# Patient Record
Sex: FEMALE | Race: OTHER | HISPANIC OR LATINO | URBAN - METROPOLITAN AREA
[De-identification: names, ages, dates, MRNs, and addresses within clinical notes are randomized per-mention and may not be internally consistent; named-entity substitution may affect disease eponyms.]

---

## 2021-09-07 ENCOUNTER — EMERGENCY (EMERGENCY)
Facility: HOSPITAL | Age: 26
LOS: 1 days | Discharge: HOME | End: 2021-09-07
Attending: EMERGENCY MEDICINE | Admitting: EMERGENCY MEDICINE
Payer: COMMERCIAL

## 2021-09-07 VITALS
HEART RATE: 99 BPM | OXYGEN SATURATION: 96 % | SYSTOLIC BLOOD PRESSURE: 119 MMHG | RESPIRATION RATE: 16 BRPM | TEMPERATURE: 98 F | DIASTOLIC BLOOD PRESSURE: 81 MMHG

## 2021-09-07 VITALS
TEMPERATURE: 98 F | WEIGHT: 194.01 LBS | RESPIRATION RATE: 18 BRPM | OXYGEN SATURATION: 98 % | SYSTOLIC BLOOD PRESSURE: 125 MMHG | DIASTOLIC BLOOD PRESSURE: 80 MMHG | HEART RATE: 110 BPM | HEIGHT: 69.29 IN

## 2021-09-07 DIAGNOSIS — Z20.822 CONTACT WITH AND (SUSPECTED) EXPOSURE TO COVID-19: ICD-10-CM
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
## 2021-09-07 DIAGNOSIS — Z87.81 PERSONAL HISTORY OF (HEALED) TRAUMATIC FRACTURE: ICD-10-CM

## 2021-09-07 DIAGNOSIS — M79.605 PAIN IN LEFT LEG: ICD-10-CM

## 2021-09-07 DIAGNOSIS — S82.202D UNSPECIFIED FRACTURE OF SHAFT OF LEFT TIBIA, SUBSEQUENT ENCOUNTER FOR CLOSED FRACTURE WITH ROUTINE HEALING: ICD-10-CM

## 2021-09-07 DIAGNOSIS — Z98.890 OTHER SPECIFIED POSTPROCEDURAL STATES: Chronic | ICD-10-CM

## 2021-09-07 DIAGNOSIS — M96.840 POSTPROCEDURAL HEMATOMA OF A MUSCULOSKELETAL STRUCTURE FOLLOWING A MUSCULOSKELETAL SYSTEM PROCEDURE: ICD-10-CM

## 2021-09-07 DIAGNOSIS — M25.572 PAIN IN LEFT ANKLE AND JOINTS OF LEFT FOOT: ICD-10-CM

## 2021-09-07 LAB
ALBUMIN SERPL ELPH-MCNC: 3.9 G/DL — SIGNIFICANT CHANGE UP (ref 3.3–5)
ALP SERPL-CCNC: 68 U/L — SIGNIFICANT CHANGE UP (ref 40–120)
ALT FLD-CCNC: 14 U/L — SIGNIFICANT CHANGE UP (ref 10–45)
ANION GAP SERPL CALC-SCNC: 14 MMOL/L — SIGNIFICANT CHANGE UP (ref 5–17)
AST SERPL-CCNC: 13 U/L — SIGNIFICANT CHANGE UP (ref 10–40)
BASOPHILS # BLD AUTO: 0.03 K/UL — SIGNIFICANT CHANGE UP (ref 0–0.2)
BASOPHILS NFR BLD AUTO: 0.2 % — SIGNIFICANT CHANGE UP (ref 0–2)
BILIRUB SERPL-MCNC: 0.4 MG/DL — SIGNIFICANT CHANGE UP (ref 0.2–1.2)
BUN SERPL-MCNC: 12 MG/DL — SIGNIFICANT CHANGE UP (ref 7–23)
CALCIUM SERPL-MCNC: 9.7 MG/DL — SIGNIFICANT CHANGE UP (ref 8.4–10.5)
CHLORIDE SERPL-SCNC: 104 MMOL/L — SIGNIFICANT CHANGE UP (ref 96–108)
CO2 SERPL-SCNC: 21 MMOL/L — LOW (ref 22–31)
CREAT SERPL-MCNC: 0.7 MG/DL — SIGNIFICANT CHANGE UP (ref 0.5–1.3)
CRP SERPL-MCNC: 213.7 MG/L — HIGH (ref 0–4)
EOSINOPHIL # BLD AUTO: 0.1 K/UL — SIGNIFICANT CHANGE UP (ref 0–0.5)
EOSINOPHIL NFR BLD AUTO: 0.8 % — SIGNIFICANT CHANGE UP (ref 0–6)
ERYTHROCYTE [SEDIMENTATION RATE] IN BLOOD: 109 MM/HR — HIGH
GLUCOSE SERPL-MCNC: 83 MG/DL — SIGNIFICANT CHANGE UP (ref 70–99)
HCT VFR BLD CALC: 37.3 % — SIGNIFICANT CHANGE UP (ref 34.5–45)
HGB BLD-MCNC: 12.4 G/DL — SIGNIFICANT CHANGE UP (ref 11.5–15.5)
IMM GRANULOCYTES NFR BLD AUTO: 0.3 % — SIGNIFICANT CHANGE UP (ref 0–1.5)
LYMPHOCYTES # BLD AUTO: 1.41 K/UL — SIGNIFICANT CHANGE UP (ref 1–3.3)
LYMPHOCYTES # BLD AUTO: 11.1 % — LOW (ref 13–44)
MCHC RBC-ENTMCNC: 29.3 PG — SIGNIFICANT CHANGE UP (ref 27–34)
MCHC RBC-ENTMCNC: 33.2 GM/DL — SIGNIFICANT CHANGE UP (ref 32–36)
MCV RBC AUTO: 88.2 FL — SIGNIFICANT CHANGE UP (ref 80–100)
MONOCYTES # BLD AUTO: 0.67 K/UL — SIGNIFICANT CHANGE UP (ref 0–0.9)
MONOCYTES NFR BLD AUTO: 5.3 % — SIGNIFICANT CHANGE UP (ref 2–14)
NEUTROPHILS # BLD AUTO: 10.48 K/UL — HIGH (ref 1.8–7.4)
NEUTROPHILS NFR BLD AUTO: 82.3 % — HIGH (ref 43–77)
NRBC # BLD: 0 /100 WBCS — SIGNIFICANT CHANGE UP (ref 0–0)
PLATELET # BLD AUTO: 524 K/UL — HIGH (ref 150–400)
POTASSIUM SERPL-MCNC: 3.9 MMOL/L — SIGNIFICANT CHANGE UP (ref 3.5–5.3)
POTASSIUM SERPL-SCNC: 3.9 MMOL/L — SIGNIFICANT CHANGE UP (ref 3.5–5.3)
PROT SERPL-MCNC: 9 G/DL — HIGH (ref 6–8.3)
RBC # BLD: 4.23 M/UL — SIGNIFICANT CHANGE UP (ref 3.8–5.2)
RBC # FLD: 11.9 % — SIGNIFICANT CHANGE UP (ref 10.3–14.5)
SARS-COV-2 RNA SPEC QL NAA+PROBE: SIGNIFICANT CHANGE UP
SODIUM SERPL-SCNC: 139 MMOL/L — SIGNIFICANT CHANGE UP (ref 135–145)
WBC # BLD: 12.73 K/UL — HIGH (ref 3.8–10.5)
WBC # FLD AUTO: 12.73 K/UL — HIGH (ref 3.8–10.5)

## 2021-09-07 PROCEDURE — 73590 X-RAY EXAM OF LOWER LEG: CPT | Mod: 26

## 2021-09-07 PROCEDURE — 80053 COMPREHEN METABOLIC PANEL: CPT

## 2021-09-07 PROCEDURE — G1004: CPT

## 2021-09-07 PROCEDURE — 36415 COLL VENOUS BLD VENIPUNCTURE: CPT

## 2021-09-07 PROCEDURE — 73701 CT LOWER EXTREMITY W/DYE: CPT | Mod: MG

## 2021-09-07 PROCEDURE — 73610 X-RAY EXAM OF ANKLE: CPT | Mod: 26

## 2021-09-07 PROCEDURE — 73610 X-RAY EXAM OF ANKLE: CPT | Mod: 26,LT

## 2021-09-07 PROCEDURE — 85652 RBC SED RATE AUTOMATED: CPT

## 2021-09-07 PROCEDURE — 73590 X-RAY EXAM OF LOWER LEG: CPT

## 2021-09-07 PROCEDURE — 87040 BLOOD CULTURE FOR BACTERIA: CPT

## 2021-09-07 PROCEDURE — 73590 X-RAY EXAM OF LOWER LEG: CPT | Mod: 26,LT

## 2021-09-07 PROCEDURE — 73701 CT LOWER EXTREMITY W/DYE: CPT | Mod: 26,LT,MG

## 2021-09-07 PROCEDURE — 73610 X-RAY EXAM OF ANKLE: CPT

## 2021-09-07 PROCEDURE — 85025 COMPLETE CBC W/AUTO DIFF WBC: CPT

## 2021-09-07 PROCEDURE — 99244 OFF/OP CNSLTJ NEW/EST MOD 40: CPT

## 2021-09-07 PROCEDURE — 86140 C-REACTIVE PROTEIN: CPT

## 2021-09-07 PROCEDURE — 99284 EMERGENCY DEPT VISIT MOD MDM: CPT | Mod: 25

## 2021-09-07 PROCEDURE — U0005: CPT

## 2021-09-07 PROCEDURE — U0003: CPT

## 2021-09-07 PROCEDURE — 99285 EMERGENCY DEPT VISIT HI MDM: CPT | Mod: 25

## 2021-09-07 PROCEDURE — 96374 THER/PROPH/DIAG INJ IV PUSH: CPT | Mod: XU

## 2021-09-07 RX ORDER — KETOROLAC TROMETHAMINE 30 MG/ML
15 SYRINGE (ML) INJECTION ONCE
Refills: 0 | Status: DISCONTINUED | OUTPATIENT
Start: 2021-09-07 | End: 2021-09-07

## 2021-09-07 RX ORDER — OXYCODONE AND ACETAMINOPHEN 5; 325 MG/1; MG/1
1 TABLET ORAL ONCE
Refills: 0 | Status: DISCONTINUED | OUTPATIENT
Start: 2021-09-07 | End: 2021-09-07

## 2021-09-07 RX ADMIN — OXYCODONE AND ACETAMINOPHEN 1 TABLET(S): 5; 325 TABLET ORAL at 17:14

## 2021-09-07 RX ADMIN — OXYCODONE AND ACETAMINOPHEN 1 TABLET(S): 5; 325 TABLET ORAL at 18:06

## 2021-09-07 RX ADMIN — Medication 15 MILLIGRAM(S): at 18:24

## 2021-09-07 NOTE — ED PROVIDER NOTE - NSFOLLOWUPINSTRUCTIONS_ED_ALL_ED_FT
If you have any worsening of your symptoms, return to the ED IMMEDIATELY for reassessment and likely admission at that time for OR.    Call tomorrow or thursday am (after the holiday) to get an appointment immediatley to see him about your ankle.

## 2021-09-07 NOTE — H&P ADULT - ATTENDING COMMENTS
Patient with chronic draining left ankle medial wound s/p prior ORIF, now with draining lateral wound, exam consistent with possible posterior tibial artery ands nerve injury at index injury/surgery as well as suspected superficial peroneal nerve.  Xrays consistent with infected nonunion.  No emergent indication for acute admission at this time as aseptic and chronic as stated. Will require complex, likely staged surgical intervention with a foot and ankle specialist.  Options for admission with pain control, and inpatient surgery versus outpatient consultation were discussed.  Pt opts for outpatient initiation of care with specialist.  NWB LLE and crutches until followup.

## 2021-09-07 NOTE — ED PROVIDER NOTE - ATTENDING CONTRIBUTION TO CARE
here w/ concern for new ankle pain and bleeding ulcers  in setting of ankle fracture s/p ORIF several months ago in [facility]. tachycardic in the ed, but non toxic appearing, ankle with diffuse edema, bilateral malleolar ulcerations with blood oozing from wounds, no purulence seen. plan for labs, xr, ortho consult for poss infection

## 2021-09-07 NOTE — H&P ADULT - NSHPPHYSICALEXAM_GEN_ALL_CORE
Physical Exam:   1.5cmx1.5cm open wound at left lateral malleolus draining serosanguinous foul smelling fluid. [id number] wound at medial maleolus draining serosanguinous fluid. Mild TTP, swollen.  Absent sensation over dorsum, 1st webspace, plantar surface of L foot. Decreased sensation over lateral surface of 5th metatarsal (same presentation as compared to sensation immediately post op)  2+ pedal pulses warm and well perfused, capillary refill <3 seconds  0/5 strength with L dorsiflexion and L EHL, 3/5 strength with L FHL/GC.

## 2021-09-07 NOTE — ED ADULT NURSE NOTE - CHIEF COMPLAINT QUOTE
Patient states had surgery on March 28 in [facility], on left ankle 5 months ago for multiple fractures, states was recovering well since, noted last week pain, swelling and wound re-opened w/  bleeding from site.  Arrives w/ bulky dressing in place and ambulates w/ crutches.

## 2021-09-07 NOTE — ED PROVIDER NOTE - OBJECTIVE STATEMENT
26 healthy F psh ORIF L tib/fib fracture 3/28 in [facility] p/w pain/swelling and open wounds to LLE x one week.   pt reports she had surgery for tib/fib fracture after injury in March, has been compliant with PT and has since been able to ambulate with cane and minimal pain.  Over past week she reports diffuse pain and increased swelling and surgical wounds have opened w/ bloody drainage.  She has not been able to bear weight for past week.  Went to see podiatrist today who told her she needs to see ortho today or go to ED.  Denies f/c, 26 healthy F psh ORIF L tib/fib fracture 3/28 in [facility] p/w pain/swelling and open wounds to LLE x one week.   pt reports she had surgery for tib/fib fracture after injury in March, has been compliant with PT and has since been able to ambulate with cane and minimal pain.  Over past week she reports diffuse pain and increased swelling and surgical wounds have opened w/ bloody drainage.  She has not been able to bear weight for past week.  Went to see podiatrist today who told her she needs to see ortho today or go to ED.  Pt has had decreased sensation L 5th toe since surgery, no change.  Denies f/c, headache, dizziness, chest pain, sob, abd pain, nv, numbness/weakness, calf pain, recurrent injury/trauma

## 2021-09-07 NOTE — ED PROVIDER NOTE - PATIENT PORTAL LINK FT
You can access the FollowMyHealth Patient Portal offered by [facility] by registering at the following website: [email or web address]. By joining [employer]’s FollowMyHealth portal, you will also be able to view your health information using other applications (apps) compatible with our system.

## 2021-09-07 NOTE — ED ADULT NURSE NOTE - NSIMPLEMENTINTERV_GEN_ALL_ED
Implemented All Fall Risk Interventions:  Gobles to call system. Call bell, personal items and telephone within reach. Instruct patient to call for assistance. Room bathroom lighting operational. Non-slip footwear when patient is off stretcher. Physically safe environment: no spills, clutter or unnecessary equipment. Stretcher in lowest position, wheels locked, appropriate side rails in place. Provide visual cue, wrist band, yellow gown, etc. Monitor gait and stability. Monitor for mental status changes and reorient to person, place, and time. Review medications for side effects contributing to fall risk. Reinforce activity limits and safety measures with patient and family.

## 2021-09-07 NOTE — H&P ADULT - NSHPLABSRESULTS_GEN_ALL_CORE
Labs:                        12.4   12.73 )-----------( 524      ( 07 Sep 2021 17:24 )             37.3     09-07    139  |  104  |  12  ----------------------------<  83  3.9   |  21<L>  |  0.70    Ca    9.7      07 Sep 2021 17:24    TPro  9.0<H>  /  Alb  3.9  /  TBili  0.4  /  DBili  x   /  AST  13  /  ALT  14  /  AlkPhos  68  09-07        .7

## 2021-09-07 NOTE — H&P ADULT - HISTORY OF PRESENT ILLNESS
Orthopaedic Surgery Consult Note    Attending Physician: Dr. Torre  Consult requested by: Dr. Urbano    CC: L ankle pain/swelling    HPI:  26yFemale s/p L ankle ORIF in [facility] in 3/2021 who presents with increasing pain, swelling in left ankle since last week. Patient states she completed PT in [facility] and progressed to full weight-bearing as per her orthopaedic surgeon's ordered. Denies recent trauma or falls to the area. Patient reports increasing drainage of foul smelling fluid from L lateral/medial malleolus wounds since yesterday. Patient denies recent sickness, and fevers.    Allergies    No Known Allergies    Intolerances      PAST MEDICAL & SURGICAL HISTORY:      Meds:  Pentoxyfylline 400mg BID    Family History:  Denies family history of bleeding disorders    Social History:   Pt is a nonsmoker  Social EtOH use     Review of Systems:

## 2021-09-07 NOTE — ED ADULT TRIAGE NOTE - CHIEF COMPLAINT QUOTE
Patient states had surgery on March 28 in [facility], on left ankle 5 months ago for multiple fractures, states was recovering well since, noted last week pain, swelling and wound re-opened w/  bleeding from site.  Arrives w/ bulky dressing in place and ambulates w/ crutches.

## 2021-09-07 NOTE — ED PROVIDER NOTE - PROGRESS NOTE DETAILS
ortho requesting CT imaging LLE, hold abx at this time seen by ortho attending and admission offered, pt prefers to go home and arrange for OR for washout as outpatient vs returning immediately to the ED for any worsening of symptoms

## 2021-09-07 NOTE — H&P ADULT - NSHPRISKHIVSCREEN_GEN_ALL_CORE
Call your healthcare provider right away if you get any of the following signs or symptoms of bleeding/clotting problems:   * Pain, redness, swelling, or temperature changes to any part of your body   * Sudden weakness, numbness or tingling    * Chest pain or shortness in breath   * Sudden onset of slurred speech/inability to speak and/or facial droop or visual changes   * Headaches, dizziness, faint-feeling, or weakness   * Unusual bruising (unexplained or growing in size)   * Nosebleeds or bleeding gums   * Bleeding from cuts that take a long time to stop   * Menstrual bleeding or vaginal bleeding that is heavier than normal   * Pink or brown urine, red or black stools   * Coughing up blood   * Vomiting blood or material that looks like coffee grounds    Update Anticoagulation Clinic (Coumadin Clinic) with any of the following:   * Medication changes (new, stopped or dose changes, this includes over-the-counter medications and supplements)   * Planning on having any surgery, medical or dental procedures   * Diet changes   * Falls  (you should go to Emergency Department immediately for evaluation, then notify Anticoagulation Clinic)    Please, do not wait until your next appointment to update us on changes.      
Offered and patient declined

## 2021-09-07 NOTE — ED PROVIDER NOTE - CLINICAL SUMMARY MEDICAL DECISION MAKING FREE TEXT BOX
26 healthy F psh ORIF L tib/fib fracture 3/28 in [facility] p/w pain/swelling and open wounds to LLE x one week.  on exam pt afebrile, appears uncomfortable but nad, LLE: open wound to b/l malleolus w/ hematoma and serosanguinous malodorous drainage from lateral wound, no purulence, + erythema/warmth and edema from proximal ankle extending into midfoot, FROM toes/knee, limited ROM ankle 2/2 pain (and likely hardware), diminished sensation 5th toe but cap refill intact all digits and warm to touch, DP/PT pulse 2+, SILT, compartments soft.  concern for infected hardware, no obvious abscess.  will obtain labs, xray and c/s ortho for recs

## 2021-09-07 NOTE — ED ADULT NURSE NOTE - OBJECTIVE STATEMENT
26y female presents to ED c/o left ankle pain. Pt states she had mechanical fall in March breaking bones (unsure which ones) in left ankle and requiring surgery with pin placement in [facility]. Since then pt has been attending pt and healing. Starting one week ago, pt started noticing pain, swelling, redness, and bleeding from incision sites on right and left lateral aspects of ankle, pain requiring use of crutches to ambulate. Pt able to move toes but has numbness in toes that has been present since surgery. Denies other relevant medical history. A&Ox4.

## 2021-09-07 NOTE — ED PROVIDER NOTE - CARE PROVIDER_API CALL
Chapin Wallis)  Orthopaedic Surgery  [address], [address] [place] [address]  Phone: [phone]  Fax: [phone]  Follow Up Time:

## 2021-09-08 ENCOUNTER — TRANSCRIPTION ENCOUNTER (OUTPATIENT)
Age: 26
End: 2021-09-08

## 2021-09-09 ENCOUNTER — INPATIENT (INPATIENT)
Facility: HOSPITAL | Age: 26
LOS: 56 days | Discharge: HOME HEALTH | DRG: 464 | End: 2021-11-05
Attending: STUDENT IN AN ORGANIZED HEALTH CARE EDUCATION/TRAINING PROGRAM | Admitting: ORTHOPAEDIC SURGERY
Payer: COMMERCIAL

## 2021-09-09 ENCOUNTER — APPOINTMENT (OUTPATIENT)
Dept: ORTHOPEDIC SURGERY | Facility: HOSPITAL | Age: 26
End: 2021-09-09

## 2021-09-09 ENCOUNTER — APPOINTMENT (OUTPATIENT)
Dept: ORTHOPEDIC SURGERY | Facility: CLINIC | Age: 26
End: 2021-09-09
Payer: COMMERCIAL

## 2021-09-09 ENCOUNTER — RESULT REVIEW (OUTPATIENT)
Age: 26
End: 2021-09-09

## 2021-09-09 ENCOUNTER — NON-APPOINTMENT (OUTPATIENT)
Age: 26
End: 2021-09-09

## 2021-09-09 VITALS
HEART RATE: 96 BPM | OXYGEN SATURATION: 97 % | SYSTOLIC BLOOD PRESSURE: 122 MMHG | DIASTOLIC BLOOD PRESSURE: 84 MMHG | WEIGHT: 190.04 LBS | TEMPERATURE: 98 F | RESPIRATION RATE: 18 BRPM | HEIGHT: 69.29 IN

## 2021-09-09 DIAGNOSIS — M00.9 PYOGENIC ARTHRITIS, UNSPECIFIED: ICD-10-CM

## 2021-09-09 DIAGNOSIS — T81.42XA INFECTION FOLLOWING A PROCEDURE, DEEP INCISIONAL SURGICAL SITE, INITIAL ENCOUNTER: ICD-10-CM

## 2021-09-09 DIAGNOSIS — M86.672 OTHER CHRONIC OSTEOMYELITIS, LEFT ANKLE AND FOOT: ICD-10-CM

## 2021-09-09 DIAGNOSIS — G57.92 UNSPECIFIED MONONEUROPATHY OF LEFT LOWER LIMB: ICD-10-CM

## 2021-09-09 DIAGNOSIS — M67.02 SHORT ACHILLES TENDON (ACQUIRED), LEFT ANKLE: ICD-10-CM

## 2021-09-09 DIAGNOSIS — B95.61 METHICILLIN SUSCEPTIBLE STAPHYLOCOCCUS AUREUS INFECTION AS THE CAUSE OF DISEASES CLASSIFIED ELSEWHERE: ICD-10-CM

## 2021-09-09 DIAGNOSIS — E87.6 HYPOKALEMIA: ICD-10-CM

## 2021-09-09 DIAGNOSIS — I96 GANGRENE, NOT ELSEWHERE CLASSIFIED: ICD-10-CM
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
## 2021-09-09 DIAGNOSIS — R74.01 ELEVATION OF LEVELS OF LIVER TRANSAMINASE LEVELS: ICD-10-CM

## 2021-09-09 DIAGNOSIS — I73.9 PERIPHERAL VASCULAR DISEASE, UNSPECIFIED: ICD-10-CM

## 2021-09-09 DIAGNOSIS — Z72.0 TOBACCO USE: ICD-10-CM

## 2021-09-09 DIAGNOSIS — T84.69XA INFECTION AND INFLAMMATORY REACTION DUE TO INTERNAL FIXATION DEVICE OF OTHER SITE, INITIAL ENCOUNTER: ICD-10-CM
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
## 2021-09-09 DIAGNOSIS — T37.8X5A ADVERSE EFFECT OF OTHER SPECIFIED SYSTEMIC ANTI-INFECTIVES AND ANTIPARASITICS, INITIAL ENCOUNTER: ICD-10-CM

## 2021-09-09 DIAGNOSIS — S82.832A OTHER FRACTURE OF UPPER AND LOWER END OF LEFT FIBULA, INITIAL ENCOUNTER FOR CLOSED FRACTURE: ICD-10-CM

## 2021-09-09 DIAGNOSIS — T86.828 OTHER COMPLICATIONS OF SKIN GRAFT (ALLOGRAFT) (AUTOGRAFT): ICD-10-CM

## 2021-09-09 DIAGNOSIS — Z98.890 OTHER SPECIFIED POSTPROCEDURAL STATES: Chronic | ICD-10-CM

## 2021-09-09 DIAGNOSIS — B95.4 OTHER STREPTOCOCCUS AS THE CAUSE OF DISEASES CLASSIFIED ELSEWHERE: ICD-10-CM

## 2021-09-09 DIAGNOSIS — G57.42: ICD-10-CM

## 2021-09-09 DIAGNOSIS — E83.42 HYPOMAGNESEMIA: ICD-10-CM

## 2021-09-09 DIAGNOSIS — S96.812A STRAIN OF OTHER SPECIFIED MUSCLES AND TENDONS AT ANKLE AND FOOT LEVEL, LEFT FOOT, INITIAL ENCOUNTER: ICD-10-CM

## 2021-09-09 DIAGNOSIS — R33.9 RETENTION OF URINE, UNSPECIFIED: ICD-10-CM

## 2021-09-09 DIAGNOSIS — T84.625A INFECTION AND INFLAMMATORY REACTION DUE TO INTERNAL FIXATION DEVICE OF LEFT FIBULA, INITIAL ENCOUNTER: ICD-10-CM

## 2021-09-09 DIAGNOSIS — L89.601 PRESSURE ULCER OF UNSPECIFIED HEEL, STAGE 1: ICD-10-CM

## 2021-09-09 DIAGNOSIS — D62 ACUTE POSTHEMORRHAGIC ANEMIA: ICD-10-CM

## 2021-09-09 DIAGNOSIS — T81.40XA INFECTION FOLLOWING A PROCEDURE, UNSPECIFIED, INITIAL ENCOUNTER: ICD-10-CM

## 2021-09-09 DIAGNOSIS — M79.A22 NONTRAUMATIC COMPARTMENT SYNDROME OF LEFT LOWER EXTREMITY: ICD-10-CM

## 2021-09-09 DIAGNOSIS — E44.0 MODERATE PROTEIN-CALORIE MALNUTRITION: ICD-10-CM

## 2021-09-09 DIAGNOSIS — G57.82 OTHER SPECIFIED MONONEUROPATHIES OF LEFT LOWER LIMB: ICD-10-CM

## 2021-09-09 LAB
ALBUMIN SERPL ELPH-MCNC: 4 G/DL — SIGNIFICANT CHANGE UP (ref 3.3–5)
ALP SERPL-CCNC: 68 U/L — SIGNIFICANT CHANGE UP (ref 40–120)
ALT FLD-CCNC: 14 U/L — SIGNIFICANT CHANGE UP (ref 10–45)
ANION GAP SERPL CALC-SCNC: 11 MMOL/L — SIGNIFICANT CHANGE UP (ref 5–17)
APPEARANCE UR: CLEAR — SIGNIFICANT CHANGE UP
APTT BLD: 31.1 SEC — SIGNIFICANT CHANGE UP (ref 27.5–35.5)
AST SERPL-CCNC: 15 U/L — SIGNIFICANT CHANGE UP (ref 10–40)
BASOPHILS # BLD AUTO: 0.02 K/UL — SIGNIFICANT CHANGE UP (ref 0–0.2)
BASOPHILS NFR BLD AUTO: 0.2 % — SIGNIFICANT CHANGE UP (ref 0–2)
BILIRUB SERPL-MCNC: 0.5 MG/DL — SIGNIFICANT CHANGE UP (ref 0.2–1.2)
BILIRUB UR-MCNC: NEGATIVE — SIGNIFICANT CHANGE UP
BLD GP AB SCN SERPL QL: NEGATIVE — SIGNIFICANT CHANGE UP
BUN SERPL-MCNC: 11 MG/DL — SIGNIFICANT CHANGE UP (ref 7–23)
CALCIUM SERPL-MCNC: 9.8 MG/DL — SIGNIFICANT CHANGE UP (ref 8.4–10.5)
CHLORIDE SERPL-SCNC: 100 MMOL/L — SIGNIFICANT CHANGE UP (ref 96–108)
CO2 SERPL-SCNC: 24 MMOL/L — SIGNIFICANT CHANGE UP (ref 22–31)
COLOR SPEC: YELLOW — SIGNIFICANT CHANGE UP
CREAT SERPL-MCNC: 0.72 MG/DL — SIGNIFICANT CHANGE UP (ref 0.5–1.3)
CRP SERPL-MCNC: 129 MG/L — HIGH (ref 0–4)
DIFF PNL FLD: NEGATIVE — SIGNIFICANT CHANGE UP
EOSINOPHIL # BLD AUTO: 0.18 K/UL — SIGNIFICANT CHANGE UP (ref 0–0.5)
EOSINOPHIL NFR BLD AUTO: 2.1 % — SIGNIFICANT CHANGE UP (ref 0–6)
ERYTHROCYTE [SEDIMENTATION RATE] IN BLOOD: >130 MM/HR — HIGH
GLUCOSE SERPL-MCNC: 83 MG/DL — SIGNIFICANT CHANGE UP (ref 70–99)
GLUCOSE UR QL: NEGATIVE — SIGNIFICANT CHANGE UP
HCG UR QL: NEGATIVE — SIGNIFICANT CHANGE UP
HCT VFR BLD CALC: 35.5 % — SIGNIFICANT CHANGE UP (ref 34.5–45)
HGB BLD-MCNC: 11.9 G/DL — SIGNIFICANT CHANGE UP (ref 11.5–15.5)
IMM GRANULOCYTES NFR BLD AUTO: 0.6 % — SIGNIFICANT CHANGE UP (ref 0–1.5)
INR BLD: 1.26 — HIGH (ref 0.88–1.16)
KETONES UR-MCNC: 40 MG/DL
LEUKOCYTE ESTERASE UR-ACNC: NEGATIVE — SIGNIFICANT CHANGE UP
LYMPHOCYTES # BLD AUTO: 1.48 K/UL — SIGNIFICANT CHANGE UP (ref 1–3.3)
LYMPHOCYTES # BLD AUTO: 16.9 % — SIGNIFICANT CHANGE UP (ref 13–44)
MCHC RBC-ENTMCNC: 29.3 PG — SIGNIFICANT CHANGE UP (ref 27–34)
MCHC RBC-ENTMCNC: 33.5 GM/DL — SIGNIFICANT CHANGE UP (ref 32–36)
MCV RBC AUTO: 87.4 FL — SIGNIFICANT CHANGE UP (ref 80–100)
MONOCYTES # BLD AUTO: 0.6 K/UL — SIGNIFICANT CHANGE UP (ref 0–0.9)
MONOCYTES NFR BLD AUTO: 6.8 % — SIGNIFICANT CHANGE UP (ref 2–14)
NEUTROPHILS # BLD AUTO: 6.44 K/UL — SIGNIFICANT CHANGE UP (ref 1.8–7.4)
NEUTROPHILS NFR BLD AUTO: 73.4 % — SIGNIFICANT CHANGE UP (ref 43–77)
NITRITE UR-MCNC: NEGATIVE — SIGNIFICANT CHANGE UP
NRBC # BLD: 0 /100 WBCS — SIGNIFICANT CHANGE UP (ref 0–0)
PH UR: 6 — SIGNIFICANT CHANGE UP (ref 5–8)
PLATELET # BLD AUTO: 498 K/UL — HIGH (ref 150–400)
POTASSIUM SERPL-MCNC: 3.6 MMOL/L — SIGNIFICANT CHANGE UP (ref 3.5–5.3)
POTASSIUM SERPL-SCNC: 3.6 MMOL/L — SIGNIFICANT CHANGE UP (ref 3.5–5.3)
PROT SERPL-MCNC: 8.9 G/DL — HIGH (ref 6–8.3)
PROT UR-MCNC: NEGATIVE MG/DL — SIGNIFICANT CHANGE UP
PROTHROM AB SERPL-ACNC: 15 SEC — HIGH (ref 10.6–13.6)
RBC # BLD: 4.06 M/UL — SIGNIFICANT CHANGE UP (ref 3.8–5.2)
RBC # FLD: 12 % — SIGNIFICANT CHANGE UP (ref 10.3–14.5)
RH IG SCN BLD-IMP: NEGATIVE — SIGNIFICANT CHANGE UP
SARS-COV-2 RNA SPEC QL NAA+PROBE: NEGATIVE — SIGNIFICANT CHANGE UP
SODIUM SERPL-SCNC: 135 MMOL/L — SIGNIFICANT CHANGE UP (ref 135–145)
SP GR SPEC: 1.02 — SIGNIFICANT CHANGE UP (ref 1–1.03)
UROBILINOGEN FLD QL: 0.2 E.U./DL — SIGNIFICANT CHANGE UP
WBC # BLD: 8.77 K/UL — SIGNIFICANT CHANGE UP (ref 3.8–10.5)
WBC # FLD AUTO: 8.77 K/UL — SIGNIFICANT CHANGE UP (ref 3.8–10.5)

## 2021-09-09 PROCEDURE — 73600 X-RAY EXAM OF ANKLE: CPT | Mod: 26,LT

## 2021-09-09 PROCEDURE — 99222 1ST HOSP IP/OBS MODERATE 55: CPT | Mod: 57

## 2021-09-09 PROCEDURE — 99285 EMERGENCY DEPT VISIT HI MDM: CPT | Mod: 25

## 2021-09-09 PROCEDURE — 88311 DECALCIFY TISSUE: CPT | Mod: 26

## 2021-09-09 PROCEDURE — 88304 TISSUE EXAM BY PATHOLOGIST: CPT | Mod: 26

## 2021-09-09 PROCEDURE — 77071 MNL APPL STRS JT RADIOGRAPHY: CPT

## 2021-09-09 PROCEDURE — 93010 ELECTROCARDIOGRAM REPORT: CPT

## 2021-09-09 PROCEDURE — 27695 REPAIR OF ANKLE LIGAMENT: CPT | Mod: 59,LT

## 2021-09-09 PROCEDURE — 97607 NEG PRS WND THR NDME<=50SQCM: CPT

## 2021-09-09 PROCEDURE — 64774 REMOVE SKIN NERVE LESION: CPT | Mod: LT

## 2021-09-09 PROCEDURE — 88300 SURGICAL PATH GROSS: CPT | Mod: 26,59

## 2021-09-09 PROCEDURE — 20205 DEEP MUSCLE BIOPSY: CPT | Mod: LT

## 2021-09-09 PROCEDURE — 99254 IP/OBS CNSLTJ NEW/EST MOD 60: CPT

## 2021-09-09 PROCEDURE — 27620 EXPLORE/TREAT ANKLE JOINT: CPT | Mod: 59,LT

## 2021-09-09 PROCEDURE — 99253 IP/OBS CNSLTJ NEW/EST LOW 45: CPT

## 2021-09-09 PROCEDURE — 20680 REMOVAL OF IMPLANT DEEP: CPT | Mod: LT

## 2021-09-09 PROCEDURE — 88305 TISSUE EXAM BY PATHOLOGIST: CPT | Mod: 26

## 2021-09-09 PROCEDURE — 99205 OFFICE O/P NEW HI 60 MIN: CPT | Mod: 25

## 2021-09-09 RX ORDER — CEFEPIME 1 G/1
2000 INJECTION, POWDER, FOR SOLUTION INTRAMUSCULAR; INTRAVENOUS EVERY 8 HOURS
Refills: 0 | Status: DISCONTINUED | OUTPATIENT
Start: 2021-09-09 | End: 2021-09-11

## 2021-09-09 RX ORDER — OXYCODONE HYDROCHLORIDE 5 MG/1
5 TABLET ORAL EVERY 4 HOURS
Refills: 0 | Status: DISCONTINUED | OUTPATIENT
Start: 2021-09-09 | End: 2021-09-16

## 2021-09-09 RX ORDER — SODIUM CHLORIDE 9 MG/ML
1000 INJECTION, SOLUTION INTRAVENOUS
Refills: 0 | Status: DISCONTINUED | OUTPATIENT
Start: 2021-09-09 | End: 2021-09-15

## 2021-09-09 RX ORDER — OXYCODONE HYDROCHLORIDE 5 MG/1
10 TABLET ORAL EVERY 4 HOURS
Refills: 0 | Status: DISCONTINUED | OUTPATIENT
Start: 2021-09-09 | End: 2021-09-16

## 2021-09-09 RX ORDER — VANCOMYCIN HCL 1 G
1250 VIAL (EA) INTRAVENOUS EVERY 8 HOURS
Refills: 0 | Status: DISCONTINUED | OUTPATIENT
Start: 2021-09-09 | End: 2021-09-10

## 2021-09-09 RX ORDER — OXYCODONE AND ACETAMINOPHEN 5; 325 MG/1; MG/1
1 TABLET ORAL EVERY 6 HOURS
Refills: 0 | Status: DISCONTINUED | OUTPATIENT
Start: 2021-09-09 | End: 2021-09-09

## 2021-09-09 RX ORDER — MORPHINE SULFATE 50 MG/1
4 CAPSULE, EXTENDED RELEASE ORAL EVERY 4 HOURS
Refills: 0 | Status: DISCONTINUED | OUTPATIENT
Start: 2021-09-09 | End: 2021-09-09

## 2021-09-09 RX ORDER — ACETAMINOPHEN 500 MG
650 TABLET ORAL EVERY 6 HOURS
Refills: 0 | Status: DISCONTINUED | OUTPATIENT
Start: 2021-09-09 | End: 2021-09-20

## 2021-09-09 RX ORDER — SODIUM CHLORIDE 9 MG/ML
1000 INJECTION INTRAMUSCULAR; INTRAVENOUS; SUBCUTANEOUS
Refills: 0 | Status: DISCONTINUED | OUTPATIENT
Start: 2021-09-09 | End: 2021-09-09

## 2021-09-09 RX ORDER — SODIUM CHLORIDE 9 MG/ML
2000 INJECTION, SOLUTION INTRAVENOUS ONCE
Refills: 0 | Status: COMPLETED | OUTPATIENT
Start: 2021-09-09 | End: 2021-09-09

## 2021-09-09 RX ORDER — METOCLOPRAMIDE HCL 10 MG
10 TABLET ORAL EVERY 6 HOURS
Refills: 0 | Status: DISCONTINUED | OUTPATIENT
Start: 2021-09-09 | End: 2021-10-25

## 2021-09-09 RX ORDER — CEFEPIME 1 G/1
2000 INJECTION, POWDER, FOR SOLUTION INTRAMUSCULAR; INTRAVENOUS ONCE
Refills: 0 | Status: COMPLETED | OUTPATIENT
Start: 2021-09-09 | End: 2021-09-09

## 2021-09-09 RX ORDER — HYDROMORPHONE HYDROCHLORIDE 2 MG/ML
0.5 INJECTION INTRAMUSCULAR; INTRAVENOUS; SUBCUTANEOUS
Refills: 0 | Status: DISCONTINUED | OUTPATIENT
Start: 2021-09-09 | End: 2021-09-16

## 2021-09-09 RX ORDER — HYDROMORPHONE HYDROCHLORIDE 2 MG/ML
0.5 INJECTION INTRAMUSCULAR; INTRAVENOUS; SUBCUTANEOUS EVERY 4 HOURS
Refills: 0 | Status: DISCONTINUED | OUTPATIENT
Start: 2021-09-09 | End: 2021-09-09

## 2021-09-09 RX ORDER — SODIUM CHLORIDE 9 MG/ML
1000 INJECTION, SOLUTION INTRAVENOUS
Refills: 0 | Status: DISCONTINUED | OUTPATIENT
Start: 2021-09-09 | End: 2021-09-09

## 2021-09-09 RX ORDER — HYDROMORPHONE HYDROCHLORIDE 2 MG/ML
0.5 INJECTION INTRAMUSCULAR; INTRAVENOUS; SUBCUTANEOUS EVERY 4 HOURS
Refills: 0 | Status: DISCONTINUED | OUTPATIENT
Start: 2021-09-09 | End: 2021-09-16

## 2021-09-09 RX ORDER — HEPARIN SODIUM 5000 [USP'U]/ML
5000 INJECTION INTRAVENOUS; SUBCUTANEOUS EVERY 12 HOURS
Refills: 0 | Status: DISCONTINUED | OUTPATIENT
Start: 2021-09-09 | End: 2021-09-10

## 2021-09-09 RX ADMIN — SODIUM CHLORIDE 100 MILLILITER(S): 9 INJECTION INTRAMUSCULAR; INTRAVENOUS; SUBCUTANEOUS at 14:27

## 2021-09-09 RX ADMIN — OXYCODONE HYDROCHLORIDE 10 MILLIGRAM(S): 5 TABLET ORAL at 14:00

## 2021-09-09 RX ADMIN — Medication 166.67 MILLIGRAM(S): at 22:47

## 2021-09-09 RX ADMIN — OXYCODONE HYDROCHLORIDE 10 MILLIGRAM(S): 5 TABLET ORAL at 14:24

## 2021-09-09 RX ADMIN — SODIUM CHLORIDE 2000 MILLILITER(S): 9 INJECTION, SOLUTION INTRAVENOUS at 09:43

## 2021-09-09 RX ADMIN — CEFEPIME 100 MILLIGRAM(S): 1 INJECTION, POWDER, FOR SOLUTION INTRAMUSCULAR; INTRAVENOUS at 21:21

## 2021-09-09 NOTE — H&P ADULT - NSHPPHYSICALEXAM_GEN_ALL_CORE
Constitutional: vitals reviewed per nursing documentation, NAD, lying comfortably in bed/stretcher  Eyes: PERRLA, no obvious deformity, ecchymoses, swelling of eyelids  Neck: trachea midline  Lungs: not using accessory muscles of respiration, normal respiratory effort  Neuro/Psych: A&Ox3, normal affect and mood  MSK:   LLE - 1 x 2 cm open draining wound over the lateral malleolus, Anterior medial 0.5 x 0.5 cm open wound, No exposed hardware, Positive Achilles contracture with inability to dorsiflex past 10 degrees plantar flexed with the knee flexed and extended, Patient guards to range of motion, Clawing of the lesser and hallux, Moderate to severe soft tissue swelling about the forefoot, Altered sensorium diffusely about the left lower extremity extending up to the knee, Unable to recruit EHL or tib ant, Minimal recruitment of FHL and/or FDL, Brisk capillary refill to all toes, Significant atrophy of the left calf and anterior and lateral compartment, Positive syndesmotic squeeze test, Positive external rotation lateral translation testing  RLE - sensation intact to light touch, 5/5 EHL/FHL/TA/GS, 2+ DP pulse, no open wounds/erythema/ecchymoses, no tenderness throughout lower extremity, FROM without pain, no instability or laxity of joints, no pitting edema

## 2021-09-09 NOTE — CONSULT NOTE ADULT - SUBJECTIVE AND OBJECTIVE BOX
HPI:  25 yo F with L ankle fracture with likely septic arthritis/infected hardware.  ID consult for assistance with antibiotics.  She had surgery on 3/28 in [facility] for multiple fractures after falling off a skateboard with placement of 9 "nails."  She was treated postoperatively with clindamycin and levofloxacin IV X 2 weeks.  Per MsAdolfo Saez, she then was treated with clindamycin and levofloxacin po from May through July b/o problem with wound healing.  She moved to [place] at the end of August for school.  On ~, she began noting increasing ankle pain and swelling.  On , she began draining blood, no obvious pus.  She saw Podiatrist – was started on cephalexin q12h and referred to the ED.  She was seen in the ED on  – She was afebrile with .  She was noted to have 1.5 X 1.5 cm open wound at L lateral malleolus draining serosanguinous foul-smelling fluid and 1 X 1 cm wound at medial malleolus draining serosanguinous fluid with mild TTP, absent sensation over dorsum, 1st webspace, plantar surface of L foot with decreased sensation over lateral surface of 5th metatarsal.  Labs were notable for WBC 12.7, , .7.  CT ankle with IVC showed fixation hardware traversing a comminuted fracture fibula and medial malleolus and posterior malleolus.  No evidence of rivas-hardware lucency was seen.  ST swelling and edema were predominantly laterally without associated collection.  Per MsAdolfo  CLIFF, she has L ankle pain rated 7/10 in severity, no fever, chills.      PAST MEDICAL & SURGICAL HISTORY:  Left tibial neuropathy    PAD (peripheral artery disease)    Status post ORIF of fracture of ankle            MEDICATIONS  (STANDING):  multivitamin 1 Tablet(s) Oral daily  sodium chloride 0.9%. 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  Single, lives alone.  No animal contact.  Occ tobacco, occ alcohol use, no recreational drug use.    FAMILY HISTORY:  No pertinent family history in first degree relatives        Vital Signs Last 24 Hrs  T(C): 37 (09 Sep 2021 15:52), Max: 37.1 (09 Sep 2021 12:05)  T(F): 98.6 (09 Sep 2021 15:52), Max: 98.8 (09 Sep 2021 12:05)  HR: 85 (09 Sep 2021 15:52) (73 - 96)  BP: 105/70 (09 Sep 2021 15:52) (105/70 - 122/84)  BP(mean): --  RR: 18 (09 Sep 2021 15:52) (18 - 18)  SpO2: 98% (09 Sep 2021 15:52) (97% - 99%)    PE:  WDWN in no distress  HEENT:  NC, PERRL, sclerae anicteric, conjunctivae clear, EOMI.  Sinuses nontender, no nasal exudate.  No buccal or pharyngeal lesions, erythema or exudate  Neck:  Supple, no adenopathy  Lungs:  Clear to auscultation  Cor:  RRR, S1, S2, no murmur appreciated  Abd:  Symmetric, normoactive BS.  Soft, nontender, no masses, guarding or rebound.  Liver and spleen not enlarged  Extrem:  No cyanosis or edema.  LLE in splint. Can move toes.  Skin:  No rashes.    LABS:                        11.9   8.77  )-----------( 498      ( 09 Sep 2021 09:42 )             35.5         135  |  100  |  11  ----------------------------<  83  3.6   |  24  |  0.72    Ca    9.8      09 Sep 2021 09:42    TPro  8.9<H>  /  Alb  4.0  /  TBili  0.5  /  DBili  x   /  AST  15  /  ALT  14  /  AlkPhos  68      Sedimentation Rate, Erythrocyte: >130 mm/Hr (. @ 09:42)  C-Reactive Protein, Serum: 129.0 mg/L (21 @ 09:42)    Urinalysis Basic - ( 09 Sep 2021 12:19 )    Color: Yellow / Appearance: Clear / S.020 / pH: x  Gluc: x / Ketone: 40 mg/dL  / Bili: Negative / Urobili: 0.2 E.U./dL   Blood: x / Protein: NEGATIVE mg/dL / Nitrite: NEGATIVE   Leuk Esterase: NEGATIVE / RBC: x / WBC x   Sq Epi: x / Non Sq Epi: x / Bacteria: x    MICROBIOLOGY:  Blood cultures:  9/7 X 2 - NGTD    RADIOLOGY & ADDITIONAL STUDIES:  < from: CT Lower Extremity w/ IV Cont, Left (21 @ 19:07) >  FINDINGS: CT of the left lower extremity was performedin the axial plane. Reconstructions were performed in the sagittal and coronal planes. Reference is made to prior radiograph dated 2021.    Evaluation of the study demonstrate fixation hardware traversing a comminuted fracture fibula and medial malleolus and posterior malleolus. No evidence of rivas-hardware lucency. Soft tissue swelling and edematous predominantly laterally without associated collection lateral aspect of foot plantar and Achilles tendon spurring.          IMPRESSION:    Fixation hardware traversing trimalleolar ankle fracture with soft tissue swelling laterally.    No drainable fluid collection and no CT evidence of osteomyelitis.    < end of copied text >

## 2021-09-09 NOTE — ED PROVIDER NOTE - NOTES
will see patient shortly, case d/w Dr. Crespo of ID by ortho team pta - rec hold off abx at this time until OR

## 2021-09-09 NOTE — ED ADULT NURSE NOTE - NS ED NURSE TRANSPORT WITH
Patient Education   Personalized Prevention Plan  You are due for the preventive services outlined below.  Your care team is available to assist you in scheduling these services.  If you have already completed any of these items, please share that information with your care team to update in your medical record.  Health Maintenance Due   Topic Date Due     Zoster (Shingles) Vaccine (1 of 2) 07/01/2003     Discuss Advance Care Planning  09/12/2017     Pneumococcal Vaccine (1 of 1 - PPSV23) 07/01/2018     Colorectal Cancer Screening  04/07/2019     Mammogram  10/09/2019     PHQ-2  01/01/2020     Flu Vaccine (1) 09/01/2020     FALL RISK ASSESSMENT  10/22/2020     Annual Wellness Visit  10/22/2020       PLEASE CALL TO SCHEDULE YOUR MAMMOGRAM  [facility] [place] [phone]  [facility] [place] [phone]  [facility]   [phone]  Central Scheduling (all locations) [phone]    If you are under/uninsured, we recommend you contact the [facility]. They offer mammograms on a sliding fee charge. You can schedule with them at [phone].    Call to schedule your imaging:    [place] or Replaced by [facility] [phone]        
IV Fluids

## 2021-09-09 NOTE — ED PROVIDER NOTE - NS ED ROS FT
no fever  no eye discharge  no cp  no shortness of breath  no vomiting  no rash  no new weakness arm/leg  no oppositional behavior  no nuchal rigidity  no laceration  +foot pain left -

## 2021-09-09 NOTE — ED ADULT NURSE NOTE - OBJECTIVE STATEMENT
Patient alert and oriented x 3 came c/o left ankle post surgery site painful , appears to be swollen with wound opening and drainage since last week , was seen in the ER last tues  and told to ff up with podiatrists . Was seen by podiatrist today and advised to come to ER for admission for OR today . Pt. been taking motrin with no relief . [place] , had surgery for left ankle fracture . Reported since the beginning of sept the site is getting infected ,with numbness and tingling sensation . Denies any fever nor chills .

## 2021-09-09 NOTE — H&P ADULT - HISTORY OF PRESENT ILLNESS
25 yo F who originally had a left ankle ORIF in [facility] where she lives in March 2021 after a skateboarding accident. She has had wound drainage and numbness for a significant amount of time since the procedure. She was seen in the [facility] ER 2 days ago due to worsening ankle pain. She was advised to follow up with Dr. Wallis in his office this morning and after seeing her in his office, patient was sent to the ER for work up and admission for infection. She has been on oral antibiotics prescribed by other providers prior to presenting to [facility] this week. These have not improved her symptoms. She has also noticed numbness and weakness in her left foot since the procedure. This has not improved. She was previously a community ambulator, but now walks with a cane. She has only been taking ibuprofen for pain. She has pain aggravated by movement and weightbearing, alleviated by rest. There is no radiation of pain.

## 2021-09-09 NOTE — CONSULT NOTE ADULT - SUBJECTIVE AND OBJECTIVE BOX
26F [place] L [place] infection and non-union here for washout, RICK, ex-fix w Dr. Kadi rogers called for pre-op evaluation            PAST MEDICAL & SURGICAL HISTORY:  Left tibial neuropathy    PAD (peripheral artery disease)    Status post ORIF of fracture of ankle      Home Meds: Home Medications:    Allergies: Allergies    No Known Allergies    Intolerances      Soc:   Advanced Directives: Presumed Full Code     CURRENT MEDICATIONS:   --------------------------------------------------------------------------------------  Neurologic Medications    Respiratory Medications    Cardiovascular Medications    Gastrointestinal Medications  sodium chloride 0.9%. 1000 milliLiter(s) IV Continuous <Continuous>    Genitourinary Medications    Hematologic/Oncologic Medications    Antimicrobial/Immunologic Medications    Endocrine/Metabolic Medications    Topical/Other Medications    --------------------------------------------------------------------------------------    VITAL SIGNS, INS/OUTS (last 24 hours):  --------------------------------------------------------------------------------------  ICU Vital Signs Last 24 Hrs  T(C): 36.9 (09 Sep 2021 08:55), Max: 36.9 (09 Sep 2021 08:55)  T(F): 98.4 (09 Sep 2021 08:55), Max: 98.4 (09 Sep 2021 08:55)  HR: 96 (09 Sep 2021 08:55) (96 - 96)  BP: 122/84 (09 Sep 2021 08:55) (122/84 - 122/84)  BP(mean): --  ABP: --  ABP(mean): --  RR: 18 (09 Sep 2021 08:55) (18 - 18)  SpO2: 97% (09 Sep 2021 08:55) (97% - 97%)    I&O's Summary    --------------------------------------------------------------------------------------    EXAM:    LABS  --------------------------------------------------------------------------------------  Labs:  CAPILLARY BLOOD GLUCOSE                              11.9   8.77  )-----------( 498      ( 09 Sep 2021 09:42 )             35.5       Auto Neutrophil %: 73.4 % (09-09-21 @ 09:42)  Auto Immature Granulocyte %: 0.6 % (09-09-21 @ 09:42)    09-09    135  |  100  |  11  ----------------------------<  83  3.6   |  24  |  0.72      Calcium, Total Serum: 9.8 mg/dL (09-09-21 @ 09:42)      LFTs:             8.9  | 0.5  | 15       ------------------[68      ( 09 Sep 2021 09:42 )  4.0  | x    | 14          Lipase:x      Amylase:x             Coags:     15.0   ----< 1.26    ( 09 Sep 2021 09:41 )     31.1                    Culture - Blood (collected 07 Sep 2021 18:48)  Source: .Blood Blood-Peripheral  Preliminary Report (08 Sep 2021 19:01):    No growth at 1 day.    Culture - Blood (collected 07 Sep 2021 18:48)  Source: .Blood Blood-Peripheral  Preliminary Report (08 Sep 2021 19:01):    No growth at 1 day.        --------------------------------------------------------------------------------------    OTHER LABS    IMAGING RESULTS  ****************  XR L Tib Fib 9/7  FINDINGS:    Frontal, lateral and ankle mortise views of the left ankle and 2 views left tibia and fibula shows no evidence for acute fracture, subluxation or dislocation. Patient is status post plate and screw fixation distal fibula and tibia. Multiple screws fixate the medial malleolus. Moderate diffuse lateral malleolus soft tissue swelling. Mild medial malleolus soft tissue swelling. Hardware appears to be in satisfactory position. No evidence of osteomyelitis. Please refer to the subsequent chest CT for further details.    IMPRESSION: Postoperative changes. Soft tissue swelling, as above. Please refer to subsequent CT for further details      XR L ankle 3 view 9/7  IMPRESSION: Postoperative changes. Soft tissue swelling, as above. Please refer to subsequent CT for further details    CT LE 9/7  IMPRESSION:    Fixation hardware traversing trimalleolar ankle fracture with soft tissue swelling laterally.    No drainable fluid collection and no CT evidence of osteomyelitis.         26F w prior L ankle [place] infection and non-union here for washout, RICK, [facility]    Patient fell while skateboarding and was found to have a trimalleolar fracture of her LEFT Ankle. She had surgery performed in [facility] on 3/28/21. She reports being on post-op antibiotics for 7 days but had poor surgical site healing thereafter. She denies having prior history of poor wound healing previously or history of DM2 or vascular issues. She states she was worked up for DM2 and SLE (both negative) as well as a doppler (denies any angiograms). She was thereafter placed on pentoxyphylline BID and Daflon to help improve her circulation. In the coming months she also received PO levofloxacin and clindamycin w some improvement. She notes that cultures were obtained (unclear source) but no organisms were identified. She has been ambulating w a cane after surgery but recently started to use crutches to to increased pain accompanied by drainage followed by bleeding from non-healing ulcers on both the lateral and medial ankle. She was seen in urgent care 3 days ago and given PO keflex which she has been taking. Was also seen in [facility] ED and instructed to follow w Dr. Wallis. Pt denies having any fever, chills, sweats, breaks elsewhere in her skin.    Labs:  9/7 now >130. WBC also improved to 8 from 12.7. Also noted to have paraprotein gap (TProtein 8.9 w Albumin 4).  today from 213 two days prior.    Pt states pain is otherwise controlled. today. She denies any fever, chills, chest pain, dyspnea. Performs activities of daily living wo limitations. Denies any nausea, abd pain. States she has some diarrhea intermittently when she takes ibuprofen (sometimes wo food). No melena, hematochezia. No dysuria.     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 Lives w roommate. Smokes intermittently. Social EtOH. Lives in 2nd story walkup          PAST MEDICAL & SURGICAL HISTORY:  Left tibial neuropathy    PAD (peripheral artery disease)    Status post ORIF of fracture of ankle      Home Meds: Home Medications:    Allergies: Allergies    No Known Allergies    Intolerances      Soc:   Advanced Directives: Presumed Full Code     CURRENT MEDICATIONS:   --------------------------------------------------------------------------------------  Neurologic Medications    Respiratory Medications    Cardiovascular Medications    Gastrointestinal Medications  sodium chloride 0.9%. 1000 milliLiter(s) IV Continuous <Continuous>    Genitourinary Medications    Hematologic/Oncologic Medications    Antimicrobial/Immunologic Medications    Endocrine/Metabolic Medications    Topical/Other Medications    --------------------------------------------------------------------------------------    VITAL SIGNS, INS/OUTS (last 24 hours):  --------------------------------------------------------------------------------------  ICU Vital Signs Last 24 Hrs  T(C): 36.9 (09 Sep 2021 08:55), Max: 36.9 (09 Sep 2021 08:55)  T(F): 98.4 (09 Sep 2021 08:55), Max: 98.4 (09 Sep 2021 08:55)  HR: 96 (09 Sep 2021 08:55) (96 - 96)  BP: 122/84 (09 Sep 2021 08:55) (122/84 - 122/84)  BP(mean): --  ABP: --  ABP(mean): --  RR: 18 (09 Sep 2021 08:55) (18 - 18)  SpO2: 97% (09 Sep 2021 08:55) (97% - 97%)    I&O's Summary    --------------------------------------------------------------------------------------    EXAM:  GEN: female in NAD on RA  HEENT: NC/AT, MMM  NECK: Supple  CV: RRR, nml S1S2, no murmurs  PULM: nml effort, CTAB  ABD: Soft, non-distended, NABS, non-tender  NEURO  A/O x3, moving all extremities, Sensation intact  EXT: L calf, ankle, foot in ACE Dressing. sensation intact in L toes. Did not take down dressing but photos of medial ankle show multiple ulcers, one w scant bleeding and surrounding erythema.   PSYCH: Appropriate    LABS  --------------------------------------------------------------------------------------  Labs:  CAPILLARY BLOOD GLUCOSE                              11.9   8.77  )-----------( 498      ( 09 Sep 2021 09:42 )             35.5       Auto Neutrophil %: 73.4 % (09-09-21 @ 09:42)  Auto Immature Granulocyte %: 0.6 % (09-09-21 @ 09:42)    09-09    135  |  100  |  11  ----------------------------<  83  3.6   |  24  |  0.72      Calcium, Total Serum: 9.8 mg/dL (09-09-21 @ 09:42)      LFTs:             8.9  | 0.5  | 15       ------------------[68      ( 09 Sep 2021 09:42 )  4.0  | x    | 14          Lipase:x      Amylase:x             Coags:     15.0   ----< 1.26    ( 09 Sep 2021 09:41 )     31.1                    Culture - Blood (collected 07 Sep 2021 18:48)  Source: .Blood Blood-Peripheral  Preliminary Report (08 Sep 2021 19:01):    No growth at 1 day.    Culture - Blood (collected 07 Sep 2021 18:48)  Source: .Blood Blood-Peripheral  Preliminary Report (08 Sep 2021 19:01):    No growth at 1 day.        --------------------------------------------------------------------------------------    OTHER LABS    IMAGING RESULTS  ****************  XR L Tib Fib 9/7  FINDINGS:    Frontal, lateral and ankle mortise views of the left ankle and 2 views left tibia and fibula shows no evidence for acute fracture, subluxation or dislocation. Patient is status post plate and screw fixation distal fibula and tibia. Multiple screws fixate the medial malleolus. Moderate diffuse lateral malleolus soft tissue swelling. Mild medial malleolus soft tissue swelling. Hardware appears to be in satisfactory position. No evidence of osteomyelitis. Please refer to the subsequent chest CT for further details.    IMPRESSION: Postoperative changes. Soft tissue swelling, as above. Please refer to subsequent CT for further details      XR L ankle 3 view 9/7  IMPRESSION: Postoperative changes. Soft tissue swelling, as above. Please refer to subsequent CT for further details    CT LE 9/7  IMPRESSION:    Fixation hardware traversing trimalleolar ankle fracture with soft tissue swelling laterally.    No drainable fluid collection and no CT evidence of osteomyelitis.

## 2021-09-09 NOTE — H&P ADULT - ASSESSMENT
27 yo F with left ankle trimalleolar fracture treated in March 2021 with subsequent nerve injury vs missed compartment syndrome and draining wounds

## 2021-09-09 NOTE — DISCUSSION/SUMMARY
[de-identified] : 26-year-old female with severe left ankle injury in March 2021 with presumed left trimalleolar fracture dislocation which underwent open reduction internal fixation outside the [place].  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  Unfortunately the patient appears to have a missed compartment syndrome with clawing of her toes as well as a screw through the posterior medial fossa concerning for injury to the tibial nerve.  The patient has developed an Achilles tendon contracture and limited range of motion.  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  The patient has open wounds as well as a screw posterior to anterior that extends to the anterior medial wound.  There is also a significant malreduction of the syndesmosis and a large malrotated anterior fibular fragment.  The patient has been on oral antibiotics as an outpatient.  I had a lengthy conversation with the patient as she will require multiple months of treatment.  Nonetheless delaying treatment for return to [facility] puts the patient has significant morbility for loss of limb.  I recommend the patient urgently go to the emergency room and be admitted to the hospital.\par The patient will require an I&D removal of hardware and possible placement of an external fixator and or wound VAC therapy.  The patient depending on the nature of her infection may require multiple washouts.  The patient will require a second stage operation after clearing the infection in 6 to 8 weeks to treat her malunions nonunions.  The patient will maintain nonweightbearing status for likely at minimum 3 months from initiation of initial I&D hopefully this evening.\par 1.  The patient is encouraged to be admitted to [facility] for urgent irrigation and debridement with removal of hardware and potential application of external fixator\par 2.  Strict nonweightbearing left lower extremity\par 3.  N.p.o.\par 4.  Patient placed in a tall cam boot for immobilization in the interim\par 5.  I spoke with Dr. Crespo from infectious disease in order to coordinate the patient's care upon admission to the hospital\par 6.  Medical clearance pending\par 7.  Neuro consult\par 8.  PT eval\par 9.  Inflammatory work-up ESR, CRP, CBC with differential upon arrival to the ER

## 2021-09-09 NOTE — H&P ADULT - ATTENDING COMMENTS
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 The patient will require multiple I&Ds and likely plastic surgery coverage. Will discuss with Dr. Dunbar depending on appearance in the OR.   1. Medicine co-management   2. Start Vanc/cefepime after OR cultures q 8 per ID recs  3. F/u OR cultures  4. NPO  5. Strict NWB LLE  6. Follow  up neuro recs  7. Pain control

## 2021-09-09 NOTE — HISTORY OF PRESENT ILLNESS
[FreeTextEntry1] : 26-year-old female who sustained a skateboarding accident about her left lower extremity at while [place] the [place].  The patient was a previous community ambulator without assistance device.  The patient denies there being an open wound but said that she could see the bone tenting the skin.  She was taken to the OR the following day after closed reduction by an outside provider.  She subsequently developed drainage and wound complications concerning for infected hardware.  She has been seen by outside providers including podiatrist and put on oral antibiotics.

## 2021-09-09 NOTE — H&P ADULT - PROBLEM SELECTOR PLAN 1
- Admit to Orthopedic Service   - NWB LLE   - NPO/IV fluids  - Medical clearance   - Dr. Carmona contacted for ID consult. Holding off on antibiotics until OR. Appreciate recs.  - Dr. Nassar contacted for Neurology consult. Appreciate recs.   - Added on for OR today   - Consented for left ankle I&D, RICK, possible ex-fix, possible wound vac with Dr. Wallis  - Discussed with Dr. Wallis - Admit to Orthopedic Service   - NWB LLE   - Short leg splint placed LLE  - NPO/IV fluids  - Medical clearance   - Dr. Carmona contacted for ID consult. Holding off on antibiotics until OR. Appreciate recs.  - Dr. Nassar contacted for Neurology consult. Appreciate recs.   - Added on for OR today   - Consented for left ankle I&D, RICK, possible ex-fix, possible wound vac with Dr. Wallis  - Discussed with Dr. Wallis

## 2021-09-09 NOTE — ED PROVIDER NOTE - PHYSICAL EXAMINATION
NAD  EOMI  airway patent  +S1S2 no mrg  CTA b/l  soft nt nd  no le edema;  normal mood and affect, pleasant  left foot in cam book, normal color toes - ortho coming down now to assess will defer removing dressing

## 2021-09-09 NOTE — ED PROVIDER NOTE - CLINICAL SUMMARY MEDICAL DECISION MAKING FREE TEXT BOX
pt with worsening post op infection here for admission, normal vitals, will get pre-op labs, ortho c/s, hold off on abx at this time per ID recs, re-assess

## 2021-09-09 NOTE — PHYSICAL EXAM
[de-identified] : Pleasant well-appearing 26-year-old female with increased BMI\par Friend at bedside\par \par Left lower extremity\par 1 x 2 cm open draining wound about the lateral malleolus\par Anterior medial 0.5 x 0.5 cm open wound\par No exposed hardware\par Positive Achilles contracture with inability to dorsiflex past 10 degrees plantar flexed with the knee flexed and extended\par Patient guards to range of motion\par Clawing of the lesser and hallux\par Moderate to severe soft tissue swelling about the forefoot\par Altered sensorium diffusely about the left lower extremity extending up to the knee\par Unable to recruit EHL or tib ant\par Minimal recruitment of FHL and/or FDL\par Brisk capillary refill to all toes\par Significant atrophy of the left calf and anterior and lateral compartment\par Positive syndesmotic squeeze test\par Positive external rotation lateral translation testing\par  [de-identified] : Nonweightbearing left tib-fib and left ankle x-rays [facility]\par Retained hardware about the left ankle\par Ankle mortise with concern for medial side intra-articular extension of the hardware\par Posterior medial posterior to anterior screw\par Articular step-off of the posterior malleolus with malreduction of the posterior malleolus\par Limited evidence of bony healing about the distal fibula\par Large soft tissue swelling\par \par \par CT scan left ankle shows limited evidence of bony healing about the distal fibula\par Long overpenetration of the lag screw about the distal fibula\par Overpenetration of the posterior and anterior screw about the posterior medial fossa concerning for proximity to the tibial nerve and FHL tendon\par Anterior tibiotalar joint narrowing consistent with posttraumatic arthrosis\par Proximal lag screw within the syndesmosis\par Over compression of the syndesmosis proximally\par Partial healing of the posterior malleolus with significant articular step-off and malalignment\par

## 2021-09-09 NOTE — REVIEW OF SYSTEMS
[Joint Pain] : joint pain [FreeTextEntry9] : Review of Systems no feelings of weakness  \par All other review of systems are negative except as noted. \par Constitutional: no chills, not feeling tired and no fever. \par Eyes: no discharge, no pain and no redness. \par ENT: no nasal discharge, no nosebleeds and no sore throat. \par Cardiovascular: no chest pain, no palpitations and the heart rate was not fast. \par Respiratory: no shortness of breath, no cough and no wheezing. \par Gastrointestinal: no abdominal pain, no diarrhea, no vomiting and no melena. \par Genitourinary: no pelvic pain, no dysuria, no abnormal urethral discharge and no frequency. \par Integumentary: no skin lesions and no change in a mole. \par Neurological: no dizziness, no fainting and no convulsions. \par Endocrine: no deepening of the voice and no hot flashes. \par Hematologic/Lymphatic: does not bleed easily, no swollen glands and no cervical lymphadenopathy. \par Musculoskeletal: [as per HPI, otherwise denies additional joint pain, effusions, stiffness.]

## 2021-09-09 NOTE — BRIEF OPERATIVE NOTE - NSICDXBRIEFPROCEDURE_GEN_ALL_CORE_FT
PROCEDURES:  Removal of deeply implanted hardware 09-Sep-2021 20:56:17  Zaid Whittington   PROCEDURES:  Removal of deeply implanted hardware 09-Sep-2021 20:56:17  Zaid Whittington  Arthrotomy, ankle, with exploration 09-Sep-2021 23:06:09  Chapin Wallis  Excision, neuroma, lower leg 09-Sep-2021 23:06:38  Chapin Wallis  Bone debridement 09-Sep-2021 23:06:59  Chapin Wallis  Irrigation and debridement, tissue, down to bone, excisional, more than 20 sq cm 09-Sep-2021 23:09:03  Chapin Wallis  Repair of ligament of left ankle 09-Sep-2021 23:09:45  Chapin Wallis  Negative pressure wound therapy, (e. g. vacuum assisted drainage collection) using a mechanically-powered device, not durable medical equipment, including provision of cartridge and dressing(s), topical application(s), wound assessment, and instructi 09-Sep-2021 23:10:17  Chapin Wallis

## 2021-09-09 NOTE — H&P ADULT - NSHPSOCIALHISTORY_GEN_ALL_CORE
She smokes occasional cigarettes  She drinks occasionally  She denies illicit drug use or marijuana use

## 2021-09-09 NOTE — ED ADULT NURSE NOTE - NSIMPLEMENTINTERV_GEN_ALL_ED
Implemented All Fall Risk Interventions:  [place] to call system. Call bell, personal items and telephone within reach. Instruct patient to call for assistance. Room bathroom lighting operational. Non-slip footwear when patient is off stretcher. Physically safe environment: no spills, clutter or unnecessary equipment. Stretcher in lowest position, wheels locked, appropriate side rails in place. Provide visual cue, wrist band, yellow gown, etc. Monitor gait and stability. Monitor for mental status changes and reorient to person, place, and time. Review medications for side effects contributing to fall risk. Reinforce activity limits and safety measures with patient and family.

## 2021-09-09 NOTE — H&P ADULT - NSHPREVIEWOFSYSTEMS_GEN_ALL_CORE
Review of Systems:  Constitutional: denies fevers, chills  CV: denies chest pain   Resp: denies shortness of breath, cough  GI: denies nausea/vomiting  MSK: + joint pain, left ankle (see HPI)   Neurologic: denies numbness/tingling, dizziness  Hematologic: denies easy bleeding/bruising    All other systems negative except as noted in HPI.

## 2021-09-09 NOTE — CONSULT NOTE ADULT - ASSESSMENT
25 yo F s/p L ankle fracture 3/2021 with draining wounds, likely septic arthritis/infected hardware.  She received prolonged course of clinda and levofloxacin.  Was started on cephalexin 2 d ago - may affect cultures.  She is afebrile, WBC currently WNL.  Suggest:  - Continue to f/u blood cultures from 9/7  - Plan for OR per Dr. Wallis - please send bacterial, fungal and AFB cultures  - Start antibiotics after cultures are obtained        - vancomycin 1.25 g IV q8h - trough prior to 4th dose       - cefepime 2 g IV q8h  Recommendations discussed with primary team and Dr. Wallis.  Will follow with you - team 2.

## 2021-09-09 NOTE — H&P ADULT - NSHPLABSRESULTS_GEN_ALL_CORE
Labs:                         11.9   8.77  )-----------( 498      ( 09 Sep 2021 09:42 )             35.5     09-09    135  |  100  |  11  ----------------------------<  83  3.6   |  24  |  0.72    Ca    9.8      09 Sep 2021 09:42    TPro  8.9<H>  /  Alb  4.0  /  TBili  0.5  /  DBili  x   /  AST  15  /  ALT  14  /  AlkPhos  68  09-09    PT/INR - ( 09 Sep 2021 09:41 )   PT: 15.0 sec;   INR: 1.26          PTT - ( 09 Sep 2021 09:41 )  PTT:31.1 sec    ESR >130      Radiographs:  Xrays and CT performed on 9/7 demonstrate hardware in place, no evidence of osteomyelitis and no visible fluid collection

## 2021-09-09 NOTE — ED ADULT NURSE NOTE - NSICDXPASTMEDICALHX_GEN_ALL_CORE_FT
PAST MEDICAL HISTORY:  No pertinent past medical history      PAST MEDICAL HISTORY:  Left tibial neuropathy

## 2021-09-09 NOTE — CONSULT NOTE ADULT - ASSESSMENT
Recommendations      DISPO 26F w [place] L [place] and non-union here for washout, RICK, [facility] Wallis [facility]    #Pre-operative evaluation  EKG: Sinus rhythm 87; QTc 454  RCRI: Class I risk  RIDER: 0.0% risk of MI or cardiac arrest intraoperatively or within 30d post-op    #L ankle soft tissue infection - Pt appears non-toxic at this time. BCx from 9/7 NGTD. Plan for washout and I&D w intraoperative cultures. ID has been consulted  #Tobacco use - intermittently - pt counseled to stop smoking    Recommendations  -Overall patient has good functional status. She is low risk for intermediate risk procedure and may proceed to surgery.  -Suspect poor wound healing likely 2/2 underlying infection. Extremities are well perfused w good pulses. Low suspicion for circulatory issue or PAD contributing to poor wound healing. Thus would hold pentoxyphylline at this time. Daflon is not FDA approved for use--thus holding.  -Add on A1C    DISPO: TBD  Pt will need to establish PMD on discharge  Pt lives in 2nd floor walk-up apartment.

## 2021-09-10 ENCOUNTER — TRANSCRIPTION ENCOUNTER (OUTPATIENT)
Age: 26
End: 2021-09-10

## 2021-09-10 PROBLEM — G57.42: Chronic | Status: ACTIVE | Noted: 2021-09-09

## 2021-09-10 PROBLEM — I73.9 PERIPHERAL VASCULAR DISEASE, UNSPECIFIED: Chronic | Status: ACTIVE | Noted: 2021-09-09

## 2021-09-10 LAB
ANION GAP SERPL CALC-SCNC: 11 MMOL/L — SIGNIFICANT CHANGE UP (ref 5–17)
BUN SERPL-MCNC: 6 MG/DL — LOW (ref 7–23)
CALCIUM SERPL-MCNC: 8.8 MG/DL — SIGNIFICANT CHANGE UP (ref 8.4–10.5)
CHLORIDE SERPL-SCNC: 104 MMOL/L — SIGNIFICANT CHANGE UP (ref 96–108)
CO2 SERPL-SCNC: 22 MMOL/L — SIGNIFICANT CHANGE UP (ref 22–31)
COVID-19 SPIKE DOMAIN AB INTERP: POSITIVE
COVID-19 SPIKE DOMAIN ANTIBODY RESULT: >250 U/ML — HIGH
CREAT SERPL-MCNC: 0.62 MG/DL — SIGNIFICANT CHANGE UP (ref 0.5–1.3)
CULTURE RESULTS: SIGNIFICANT CHANGE UP
GLUCOSE SERPL-MCNC: 90 MG/DL — SIGNIFICANT CHANGE UP (ref 70–99)
GRAM STN FLD: SIGNIFICANT CHANGE UP
HCT VFR BLD CALC: 31.8 % — LOW (ref 34.5–45)
HGB BLD-MCNC: 10.7 G/DL — LOW (ref 11.5–15.5)
MCHC RBC-ENTMCNC: 29.8 PG — SIGNIFICANT CHANGE UP (ref 27–34)
MCHC RBC-ENTMCNC: 33.6 GM/DL — SIGNIFICANT CHANGE UP (ref 32–36)
MCV RBC AUTO: 88.6 FL — SIGNIFICANT CHANGE UP (ref 80–100)
NRBC # BLD: 0 /100 WBCS — SIGNIFICANT CHANGE UP (ref 0–0)
PLATELET # BLD AUTO: 432 K/UL — HIGH (ref 150–400)
POTASSIUM SERPL-MCNC: 3.8 MMOL/L — SIGNIFICANT CHANGE UP (ref 3.5–5.3)
POTASSIUM SERPL-SCNC: 3.8 MMOL/L — SIGNIFICANT CHANGE UP (ref 3.5–5.3)
RBC # BLD: 3.59 M/UL — LOW (ref 3.8–5.2)
RBC # FLD: 11.8 % — SIGNIFICANT CHANGE UP (ref 10.3–14.5)
SARS-COV-2 IGG+IGM SERPL QL IA: >250 U/ML — HIGH
SARS-COV-2 IGG+IGM SERPL QL IA: POSITIVE
SODIUM SERPL-SCNC: 137 MMOL/L — SIGNIFICANT CHANGE UP (ref 135–145)
SPECIMEN SOURCE: SIGNIFICANT CHANGE UP
VANCOMYCIN TROUGH SERPL-MCNC: 25.2 UG/ML — CRITICAL HIGH (ref 10–20)
WBC # BLD: 8.33 K/UL — SIGNIFICANT CHANGE UP (ref 3.8–10.5)
WBC # FLD AUTO: 8.33 K/UL — SIGNIFICANT CHANGE UP (ref 3.8–10.5)

## 2021-09-10 PROCEDURE — 99232 SBSQ HOSP IP/OBS MODERATE 35: CPT

## 2021-09-10 PROCEDURE — 99233 SBSQ HOSP IP/OBS HIGH 50: CPT

## 2021-09-10 RX ORDER — HEPARIN SODIUM 5000 [USP'U]/ML
5000 INJECTION INTRAVENOUS; SUBCUTANEOUS EVERY 8 HOURS
Refills: 0 | Status: DISCONTINUED | OUTPATIENT
Start: 2021-09-10 | End: 2021-09-13

## 2021-09-10 RX ORDER — CHOLECALCIFEROL (VITAMIN D3) 125 MCG
1000 CAPSULE ORAL DAILY
Refills: 0 | Status: DISCONTINUED | OUTPATIENT
Start: 2021-09-10 | End: 2021-10-25

## 2021-09-10 RX ORDER — SENNA PLUS 8.6 MG/1
2 TABLET ORAL AT BEDTIME
Refills: 0 | Status: DISCONTINUED | OUTPATIENT
Start: 2021-09-10 | End: 2021-09-14

## 2021-09-10 RX ORDER — CALCIUM CARBONATE 500(1250)
1 TABLET ORAL DAILY
Refills: 0 | Status: DISCONTINUED | OUTPATIENT
Start: 2021-09-10 | End: 2021-10-25

## 2021-09-10 RX ORDER — MAGNESIUM HYDROXIDE 400 MG/1
30 TABLET, CHEWABLE ORAL DAILY
Refills: 0 | Status: DISCONTINUED | OUTPATIENT
Start: 2021-09-10 | End: 2021-09-14

## 2021-09-10 RX ORDER — VANCOMYCIN HCL 1 G
1000 VIAL (EA) INTRAVENOUS EVERY 8 HOURS
Refills: 0 | Status: DISCONTINUED | OUTPATIENT
Start: 2021-09-10 | End: 2021-09-11

## 2021-09-10 RX ORDER — POLYETHYLENE GLYCOL 3350 17 G/17G
17 POWDER, FOR SOLUTION ORAL DAILY
Refills: 0 | Status: DISCONTINUED | OUTPATIENT
Start: 2021-09-10 | End: 2021-09-14

## 2021-09-10 RX ADMIN — Medication 166.67 MILLIGRAM(S): at 16:01

## 2021-09-10 RX ADMIN — HEPARIN SODIUM 5000 UNIT(S): 5000 INJECTION INTRAVENOUS; SUBCUTANEOUS at 15:34

## 2021-09-10 RX ADMIN — CEFEPIME 100 MILLIGRAM(S): 1 INJECTION, POWDER, FOR SOLUTION INTRAMUSCULAR; INTRAVENOUS at 05:40

## 2021-09-10 RX ADMIN — OXYCODONE HYDROCHLORIDE 10 MILLIGRAM(S): 5 TABLET ORAL at 07:36

## 2021-09-10 RX ADMIN — HEPARIN SODIUM 5000 UNIT(S): 5000 INJECTION INTRAVENOUS; SUBCUTANEOUS at 21:21

## 2021-09-10 RX ADMIN — OXYCODONE HYDROCHLORIDE 10 MILLIGRAM(S): 5 TABLET ORAL at 16:50

## 2021-09-10 RX ADMIN — OXYCODONE HYDROCHLORIDE 10 MILLIGRAM(S): 5 TABLET ORAL at 15:47

## 2021-09-10 RX ADMIN — SENNA PLUS 2 TABLET(S): 8.6 TABLET ORAL at 21:21

## 2021-09-10 RX ADMIN — Medication 650 MILLIGRAM(S): at 16:50

## 2021-09-10 RX ADMIN — Medication 650 MILLIGRAM(S): at 15:41

## 2021-09-10 RX ADMIN — Medication 1 TABLET(S): at 12:03

## 2021-09-10 RX ADMIN — Medication 166.67 MILLIGRAM(S): at 05:40

## 2021-09-10 RX ADMIN — CEFEPIME 100 MILLIGRAM(S): 1 INJECTION, POWDER, FOR SOLUTION INTRAMUSCULAR; INTRAVENOUS at 21:21

## 2021-09-10 RX ADMIN — OXYCODONE HYDROCHLORIDE 10 MILLIGRAM(S): 5 TABLET ORAL at 08:10

## 2021-09-10 RX ADMIN — CEFEPIME 100 MILLIGRAM(S): 1 INJECTION, POWDER, FOR SOLUTION INTRAMUSCULAR; INTRAVENOUS at 15:34

## 2021-09-10 NOTE — PROGRESS NOTE ADULT - ASSESSMENT
27 yo F with L ankle fractures with septic arthritis, postoperative compartment syndrome s/p RICK, I&D, bone debridement and ligament repair on 9/9.  Afebrile, WBC nl.  Multiple OR cultures have GPC in pairs on gram stain - most c/c Strep, cultures are in progress.  Would continue broad coverage for now.  Per Ms. Saez, RTOR is planned for 9/12.  Suggest:  - Continue to f/u blood cultures from 9/7 and 9/9  - Continue to f/u OR cultures from 9/9  - Continue vancomycin 1.25 mg IV q8h - agree with trough prior to dose scheduled for 22:00 today.  Goal:  13-17  - Continue cefepime 2 g IV q8h  Will continue to follow with you - team 2.

## 2021-09-10 NOTE — DISCHARGE NOTE PROVIDER - DETAILS OF MALNUTRITION DIAGNOSIS/DIAGNOSES
This patient has been assessed with a concern for Malnutrition and was treated during this hospitalization for the following Nutrition diagnosis/diagnoses:     -  09/27/2021: Moderate protein-calorie malnutrition

## 2021-09-10 NOTE — PROGRESS NOTE ADULT - SUBJECTIVE AND OBJECTIVE BOX
INTERVAL HPI/OVERNIGHT EVENTS: CASTRO O/N    SUBJECTIVE: Patient seen and examined at bedside.   Pt feels well overall. Denies any fever. Pain is controlled in ankle. She has been eating wo nausea, abd pain. Denies flatus or BM. Voiding wo dysuria. No chest pain, palpitations, lightheadedness, dizziness, or dyspnea.  Discussed w patient plan to monitor surgical cultures for growth and sensitivities and tailoring abx.    **    OBJECTIVE:    VITAL SIGNS:  ICU Vital Signs Last 24 Hrs  T(C): 37.6 (10 Sep 2021 05:19), Max: 37.6 (10 Sep 2021 05:19)  T(F): 99.6 (10 Sep 2021 05:19), Max: 99.6 (10 Sep 2021 05:19)  HR: 99 (10 Sep 2021 06:04) (80 - 103)  BP: 91/61 (10 Sep 2021 06:04) (91/47 - 117/73)  BP(mean): 90 (09 Sep 2021 21:46) (69 - 90)  ABP: --  ABP(mean): --  RR: 18 (10 Sep 2021 05:19) (14 - 22)  SpO2: 95% (10 Sep 2021 05:19) (95% - 100%)         @ :  -  09-10 @ 07:00  --------------------------------------------------------  IN: 1620 mL / OUT: 1190 mL / NET: 430 mL    09-10 @ 07:  -  09-10 @ 12:40  --------------------------------------------------------  IN: 240 mL / OUT: 1000 mL / NET: -760 mL      CAPILLARY BLOOD GLUCOSE          PHYSICAL EXAM:  GEN: female in NAD on RA  HEENT: NC/AT, MMM  NECK: Supple  CV: RRR, nml S1S2, no murmurs  PULM: nml effort, CTAB  ABD: Soft, non-distended, NABS, non-tender  NEURO  A/O x3, moving all extremities,   EXT: LLE in splint.   PSYCH: Appropriate      MEDICATIONS:  MEDICATIONS  (STANDING):  calcium carbonate   1250 mG (OsCal) 1 Tablet(s) Oral daily  cefepime   IVPB 2000 milliGRAM(s) IV Intermittent every 8 hours  cholecalciferol 1000 Unit(s) Oral daily  heparin   Injectable 5000 Unit(s) SubCutaneous every 8 hours  lactated ringers. 1000 milliLiter(s) (120 mL/Hr) IV Continuous <Continuous>  multivitamin 1 Tablet(s) Oral daily  polyethylene glycol 3350 17 Gram(s) Oral daily  senna 2 Tablet(s) Oral at bedtime  vancomycin  IVPB 1250 milliGRAM(s) IV Intermittent every 8 hours    MEDICATIONS  (PRN):  acetaminophen   Tablet .. 650 milliGRAM(s) Oral every 6 hours PRN Temp greater or equal to 38C (100.4F), Mild Pain (1 - 3)  HYDROmorphone  Injectable 0.5 milliGRAM(s) IV Push every 4 hours PRN Breakthrough pain  HYDROmorphone  Injectable 0.5 milliGRAM(s) IV Push every 15 minutes PRN pacu  magnesium hydroxide Suspension 30 milliLiter(s) Oral daily PRN Constipation  metoclopramide Injectable 10 milliGRAM(s) IV Push every 6 hours PRN Nausea and/or Vomiting  oxyCODONE    IR 10 milliGRAM(s) Oral every 4 hours PRN Severe Pain (7 - 10)  oxyCODONE    IR 5 milliGRAM(s) Oral every 4 hours PRN Moderate Pain (4 - 6)      ALLERGIES:  Allergies    No Known Allergies    Intolerances        LABS:                        10.7   8.33  )-----------( 432      ( 10 Sep 2021 07:40 )             31.8     09-10    137  |  104  |  6<L>  ----------------------------<  90  3.8   |  22  |  0.62    Ca    8.8      10 Sep 2021 07:40    TPro  8.9<H>  /  Alb  4.0  /  TBili  0.5  /  DBili  x   /  AST  15  /  ALT  14  /  AlkPhos  68  -    PT/INR - ( 09 Sep 2021 09:41 )   PT: 15.0 sec;   INR: 1.26          PTT - ( 09 Sep 2021 09:41 )  PTT:31.1 sec  Urinalysis Basic - ( 09 Sep 2021 12:19 )    Color: Yellow / Appearance: Clear / S.020 / pH: x  Gluc: x / Ketone: 40 mg/dL  / Bili: Negative / Urobili: 0.2 E.U./dL   Blood: x / Protein: NEGATIVE mg/dL / Nitrite: NEGATIVE   Leuk Esterase: NEGATIVE / RBC: x / WBC x   Sq Epi: x / Non Sq Epi: x / Bacteria: x        RADIOLOGY & ADDITIONAL TESTS: Reviewed.

## 2021-09-10 NOTE — DISCHARGE NOTE PROVIDER - CARE PROVIDER_API CALL
Chapin Wallis)  Orthopaedic Surgery  [address] [place] [address]  Phone: [phone]  Fax: [phone]  Follow Up Time: 2 weeks   Chapin Wallis)  Orthopaedic Surgery  [address] [place] [address]  Phone: [phone]  Fax: [phone]  Follow Up Time: 2 weeks    Jac Dickens)  Plastic Surgery  [address] [place] [address]  Phone: [phone]  Fax: [phone]  Follow Up Time:     Kathleen Carmona)  Infectious Disease; Internal Medicine  [address] [place] [address]  Phone: [phone]  Fax: [phone]  Follow Up Time:    Chapin Wallis)  Orthopaedic Surgery  [address] [place] [address]  Phone: [phone]  Fax: [phone]  Follow Up Time: 2 weeks    Jac Dickens)  Plastic Surgery  [address] [place] [address]  Phone: [phone]  Fax: [phone]  Follow Up Time: 1 week    Kathleen Carmona)  Infectious Disease; Internal Medicine  [address] [place] [address]  Phone: [phone]  Fax: [phone]  Follow Up Time:

## 2021-09-10 NOTE — PROGRESS NOTE ADULT - ASSESSMENT
26F [place] p/w infection and non-union now s/p RICK, arthrotomy, neuroma excision, L ankle ligament repair, and I/D w bone debridement w Dr. Wallis 9/10 -- surgical culture growing G+ cocci in pairs    #Post-op state - pain is controlled. PPx: SQH. 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 Currently on IV Vancomycin and Cefepime. BCx 9/7 NGTD. Surgical cultures from OR 9/9 w Gram + cocci. ID is following.   #Blood loss anemia - 10.7 from 11.9 yesterday. Likely operative loss w element of dilution from IVF. Pt is asymptomatic. Continue to follow  #Tobacco use - intermittently - pt counseled to stop smoking  VTE ppx: SQH    Plan  -Follow up surgical culture and sensitivities - Growing gram+ cocci. f/u ID Recs  -plan to return to OR for debridement SUN  -Neurology and Plastic surgery have been consulted; f/u recs. Noted to have neuroma which was excised in OR  -Add on A1C    DISPO: TBD  Pt will need to establish PMD on discharge  Pt lives in 2nd floor walk-up apartment.

## 2021-09-10 NOTE — PHYSICAL THERAPY INITIAL EVALUATION ADULT - LIGHT TOUCH SENSATION, LLE, REHAB EVAL
Pt reports decreased sensation below the knee, could not provide numerical value to compare to RLE/mild impairment

## 2021-09-10 NOTE — DISCHARGE NOTE PROVIDER - NSDCCPTREATMENT_GEN_ALL_CORE_FT
PRINCIPAL PROCEDURE  Procedure: Removal of deeply implanted hardware  Findings and Treatment:       SECONDARY PROCEDURE  Procedure: Bone debridement  Findings and Treatment:     Procedure: Excision, neuroma, lower leg  Findings and Treatment:     Procedure: Irrigation and debridement, tissue, down to bone, excisional, more than 20 sq cm  Findings and Treatment:     Procedure: Negative pressure wound therapy, (e. g. vacuum assisted drainage collection) using a mechanically-powered device, not durable medical equipment, including provision of cartridge and dressing(s), topical application(s), wound assessment, and instructi  Findings and Treatment:     Procedure: Arthrotomy, ankle, with exploration  Findings and Treatment:      PRINCIPAL PROCEDURE  Procedure: Removal of deeply implanted hardware  Findings and Treatment:       SECONDARY PROCEDURE  Procedure: Arthrotomy, ankle, with exploration  Findings and Treatment:     Procedure: Bone debridement  Findings and Treatment:     Procedure: Excision, neuroma, lower leg  Findings and Treatment:     Procedure: Irrigation and debridement, tissue, down to bone, excisional, more than 20 sq cm  Findings and Treatment:     Procedure: Negative pressure wound therapy, (e. g. vacuum assisted drainage collection) using a mechanically-powered device, not durable medical equipment, including provision of cartridge and dressing(s), topical application(s), wound assessment, and instructi  Findings and Treatment:     Procedure: Gracilis free flap procedure  Findings and Treatment:     Procedure: Split thickness skin graft of left thigh  Findings and Treatment:

## 2021-09-10 NOTE — DISCHARGE NOTE PROVIDER - HOSPITAL COURSE
Admitted 9/9/21 with chronic left ankle drainage s/p left ankle ORIF in March 2020; and increased left ankle pain  Pre-op medical clearance and optimization  ID consult- Dr. Carmona recommend cefepime and vancomycin after OR while awaiting growth on OR cultures  Surgery- Left ankle I+D and removal of hardware with wound vac placement 9/9/21  Pain control  DVT prophylaxis- heparin subq  OOB/Physical Therapy  Nutrition Consult  Plastics consult- Dr. Dunbar for free flap evaluation   Dianna Kohli was admitted 9/9/21 with chronic left ankle drainage and increase left ankle pain s/p left ankle ORIF in March 2020. She underwent left ankle incision and drainage and removal of hardware with wound vacuum placement on 9/9/21 by orthopedic surgery with repeat I&D and vac change on 9/12/21. The wound infection was treated with cefepime and vancomycin followed by ceftriaxone and nafcillin after wound cultures grew out MSSA. She went for repeat I&D, arthrotomy of left ankle, left ankle reconstruction with left gracilis free flap and left thigh split thickness skin graft on 9/20/21 with plastic surgery.  A PICC line was placed on 9/21/21 for long-term administration of intravenous antibiotics at home.     Pain medication and blood clot prophylaxis were provided throughout the hospital course. Diet was advanced as tolerated and transition to oral medications occurred. Physical therapy was provided. Instructions for care at home care and outpatient follow-up were provided. She was discharged on *** to ***.   Dianna Kohli was admitted 9/9/21 with chronic left ankle drainage and increase left ankle pain s/p left ankle ORIF in March 2020. She underwent left ankle incision and drainage and removal of hardware with wound vacuum placement on 9/9/21 by orthopedic surgery with repeat I&D and vac change on 9/12/21. The wound infection was treated with cefepime and vancomycin followed by ceftriaxone and nafcillin after wound cultures grew out MSSA. She went for repeat I&D, arthrotomy of left ankle, left ankle reconstruction with left gracilis free flap closure and left thigh split thickness skin graft on 9/20/21 with orthopedics and plastic surgery in the setting of missed compartment syndrome.  A PICC line was placed on 9/21/21 for long-term administration of intravenous antibiotics. Orthopedics fitted her with a PRAFO device to offload pressure from her heal while on bedrest. The patient was educated on daily leg dangles, which were uptitrated to 30 minutes four times a day. Pain medication and blood clot prophylaxis were provided throughout the hospital course. Diet was advanced as tolerated and transition to oral medications occurred. Physical therapy was provided. She is being discharged to a rehabilitation facility with the plan for readmission in mid-October for a definitive reconstructive surgery with plastics and orthopedics. Dianna Kohli was admitted 9/9/21 with chronic left ankle drainage and increase left ankle pain s/p left ankle ORIF in March 2020. She underwent left ankle incision and drainage and removal of hardware with wound vacuum placement on 9/9/21 by orthopedic surgery with repeat I&D and vac change on 9/12/21. The wound infection was treated with cefepime and vancomycin followed by ceftriaxone and nafcillin after wound cultures grew out MSSA. She went for repeat I&D, arthrotomy of left ankle, left ankle reconstruction with left gracilis free flap closure and left thigh split thickness skin graft on 9/20/21 with orthopedics and plastic surgery in the setting of missed compartment syndrome.  A PICC line was placed on 9/21/21 for long-term administration of intravenous antibiotics until 11/1/2021. Orthopedics fitted her with a PRAFO device to offload pressure from her heal while on bedrest, which she will continue to wear. The patient was educated on daily leg dangles, which were uptitrated to 30 minutes four times a day. Pain medication and blood clot prophylaxis were provided throughout the hospital course. Diet was advanced as tolerated and transition to oral medications occurred. Physical therapy was provided. She is being discharged to a rehabilitation facility with the plan for readmission in mid-October for a definitive reconstructive surgery with plastics and orthopedics. Dianna Kohli is a 26F with h/o left ankle fracture s/p ORIF (3/2021) c/b septic arthritis who was admitted 9/9/21 for I&D with bone debridement, removal of hardware, ligament repair with wound vac placement by Orthopedic sx. She then returned to OR (9/12/21) for further tissue debridement, arthrotomy of ankle with wound vac replacement. The complex wound infection was treated with cefepime and vancomycin followed by ceftriaxone and nafcillin after wound cultures grew out MSSA. She went for repeat I&D, arthrotomy of left ankle, left ankle reconstruction with left gracilis free flap closure and left thigh split thickness skin graft on 9/20/21 with orthopedics and plastic surgery in the setting of missed compartment syndrome. A PICC line was placed on 9/21/21 for long-term administration of intravenous antibiotics until 12/1/2021. 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 OR cultures notable for E. feacalis, Candida (albicans and parapsilosis), Staph epi, and Stenotrophomonas. ID was consulted antibiotics were broadened to daptomycin and diflucan. She then returned to OR 10/25/21 for repeat free flap debridement and STSG from the left anterior thigh. Postoperatively was noted to have increased LFTs and was switched to caspofungin. Pain medication and blood clot prophylaxis were provided throughout the hospital course. Diet was advanced as tolerated and transition to oral medications occurred. Physical therapy was provided and recommended home with home PT.  She is being discharged home with plan for readmission in [facility] with orthopedic surgery. Patient is to follow up outpatient with Dr. Dickens, Mathew, and Kadi. She will receive home infusions of daptomycin and caspofungin until 12/1/21.

## 2021-09-10 NOTE — PROGRESS NOTE ADULT - SUBJECTIVE AND OBJECTIVE BOX
Ortho Note    Pt seen and examined. Comfortable without complaints, pain controlled  Denies CP, SOB, N/V, numbness/tingling. Reports has neuro deficits in left foot at baseline s/p previous ankle surgery,   and current neuro status is consistent with baseline.     Vital Signs Last 24 Hrs  T(C): 37.6 (09-10-21 @ 05:19), Max: 37.6 (09-10-21 @ 05:19)  T(F): 99.6 (09-10-21 @ 05:19), Max: 99.6 (09-10-21 @ 05:19)  HR: 99 (09-10-21 @ 06:04) (99 - 103)  BP: 91/61 (09-10-21 @ 06:04) (91/47 - 91/61)  BP(mean): --  RR: 18 (09-10-21 @ 05:19) (18 - 18)  SpO2: 95% (09-10-21 @ 05:19) (95% - 95%)  AVSS    General: Pt Alert and oriented, NAD  DSG- wound vac holding suction; ace wrap CDI; elevated on pillows  Pulses: Toes WWP  Sensation: SILT to first 2 toes; denies sensation along SPN distribution  Motor: 0-1/5 EHL/TA/FHL/GS at baseline; able to slightly wiggle first 2 toes, which reports is her baseline                          10.7   8.33  )-----------( 432      ( 10 Sep 2021 07:40 )             31.8     09-10    137  |  104  |  6<L>  ----------------------------<  90  3.8   |  22  |  0.62    Ca    8.8      10 Sep 2021 07:40    TPro  8.9<H>  /  Alb  4.0  /  TBili  0.5  /  DBili  x   /  AST  15  /  ALT  14  /  AlkPhos  68  09-09      A/P: 26yFemale POD#1 s/p left ankle I+D and removal of hardware  - afebrile, nontoxic appearing; VSS, continue to monitor labs including CBC, CMP, ESR, CRP  - f/u OR cultures and pathology specimens (neuroma, muscle belly, bone)  - Pain Control- well- controlled on current regimen, continue  - DVT ppx: HSQ  - PT, WBS: NWB LLE  - LLE elevation  - monitor wound vac output  - appreciate ID recs- continue cefepime and vancomycin; vanc troph due prio to 4th dose (9pm tonight)  - Plastics Dr. Dunbar contacted directly by Dr. Wallis for recommendations on possible free-flap, f/u recs  - nutrition consult and recommendations; vitamin D and calcium supplementation ordered  - dispo: plan for RTOR Sunday for repeat I+D and wound vac change    Ortho Pager [phone]

## 2021-09-10 NOTE — PHYSICAL THERAPY INITIAL EVALUATION ADULT - GENERAL OBSERVATIONS, REHAB EVAL
Patient received semi-yin in bed in NAD on RA, +SCD, +IV, + L ankle in ace wrap, +wound vac. Cleared by RN. Agreeable to PT.

## 2021-09-10 NOTE — DISCHARGE NOTE PROVIDER - NSDCFUADDINST_GEN_ALL_CORE_FT
Non weight-bearing left lower extremity.  No strenuous activity, heavy lifting, driving or returning to work until cleared by MD.  Keep incision clean and dry. Keep dressing dry and intact. Do not remove.  Try to have regular bowel movements, take stool softener or laxative if necessary.  May take Pepcid or Zantac for upset stomach.    Swelling may travel all the way down leg to foot, this is normal and will subside in a few weeks.  Call to schedule an appt with Dr. Wallis for follow up, if you have staples or sutures they will be removed in office.  Contact your doctor if you experience: fever greater than 101.5, chills, chest pain, difficulty breathing, redness or excessive drainage around the incision, other concerns.  Follow up with your primary care provider.   Non weight-bearing left lower extremity.  No strenuous activity, heavy lifting, driving or returning to work until cleared by MD.  Keep incision clean and dry. Keep dressing dry and intact. Do not remove.  Try to have regular bowel movements, take stool softener or laxative if necessary.  May take Pepcid or Zantac for upset stomach.    Swelling may travel all the way down leg to foot, this is normal and will subside in a few weeks.  Call to schedule an appt with Dr. Wallis for follow up, if you have staples or sutures they will be removed in office.  Contact your doctor if you experience: fever greater than 101.5, chills, chest pain, difficulty breathing, redness or excessive drainage around the incision, other concerns.  Follow up with your primary care provider.    The flap and skin used to reconstruct the ankle defect is very fragile. Monitor it for normal skin color and warmth. If you notice changes, contact Dr. Dickens's office [phone].     Patient to follow up with Dr. Carmona ([address] [place] [address], [phone]); ID office will call patient to schedule.  Non weight-bearing left lower extremity.  No strenuous activity, heavy lifting, driving or returning to work until cleared by MD.  Keep incision clean and dry. Keep dressing dry and intact. Do not remove.  Try to have regular bowel movements, take stool softener or laxative if necessary.  May take Pepcid or Zantac for upset stomach.    Swelling may travel all the way down leg to foot, this is normal and will subside in a few weeks.  Call to schedule an appt with Dr. Wallis for follow up, if you have staples or sutures they will be removed in office.  Contact your doctor if you experience: fever greater than 101.5, chills, chest pain, difficulty breathing, redness or excessive drainage around the incision, other concerns.  Follow up with your primary care provider.    The flap and skin used to reconstruct the ankle defect is very fragile. Monitor it for normal skin color and warmth. If you notice changes, contact Dr. Dickens's office [phone].     *Weekly labs: CMP, CBC, ESR, CRP faxed to ID office at [phone]  - Patient to follow up with Dr. Carmona in 2 weeks ([address] [place] [address], [phone]), ID office will call patient to schedule  Non weight-bearing left lower extremity.  No strenuous activity, heavy lifting, driving or returning to work until cleared by MD.  Keep incision clean and dry. Keep dressing dry and intact. Do not remove.  Try to have regular bowel movements, take stool softener or laxative if necessary.  May take Pepcid or Zantac for upset stomach.    Swelling may travel all the way down leg to foot, this is normal and will subside in a few weeks.  Call to schedule an appt with Dr. Wallis for follow up, if you have staples or sutures they will be removed in office.  Contact your doctor if you experience: fever greater than 101.5, chills, chest pain, difficulty breathing, redness or excessive drainage around the incision, other concerns.  Follow up with your primary care provider.    The flap and skin used to reconstruct the ankle defect is very fragile. Monitor it for normal skin color and warmth. Continue dangling your foot for 30 minutes four times a day. If you notice changes, contact Dr. Dickens's office [phone].     *Weekly labs: CMP, CBC, ESR, CRP faxed to ID office at [phone]  - Patient to follow up with Dr. Carmona in 2 weeks ([address] [place] [address], [phone]), ID office will call patient to schedule  Non weight-bearing left lower extremity.   Continue to wear PRAFO device on L foot.   No strenuous activity, heavy lifting, driving or returning to work until cleared by MD.  Keep incision clean and dry. Keep dressing dry and intact. Do not remove.  Try to have regular bowel movements, take stool softener or laxative if necessary.  May take Pepcid or Zantac for upset stomach.  Continue IV antibiotics until 11/1/2021.    Swelling may travel all the way down leg to foot, this is normal and will subside in a few weeks.  Contact your doctor if you experience: fever greater than 101.5, chills, chest pain, difficulty breathing, redness or excessive drainage around the incision, other concerns.    The flap and skin used to reconstruct the ankle defect is very fragile. Monitor it for normal skin color and warmth.   Continue dangling your foot for 30 minutes four times a day. If you notice changes, contact Dr. Dickens's office [phone].     *Weekly labs: CMP, CBC, ESR, CRP faxed to ID office at [phone]  - Patient to follow up with Dr. Carmona in 2 weeks ([address] [place] [address], [phone]), ID office will call patient to schedule.  - Patient will be scheduled for definitive reconstructive surgery with [facility] in mid-October, tentatively 10/18/2021.  Non weight-bearing left lower extremity.   Continue to wear Ex fit to LLE.  No strenuous activity, heavy lifting, driving or returning to work until cleared by MD.  Keep incision clean and dry. Keep dressing dry and intact. Change daily with xeroform gauze, abd pad, kerlix gauze to LLE. Left anterior thigh to have aquaphor daily.   Try to have regular bowel movements, take stool softener or laxative if necessary.  May take Pepcid or Zantac for upset stomach.  Continue IV antibiotics until 12/1/2021.    Swelling may travel all the way down leg to foot, this is normal and will subside in a few weeks.  Contact your doctor if you experience: fever greater than 101.5, chills, chest pain, difficulty breathing, redness or excessive drainage around the incision, other concerns.    The flap and skin used to reconstruct the ankle defect is very fragile. Monitor it for normal skin color and warmth.   Continue dangling your foot for 30 minutes four times a day. If you notice changes, contact Dr. Dickens's office [phone].     *Weekly labs: CMP, CBC, ESR, CRP faxed to ID office at [phone]  - Patient to follow up with Dr. Carmona in 2 weeks ([address] [place] [address], [phone]), ID office will call patient to schedule.  - Patient will be scheduled for definitive reconstructive surgery with [facility] in mid-December tentative.

## 2021-09-10 NOTE — DISCHARGE NOTE PROVIDER - PROVIDER TOKENS
PROVIDER:[TOKEN:[[id number]],FOLLOWUP:[2 weeks]] PROVIDER:[TOKEN:[[id number]],FOLLOWUP:[2 weeks]],PROVIDER:[TOKEN:[[id number]]],PROVIDER:[TOKEN:[[id number]]] PROVIDER:[TOKEN:[[id number]],FOLLOWUP:[2 weeks]],PROVIDER:[TOKEN:[[id number]],FOLLOWUP:[1 week]],PROVIDER:[TOKEN:[[id number]]]

## 2021-09-10 NOTE — DISCHARGE NOTE PROVIDER - NSDCMRMEDTOKEN_GEN_ALL_CORE_FT
acetaminophen 325 mg oral tablet: 3 tab(s) orally every 6 hours  bacitracin 500 units/g topical ointment: 1 application topically once a day  bisacodyl 10 mg rectal suppository: 1 suppository(ies) rectal once a day, As needed, Constipation  calcium carbonate 1250 mg (500 mg elemental calcium) oral tablet: 1 tab(s) orally once a day  cefTRIAXone: 2000 milligram(s) intravenous every 24 hours  cholecalciferol oral tablet: 1000 unit(s) orally once a day  diphenhydrAMINE 25 mg oral capsule: 1 cap(s) orally every 4 hours, As needed, Rash and/or Itching  enoxaparin: 40 milligram(s) subcutaneous once a day  gabapentin 600 mg oral tablet: 1 tab(s) orally every 8 hours  Multiple Vitamins oral tablet: 1 tab(s) orally once a day  nafcillin: 2 gram(s) intravenous every 4 hours  oxyCODONE 10 mg oral tablet: 1 tab(s) orally every 4 hours, As needed, Severe Pain (7 - 10)  oxyCODONE 5 mg oral tablet: 1 tab(s) orally every 4 hours, As needed, Moderate Pain (4 - 6)  polyethylene glycol 3350 oral powder for reconstitution: 17 gram(s) orally 2 times a day  senna oral tablet: 2 tab(s) orally once a day (at bedtime)  sodium chloride 0.9% injectable solution: 10 milliliter(s) injectable every 4 hours to flush picc line before/after medication is administered   acetaminophen 325 mg oral tablet: 3 tab(s) orally every 6 hours  bacitracin 500 units/g topical ointment: 1 application topically once a day  bisacodyl 10 mg rectal suppository: 1 suppository(ies) rectal once a day, As needed, Constipation  calcium carbonate 1250 mg (500 mg elemental calcium) oral tablet: 1 tab(s) orally once a day  cefTRIAXone: 2000 milligram(s) intravenous every 24 hours  cholecalciferol oral tablet: 1000 unit(s) orally once a day  diphenhydrAMINE 25 mg oral capsule: 1 cap(s) orally every 4 hours, As needed, Rash and/or Itching  enoxaparin: 40 milligram(s) subcutaneous once a day  gabapentin 600 mg oral tablet: 1 tab(s) orally every 8 hours  Multiple Vitamins oral tablet: 1 tab(s) orally once a day  nafcillin: 2 gram(s) intravenous every 4 hours  oxyCODONE 10 mg oral tablet: 1 tab(s) orally every 4 hours, As needed, Severe Pain (7 - 10)  oxyCODONE 5 mg oral tablet: 1 tab(s) orally every 4 hours, As needed, Moderate Pain (4 - 6)  petrolatum topical ointment: 1 application topically 2 times a day  polyethylene glycol 3350 oral powder for reconstitution: 17 gram(s) orally 2 times a day  senna oral tablet: 2 tab(s) orally once a day (at bedtime)  sodium chloride 0.9% injectable solution: 10 milliliter(s) injectable every 4 hours to flush picc line before/after medication is administered   acetaminophen 325 mg oral tablet: 3 tab(s) orally every 6 hours, As Needed -for mild pain   Allergy Relief (Diphenhydramine HCl) 25 mg oral capsule: 1 cap(s) orally once a day (at bedtime), As Needed -Rash and/or Itching   Baciguent 500 units/g topical ointment: 1 application topically once a day  bacitracin 500 units/g topical ointment: 1 application topically once a day  bisacodyl 10 mg rectal suppository: 1 suppository(ies) rectal once a day, As needed, Constipation  calcium carbonate 1250 mg (500 mg elemental calcium) oral tablet: 1 tab(s) orally once a day  Calcium Oyster Shell 1250 mg (500 mg elemental calcium) oral tablet: 1 tab(s) orally once a day  caspofungin:   caspofungin 50 mg intravenous injection: 50 milligram(s) intravenously every 24 hours   cholecalciferol oral tablet: 1000 unit(s) orally once a day  cholecalciferol oral tablet: 1000 unit(s) orally once a day  DAPTOmycin 500 mg intravenous injection: 500 milligram(s) intravenous every 24 hours  diphenhydrAMINE 25 mg oral capsule: 1 cap(s) orally every 4 hours, As needed, Rash and/or Itching  heparin flush 10 units/mL intravenous solution: 3 milliliter(s) intravenously 2 times a day   Melatonin 3 mg oral tablet: 1 tab(s) orally once a day (at bedtime)  Multiple Vitamins oral tablet: 1 tab(s) orally once a day  Multiple Vitamins oral tablet: 1 tab(s) orally once a day  Neurontin 600 mg oral tablet: 1 tab(s) orally every 6 hours MDD:4  Normal Saline Flush 0.9% injectable solution: 10 milliliter(s) injectable 2 times a day   Oxaydo 5 mg oral tablet: 1 tab(s) orally every 4 hours, As Needed -Moderate Pain (4 - 6) - for moderate pain MDD:6  oxyCODONE 10 mg oral tablet: 1 tab(s) orally every 4 hours, As Needed -Severe Pain (7 - 10) - for severe pain MDD:2  petrolatum topical ointment: 1 application topically 2 times a day  Please obtain the following labs once every week: CBC, CMP, ESR, CRP and CPK:   polyethylene glycol 3350 oral powder for reconstitution: 17 gram(s) orally 2 times a day  polyethylene glycol 3350 oral powder for reconstitution: 17 gram(s) orally 2 times a day  senna oral tablet: 2 tab(s) orally once a day (at bedtime)  senna oral tablet: 2 tab(s) orally once a day (at bedtime)  sodium chloride 0.9% injectable solution: 10 milliliter(s) injectable every 4 hours to flush picc line before/after medication is administered

## 2021-09-10 NOTE — DISCHARGE NOTE PROVIDER - NSDCACTIVITY_GEN_ALL_CORE
Do not drive or operate machinery/Walking - Indoors allowed/No heavy lifting/straining Do not drive or operate machinery/Showering allowed/Walking - Indoors allowed/No heavy lifting/straining/Follow Instructions Provided by your Surgical Team

## 2021-09-10 NOTE — DISCHARGE NOTE PROVIDER - NSDCCPCAREPLAN_GEN_ALL_CORE_FT
PRINCIPAL DISCHARGE DIAGNOSIS  Diagnosis: Wound infection complicating hardware  Assessment and Plan of Treatment:

## 2021-09-10 NOTE — PHYSICAL THERAPY INITIAL EVALUATION ADULT - ADDITIONAL COMMENTS
Pt lives alone in an apartment [address] + 2 [address] inside.  Pt previously Independent, pt owns AC but states she frank snot feel confident on them.  Pt reports using a RW with the initial injury.  Pt reports she had done stair bumping before.

## 2021-09-10 NOTE — PHYSICAL THERAPY INITIAL EVALUATION ADULT - PERTINENT HX OF CURRENT PROBLEM, REHAB EVAL
25 yo F who originally had a left ankle ORIF in [facility] where she lives in March 2021 after a skateboarding accident. She has had wound drainage and numbness for a significant amount of time since the procedure. She was seen in the [facility] ER 2 days ago due to worsening ankle pain. She was advised to follow up with Dr. Wallis in his office this morning and after seeing her in his office, patient was sent to the ER for work up and admission for infection.

## 2021-09-10 NOTE — PROGRESS NOTE ADULT - ATTENDING COMMENTS
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   She will require repeat I&D second look on sunday with eval with plastics at that time  - afebrile, nontoxic appearing; VSS, continue to monitor labs including CBC, CMP, ESR, CRP  - f/u OR cultures and pathology specimens (neuroma, muscle belly, bone)  - Pain Control- well- controlled on current regimen, continue  - DVT ppx: HSQ  - PT, WBS: NWB LLE  - LLE elevation  - monitor wound vac output  - appreciate ID recs- continue cefepime and vancomycin; vanc troph due prio to 4th dose (9pm tonight)  - Plastics Dr. Dunbar contacted directly by Dr. Wallis for recommendations on possible free-flap, f/u recs  - nutrition consult and recommendations; vitamin D and calcium supplementation ordered  - dispo: plan for RTOR Sunday for repeat I+D and wound vac change

## 2021-09-10 NOTE — PROGRESS NOTE ADULT - SUBJECTIVE AND OBJECTIVE BOX
Ortho Post Op Check    Procedure: L ankle RICK, I&D and wound vac placement   Surgeon: Kadi     Pt seen and examined at bedside in the PACU. Reports no sensation below the knee, pain well-controlled. Wound vac intact and functional. Denies CP, SOB, N/V and tingling      Vital Signs Last 24 Hrs  T(C): 37.6 (10 Sep 2021 05:19), Max: 37.6 (10 Sep 2021 05:19)  T(F): 99.6 (10 Sep 2021 05:19), Max: 99.6 (10 Sep 2021 05:19)  HR: 99 (10 Sep 2021 06:04) (73 - 103)  BP: 91/61 (10 Sep 2021 06:04) (91/47 - 122/84)  BP(mean): 90 (09 Sep 2021 21:46) (69 - 90)  RR: 18 (10 Sep 2021 05:19) (14 - 22)  SpO2: 95% (10 Sep 2021 05:19) (95% - 100%)    General: Pt Alert and oriented, NAD  LLE splint intact  DSG C/D/I  Wound vac in place  Sensation intact around knee but none at and below splint (likely from block)  Motor: EHL/FHL/TA/GS 0/5 (likely from block)  Toes WWP    A/P: 26yFemale POD#1 s/p above procedure   - Stable  - Pain Control  - Continue vanc and cefepime  - f/u cultures  - DVT ppx: SQH  - PT, WBS: NWB LLE  - Dispo plan for RTOR likely saturday for wound vac change and repeat I&D    Ortho Pager [phone]

## 2021-09-10 NOTE — DISCHARGE NOTE PROVIDER - NSDCHHNEEDSERVICE_GEN_ALL_CORE
Medication teaching and assessment/Observation and assessment/Teaching and training/Wound care and assessment Medication teaching and assessment/Observation and assessment/Teaching and training/Wound care and assessment/Other, specify... Central venous access care/Medication teaching and assessment/Observation and assessment/Teaching and training/Wound care and assessment/Other, specify...

## 2021-09-10 NOTE — DISCHARGE NOTE PROVIDER - NSDCFUSCHEDAPPT_GEN_ALL_CORE_FT
NAPOLEON RODRIGUEZ ; 09/12/2021 ; [facility] [address] NAPOLEON RODRIGUEZ ; 10/18/2021 ; [facility] [address]

## 2021-09-10 NOTE — DISCHARGE NOTE PROVIDER - CARE PROVIDERS DIRECT ADDRESSES
[email or web address]

## 2021-09-10 NOTE — PROGRESS NOTE ADULT - SUBJECTIVE AND OBJECTIVE BOX
INTERVAL HPI/OVERNIGHT EVENTS:  Tm 99.6.  s/p OR last night.  L ankle pain currently 4-5/10 in severity    CONSTITUTIONAL:  No fever, chills, night sweats  EYES:  No photophobia or visual changes  CARDIOVASCULAR:  No chest pain  RESPIRATORY:  No cough, wheezing, or SOB   GASTROINTESTINAL:  No nausea, vomiting, diarrhea, constipation, or abdominal pain  GENITOURINARY:  No frequency, urgency, dysuria or hematuria  NEUROLOGIC:  No headache, lightheadedness      ANTIBIOTICS/RELEVANT:    Vancomycin 1.25 g IV q8h (-present)  Cefepime 2 g IV q8h (-present)      Vital Signs Last 24 Hrs  T(C): 36.9 (10 Sep 2021 16:55), Max: 37.6 (10 Sep 2021 05:19)  T(F): 98.5 (10 Sep 2021 16:55), Max: 99.6 (10 Sep 2021 05:19)  HR: 90 (10 Sep 2021 16:55) (80 - 103)  BP: 106/67 (10 Sep 2021 16:55) (91/47 - 117/73)  BP(mean): 90 (09 Sep 2021 21:46) (69 - 90)  RR: 17 (10 Sep 2021 16:55) (14 - 22)  SpO2: 95% (10 Sep 2021 16:55) (95% - 100%)    PHYSICAL EXAM:  Constitutional:  Well-developed, well nourished  Eyes:  Sclerae anicteric, conjunctivae clear, PERRL  Ear/Nose/Throat:  No nasal exudate or sinus tenderness;  No buccal mucosal lesions, no pharyngeal erythema or exudate	  Neck:  Supple, no adenopathy  Respiratory:  Clear bilaterally  Cardiovascular:  RRR, S1S2, no murmur appreciated  Gastrointestinal:  Symmetric, normoactive BS, soft, NT, no masses, guarding or rebound.  No HSM  Extremities:  No edema.  LLE in splint below knee.  Can move toes      LABS:                        10.7   8.33  )-----------( 432      ( 10 Sep 2021 07:40 )             31.8         09-10    137  |  104  |  6<L>  ----------------------------<  90  3.8   |  22  |  0.62    Ca    8.8      10 Sep 2021 07:40    TPro  8.9<H>  /  Alb  4.0  /  TBili  0.5  /  DBili  x   /  AST  15  /  ALT  14  /  AlkPhos  68        Urinalysis Basic - ( 09 Sep 2021 12:19 )    Color: Yellow / Appearance: Clear / S.020 / pH: x  Gluc: x / Ketone: 40 mg/dL  / Bili: Negative / Urobili: 0.2 E.U./dL   Blood: x / Protein: NEGATIVE mg/dL / Nitrite: NEGATIVE   Leuk Esterase: NEGATIVE / RBC: x / WBC x   Sq Epi: x / Non Sq Epi: x / Bacteria: x        MICROBIOLOGY:  Blood cultures;  9/7 X 2 - NGTD;  9/9 X 2 - NGTD    OR cultures 9/9 X 6 (medial and lateral) - some or all with GPC in prs on grams stain, culture in progress in         RADIOLOGY & ADDITIONAL STUDIES:

## 2021-09-11 ENCOUNTER — TRANSCRIPTION ENCOUNTER (OUTPATIENT)
Age: 26
End: 2021-09-11

## 2021-09-11 LAB
ANION GAP SERPL CALC-SCNC: 11 MMOL/L — SIGNIFICANT CHANGE UP (ref 5–17)
BLD GP AB SCN SERPL QL: NEGATIVE — SIGNIFICANT CHANGE UP
BUN SERPL-MCNC: 7 MG/DL — SIGNIFICANT CHANGE UP (ref 7–23)
CALCIUM SERPL-MCNC: 9.2 MG/DL — SIGNIFICANT CHANGE UP (ref 8.4–10.5)
CHLORIDE SERPL-SCNC: 101 MMOL/L — SIGNIFICANT CHANGE UP (ref 96–108)
CO2 SERPL-SCNC: 24 MMOL/L — SIGNIFICANT CHANGE UP (ref 22–31)
CREAT SERPL-MCNC: 0.62 MG/DL — SIGNIFICANT CHANGE UP (ref 0.5–1.3)
CRP SERPL-MCNC: 104.5 MG/L — HIGH (ref 0–4)
ERYTHROCYTE [SEDIMENTATION RATE] IN BLOOD: 143 MM/HR — HIGH
GLUCOSE SERPL-MCNC: 102 MG/DL — HIGH (ref 70–99)
HCT VFR BLD CALC: 32.3 % — LOW (ref 34.5–45)
HGB BLD-MCNC: 10.6 G/DL — LOW (ref 11.5–15.5)
MCHC RBC-ENTMCNC: 29.3 PG — SIGNIFICANT CHANGE UP (ref 27–34)
MCHC RBC-ENTMCNC: 32.8 GM/DL — SIGNIFICANT CHANGE UP (ref 32–36)
MCV RBC AUTO: 89.2 FL — SIGNIFICANT CHANGE UP (ref 80–100)
NRBC # BLD: 0 /100 WBCS — SIGNIFICANT CHANGE UP (ref 0–0)
PLATELET # BLD AUTO: 419 K/UL — HIGH (ref 150–400)
POTASSIUM SERPL-MCNC: 3.7 MMOL/L — SIGNIFICANT CHANGE UP (ref 3.5–5.3)
POTASSIUM SERPL-SCNC: 3.7 MMOL/L — SIGNIFICANT CHANGE UP (ref 3.5–5.3)
RBC # BLD: 3.62 M/UL — LOW (ref 3.8–5.2)
RBC # FLD: 11.6 % — SIGNIFICANT CHANGE UP (ref 10.3–14.5)
RH IG SCN BLD-IMP: NEGATIVE — SIGNIFICANT CHANGE UP
SARS-COV-2 RNA SPEC QL NAA+PROBE: SIGNIFICANT CHANGE UP
SODIUM SERPL-SCNC: 136 MMOL/L — SIGNIFICANT CHANGE UP (ref 135–145)
WBC # BLD: 6.96 K/UL — SIGNIFICANT CHANGE UP (ref 3.8–10.5)
WBC # FLD AUTO: 6.96 K/UL — SIGNIFICANT CHANGE UP (ref 3.8–10.5)

## 2021-09-11 PROCEDURE — 99232 SBSQ HOSP IP/OBS MODERATE 35: CPT

## 2021-09-11 RX ORDER — NAFCILLIN 10 G/100ML
2 INJECTION, POWDER, FOR SOLUTION INTRAVENOUS EVERY 4 HOURS
Refills: 0 | Status: DISCONTINUED | OUTPATIENT
Start: 2021-09-11 | End: 2021-10-21

## 2021-09-11 RX ADMIN — CEFEPIME 100 MILLIGRAM(S): 1 INJECTION, POWDER, FOR SOLUTION INTRAMUSCULAR; INTRAVENOUS at 05:22

## 2021-09-11 RX ADMIN — HEPARIN SODIUM 5000 UNIT(S): 5000 INJECTION INTRAVENOUS; SUBCUTANEOUS at 15:07

## 2021-09-11 RX ADMIN — OXYCODONE HYDROCHLORIDE 10 MILLIGRAM(S): 5 TABLET ORAL at 11:23

## 2021-09-11 RX ADMIN — Medication 1000 UNIT(S): at 11:23

## 2021-09-11 RX ADMIN — Medication 1 TABLET(S): at 11:23

## 2021-09-11 RX ADMIN — Medication 250 MILLIGRAM(S): at 00:37

## 2021-09-11 RX ADMIN — NAFCILLIN 200 GRAM(S): 10 INJECTION, POWDER, FOR SOLUTION INTRAVENOUS at 23:28

## 2021-09-11 RX ADMIN — OXYCODONE HYDROCHLORIDE 10 MILLIGRAM(S): 5 TABLET ORAL at 05:21

## 2021-09-11 RX ADMIN — SENNA PLUS 2 TABLET(S): 8.6 TABLET ORAL at 22:17

## 2021-09-11 RX ADMIN — OXYCODONE HYDROCHLORIDE 10 MILLIGRAM(S): 5 TABLET ORAL at 06:20

## 2021-09-11 RX ADMIN — HEPARIN SODIUM 5000 UNIT(S): 5000 INJECTION INTRAVENOUS; SUBCUTANEOUS at 05:22

## 2021-09-11 RX ADMIN — CEFEPIME 100 MILLIGRAM(S): 1 INJECTION, POWDER, FOR SOLUTION INTRAMUSCULAR; INTRAVENOUS at 13:00

## 2021-09-11 RX ADMIN — OXYCODONE HYDROCHLORIDE 10 MILLIGRAM(S): 5 TABLET ORAL at 18:29

## 2021-09-11 RX ADMIN — OXYCODONE HYDROCHLORIDE 10 MILLIGRAM(S): 5 TABLET ORAL at 19:29

## 2021-09-11 RX ADMIN — HEPARIN SODIUM 5000 UNIT(S): 5000 INJECTION INTRAVENOUS; SUBCUTANEOUS at 22:17

## 2021-09-11 RX ADMIN — OXYCODONE HYDROCHLORIDE 10 MILLIGRAM(S): 5 TABLET ORAL at 23:47

## 2021-09-11 RX ADMIN — OXYCODONE HYDROCHLORIDE 10 MILLIGRAM(S): 5 TABLET ORAL at 12:20

## 2021-09-11 NOTE — PROGRESS NOTE ADULT - ASSESSMENT
25 yo F with L ankle fractures with septic arthritis, postoperative compartment syndrome s/p RICK, I&D, bone debridement and ligament repair on 9/9.  Afebrile, WBC nl.  All OR cultures are growing Staph aureus - presumptive MSSA.  Vancomycin trough was supratherapeutic but no longer need vanc.  Discussed with MsAdolfo COORNADO options of cefazolin vs. nafcillin - advantages and disadvantages.  Will use nafcillin.  RTOR is planned for 9/12.    Suggest:  - Continue to f/u blood cultures from 9/7 and 9/9  - Continue to f/u OR cultures from 9/9 for additional organisms and formal susceptibility testing  - Start nafcillin 2 g IV q4h  - D/C vancomycin and cefepime  Recommendations discussed with primary team.  Will continue to follow with you - team 2.

## 2021-09-11 NOTE — PROGRESS NOTE ADULT - SUBJECTIVE AND OBJECTIVE BOX
INTERVAL HPI/OVERNIGHT EVENTS:  Patient seen and examined at bedside. Patient states her pain is well controlled. Has not had BM since being in the hospital. No new complaints. Otherwise denies fever, chills, lightheadedness, CP, palpitations, SOB, cough, N/V/D, abdominal pain, dysuria.     VITAL SIGNS:  T(F): 98.2 (09-11-21 @ 20:15)  HR: 96 (09-11-21 @ 20:15)  BP: 104/69 (09-11-21 @ 20:15)  RR: 18 (09-11-21 @ 20:15)  SpO2: 96% (09-11-21 @ 20:15)  Wt(kg): --    PHYSICAL EXAM:  Constitutional: NAD, comfortable in bed.  HEENT: NC/AT, MMM  Neck: Supple,  Respiratory: Normal rate, rhythm, depth, effort. CTAB. No w/r/r.   Cardiovascular: RRR, normal S1 and S2, no m/r/g.   Gastrointestinal: +BS, soft NTND, no guarding   Extremities: wwp; no cyanosis, clubbing or edema.   Vascular: Pulses equal and strong throughout.   Neurological: AAOx3, moving all extremities  Skin: No gross skin abnormalities or rashes    MEDICATIONS  (STANDING):  calcium carbonate   1250 mG (OsCal) 1 Tablet(s) Oral daily  cholecalciferol 1000 Unit(s) Oral daily  heparin   Injectable 5000 Unit(s) SubCutaneous every 8 hours  lactated ringers. 1000 milliLiter(s) (120 mL/Hr) IV Continuous <Continuous>  multivitamin 1 Tablet(s) Oral daily  nafcillin  IVPB 2 Gram(s) IV Intermittent every 4 hours  polyethylene glycol 3350 17 Gram(s) Oral daily  senna 2 Tablet(s) Oral at bedtime    MEDICATIONS  (PRN):  acetaminophen   Tablet .. 650 milliGRAM(s) Oral every 6 hours PRN Temp greater or equal to 38C (100.4F), Mild Pain (1 - 3)  bisacodyl Suppository 10 milliGRAM(s) Rectal daily PRN If no bowel movement by POD#2  HYDROmorphone  Injectable 0.5 milliGRAM(s) IV Push every 4 hours PRN Breakthrough pain  HYDROmorphone  Injectable 0.5 milliGRAM(s) IV Push every 15 minutes PRN pacu  magnesium hydroxide Suspension 30 milliLiter(s) Oral daily PRN Constipation  metoclopramide Injectable 10 milliGRAM(s) IV Push every 6 hours PRN Nausea and/or Vomiting  oxyCODONE    IR 10 milliGRAM(s) Oral every 4 hours PRN Severe Pain (7 - 10)  oxyCODONE    IR 5 milliGRAM(s) Oral every 4 hours PRN Moderate Pain (4 - 6)      Allergies    No Known Allergies    Intolerances        LABS:                        10.6   6.96  )-----------( 419      ( 11 Sep 2021 05:52 )             32.3     09-11    136  |  101  |  7   ----------------------------<  102<H>  3.7   |  24  |  0.62    Ca    9.2      11 Sep 2021 05:52            RADIOLOGY & ADDITIONAL TESTS: Reviewed

## 2021-09-11 NOTE — PROGRESS NOTE ADULT - SUBJECTIVE AND OBJECTIVE BOX
Ortho Note    Pt comfortable without complaints, pain controlled  Denies CP, SOB, N/V, numbness/tingling     Vital Signs Last 24 Hrs  T(C): 36.9 (09-11-21 @ 05:47), Max: 36.9 (09-11-21 @ 05:47)  T(F): 98.5 (09-11-21 @ 05:47), Max: 98.5 (09-11-21 @ 05:47)  HR: 94 (09-11-21 @ 05:47) (94 - 94)  BP: 102/66 (09-11-21 @ 05:47) (102/66 - 102/66)  BP(mean): 78 (09-11-21 @ 05:47) (78 - 78)  RR: 18 (09-11-21 @ 05:47) (18 - 18)  SpO2: 96% (09-11-21 @ 05:47) (96% - 96%)  I&O's Summary    10 Sep 2021 07:01  -  11 Sep 2021 07:00  --------------------------------------------------------  IN: 1990 mL / OUT: 1910 mL / NET: 80 mL        Physical Exam:  General: Pt Alert and oriented, NAD  DSG posterior slab splint C/D/I vacuum dressing  Pulses: 2+ pedal pulses  Sensation: Sensation absent in superficial peroneal distribution  Motor: 0/5 EHL 3/5 FHL                          10.6   6.96  )-----------( 419      ( 11 Sep 2021 05:52 )             32.3     09-11    136  |  101  |  7   ----------------------------<  102<H>  3.7   |  24  |  0.62    Ca    9.2      11 Sep 2021 05:52    TPro  8.9<H>  /  Alb  4.0  /  TBili  0.5  /  DBili  x   /  AST  15  /  ALT  14  /  AlkPhos  68  09-09      A/P: 26yFemale POD#2 s/p L ankle ORIF/I&D   - Stable  - Pain Control  - Continue vanc   - waiting ID recs regarding cefepime  - f/u cultures  - DVT ppx: SQH  - PT, WBS: NWB LLE  - Dispo plan for RTOR sunday for wound vac change and repeat I&D    Sergio Clements, PGY-1  Ortho Pager [phone] Ortho Note    Pt comfortable without complaints, pain controlled  Denies CP, SOB, N/V, numbness/tingling     Vital Signs Last 24 Hrs  T(C): 36.9 (09-11-21 @ 05:47), Max: 36.9 (09-11-21 @ 05:47)  T(F): 98.5 (09-11-21 @ 05:47), Max: 98.5 (09-11-21 @ 05:47)  HR: 94 (09-11-21 @ 05:47) (94 - 94)  BP: 102/66 (09-11-21 @ 05:47) (102/66 - 102/66)  BP(mean): 78 (09-11-21 @ 05:47) (78 - 78)  RR: 18 (09-11-21 @ 05:47) (18 - 18)  SpO2: 96% (09-11-21 @ 05:47) (96% - 96%)  I&O's Summary    10 Sep 2021 07:01  -  11 Sep 2021 07:00  --------------------------------------------------------  IN: 1990 mL / OUT: 1910 mL / NET: 80 mL        Physical Exam:  General: Pt Alert and oriented, NAD  DSG posterior slab splint C/D/I vacuum dressing  Pulses: 2+ pedal pulses  Sensation: Sensation absent in superficial peroneal distribution  Motor: 0/5 EHL 3/5 FHL                          10.6   6.96  )-----------( 419      ( 11 Sep 2021 05:52 )             32.3     09-11    136  |  101  |  7   ----------------------------<  102<H>  3.7   |  24  |  0.62    Ca    9.2      11 Sep 2021 05:52    TPro  8.9<H>  /  Alb  4.0  /  TBili  0.5  /  DBili  x   /  AST  15  /  ALT  14  /  AlkPhos  68  09-09      A/P: 26yFemale POD#2 s/p L ankle ORIF/I&D   - Stable  - Pain Control  - trend ESR/CRP for x3 days  - Continue vanc   - waiting ID recs regarding cefepime  - f/u cultures  - DVT ppx: SQH  - PT, WBS: NWGERRY LLE  - Dispo plan for RTOR sunday for wound vac change and repeat I&D    Sergio Clements, PGY-1  Ortho Pager [phone]

## 2021-09-11 NOTE — PROGRESS NOTE ADULT - ATTENDING COMMENTS
Patient seen and examined at 1030am on 9.11.21.  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   Plan for repeat I&D second look on tomorrow  I have discussed and reviewed images with Dr. Dunbar regarding her need for flap. Potential for ALT vs gracilis flap.   - f/u OR cultures and pathology specimens (neuroma, muscle belly, bone)  - Pain Control- well- controlled on current regimen, continue  - DVT ppx: HSQ  - PT, WBS: NWB LLE  - LLE elevation  - monitor wound vac output  - appreciate ID recs- continue cefepime and vancomycin  - dispo: plan for RTOR Sunday for repeat I+D and wound vac change .

## 2021-09-11 NOTE — PROGRESS NOTE ADULT - SUBJECTIVE AND OBJECTIVE BOX
INTERVAL HPI/OVERNIGHT EVENTS:  Tm 98.5.  Pain well-controlled at present - recently took pain meds    CONSTITUTIONAL:  No fever, chills, night sweats  EYES:  No photophobia or visual changes  CARDIOVASCULAR:  No chest pain  RESPIRATORY:  No cough, wheezing, or SOB   GASTROINTESTINAL:  No nausea, vomiting, diarrhea, constipation, or abdominal pain  GENITOURINARY:  No frequency, urgency, dysuria or hematuria  NEUROLOGIC:  No headache, lightheadedness      ANTIBIOTICS/RELEVANT:    Vancomycin 1.25 g IV q8h (9/9-present)  Cefepime 2 g IV q8h (9/9-present)      Vital Signs Last 24 Hrs  T(C): 36.9 (11 Sep 2021 16:50), Max: 36.9 (11 Sep 2021 05:47)  T(F): 98.4 (11 Sep 2021 16:50), Max: 98.5 (11 Sep 2021 05:47)  HR: 94 (11 Sep 2021 16:50) (84 - 115)  BP: 101/70 (11 Sep 2021 16:50) (98/63 - 123/77)  BP(mean): 78 (11 Sep 2021 05:47) (78 - 78)  RR: 17 (11 Sep 2021 16:50) (16 - 18)  SpO2: 96% (11 Sep 2021 16:50) (95% - 97%)    PHYSICAL EXAM:  Constitutional:  Well-developed, well nourished  Eyes:  Sclerae anicterica, conjunctivae clear, PERRL  Ear/Nose/Throat:  No nasal exudate or sinus tenderness;  No buccal mucosal lesions, no pharyngeal erythema or exudate	  Neck:  Supple, no adenopathy  Respiratory:  Clear bilaterally  Cardiovascular:  RRR, S1S2, no murmur appreciated  Gastrointestinal:  Symmetric, normoactive BS, soft, NT, no masses, guarding or rebound.  No HSM  Extremities:  No edema      LABS:                        10.6   6.96  )-----------( 419      ( 11 Sep 2021 05:52 )             32.3         09-11    136  |  101  |  7   ----------------------------<  102<H>  3.7   |  24  |  0.62    Ca    9.2      11 Sep 2021 05:52            MICROBIOLOGY:    Blood cultures;  9/7 X 2 - NGTD;  9/9 X 2 - NGTD    OR cultures 9/9 X 6 (medial and lateral) - all growing Staph aureus, presumed MSSA      RADIOLOGY & ADDITIONAL STUDIES:

## 2021-09-11 NOTE — PROGRESS NOTE ADULT - ASSESSMENT
26F [place] p/w infection and non-union now s/p RICK, arthrotomy, neuroma excision, L ankle ligament repair, and I/D w bone debridement w Dr. Wallis 9/10 -- surgical culture growing G+ cocci in pairs    #Post-op state - pain is controlled. PPx: SQH. 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 Currently on nafcillin per ID. BCx 9/7 NGTD. Surgical cultures from OR 9/9 w Gram + cocci, presumed MSSA. ID is following.   #Blood loss anemia - Likely operative loss w element of dilution from IVF. Pt is asymptomatic and hgb is stable. Continue to follow  #Tobacco use - intermittently - pt counseled to stop smoking  VTE ppx: SQH    Plan  -Follow up surgical culture and sensitivities - f/u ID Recs  -plan to return to OR for debridement SUN  -Neurology and Plastic surgery have been consulted; f/u recs. Noted to have neuroma which was excised in OR  -Add on A1C  - increase bowel regimen    DISPO: TBD  Pt will need to establish PMD on discharge  Pt lives in 2nd floor walk-up apartment.

## 2021-09-12 ENCOUNTER — APPOINTMENT (OUTPATIENT)
Dept: ORTHOPEDIC SURGERY | Facility: HOSPITAL | Age: 26
End: 2021-09-12

## 2021-09-12 LAB
-  CEFAZOLIN: SIGNIFICANT CHANGE UP
-  CLINDAMYCIN: SIGNIFICANT CHANGE UP
-  ERYTHROMYCIN: SIGNIFICANT CHANGE UP
-  LINEZOLID: SIGNIFICANT CHANGE UP
-  OXACILLIN: SIGNIFICANT CHANGE UP
-  RIFAMPIN: SIGNIFICANT CHANGE UP
-  TRIMETHOPRIM/SULFAMETHOXAZOLE: SIGNIFICANT CHANGE UP
-  VANCOMYCIN: SIGNIFICANT CHANGE UP
CULTURE RESULTS: SIGNIFICANT CHANGE UP
GRAM STN FLD: SIGNIFICANT CHANGE UP
METHOD TYPE: SIGNIFICANT CHANGE UP
ORGANISM # SPEC MICROSCOPIC CNT: SIGNIFICANT CHANGE UP
SPECIMEN SOURCE: SIGNIFICANT CHANGE UP

## 2021-09-12 PROCEDURE — 11047 DBRDMT BONE EACH ADDL: CPT | Mod: 58,LT

## 2021-09-12 PROCEDURE — 73600 X-RAY EXAM OF ANKLE: CPT | Mod: 26,LT

## 2021-09-12 PROCEDURE — 11044 DBRDMT BONE 1ST 20 SQ CM/<: CPT | Mod: 58,LT

## 2021-09-12 PROCEDURE — 73706 CT ANGIO LWR EXTR W/O&W/DYE: CPT | Mod: 26,LT

## 2021-09-12 PROCEDURE — 27620 EXPLORE/TREAT ANKLE JOINT: CPT | Mod: 58,LT

## 2021-09-12 PROCEDURE — 99232 SBSQ HOSP IP/OBS MODERATE 35: CPT

## 2021-09-12 RX ORDER — CEFTRIAXONE 500 MG/1
2000 INJECTION, POWDER, FOR SOLUTION INTRAMUSCULAR; INTRAVENOUS EVERY 24 HOURS
Refills: 0 | Status: DISCONTINUED | OUTPATIENT
Start: 2021-09-12 | End: 2021-09-17

## 2021-09-12 RX ADMIN — HYDROMORPHONE HYDROCHLORIDE 0.5 MILLIGRAM(S): 2 INJECTION INTRAMUSCULAR; INTRAVENOUS; SUBCUTANEOUS at 21:55

## 2021-09-12 RX ADMIN — POLYETHYLENE GLYCOL 3350 17 GRAM(S): 17 POWDER, FOR SOLUTION ORAL at 12:37

## 2021-09-12 RX ADMIN — SENNA PLUS 2 TABLET(S): 8.6 TABLET ORAL at 21:46

## 2021-09-12 RX ADMIN — OXYCODONE HYDROCHLORIDE 10 MILLIGRAM(S): 5 TABLET ORAL at 13:39

## 2021-09-12 RX ADMIN — NAFCILLIN 200 GRAM(S): 10 INJECTION, POWDER, FOR SOLUTION INTRAVENOUS at 17:30

## 2021-09-12 RX ADMIN — HYDROMORPHONE HYDROCHLORIDE 0.5 MILLIGRAM(S): 2 INJECTION INTRAMUSCULAR; INTRAVENOUS; SUBCUTANEOUS at 10:41

## 2021-09-12 RX ADMIN — NAFCILLIN 200 GRAM(S): 10 INJECTION, POWDER, FOR SOLUTION INTRAVENOUS at 03:01

## 2021-09-12 RX ADMIN — CEFTRIAXONE 100 MILLIGRAM(S): 500 INJECTION, POWDER, FOR SOLUTION INTRAMUSCULAR; INTRAVENOUS at 23:18

## 2021-09-12 RX ADMIN — Medication 1 TABLET(S): at 12:36

## 2021-09-12 RX ADMIN — HYDROMORPHONE HYDROCHLORIDE 0.5 MILLIGRAM(S): 2 INJECTION INTRAMUSCULAR; INTRAVENOUS; SUBCUTANEOUS at 21:46

## 2021-09-12 RX ADMIN — Medication 650 MILLIGRAM(S): at 21:00

## 2021-09-12 RX ADMIN — OXYCODONE HYDROCHLORIDE 10 MILLIGRAM(S): 5 TABLET ORAL at 19:02

## 2021-09-12 RX ADMIN — HYDROMORPHONE HYDROCHLORIDE 0.5 MILLIGRAM(S): 2 INJECTION INTRAMUSCULAR; INTRAVENOUS; SUBCUTANEOUS at 10:12

## 2021-09-12 RX ADMIN — Medication 1 TABLET(S): at 18:53

## 2021-09-12 RX ADMIN — OXYCODONE HYDROCHLORIDE 10 MILLIGRAM(S): 5 TABLET ORAL at 12:39

## 2021-09-12 RX ADMIN — Medication 1000 UNIT(S): at 12:36

## 2021-09-12 RX ADMIN — HEPARIN SODIUM 5000 UNIT(S): 5000 INJECTION INTRAVENOUS; SUBCUTANEOUS at 14:56

## 2021-09-12 RX ADMIN — OXYCODONE HYDROCHLORIDE 10 MILLIGRAM(S): 5 TABLET ORAL at 20:00

## 2021-09-12 RX ADMIN — NAFCILLIN 200 GRAM(S): 10 INJECTION, POWDER, FOR SOLUTION INTRAVENOUS at 07:05

## 2021-09-12 RX ADMIN — NAFCILLIN 200 GRAM(S): 10 INJECTION, POWDER, FOR SOLUTION INTRAVENOUS at 12:37

## 2021-09-12 RX ADMIN — HEPARIN SODIUM 5000 UNIT(S): 5000 INJECTION INTRAVENOUS; SUBCUTANEOUS at 07:05

## 2021-09-12 RX ADMIN — NAFCILLIN 200 GRAM(S): 10 INJECTION, POWDER, FOR SOLUTION INTRAVENOUS at 21:39

## 2021-09-12 RX ADMIN — HEPARIN SODIUM 5000 UNIT(S): 5000 INJECTION INTRAVENOUS; SUBCUTANEOUS at 21:46

## 2021-09-12 RX ADMIN — Medication 650 MILLIGRAM(S): at 20:30

## 2021-09-12 NOTE — DIETITIAN INITIAL EVALUATION ADULT. - LAB (SPECIFY)
[phone] [place], glucose

## 2021-09-12 NOTE — PROGRESS NOTE ADULT - SUBJECTIVE AND OBJECTIVE BOX
Ortho Post Op Check    Procedure: L ankle I&D, wound vac change  Surgeon: Dr. Wallis    Pt comfortable without complaints, pain controlled  Denies CP, SOB, N/V, numbness/tingling     Vital Signs Last 24 Hrs  T(C): 37.1 (09-12-21 @ 16:06), Max: 37.1 (09-12-21 @ 16:06)  T(F): 98.7 (09-12-21 @ 16:06), Max: 98.7 (09-12-21 @ 16:06)  HR: 98 (09-12-21 @ 16:06) (86 - 98)  BP: 104/70 (09-12-21 @ 16:06) (103/69 - 104/70)  BP(mean): 81 (09-12-21 @ 16:06) (81 - 81)  RR: 18 (09-12-21 @ 16:06) (16 - 18)  SpO2: 97% (09-12-21 @ 16:06) (97% - 97%)  I&O's Summary    11 Sep 2021 07:01  -  12 Sep 2021 07:00  --------------------------------------------------------  IN: 1930 mL / OUT: 1300 mL / NET: 630 mL    12 Sep 2021 07:01  -  12 Sep 2021 18:45  --------------------------------------------------------  IN: 1520 mL / OUT: 830 mL / NET: 690 mL        Physical Exam:  General: Pt Alert and oriented, NAD  DSG posterior slab splint C/D/I vacuum dressing  Pulses: 2+ pedal pulses  Sensation: Sensation absent in superficial peroneal distribution  Motor: 0/5 EHL 3/5 FHL                          10.6   6.96  )-----------( 419      ( 11 Sep 2021 05:52 )             32.3     09-11    136  |  101  |  7   ----------------------------<  102<H>  3.7   |  24  |  0.62    Ca    9.2      11 Sep 2021 05:52      A/P: 26yFemale POD#0 s/p above procedure  - Stable  - Pain Control  - trend ESR/CRP for x3 days  - appreciate ID recs, continue naficillin 2gIV q4  - f/u cultures  - DVT ppx: SQH  - PT, WBS: NWB LLE  - Dispo: pending RTOR Tuesday for L ankle I&D, vac change    Sergio Clements, PGY-1  Ortho Pager [phone] Ortho Post Op Check    Procedure: L ankle I&D, wound vac change  Surgeon: Dr. Wallis    Pt comfortable without complaints, pain controlled  Denies CP, SOB, N/V, numbness/tingling     Vital Signs Last 24 Hrs  T(C): 37.1 (09-12-21 @ 16:06), Max: 37.1 (09-12-21 @ 16:06)  T(F): 98.7 (09-12-21 @ 16:06), Max: 98.7 (09-12-21 @ 16:06)  HR: 98 (09-12-21 @ 16:06) (86 - 98)  BP: 104/70 (09-12-21 @ 16:06) (103/69 - 104/70)  BP(mean): 81 (09-12-21 @ 16:06) (81 - 81)  RR: 18 (09-12-21 @ 16:06) (16 - 18)  SpO2: 97% (09-12-21 @ 16:06) (97% - 97%)  I&O's Summary    11 Sep 2021 07:01  -  12 Sep 2021 07:00  --------------------------------------------------------  IN: 1930 mL / OUT: 1300 mL / NET: 630 mL    12 Sep 2021 07:01  -  12 Sep 2021 18:45  --------------------------------------------------------  IN: 1520 mL / OUT: 830 mL / NET: 690 mL        Physical Exam:  General: Pt Alert and oriented, NAD  DSG posterior slab splint C/D/I vacuum dressing  Pulses: 2+ pedal pulses  Sensation: Sensation improving over 3rd, 4th, 5th toes, regained minor sensation over the 2nd toe  Motor: 0/5 EHL 3/5 FHL                          10.6   6.96  )-----------( 419      ( 11 Sep 2021 05:52 )             32.3     09-11    136  |  101  |  7   ----------------------------<  102<H>  3.7   |  24  |  0.62    Ca    9.2      11 Sep 2021 05:52      A/P: 26yFemale POD#0 s/p above procedure  - Stable  - Pain Control  - trend ESR/CRP for x3 days  - appreciate ID recs, continue naficillin 2gIV q4, start ceftriaxone IV 2g q24  - cultures growing Strep anginosus  - DVT ppx: SQH  - PT, WBS: NWB LLE  - Dispo: pending RTOR Tuesday for L ankle I&D, vac change    Sergio Clements, PGY-1  Ortho Pager [phone]

## 2021-09-12 NOTE — PROGRESS NOTE ADULT - SUBJECTIVE AND OBJECTIVE BOX
INTERVAL HPI/OVERNIGHT EVENTS:  She is s/p RTOR this morning - feels well.  No ankle pain at present    CONSTITUTIONAL:  No fever, chills, night sweats  EYES:  No photophobia or visual changes  CARDIOVASCULAR:  No chest pain  RESPIRATORY:  No cough, wheezing, or SOB   GASTROINTESTINAL:  No nausea, vomiting, diarrhea, constipation, or abdominal pain  GENITOURINARY:  No frequency, urgency, dysuria or hematuria  NEUROLOGIC:  No headache, lightheadedness      ANTIBIOTICS/RELEVANT:    Nafcillin 2 g IV q4h (9/11-present)    Vancomycin 9/9-9/11  Cefepime 9/9-9/11      Vital Signs Last 24 Hrs  T(C): 37.1 (12 Sep 2021 16:06), Max: 37.2 (12 Sep 2021 05:43)  T(F): 98.7 (12 Sep 2021 16:06), Max: 98.9 (12 Sep 2021 05:43)  HR: 98 (12 Sep 2021 16:06) (70 - 98)  BP: 104/70 (12 Sep 2021 16:06) (87/54 - 108/73)  BP(mean): 81 (12 Sep 2021 16:06) (65 - 87)  RR: 18 (12 Sep 2021 16:06) (11 - 19)  SpO2: 97% (12 Sep 2021 16:06) (94% - 99%)    PHYSICAL EXAM:  Constitutional:  Well-developed, well nourished  Eyes:  Sclerae anicteric, conjunctivae clear, PERRL  Ear/Nose/Throat:  No nasal exudate or sinus tenderness;  No buccal mucosal lesions, no pharyngeal erythema or exudate	  Neck:  Supple, no adenopathy  Respiratory:  Clear bilaterally  Cardiovascular:  RRR, S1S2, no murmur appreciated  Gastrointestinal:  Symmetric, normoactive BS, soft, NT, no masses, guarding or rebound.  No HSM  Extremities:  No edema.  LLE in splint, can wiggle toes      LABS:                        10.6   6.96  )-----------( 419      ( 11 Sep 2021 05:52 )             32.3         09-11    136  |  101  |  7   ----------------------------<  102<H>  3.7   |  24  |  0.62    Ca    9.2      11 Sep 2021 05:52            MICROBIOLOGY:    Blood cultures;  9/7 X 2 - NG;  9/9 X 2 - NG    OR cultures 9/9 X 6 (medial and lateral) - all growing Staph aureus, some also with Strep anginosus    RADIOLOGY & ADDITIONAL STUDIES:

## 2021-09-12 NOTE — PROGRESS NOTE ADULT - SUBJECTIVE AND OBJECTIVE BOX
Ortho Note    Pt comfortable without complaints, pain controlled  Denies CP, SOB, N/V, numbness/tingling     Vital Signs Last 24 Hrs  T(C): 35.8 (12 Sep 2021 09:09), Max: 37.2 (12 Sep 2021 05:43)  T(F): 96.5 (12 Sep 2021 09:09), Max: 98.9 (12 Sep 2021 05:43)  HR: 82 (12 Sep 2021 09:24) (81 - 115)  BP: 97/57 (12 Sep 2021 09:24) (94/52 - 123/77)  BP(mean): 72 (12 Sep 2021 09:24) (68 - 74)  RR: 17 (12 Sep 2021 09:24) (14 - 19)  SpO2: 95% (12 Sep 2021 09:24) (95% - 99%)      Physical Exam:  General: Pt Alert and oriented, NAD  DSG posterior slab splint C/D/I vacuum dressing  Pulses: 2+ pedal pulses  Sensation: Sensation absent in superficial peroneal distribution  Motor: 0/5 EHL 3/5 FHL                          10.6   6.96  )-----------( 419      ( 11 Sep 2021 05:52 )             32.3         A/P: 26yFemale POD#3 s/p L ankle ORIF/I&D   - Stable  - Pain Control  - trend ESR/CRP for x3 days  - ID recommends naficillin  - f/u cultures  - DVT ppx: SQH  - PT, WBS: NWB LLE  - Dispo plan for RTOR today for wound vac change and repeat I&D

## 2021-09-12 NOTE — PROGRESS NOTE ADULT - ASSESSMENT
25 yo F with L ankle fractures with septic arthritis, postoperative compartment syndrome s/p RICK, I&D, bone debridement and ligament repair on 9/9, I&D with tissue debridement to bone 9/12.  Afebrile, WBC nl.  All OR cultures are growing MSSA, some now also growing Strep anginosus - would target.  Two also with Staph epi - not clear that this needs to be treated.  Suggest:  - Continue to f/u blood cultures from 9/9  - Continue to f/u OR cultures from 9/9 for additional growth and susceptibility of Strep anginosus  - Continue nafcillin 2 g IV q4h  - Start ceftriaxone 2 g IV q24h  Recommendations discussed with primary team.  Will follow with you - team 2.

## 2021-09-12 NOTE — BRIEF OPERATIVE NOTE - NSICDXBRIEFPROCEDURE_GEN_ALL_CORE_FT
PROCEDURES:  Irrigation and debridement, tissue, down to bone, excisional, more than 20 sq cm 09-Sep-2021 23:09:03  Chapin Wallis  Negative pressure wound therapy, (e. g. vacuum assisted drainage collection) using a mechanically-powered device, not durable medical equipment, including provision of cartridge and dressing(s), topical application(s), wound assessment, and instructi 09-Sep-2021 23:10:17  Chapin Wallis

## 2021-09-12 NOTE — PROGRESS NOTE ADULT - ATTENDING COMMENTS
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 Repeat I&D vac change performed with no active purulence. Debridement included skin, subdermal, muscle and necrotic bone. 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   Plan for repeat I&D vac change on 9.14.21 and 9.17.21 with plan for free flap coverage on 9.20.21. Plan for either  ALT vs gracilis flap.   - f/u repeat OR cultures and pathology specimens (neuroma, muscle belly, bone)  - Pain Control- well- controlled on current regimen, continue  - DVT ppx: HSQ  - PT, WBS: NWB LLE  - LLE elevation  - monitor wound vac output  - appreciate ID recs- nafcillin  - dispo: plan for RTOR Sunday for repeat I+D and wound vac change . 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 Repeat I&D vac change performed with no active purulence. Debridement included skin, subdermal, muscle and necrotic bone. 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   Plan for repeat I&D vac change on 9.14.21 and 9.17.21 with plan for free flap coverage on 9.20.21. Plan for either  ALT vs gracilis flap.   - f/u repeat OR cultures and pathology specimens (neuroma, muscle belly, bone)  - CTA left lower extremtiy for preop planning for free flap   - Pain Control- well- controlled on current regimen, continue  - DVT ppx: HSQ  - PT, WBS: NWB LLE  - LLE elevation  - monitor wound vac output  - appreciate ID recs- nafcillin  - dispo: plan for RTOR Sunday for repeat I+D and wound vac change .

## 2021-09-12 NOTE — DIETITIAN INITIAL EVALUATION ADULT. - OTHER INFO
Pt originally had left ankle ORIF in [facility] in 3/2021 after skateboarding accident. Has wound drainage and numbness for significant amount of time since procedure. Presents to [facility] for worsening ankle pain. 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 Will require multiple I&Ds + IV abx. S/P Lt ankle RICK and I&D with wound vac placement on 9/09. Plan to OR again 9/12.   On regular diet, tolerating without N/V.  GI: No BM since admit on 9/09 (x2-3 days), bowel med noted. abd-soft  Skin: surgical incision to lt ankle with wound vac     *Emphasized importance of consuming nutrient dense foods/ protein for healing/ recovery.

## 2021-09-12 NOTE — DIETITIAN INITIAL EVALUATION ADULT. - PROBLEM SELECTOR PLAN 1
- Admit to Orthopedic Service   - NWB LLE   - Short leg splint placed LLE  - NPO/IV fluids  - Medical clearance   - Dr. Carmona contacted for ID consult. Holding off on antibiotics until OR. Appreciate recs.  - Dr. Nassar contacted for Neurology consult. Appreciate recs.   - Added on for OR today   - Consented for left ankle I&D, RICK, possible ex-fix, possible wound vac with Dr. Wallis  - Discussed with Dr. Wallis

## 2021-09-12 NOTE — DIETITIAN INITIAL EVALUATION ADULT. - ORAL INTAKE PTA/DIET HISTORY
Spoke with pt in room on 9/11. States appetite is fair, typically eats 2-3 meals per day (breakfast and lunch). Does not always eat dinner, but may have snack. Eats/ drinks a variety of foods and beverages. Denies nutrition concerns or issues.   No cultural, ethnic, Moravian food preferences noted   Overall, nutritionally stable prior to admit.

## 2021-09-13 ENCOUNTER — TRANSCRIPTION ENCOUNTER (OUTPATIENT)
Age: 26
End: 2021-09-13

## 2021-09-13 LAB
-  AMPICILLIN: SIGNIFICANT CHANGE UP
-  CEFTRIAXONE: SIGNIFICANT CHANGE UP
-  CLINDAMYCIN: SIGNIFICANT CHANGE UP
-  ERYTHROMYCIN: SIGNIFICANT CHANGE UP
-  LEVOFLOXACIN: SIGNIFICANT CHANGE UP
-  PENICILLIN: SIGNIFICANT CHANGE UP
-  VANCOMYCIN: SIGNIFICANT CHANGE UP
-  VANCOMYCIN: SIGNIFICANT CHANGE UP
ANION GAP SERPL CALC-SCNC: 13 MMOL/L — SIGNIFICANT CHANGE UP (ref 5–17)
BUN SERPL-MCNC: 11 MG/DL — SIGNIFICANT CHANGE UP (ref 7–23)
CALCIUM SERPL-MCNC: 9.2 MG/DL — SIGNIFICANT CHANGE UP (ref 8.4–10.5)
CHLORIDE SERPL-SCNC: 98 MMOL/L — SIGNIFICANT CHANGE UP (ref 96–108)
CO2 SERPL-SCNC: 27 MMOL/L — SIGNIFICANT CHANGE UP (ref 22–31)
CREAT SERPL-MCNC: 0.86 MG/DL — SIGNIFICANT CHANGE UP (ref 0.5–1.3)
CRP SERPL-MCNC: 47.9 MG/L — HIGH (ref 0–4)
CULTURE RESULTS: SIGNIFICANT CHANGE UP
ERYTHROCYTE [SEDIMENTATION RATE] IN BLOOD: >130 MM/HR — HIGH
GLUCOSE SERPL-MCNC: 101 MG/DL — HIGH (ref 70–99)
HCT VFR BLD CALC: 32.5 % — LOW (ref 34.5–45)
HGB BLD-MCNC: 10.6 G/DL — LOW (ref 11.5–15.5)
MCHC RBC-ENTMCNC: 29 PG — SIGNIFICANT CHANGE UP (ref 27–34)
MCHC RBC-ENTMCNC: 32.6 GM/DL — SIGNIFICANT CHANGE UP (ref 32–36)
MCV RBC AUTO: 89 FL — SIGNIFICANT CHANGE UP (ref 80–100)
METHOD TYPE: SIGNIFICANT CHANGE UP
NRBC # BLD: 0 /100 WBCS — SIGNIFICANT CHANGE UP (ref 0–0)
ORGANISM # SPEC MICROSCOPIC CNT: SIGNIFICANT CHANGE UP
PLATELET # BLD AUTO: 543 K/UL — HIGH (ref 150–400)
POTASSIUM SERPL-MCNC: 3.6 MMOL/L — SIGNIFICANT CHANGE UP (ref 3.5–5.3)
POTASSIUM SERPL-SCNC: 3.6 MMOL/L — SIGNIFICANT CHANGE UP (ref 3.5–5.3)
RBC # BLD: 3.65 M/UL — LOW (ref 3.8–5.2)
RBC # FLD: 11.9 % — SIGNIFICANT CHANGE UP (ref 10.3–14.5)
SARS-COV-2 RNA SPEC QL NAA+PROBE: SIGNIFICANT CHANGE UP
SODIUM SERPL-SCNC: 138 MMOL/L — SIGNIFICANT CHANGE UP (ref 135–145)
SPECIMEN SOURCE: SIGNIFICANT CHANGE UP
WBC # BLD: 12.04 K/UL — HIGH (ref 3.8–10.5)
WBC # FLD AUTO: 12.04 K/UL — HIGH (ref 3.8–10.5)

## 2021-09-13 PROCEDURE — 99232 SBSQ HOSP IP/OBS MODERATE 35: CPT

## 2021-09-13 PROCEDURE — 99233 SBSQ HOSP IP/OBS HIGH 50: CPT

## 2021-09-13 RX ORDER — HEPARIN SODIUM 5000 [USP'U]/ML
5000 INJECTION INTRAVENOUS; SUBCUTANEOUS EVERY 8 HOURS
Refills: 0 | Status: DISCONTINUED | OUTPATIENT
Start: 2021-09-13 | End: 2021-09-13

## 2021-09-13 RX ORDER — HEPARIN SODIUM 5000 [USP'U]/ML
5000 INJECTION INTRAVENOUS; SUBCUTANEOUS EVERY 8 HOURS
Refills: 0 | Status: DISCONTINUED | OUTPATIENT
Start: 2021-09-13 | End: 2021-09-17

## 2021-09-13 RX ORDER — SODIUM CHLORIDE 9 MG/ML
1000 INJECTION, SOLUTION INTRAVENOUS
Refills: 0 | Status: DISCONTINUED | OUTPATIENT
Start: 2021-09-13 | End: 2021-09-15

## 2021-09-13 RX ORDER — INFLUENZA VIRUS VACCINE 15; 15; 15; 15 UG/.5ML; UG/.5ML; UG/.5ML; UG/.5ML
0.5 SUSPENSION INTRAMUSCULAR ONCE
Refills: 0 | Status: DISCONTINUED | OUTPATIENT
Start: 2021-09-13 | End: 2021-10-03

## 2021-09-13 RX ADMIN — NAFCILLIN 200 GRAM(S): 10 INJECTION, POWDER, FOR SOLUTION INTRAVENOUS at 09:30

## 2021-09-13 RX ADMIN — HEPARIN SODIUM 5000 UNIT(S): 5000 INJECTION INTRAVENOUS; SUBCUTANEOUS at 06:49

## 2021-09-13 RX ADMIN — HEPARIN SODIUM 5000 UNIT(S): 5000 INJECTION INTRAVENOUS; SUBCUTANEOUS at 21:39

## 2021-09-13 RX ADMIN — NAFCILLIN 200 GRAM(S): 10 INJECTION, POWDER, FOR SOLUTION INTRAVENOUS at 13:02

## 2021-09-13 RX ADMIN — OXYCODONE HYDROCHLORIDE 10 MILLIGRAM(S): 5 TABLET ORAL at 22:27

## 2021-09-13 RX ADMIN — NAFCILLIN 200 GRAM(S): 10 INJECTION, POWDER, FOR SOLUTION INTRAVENOUS at 01:10

## 2021-09-13 RX ADMIN — Medication 1 TABLET(S): at 11:45

## 2021-09-13 RX ADMIN — OXYCODONE HYDROCHLORIDE 10 MILLIGRAM(S): 5 TABLET ORAL at 21:27

## 2021-09-13 RX ADMIN — CEFTRIAXONE 100 MILLIGRAM(S): 500 INJECTION, POWDER, FOR SOLUTION INTRAMUSCULAR; INTRAVENOUS at 23:02

## 2021-09-13 RX ADMIN — HEPARIN SODIUM 5000 UNIT(S): 5000 INJECTION INTRAVENOUS; SUBCUTANEOUS at 13:02

## 2021-09-13 RX ADMIN — OXYCODONE HYDROCHLORIDE 10 MILLIGRAM(S): 5 TABLET ORAL at 15:00

## 2021-09-13 RX ADMIN — POLYETHYLENE GLYCOL 3350 17 GRAM(S): 17 POWDER, FOR SOLUTION ORAL at 13:10

## 2021-09-13 RX ADMIN — Medication 1000 UNIT(S): at 11:45

## 2021-09-13 RX ADMIN — OXYCODONE HYDROCHLORIDE 10 MILLIGRAM(S): 5 TABLET ORAL at 14:00

## 2021-09-13 RX ADMIN — NAFCILLIN 200 GRAM(S): 10 INJECTION, POWDER, FOR SOLUTION INTRAVENOUS at 21:05

## 2021-09-13 RX ADMIN — NAFCILLIN 200 GRAM(S): 10 INJECTION, POWDER, FOR SOLUTION INTRAVENOUS at 05:09

## 2021-09-13 RX ADMIN — NAFCILLIN 200 GRAM(S): 10 INJECTION, POWDER, FOR SOLUTION INTRAVENOUS at 17:09

## 2021-09-13 RX ADMIN — SENNA PLUS 2 TABLET(S): 8.6 TABLET ORAL at 21:40

## 2021-09-13 NOTE — PROGRESS NOTE ADULT - SUBJECTIVE AND OBJECTIVE BOX
Ortho Progress Note    Procedure: L ankle I&D, wound vac change  Surgeon: Dr. Wallis    Pt comfortable without complaints, pain controlled  Denies CP, SOB, N/V, numbness/tingling     Vital Signs Last 24 Hrs  T(C): 37.7 (13 Sep 2021 05:10), Max: 37.7 (13 Sep 2021 05:10)  T(F): 99.9 (13 Sep 2021 05:10), Max: 99.9 (13 Sep 2021 05:10)  HR: 85 (13 Sep 2021 05:10) (70 - 98)  BP: 106/67 (13 Sep 2021 05:10) (95/62 - 109/71)  BP(mean): 81 (12 Sep 2021 16:06) (69 - 82)  RR: 18 (13 Sep 2021 05:10) (11 - 18)  SpO2: 96% (13 Sep 2021 05:10) (95% - 97%)    I&O's Summary    12 Sep 2021 07:01  -  13 Sep 2021 07:00  --------------------------------------------------------  IN: 2000 mL / OUT: 2480 mL / NET: -480 mL          Physical Exam:  General: Pt Alert and oriented, NAD  DSG posterior slab splint C/D/I vacuum dressing  Pulses: 2+ pedal pulses  Sensation: Sensation improving over 3rd, 4th, 5th toes, regained minor sensation over the 2nd toe  Motor: 0/5 EHL 3/5 FHL                                     10.6   12.04 )-----------( 543      ( 13 Sep 2021 09:05 )             32.5         A/P: 26yFemale POD#1 s/p above procedure  - Stable  - Pain Control  - trend ESR/CRP for x3 days  - appreciate ID recs, continue naficillin 2gIV q4, ceftriaxone IV 2g q24  - cultures growing Strep anginosus  - DVT ppx: SQH  - PT, WBS: NWB LLE  - Dispo: pending RTOR Tuesday for L ankle I&D, vac change

## 2021-09-13 NOTE — PROGRESS NOTE ADULT - SUBJECTIVE AND OBJECTIVE BOX
INTERVAL HPI/OVERNIGHT EVENTS:  Tm 99.9.  L ankle pain 5/10 in severity    CONSTITUTIONAL:  No fever, chills, night sweats  EYES:  No photophobia or visual changes  CARDIOVASCULAR:  No chest pain  RESPIRATORY:  No cough, wheezing, or SOB   GASTROINTESTINAL:  No nausea, vomiting, diarrhea, constipation, or abdominal pain  GENITOURINARY:  No frequency, urgency, dysuria or hematuria  NEUROLOGIC:  No headache, lightheadedness      ANTIBIOTICS/RELEVANT:    Nafcillin 2 g IV q4h (9/11-present)  Ceftriaxone 2 g IV q24h (9/12-present)    Vancomycin 9/9-9/11  Cefepime 9/9-9/11        Vital Signs Last 24 Hrs  T(C): 37.2 (13 Sep 2021 13:32), Max: 37.7 (13 Sep 2021 05:10)  T(F): 98.9 (13 Sep 2021 13:32), Max: 99.9 (13 Sep 2021 05:10)  HR: 90 (13 Sep 2021 13:32) (85 - 98)  BP: 99/68 (13 Sep 2021 13:32) (95/62 - 109/71)  BP(mean): 81 (12 Sep 2021 16:06) (81 - 81)  RR: 18 (13 Sep 2021 13:32) (16 - 18)  SpO2: 99% (13 Sep 2021 13:32) (95% - 99%)    PHYSICAL EXAM:  Constitutional:  Well-developed, well nourished  Eyes:  Sclerae anicteric, conjunctivae clear, PERRL  Ear/Nose/Throat:  No nasal exudate or sinus tenderness;  No buccal mucosal lesions, no pharyngeal erythema or exudate	  Neck:  Supple, no adenopathy  Respiratory:  Clear bilaterally  Cardiovascular:  RRR, S1S2, no murmur appreciated  Gastrointestinal:  Symmetric, normoactive BS, soft, NT, no masses, guarding or rebound.  No HSM  Extremities:  No edema.  LLE in splint      LABS:                        10.6   12.04 )-----------( 543      ( 13 Sep 2021 09:05 )             32.5         09-13    138  |  98  |  11  ----------------------------<  101<H>  3.6   |  27  |  0.86    Ca    9.2      13 Sep 2021 09:05    ESR >130  CRP 47.9        MICROBIOLOGY:    Blood cultures;  9/7 X 2 - NG;  9/9 X 2 - NG    OR cultures 9/9 X 6 (medial and lateral) - all growing Staph aureus, some also with Strep anginosus    OR cultures 9/12 X3 - NGTD        RADIOLOGY & ADDITIONAL STUDIES:

## 2021-09-13 NOTE — PROGRESS NOTE ADULT - SUBJECTIVE AND OBJECTIVE BOX
Ortho Note    Surgery: s/p OR for RICK, I&D, wound vac left ankle on 9/9, repeat on 9/11  Repeat planned 9/14  Patient seen and examined at bedside   Pt endorses left ankle pain   Denies CP, SOB, N/V, numbness/tingling   states no BM since admission     Vital Signs Last 24 Hrs  T(C): 37.2 (09-13-21 @ 13:32), Max: 37.2 (09-13-21 @ 13:32)  T(F): 98.9 (09-13-21 @ 13:32), Max: 98.9 (09-13-21 @ 13:32)  HR: 90 (09-13-21 @ 13:32) (90 - 90)  BP: 99/68 (09-13-21 @ 13:32) (99/68 - 99/68)  BP(mean): --  RR: 18 (09-13-21 @ 13:32) (18 - 18)  SpO2: 99% (09-13-21 @ 13:32) (99% - 99%)  AVSS    General: Pt Alert and oriented, NAD  Dressing C/D/I left ankle   Sensation: intact to light touch   Motor wiggles digits         A/P: 26yFemale OR for RICK, I&D, wound vac left ankle on 9/9, repeat on 9/11, repeat planned for 9/14, 9/17    - Medically Stable  - c/w Pain Control  - DVT ppx: c/w SQH - do not need to hold for I&D procedures  - PT, WBS: NWB LLE   - c/w ceftriaxone, nafcillin IV - OR cultures growing strep   - repeat I&D tomorrow with wound vac change   - NPO after midnight, continue SQH  - Bowel regimen       Ortho Pager [phone]

## 2021-09-13 NOTE — PROGRESS NOTE ADULT - ATTENDING COMMENTS
Patients CTA reviewed and concern for posterior ankle and subtalar abscess. Will plan for posterior ankle and subtalar arthroscopy to avoid further soft tissue compromise for planned reconstruction. Plan for initiation of Whitley technique 9.14.21 of the distal fibula.  I discussed this plan at length with the patient who agrees to the purposed plan  I discussed the patients care with Dr Dunbar who will be taking the patient for free flap on 9.20

## 2021-09-13 NOTE — PROGRESS NOTE ADULT - SUBJECTIVE AND OBJECTIVE BOX
INTERVAL HPI/OVERNIGHT EVENTS:  CASTRO O/N    SUBJECTIVE: Patient seen and examined at bedside.   Pt feels well overall. Had some trouble sleeping last night due to noise. Denies any pain in L ankle. No fever, chills, numbness, chest pain, dyspnea. Eating wo nausea, abd pain. Voiding wo dysuria. Working w PT (N[facility] LLE) wo issues)      OBJECTIVE:    VITAL SIGNS:  ICU Vital Signs Last 24 Hrs  T(C): 37.2 (13 Sep 2021 13:32), Max: 37.7 (13 Sep 2021 05:10)  T(F): 98.9 (13 Sep 2021 13:32), Max: 99.9 (13 Sep 2021 05:10)  HR: 90 (13 Sep 2021 13:32) (85 - 98)  BP: 99/68 (13 Sep 2021 13:32) (95/62 - 109/71)  BP(mean): 81 (12 Sep 2021 16:06) (81 - 81)  ABP: --  ABP(mean): --  RR: 18 (13 Sep 2021 13:32) (16 - 18)  SpO2: 99% (13 Sep 2021 13:32) (95% - 99%)        09-12 @ 07:01 - 09-13 @ 07:00  --------------------------------------------------------  IN: 2000 mL / OUT: 2480 mL / NET: -480 mL    09-13 @ 07:01 - 09-13 @ 14:04  --------------------------------------------------------  IN: 360 mL / OUT: 0 mL / NET: 360 mL      CAPILLARY BLOOD GLUCOSE          PHYSICAL EXAM:  GEN: female in NAD on RA  HEENT: NC/AT, MMM  NECK: Supple  CV: RRR, nml S1S2, no murmurs  PULM: nml effort, CTAB  ABD: Soft, non-distended, NABS, non-tender  NEURO  A/O x3, moving all extremities,   EXT: LLE in splint.   PSYCH: Appropriate      MEDICATIONS:  MEDICATIONS  (STANDING):  calcium carbonate   1250 mG (OsCal) 1 Tablet(s) Oral daily  cefTRIAXone   IVPB 2000 milliGRAM(s) IV Intermittent every 24 hours  cholecalciferol 1000 Unit(s) Oral daily  heparin   Injectable 5000 Unit(s) SubCutaneous every 8 hours  influenza   Vaccine 0.5 milliLiter(s) IntraMuscular once  lactated ringers. 1000 milliLiter(s) (120 mL/Hr) IV Continuous <Continuous>  lactated ringers. 1000 milliLiter(s) (120 mL/Hr) IV Continuous <Continuous>  multivitamin 1 Tablet(s) Oral daily  nafcillin  IVPB 2 Gram(s) IV Intermittent every 4 hours  polyethylene glycol 3350 17 Gram(s) Oral daily  senna 2 Tablet(s) Oral at bedtime    MEDICATIONS  (PRN):  acetaminophen   Tablet .. 650 milliGRAM(s) Oral every 6 hours PRN Temp greater or equal to 38C (100.4F), Mild Pain (1 - 3)  bisacodyl Suppository 10 milliGRAM(s) Rectal daily PRN If no bowel movement by POD#2  HYDROmorphone  Injectable 0.5 milliGRAM(s) IV Push every 4 hours PRN Breakthrough pain  HYDROmorphone  Injectable 0.5 milliGRAM(s) IV Push every 15 minutes PRN pacu  magnesium hydroxide Suspension 30 milliLiter(s) Oral daily PRN Constipation  metoclopramide Injectable 10 milliGRAM(s) IV Push every 6 hours PRN Nausea and/or Vomiting  oxyCODONE    IR 10 milliGRAM(s) Oral every 4 hours PRN Severe Pain (7 - 10)  oxyCODONE    IR 5 milliGRAM(s) Oral every 4 hours PRN Moderate Pain (4 - 6)      ALLERGIES:  Allergies    No Known Allergies    Intolerances        LABS:                        10.6   12.04 )-----------( 543      ( 13 Sep 2021 09:05 )             32.5     09-13    138  |  98  |  11  ----------------------------<  101<H>  3.6   |  27  |  0.86    Ca    9.2      13 Sep 2021 09:05            RADIOLOGY & ADDITIONAL TESTS: Reviewed.

## 2021-09-13 NOTE — PROGRESS NOTE ADULT - ASSESSMENT
25 yo F with L ankle fractures with septic arthritis, postoperative compartment syndrome s/p RICK, I&D, bone debridement and ligament repair on 9/9, I&D with tissue debridement to bone 9/12.  Afebrile, WBC nl.  OR cultures from 9/9 are growing MSSA and Strep anginosus- would target and provide some gram negative coverage as she had been on cephalexin PTA.  Two also with Staph epi - not clear that this needs to be treated.  OR cultures from 9/12 - NGTD.  Suggest:  - Continue to f/u blood cultures from 9/9  - F/U OR cultures from 9/12  - Continue nafcillin 2 g IV q4h  - Continue ceftriaxone 2 g IV q24h  - Plan for RTOR on 9/14 and 9/17, per Dr. Wallis.  Will f/u cultures from those dates.  Recommendations discussed with primary team and Dr. Wallis.  Will follow with you - team 2.

## 2021-09-14 ENCOUNTER — APPOINTMENT (OUTPATIENT)
Dept: ORTHOPEDIC SURGERY | Facility: HOSPITAL | Age: 26
End: 2021-09-14

## 2021-09-14 LAB
ANION GAP SERPL CALC-SCNC: 11 MMOL/L — SIGNIFICANT CHANGE UP (ref 5–17)
BLD GP AB SCN SERPL QL: NEGATIVE — SIGNIFICANT CHANGE UP
BUN SERPL-MCNC: 11 MG/DL — SIGNIFICANT CHANGE UP (ref 7–23)
CALCIUM SERPL-MCNC: 9.4 MG/DL — SIGNIFICANT CHANGE UP (ref 8.4–10.5)
CHLORIDE SERPL-SCNC: 99 MMOL/L — SIGNIFICANT CHANGE UP (ref 96–108)
CO2 SERPL-SCNC: 27 MMOL/L — SIGNIFICANT CHANGE UP (ref 22–31)
CREAT SERPL-MCNC: 0.88 MG/DL — SIGNIFICANT CHANGE UP (ref 0.5–1.3)
CULTURE RESULTS: SIGNIFICANT CHANGE UP
GLUCOSE SERPL-MCNC: 81 MG/DL — SIGNIFICANT CHANGE UP (ref 70–99)
GRAM STN FLD: SIGNIFICANT CHANGE UP
HCG SERPL-ACNC: <0 MIU/ML — SIGNIFICANT CHANGE UP
HCT VFR BLD CALC: 31.8 % — LOW (ref 34.5–45)
HGB BLD-MCNC: 10.4 G/DL — LOW (ref 11.5–15.5)
MCHC RBC-ENTMCNC: 30 PG — SIGNIFICANT CHANGE UP (ref 27–34)
MCHC RBC-ENTMCNC: 32.7 GM/DL — SIGNIFICANT CHANGE UP (ref 32–36)
MCV RBC AUTO: 91.6 FL — SIGNIFICANT CHANGE UP (ref 80–100)
NRBC # BLD: 0 /100 WBCS — SIGNIFICANT CHANGE UP (ref 0–0)
PLATELET # BLD AUTO: 487 K/UL — HIGH (ref 150–400)
POTASSIUM SERPL-MCNC: 3.6 MMOL/L — SIGNIFICANT CHANGE UP (ref 3.5–5.3)
POTASSIUM SERPL-SCNC: 3.6 MMOL/L — SIGNIFICANT CHANGE UP (ref 3.5–5.3)
RBC # BLD: 3.47 M/UL — LOW (ref 3.8–5.2)
RBC # FLD: 12.1 % — SIGNIFICANT CHANGE UP (ref 10.3–14.5)
RH IG SCN BLD-IMP: NEGATIVE — SIGNIFICANT CHANGE UP
SODIUM SERPL-SCNC: 137 MMOL/L — SIGNIFICANT CHANGE UP (ref 135–145)
SPECIMEN SOURCE: SIGNIFICANT CHANGE UP
WBC # BLD: 7.06 K/UL — SIGNIFICANT CHANGE UP (ref 3.8–10.5)
WBC # FLD AUTO: 7.06 K/UL — SIGNIFICANT CHANGE UP (ref 3.8–10.5)

## 2021-09-14 PROCEDURE — 97607 NEG PRS WND THR NDME<=50SQCM: CPT

## 2021-09-14 PROCEDURE — 29897 ANKLE ARTHROSCOPY/SURGERY: CPT | Mod: LT

## 2021-09-14 PROCEDURE — 99232 SBSQ HOSP IP/OBS MODERATE 35: CPT

## 2021-09-14 PROCEDURE — 27620 EXPLORE/TREAT ANKLE JOINT: CPT | Mod: LT

## 2021-09-14 PROCEDURE — 27612 EXPLORATION OF ANKLE JOINT: CPT | Mod: 59,LT

## 2021-09-14 PROCEDURE — 29906 SUBTALAR ARTHRO W/DEB: CPT | Mod: LT

## 2021-09-14 RX ORDER — LACTULOSE 10 G/15ML
10 SOLUTION ORAL ONCE
Refills: 0 | Status: DISCONTINUED | OUTPATIENT
Start: 2021-09-14 | End: 2021-09-14

## 2021-09-14 RX ORDER — HYDROMORPHONE HYDROCHLORIDE 2 MG/ML
0.5 INJECTION INTRAMUSCULAR; INTRAVENOUS; SUBCUTANEOUS
Refills: 0 | Status: DISCONTINUED | OUTPATIENT
Start: 2021-09-14 | End: 2021-09-17

## 2021-09-14 RX ORDER — SENNA PLUS 8.6 MG/1
2 TABLET ORAL AT BEDTIME
Refills: 0 | Status: DISCONTINUED | OUTPATIENT
Start: 2021-09-14 | End: 2021-10-25

## 2021-09-14 RX ORDER — POLYETHYLENE GLYCOL 3350 17 G/17G
17 POWDER, FOR SOLUTION ORAL
Refills: 0 | Status: DISCONTINUED | OUTPATIENT
Start: 2021-09-14 | End: 2021-10-25

## 2021-09-14 RX ORDER — MULTIVIT WITH MIN/MFOLATE/K2 340-15/3 G
1 POWDER (GRAM) ORAL ONCE
Refills: 0 | Status: COMPLETED | OUTPATIENT
Start: 2021-09-14 | End: 2021-09-14

## 2021-09-14 RX ADMIN — HEPARIN SODIUM 5000 UNIT(S): 5000 INJECTION INTRAVENOUS; SUBCUTANEOUS at 22:11

## 2021-09-14 RX ADMIN — POLYETHYLENE GLYCOL 3350 17 GRAM(S): 17 POWDER, FOR SOLUTION ORAL at 19:38

## 2021-09-14 RX ADMIN — HEPARIN SODIUM 5000 UNIT(S): 5000 INJECTION INTRAVENOUS; SUBCUTANEOUS at 05:18

## 2021-09-14 RX ADMIN — POLYETHYLENE GLYCOL 3350 17 GRAM(S): 17 POWDER, FOR SOLUTION ORAL at 12:05

## 2021-09-14 RX ADMIN — HYDROMORPHONE HYDROCHLORIDE 0.5 MILLIGRAM(S): 2 INJECTION INTRAMUSCULAR; INTRAVENOUS; SUBCUTANEOUS at 05:47

## 2021-09-14 RX ADMIN — HYDROMORPHONE HYDROCHLORIDE 0.5 MILLIGRAM(S): 2 INJECTION INTRAMUSCULAR; INTRAVENOUS; SUBCUTANEOUS at 05:17

## 2021-09-14 RX ADMIN — NAFCILLIN 200 GRAM(S): 10 INJECTION, POWDER, FOR SOLUTION INTRAVENOUS at 05:05

## 2021-09-14 RX ADMIN — NAFCILLIN 200 GRAM(S): 10 INJECTION, POWDER, FOR SOLUTION INTRAVENOUS at 01:00

## 2021-09-14 RX ADMIN — HEPARIN SODIUM 5000 UNIT(S): 5000 INJECTION INTRAVENOUS; SUBCUTANEOUS at 12:05

## 2021-09-14 RX ADMIN — NAFCILLIN 200 GRAM(S): 10 INJECTION, POWDER, FOR SOLUTION INTRAVENOUS at 19:39

## 2021-09-14 RX ADMIN — NAFCILLIN 200 GRAM(S): 10 INJECTION, POWDER, FOR SOLUTION INTRAVENOUS at 12:05

## 2021-09-14 RX ADMIN — Medication 1 BOTTLE: at 22:11

## 2021-09-14 RX ADMIN — SODIUM CHLORIDE 120 MILLILITER(S): 9 INJECTION, SOLUTION INTRAVENOUS at 17:38

## 2021-09-14 RX ADMIN — HYDROMORPHONE HYDROCHLORIDE 0.5 MILLIGRAM(S): 2 INJECTION INTRAMUSCULAR; INTRAVENOUS; SUBCUTANEOUS at 17:37

## 2021-09-14 RX ADMIN — HYDROMORPHONE HYDROCHLORIDE 0.5 MILLIGRAM(S): 2 INJECTION INTRAMUSCULAR; INTRAVENOUS; SUBCUTANEOUS at 22:41

## 2021-09-14 RX ADMIN — SENNA PLUS 2 TABLET(S): 8.6 TABLET ORAL at 22:12

## 2021-09-14 RX ADMIN — HYDROMORPHONE HYDROCHLORIDE 0.5 MILLIGRAM(S): 2 INJECTION INTRAMUSCULAR; INTRAVENOUS; SUBCUTANEOUS at 22:11

## 2021-09-14 NOTE — PROGRESS NOTE ADULT - SUBJECTIVE AND OBJECTIVE BOX
INTERVAL HPI/OVERNIGHT EVENTS:  Seen this morning prior to OR.  Remains afebrile.  Pain in L ankle is 7/10 in severity    CONSTITUTIONAL:  No fever, chills, night sweats  EYES:  No photophobia or visual changes  CARDIOVASCULAR:  No chest pain  RESPIRATORY:  No cough, wheezing, or SOB   GASTROINTESTINAL:  No nausea, vomiting, diarrhea, constipation, or abdominal pain  GENITOURINARY:  No frequency, urgency, dysuria or hematuria  NEUROLOGIC:  No headache, lightheadedness      ANTIBIOTICS/RELEVANT:    Nafcillin 2 g IV q4h (9/11-present)  Ceftriaxone 2 g IV q24h (9/12-present)    Vancomycin 9/9-9/11  Cefepime 9/9-9/11      Vital Signs Last 24 Hrs  T(C): 36.8 (14 Sep 2021 13:44), Max: 37 (14 Sep 2021 04:33)  T(F): 98.2 (14 Sep 2021 13:44), Max: 98.6 (14 Sep 2021 04:33)  HR: 90 (14 Sep 2021 13:44) (84 - 92)  BP: 110/70 (14 Sep 2021 13:44) (99/67 - 110/70)  BP(mean): 80 (14 Sep 2021 10:01) (80 - 80)  RR: 18 (14 Sep 2021 13:44) (17 - 20)  SpO2: 99% (14 Sep 2021 13:44) (96% - 100%)    PHYSICAL EXAM:  Constitutional:  Well-developed, well nourished  Eyes:  Sclerae anicteric, conjunctivae clear, PERRL  Ear/Nose/Throat:  No nasal exudate or sinus tenderness;  No buccal mucosal lesions, no pharyngeal erythema or exudate	  Neck:  Supple, no adenopathy  Respiratory:  Clear bilaterally  Cardiovascular:  RRR, S1S2, no murmur appreciated  Gastrointestinal:  Symmetric, normoactive BS, soft, NT, no masses, guarding or rebound.  No HSM  Extremities:  No edema.  LLE in splint      LABS:                        10.4   7.06  )-----------( 487      ( 14 Sep 2021 08:20 )             31.8         09-14    137  |  99  |  11  ----------------------------<  81  3.6   |  27  |  0.88    Ca    9.4      14 Sep 2021 08:20            MICROBIOLOGY:    Blood cultures;  9/7 X 2 - NG;  9/9 X 2 - NG    OR cultures 9/9 X 6 (medial and lateral) - all growing Staph aureus, some also with Strep anginosus    OR cultures 9/12 X3 - NGTD      RADIOLOGY & ADDITIONAL STUDIES:

## 2021-09-14 NOTE — PROGRESS NOTE ADULT - SUBJECTIVE AND OBJECTIVE BOX
Ortho Post Op Check    Procedure: L Ankle I&D/Vac Change  Surgeon: Kadi    Pt comfortable without complaints, pain controlled  Denies CP, SOB, N/V, numbness/tingling     Vital Signs Last 24 Hrs  T(C): 36.6 (09-14-21 @ 19:18), Max: 36.6 (09-14-21 @ 19:18)  T(F): 97.9 (09-14-21 @ 19:18), Max: 97.9 (09-14-21 @ 19:18)  HR: 84 (09-14-21 @ 19:18) (66 - 92)  BP: 97/66 (09-14-21 @ 19:18) (97/66 - 108/67)  BP(mean): 79 (09-14-21 @ 18:00) (77 - 82)  RR: 18 (09-14-21 @ 19:18) (12 - 18)  SpO2: 95% (09-14-21 @ 19:18) (93% - 97%)    General: Pt Alert and oriented, NAD  AO Splint DSG C/D/I  Pulses: Toes WWP  Sensation: SILT grossly SPN/DPN in Toes  Motor: wiggling toes in splint      A/P: 26yFemale s/p L ankle Arthroscopy, I&D/Vac Exchange by Dr. NEAL Wallis on 09-14  - Stable  - Pain Control  - DVT ppx: HSQ  - Post op abx: Ceftriaxone + Nafcillin  - WBS: NWB LLE in AO Splint  - RTOR Friday 9/17 for repeat I&D, 9/20 for closure with PRS    Ortho Pager [phone]

## 2021-09-14 NOTE — PROGRESS NOTE ADULT - SUBJECTIVE AND OBJECTIVE BOX
Ortho Progress Note    Procedure: L ankle I&D, wound vac change  Surgeon: Dr. Wallis    Pt comfortable without complaints, pain controlled. For OR today  Denies CP, SOB, N/V, numbness/tingling     Vital Signs Last 24 Hrs  T(C): 37 (14 Sep 2021 04:33), Max: 37.2 (13 Sep 2021 13:32)  T(F): 98.6 (14 Sep 2021 04:33), Max: 98.9 (13 Sep 2021 13:32)  HR: 84 (14 Sep 2021 04:33) (84 - 90)  BP: 102/69 (14 Sep 2021 04:33) (99/67 - 105/75)  BP(mean): --  RR: 17 (14 Sep 2021 04:33) (17 - 18)  SpO2: 96% (14 Sep 2021 04:33) (96% - 100%)      Physical Exam:  General: Pt Alert and oriented, NAD  DSG posterior slab splint C/D/I vacuum dressing  Pulses: 2+ pedal pulses  Sensation: Sensation improving over 3rd, 4th, 5th toes, regained minor sensation over the 2nd toe  Motor: 0/5 EHL 3/5 FHL                                                10.6   12.04 )-----------( 543      ( 13 Sep 2021 09:05 )             32.5       < from: CT Angio Lower Extremity w/ IV Cont, Left (09.12.21 @ 14:34) >  IMPRESSION:    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    Most distal 5 cm segments of left ASHLEY and peroneal artery are not opacified at the surgical site due to occlusion or severe narrowing. This was present on preoperative study as well. Reconstitution of dorsalis pedis artery.    Postoperative changes in the left ankle with removal of previously seen metallic hardware.    Persistent 2.6 cm rim enhancing pockets of fluid behind the talocalcaneal joint which could be infected.    < end of copied text >      A/P: 26yFemale POD#2 s/p above procedure  - Stable  - Pain Control  - trend ESR/CRP for x3 days  - appreciate ID recs, continue naficillin 2gIV q4, ceftriaxone IV 2g q24  - cultures growing Strep anginosus  - DVT ppx: SQH  - PT, WBS: NWB LLE  - Dispo: pending RTOR today for L ankle I&D, vac change

## 2021-09-14 NOTE — PROGRESS NOTE ADULT - ASSESSMENT
27 yo F with L ankle fractures with septic arthritis, postoperative compartment syndrome s/p RICK, I&D, bone debridement and ligament repair on 9/9, I&D with tissue debridement to bone 9/12.  Afebrile, WBC nl.  OR cultures from 9/9 are growing MSSA and Strep anginosus- would target and provide some gram negative coverage as she had been on cephalexin PTA.  Two also with Staph epi - not clear that this needs to be treated.  OR cultures from 9/12 - NGTD.  Suggest:  - Continue to f/u blood cultures from 9/9  - F/U OR cultures from 9/12 and today  - Continue nafcillin 2 g IV q4h  - Continue ceftriaxone 2 g IV q24h  - Plan for RTOR on 9/17, per Dr. Wallis.  Will f/u cultures from that day.  Recommendations discussed with primary team and Dr. Wallsi.  Will follow with you - team 2.

## 2021-09-14 NOTE — PROGRESS NOTE ADULT - SUBJECTIVE AND OBJECTIVE BOX
Ortho Note    Surgery: s/p OR for RICK, I&D, wound vac left ankle on 9/9, repeat on 9/11  Repeat planned today 9/14  Patient seen and examined at bedside   Pt notes pain controlled at rest this AM   Denies CP, SOB, N/V, numbness/tingling   still no BM     Vital Signs Last 24 Hrs  T(C): 36.9 (09-14-21 @ 10:01), Max: 36.9 (09-14-21 @ 10:01)  T(F): 98.5 (09-14-21 @ 10:01), Max: 98.5 (09-14-21 @ 10:01)  HR: 92 (09-14-21 @ 10:01) (92 - 92)  BP: 102/69 (09-14-21 @ 10:01) (102/69 - 102/69)  BP(mean): 80 (09-14-21 @ 10:01) (80 - 80)  RR: 20 (09-14-21 @ 10:01) (20 - 20)  SpO2: 97% (09-14-21 @ 10:01) (97% - 97%)  AVSS    General: Pt Alert and oriented, NAD  Dressing C/D/I, wound vac left ankle   extremity warm, well perfused   motor firing LLE       A/P: 26yFemale OR for RICK, I&D, wound vac left ankle on 9/9, repeat on 9/11, repeat planned for  today 9/14, 9/17    - Medically Stable  - c/w Pain Control  - DVT ppx: c/w SQH - do not need to hold for I&D procedures  - Per ID Dr Carmona: Continue nafcillin 2 g IV q4h,  ceftriaxone 2 g IV q24h, trend OR cultures  - repeat I&D today with wound vac change   - NPO since midnight   - c/w Bowel regimen   - c/w NWB LLE     Ortho Pager [phone]

## 2021-09-14 NOTE — PROGRESS NOTE ADULT - ATTENDING COMMENTS
Patient CTA for soft tissue mapping for free flap for the plastic surgery team showed a ring-enhancing collection about the posterior aspect of the ankle and subtalar joint concerning for abscess.  The patient was discussed with at length the risk benefits and alternatives to operative intervention.  The posterior lateral approach is our planned interval for reconstruction of her ankle.  In order to assess this abscess, the patient will undergo a posterior ankle and subtalar arthroscopy with her I&D and VAC change.  After shared decision-making, the patient agrees to the proposed intervention.  The patient has been scheduled for Friday for repeat washout and VAC change as well as planned free flap on Monday the 20th.Patient CTA for soft tissue mapping for free flap for the plastic surgery team showed a ring-enhancing collection about the posterior aspect of the ankle and subtalar joint concerning for abscess.  The patient was discussed with at length the risk benefits and alternatives to operative intervention.  The posterior lateral approach is our planned interval for reconstruction of her ankle.  In order to assess this abscess, the patient will undergo a posterior ankle and subtalar arthroscopy with her I&D and VAC change.  After shared decision-making, the patient agrees to the proposed intervention.  The patient has been scheduled for Friday for repeat washout and VAC change as well as planned free flap on Monday the 20th.

## 2021-09-14 NOTE — BRIEF OPERATIVE NOTE - NSICDXBRIEFPROCEDURE_GEN_ALL_CORE_FT
PROCEDURES:  Irrigation and debridement, tissue, down to bone, excisional, more than 20 sq cm 09-Sep-2021 23:09:03  Chapin Wallis  Negative pressure wound therapy, (e. g. vacuum assisted drainage collection) using a mechanically-powered device, not durable medical equipment, including provision of cartridge and dressing(s), topical application(s), wound assessment, and instructi 09-Sep-2021 23:10:17  Chapin Wallis  Arthroscopy, ankle, with arthrotomy 14-Sep-2021 16:58:42  Unruly Chopra

## 2021-09-15 DIAGNOSIS — S91.002A UNSPECIFIED OPEN WOUND, LEFT ANKLE, INITIAL ENCOUNTER: ICD-10-CM

## 2021-09-15 LAB
ANION GAP SERPL CALC-SCNC: 9 MMOL/L — SIGNIFICANT CHANGE UP (ref 5–17)
BUN SERPL-MCNC: 11 MG/DL — SIGNIFICANT CHANGE UP (ref 7–23)
CALCIUM SERPL-MCNC: 9.3 MG/DL — SIGNIFICANT CHANGE UP (ref 8.4–10.5)
CHLORIDE SERPL-SCNC: 101 MMOL/L — SIGNIFICANT CHANGE UP (ref 96–108)
CO2 SERPL-SCNC: 28 MMOL/L — SIGNIFICANT CHANGE UP (ref 22–31)
CREAT SERPL-MCNC: 0.83 MG/DL — SIGNIFICANT CHANGE UP (ref 0.5–1.3)
CULTURE RESULTS: SIGNIFICANT CHANGE UP
GLUCOSE SERPL-MCNC: 91 MG/DL — SIGNIFICANT CHANGE UP (ref 70–99)
HCT VFR BLD CALC: 30.3 % — LOW (ref 34.5–45)
HGB BLD-MCNC: 10 G/DL — LOW (ref 11.5–15.5)
MCHC RBC-ENTMCNC: 29.7 PG — SIGNIFICANT CHANGE UP (ref 27–34)
MCHC RBC-ENTMCNC: 33 GM/DL — SIGNIFICANT CHANGE UP (ref 32–36)
MCV RBC AUTO: 89.9 FL — SIGNIFICANT CHANGE UP (ref 80–100)
NRBC # BLD: 0 /100 WBCS — SIGNIFICANT CHANGE UP (ref 0–0)
PLATELET # BLD AUTO: 527 K/UL — HIGH (ref 150–400)
POTASSIUM SERPL-MCNC: 3.5 MMOL/L — SIGNIFICANT CHANGE UP (ref 3.5–5.3)
POTASSIUM SERPL-SCNC: 3.5 MMOL/L — SIGNIFICANT CHANGE UP (ref 3.5–5.3)
RBC # BLD: 3.37 M/UL — LOW (ref 3.8–5.2)
RBC # FLD: 12 % — SIGNIFICANT CHANGE UP (ref 10.3–14.5)
SODIUM SERPL-SCNC: 138 MMOL/L — SIGNIFICANT CHANGE UP (ref 135–145)
SPECIMEN SOURCE: SIGNIFICANT CHANGE UP
SURGICAL PATHOLOGY STUDY: SIGNIFICANT CHANGE UP
WBC # BLD: 9.7 K/UL — SIGNIFICANT CHANGE UP (ref 3.8–10.5)
WBC # FLD AUTO: 9.7 K/UL — SIGNIFICANT CHANGE UP (ref 3.8–10.5)

## 2021-09-15 PROCEDURE — 99232 SBSQ HOSP IP/OBS MODERATE 35: CPT

## 2021-09-15 RX ORDER — SODIUM CHLORIDE 9 MG/ML
1000 INJECTION, SOLUTION INTRAVENOUS
Refills: 0 | Status: DISCONTINUED | OUTPATIENT
Start: 2021-09-16 | End: 2021-09-17

## 2021-09-15 RX ORDER — SOD SULF/SODIUM/NAHCO3/KCL/PEG
4000 SOLUTION, RECONSTITUTED, ORAL ORAL ONCE
Refills: 0 | Status: COMPLETED | OUTPATIENT
Start: 2021-09-15 | End: 2021-09-15

## 2021-09-15 RX ORDER — SODIUM CHLORIDE 9 MG/ML
1000 INJECTION, SOLUTION INTRAVENOUS
Refills: 0 | Status: DISCONTINUED | OUTPATIENT
Start: 2021-09-15 | End: 2021-09-15

## 2021-09-15 RX ADMIN — Medication 1 TABLET(S): at 12:48

## 2021-09-15 RX ADMIN — NAFCILLIN 200 GRAM(S): 10 INJECTION, POWDER, FOR SOLUTION INTRAVENOUS at 00:43

## 2021-09-15 RX ADMIN — NAFCILLIN 200 GRAM(S): 10 INJECTION, POWDER, FOR SOLUTION INTRAVENOUS at 15:59

## 2021-09-15 RX ADMIN — OXYCODONE HYDROCHLORIDE 10 MILLIGRAM(S): 5 TABLET ORAL at 21:19

## 2021-09-15 RX ADMIN — NAFCILLIN 200 GRAM(S): 10 INJECTION, POWDER, FOR SOLUTION INTRAVENOUS at 12:47

## 2021-09-15 RX ADMIN — NAFCILLIN 200 GRAM(S): 10 INJECTION, POWDER, FOR SOLUTION INTRAVENOUS at 07:39

## 2021-09-15 RX ADMIN — Medication 1000 UNIT(S): at 12:48

## 2021-09-15 RX ADMIN — SENNA PLUS 2 TABLET(S): 8.6 TABLET ORAL at 21:19

## 2021-09-15 RX ADMIN — OXYCODONE HYDROCHLORIDE 10 MILLIGRAM(S): 5 TABLET ORAL at 13:12

## 2021-09-15 RX ADMIN — OXYCODONE HYDROCHLORIDE 10 MILLIGRAM(S): 5 TABLET ORAL at 14:10

## 2021-09-15 RX ADMIN — HEPARIN SODIUM 5000 UNIT(S): 5000 INJECTION INTRAVENOUS; SUBCUTANEOUS at 13:12

## 2021-09-15 RX ADMIN — OXYCODONE HYDROCHLORIDE 10 MILLIGRAM(S): 5 TABLET ORAL at 23:00

## 2021-09-15 RX ADMIN — HEPARIN SODIUM 5000 UNIT(S): 5000 INJECTION INTRAVENOUS; SUBCUTANEOUS at 05:15

## 2021-09-15 RX ADMIN — OXYCODONE HYDROCHLORIDE 10 MILLIGRAM(S): 5 TABLET ORAL at 04:47

## 2021-09-15 RX ADMIN — Medication 4000 MILLILITER(S): at 09:14

## 2021-09-15 RX ADMIN — CEFTRIAXONE 100 MILLIGRAM(S): 500 INJECTION, POWDER, FOR SOLUTION INTRAMUSCULAR; INTRAVENOUS at 23:09

## 2021-09-15 RX ADMIN — NAFCILLIN 200 GRAM(S): 10 INJECTION, POWDER, FOR SOLUTION INTRAVENOUS at 04:07

## 2021-09-15 RX ADMIN — OXYCODONE HYDROCHLORIDE 10 MILLIGRAM(S): 5 TABLET ORAL at 03:47

## 2021-09-15 RX ADMIN — NAFCILLIN 200 GRAM(S): 10 INJECTION, POWDER, FOR SOLUTION INTRAVENOUS at 19:16

## 2021-09-15 RX ADMIN — HEPARIN SODIUM 5000 UNIT(S): 5000 INJECTION INTRAVENOUS; SUBCUTANEOUS at 21:19

## 2021-09-15 RX ADMIN — CEFTRIAXONE 100 MILLIGRAM(S): 500 INJECTION, POWDER, FOR SOLUTION INTRAMUSCULAR; INTRAVENOUS at 00:42

## 2021-09-15 NOTE — CONSULT NOTE ADULT - SUBJECTIVE AND OBJECTIVE BOX
Dianna is a 25 yo F who underwent a left ankle ORIF in [facility] in March 2021 following a skateboarding accident. Since the procedure she experienced wound pus drainage and foot numbness. She is currently under the care of Dr Wallis who performed multiple I&D, debridements and washouts to the wound area, and is treated with IV Abx with positive bacterial growth from wound cultures. She was previously a community ambulator, but now walks with a cane. She has only been taking ibuprofen for pain. She has pain aggravated by movement and weightbearing, alleviated by rest.  Dr Wallis is planning extensive debridement of remnant abscesses and soft tissue in the ankle area and bone reconstruction using hardware. Therfore he requested my recommendation regarding soft tissue coverage to the hardware.   The patient denies any other procedures in the past especially in the lower extremity.     Upon physical exam: left ankle wound is covered with wound vac. all pulses stations were palpated +3. no scars were visible over the thighs skin.

## 2021-09-15 NOTE — PROGRESS NOTE ADULT - SUBJECTIVE AND OBJECTIVE BOX
Ortho Progress Note    Procedure: L Ankle I&D/Vac Change  Surgeon: Kadi    Pt comfortable without complaints, pain controlled. Cultures NGTD  Denies CP, SOB, N/V, numbness/tingling     Vital Signs Last 24 Hrs  T(C): 37.1 (15 Sep 2021 03:39), Max: 37.1 (15 Sep 2021 03:39)  T(F): 98.8 (15 Sep 2021 03:39), Max: 98.8 (15 Sep 2021 03:39)  HR: 88 (15 Sep 2021 03:39) (66 - 92)  BP: 105/70 (15 Sep 2021 03:39) (97/66 - 110/70)  BP(mean): 79 (14 Sep 2021 18:00) (77 - 82)  RR: 17 (15 Sep 2021 03:39) (12 - 20)  SpO2: 96% (15 Sep 2021 03:39) (93% - 99%)    General: Pt Alert and oriented, NAD  AO Splint DSG C/D/I  Pulses: Toes WWP  Sensation: SILT grossly SPN/DPN in Toes  Motor: wiggling toes in splint                          10.0   9.70  )-----------( 527      ( 15 Sep 2021 07:58 )             30.3         A/P: 26yFemale s/p L ankle Arthroscopy, I&D/Vac Exchange by Dr. NEAL Wallis on 09-14  - Stable  - Pain Control  - DVT ppx: HSQ  - Post op abx: Ceftriaxone + Nafcillin per ID recs  - WBS: NWB LLE in AO Splint  - RTOR Friday 9/17 for repeat I&D, 9/20 for closure with PRS    Ortho Pager [phone]

## 2021-09-15 NOTE — PROGRESS NOTE ADULT - SUBJECTIVE AND OBJECTIVE BOX
INTERVAL HPI/OVERNIGHT EVENTS:  Remains afebrile.  L ankle pain 8/10 -has not taken pain meds since last night    CONSTITUTIONAL:  No fever, chills, night sweats  EYES:  No photophobia or visual changes  CARDIOVASCULAR:  No chest pain  RESPIRATORY:  No cough, wheezing, or SOB   GASTROINTESTINAL:  No nausea, vomiting, diarrhea, or abdominal pain.  Is taking Go-Lytely for constipation  GENITOURINARY:  No frequency, urgency, dysuria or hematuria  NEUROLOGIC:  No headache, lightheadedness      ANTIBIOTICS/RELEVANT:  Nafcillin 2 g IV q4h (9/11-present)  Ceftriaxone 2 g IV q24h (9/12-present)    Vancomycin 9/9-9/11  Cefepime 9/9-9/11          Vital Signs Last 24 Hrs  T(C): 37 (15 Sep 2021 17:42), Max: 37.1 (15 Sep 2021 03:39)  T(F): 98.6 (15 Sep 2021 17:42), Max: 98.8 (15 Sep 2021 03:39)  HR: 99 (15 Sep 2021 17:42) (66 - 99)  BP: 101/65 (15 Sep 2021 17:42) (97/66 - 108/67)  BP(mean): 79 (14 Sep 2021 18:00) (77 - 82)  RR: 18 (15 Sep 2021 17:42) (12 - 19)  SpO2: 95% (15 Sep 2021 17:42) (93% - 97%)    PHYSICAL EXAM:  Constitutional:  Well-developed, well nourished  Eyes:  Sclerae anicteric, conjunctivae clear, PERRL  Ear/Nose/Throat:  No nasal exudate or sinus tenderness;  No buccal mucosal lesions, no pharyngeal erythema or exudate	  Neck:  Supple, no adenopathy  Respiratory:  Clear bilaterally  Cardiovascular:  RRR, S1S2, no murmur appreciated  Gastrointestinal:  Symmetric, normoactive BS, soft, NT, no masses, guarding or rebound.  No HSM  Extremities:  No edema.  LLE in splint      LABS:                        10.0   9.70  )-----------( 527      ( 15 Sep 2021 07:58 )             30.3         09-15    138  |  101  |  11  ----------------------------<  91  3.5   |  28  |  0.83    Ca    9.3      15 Sep 2021 07:58            MICROBIOLOGY:    Blood cultures;  9/7 X 2 - NG;  9/9 X 2 - NG    OR cultures 9/9 X 6 (medial and lateral) - all growing Staph aureus, some also with Strep anginosus    OR cultures 9/12 X3 - NGTD    OR cultures 9/14 X3 - NGTD    RADIOLOGY & ADDITIONAL STUDIES:

## 2021-09-15 NOTE — PROGRESS NOTE ADULT - NSPROGADDITIONALINFOA_GEN_ALL_CORE
Patient's cultures reviewed  No growth to date  Posterior ankle arthroscopy and subtalar arthroscopy showed no evidence of abscess at the level of the ring-enhancing soft tissue collection about the posterior aspect of the subtalar and ankle joint.  There is significant evidence of a historic compartment syndrome about the operative extremity as the FHL tendon muscle belly was severely atrophic.  There was significant capsular contracture of the posterior capsule which was taken down to increase the patient's ankle range of motion.  I discussed at length with Dr. Dunbar regarding his plan for plastic surgery coverage and we reviewed images as well as clinical and x-ray as well as her CT.  Based on the negative cultures and no evidence of abscess about the posterior ankle and subtalar joint, we will continue with repeat I&D on Friday plan for musculocutaneous flap coverage of the lateral ankle with a mass Gunnison technique for the distal fibula with planned bone grafting and fixation of the syndesmosis and fibula with possible deltoid ligament reconstruction.

## 2021-09-15 NOTE — PROGRESS NOTE ADULT - ASSESSMENT
25 yo F with L ankle fractures with septic arthritis, postoperative compartment syndrome s/p RICK, I&D, bone debridement and ligament repair on 9/9, I&D with tissue debridement to bone 9/12.  Afebrile, WBC nl.  OR cultures from 9/9 are growing MSSA and Strep anginosus- would target and provide some gram negative coverage as she had been on cephalexin PTA.  Two also with Staph epi - not clear that this needs to be treated.  OR cultures from 9/12 & 9/14 - NGTD.  Suggest:  - F/U OR cultures from 9/12 and today  - Continue nafcillin 2 g IV q4h  - Continue ceftriaxone 2 g IV q24h  - Plan for RTOR on 9/17, per Dr. Wallis.  Will f/u cultures from that day.  Recommendations discussed with primary team.  Will follow with you - team 2.  Dr. Campbell will assume care tomorrow.

## 2021-09-15 NOTE — PROGRESS NOTE ADULT - ASSESSMENT
26F [place] p/w infection and non-union now s/p RICK, arthrotomy, neuroma excision, L ankle ligament repair, and I/D w bone debridement w Dr. Wallis 9/10, Repeat I&D and vac change 9/12, Repeat I&D w vac change, athrotomy 9/14 -- surgical culture growing MSSA, strep anginosis - currently on IV CTX and Nafcillin    #Post-op state - pain is controlled. PPx: SQH. 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 Currently on IV CTX 2g q24h and Nafcillin 1g q4h. BCx 9/7 NGTD. Surgical cultures from OR 9/9 w MSSA and strep anginosus. Cx 9/12 NGTD.  ID is following.   #Blood loss anemia - 10 from 10.4. Likely operative loss w element of dilution from IVF. Pt is asymptomatic. Continue to follow  #Leukocytosis - resolved. Likely reactive. Pt afebrile and non-toxica ppearing.   #Tobacco use - intermittently - pt counseled to stop smoking  #Constipation - resolved 9/15 - on golytely and bowel regimen  VTE ppx: SQH    Plan  -F/U ID recs  -plan to return to OR Debridement and grafting SUN 9/19 w plastic surgery    DISPO: TBD  Pt will need to establish PMD on discharge  Pt lives in 2nd floor walk-up apartment.

## 2021-09-15 NOTE — PROGRESS NOTE ADULT - SUBJECTIVE AND OBJECTIVE BOX
POST OPERATIVE DAY #:   STATUS POST: Left    Right  TKA/THR                        SUBJECTIVE: Patient seen and examined.   Denies any sob/cp/n/v/numbness or tingling in b/l   Patient denies having a bowel movement today    OBJECTIVE:     Vital Signs Last 24 Hrs  T(C): 36.8 (15 Sep 2021 09:20), Max: 37.1 (15 Sep 2021 03:39)  T(F): 98.3 (15 Sep 2021 09:20), Max: 98.8 (15 Sep 2021 03:39)  HR: 83 (15 Sep 2021 09:20) (66 - 92)  BP: 100/67 (15 Sep 2021 09:20) (97/66 - 110/70)  BP(mean): 79 (14 Sep 2021 18:00) (77 - 82)  RR: 17 (15 Sep 2021 09:20) (12 - 18)  SpO2: 95% (15 Sep 2021 09:20) (93% - 99%)    Affected extremity:          Dressing: clean/dry/intact          Sensation: intact to light touch to patient's baseline         Motor exam: EHL/TA/GS   Pulses             I&O's Detail    14 Sep 2021 07:01  -  15 Sep 2021 07:00  --------------------------------------------------------  IN:    IV PiggyBack: 100 mL    IV PiggyBack: 50 mL    Lactated Ringers: 960 mL    Oral Fluid: 550 mL  Total IN: 1660 mL    OUT:    VAC (Vacuum Assisted Closure) System (mL): 10 mL    Voided (mL): 2600 mL  Total OUT: 2610 mL    Total NET: -950 mL      15 Sep 2021 07:01  -  15 Sep 2021 12:44  --------------------------------------------------------  IN:    Oral Fluid: 300 mL  Total IN: 300 mL    OUT:    Voided (mL): 500 mL  Total OUT: 500 mL    Total NET: -200 mL          LABS:                        10.0   9.70  )-----------( 527      ( 15 Sep 2021 07:58 )             30.3     09-15    138  |  101  |  11  ----------------------------<  91  3.5   |  28  |  0.83    Ca    9.3      15 Sep 2021 07:58            MEDICATIONS:  cefTRIAXone   IVPB 2000 milliGRAM(s) IV Intermittent every 24 hours  nafcillin  IVPB 2 Gram(s) IV Intermittent every 4 hours    acetaminophen   Tablet .. 650 milliGRAM(s) Oral every 6 hours PRN  HYDROmorphone  Injectable 0.5 milliGRAM(s) IV Push every 15 minutes  HYDROmorphone  Injectable 0.5 milliGRAM(s) IV Push every 4 hours PRN  HYDROmorphone  Injectable 0.5 milliGRAM(s) IV Push every 15 minutes PRN  metoclopramide Injectable 10 milliGRAM(s) IV Push every 6 hours PRN  oxyCODONE    IR 10 milliGRAM(s) Oral every 4 hours PRN  oxyCODONE    IR 5 milliGRAM(s) Oral every 4 hours PRN    heparin   Injectable 5000 Unit(s) SubCutaneous every 8 hours        ASSESSMENT AND PLAN: 25yo Female s/p     1. Analgesic pain control  2. DVT prophylaxis: HSQ    3. Weight Bearing Status: NWB    -Continue drain VAC POST OPERATIVE DAY #:   STATUS POST: Left ankle arthroscopy and arthrotomy on 9/14          SUBJECTIVE: Patient seen and examined.   Denies any sob/cp/n/v/numbness or tingling in b/l   Patient denies having a bowel movement today    OBJECTIVE:     Vital Signs Last 24 Hrs  T(C): 36.8 (15 Sep 2021 09:20), Max: 37.1 (15 Sep 2021 03:39)  T(F): 98.3 (15 Sep 2021 09:20), Max: 98.8 (15 Sep 2021 03:39)  HR: 83 (15 Sep 2021 09:20) (66 - 92)  BP: 100/67 (15 Sep 2021 09:20) (97/66 - 110/70)  BP(mean): 79 (14 Sep 2021 18:00) (77 - 82)  RR: 17 (15 Sep 2021 09:20) (12 - 18)  SpO2: 95% (15 Sep 2021 09:20) (93% - 99%)    Affected extremity:          Dressing: clean/dry/intact          Sensation: intact to light touch to patient's baseline         Motor exam: EHL/TA/GS  Pulses present             I&O's Detail    14 Sep 2021 07:01  -  15 Sep 2021 07:00  --------------------------------------------------------  IN:    IV PiggyBack: 100 mL    IV PiggyBack: 50 mL    Lactated Ringers: 960 mL    Oral Fluid: 550 mL  Total IN: 1660 mL    OUT:    VAC (Vacuum Assisted Closure) System (mL): 10 mL    Voided (mL): 2600 mL  Total OUT: 2610 mL    Total NET: -950 mL      15 Sep 2021 07:01  -  15 Sep 2021 12:44  --------------------------------------------------------  IN:    Oral Fluid: 300 mL  Total IN: 300 mL    OUT:    Voided (mL): 500 mL  Total OUT: 500 mL    Total NET: -200 mL          LABS:                        10.0   9.70  )-----------( 527      ( 15 Sep 2021 07:58 )             30.3     09-15    138  |  101  |  11  ----------------------------<  91  3.5   |  28  |  0.83    Ca    9.3      15 Sep 2021 07:58            MEDICATIONS:  cefTRIAXone   IVPB 2000 milliGRAM(s) IV Intermittent every 24 hours  nafcillin  IVPB 2 Gram(s) IV Intermittent every 4 hours    acetaminophen   Tablet .. 650 milliGRAM(s) Oral every 6 hours PRN  HYDROmorphone  Injectable 0.5 milliGRAM(s) IV Push every 15 minutes  HYDROmorphone  Injectable 0.5 milliGRAM(s) IV Push every 4 hours PRN  HYDROmorphone  Injectable 0.5 milliGRAM(s) IV Push every 15 minutes PRN  metoclopramide Injectable 10 milliGRAM(s) IV Push every 6 hours PRN  oxyCODONE    IR 10 milliGRAM(s) Oral every 4 hours PRN  oxyCODONE    IR 5 milliGRAM(s) Oral every 4 hours PRN    heparin   Injectable 5000 Unit(s) SubCutaneous every 8 hours        ASSESSMENT AND PLAN: 25yo Female s/p     Analgesic pain control: continue hydromorphone, acetaminophen, oxycodone  - DVT prophylaxis: HSQ    - Non-weight bearing lower left extremity   -Continue drain VAC  -Continue vancomycin and nafcillin    POST OPERATIVE DAY #: 1  STATUS POST: Left ankle arthroscopy, arthrotomy, and washout on 9/14          SUBJECTIVE: Patient seen and examined. Denies any sob/cp/n/v. Patient denies having a bowel movement for a week and has been started drinking Golytely today (9/15), patient also denies being able to pass gas.     OBJECTIVE:     Vital Signs Last 24 Hrs  T(C): 36.8 (15 Sep 2021 09:20), Max: 37.1 (15 Sep 2021 03:39)  T(F): 98.3 (15 Sep 2021 09:20), Max: 98.8 (15 Sep 2021 03:39)  HR: 83 (15 Sep 2021 09:20) (66 - 92)  BP: 100/67 (15 Sep 2021 09:20) (97/66 - 110/70)  BP(mean): 79 (14 Sep 2021 18:00) (77 - 82)  RR: 17 (15 Sep 2021 09:20) (12 - 18)  SpO2: 95% (15 Sep 2021 09:20) (93% - 99%)    General: No obvious signs of distress. Upon entering the room the patient was sleeping.     Affected extremity: Left Lower Extremity         Dressing: clean/dry/intact, wound vac present and operational, is in a posterior splint.          Sensation: Decreased sensation in the toes of the left lower extremity.          Motor exam: firing toes minimally, can wiggle toes, cap refill <2 seconds.               I&O's Detail    14 Sep 2021 07:01  -  15 Sep 2021 07:00  --------------------------------------------------------  IN:    IV PiggyBack: 100 mL    IV PiggyBack: 50 mL    Lactated Ringers: 960 mL    Oral Fluid: 550 mL  Total IN: 1660 mL    OUT:    VAC (Vacuum Assisted Closure) System (mL): 10 mL    Voided (mL): 2600 mL  Total OUT: 2610 mL    Total NET: -950 mL      15 Sep 2021 07:01  -  15 Sep 2021 12:44  --------------------------------------------------------  IN:    Oral Fluid: 300 mL  Total IN: 300 mL    OUT:    Voided (mL): 500 mL  Total OUT: 500 mL    Total NET: -200 mL          LABS:                        10.0   9.70  )-----------( 527      ( 15 Sep 2021 07:58 )             30.3     09-15    138  |  101  |  11  ----------------------------<  91  3.5   |  28  |  0.83    Ca    9.3      15 Sep 2021 07:58            MEDICATIONS:  cefTRIAXone   IVPB 2000 milliGRAM(s) IV Intermittent every 24 hours  nafcillin  IVPB 2 Gram(s) IV Intermittent every 4 hours    acetaminophen   Tablet .. 650 milliGRAM(s) Oral every 6 hours PRN  HYDROmorphone  Injectable 0.5 milliGRAM(s) IV Push every 15 minutes  HYDROmorphone  Injectable 0.5 milliGRAM(s) IV Push every 4 hours PRN  HYDROmorphone  Injectable 0.5 milliGRAM(s) IV Push every 15 minutes PRN  metoclopramide Injectable 10 milliGRAM(s) IV Push every 6 hours PRN  oxyCODONE    IR 10 milliGRAM(s) Oral every 4 hours PRN  oxyCODONE    IR 5 milliGRAM(s) Oral every 4 hours PRN    heparin   Injectable 5000 Unit(s) SubCutaneous every 8 hours        ASSESSMENT AND PLAN: 25yo female s/p Left ankle arthroscopy, arthrotomy, and washout on 9/14       - Analgesic pain control: continue hydromorphone, acetaminophen, oxycodone  - DVT prophylaxis: HSQ    - Non-weight bearing lower left extremity   - Continue wound VAC  - Appreciate ID recommendations  - Continue vancomycin and nafcillin   - No bowel movement given golytely today  - Ensure and liquid protein to diet per nutrition recommendation   - F/U if patient has bowel movement  - Plan for OR on Friday and on Monday for wound vac closure POST OPERATIVE DAY #: 1  STATUS POST: left  Posterior ankle arthroscopy and subtalar arthroscopy on 9/14          SUBJECTIVE: Patient seen and examined. Denies any sob/cp/n/v. Patient denies having a bowel movement for a week and has been started drinking Golytely today (9/15), patient also denies being able to pass gas.     OBJECTIVE:     Vital Signs Last 24 Hrs  T(C): 36.8 (15 Sep 2021 09:20), Max: 37.1 (15 Sep 2021 03:39)  T(F): 98.3 (15 Sep 2021 09:20), Max: 98.8 (15 Sep 2021 03:39)  HR: 83 (15 Sep 2021 09:20) (66 - 92)  BP: 100/67 (15 Sep 2021 09:20) (97/66 - 110/70)  BP(mean): 79 (14 Sep 2021 18:00) (77 - 82)  RR: 17 (15 Sep 2021 09:20) (12 - 18)  SpO2: 95% (15 Sep 2021 09:20) (93% - 99%)    General: No obvious signs of distress. Upon entering the room the patient was sleeping.   Affected extremity: Left Lower Extremity elevated on pillows,          Dressing: clean/dry/intact, wound vac present and operational, is in a posterior splint.          Sensation: Decreased sensation in the toes of the left lower extremity.          Motor exam: firing toes minimally, can wiggle toes, cap refill <2 seconds.               I&O's Detail    14 Sep 2021 07:01  -  15 Sep 2021 07:00  --------------------------------------------------------  IN:    IV PiggyBack: 100 mL    IV PiggyBack: 50 mL    Lactated Ringers: 960 mL    Oral Fluid: 550 mL  Total IN: 1660 mL    OUT:    VAC (Vacuum Assisted Closure) System (mL): 10 mL    Voided (mL): 2600 mL  Total OUT: 2610 mL    Total NET: -950 mL      15 Sep 2021 07:01  -  15 Sep 2021 12:44  --------------------------------------------------------  IN:    Oral Fluid: 300 mL  Total IN: 300 mL    OUT:    Voided (mL): 500 mL  Total OUT: 500 mL    Total NET: -200 mL          LABS:                        10.0   9.70  )-----------( 527      ( 15 Sep 2021 07:58 )             30.3     09-15    138  |  101  |  11  ----------------------------<  91  3.5   |  28  |  0.83    Ca    9.3      15 Sep 2021 07:58            MEDICATIONS:  cefTRIAXone   IVPB 2000 milliGRAM(s) IV Intermittent every 24 hours  nafcillin  IVPB 2 Gram(s) IV Intermittent every 4 hours    acetaminophen   Tablet .. 650 milliGRAM(s) Oral every 6 hours PRN  HYDROmorphone  Injectable 0.5 milliGRAM(s) IV Push every 15 minutes  HYDROmorphone  Injectable 0.5 milliGRAM(s) IV Push every 4 hours PRN  HYDROmorphone  Injectable 0.5 milliGRAM(s) IV Push every 15 minutes PRN  metoclopramide Injectable 10 milliGRAM(s) IV Push every 6 hours PRN  oxyCODONE    IR 10 milliGRAM(s) Oral every 4 hours PRN  oxyCODONE    IR 5 milliGRAM(s) Oral every 4 hours PRN    heparin   Injectable 5000 Unit(s) SubCutaneous every 8 hours        ASSESSMENT AND PLAN: 27yo female s/p left Posterior ankle arthroscopy and subtalar arthroscopy , arthrotomy, on 9/14       - Analgesic pain control: continue hydromorphone, acetaminophen, oxycodone  - DVT prophylaxis: HSQ    - Non-weight bearing lower left extremity   - Continue wound VAC  - Appreciate ID recommendations- Continue vancomycin and nafcillin, cx- ngtd  - No bowel movement given golytely today- had BM today  - Ensure and liquid protein to diet per nutrition recommendation   - F/U if patient has bowel movement    Agree with and edited by Chanel Erickson MS, PA-C  - Plan for OR on Friday and on Monday for wound vac closure

## 2021-09-15 NOTE — CHART NOTE - NSCHARTNOTEFT_GEN_A_CORE
Admitting Diagnosis:   Patient is a 26y old  Female who presents with a chief complaint of L ankle pain (15 Sep 2021 12:41)    PAST MEDICAL & SURGICAL HISTORY:  Left tibial neuropathy  PAD (peripheral artery disease)  Status post ORIF of fracture of ankle      Current Nutrition Order:  Diet, Regular:   Liquid Protein Supplement     Qty per Day:  2  Supplement Feeding Modality:  Oral  Ensure Max Cans or Servings Per Day:  1       Frequency:  Daily (09-15-21 @ 12:49)  Diet, NPO:   NPO for Procedure/Test     NPO Start Date: 16-Sep-2021,   NPO Start Time: 23:59  Except Medications (09-15-21 @ 08:42)    PO Intake: Good (%) [   ]  Fair (50-75%) [ x  ] Poor (<25%) [   ]  -Attempted to speak with pt in room this morning, but speaking soundly at visit. On previous visit (9/12), pt was eating 2 meals per day (B and L) which is normal for pt at home. To increase protein intake, spoke with MD via phone and recommend add Ensure Max once daily and LPS BID.     GI Issues: Last BM 9/10 -noted dulcolax, ordered + senna and miralax. + flatus, abd-soft   Per RN, pt denies N/V.   Pain: controlled  Skin Integrity: Left ankle with wound vac, no pressure breakdown     Labs:   09-15    138  |  101  |  11  ----------------------------<  91  3.5   |  28  |  0.83    Ca    9.3      15 Sep 2021 07:58    Medications:  MEDICATIONS  (STANDING):  calcium carbonate   1250 mG (OsCal) 1 Tablet(s) Oral daily  cefTRIAXone   IVPB 2000 milliGRAM(s) IV Intermittent every 24 hours  cholecalciferol 1000 Unit(s) Oral daily  heparin   Injectable 5000 Unit(s) SubCutaneous every 8 hours  HYDROmorphone  Injectable 0.5 milliGRAM(s) IV Push every 15 minutes  influenza   Vaccine 0.5 milliLiter(s) IntraMuscular once  multivitamin 1 Tablet(s) Oral daily  nafcillin  IVPB 2 Gram(s) IV Intermittent every 4 hours  polyethylene glycol 3350 17 Gram(s) Oral two times a day  senna 2 Tablet(s) Oral at bedtime    MEDICATIONS  (PRN):  acetaminophen   Tablet .. 650 milliGRAM(s) Oral every 6 hours PRN Temp greater or equal to 38C (100.4F), Mild Pain (1 - 3)  bisacodyl Suppository 10 milliGRAM(s) Rectal daily PRN Constipation  HYDROmorphone  Injectable 0.5 milliGRAM(s) IV Push every 4 hours PRN Breakthrough pain  HYDROmorphone  Injectable 0.5 milliGRAM(s) IV Push every 15 minutes PRN pacu  metoclopramide Injectable 10 milliGRAM(s) IV Push every 6 hours PRN Nausea and/or Vomiting  oxyCODONE    IR 10 milliGRAM(s) Oral every 4 hours PRN Severe Pain (7 - 10)  oxyCODONE    IR 5 milliGRAM(s) Oral every 4 hours PRN Moderate Pain (4 - 6)    Anthropometrics:  Ht: 176cm (69")  Wt: 82.2kg (9/14)        88kg (9/07)    IBW:145# (65.9kg)  % IBW: 125%    Weight Change: Per EMR, noted 6kg (7%) wt decline x 1 week which is severe per standards. Suspect some weight loss d/t hypermetabolic, however, question accuracy of weight. Continue to monitor wt during admission.     Nutrition Focused Physical Exam: Completed [   ]  Not Pertinent [ x ]    Estimated energy needs: 65.9kg   *IBW for estimated needs d/t % IBW >120% (125%). Increased needs d/t increased protein catabolism   Calories: 1650-1950kcals (25-30kcals/kg)  Protein: 95-130g (1.5-2.0g/kg)   Fluid:+1950ml/day (30ml/kcals)    Subjective:   Pt originally had left ankle ORIF in [facility] in 3/2021 after skateboarding accident. Has wound drainage and numbness for significant amount of time since procedure. Presents to [facility] for worsening ankle pain. 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 Will require multiple I&Ds + IV abx. S/P Lt ankle RICK and I&D with wound vac placement on 9/09. RTOR 9/17 for repeat I&D and 9/20 for closure of PRS.     Previous Nutrition Diagnosis: Increased nutrient needs (protein) R/T hypermetabolic, increased protein catabolism AEB 1.5-2.0g/kg protein,    Active [ x  ]  Resolved [   ]    Goal: Pt to meet >75% of nutrient needs.     Recommendations:  1. Continue regular diet   -Add Ensure Max once daily and LPS BID (additional protein)  -Encourage protein at every meal/snack  2. Continue MVI, vitamin D3  3. Aggressive bowel regimen (last BM 9/10)    Education: Encourage nutrient dense foods     Risk Level: High [ x  ] Moderate [   ] Low [   ]  Will continue to monitor per protocol.   Lisa Dolan RD,,CNSC

## 2021-09-15 NOTE — CONSULT NOTE ADULT - ASSESSMENT
Patient is a candidate for bone and soft tissue reconstruction of the left ankle. I have discussed with the patient about possible reconstruction options, their benefits, risks and alternatives. I have explored with her the possibility of using a free flap from the outer or inner thigh. We reviewed the procedure itself, possible complications, the recovery process and the scar locations. All patient questions answered. patient understood and agreed to proceed with the proposed plan.     I have discussed my plans with Dr Mcclelland and we agreed to proceed as planned.

## 2021-09-15 NOTE — PROGRESS NOTE ADULT - SUBJECTIVE AND OBJECTIVE BOX
INTERVAL HPI/OVERNIGHT EVENTS: CASTRO O/N    SUBJECTIVE: Patient seen and examined at bedside.   Pt had BM earlier today. Reports minimal pain in L ankle and foot. Feels sensation in 4th and 5th toes are improving. Denies any fever, chest pain, dyspnea. Walking w PT and to bathroom wo issues. No nausea, abd pain. No dysuria    OBJECTIVE:    VITAL SIGNS:  ICU Vital Signs Last 24 Hrs  T(C): 36.7 (15 Sep 2021 13:59), Max: 37.1 (15 Sep 2021 03:39)  T(F): 98 (15 Sep 2021 13:59), Max: 98.8 (15 Sep 2021 03:39)  HR: 88 (15 Sep 2021 13:59) (66 - 92)  BP: 101/67 (15 Sep 2021 13:59) (97/66 - 108/67)  BP(mean): 79 (14 Sep 2021 18:00) (77 - 82)  ABP: --  ABP(mean): --  RR: 19 (15 Sep 2021 13:59) (12 - 19)  SpO2: 97% (15 Sep 2021 13:59) (93% - 97%)        09-14 @ 07:01  -  09-15 @ 07:00  --------------------------------------------------------  IN: 1660 mL / OUT: 2610 mL / NET: -950 mL    09-15 @ 07:01  -  09-15 @ 16:31  --------------------------------------------------------  IN: 700 mL / OUT: 525 mL / NET: 175 mL      CAPILLARY BLOOD GLUCOSE          PHYSICAL EXAM:  GEN: female in NAD on RA  HEENT: NC/AT, MMM  NECK: Supple  CV: RRR, nml S1S2, no murmurs  PULM: nml effort, CTAB  ABD: Soft, non-distended, NABS, non-tender  NEURO  A/O x3, moving all extremities,   EXT: LLE in splint. Decreased sensation in 1st 3 toes on LEFT  PSYCH: Appropriate    MEDICATIONS:  MEDICATIONS  (STANDING):  calcium carbonate   1250 mG (OsCal) 1 Tablet(s) Oral daily  cefTRIAXone   IVPB 2000 milliGRAM(s) IV Intermittent every 24 hours  cholecalciferol 1000 Unit(s) Oral daily  heparin   Injectable 5000 Unit(s) SubCutaneous every 8 hours  HYDROmorphone  Injectable 0.5 milliGRAM(s) IV Push every 15 minutes  influenza   Vaccine 0.5 milliLiter(s) IntraMuscular once  multivitamin 1 Tablet(s) Oral daily  nafcillin  IVPB 2 Gram(s) IV Intermittent every 4 hours  polyethylene glycol 3350 17 Gram(s) Oral two times a day  senna 2 Tablet(s) Oral at bedtime    MEDICATIONS  (PRN):  acetaminophen   Tablet .. 650 milliGRAM(s) Oral every 6 hours PRN Temp greater or equal to 38C (100.4F), Mild Pain (1 - 3)  bisacodyl Suppository 10 milliGRAM(s) Rectal daily PRN Constipation  HYDROmorphone  Injectable 0.5 milliGRAM(s) IV Push every 4 hours PRN Breakthrough pain  HYDROmorphone  Injectable 0.5 milliGRAM(s) IV Push every 15 minutes PRN pacu  metoclopramide Injectable 10 milliGRAM(s) IV Push every 6 hours PRN Nausea and/or Vomiting  oxyCODONE    IR 10 milliGRAM(s) Oral every 4 hours PRN Severe Pain (7 - 10)  oxyCODONE    IR 5 milliGRAM(s) Oral every 4 hours PRN Moderate Pain (4 - 6)      ALLERGIES:  Allergies    No Known Allergies    Intolerances        LABS:                        10.0   9.70  )-----------( 527      ( 15 Sep 2021 07:58 )             30.3     09-15    138  |  101  |  11  ----------------------------<  91  3.5   |  28  |  0.83    Ca    9.3      15 Sep 2021 07:58            RADIOLOGY & ADDITIONAL TESTS: Reviewed.

## 2021-09-16 ENCOUNTER — TRANSCRIPTION ENCOUNTER (OUTPATIENT)
Age: 26
End: 2021-09-16

## 2021-09-16 LAB
ANION GAP SERPL CALC-SCNC: 10 MMOL/L — SIGNIFICANT CHANGE UP (ref 5–17)
BUN SERPL-MCNC: 8 MG/DL — SIGNIFICANT CHANGE UP (ref 7–23)
CALCIUM SERPL-MCNC: 9.3 MG/DL — SIGNIFICANT CHANGE UP (ref 8.4–10.5)
CHLORIDE SERPL-SCNC: 101 MMOL/L — SIGNIFICANT CHANGE UP (ref 96–108)
CO2 SERPL-SCNC: 27 MMOL/L — SIGNIFICANT CHANGE UP (ref 22–31)
CREAT SERPL-MCNC: 0.79 MG/DL — SIGNIFICANT CHANGE UP (ref 0.5–1.3)
GLUCOSE SERPL-MCNC: 101 MG/DL — HIGH (ref 70–99)
HCT VFR BLD CALC: 31.6 % — LOW (ref 34.5–45)
HGB BLD-MCNC: 9.9 G/DL — LOW (ref 11.5–15.5)
MCHC RBC-ENTMCNC: 28.7 PG — SIGNIFICANT CHANGE UP (ref 27–34)
MCHC RBC-ENTMCNC: 31.3 GM/DL — LOW (ref 32–36)
MCV RBC AUTO: 91.6 FL — SIGNIFICANT CHANGE UP (ref 80–100)
NRBC # BLD: 0 /100 WBCS — SIGNIFICANT CHANGE UP (ref 0–0)
PLATELET # BLD AUTO: 443 K/UL — HIGH (ref 150–400)
POTASSIUM SERPL-MCNC: 3.7 MMOL/L — SIGNIFICANT CHANGE UP (ref 3.5–5.3)
POTASSIUM SERPL-SCNC: 3.7 MMOL/L — SIGNIFICANT CHANGE UP (ref 3.5–5.3)
RBC # BLD: 3.45 M/UL — LOW (ref 3.8–5.2)
RBC # FLD: 12.2 % — SIGNIFICANT CHANGE UP (ref 10.3–14.5)
SARS-COV-2 RNA SPEC QL NAA+PROBE: SIGNIFICANT CHANGE UP
SODIUM SERPL-SCNC: 138 MMOL/L — SIGNIFICANT CHANGE UP (ref 135–145)
WBC # BLD: 6.74 K/UL — SIGNIFICANT CHANGE UP (ref 3.8–10.5)
WBC # FLD AUTO: 6.74 K/UL — SIGNIFICANT CHANGE UP (ref 3.8–10.5)

## 2021-09-16 PROCEDURE — 99232 SBSQ HOSP IP/OBS MODERATE 35: CPT

## 2021-09-16 RX ORDER — OXYCODONE HYDROCHLORIDE 5 MG/1
10 TABLET ORAL EVERY 4 HOURS
Refills: 0 | Status: DISCONTINUED | OUTPATIENT
Start: 2021-09-16 | End: 2021-09-22

## 2021-09-16 RX ORDER — OXYCODONE HYDROCHLORIDE 5 MG/1
5 TABLET ORAL EVERY 4 HOURS
Refills: 0 | Status: DISCONTINUED | OUTPATIENT
Start: 2021-09-16 | End: 2021-09-22

## 2021-09-16 RX ORDER — HYDROMORPHONE HYDROCHLORIDE 2 MG/ML
0.5 INJECTION INTRAMUSCULAR; INTRAVENOUS; SUBCUTANEOUS EVERY 4 HOURS
Refills: 0 | Status: DISCONTINUED | OUTPATIENT
Start: 2021-09-16 | End: 2021-09-22

## 2021-09-16 RX ADMIN — Medication 1 TABLET(S): at 12:46

## 2021-09-16 RX ADMIN — NAFCILLIN 200 GRAM(S): 10 INJECTION, POWDER, FOR SOLUTION INTRAVENOUS at 00:34

## 2021-09-16 RX ADMIN — HYDROMORPHONE HYDROCHLORIDE 0.5 MILLIGRAM(S): 2 INJECTION INTRAMUSCULAR; INTRAVENOUS; SUBCUTANEOUS at 01:00

## 2021-09-16 RX ADMIN — POLYETHYLENE GLYCOL 3350 17 GRAM(S): 17 POWDER, FOR SOLUTION ORAL at 05:35

## 2021-09-16 RX ADMIN — HEPARIN SODIUM 5000 UNIT(S): 5000 INJECTION INTRAVENOUS; SUBCUTANEOUS at 20:50

## 2021-09-16 RX ADMIN — NAFCILLIN 200 GRAM(S): 10 INJECTION, POWDER, FOR SOLUTION INTRAVENOUS at 19:13

## 2021-09-16 RX ADMIN — CEFTRIAXONE 100 MILLIGRAM(S): 500 INJECTION, POWDER, FOR SOLUTION INTRAMUSCULAR; INTRAVENOUS at 23:16

## 2021-09-16 RX ADMIN — OXYCODONE HYDROCHLORIDE 5 MILLIGRAM(S): 5 TABLET ORAL at 20:50

## 2021-09-16 RX ADMIN — NAFCILLIN 200 GRAM(S): 10 INJECTION, POWDER, FOR SOLUTION INTRAVENOUS at 08:13

## 2021-09-16 RX ADMIN — NAFCILLIN 200 GRAM(S): 10 INJECTION, POWDER, FOR SOLUTION INTRAVENOUS at 12:47

## 2021-09-16 RX ADMIN — HEPARIN SODIUM 5000 UNIT(S): 5000 INJECTION INTRAVENOUS; SUBCUTANEOUS at 13:37

## 2021-09-16 RX ADMIN — Medication 1000 UNIT(S): at 12:46

## 2021-09-16 RX ADMIN — HYDROMORPHONE HYDROCHLORIDE 0.5 MILLIGRAM(S): 2 INJECTION INTRAMUSCULAR; INTRAVENOUS; SUBCUTANEOUS at 06:32

## 2021-09-16 RX ADMIN — SODIUM CHLORIDE 130 MILLILITER(S): 9 INJECTION, SOLUTION INTRAVENOUS at 00:01

## 2021-09-16 RX ADMIN — HYDROMORPHONE HYDROCHLORIDE 0.5 MILLIGRAM(S): 2 INJECTION INTRAMUSCULAR; INTRAVENOUS; SUBCUTANEOUS at 05:40

## 2021-09-16 RX ADMIN — HEPARIN SODIUM 5000 UNIT(S): 5000 INJECTION INTRAVENOUS; SUBCUTANEOUS at 05:35

## 2021-09-16 RX ADMIN — NAFCILLIN 200 GRAM(S): 10 INJECTION, POWDER, FOR SOLUTION INTRAVENOUS at 16:20

## 2021-09-16 RX ADMIN — NAFCILLIN 200 GRAM(S): 10 INJECTION, POWDER, FOR SOLUTION INTRAVENOUS at 04:00

## 2021-09-16 RX ADMIN — HYDROMORPHONE HYDROCHLORIDE 0.5 MILLIGRAM(S): 2 INJECTION INTRAMUSCULAR; INTRAVENOUS; SUBCUTANEOUS at 00:33

## 2021-09-16 NOTE — PROGRESS NOTE ADULT - SUBJECTIVE AND OBJECTIVE BOX
Ortho Progress Note    Procedure: L Ankle I&D/Vac Change  Surgeon: Kadi    Pt comfortable without complaints, pain controlled. Cultures NGTD  Denies CP, SOB, N/V, numbness/tingling     Vital Signs Last 24 Hrs  T(C): 36.8 (16 Sep 2021 05:25), Max: 37 (15 Sep 2021 17:42)  T(F): 98.3 (16 Sep 2021 05:25), Max: 98.6 (15 Sep 2021 17:42)  HR: 80 (16 Sep 2021 05:25) (80 - 99)  BP: 97/63 (16 Sep 2021 05:25) (90/62 - 105/67)  BP(mean): --  RR: 19 (16 Sep 2021 05:25) (17 - 19)  SpO2: 96% (16 Sep 2021 05:25) (95% - 97%)    General: Pt Alert and oriented, NAD  AO Splint DSG C/D/I  Pulses: Toes WWP  Sensation: SILT grossly SPN/DPN in Toes  Motor: wiggling toes in splint                                       10.0   9.70  )-----------( 527      ( 15 Sep 2021 07:58 )             30.3       A/P: 26yFemale s/p L ankle Arthroscopy, I&D/Vac Exchange by Dr. NEAL Wallis on 09-14  - Stable  - Pain Control  - DVT ppx: HSQ  - Post op abx: Ceftriaxone + Nafcillin per ID recs  - WBS: NWB LLE in AO Splint  - RTOR Friday 9/17 for repeat I&D, 9/20 for closure with PRS    Ortho Pager [phone]

## 2021-09-16 NOTE — PROGRESS NOTE ADULT - ASSESSMENT
26F h/o L ankle fracture with septic arthritis, postoperative compartment syndrome s/p RICK, I&D, bone debridement and ligament repair on 9/9, I&D with tissue debridement to bone on 9/12, OR culture 9/9 grew MSSA and S. anginosus.  Plan for repeat OR on 9/17 and 9/20.      - cont nafcillin 2g IV q4h  - cont CTX 2g IV q24h  - f/u OR cultures   - please send OR culture when she goes to OR    Team 2 will follow you.  Case d/w primary team.    Patito Campbell MD, MS  Infectious Disease attending  work cell [phone]

## 2021-09-16 NOTE — PROGRESS NOTE ADULT - ASSESSMENT
26F [place] p/w infection and non-union now s/p RICK, arthrotomy, neuroma excision, L ankle ligament repair, and I/D w bone debridement w Dr. Wallis 9/10, Repeat I&D and vac change 9/12, Repeat I&D w vac change, athrotomy 9/14 -- surgical culture growing MSSA, strep anginosis - currently on IV CTX and Nafcillin    #Post-op state - pain is controlled. PPx: SQH. 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 Currently on IV CTX 2g q24h and Nafcillin 1g q4h. BCx 9/7 NGTD. Surgical cultures from OR 9/9 w MSSA and strep anginosus. Cx 9/12 NGTD.  ID is following.   #Blood loss anemia - stable in 10 range. Likely operative loss w element of dilution from IVF. Pt is asymptomatic. Continue to follow  #Leukocytosis - resolved. Likely reactive. Pt afebrile and non-toxica ppearing.   #Tobacco use - intermittently - pt counseled to stop smoking  #Constipation - resolved 9/15 - on golytely and bowel regimen  VTE ppx: SQH    Plan  -F/U ID recs  -plan to return to OR Debridement and grafting SUN 9/19 w plastic surgery    DISPO: TBD  Pt will need to establish PMD on discharge  Pt lives in 2nd floor walk-up apartment.

## 2021-09-16 NOTE — PROGRESS NOTE ADULT - SUBJECTIVE AND OBJECTIVE BOX
INFECTIOUS DISEASES CONSULT FOLLOW-UP NOTE    INTERVAL HPI/OVERNIGHT EVENTS:  no event overnight  patient denied fever/chills, n/v/d   L ankle pain controlled     ROS:   Constitutional, eyes, ENT, cardiovascular, respiratory, gastrointestinal, genitourinary, integumentary, neurological, psychiatric and heme/lymph are otherwise negative other than noted above       ANTIBIOTICS/RELEVANT:    MEDICATIONS  (STANDING):  calcium carbonate   1250 mG (OsCal) 1 Tablet(s) Oral daily  cefTRIAXone   IVPB 2000 milliGRAM(s) IV Intermittent every 24 hours  cholecalciferol 1000 Unit(s) Oral daily  heparin   Injectable 5000 Unit(s) SubCutaneous every 8 hours  HYDROmorphone  Injectable 0.5 milliGRAM(s) IV Push every 15 minutes  influenza   Vaccine 0.5 milliLiter(s) IntraMuscular once  lactated ringers. 1000 milliLiter(s) (130 mL/Hr) IV Continuous <Continuous>  multivitamin 1 Tablet(s) Oral daily  nafcillin  IVPB 2 Gram(s) IV Intermittent every 4 hours  polyethylene glycol 3350 17 Gram(s) Oral two times a day  senna 2 Tablet(s) Oral at bedtime    MEDICATIONS  (PRN):  acetaminophen   Tablet .. 650 milliGRAM(s) Oral every 6 hours PRN Temp greater or equal to 38C (100.4F), Mild Pain (1 - 3)  bisacodyl Suppository 10 milliGRAM(s) Rectal daily PRN Constipation  HYDROmorphone  Injectable 0.5 milliGRAM(s) IV Push every 4 hours PRN Breakthrough pain  metoclopramide Injectable 10 milliGRAM(s) IV Push every 6 hours PRN Nausea and/or Vomiting  oxyCODONE    IR 5 milliGRAM(s) Oral every 4 hours PRN Moderate Pain (4 - 6)  oxyCODONE    IR 10 milliGRAM(s) Oral every 4 hours PRN Severe Pain (7 - 10)        Vital Signs Last 24 Hrs  T(C): 36.7 (16 Sep 2021 14:30), Max: 37 (15 Sep 2021 17:42)  T(F): 98 (16 Sep 2021 14:30), Max: 98.6 (15 Sep 2021 17:42)  HR: 87 (16 Sep 2021 14:30) (80 - 99)  BP: 105/69 (16 Sep 2021 14:30) (90/62 - 105/69)  BP(mean): --  RR: 17 (16 Sep 2021 14:30) (17 - 19)  SpO2: 97% (16 Sep 2021 14:30) (95% - 97%)    09-15-21 @ 07:01  -  09-16-21 @ 07:00  --------------------------------------------------------  IN: 3180 mL / OUT: 1325 mL / NET: 1855 mL    09-16-21 @ 07:01  -  09-16-21 @ 15:29  --------------------------------------------------------  IN: 1710 mL / OUT: 0 mL / NET: 1710 mL      PHYSICAL EXAM:  Constitutional: alert, NAD  Eyes: the sclera and conjunctiva were normal.   ENT: the ears and nose were normal in appearance.   Neck: the appearance of the neck was normal and the neck was supple.   Pulmonary: no respiratory distress and lungs were clear to auscultation bilaterally.   Heart: heart rate was normal and rhythm regular, normal S1 and S2  Ext: L ankle covered with dressing   Abdomen: normal bowel sounds, soft, non-tender  Neurological: no focal deficits.   Psychiatric: the affect was normal        LABS:                        9.9    6.74  )-----------( 443      ( 16 Sep 2021 09:57 )             31.6     09-16    138  |  101  |  8   ----------------------------<  101<H>  3.7   |  27  |  0.79    Ca    9.3      16 Sep 2021 09:57            MICROBIOLOGY:  9/14 OR culture ngtd x 3  9/12 OR culture ngtd x3  9/9 L lateral ankle WCx: MSSA (S to ox, cef), S. anginosus (S to PenG)      RADIOLOGY & ADDITIONAL STUDIES:  Reviewed

## 2021-09-16 NOTE — PROGRESS NOTE ADULT - NSPROGADDITIONALINFOA_GEN_ALL_CORE
A/P: 26yFemale w/ complex pathology of the left ankle with septic arthritis s/p washout in the setting of a trimal infected malunion/nonunion, unstable sydnesmotic injury, deltoid sleeve avulsion with missed compartment syndrome 6 months ago, with SPN transection and neuroma formation. No evidence of posterior ankle and subtalar abscess on posterior ankle scope on 9.14.21/. Cultures continue to be negative from repeat washouts.  - Pathology reviewed consistent with above diagnosis  - For I&D vac change tomorrow with plan for free flap on monday   - trend ESR CRPs  - Stable  - Pain Control  - DVT ppx: HSQ  - Post op abx: Ceftriaxone + Nafcillin per ID recs  - WBS: NWB LLE in AO Splint .

## 2021-09-17 ENCOUNTER — APPOINTMENT (OUTPATIENT)
Dept: ORTHOPEDIC SURGERY | Facility: HOSPITAL | Age: 26
End: 2021-09-17

## 2021-09-17 LAB
ANION GAP SERPL CALC-SCNC: 11 MMOL/L — SIGNIFICANT CHANGE UP (ref 5–17)
BUN SERPL-MCNC: 8 MG/DL — SIGNIFICANT CHANGE UP (ref 7–23)
CALCIUM SERPL-MCNC: 9.2 MG/DL — SIGNIFICANT CHANGE UP (ref 8.4–10.5)
CHLORIDE SERPL-SCNC: 103 MMOL/L — SIGNIFICANT CHANGE UP (ref 96–108)
CO2 SERPL-SCNC: 24 MMOL/L — SIGNIFICANT CHANGE UP (ref 22–31)
CREAT SERPL-MCNC: 0.8 MG/DL — SIGNIFICANT CHANGE UP (ref 0.5–1.3)
GLUCOSE SERPL-MCNC: 94 MG/DL — SIGNIFICANT CHANGE UP (ref 70–99)
HCG SERPL-ACNC: <0 MIU/ML — SIGNIFICANT CHANGE UP
HCT VFR BLD CALC: 30.8 % — LOW (ref 34.5–45)
HGB BLD-MCNC: 10 G/DL — LOW (ref 11.5–15.5)
MCHC RBC-ENTMCNC: 29.5 PG — SIGNIFICANT CHANGE UP (ref 27–34)
MCHC RBC-ENTMCNC: 32.5 GM/DL — SIGNIFICANT CHANGE UP (ref 32–36)
MCV RBC AUTO: 90.9 FL — SIGNIFICANT CHANGE UP (ref 80–100)
NRBC # BLD: 0 /100 WBCS — SIGNIFICANT CHANGE UP (ref 0–0)
PLATELET # BLD AUTO: 478 K/UL — HIGH (ref 150–400)
POTASSIUM SERPL-MCNC: 3.5 MMOL/L — SIGNIFICANT CHANGE UP (ref 3.5–5.3)
POTASSIUM SERPL-SCNC: 3.5 MMOL/L — SIGNIFICANT CHANGE UP (ref 3.5–5.3)
RBC # BLD: 3.39 M/UL — LOW (ref 3.8–5.2)
RBC # FLD: 12.3 % — SIGNIFICANT CHANGE UP (ref 10.3–14.5)
SODIUM SERPL-SCNC: 138 MMOL/L — SIGNIFICANT CHANGE UP (ref 135–145)
WBC # BLD: 8.14 K/UL — SIGNIFICANT CHANGE UP (ref 3.8–10.5)
WBC # FLD AUTO: 8.14 K/UL — SIGNIFICANT CHANGE UP (ref 3.8–10.5)

## 2021-09-17 PROCEDURE — 27610 EXPLORE/TREAT ANKLE JOINT: CPT | Mod: 58,LT

## 2021-09-17 PROCEDURE — 99232 SBSQ HOSP IP/OBS MODERATE 35: CPT

## 2021-09-17 PROCEDURE — 97607 NEG PRS WND THR NDME<=50SQCM: CPT | Mod: LT

## 2021-09-17 PROCEDURE — 20700 MNL PREP&INSJ DP RX DLVR DEV: CPT | Mod: 58,LT

## 2021-09-17 PROCEDURE — 99231 SBSQ HOSP IP/OBS SF/LOW 25: CPT

## 2021-09-17 PROCEDURE — 73610 X-RAY EXAM OF ANKLE: CPT | Mod: 26,LT

## 2021-09-17 RX ORDER — HEPARIN SODIUM 5000 [USP'U]/ML
5000 INJECTION INTRAVENOUS; SUBCUTANEOUS EVERY 8 HOURS
Refills: 0 | Status: DISCONTINUED | OUTPATIENT
Start: 2021-09-17 | End: 2021-09-19

## 2021-09-17 RX ORDER — CEFTRIAXONE 500 MG/1
2000 INJECTION, POWDER, FOR SOLUTION INTRAMUSCULAR; INTRAVENOUS EVERY 24 HOURS
Refills: 0 | Status: DISCONTINUED | OUTPATIENT
Start: 2021-09-17 | End: 2021-10-21

## 2021-09-17 RX ORDER — SODIUM CHLORIDE 9 MG/ML
1000 INJECTION, SOLUTION INTRAVENOUS
Refills: 0 | Status: DISCONTINUED | OUTPATIENT
Start: 2021-09-17 | End: 2021-09-19

## 2021-09-17 RX ADMIN — CEFTRIAXONE 100 MILLIGRAM(S): 500 INJECTION, POWDER, FOR SOLUTION INTRAMUSCULAR; INTRAVENOUS at 23:10

## 2021-09-17 RX ADMIN — OXYCODONE HYDROCHLORIDE 10 MILLIGRAM(S): 5 TABLET ORAL at 08:49

## 2021-09-17 RX ADMIN — NAFCILLIN 200 GRAM(S): 10 INJECTION, POWDER, FOR SOLUTION INTRAVENOUS at 08:37

## 2021-09-17 RX ADMIN — HEPARIN SODIUM 5000 UNIT(S): 5000 INJECTION INTRAVENOUS; SUBCUTANEOUS at 21:23

## 2021-09-17 RX ADMIN — HYDROMORPHONE HYDROCHLORIDE 0.5 MILLIGRAM(S): 2 INJECTION INTRAMUSCULAR; INTRAVENOUS; SUBCUTANEOUS at 21:38

## 2021-09-17 RX ADMIN — NAFCILLIN 200 GRAM(S): 10 INJECTION, POWDER, FOR SOLUTION INTRAVENOUS at 21:23

## 2021-09-17 RX ADMIN — SENNA PLUS 2 TABLET(S): 8.6 TABLET ORAL at 21:23

## 2021-09-17 RX ADMIN — NAFCILLIN 200 GRAM(S): 10 INJECTION, POWDER, FOR SOLUTION INTRAVENOUS at 05:34

## 2021-09-17 RX ADMIN — OXYCODONE HYDROCHLORIDE 10 MILLIGRAM(S): 5 TABLET ORAL at 09:19

## 2021-09-17 RX ADMIN — HYDROMORPHONE HYDROCHLORIDE 0.5 MILLIGRAM(S): 2 INJECTION INTRAMUSCULAR; INTRAVENOUS; SUBCUTANEOUS at 16:35

## 2021-09-17 RX ADMIN — NAFCILLIN 200 GRAM(S): 10 INJECTION, POWDER, FOR SOLUTION INTRAVENOUS at 00:47

## 2021-09-17 RX ADMIN — NAFCILLIN 200 GRAM(S): 10 INJECTION, POWDER, FOR SOLUTION INTRAVENOUS at 17:27

## 2021-09-17 RX ADMIN — NAFCILLIN 200 GRAM(S): 10 INJECTION, POWDER, FOR SOLUTION INTRAVENOUS at 13:05

## 2021-09-17 RX ADMIN — HYDROMORPHONE HYDROCHLORIDE 0.5 MILLIGRAM(S): 2 INJECTION INTRAMUSCULAR; INTRAVENOUS; SUBCUTANEOUS at 22:08

## 2021-09-17 RX ADMIN — Medication 1 TABLET(S): at 11:17

## 2021-09-17 RX ADMIN — HEPARIN SODIUM 5000 UNIT(S): 5000 INJECTION INTRAVENOUS; SUBCUTANEOUS at 13:05

## 2021-09-17 RX ADMIN — HEPARIN SODIUM 5000 UNIT(S): 5000 INJECTION INTRAVENOUS; SUBCUTANEOUS at 05:41

## 2021-09-17 RX ADMIN — OXYCODONE HYDROCHLORIDE 10 MILLIGRAM(S): 5 TABLET ORAL at 00:47

## 2021-09-17 RX ADMIN — Medication 1000 UNIT(S): at 11:17

## 2021-09-17 NOTE — PROGRESS NOTE ADULT - ATTENDING COMMENTS
A/P: 26yFemale w/ complex pathology of the left ankle with septic arthritis s/p washout in the setting of a trimal infected malunion/nonunion, unstable sydnesmotic injury, deltoid sleeve avulsion with missed compartment syndrome 6 months ago, with SPN transection and neuroma formation  - Pathology reviewed consistent with above diagnosis  - For I&D vac change today with plan for free flap on monday   - trend ESR CRPs  - Stable  - Pain Control  - DVT ppx: HSQ  - Post op abx: Ceftriaxone + Nafcillin per ID recs  - WBS: NWB LLE in AO Splint

## 2021-09-17 NOTE — PROGRESS NOTE ADULT - SUBJECTIVE AND OBJECTIVE BOX
Ortho Post Op Check    Procedure: L Ankle I&D, Vac Change  Surgeon: Kadi    Pt comfortable without complaints, pain controlled  Denies CP, SOB, N/V, numbness/tingling     Vital Signs Last 24 Hrs  T(C): 36.8 (09-17-21 @ 17:45), Max: 37 (09-17-21 @ 14:09)  T(F): 98.3 (09-17-21 @ 17:45), Max: 98.6 (09-17-21 @ 14:09)  HR: 73 (09-17-21 @ 17:45) (68 - 85)  BP: 104/68 (09-17-21 @ 17:45) (99/59 - 110/78)  BP(mean): 80 (09-17-21 @ 17:10) (74 - 83)  RR: 18 (09-17-21 @ 17:45) (12 - 21)  SpO2: 94% (09-17-21 @ 17:45) (94% - 97%)    General: Pt Alert and oriented, NAD  AO Splint, wound vac; DSG C/D/I  Pulses: Toes WWP  Sensation: SILT SPN/DPN/Tib in Toes  Motor: wiggling toes    A/P: 26yFemale s/p Repeat L Ankle I&D, Vac Change by Dr. NEAL Wallis on 09-17  - Stable  - Pain Control  - DVT ppx: HSQ  - Post op abx: Ceftriaxone + Nafcillin  - WBS: NWB in AO Splint  - RTOR on Monday 9/20 with plastics for flap closure    Ortho Pager [phone]

## 2021-09-17 NOTE — PROGRESS NOTE ADULT - ASSESSMENT
26F [place] p/w infection and non-union now s/p RICK, arthrotomy, neuroma excision, L ankle ligament repair, and I/D w bone debridement w Dr. Wallis 9/10, Repeat I&D and vac change 9/12, Repeat I&D w vac change, athrotomy 9/14 -- surgical culture growing MSSA, strep anginosis - currently on IV CTX and Nafcillin    #Post-op state - pain is controlled. PPx: SQH. 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 Currently on IV CTX 2g q24h and Nafcillin 2g q4h. BCx 9/7 NGTD. Surgical cultures from OR 9/9 w MSSA and strep anginosus. Cx 9/12 NGTD.  ID is following.   #Blood loss anemia - stable in 10 range. Likely operative loss w element of dilution from IVF. Pt is asymptomatic. Continue to follow  #Leukocytosis - resolved. Likely reactive. Pt afebrile and non-toxica ppearing.   #Tobacco use - intermittently - pt counseled to stop smoking  #Constipation - resolved 9/15 - on golytely and bowel regimen  VTE ppx: SQH    Plan  -F/U ID recs  -plan to return to OR I&D w vac change TODAY. will have grafting w plastics Monday.    DISPO: TBD  Pt will need to establish PMD on discharge  Pt lives in 2nd floor walk-up apartment.

## 2021-09-17 NOTE — PROGRESS NOTE ADULT - SUBJECTIVE AND OBJECTIVE BOX
Ortho Note    Pt seen and examined on morning rounds. Pt comfortable without complaints, pain controlled.  Denies CP, SOB, N/V, numbness/tingling     Vital Signs Last 24 Hrs  T(C): 36.7 (09-17-21 @ 00:34), Max: 36.7 (09-17-21 @ 00:34)  T(F): 98.1 (09-17-21 @ 00:34), Max: 98.1 (09-17-21 @ 00:34)  HR: 87 (09-17-21 @ 00:34) (87 - 87)  BP: 106/71 (09-17-21 @ 00:34) (106/71 - 106/71)  BP(mean): --  RR: 18 (09-17-21 @ 00:34) (18 - 18)  SpO2: 97% (09-17-21 @ 00:34) (97% - 97%)  I&O's Summary    15 Sep 2021 07:01  -  16 Sep 2021 07:00  --------------------------------------------------------  IN: 3180 mL / OUT: 1325 mL / NET: 1855 mL    16 Sep 2021 07:01  -  17 Sep 2021 06:31  --------------------------------------------------------  IN: 3860 mL / OUT: 2270 mL / NET: 1590 mL        Physical Exam:  General: Pt Alert and oriented, NAD  DSG AO splint C/D/I  Pulses: 2+ pedal pulses, toes wwp  Sensation: Absent sensation over the 1st and 2nd toes, decreased sensation over the 3rd-5th toes  Motor: 0/0 L  EHL, 3/5 FHL/GS                           9.9    6.74  )-----------( 443      ( 16 Sep 2021 09:57 )             31.6     09-16    138  |  101  |  8   ----------------------------<  101<H>  3.7   |  27  |  0.79    Ca    9.3      16 Sep 2021 09:57        A/P: 26yFemale s/p L ankle Arthroscopy, I&D/Vac Exchange by Dr. NEAL Wallis on 09-14  - Stable  - Pain Control  - DVT ppx: HSQ  - Post op abx: Ceftriaxone + Nafcillin per ID recs  - WBS: NWB LLE in AO Splint  - RTOR today for repeat I&D, 9/20 for closure with plastics      Sergio Clements, PGY-1  Ortho Pager [phone]

## 2021-09-17 NOTE — PROGRESS NOTE ADULT - ATTENDING COMMENTS
Plan for free flap on monday and care transfer to plastics team on monday after free flap  Plan for f/u mri w/wo contrast to ensure no residual abscess in distal tibia or adjacent  f/u inflammatory markers  Continue abx per ID nafcillin and ceftriaxone

## 2021-09-17 NOTE — BRIEF OPERATIVE NOTE - NSICDXBRIEFPROCEDURE_GEN_ALL_CORE_FT
PROCEDURES:  Bone debridement 09-Sep-2021 23:06:59  Chapin Wallis  Irrigation and debridement, tissue, down to bone, excisional, more than 20 sq cm 09-Sep-2021 23:09:03  Chapin Wallis  Negative pressure wound therapy, (e. g. vacuum assisted drainage collection) using a mechanically-powered device, not durable medical equipment, including provision of cartridge and dressing(s), topical application(s), wound assessment, and instructi 09-Sep-2021 23:10:17  Chapin Wallis

## 2021-09-17 NOTE — PROGRESS NOTE ADULT - ASSESSMENT
26F h/o L ankle fracture with septic arthritis, postoperative compartment syndrome s/p RICK, I&D, bone debridement and ligament repair on 9/9, I&D with tissue debridement to bone on 9/12, OR culture 9/9 grew MSSA and S. anginosus.  Plan for repeat OR on 9/17 and 9/20.      - cont nafcillin 2g IV q4h  - cont CTX 2g IV q24h  - f/u OR cultures   - please send OR culture when she goes to OR    Team 2 will follow you.  Case d/w primary team.    Patito Campbell MD, MS  Infectious Disease attending  work cell [phone]

## 2021-09-17 NOTE — PROGRESS NOTE ADULT - SUBJECTIVE AND OBJECTIVE BOX
INTERVAL HPI/OVERNIGHT EVENTS: CASTRO O/N    SUBJECTIVE: Patient seen and examined at bedside.   Pt doing well. Awaiting OR today for I&D and vac change. Reports she has been ambulating to bathroom using a walker wo issues. Having regular bowel movements despite declining bowel regimen. Voiding wo dysuria. Numbness in L toes at baseline. No fever, chest pain, dyspnea.       OBJECTIVE:    VITAL SIGNS:  ICU Vital Signs Last 24 Hrs  T(C): 36.8 (17 Sep 2021 17:45), Max: 37.2 (16 Sep 2021 21:03)  T(F): 98.3 (17 Sep 2021 17:45), Max: 99 (16 Sep 2021 21:03)  HR: 73 (17 Sep 2021 17:45) (68 - 89)  BP: 104/68 (17 Sep 2021 17:45) (94/64 - 110/78)  BP(mean): 80 (17 Sep 2021 17:10) (74 - 83)  ABP: --  ABP(mean): --  RR: 18 (17 Sep 2021 17:45) (12 - 21)  SpO2: 94% (17 Sep 2021 17:45) (94% - 97%)        09-16 @ 07:01 - 09-17 @ 07:00  --------------------------------------------------------  IN: 3990 mL / OUT: 2270 mL / NET: 1720 mL    09-17 @ 07:01 - 09-17 @ 20:10  --------------------------------------------------------  IN: 1380 mL / OUT: 800 mL / NET: 580 mL      CAPILLARY BLOOD GLUCOSE          PHYSICAL EXAM:  GEN: female in NAD on RA  HEENT: NC/AT, MMM  NECK: Supple  CV: RRR, nml S1S2, no murmurs  PULM: nml effort, CTAB  ABD: Soft, non-distended, NABS, non-tender  NEURO  A/O x3, moving all extremities,   EXT: LLE in splint. Decreased sensation in 1st 3 toes on LEFT  PSYCH: Appropriate    MEDICATIONS:  MEDICATIONS  (STANDING):  calcium carbonate   1250 mG (OsCal) 1 Tablet(s) Oral daily  cefTRIAXone   IVPB 2000 milliGRAM(s) IV Intermittent every 24 hours  cholecalciferol 1000 Unit(s) Oral daily  heparin   Injectable 5000 Unit(s) SubCutaneous every 8 hours  influenza   Vaccine 0.5 milliLiter(s) IntraMuscular once  lactated ringers. 1000 milliLiter(s) (130 mL/Hr) IV Continuous <Continuous>  multivitamin 1 Tablet(s) Oral daily  nafcillin  IVPB 2 Gram(s) IV Intermittent every 4 hours  polyethylene glycol 3350 17 Gram(s) Oral two times a day  senna 2 Tablet(s) Oral at bedtime    MEDICATIONS  (PRN):  acetaminophen   Tablet .. 650 milliGRAM(s) Oral every 6 hours PRN Temp greater or equal to 38C (100.4F), Mild Pain (1 - 3)  bisacodyl Suppository 10 milliGRAM(s) Rectal daily PRN Constipation  HYDROmorphone  Injectable 0.5 milliGRAM(s) IV Push every 4 hours PRN Breakthrough pain  metoclopramide Injectable 10 milliGRAM(s) IV Push every 6 hours PRN Nausea and/or Vomiting  oxyCODONE    IR 10 milliGRAM(s) Oral every 4 hours PRN Severe Pain (7 - 10)  oxyCODONE    IR 5 milliGRAM(s) Oral every 4 hours PRN Moderate Pain (4 - 6)      ALLERGIES:  Allergies    No Known Allergies    Intolerances        LABS:                        10.0   8.14  )-----------( 478      ( 17 Sep 2021 07:16 )             30.8     09-17    138  |  103  |  8   ----------------------------<  94  3.5   |  24  |  0.80    Ca    9.2      17 Sep 2021 07:16            RADIOLOGY & ADDITIONAL TESTS: Reviewed.

## 2021-09-17 NOTE — CHART NOTE - NSCHARTNOTEFT_GEN_A_CORE
Admitting Diagnosis:   Patient is a 26y old  Female who presents with a chief complaint of L ankle pain (17 Sep 2021 13:35)      PAST MEDICAL & SURGICAL HISTORY:  Left tibial neuropathy    PAD (peripheral artery disease)    Status post ORIF of fracture of ankle        Current Nutrition Order:   NPO    PO Intake: Good (%) [   ]  Fair (50-75%) [   ] Poor (<25%) [   ] -- N/A    GI Issues: Denies n/v/c. Reported diarrhea, last BM 9/15. Currently on BM regimen (dulcolax, miralax, senna)    Pain: pain well managed per pt report    Skin Integrity: Steve 19, surgical incision noted    Labs:   09-17    138  |  103  |  8   ----------------------------<  94  3.5   |  24  |  0.80    Ca    9.2      17 Sep 2021 07:16      CAPILLARY BLOOD GLUCOSE          Medications:  MEDICATIONS  (STANDING):  calcium carbonate   1250 mG (OsCal) 1 Tablet(s) Oral daily  cefTRIAXone   IVPB 2000 milliGRAM(s) IV Intermittent every 24 hours  cholecalciferol 1000 Unit(s) Oral daily  heparin   Injectable 5000 Unit(s) SubCutaneous every 8 hours  HYDROmorphone  Injectable 0.5 milliGRAM(s) IV Push every 15 minutes  influenza   Vaccine 0.5 milliLiter(s) IntraMuscular once  lactated ringers. 1000 milliLiter(s) (130 mL/Hr) IV Continuous <Continuous>  multivitamin 1 Tablet(s) Oral daily  nafcillin  IVPB 2 Gram(s) IV Intermittent every 4 hours  polyethylene glycol 3350 17 Gram(s) Oral two times a day  senna 2 Tablet(s) Oral at bedtime    MEDICATIONS  (PRN):  acetaminophen   Tablet .. 650 milliGRAM(s) Oral every 6 hours PRN Temp greater or equal to 38C (100.4F), Mild Pain (1 - 3)  bisacodyl Suppository 10 milliGRAM(s) Rectal daily PRN Constipation  HYDROmorphone  Injectable 0.5 milliGRAM(s) IV Push every 4 hours PRN Breakthrough pain  metoclopramide Injectable 10 milliGRAM(s) IV Push every 6 hours PRN Nausea and/or Vomiting  oxyCODONE    IR 10 milliGRAM(s) Oral every 4 hours PRN Severe Pain (7 - 10)  oxyCODONE    IR 5 milliGRAM(s) Oral every 4 hours PRN Moderate Pain (4 - 6)      Anthropometrics:  Ht: 176cm (69")  Wt: 82.2kg (9/14)        88kg (9/07)  IBW:145# (65.9kg)  % IBW: 125%    Weight Change: No new wts in EMR. Please obtain wts weekly to assess for wt changes.    Estimated energy needs:  65.9kg   *IBW for estimated needs d/t % IBW >120% (125%). Increased needs d/t increased protein catabolism   Calories: 1650-1950kcals (25-30kcals/kg)  Protein: 95-130g (1.5-2.0g/kg)   Fluid:+1950ml/day (30ml/kcals)    Subjective:   @7 y/o with h/o Pt originally had left ankle ORIF in [facility] in 3/2021 after skateboarding accident. Has wound drainage and numbness for significant amount of time since procedure. Presents to [facility] for worsening ankle pain. 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 Will require multiple I&Ds + IV abx. S/P Lt ankle RICK and I&D with wound vac placement on 9/09. RTOR 9/17 for repeat I&D and 9/20 for closure of PRS.     Previous Nutrition Diagnosis:    Active [   ]  Resolved [   ]    If resolved, new PES:     Goal:    Recommendations:    Education:     Risk Level: High [   ] Moderate [   ] Low [   ] Admitting Diagnosis:   Patient is a 26y old  Female who presents with a chief complaint of L ankle pain (17 Sep 2021 13:35)      PAST MEDICAL & SURGICAL HISTORY:  Left tibial neuropathy    PAD (peripheral artery disease)    Status post ORIF of fracture of ankle        Current Nutrition Order:   NPO    PO Intake: Good (%) [   ]  Fair (50-75%) [   ] Poor (<25%) [   ] -- N/A    GI Issues: Denies n/v/c. Reported diarrhea, last BM 9/15. Currently on BM regimen (dulcolax, miralax, senna)    Pain: pain well managed per pt report    Skin Integrity: Steve 19, surgical incision noted    Labs:   09-17    138  |  103  |  8   ----------------------------<  94  3.5   |  24  |  0.80    Ca    9.2      17 Sep 2021 07:16      CAPILLARY BLOOD GLUCOSE          Medications:  MEDICATIONS  (STANDING):  calcium carbonate   1250 mG (OsCal) 1 Tablet(s) Oral daily  cefTRIAXone   IVPB 2000 milliGRAM(s) IV Intermittent every 24 hours  cholecalciferol 1000 Unit(s) Oral daily  heparin   Injectable 5000 Unit(s) SubCutaneous every 8 hours  HYDROmorphone  Injectable 0.5 milliGRAM(s) IV Push every 15 minutes  influenza   Vaccine 0.5 milliLiter(s) IntraMuscular once  lactated ringers. 1000 milliLiter(s) (130 mL/Hr) IV Continuous <Continuous>  multivitamin 1 Tablet(s) Oral daily  nafcillin  IVPB 2 Gram(s) IV Intermittent every 4 hours  polyethylene glycol 3350 17 Gram(s) Oral two times a day  senna 2 Tablet(s) Oral at bedtime    MEDICATIONS  (PRN):  acetaminophen   Tablet .. 650 milliGRAM(s) Oral every 6 hours PRN Temp greater or equal to 38C (100.4F), Mild Pain (1 - 3)  bisacodyl Suppository 10 milliGRAM(s) Rectal daily PRN Constipation  HYDROmorphone  Injectable 0.5 milliGRAM(s) IV Push every 4 hours PRN Breakthrough pain  metoclopramide Injectable 10 milliGRAM(s) IV Push every 6 hours PRN Nausea and/or Vomiting  oxyCODONE    IR 10 milliGRAM(s) Oral every 4 hours PRN Severe Pain (7 - 10)  oxyCODONE    IR 5 milliGRAM(s) Oral every 4 hours PRN Moderate Pain (4 - 6)      Anthropometrics:  Ht: 176cm (69")  Wt: 82.2kg (9/14)        88kg (9/07)  IBW:145# (65.9kg)  % IBW: 125%    Weight Change: No new wts in EMR. Please obtain wts weekly to assess for wt changes.    Estimated energy needs:  65.9kg   *IBW for estimated needs d/t % IBW >120% (125%). Increased needs d/t increased protein catabolism   Calories: 1650-1950kcals (25-30kcals/kg)  Protein: 95-130g (1.5-2.0g/kg)   Fluid:+1950ml/day (30ml/kcals)    Subjective:   25 y/o with h/o Pt originally had left ankle ORIF in [facility] in 3/2021 after skateboarding accident. Has wound drainage and numbness for significant amount of time since procedure. Presents to [facility] for worsening ankle pain. 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 Will require multiple I&Ds + IV abx. S/P Lt ankle RICK and I&D with wound vac placement on 9/09, s/p repeat on 9/11, 9/14 and planned repeat on 9/17 and 9/20.     On assessment, pt seen resting in bed. Pt reported fluctuating appetite. Pt NPO today for pending repeat washout. Previously on regular diet, reported approximately 50% PO intake and able to tolerate ONS. Encourage pt to have adequate PO intake and ONS to meet protein needs to aid in healing. Pt verbalized understanding. Reports well managed pain, no n/v. Skin: Steve 19, surgical incision noted. RD to follow per protocol.    Previous Nutrition Diagnosis:  Increased nutrient needs (protein) R/T hypermetabolic, increased protein catabolism AEB 1.5-2.0g/kg protein    Active [  x ]  Resolved [   ]    If resolved, new PES:     Goal: Pt to meet >75% of nutrient needs.     Recommendations:  1. Adv diet when medically feasible, rec continue regular diet, Ensure Max 1day + LPS BID  2. Monitor %PO intake, continue to encourage PO at mealtimes  3. Continue MVI  4. Monitor BM, if persistent diarrhea, decrease BM regimen, my need additional fiber supplement  5. Monitor BMP, lytes, replete prn    Education: Continue encourage PO    Risk Level: High [  x ] Moderate [   ] Low [   ]

## 2021-09-18 LAB
ANION GAP SERPL CALC-SCNC: 11 MMOL/L — SIGNIFICANT CHANGE UP (ref 5–17)
BUN SERPL-MCNC: 6 MG/DL — LOW (ref 7–23)
CALCIUM SERPL-MCNC: 9.7 MG/DL — SIGNIFICANT CHANGE UP (ref 8.4–10.5)
CHLORIDE SERPL-SCNC: 104 MMOL/L — SIGNIFICANT CHANGE UP (ref 96–108)
CO2 SERPL-SCNC: 24 MMOL/L — SIGNIFICANT CHANGE UP (ref 22–31)
CREAT SERPL-MCNC: 0.81 MG/DL — SIGNIFICANT CHANGE UP (ref 0.5–1.3)
CRP SERPL-MCNC: 48.6 MG/L — HIGH (ref 0–4)
ERYTHROCYTE [SEDIMENTATION RATE] IN BLOOD: 125 MM/HR — HIGH
GLUCOSE SERPL-MCNC: 97 MG/DL — SIGNIFICANT CHANGE UP (ref 70–99)
HCT VFR BLD CALC: 34.2 % — LOW (ref 34.5–45)
HGB BLD-MCNC: 11.1 G/DL — LOW (ref 11.5–15.5)
MCHC RBC-ENTMCNC: 29.7 PG — SIGNIFICANT CHANGE UP (ref 27–34)
MCHC RBC-ENTMCNC: 32.5 GM/DL — SIGNIFICANT CHANGE UP (ref 32–36)
MCV RBC AUTO: 91.4 FL — SIGNIFICANT CHANGE UP (ref 80–100)
NRBC # BLD: 0 /100 WBCS — SIGNIFICANT CHANGE UP (ref 0–0)
PLATELET # BLD AUTO: 632 K/UL — HIGH (ref 150–400)
POTASSIUM SERPL-MCNC: 3.9 MMOL/L — SIGNIFICANT CHANGE UP (ref 3.5–5.3)
POTASSIUM SERPL-SCNC: 3.9 MMOL/L — SIGNIFICANT CHANGE UP (ref 3.5–5.3)
RBC # BLD: 3.74 M/UL — LOW (ref 3.8–5.2)
RBC # FLD: 12.3 % — SIGNIFICANT CHANGE UP (ref 10.3–14.5)
SODIUM SERPL-SCNC: 139 MMOL/L — SIGNIFICANT CHANGE UP (ref 135–145)
WBC # BLD: 11.47 K/UL — HIGH (ref 3.8–10.5)
WBC # FLD AUTO: 11.47 K/UL — HIGH (ref 3.8–10.5)

## 2021-09-18 PROCEDURE — 99231 SBSQ HOSP IP/OBS SF/LOW 25: CPT

## 2021-09-18 PROCEDURE — 73723 MRI JOINT LWR EXTR W/O&W/DYE: CPT | Mod: 26,LT

## 2021-09-18 PROCEDURE — 99232 SBSQ HOSP IP/OBS MODERATE 35: CPT

## 2021-09-18 RX ADMIN — CEFTRIAXONE 100 MILLIGRAM(S): 500 INJECTION, POWDER, FOR SOLUTION INTRAMUSCULAR; INTRAVENOUS at 22:41

## 2021-09-18 RX ADMIN — OXYCODONE HYDROCHLORIDE 10 MILLIGRAM(S): 5 TABLET ORAL at 21:29

## 2021-09-18 RX ADMIN — NAFCILLIN 200 GRAM(S): 10 INJECTION, POWDER, FOR SOLUTION INTRAVENOUS at 08:42

## 2021-09-18 RX ADMIN — NAFCILLIN 200 GRAM(S): 10 INJECTION, POWDER, FOR SOLUTION INTRAVENOUS at 05:45

## 2021-09-18 RX ADMIN — NAFCILLIN 200 GRAM(S): 10 INJECTION, POWDER, FOR SOLUTION INTRAVENOUS at 23:44

## 2021-09-18 RX ADMIN — NAFCILLIN 200 GRAM(S): 10 INJECTION, POWDER, FOR SOLUTION INTRAVENOUS at 12:05

## 2021-09-18 RX ADMIN — HEPARIN SODIUM 5000 UNIT(S): 5000 INJECTION INTRAVENOUS; SUBCUTANEOUS at 22:41

## 2021-09-18 RX ADMIN — NAFCILLIN 200 GRAM(S): 10 INJECTION, POWDER, FOR SOLUTION INTRAVENOUS at 00:39

## 2021-09-18 RX ADMIN — Medication 1 TABLET(S): at 13:12

## 2021-09-18 RX ADMIN — HEPARIN SODIUM 5000 UNIT(S): 5000 INJECTION INTRAVENOUS; SUBCUTANEOUS at 05:45

## 2021-09-18 RX ADMIN — POLYETHYLENE GLYCOL 3350 17 GRAM(S): 17 POWDER, FOR SOLUTION ORAL at 05:46

## 2021-09-18 RX ADMIN — NAFCILLIN 200 GRAM(S): 10 INJECTION, POWDER, FOR SOLUTION INTRAVENOUS at 19:01

## 2021-09-18 RX ADMIN — OXYCODONE HYDROCHLORIDE 10 MILLIGRAM(S): 5 TABLET ORAL at 08:12

## 2021-09-18 RX ADMIN — OXYCODONE HYDROCHLORIDE 10 MILLIGRAM(S): 5 TABLET ORAL at 13:51

## 2021-09-18 RX ADMIN — Medication 1000 UNIT(S): at 12:05

## 2021-09-18 RX ADMIN — HEPARIN SODIUM 5000 UNIT(S): 5000 INJECTION INTRAVENOUS; SUBCUTANEOUS at 13:11

## 2021-09-18 RX ADMIN — Medication 1 TABLET(S): at 12:05

## 2021-09-18 RX ADMIN — OXYCODONE HYDROCHLORIDE 10 MILLIGRAM(S): 5 TABLET ORAL at 08:42

## 2021-09-18 RX ADMIN — OXYCODONE HYDROCHLORIDE 10 MILLIGRAM(S): 5 TABLET ORAL at 13:11

## 2021-09-18 NOTE — PROGRESS NOTE ADULT - SUBJECTIVE AND OBJECTIVE BOX
INTERVAL HPI/OVERNIGHT EVENTS: CASTRO o/n. Pain controlled. OR I/D and vac change were uncomplicated. working wiht physical therapy. no new numbness in legs.     VITAL SIGNS:  T(F): 98.1 (09-18-21 @ 08:45)  HR: 78 (09-18-21 @ 08:45)  BP: 103/65 (09-18-21 @ 08:45)  RR: 18 (09-18-21 @ 08:45)  SpO2: 97% (09-18-21 @ 08:45)  Wt(kg): --    PHYSICAL EXAM:      Constitutional: NAD, well-groomed, well-developed  HEENT: PERRLA, EOMI, Normal Hearing, MMM  Respiratory: CTAB  Cardiovascular: S1 and S2, RRR, no M/G/R  Gastrointestinal: BS+, soft, NT/ND  Extremities: No peripheral edema  Vascular: 2+ peripheral pulses  Neurological: A/O x 3, no focal deficits  Psychiatric: Normal mood, normal affect  Musculoskeletal: LLE w/ splint and wound vac   Skin: No rashes    MEDICATIONS  (STANDING):  calcium carbonate   1250 mG (OsCal) 1 Tablet(s) Oral daily  cefTRIAXone   IVPB 2000 milliGRAM(s) IV Intermittent every 24 hours  cholecalciferol 1000 Unit(s) Oral daily  heparin   Injectable 5000 Unit(s) SubCutaneous every 8 hours  influenza   Vaccine 0.5 milliLiter(s) IntraMuscular once  lactated ringers. 1000 milliLiter(s) (130 mL/Hr) IV Continuous <Continuous>  multivitamin 1 Tablet(s) Oral daily  nafcillin  IVPB 2 Gram(s) IV Intermittent every 4 hours  polyethylene glycol 3350 17 Gram(s) Oral two times a day  senna 2 Tablet(s) Oral at bedtime    MEDICATIONS  (PRN):  acetaminophen   Tablet .. 650 milliGRAM(s) Oral every 6 hours PRN Temp greater or equal to 38C (100.4F), Mild Pain (1 - 3)  bisacodyl Suppository 10 milliGRAM(s) Rectal daily PRN Constipation  HYDROmorphone  Injectable 0.5 milliGRAM(s) IV Push every 4 hours PRN Breakthrough pain  metoclopramide Injectable 10 milliGRAM(s) IV Push every 6 hours PRN Nausea and/or Vomiting  oxyCODONE    IR 10 milliGRAM(s) Oral every 4 hours PRN Severe Pain (7 - 10)  oxyCODONE    IR 5 milliGRAM(s) Oral every 4 hours PRN Moderate Pain (4 - 6)      Allergies    No Known Allergies    Intolerances        LABS:                        11.1   11.47 )-----------( 632      ( 18 Sep 2021 07:33 )             34.2     09-18    139  |  104  |  6<L>  ----------------------------<  97  3.9   |  24  |  0.81    Ca    9.7      18 Sep 2021 07:33            RADIOLOGY & ADDITIONAL TESTS:

## 2021-09-18 NOTE — PROGRESS NOTE ADULT - SUBJECTIVE AND OBJECTIVE BOX
Ortho Note    Pt seen and examined on morning rounds. Pt comfortable without complaints, pain controlled.  Denies CP, SOB, N/V, numbness/tingling     Vital Signs Last 24 Hrs  T(C): 36.8 (09-18-21 @ 04:23), Max: 36.8 (09-18-21 @ 04:23)  T(F): 98.2 (09-18-21 @ 04:23), Max: 98.2 (09-18-21 @ 04:23)  HR: 76 (09-18-21 @ 04:23) (76 - 76)  BP: 99/65 (09-18-21 @ 04:23) (99/65 - 99/65)  BP(mean): --  RR: 18 (09-18-21 @ 04:23) (18 - 18)  SpO2: 97% (09-18-21 @ 04:23) (97% - 97%)  I&O's Summary    17 Sep 2021 07:01  -  18 Sep 2021 07:00  --------------------------------------------------------  IN: 2930 mL / OUT: 800 mL / NET: 2130 mL    18 Sep 2021 07:01  -  18 Sep 2021 08:42  --------------------------------------------------------  IN: 0 mL / OUT: 800 mL / NET: -800 mL        Physical Exam:  General: Pt Alert and oriented, NAD  DSG AO splint C/D/I  Pulses: 2+ pedal pulses, toes wwp  Sensation: Absent sensation over the 1st and 2nd toes, decreased sensation over the 3rd-5th toes  Motor: 0/0 L  EHL, 3/5 FHL/GS                           11.1   11.47 )-----------( 632      ( 18 Sep 2021 07:33 )             34.2     09-18    139  |  104  |  6<L>  ----------------------------<  97  3.9   |  24  |  0.81    Ca    9.7      18 Sep 2021 07:33        A/P: 26yFemale s/p Repeat L Ankle I&D, Vac Change by Dr. NEAL Wallis on 09-17  - Stable  - Pain Control  - DVT ppx: HSQ  - Post op abx: Ceftriaxone + Nafcillin  - WBS: NWB LLE in AO Splint  - RTOR on Monday 9/20 with plastics for flap closure    Sergio Clements, PGY-1  Ortho Pager [phone]

## 2021-09-18 NOTE — PROGRESS NOTE ADULT - SUBJECTIVE AND OBJECTIVE BOX
3rd attempt to call with results, busy signal.    INTERVAL HPI/OVERNIGHT EVENTS:    Coverage for Dr. Campbell     Patient was seen and examined at bedside.  No complaints.  No events    CONSTITUTIONAL:  Negative fever or chills, feels well, good appetite  EYES:  Negative  blurry vision or double vision  CARDIOVASCULAR:  Negative for chest pain or palpitations  RESPIRATORY:  Negative for cough, wheezing, or SOB   GASTROINTESTINAL:  Negative for nausea, vomiting, diarrhea, constipation, or abdominal pain  GENITOURINARY:  Negative frequency, urgency or dysuria  NEUROLOGIC:  No headache, confusion, dizziness, lightheadedness      ANTIBIOTICS/RELEVANT:    MEDICATIONS  (STANDING):  calcium carbonate   1250 mG (OsCal) 1 Tablet(s) Oral daily  cefTRIAXone   IVPB 2000 milliGRAM(s) IV Intermittent every 24 hours  cholecalciferol 1000 Unit(s) Oral daily  heparin   Injectable 5000 Unit(s) SubCutaneous every 8 hours  influenza   Vaccine 0.5 milliLiter(s) IntraMuscular once  lactated ringers. 1000 milliLiter(s) (130 mL/Hr) IV Continuous <Continuous>  multivitamin 1 Tablet(s) Oral daily  nafcillin  IVPB 2 Gram(s) IV Intermittent every 4 hours  polyethylene glycol 3350 17 Gram(s) Oral two times a day  senna 2 Tablet(s) Oral at bedtime    MEDICATIONS  (PRN):  acetaminophen   Tablet .. 650 milliGRAM(s) Oral every 6 hours PRN Temp greater or equal to 38C (100.4F), Mild Pain (1 - 3)  bisacodyl Suppository 10 milliGRAM(s) Rectal daily PRN Constipation  HYDROmorphone  Injectable 0.5 milliGRAM(s) IV Push every 4 hours PRN Breakthrough pain  metoclopramide Injectable 10 milliGRAM(s) IV Push every 6 hours PRN Nausea and/or Vomiting  oxyCODONE    IR 10 milliGRAM(s) Oral every 4 hours PRN Severe Pain (7 - 10)  oxyCODONE    IR 5 milliGRAM(s) Oral every 4 hours PRN Moderate Pain (4 - 6)        Vital Signs Last 24 Hrs  T(C): 36.6 (18 Sep 2021 14:25), Max: 36.8 (17 Sep 2021 17:45)  T(F): 97.9 (18 Sep 2021 14:25), Max: 98.3 (17 Sep 2021 17:45)  HR: 86 (18 Sep 2021 14:25) (66 - 86)  BP: 114/72 (18 Sep 2021 14:25) (97/59 - 114/72)  BP(mean): 80 (17 Sep 2021 17:10) (74 - 83)  RR: 18 (18 Sep 2021 14:25) (12 - 21)  SpO2: 94% (18 Sep 2021 14:25) (94% - 98%)    PHYSICAL EXAM:  Constitutional:Well-developed, well nourished  Eyes:GLORY, EOMI  Ear/Nose/Throat: no oral lesion, no sinus tenderness on percussion	  Neck:   supple  Respiratory: CTA rhett  Cardiovascular: S1S2 RRR, no murmurs  Gastrointestinal:soft, (+) BS, no HSM  Extremities:dressing in place, clean   Vascular: DP Pulse:	right normal; left normal      LABS:                        11.1   11.47 )-----------( 632      ( 18 Sep 2021 07:33 )             34.2     09-18    139  |  104  |  6<L>  ----------------------------<  97  3.9   |  24  |  0.81    Ca    9.7      18 Sep 2021 07:33            MICROBIOLOGY:    Culture - Surgical Swab (09.14.21 @ 21:59)    Gram Stain:   No organisms seen  Rare WBC's    Specimen Source: .Surgical Swab left posterior subtalar joint#3 or spe    Culture Results:   No growth to date          RADIOLOGY & ADDITIONAL STUDIES:

## 2021-09-18 NOTE — PROGRESS NOTE ADULT - ASSESSMENT
26F [place] p/w infection and non-union now s/p RICK, arthrotomy, neuroma excision, L ankle ligament repair, and I/D w bone debridement w Dr. Wallis 9/10, Repeat I&D and vac change 9/12, Repeat I&D w vac change, athrotomy 9/14 -- surgical culture growing MSSA, strep anginosis - currently on IV CTX and Nafcillin    #Post-op state - pain is controlled. PPx: SQH. 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 Currently on IV CTX 2g q24h and Nafcillin 2g q4h. BCx 9/7 NGTD. Surgical cultures from OR 9/9 w MSSA and strep anginosus. Cx 9/12 NGTD.  ID is following.   #Blood loss anemia - stable in 10 range. Likely operative loss w element of dilution from IVF. hgb today is 11 reflecting likely hemoconcentration  #Leukocytosis - mild, suspect due to hemoconcentration given rise in all cell lines as opposed to worsening infection control. continue to follow   #Tobacco use - intermittently - pt counseled to stop smoking  #Constipation - resolved 9/15 - on golytely and bowel regimen  VTE ppx: SQH    Plan  -F/U ID recs  - return to OR Monday for flap revision    DISPO: TBD  Pt will need to establish PMD on dischargef  Pt lives in 2nd floor walk-up apartment.

## 2021-09-18 NOTE — PROGRESS NOTE ADULT - ATTENDING COMMENTS
MRI w/wo contrast pending results and eval for skip abscess or broadies abscess   Posterior fluid collection evaluated with posterior ankle and subtalar scope without evidence of purulence in posterior aspect of ankle  Will discuss MRI results with [facility] attg radiologist  f/u inflammatory markers being trended  plan for coverage on monday with plastics free flap  Continuing limb salvage with planned delayed reconstruction after infection clearance and possible bone transport  continue vac therapy  continue abx ceftriaxone and nafcillin

## 2021-09-18 NOTE — PROGRESS NOTE ADULT - ASSESSMENT
26F h/o L ankle fracture with septic arthritis, postoperative compartment syndrome s/p RICK, I&D, bone debridement and ligament repair on 9/9, I&D with tissue debridement to bone on 9/12, OR culture 9/9 grew MSSA and S. anginosus.  Plan for repeat OR on 9/17 and 9/20.      Recommend:  1.  Continue nafcillin 2g IV q4h  2.  Continue CTX 2g IV q24h  3.  f/u OR cultures   4.  please send OR culture when she goes to OR    Team 2 will follow.  I will cover the service this weekend.  Dr. Campbell returns on 9/20

## 2021-09-19 ENCOUNTER — TRANSCRIPTION ENCOUNTER (OUTPATIENT)
Age: 26
End: 2021-09-19

## 2021-09-19 LAB
ANION GAP SERPL CALC-SCNC: 12 MMOL/L — SIGNIFICANT CHANGE UP (ref 5–17)
BASOPHILS # BLD AUTO: 0.04 K/UL — SIGNIFICANT CHANGE UP (ref 0–0.2)
BASOPHILS NFR BLD AUTO: 0.5 % — SIGNIFICANT CHANGE UP (ref 0–2)
BLD GP AB SCN SERPL QL: NEGATIVE — SIGNIFICANT CHANGE UP
BUN SERPL-MCNC: 8 MG/DL — SIGNIFICANT CHANGE UP (ref 7–23)
CALCIUM SERPL-MCNC: 9.8 MG/DL — SIGNIFICANT CHANGE UP (ref 8.4–10.5)
CHLORIDE SERPL-SCNC: 102 MMOL/L — SIGNIFICANT CHANGE UP (ref 96–108)
CO2 SERPL-SCNC: 22 MMOL/L — SIGNIFICANT CHANGE UP (ref 22–31)
CREAT SERPL-MCNC: 0.77 MG/DL — SIGNIFICANT CHANGE UP (ref 0.5–1.3)
EOSINOPHIL # BLD AUTO: 0.21 K/UL — SIGNIFICANT CHANGE UP (ref 0–0.5)
EOSINOPHIL NFR BLD AUTO: 2.5 % — SIGNIFICANT CHANGE UP (ref 0–6)
GLUCOSE SERPL-MCNC: 120 MG/DL — HIGH (ref 70–99)
HCT VFR BLD CALC: 34.4 % — LOW (ref 34.5–45)
HGB BLD-MCNC: 11.2 G/DL — LOW (ref 11.5–15.5)
IMM GRANULOCYTES NFR BLD AUTO: 0.6 % — SIGNIFICANT CHANGE UP (ref 0–1.5)
LYMPHOCYTES # BLD AUTO: 1.76 K/UL — SIGNIFICANT CHANGE UP (ref 1–3.3)
LYMPHOCYTES # BLD AUTO: 20.9 % — SIGNIFICANT CHANGE UP (ref 13–44)
MCHC RBC-ENTMCNC: 29.2 PG — SIGNIFICANT CHANGE UP (ref 27–34)
MCHC RBC-ENTMCNC: 32.6 GM/DL — SIGNIFICANT CHANGE UP (ref 32–36)
MCV RBC AUTO: 89.8 FL — SIGNIFICANT CHANGE UP (ref 80–100)
MONOCYTES # BLD AUTO: 0.58 K/UL — SIGNIFICANT CHANGE UP (ref 0–0.9)
MONOCYTES NFR BLD AUTO: 6.9 % — SIGNIFICANT CHANGE UP (ref 2–14)
NEUTROPHILS # BLD AUTO: 5.79 K/UL — SIGNIFICANT CHANGE UP (ref 1.8–7.4)
NEUTROPHILS NFR BLD AUTO: 68.6 % — SIGNIFICANT CHANGE UP (ref 43–77)
NRBC # BLD: 0 /100 WBCS — SIGNIFICANT CHANGE UP (ref 0–0)
PLATELET # BLD AUTO: 582 K/UL — HIGH (ref 150–400)
POTASSIUM SERPL-MCNC: 3.6 MMOL/L — SIGNIFICANT CHANGE UP (ref 3.5–5.3)
POTASSIUM SERPL-SCNC: 3.6 MMOL/L — SIGNIFICANT CHANGE UP (ref 3.5–5.3)
RBC # BLD: 3.83 M/UL — SIGNIFICANT CHANGE UP (ref 3.8–5.2)
RBC # FLD: 12.8 % — SIGNIFICANT CHANGE UP (ref 10.3–14.5)
RH IG SCN BLD-IMP: NEGATIVE — SIGNIFICANT CHANGE UP
SARS-COV-2 RNA SPEC QL NAA+PROBE: SIGNIFICANT CHANGE UP
SODIUM SERPL-SCNC: 136 MMOL/L — SIGNIFICANT CHANGE UP (ref 135–145)
WBC # BLD: 8.43 K/UL — SIGNIFICANT CHANGE UP (ref 3.8–10.5)
WBC # FLD AUTO: 8.43 K/UL — SIGNIFICANT CHANGE UP (ref 3.8–10.5)

## 2021-09-19 RX ORDER — SODIUM CHLORIDE 9 MG/ML
1000 INJECTION, SOLUTION INTRAVENOUS
Refills: 0 | Status: DISCONTINUED | OUTPATIENT
Start: 2021-09-19 | End: 2021-09-20

## 2021-09-19 RX ADMIN — NAFCILLIN 200 GRAM(S): 10 INJECTION, POWDER, FOR SOLUTION INTRAVENOUS at 18:15

## 2021-09-19 RX ADMIN — Medication 1 TABLET(S): at 11:31

## 2021-09-19 RX ADMIN — NAFCILLIN 200 GRAM(S): 10 INJECTION, POWDER, FOR SOLUTION INTRAVENOUS at 22:03

## 2021-09-19 RX ADMIN — NAFCILLIN 200 GRAM(S): 10 INJECTION, POWDER, FOR SOLUTION INTRAVENOUS at 14:08

## 2021-09-19 RX ADMIN — NAFCILLIN 200 GRAM(S): 10 INJECTION, POWDER, FOR SOLUTION INTRAVENOUS at 10:34

## 2021-09-19 RX ADMIN — Medication 1000 UNIT(S): at 11:31

## 2021-09-19 RX ADMIN — HYDROMORPHONE HYDROCHLORIDE 0.5 MILLIGRAM(S): 2 INJECTION INTRAMUSCULAR; INTRAVENOUS; SUBCUTANEOUS at 20:41

## 2021-09-19 RX ADMIN — HYDROMORPHONE HYDROCHLORIDE 0.5 MILLIGRAM(S): 2 INJECTION INTRAMUSCULAR; INTRAVENOUS; SUBCUTANEOUS at 11:47

## 2021-09-19 RX ADMIN — HEPARIN SODIUM 5000 UNIT(S): 5000 INJECTION INTRAVENOUS; SUBCUTANEOUS at 14:10

## 2021-09-19 RX ADMIN — HEPARIN SODIUM 5000 UNIT(S): 5000 INJECTION INTRAVENOUS; SUBCUTANEOUS at 06:08

## 2021-09-19 RX ADMIN — NAFCILLIN 200 GRAM(S): 10 INJECTION, POWDER, FOR SOLUTION INTRAVENOUS at 03:10

## 2021-09-19 RX ADMIN — HYDROMORPHONE HYDROCHLORIDE 0.5 MILLIGRAM(S): 2 INJECTION INTRAMUSCULAR; INTRAVENOUS; SUBCUTANEOUS at 21:41

## 2021-09-19 RX ADMIN — NAFCILLIN 200 GRAM(S): 10 INJECTION, POWDER, FOR SOLUTION INTRAVENOUS at 06:08

## 2021-09-19 RX ADMIN — HYDROMORPHONE HYDROCHLORIDE 0.5 MILLIGRAM(S): 2 INJECTION INTRAMUSCULAR; INTRAVENOUS; SUBCUTANEOUS at 11:32

## 2021-09-19 NOTE — OCCUPATIONAL THERAPY INITIAL EVALUATION ADULT - PERTINENT HX OF CURRENT PROBLEM, REHAB EVAL
s/p Repeat L Ankle I&D, Vac Change by Dr. NEAL Wallis on 09-17, for RTOR 9/20 with plastic surgery for flap

## 2021-09-19 NOTE — OCCUPATIONAL THERAPY INITIAL EVALUATION ADULT - MD ORDER
25 yo F who originally had a left ankle ORIF in [facility] where she lives in March 2021 after a skateboarding accident. She has had wound drainage and numbness for a significant amount of time since the procedure. She was seen in the [facility] ER 2 days ago due to worsening ankle pain. She was advised to follow up with Dr. Wallis in his office this morning and after seeing her in his office, patient was sent to the ER for work up and admission for infection.

## 2021-09-19 NOTE — OCCUPATIONAL THERAPY INITIAL EVALUATION ADULT - ANTICIPATED DISCHARGE DISPOSITION, OT EVAL
Home no OT needs. Pt reports her friend can assist her in [place] once discharge and then will fly to [place] to recovery with assistance from family./no needs

## 2021-09-19 NOTE — OCCUPATIONAL THERAPY INITIAL EVALUATION ADULT - LIVES WITH, PROFILE
Pt currently lives in [place] alone in [facility] with 2 flights of stairs to enter. Pt reports since ORIF in march, she has been using crutches or w/c. Prior to sx, pt is ind for ADLs and functional mobility./alone

## 2021-09-19 NOTE — PROGRESS NOTE ADULT - SUBJECTIVE AND OBJECTIVE BOX
Ortho Note    Pt seen and examined on morning rounds. Pt comfortable without complaints, pain controlled.  Denies CP, SOB, N/V, numbness/tingling     Vital Signs Last 24 Hrs  T(C): 36.7 (19 Sep 2021 05:52), Max: 36.7 (18 Sep 2021 08:45)  T(F): 98.1 (19 Sep 2021 05:52), Max: 98.1 (18 Sep 2021 08:45)  HR: 75 (19 Sep 2021 05:52) (75 - 89)  BP: 103/67 (19 Sep 2021 05:52) (99/65 - 114/72)  BP(mean): --  RR: 18 (19 Sep 2021 05:52) (16 - 18)  SpO2: 97% (19 Sep 2021 05:52) (94% - 97%)      Physical Exam:  General: Pt Alert and oriented, NAD  DSG AO splint C/D/I  Pulses: 2+ pedal pulses, toes wwp  Sensation: Absent sensation over the 1st and 2nd toes, decreased sensation over the 3rd-5th toes  Motor: 0/0 L  EHL, 3/5 FHL/GS                A/P: 26yFemale s/p Repeat L Ankle I&D, Vac Change by Dr. NEAL Wallis on 09-17, for RTOR 9/20 with plastic surgery for flap  - Stable  - Pain Control  - DVT ppx: HSQ, will hold tonight for OR  - NPO after MN  - Consent by PRS team  - Pre-op labs/covid  - Post op abx: Ceftriaxone + Nafcillin  - WBS: NWB LLE in AO Splint  - RTOR on Monday 9/20 with plastics for flap closure

## 2021-09-19 NOTE — OCCUPATIONAL THERAPY INITIAL EVALUATION ADULT - ADDITIONAL COMMENTS
Pt reports she is planning to go home for a day or two to pack up her things and then fly to [place] to recovery with her family.

## 2021-09-19 NOTE — PROGRESS NOTE ADULT - ATTENDING COMMENTS
repeat labs show no progressive thrombocytosis  I spoke with Dr. Dunbar who will be performing her free flap tomorow  Clinical imaging reviewed and I will be present to resect deficient tissue prior to free flap and plan for flap elevation and recon in 4+ weeks  plan for transfer to plastics team post op for flap management  Will discuss with scholarship regarding patient dispo once stabilized and healing.. likely 1-2 weeks from now

## 2021-09-19 NOTE — OCCUPATIONAL THERAPY INITIAL EVALUATION ADULT - RANGE OF MOTION EXAMINATION, LOWER EXTREMITY
except L ankle: not accessed in ace bandage/cast/bilateral LE Active ROM was WFL  (within functional limits)

## 2021-09-20 ENCOUNTER — APPOINTMENT (OUTPATIENT)
Dept: ORTHOPEDIC SURGERY | Facility: HOSPITAL | Age: 26
End: 2021-09-20

## 2021-09-20 ENCOUNTER — RESULT REVIEW (OUTPATIENT)
Age: 26
End: 2021-09-20

## 2021-09-20 LAB
ANION GAP SERPL CALC-SCNC: 12 MMOL/L — SIGNIFICANT CHANGE UP (ref 5–17)
BUN SERPL-MCNC: 10 MG/DL — SIGNIFICANT CHANGE UP (ref 7–23)
CALCIUM SERPL-MCNC: 9.2 MG/DL — SIGNIFICANT CHANGE UP (ref 8.4–10.5)
CHLORIDE SERPL-SCNC: 101 MMOL/L — SIGNIFICANT CHANGE UP (ref 96–108)
CO2 SERPL-SCNC: 18 MMOL/L — LOW (ref 22–31)
CREAT SERPL-MCNC: 0.72 MG/DL — SIGNIFICANT CHANGE UP (ref 0.5–1.3)
GLUCOSE SERPL-MCNC: 92 MG/DL — SIGNIFICANT CHANGE UP (ref 70–99)
GRAM STN FLD: SIGNIFICANT CHANGE UP
HCG UR QL: NEGATIVE — SIGNIFICANT CHANGE UP
HCT VFR BLD CALC: 32.1 % — LOW (ref 34.5–45)
HGB BLD-MCNC: 10.7 G/DL — LOW (ref 11.5–15.5)
MCHC RBC-ENTMCNC: 29.9 PG — SIGNIFICANT CHANGE UP (ref 27–34)
MCHC RBC-ENTMCNC: 33.3 GM/DL — SIGNIFICANT CHANGE UP (ref 32–36)
MCV RBC AUTO: 89.7 FL — SIGNIFICANT CHANGE UP (ref 80–100)
NRBC # BLD: 0 /100 WBCS — SIGNIFICANT CHANGE UP (ref 0–0)
PLATELET # BLD AUTO: 523 K/UL — HIGH (ref 150–400)
POTASSIUM SERPL-MCNC: SIGNIFICANT CHANGE UP (ref 3.5–5.3)
POTASSIUM SERPL-SCNC: SIGNIFICANT CHANGE UP (ref 3.5–5.3)
RBC # BLD: 3.58 M/UL — LOW (ref 3.8–5.2)
RBC # FLD: 12.8 % — SIGNIFICANT CHANGE UP (ref 10.3–14.5)
SODIUM SERPL-SCNC: 131 MMOL/L — LOW (ref 135–145)
SPECIMEN SOURCE: SIGNIFICANT CHANGE UP
WBC # BLD: 7.78 K/UL — SIGNIFICANT CHANGE UP (ref 3.8–10.5)
WBC # FLD AUTO: 7.78 K/UL — SIGNIFICANT CHANGE UP (ref 3.8–10.5)

## 2021-09-20 PROCEDURE — 11044 DBRDMT BONE 1ST 20 SQ CM/<: CPT | Mod: 58,LT

## 2021-09-20 PROCEDURE — 11047 DBRDMT BONE EACH ADDL: CPT | Mod: 58,LT

## 2021-09-20 PROCEDURE — 73630 X-RAY EXAM OF FOOT: CPT | Mod: 26,LT

## 2021-09-20 PROCEDURE — 73610 X-RAY EXAM OF ANKLE: CPT | Mod: 26,LT

## 2021-09-20 PROCEDURE — 27691 REVISE LOWER LEG TENDON: CPT | Mod: 58,LT

## 2021-09-20 PROCEDURE — 88304 TISSUE EXAM BY PATHOLOGIST: CPT | Mod: 26

## 2021-09-20 PROCEDURE — 88311 DECALCIFY TISSUE: CPT | Mod: 26

## 2021-09-20 RX ORDER — ENOXAPARIN SODIUM 100 MG/ML
40 INJECTION SUBCUTANEOUS ONCE
Refills: 0 | Status: DISCONTINUED | OUTPATIENT
Start: 2021-09-20 | End: 2021-09-20

## 2021-09-20 RX ORDER — NAFCILLIN 10 G/100ML
2 INJECTION, POWDER, FOR SOLUTION INTRAVENOUS
Qty: 0 | Refills: 0 | DISCHARGE
Start: 2021-09-20 | End: 2021-11-01

## 2021-09-20 RX ORDER — SODIUM CHLORIDE 9 MG/ML
1000 INJECTION, SOLUTION INTRAVENOUS
Refills: 0 | Status: DISCONTINUED | OUTPATIENT
Start: 2021-09-20 | End: 2021-09-22

## 2021-09-20 RX ORDER — ACETAMINOPHEN 500 MG
975 TABLET ORAL EVERY 6 HOURS
Refills: 0 | Status: DISCONTINUED | OUTPATIENT
Start: 2021-09-20 | End: 2021-10-18

## 2021-09-20 RX ORDER — ONDANSETRON 8 MG/1
4 TABLET, FILM COATED ORAL EVERY 6 HOURS
Refills: 0 | Status: DISCONTINUED | OUTPATIENT
Start: 2021-09-20 | End: 2021-10-25

## 2021-09-20 RX ORDER — ENOXAPARIN SODIUM 100 MG/ML
40 INJECTION SUBCUTANEOUS DAILY
Refills: 0 | Status: DISCONTINUED | OUTPATIENT
Start: 2021-09-21 | End: 2021-10-12

## 2021-09-20 RX ORDER — ACETAMINOPHEN 500 MG
975 TABLET ORAL EVERY 8 HOURS
Refills: 0 | Status: DISCONTINUED | OUTPATIENT
Start: 2021-09-20 | End: 2021-09-20

## 2021-09-20 RX ORDER — ALBUMIN HUMAN 25 %
250 VIAL (ML) INTRAVENOUS ONCE
Refills: 0 | Status: DISCONTINUED | OUTPATIENT
Start: 2021-09-20 | End: 2021-09-20

## 2021-09-20 RX ORDER — CEFTRIAXONE 500 MG/1
2000 INJECTION, POWDER, FOR SOLUTION INTRAMUSCULAR; INTRAVENOUS
Qty: 0 | Refills: 0 | DISCHARGE
Start: 2021-09-20 | End: 2021-11-01

## 2021-09-20 RX ORDER — HYDROMORPHONE HYDROCHLORIDE 2 MG/ML
0.5 INJECTION INTRAMUSCULAR; INTRAVENOUS; SUBCUTANEOUS
Refills: 0 | Status: DISCONTINUED | OUTPATIENT
Start: 2021-09-20 | End: 2021-09-21

## 2021-09-20 RX ADMIN — CEFTRIAXONE 100 MILLIGRAM(S): 500 INJECTION, POWDER, FOR SOLUTION INTRAMUSCULAR; INTRAVENOUS at 23:02

## 2021-09-20 RX ADMIN — SENNA PLUS 2 TABLET(S): 8.6 TABLET ORAL at 22:00

## 2021-09-20 RX ADMIN — CEFTRIAXONE 100 MILLIGRAM(S): 500 INJECTION, POWDER, FOR SOLUTION INTRAMUSCULAR; INTRAVENOUS at 00:22

## 2021-09-20 RX ADMIN — HYDROMORPHONE HYDROCHLORIDE 0.5 MILLIGRAM(S): 2 INJECTION INTRAMUSCULAR; INTRAVENOUS; SUBCUTANEOUS at 18:35

## 2021-09-20 RX ADMIN — HYDROMORPHONE HYDROCHLORIDE 0.5 MILLIGRAM(S): 2 INJECTION INTRAMUSCULAR; INTRAVENOUS; SUBCUTANEOUS at 20:13

## 2021-09-20 RX ADMIN — NAFCILLIN 200 GRAM(S): 10 INJECTION, POWDER, FOR SOLUTION INTRAVENOUS at 02:35

## 2021-09-20 RX ADMIN — OXYCODONE HYDROCHLORIDE 10 MILLIGRAM(S): 5 TABLET ORAL at 20:08

## 2021-09-20 RX ADMIN — HYDROMORPHONE HYDROCHLORIDE 0.5 MILLIGRAM(S): 2 INJECTION INTRAMUSCULAR; INTRAVENOUS; SUBCUTANEOUS at 00:28

## 2021-09-20 RX ADMIN — NAFCILLIN 200 GRAM(S): 10 INJECTION, POWDER, FOR SOLUTION INTRAVENOUS at 22:00

## 2021-09-20 RX ADMIN — Medication 10 MILLIGRAM(S): at 21:53

## 2021-09-20 RX ADMIN — HYDROMORPHONE HYDROCHLORIDE 0.5 MILLIGRAM(S): 2 INJECTION INTRAMUSCULAR; INTRAVENOUS; SUBCUTANEOUS at 18:12

## 2021-09-20 RX ADMIN — HYDROMORPHONE HYDROCHLORIDE 0.5 MILLIGRAM(S): 2 INJECTION INTRAMUSCULAR; INTRAVENOUS; SUBCUTANEOUS at 01:00

## 2021-09-20 RX ADMIN — Medication 975 MILLIGRAM(S): at 22:00

## 2021-09-20 RX ADMIN — Medication 975 MILLIGRAM(S): at 22:30

## 2021-09-20 RX ADMIN — HYDROMORPHONE HYDROCHLORIDE 0.5 MILLIGRAM(S): 2 INJECTION INTRAMUSCULAR; INTRAVENOUS; SUBCUTANEOUS at 18:30

## 2021-09-20 RX ADMIN — SODIUM CHLORIDE 125 MILLILITER(S): 9 INJECTION, SOLUTION INTRAVENOUS at 21:54

## 2021-09-20 RX ADMIN — SODIUM CHLORIDE 100 MILLILITER(S): 9 INJECTION, SOLUTION INTRAVENOUS at 00:10

## 2021-09-20 RX ADMIN — NAFCILLIN 200 GRAM(S): 10 INJECTION, POWDER, FOR SOLUTION INTRAVENOUS at 05:37

## 2021-09-20 NOTE — PROGRESS NOTE ADULT - ASSESSMENT
26 F with Left ankle trimalleolar fx and missed ACS s/p ORIF 3/2021 in [facility] now s/p multiple debridements.  - OR today for flap closure

## 2021-09-20 NOTE — BRIEF OPERATIVE NOTE - OPERATION/FINDINGS
no purulence. 7x13cm lateral area of injury  5X8cm anterior medial zone of injury  necrotic avascular peroneus brevis muscle and tendon

## 2021-09-20 NOTE — BRIEF OPERATIVE NOTE - NSICDXBRIEFPROCEDURE_GEN_ALL_CORE_FT
PROCEDURES:  Arthrotomy, ankle, with exploration 09-Sep-2021 23:06:09  Chapin Wallis  Bone debridement 09-Sep-2021 23:06:59  Chapin Wallis  Irrigation and debridement, tissue, down to bone, excisional, more than 20 sq cm 09-Sep-2021 23:09:03  Chapin Wallis

## 2021-09-20 NOTE — PROGRESS NOTE ADULT - SUBJECTIVE AND OBJECTIVE BOX
SUBJECTIVE: pt examined at bedside. Wound vac not on since last night. No other issues.      MEDICATIONS  (STANDING):  calcium carbonate   1250 mG (OsCal) 1 Tablet(s) Oral daily  cefTRIAXone   IVPB 2000 milliGRAM(s) IV Intermittent every 24 hours  cholecalciferol 1000 Unit(s) Oral daily  influenza   Vaccine 0.5 milliLiter(s) IntraMuscular once  lactated ringers. 1000 milliLiter(s) (100 mL/Hr) IV Continuous <Continuous>  multivitamin 1 Tablet(s) Oral daily  nafcillin  IVPB 2 Gram(s) IV Intermittent every 4 hours  polyethylene glycol 3350 17 Gram(s) Oral two times a day  senna 2 Tablet(s) Oral at bedtime    MEDICATIONS  (PRN):  acetaminophen   Tablet .. 650 milliGRAM(s) Oral every 6 hours PRN Temp greater or equal to 38C (100.4F), Mild Pain (1 - 3)  bisacodyl Suppository 10 milliGRAM(s) Rectal daily PRN Constipation  HYDROmorphone  Injectable 0.5 milliGRAM(s) IV Push every 4 hours PRN Breakthrough pain  metoclopramide Injectable 10 milliGRAM(s) IV Push every 6 hours PRN Nausea and/or Vomiting  oxyCODONE    IR 10 milliGRAM(s) Oral every 4 hours PRN Severe Pain (7 - 10)  oxyCODONE    IR 5 milliGRAM(s) Oral every 4 hours PRN Moderate Pain (4 - 6)      Vital Signs Last 24 Hrs  T(C): 36.7 (20 Sep 2021 04:35), Max: 36.9 (19 Sep 2021 14:05)  T(F): 98 (20 Sep 2021 04:35), Max: 98.4 (19 Sep 2021 14:05)  HR: 84 (20 Sep 2021 04:35) (78 - 95)  BP: 90/58 (20 Sep 2021 04:35) (90/58 - 121/86)  BP(mean): --  RR: 16 (20 Sep 2021 04:35) (16 - 18)  SpO2: 96% (20 Sep 2021 04:35) (94% - 98%)    Physical Exam:  General: NAD, resting comfortably in bed  Extremities: L LE elevated and wrapped, wound vac off overnight    I&O's Summary    19 Sep 2021 07:01  -  20 Sep 2021 07:00  --------------------------------------------------------  IN: 1450 mL / OUT: 1550 mL / NET: -100 mL        LABS:                        11.2   8.43  )-----------( 582      ( 19 Sep 2021 10:30 )             34.4     09-19    136  |  102  |  8   ----------------------------<  120<H>  3.6   |  22  |  0.77    Ca    9.8      19 Sep 2021 10:30

## 2021-09-20 NOTE — PROGRESS NOTE ADULT - ATTENDING COMMENTS
P: 26yFemale s/p Repeat L Ankle I&D, Vac Change by Dr. NEAL Wallis on 09-17, for RTOR 9/20 with plastic surgery for flap, excisional debridement by ortho  - NPO for OR today   - Pain Control  - DVT ppx: held for OR   - Pre-op labs/covid  - Post op abx: Ceftriaxone + Nafcillin  - WBS: NWB LLE in AO posterior slab Splint  - RTOR on today with plastics for flap closure and transfer to plastics service  - Plan for flap elevation and reconstruction in 4+ weeks after at minimum 6 weeks IV abx  - f/u repeat OR cultures

## 2021-09-20 NOTE — PROGRESS NOTE ADULT - SUBJECTIVE AND OBJECTIVE BOX
Ortho Note    Pt seen and examined on morning rounds. Pt comfortable without complaints, pain controlled.  Denies CP, SOB, N/V, numbness/tingling. OR today with plastics     Vital Signs Last 24 Hrs  T(C): 36.7 (20 Sep 2021 04:35), Max: 36.9 (19 Sep 2021 14:05)  T(F): 98 (20 Sep 2021 04:35), Max: 98.4 (19 Sep 2021 14:05)  HR: 84 (20 Sep 2021 04:35) (78 - 95)  BP: 90/58 (20 Sep 2021 04:35) (90/58 - 121/86)  BP(mean): --  RR: 16 (20 Sep 2021 04:35) (16 - 18)  SpO2: 96% (20 Sep 2021 04:35) (94% - 98%)      Physical Exam:  General: Pt Alert and oriented, NAD  DSG AO splint C/D/I  Pulses: 2+ pedal pulses, toes wwp  Sensation: Absent sensation over the 1st and 2nd toes, decreased sensation over the 3rd-5th toes  Motor: 0/0 L  EHL, 3/5 FHL/GS                A/P: 26yFemale s/p Repeat L Ankle I&D, Vac Change by Dr. NEAL Wallis on 09-17, for RTOR 9/20 with plastic surgery for flap  - NPO for OR today   - Pain Control  - DVT ppx: held for OR   - Pre-op labs/covid  - Post op abx: Ceftriaxone + Nafcillin  - WBS: NWB LLE in AO Splint  - RTOR on today with plastics for flap closure and transfer to plastics service

## 2021-09-21 LAB
ANION GAP SERPL CALC-SCNC: 8 MMOL/L — SIGNIFICANT CHANGE UP (ref 5–17)
BUN SERPL-MCNC: 8 MG/DL — SIGNIFICANT CHANGE UP (ref 7–23)
CALCIUM SERPL-MCNC: 8.9 MG/DL — SIGNIFICANT CHANGE UP (ref 8.4–10.5)
CHLORIDE SERPL-SCNC: 104 MMOL/L — SIGNIFICANT CHANGE UP (ref 96–108)
CO2 SERPL-SCNC: 26 MMOL/L — SIGNIFICANT CHANGE UP (ref 22–31)
CREAT SERPL-MCNC: 0.82 MG/DL — SIGNIFICANT CHANGE UP (ref 0.5–1.3)
GLUCOSE SERPL-MCNC: 102 MG/DL — HIGH (ref 70–99)
HCT VFR BLD CALC: 21.4 % — LOW (ref 34.5–45)
HGB BLD-MCNC: 7.1 G/DL — LOW (ref 11.5–15.5)
MCHC RBC-ENTMCNC: 30.2 PG — SIGNIFICANT CHANGE UP (ref 27–34)
MCHC RBC-ENTMCNC: 33.2 GM/DL — SIGNIFICANT CHANGE UP (ref 32–36)
MCV RBC AUTO: 91.1 FL — SIGNIFICANT CHANGE UP (ref 80–100)
NRBC # BLD: 0 /100 WBCS — SIGNIFICANT CHANGE UP (ref 0–0)
PLATELET # BLD AUTO: 400 K/UL — SIGNIFICANT CHANGE UP (ref 150–400)
POTASSIUM SERPL-MCNC: 3.5 MMOL/L — SIGNIFICANT CHANGE UP (ref 3.5–5.3)
POTASSIUM SERPL-SCNC: 3.5 MMOL/L — SIGNIFICANT CHANGE UP (ref 3.5–5.3)
RBC # BLD: 2.35 M/UL — LOW (ref 3.8–5.2)
RBC # FLD: 13.4 % — SIGNIFICANT CHANGE UP (ref 10.3–14.5)
SODIUM SERPL-SCNC: 138 MMOL/L — SIGNIFICANT CHANGE UP (ref 135–145)
WBC # BLD: 8.45 K/UL — SIGNIFICANT CHANGE UP (ref 3.8–10.5)
WBC # FLD AUTO: 8.45 K/UL — SIGNIFICANT CHANGE UP (ref 3.8–10.5)

## 2021-09-21 PROCEDURE — 99232 SBSQ HOSP IP/OBS MODERATE 35: CPT

## 2021-09-21 PROCEDURE — 36573 INSJ PICC RS&I 5 YR+: CPT

## 2021-09-21 PROCEDURE — 76937 US GUIDE VASCULAR ACCESS: CPT | Mod: 26,59

## 2021-09-21 RX ORDER — SODIUM CHLORIDE 9 MG/ML
10 INJECTION INTRAMUSCULAR; INTRAVENOUS; SUBCUTANEOUS
Refills: 0 | Status: DISCONTINUED | OUTPATIENT
Start: 2021-09-21 | End: 2021-10-25

## 2021-09-21 RX ORDER — CHLORHEXIDINE GLUCONATE 213 G/1000ML
1 SOLUTION TOPICAL
Refills: 0 | Status: DISCONTINUED | OUTPATIENT
Start: 2021-09-21 | End: 2021-10-25

## 2021-09-21 RX ADMIN — POLYETHYLENE GLYCOL 3350 17 GRAM(S): 17 POWDER, FOR SOLUTION ORAL at 05:48

## 2021-09-21 RX ADMIN — Medication 975 MILLIGRAM(S): at 16:54

## 2021-09-21 RX ADMIN — Medication 975 MILLIGRAM(S): at 05:30

## 2021-09-21 RX ADMIN — NAFCILLIN 200 GRAM(S): 10 INJECTION, POWDER, FOR SOLUTION INTRAVENOUS at 10:38

## 2021-09-21 RX ADMIN — OXYCODONE HYDROCHLORIDE 5 MILLIGRAM(S): 5 TABLET ORAL at 14:32

## 2021-09-21 RX ADMIN — Medication 1000 UNIT(S): at 12:01

## 2021-09-21 RX ADMIN — Medication 1 TABLET(S): at 14:29

## 2021-09-21 RX ADMIN — Medication 975 MILLIGRAM(S): at 10:47

## 2021-09-21 RX ADMIN — HYDROMORPHONE HYDROCHLORIDE 0.5 MILLIGRAM(S): 2 INJECTION INTRAMUSCULAR; INTRAVENOUS; SUBCUTANEOUS at 11:33

## 2021-09-21 RX ADMIN — NAFCILLIN 200 GRAM(S): 10 INJECTION, POWDER, FOR SOLUTION INTRAVENOUS at 02:16

## 2021-09-21 RX ADMIN — Medication 1 TABLET(S): at 12:01

## 2021-09-21 RX ADMIN — NAFCILLIN 200 GRAM(S): 10 INJECTION, POWDER, FOR SOLUTION INTRAVENOUS at 14:29

## 2021-09-21 RX ADMIN — OXYCODONE HYDROCHLORIDE 10 MILLIGRAM(S): 5 TABLET ORAL at 21:37

## 2021-09-21 RX ADMIN — POLYETHYLENE GLYCOL 3350 17 GRAM(S): 17 POWDER, FOR SOLUTION ORAL at 17:04

## 2021-09-21 RX ADMIN — SENNA PLUS 2 TABLET(S): 8.6 TABLET ORAL at 21:37

## 2021-09-21 RX ADMIN — OXYCODONE HYDROCHLORIDE 10 MILLIGRAM(S): 5 TABLET ORAL at 22:30

## 2021-09-21 RX ADMIN — CEFTRIAXONE 100 MILLIGRAM(S): 500 INJECTION, POWDER, FOR SOLUTION INTRAMUSCULAR; INTRAVENOUS at 23:23

## 2021-09-21 RX ADMIN — Medication 975 MILLIGRAM(S): at 04:48

## 2021-09-21 RX ADMIN — OXYCODONE HYDROCHLORIDE 10 MILLIGRAM(S): 5 TABLET ORAL at 04:48

## 2021-09-21 RX ADMIN — Medication 975 MILLIGRAM(S): at 22:30

## 2021-09-21 RX ADMIN — CHLORHEXIDINE GLUCONATE 1 APPLICATION(S): 213 SOLUTION TOPICAL at 16:56

## 2021-09-21 RX ADMIN — OXYCODONE HYDROCHLORIDE 10 MILLIGRAM(S): 5 TABLET ORAL at 05:30

## 2021-09-21 RX ADMIN — Medication 975 MILLIGRAM(S): at 21:37

## 2021-09-21 RX ADMIN — Medication 975 MILLIGRAM(S): at 10:39

## 2021-09-21 RX ADMIN — SODIUM CHLORIDE 125 MILLILITER(S): 9 INJECTION, SOLUTION INTRAVENOUS at 21:38

## 2021-09-21 RX ADMIN — NAFCILLIN 200 GRAM(S): 10 INJECTION, POWDER, FOR SOLUTION INTRAVENOUS at 05:48

## 2021-09-21 RX ADMIN — ENOXAPARIN SODIUM 40 MILLIGRAM(S): 100 INJECTION SUBCUTANEOUS at 12:01

## 2021-09-21 RX ADMIN — NAFCILLIN 200 GRAM(S): 10 INJECTION, POWDER, FOR SOLUTION INTRAVENOUS at 18:14

## 2021-09-21 RX ADMIN — NAFCILLIN 200 GRAM(S): 10 INJECTION, POWDER, FOR SOLUTION INTRAVENOUS at 21:38

## 2021-09-21 RX ADMIN — HYDROMORPHONE HYDROCHLORIDE 0.5 MILLIGRAM(S): 2 INJECTION INTRAMUSCULAR; INTRAVENOUS; SUBCUTANEOUS at 11:18

## 2021-09-21 RX ADMIN — OXYCODONE HYDROCHLORIDE 5 MILLIGRAM(S): 5 TABLET ORAL at 17:04

## 2021-09-21 RX ADMIN — ONDANSETRON 4 MILLIGRAM(S): 8 TABLET, FILM COATED ORAL at 16:55

## 2021-09-21 RX ADMIN — Medication 975 MILLIGRAM(S): at 17:03

## 2021-09-21 NOTE — BRIEF OPERATIVE NOTE - OPERATION/FINDINGS
Left ankle reconstruction with left gracilis free flap and left thigh STSG. Microvascular anastomosis with recipient AT. Implantable cook at venous anastomosis.  in left thigh.

## 2021-09-21 NOTE — PROGRESS NOTE ADULT - SUBJECTIVE AND OBJECTIVE BOX
Ortho    Patient doing well post-op. Expected amount of pain/tenderness at donor site. Foot XR performed. OR cx NGTD.   Denies CP, SOB, N/V, numbness/tingling     Vital Signs Last 24 Hrs  T(C): 37 (21 Sep 2021 04:33), Max: 37.2 (20 Sep 2021 20:50)  T(F): 98.6 (21 Sep 2021 04:33), Max: 98.9 (20 Sep 2021 20:50)  HR: 98 (21 Sep 2021 04:05) (92 - 102)  BP: 106/64 (21 Sep 2021 04:05) (100/55 - 120/65)  BP(mean): 80 (21 Sep 2021 04:05) (71 - 80)  RR: 18 (21 Sep 2021 04:05) (12 - 18)  SpO2: 97% (21 Sep 2021 04:05) (95% - 100%)    General: Pt Alert and oriented, NAD  L thigh and L foot DSG C/D/I,  intact  Pulses: toes wwp  Sensation: Absent sensation over the 1st and 2nd toes, decreased sensation over the 3rd-5th toes  Motor: 0/0 L  EHL, 3/5 FHL/GS    A/P: 26yFemale s/p Repeat L Ankle I&D, Vac Change by Dr. NEAL Wallis on 09-17, with plastic surgery for flap harvest and closure with plastic surgery (9/2)    - Dressing evaluation and drain management per plastics team   - F/u OR cx - NGTD   - Pain Control  - Post op abx: Ceftriaxone + Nafcillin  - DVT ppx: Lovenox  - WBS: NWB LLE  - Plastics primary, ortho to peripherally follow

## 2021-09-21 NOTE — PROGRESS NOTE ADULT - SUBJECTIVE AND OBJECTIVE BOX
SUBJECTIVE: s/p L ankle recon with L gracilis free flap and STSG (9/20). No events overnight. Pt reporting post operative pain and nausea. Otherwise feeling ok. No other issues      MEDICATIONS  (STANDING):  acetaminophen   Tablet .. 975 milliGRAM(s) Oral every 6 hours  calcium carbonate   1250 mG (OsCal) 1 Tablet(s) Oral daily  cefTRIAXone   IVPB 2000 milliGRAM(s) IV Intermittent every 24 hours  cholecalciferol 1000 Unit(s) Oral daily  enoxaparin Injectable 40 milliGRAM(s) SubCutaneous daily  influenza   Vaccine 0.5 milliLiter(s) IntraMuscular once  lactated ringers. 1000 milliLiter(s) (125 mL/Hr) IV Continuous <Continuous>  multivitamin 1 Tablet(s) Oral daily  nafcillin  IVPB 2 Gram(s) IV Intermittent every 4 hours  polyethylene glycol 3350 17 Gram(s) Oral two times a day  senna 2 Tablet(s) Oral at bedtime    MEDICATIONS  (PRN):  bisacodyl Suppository 10 milliGRAM(s) Rectal daily PRN Constipation  HYDROmorphone  Injectable 0.5 milliGRAM(s) IV Push every 4 hours PRN Breakthrough pain  HYDROmorphone  Injectable 0.5 milliGRAM(s) IV Push every 10 minutes PRN Severe Pain (7 - 10)  metoclopramide Injectable 10 milliGRAM(s) IV Push every 6 hours PRN Nausea and/or Vomiting  ondansetron Injectable 4 milliGRAM(s) IV Push every 6 hours PRN Nausea and/or Vomiting  oxyCODONE    IR 10 milliGRAM(s) Oral every 4 hours PRN Severe Pain (7 - 10)  oxyCODONE    IR 5 milliGRAM(s) Oral every 4 hours PRN Moderate Pain (4 - 6)      Vital Signs Last 24 Hrs  T(C): 37 (21 Sep 2021 04:33), Max: 37.2 (20 Sep 2021 20:50)  T(F): 98.6 (21 Sep 2021 04:33), Max: 98.9 (20 Sep 2021 20:50)  HR: 98 (21 Sep 2021 04:05) (92 - 102)  BP: 106/64 (21 Sep 2021 04:05) (100/55 - 120/65)  BP(mean): 80 (21 Sep 2021 04:05) (71 - 80)  RR: 18 (21 Sep 2021 04:05) (12 - 18)  SpO2: 97% (21 Sep 2021 04:05) (95% - 100%)    Physical Exam:  General: NAD  Extremities: LLE ace wrap in place, flap soft, warm, color appropriate, stable cook signal and arterial doppler, drain serosanguinous 15cc    I&O's Summary    20 Sep 2021 07:01  -  21 Sep 2021 07:00  --------------------------------------------------------  IN: 1725 mL / OUT: 765 mL / NET: 960 mL        LABS:                        10.7   7.78  )-----------( 523      ( 20 Sep 2021 07:24 )             32.1     09-20    131<L>  |  101  |  10  ----------------------------<  92  See Note   |  18<L>  |  0.72    Ca    9.2      20 Sep 2021 07:24          CAPILLARY BLOOD GLUCOSE            RADIOLOGY & ADDITIONAL STUDIES:

## 2021-09-21 NOTE — PROGRESS NOTE ADULT - SUBJECTIVE AND OBJECTIVE BOX
Ortho Post Op Check    Procedure: Flap closure  Surgeon: Dr. Dickens    Pt comfortable without complaints, pain controlled  Denies CP, SOB, N/V, numbness/tingling     Vital Signs Last 24 Hrs  T(C): 37.2 (09-20-21 @ 22:19), Max: 37.2 (09-20-21 @ 20:50)  T(F): 98.9 (09-20-21 @ 22:19), Max: 98.9 (09-20-21 @ 20:50)  HR: 102 (09-20-21 @ 23:40) (92 - 102)  BP: 108/58 (09-20-21 @ 23:40) (105/59 - 120/65)  BP(mean): 78 (09-20-21 @ 23:40) (77 - 79)  RR: 17 (09-20-21 @ 23:40) (12 - 17)  SpO2: 97% (09-20-21 @ 23:40) (95% - 100%)  I&O's Summary    19 Sep 2021 07:01  -  20 Sep 2021 07:00  --------------------------------------------------------  IN: 1450 mL / OUT: 1550 mL / NET: -100 mL    20 Sep 2021 07:01  -  21 Sep 2021 00:35  --------------------------------------------------------  IN: 775 mL / OUT: 250 mL / NET: 525 mL        General: Pt Alert and oriented, NAD  DSG C/D/I  Pulses: 2+ dp, pt, toes wwp  Sensation: SILT sural/saph/sp/dp/ tibial distributions  Motor: 5/5 EHL/FHL/TA/GS firing                          10.7   7.78  )-----------( 523      ( 20 Sep 2021 07:24 )             32.1     09-20    131<L>  |  101  |  10  ----------------------------<  92  See Note   |  18<L>  |  0.72    Ca    9.2      20 Sep 2021 07:24        Post-op X-Ray: hardware in place    A/P: 26yFemale s/p Repeat L Ankle I&D, Vac Change by Dr. NEAL Wallis on 09-17, for RTOR 9/20 with plastic surgery for flap  - Stable  - Pain Control  - transferred to plastic surgery service  - Post op abx: Ceftriaxone + Nafcillin  - DVT ppx: HSQ  - WBS: NWB LLE in AO Splint  - RTOR on Monday 9/20 with plastics for flap closure    Sergio Clements, PGY-1  Ortho Pager [phone] Ortho Post Op Check    Procedure: Flap closure  Surgeon: Dr. Dickens    Pt comfortable without complaints, pain controlled  Denies CP, SOB, N/V, numbness/tingling     Vital Signs Last 24 Hrs  T(C): 37.2 (09-20-21 @ 22:19), Max: 37.2 (09-20-21 @ 20:50)  T(F): 98.9 (09-20-21 @ 22:19), Max: 98.9 (09-20-21 @ 20:50)  HR: 102 (09-20-21 @ 23:40) (92 - 102)  BP: 108/58 (09-20-21 @ 23:40) (105/59 - 120/65)  BP(mean): 78 (09-20-21 @ 23:40) (77 - 79)  RR: 17 (09-20-21 @ 23:40) (12 - 17)  SpO2: 97% (09-20-21 @ 23:40) (95% - 100%)  I&O's Summary    19 Sep 2021 07:01  -  20 Sep 2021 07:00  --------------------------------------------------------  IN: 1450 mL / OUT: 1550 mL / NET: -100 mL    20 Sep 2021 07:01  -  21 Sep 2021 00:35  --------------------------------------------------------  IN: 775 mL / OUT: 250 mL / NET: 525 mL        General: Pt Alert and oriented, NAD  DSG C/D/I  Pulses: 2+ dp, pt, toes wwp  Sensation: Absent sensation over the 1st and 2nd toes, decreased sensation over 3rd>>4th> 5th toes  Motor: 0/0 L EHL, 3/5 FHL/TA/GS firing                          10.7   7.78  )-----------( 523      ( 20 Sep 2021 07:24 )             32.1     09-20    131<L>  |  101  |  10  ----------------------------<  92  See Note   |  18<L>  |  0.72    Ca    9.2      20 Sep 2021 07:24        Post-op X-Ray: hardware in place    A/P: 26yFemale s/p Repeat L Ankle I&D, Vac Change by Dr. NEAL Wallis on 09-17, for flap harvest and closure with plastic surgery 9-20.  - f/u OR cultures  - Pain Control  - transferred to plastic surgery service (primary)  - Post op abx: Ceftriaxone + Nafcillin  - DVT ppx: LVX  - WBS: NWB LLE in AO Splint  - RTOR on Monday 9/20 with plastics for flap closure  - Ortho will continue to follow    Serigo Clements, PGY-1  Ortho Pager [phone]

## 2021-09-21 NOTE — PROGRESS NOTE ADULT - ASSESSMENT
26F h/o L ankle fracture with septic arthritis, postoperative compartment syndrome s/p RICK, I&D, bone debridement and ligament repair on 9/9, I&D with tissue debridement to bone on 9/12, repeat I&D, arthrotomy of ankle, L ankle reconstruction with L gracilis free flap and L thigh STSG, microvascular ansastomosis with recipient AT on 9/20.  OR culture 9/9 grew MSSA and S. anginosus.      - cont nafcillin 2g IV q4h  - cont CTX 2g IV q24h  - f/u 9/20 OR cultures   - Place PICC line   - Duration of IV antibiotics is 6 weeks (9/20 - 11/1)  - Weekly labs: CMP, CBC, ESR, CRP faxed to ID office at [phone]  - Patient to follow up with Dr. Carmona in 2 weeks ([address] [place] [address], [phone]), ID office will call patient to schedule       Team 2 will follow you.  Case d/w primary team.    Patito Campbell MD, MS  Infectious Disease attending  work cell [phone]

## 2021-09-21 NOTE — PROGRESS NOTE ADULT - ATTENDING COMMENTS
Patient seen at 1800.   Doing well  splint intact  flap with good turgor  post op images reviewed  Discussed with Dr. Campbell for IV abx for 6 weeks prior to flap elevation and reconstruction  Tentative plan for definitive ORIF on October 18th.   F/u cultures  Care per plastics for flap managment

## 2021-09-21 NOTE — PROGRESS NOTE ADULT - ASSESSMENT
26F s/p L ankle recon with L gracilis free flap and STSG (9/20).  - NPO  - pain/nausea  - keep Left leg elevated on 2 pillows  - flap check q1 hour  - monitor cook doppler and sera doppler  -  drain care  - bedrest  - ceftriaxone, nafcillin  - Macario  - IVF  - Lovenox/SCD on right leg  - IS

## 2021-09-21 NOTE — CHART NOTE - NSCHARTNOTEFT_GEN_A_CORE
Admitting Diagnosis:   Patient is a 26y old  Female who presents with a chief complaint of L ankle pain (21 Sep 2021 11:56)      PAST MEDICAL & SURGICAL HISTORY:  Left tibial neuropathy    PAD (peripheral artery disease)    Status post ORIF of fracture of ankle          Current Nutrition Order:  Regular    PO Intake: Good (%) [   ]  Fair (50-75%) [  x ] Poor (<25%) [   ]   Endorsing fair appetite, planning to have steak for lunch as protein. Discussed other optimal protein options for wound healing.     GI Issues:  Denies GI distress,   No reports of N/V, has yet to have a BM post-op  Ordered for zofran, dulcolax, reglan    Pain:   Pain being medically managed.   Ordered for tylenol, dilaudid, oxycodone    Skin Integrity:   Surgical incision  L gracilis free flap and STSG   drain      Labs:   09-21    138  |  104  |  8   ----------------------------<  102<H>  3.5   |  26  |  0.82    Ca    8.9      21 Sep 2021 07:56      CAPILLARY BLOOD GLUCOSE          Medications:  MEDICATIONS  (STANDING):  acetaminophen   Tablet .. 975 milliGRAM(s) Oral every 6 hours  calcium carbonate   1250 mG (OsCal) 1 Tablet(s) Oral daily  cefTRIAXone   IVPB 2000 milliGRAM(s) IV Intermittent every 24 hours  cholecalciferol 1000 Unit(s) Oral daily  enoxaparin Injectable 40 milliGRAM(s) SubCutaneous daily  influenza   Vaccine 0.5 milliLiter(s) IntraMuscular once  lactated ringers. 1000 milliLiter(s) (125 mL/Hr) IV Continuous <Continuous>  multivitamin 1 Tablet(s) Oral daily  nafcillin  IVPB 2 Gram(s) IV Intermittent every 4 hours  polyethylene glycol 3350 17 Gram(s) Oral two times a day  senna 2 Tablet(s) Oral at bedtime    MEDICATIONS  (PRN):  bisacodyl Suppository 10 milliGRAM(s) Rectal daily PRN Constipation  HYDROmorphone  Injectable 0.5 milliGRAM(s) IV Push every 4 hours PRN Breakthrough pain  HYDROmorphone  Injectable 0.5 milliGRAM(s) IV Push every 10 minutes PRN Severe Pain (7 - 10)  metoclopramide Injectable 10 milliGRAM(s) IV Push every 6 hours PRN Nausea and/or Vomiting  ondansetron Injectable 4 milliGRAM(s) IV Push every 6 hours PRN Nausea and/or Vomiting  oxyCODONE    IR 10 milliGRAM(s) Oral every 4 hours PRN Severe Pain (7 - 10)  oxyCODONE    IR 5 milliGRAM(s) Oral every 4 hours PRN Moderate Pain (4 - 6)    Admitted Anthropometrics:  Height: 5'9" Weight: 181lbs, IBW 160lbs+/-10%, %%, BMI 26.8     Weight Change:  Pt suspects weight loss during hospital stay but not prior to admission  Reports UBW is 88kg, admitted weight is 82kg. Will f/u with patient to reassess for weight loss PTA- may be scale discrepencies.     Estimated energy needs:  IBW used for calculations as pt >120% of IBW  Nutrient needs based on [facility] standards of care for maintenance in adults.   Needs adjusted for wound healing  Calories: 1650-1950kcals (25-30kcals/kg)  Protein: 95-130g (1.5-2.0g/kg)   Fluid:+1950ml/day (30ml/kcals)    Subjective:   26yFemale s/p Repeat L Ankle I&D, Vac Change by Dr. NEAL Wallis on 09-17, with plastic surgery for flap harvest and closure with plastic surgery (9/2)    Pt seen in room, resting in bed. Currently on a regular diet and tolerating PO. Endorsing a fair appetite and intentionally eating protein. Reinforced diet education on importance of protein w/ wound healing. Continue w/ MVI, recommend adding on Zinc and Vitamin C for wound healing. Provided education on optimal protein options. Pt receptive and expressed understanding. RD to follow.     Previous Nutrition Diagnosis:  Increased nutrient needs (protein) R/T hypermetabolic, increased protein catabolism AEB 1.5-2.0g/kg protein  Active [  x ]  Resolved [   ]    If resolved, new PES:     Goal:   Pt to consistently meet % of estimated needs PO     Recommendations:  1. Adv diet when medically feasible, rec continue regular diet, Ensure Max 1day + LPS BID  2. Monitor %PO intake, continue to encourage PO at mealtimes  3. Continue MVI. Add on vitamin C and Zinc to promote wound healing.   4. Bowel regimen per team 2/2 current pain regimen  5. Monitor BMP, lytes, replete prn    Education: Continue encourage PO/protein    Risk Level: High [   ] Moderate [ x ] Low [   ].

## 2021-09-21 NOTE — CONSULT NOTE ADULT - SUBJECTIVE AND OBJECTIVE BOX
Vascular Access Service Consult Note    26yFemaleHEALTH ISSUES - PROBLEM Dx:      Diagnosis:  ankle infection    Indications for Vascular Access (Check all that apply)  [ x ]  Antibiotic Therapy       Antibiotic Prescribed:    ceftriaxone and nafcillin x 6 weeks                                                                                [  ]  IV Hydration  [  ]  Total Parenteral Nutrition  [  ]  Chemotherapy  [  ]  Difficult Venous Access  [  ]  CVP monitoring  [  ]  Medications with high potential for tissue necrosis on extravasation  [  ]  Other    Screening (Check all that apply)  Previous Radiation to chest  [  ] Yes      [x  ]  No  Breast Cancer                          [  ] Left     [  ]  Right    [ x ]  No  Lymph Node Dissection         [  ] Left     [  ]  Right    [x  ]  No  Pacemaker or ICD                   [  ] Left     [  ]  Right    [x]  No  Upper Extremity DVT             [  ] Left     [  ]  Right    [x  ]  No  Chronic Kidney Disease         [  ]  Yes     [ x]  No  Hemodialysis                           [  ]  Yes     [ x ]  No  AV Fistula/ Graft                     [  ]  Left    [  ]  Right    [ x ]  No  Temp>101F in past 24 H       [  ]  Yes     [ x ]  No  H/O PICC/Midline                   [  ]  Yes     [ x]  No    Lab data:                        7.1    8.45  )-----------( 400      ( 21 Sep 2021 07:56 )             21.4     09-21    138  |  104  |  8   ----------------------------<  102<H>  3.5   |  26  |  0.82    Ca    8.9      21 Sep 2021 07:56        Culture Results:   No growth to date (09-20 @ 10:03)  Culture Results:   No growth to date (09-20 @ 10:02)  Culture Results:   No growth to date (09-20 @ 10:02)          I have reviewed the chart, interviewed and examined the patient and determined that this patient:  [x  ] Is a candidate for a PICC line  [  ] Is a candidate for a Midline  [  ] Is not a candidate for vascular access device (reason)    Lumens:    [  ] Single  [ x ] Double

## 2021-09-21 NOTE — PROGRESS NOTE ADULT - SUBJECTIVE AND OBJECTIVE BOX
INFECTIOUS DISEASES CONSULT FOLLOW-UP NOTE    INTERVAL HPI/OVERNIGHT EVENTS:  no event overnight  patient underwent surgery yesterda  reports feeling very tired and pain at L foot but otherwise feeling ok    ROS:   Constitutional, eyes, ENT, cardiovascular, respiratory, gastrointestinal, genitourinary, integumentary, neurological, psychiatric and heme/lymph are otherwise negative other than noted above       ANTIBIOTICS/RELEVANT:    MEDICATIONS  (STANDING):  acetaminophen   Tablet .. 975 milliGRAM(s) Oral every 6 hours  calcium carbonate   1250 mG (OsCal) 1 Tablet(s) Oral daily  cefTRIAXone   IVPB 2000 milliGRAM(s) IV Intermittent every 24 hours  cholecalciferol 1000 Unit(s) Oral daily  enoxaparin Injectable 40 milliGRAM(s) SubCutaneous daily  influenza   Vaccine 0.5 milliLiter(s) IntraMuscular once  lactated ringers. 1000 milliLiter(s) (125 mL/Hr) IV Continuous <Continuous>  multivitamin 1 Tablet(s) Oral daily  nafcillin  IVPB 2 Gram(s) IV Intermittent every 4 hours  polyethylene glycol 3350 17 Gram(s) Oral two times a day  senna 2 Tablet(s) Oral at bedtime    MEDICATIONS  (PRN):  bisacodyl Suppository 10 milliGRAM(s) Rectal daily PRN Constipation  HYDROmorphone  Injectable 0.5 milliGRAM(s) IV Push every 4 hours PRN Breakthrough pain  HYDROmorphone  Injectable 0.5 milliGRAM(s) IV Push every 10 minutes PRN Severe Pain (7 - 10)  metoclopramide Injectable 10 milliGRAM(s) IV Push every 6 hours PRN Nausea and/or Vomiting  ondansetron Injectable 4 milliGRAM(s) IV Push every 6 hours PRN Nausea and/or Vomiting  oxyCODONE    IR 10 milliGRAM(s) Oral every 4 hours PRN Severe Pain (7 - 10)  oxyCODONE    IR 5 milliGRAM(s) Oral every 4 hours PRN Moderate Pain (4 - 6)        Vital Signs Last 24 Hrs  T(C): 37.2 (21 Sep 2021 09:35), Max: 37.2 (20 Sep 2021 20:50)  T(F): 98.9 (21 Sep 2021 09:35), Max: 98.9 (20 Sep 2021 20:50)  HR: 96 (21 Sep 2021 08:32) (92 - 102)  BP: 109/57 (21 Sep 2021 08:32) (100/55 - 120/65)  BP(mean): 78 (21 Sep 2021 08:32) (71 - 80)  RR: 18 (21 Sep 2021 08:32) (12 - 18)  SpO2: 96% (21 Sep 2021 08:32) (95% - 100%)    09-20-21 @ 07:01  -  09-21-21 @ 07:00  --------------------------------------------------------  IN: 1725 mL / OUT: 765 mL / NET: 960 mL    09-21-21 @ 07:01  -  09-21-21 @ 11:56  --------------------------------------------------------  IN: 625 mL / OUT: 0 mL / NET: 625 mL      PHYSICAL EXAM:  Constitutional: alert, NAD  Eyes: the sclera and conjunctiva were normal.   ENT: the ears and nose were normal in appearance.   Neck: the appearance of the neck was normal and the neck was supple.   Pulmonary: no respiratory distress and lungs were clear to auscultation bilaterally.   Heart: heart rate was normal and rhythm regular, normal S1 and S2  Ext: L leg covered with dressing and cast   Abdomen: normal bowel sounds, soft, non-tender  Neurological: no focal deficits.   Psychiatric: the affect was normal        LABS:                        7.1    8.45  )-----------( 400      ( 21 Sep 2021 07:56 )             21.4     09-21    138  |  104  |  8   ----------------------------<  102<H>  3.5   |  26  |  0.82    Ca    8.9      21 Sep 2021 07:56            MICROBIOLOGY:  9/20 OR #2 L ankle WCx: ngtd  9/20 OR #1 L ankle WCx: ngtd  9/20 OR #3 L ankle WCx: ngtd  9/14 OR culture ngtd x 3  9/12 OR culture ngtd x3  9/9 L lateral ankle WCx: MSSA (S to ox, cef), S. anginosus (S to PenG)        RADIOLOGY & ADDITIONAL STUDIES:  Reviewed

## 2021-09-22 LAB
-  CEFAZOLIN: SIGNIFICANT CHANGE UP
-  CLINDAMYCIN: SIGNIFICANT CHANGE UP
-  ERYTHROMYCIN: SIGNIFICANT CHANGE UP
-  LINEZOLID: SIGNIFICANT CHANGE UP
-  OXACILLIN: SIGNIFICANT CHANGE UP
-  RIFAMPIN: SIGNIFICANT CHANGE UP
-  TRIMETHOPRIM/SULFAMETHOXAZOLE: SIGNIFICANT CHANGE UP
-  VANCOMYCIN: SIGNIFICANT CHANGE UP
CULTURE RESULTS: SIGNIFICANT CHANGE UP
METHOD TYPE: SIGNIFICANT CHANGE UP
ORGANISM # SPEC MICROSCOPIC CNT: SIGNIFICANT CHANGE UP
ORGANISM # SPEC MICROSCOPIC CNT: SIGNIFICANT CHANGE UP
SPECIMEN SOURCE: SIGNIFICANT CHANGE UP
SURGICAL PATHOLOGY STUDY: SIGNIFICANT CHANGE UP

## 2021-09-22 PROCEDURE — 99232 SBSQ HOSP IP/OBS MODERATE 35: CPT

## 2021-09-22 RX ORDER — HYDROMORPHONE HYDROCHLORIDE 2 MG/ML
0.5 INJECTION INTRAMUSCULAR; INTRAVENOUS; SUBCUTANEOUS EVERY 4 HOURS
Refills: 0 | Status: DISCONTINUED | OUTPATIENT
Start: 2021-09-22 | End: 2021-09-28

## 2021-09-22 RX ORDER — OXYCODONE HYDROCHLORIDE 5 MG/1
10 TABLET ORAL EVERY 4 HOURS
Refills: 0 | Status: DISCONTINUED | OUTPATIENT
Start: 2021-09-22 | End: 2021-09-28

## 2021-09-22 RX ORDER — OXYCODONE HYDROCHLORIDE 5 MG/1
5 TABLET ORAL EVERY 4 HOURS
Refills: 0 | Status: DISCONTINUED | OUTPATIENT
Start: 2021-09-22 | End: 2021-09-28

## 2021-09-22 RX ADMIN — Medication 975 MILLIGRAM(S): at 05:00

## 2021-09-22 RX ADMIN — HYDROMORPHONE HYDROCHLORIDE 0.5 MILLIGRAM(S): 2 INJECTION INTRAMUSCULAR; INTRAVENOUS; SUBCUTANEOUS at 06:11

## 2021-09-22 RX ADMIN — ONDANSETRON 4 MILLIGRAM(S): 8 TABLET, FILM COATED ORAL at 09:16

## 2021-09-22 RX ADMIN — OXYCODONE HYDROCHLORIDE 10 MILLIGRAM(S): 5 TABLET ORAL at 05:00

## 2021-09-22 RX ADMIN — Medication 1 TABLET(S): at 13:50

## 2021-09-22 RX ADMIN — ENOXAPARIN SODIUM 40 MILLIGRAM(S): 100 INJECTION SUBCUTANEOUS at 12:10

## 2021-09-22 RX ADMIN — Medication 975 MILLIGRAM(S): at 09:16

## 2021-09-22 RX ADMIN — OXYCODONE HYDROCHLORIDE 10 MILLIGRAM(S): 5 TABLET ORAL at 04:33

## 2021-09-22 RX ADMIN — SENNA PLUS 2 TABLET(S): 8.6 TABLET ORAL at 22:48

## 2021-09-22 RX ADMIN — NAFCILLIN 200 GRAM(S): 10 INJECTION, POWDER, FOR SOLUTION INTRAVENOUS at 05:30

## 2021-09-22 RX ADMIN — NAFCILLIN 200 GRAM(S): 10 INJECTION, POWDER, FOR SOLUTION INTRAVENOUS at 17:09

## 2021-09-22 RX ADMIN — Medication 975 MILLIGRAM(S): at 18:02

## 2021-09-22 RX ADMIN — NAFCILLIN 200 GRAM(S): 10 INJECTION, POWDER, FOR SOLUTION INTRAVENOUS at 10:41

## 2021-09-22 RX ADMIN — ONDANSETRON 4 MILLIGRAM(S): 8 TABLET, FILM COATED ORAL at 22:48

## 2021-09-22 RX ADMIN — Medication 1 TABLET(S): at 14:09

## 2021-09-22 RX ADMIN — Medication 975 MILLIGRAM(S): at 17:05

## 2021-09-22 RX ADMIN — NAFCILLIN 200 GRAM(S): 10 INJECTION, POWDER, FOR SOLUTION INTRAVENOUS at 02:04

## 2021-09-22 RX ADMIN — OXYCODONE HYDROCHLORIDE 10 MILLIGRAM(S): 5 TABLET ORAL at 17:06

## 2021-09-22 RX ADMIN — Medication 975 MILLIGRAM(S): at 10:16

## 2021-09-22 RX ADMIN — NAFCILLIN 200 GRAM(S): 10 INJECTION, POWDER, FOR SOLUTION INTRAVENOUS at 14:10

## 2021-09-22 RX ADMIN — Medication 975 MILLIGRAM(S): at 22:48

## 2021-09-22 RX ADMIN — OXYCODONE HYDROCHLORIDE 10 MILLIGRAM(S): 5 TABLET ORAL at 22:48

## 2021-09-22 RX ADMIN — HYDROMORPHONE HYDROCHLORIDE 0.5 MILLIGRAM(S): 2 INJECTION INTRAMUSCULAR; INTRAVENOUS; SUBCUTANEOUS at 06:45

## 2021-09-22 RX ADMIN — Medication 1000 UNIT(S): at 12:10

## 2021-09-22 RX ADMIN — Medication 975 MILLIGRAM(S): at 04:30

## 2021-09-22 RX ADMIN — OXYCODONE HYDROCHLORIDE 10 MILLIGRAM(S): 5 TABLET ORAL at 18:02

## 2021-09-22 RX ADMIN — CHLORHEXIDINE GLUCONATE 1 APPLICATION(S): 213 SOLUTION TOPICAL at 05:00

## 2021-09-22 NOTE — PROGRESS NOTE ADULT - SUBJECTIVE AND OBJECTIVE BOX
Ortho    Patient doing well post-op. Pain moderately improved. PICC placed yesterday. OR cx NGTD.   Denies CP, SOB, N/V, numbness/tingling     Vital Signs Last 24 Hrs  T(C): 37.7 (22 Sep 2021 04:45), Max: 37.7 (22 Sep 2021 04:45)  T(F): 99.8 (22 Sep 2021 04:45), Max: 99.8 (22 Sep 2021 04:45)  HR: 100 (22 Sep 2021 03:45) (94 - 105)  BP: 108/65 (22 Sep 2021 03:45) (106/59 - 127/68)  BP(mean): 81 (22 Sep 2021 03:45) (78 - 90)  RR: 17 (22 Sep 2021 03:45) (17 - 18)  SpO2: 98% (22 Sep 2021 03:45) (96% - 98%)    General: Pt Alert and oriented, NAD  L thigh and L foot DSG C/D/I,  intact  Pulses: toes wwp  Sensation: Absent sensation over the 1st and 2nd toes, decreased sensation over the 3rd-5th toes  Motor: 0/0 L  EHL, 3/5 FHL/GS    A/P: 26yFemale s/p Repeat L Ankle I&D, Vac Change by Dr. NEAL Wallis on 09-17, with plastic surgery for flap harvest and closure with plastic surgery (9/2)    - PICC placed, placed for IV abx x 6 weeks  - Dressing evaluation and drain management per plastics team   - F/u OR cx - NGTD   - Pain Control  - Post op abx: Ceftriaxone + Nafcillin  - DVT ppx: Lovenox  - WBS: NWB LLE  - Plastics primary, ortho to peripherally follow

## 2021-09-22 NOTE — PROGRESS NOTE ADULT - ATTENDING COMMENTS
26yFemale s/p Repeat L Ankle I&D, Vac Change by Dr. NEAL Wallis on 09-17, with plastic surgery for flap harvest and closure with plastic surgery (9/2)    - Care per plastics until flap matured. Plan for return to operating room as early as October 18 for open treatment of fibular nonunion malunion, open treatment of of tibial malunion nonunion, deltoid ligament repair versus reconstruction, open reduction internal fixation of syndesmosis, possible tendo Achilles tendon lengthening.  The patient is doing well and was seen with the plastic surgery team at bedside for flap evaluation.  The flap is doing quite well and Doppler intact flow inflow and outflow to the flap.  The skin paddle looks excellent.  Continue IV abx thru PICC with plan for 6 weeks IV abx post Reconstruction. discussed and agreed with Dr. Campbell  - Dressing evaluation and drain management per plastics team   - F/u OR cx - NGTD   - Pain Control  - Post op abx: Ceftriaxone + Nafcillin  - DVT ppx: Lovenox  - WBS: DIANN DELACRUZE

## 2021-09-22 NOTE — PROGRESS NOTE ADULT - ASSESSMENT
26F s/p L ankle recon with L gracilis free flap and STSG (9/20).  - Reg diet  - pain/nausea  - PICC 9/21, ceftriaxone, nafcillin - ID following  - keep Left leg neutral  - flap check q2 hour  - monitor channing doppler and sera doppler  -  drain care  - bedrest  - Remove st  - DC IVF  - Lovenox/SCD on right leg  - IS

## 2021-09-22 NOTE — PROGRESS NOTE ADULT - ASSESSMENT
26F h/o L ankle fracture with septic arthritis, postoperative compartment syndrome s/p RICK, I&D, bone debridement and ligament repair on 9/9, I&D with tissue debridement to bone on 9/12, repeat I&D, arthrotomy of ankle, L ankle reconstruction with L gracilis free flap and L thigh STSG, microvascular ansastomosis with recipient AT on 9/20.  OR culture 9/9 grew MSSA and S. anginosus.  9/20 OR culture ngtd so far.     - cont nafcillin 2g IV q4h  - cont CTX 2g IV q24h  - f/u 9/20 OR cultures   - Place PICC line   - Duration of IV antibiotics is at least 6 weeks (9/20 - 11/1), then hardware placement, then likely another long course of abx  - Weekly labs: CMP, CBC, ESR, CRP faxed to ID office at [phone]  - Patient to follow up with Dr. Carmona in 2 weeks ([address] [place] [address], [phone]), ID office will call patient to schedule       Team 2 will follow you.  Case d/w primary team.    Patito Campbell MD, MS  Infectious Disease attending  work cell [phone]

## 2021-09-22 NOTE — PROGRESS NOTE ADULT - SUBJECTIVE AND OBJECTIVE BOX
INFECTIOUS DISEASES CONSULT FOLLOW-UP NOTE    INTERVAL HPI/OVERNIGHT EVENTS:  no event overnight  patient reports nausea and vomiting this morning    ROS:   Constitutional, eyes, ENT, cardiovascular, respiratory, gastrointestinal, genitourinary, integumentary, neurological, psychiatric and heme/lymph are otherwise negative other than noted above       ANTIBIOTICS/RELEVANT:    MEDICATIONS  (STANDING):  acetaminophen   Tablet .. 975 milliGRAM(s) Oral every 6 hours  calcium carbonate   1250 mG (OsCal) 1 Tablet(s) Oral daily  cefTRIAXone   IVPB 2000 milliGRAM(s) IV Intermittent every 24 hours  chlorhexidine 2% Cloths 1 Application(s) Topical <User Schedule>  cholecalciferol 1000 Unit(s) Oral daily  enoxaparin Injectable 40 milliGRAM(s) SubCutaneous daily  influenza   Vaccine 0.5 milliLiter(s) IntraMuscular once  multivitamin 1 Tablet(s) Oral daily  nafcillin  IVPB 2 Gram(s) IV Intermittent every 4 hours  polyethylene glycol 3350 17 Gram(s) Oral two times a day  senna 2 Tablet(s) Oral at bedtime    MEDICATIONS  (PRN):  bisacodyl Suppository 10 milliGRAM(s) Rectal daily PRN Constipation  HYDROmorphone  Injectable 0.5 milliGRAM(s) IV Push every 4 hours PRN Breakthrough pain  metoclopramide Injectable 10 milliGRAM(s) IV Push every 6 hours PRN Nausea and/or Vomiting  ondansetron Injectable 4 milliGRAM(s) IV Push every 6 hours PRN Nausea and/or Vomiting  oxyCODONE    IR 10 milliGRAM(s) Oral every 4 hours PRN Severe Pain (7 - 10)  oxyCODONE    IR 5 milliGRAM(s) Oral every 4 hours PRN Moderate Pain (4 - 6)  sodium chloride 0.9% lock flush 10 milliLiter(s) IV Push every 1 hour PRN Pre/post blood products, medications, blood draw, and to maintain line patency        Vital Signs Last 24 Hrs  T(C): 36.9 (22 Sep 2021 09:06), Max: 37.7 (22 Sep 2021 04:45)  T(F): 98.4 (22 Sep 2021 09:06), Max: 99.8 (22 Sep 2021 04:45)  HR: 90 (22 Sep 2021 09:19) (90 - 100)  BP: 116/72 (22 Sep 2021 09:19) (106/59 - 127/68)  BP(mean): 88 (22 Sep 2021 09:19) (78 - 90)  RR: 18 (22 Sep 2021 09:19) (17 - 18)  SpO2: 99% (22 Sep 2021 09:19) (96% - 99%)    09-21-21 @ 07:01  -  09-22-21 @ 07:00  --------------------------------------------------------  IN: 4050 mL / OUT: 3715 mL / NET: 335 mL    09-22-21 @ 07:01  -  09-22-21 @ 13:34  --------------------------------------------------------  IN: 100 mL / OUT: 0 mL / NET: 100 mL      PHYSICAL EXAM:  Constitutional: alert, NAD  Eyes: the sclera and conjunctiva were normal.   ENT: the ears and nose were normal in appearance.   Neck: the appearance of the neck was normal and the neck was supple.   Pulmonary: no respiratory distress and lungs were clear to auscultation bilaterally.   Heart: heart rate was normal and rhythm regular, normal S1 and S2  Ext: L leg covered with cast   Abdomen: normal bowel sounds, soft, non-tender  Neurological: no focal deficits.           LABS:                        7.1    8.45  )-----------( 400      ( 21 Sep 2021 07:56 )             21.4     09-21    138  |  104  |  8   ----------------------------<  102<H>  3.5   |  26  |  0.82    Ca    8.9      21 Sep 2021 07:56            MICROBIOLOGY:  9/20 OR #2 L ankle WCx: ngtd  9/20 OR #1 L ankle WCx: ngtd  9/20 OR #3 L ankle WCx: ngtd  9/14 OR culture ngtd x 3  9/12 OR culture ngtd x3  9/9 L lateral ankle WCx: MSSA (S to ox, cef), S. anginosus (S to PenG)        RADIOLOGY & ADDITIONAL STUDIES:  Reviewed

## 2021-09-22 NOTE — PROGRESS NOTE ADULT - SUBJECTIVE AND OBJECTIVE BOX
SUBJECTIVE: examined at bedside this morning. no events overnight. Pt is feeling well. Pain well controlled. Pt has not yet had BM. No other issues.    MEDICATIONS  (STANDING):  acetaminophen   Tablet .. 975 milliGRAM(s) Oral every 6 hours  calcium carbonate   1250 mG (OsCal) 1 Tablet(s) Oral daily  cefTRIAXone   IVPB 2000 milliGRAM(s) IV Intermittent every 24 hours  chlorhexidine 2% Cloths 1 Application(s) Topical <User Schedule>  cholecalciferol 1000 Unit(s) Oral daily  enoxaparin Injectable 40 milliGRAM(s) SubCutaneous daily  influenza   Vaccine 0.5 milliLiter(s) IntraMuscular once  multivitamin 1 Tablet(s) Oral daily  nafcillin  IVPB 2 Gram(s) IV Intermittent every 4 hours  polyethylene glycol 3350 17 Gram(s) Oral two times a day  senna 2 Tablet(s) Oral at bedtime    MEDICATIONS  (PRN):  bisacodyl Suppository 10 milliGRAM(s) Rectal daily PRN Constipation  HYDROmorphone  Injectable 0.5 milliGRAM(s) IV Push every 4 hours PRN Breakthrough pain  metoclopramide Injectable 10 milliGRAM(s) IV Push every 6 hours PRN Nausea and/or Vomiting  ondansetron Injectable 4 milliGRAM(s) IV Push every 6 hours PRN Nausea and/or Vomiting  oxyCODONE    IR 10 milliGRAM(s) Oral every 4 hours PRN Severe Pain (7 - 10)  oxyCODONE    IR 5 milliGRAM(s) Oral every 4 hours PRN Moderate Pain (4 - 6)  sodium chloride 0.9% lock flush 10 milliLiter(s) IV Push every 1 hour PRN Pre/post blood products, medications, blood draw, and to maintain line patency      Vital Signs Last 24 Hrs  T(C): 37.7 (22 Sep 2021 04:45), Max: 37.7 (22 Sep 2021 04:45)  T(F): 99.8 (22 Sep 2021 04:45), Max: 99.8 (22 Sep 2021 04:45)  HR: 100 (22 Sep 2021 03:45) (94 - 105)  BP: 108/65 (22 Sep 2021 03:45) (106/59 - 127/68)  BP(mean): 81 (22 Sep 2021 03:45) (78 - 90)  RR: 17 (22 Sep 2021 03:45) (17 - 18)  SpO2: 98% (22 Sep 2021 03:45) (96% - 98%)    Physical Exam:  General: NAD  Extremities: LLE ace wrap in place, flap soft, warm, color appropriate, stable venous and arterial doppler, thigh soft, drain serosanguinous 15cc    I&O's Summary    21 Sep 2021 07:01  -  22 Sep 2021 07:00  --------------------------------------------------------  IN: 4050 mL / OUT: 3715 mL / NET: 335 mL        LABS:                        7.1    8.45  )-----------( 400      ( 21 Sep 2021 07:56 )             21.4     09-21    138  |  104  |  8   ----------------------------<  102<H>  3.5   |  26  |  0.82    Ca    8.9      21 Sep 2021 07:56

## 2021-09-23 LAB
-  CEFAZOLIN: SIGNIFICANT CHANGE UP
-  CLINDAMYCIN: SIGNIFICANT CHANGE UP
-  ERYTHROMYCIN: SIGNIFICANT CHANGE UP
-  LINEZOLID: SIGNIFICANT CHANGE UP
-  OXACILLIN: SIGNIFICANT CHANGE UP
-  RIFAMPIN: SIGNIFICANT CHANGE UP
-  TRIMETHOPRIM/SULFAMETHOXAZOLE: SIGNIFICANT CHANGE UP
-  VANCOMYCIN: SIGNIFICANT CHANGE UP
ANION GAP SERPL CALC-SCNC: 10 MMOL/L — SIGNIFICANT CHANGE UP (ref 5–17)
BASOPHILS # BLD AUTO: 0.03 K/UL — SIGNIFICANT CHANGE UP (ref 0–0.2)
BASOPHILS NFR BLD AUTO: 0.4 % — SIGNIFICANT CHANGE UP (ref 0–2)
BUN SERPL-MCNC: 9 MG/DL — SIGNIFICANT CHANGE UP (ref 7–23)
CALCIUM SERPL-MCNC: 9.4 MG/DL — SIGNIFICANT CHANGE UP (ref 8.4–10.5)
CHLORIDE SERPL-SCNC: 105 MMOL/L — SIGNIFICANT CHANGE UP (ref 96–108)
CO2 SERPL-SCNC: 25 MMOL/L — SIGNIFICANT CHANGE UP (ref 22–31)
CREAT SERPL-MCNC: 0.71 MG/DL — SIGNIFICANT CHANGE UP (ref 0.5–1.3)
CRP SERPL-MCNC: 134.3 MG/L — HIGH (ref 0–4)
CRP SERPL-MCNC: 75.7 MG/L — HIGH (ref 0–4)
CULTURE RESULTS: SIGNIFICANT CHANGE UP
EOSINOPHIL # BLD AUTO: 0.12 K/UL — SIGNIFICANT CHANGE UP (ref 0–0.5)
EOSINOPHIL NFR BLD AUTO: 1.7 % — SIGNIFICANT CHANGE UP (ref 0–6)
ERYTHROCYTE [SEDIMENTATION RATE] IN BLOOD: 123 MM/HR — HIGH
ERYTHROCYTE [SEDIMENTATION RATE] IN BLOOD: 43 MM/HR — HIGH
GLUCOSE SERPL-MCNC: 83 MG/DL — SIGNIFICANT CHANGE UP (ref 70–99)
HCT VFR BLD CALC: 24.9 % — LOW (ref 34.5–45)
HGB BLD-MCNC: 8.5 G/DL — LOW (ref 11.5–15.5)
IMM GRANULOCYTES NFR BLD AUTO: 0.3 % — SIGNIFICANT CHANGE UP (ref 0–1.5)
LYMPHOCYTES # BLD AUTO: 0.98 K/UL — LOW (ref 1–3.3)
LYMPHOCYTES # BLD AUTO: 13.7 % — SIGNIFICANT CHANGE UP (ref 13–44)
MCHC RBC-ENTMCNC: 30.9 PG — SIGNIFICANT CHANGE UP (ref 27–34)
MCHC RBC-ENTMCNC: 34.1 GM/DL — SIGNIFICANT CHANGE UP (ref 32–36)
MCV RBC AUTO: 90.5 FL — SIGNIFICANT CHANGE UP (ref 80–100)
METHOD TYPE: SIGNIFICANT CHANGE UP
MONOCYTES # BLD AUTO: 0.58 K/UL — SIGNIFICANT CHANGE UP (ref 0–0.9)
MONOCYTES NFR BLD AUTO: 8.1 % — SIGNIFICANT CHANGE UP (ref 2–14)
NEUTROPHILS # BLD AUTO: 5.43 K/UL — SIGNIFICANT CHANGE UP (ref 1.8–7.4)
NEUTROPHILS NFR BLD AUTO: 75.8 % — SIGNIFICANT CHANGE UP (ref 43–77)
NRBC # BLD: 0 /100 WBCS — SIGNIFICANT CHANGE UP (ref 0–0)
ORGANISM # SPEC MICROSCOPIC CNT: SIGNIFICANT CHANGE UP
ORGANISM # SPEC MICROSCOPIC CNT: SIGNIFICANT CHANGE UP
PLATELET # BLD AUTO: 413 K/UL — HIGH (ref 150–400)
POTASSIUM SERPL-MCNC: 3.7 MMOL/L — SIGNIFICANT CHANGE UP (ref 3.5–5.3)
POTASSIUM SERPL-SCNC: 3.7 MMOL/L — SIGNIFICANT CHANGE UP (ref 3.5–5.3)
RBC # BLD: 2.75 M/UL — LOW (ref 3.8–5.2)
RBC # FLD: 14.2 % — SIGNIFICANT CHANGE UP (ref 10.3–14.5)
SODIUM SERPL-SCNC: 140 MMOL/L — SIGNIFICANT CHANGE UP (ref 135–145)
SPECIMEN SOURCE: SIGNIFICANT CHANGE UP
WBC # BLD: 7.16 K/UL — SIGNIFICANT CHANGE UP (ref 3.8–10.5)
WBC # FLD AUTO: 7.16 K/UL — SIGNIFICANT CHANGE UP (ref 3.8–10.5)

## 2021-09-23 PROCEDURE — 99232 SBSQ HOSP IP/OBS MODERATE 35: CPT

## 2021-09-23 RX ADMIN — NAFCILLIN 200 GRAM(S): 10 INJECTION, POWDER, FOR SOLUTION INTRAVENOUS at 15:21

## 2021-09-23 RX ADMIN — NAFCILLIN 200 GRAM(S): 10 INJECTION, POWDER, FOR SOLUTION INTRAVENOUS at 00:26

## 2021-09-23 RX ADMIN — Medication 975 MILLIGRAM(S): at 01:30

## 2021-09-23 RX ADMIN — ONDANSETRON 4 MILLIGRAM(S): 8 TABLET, FILM COATED ORAL at 10:41

## 2021-09-23 RX ADMIN — NAFCILLIN 200 GRAM(S): 10 INJECTION, POWDER, FOR SOLUTION INTRAVENOUS at 03:11

## 2021-09-23 RX ADMIN — OXYCODONE HYDROCHLORIDE 10 MILLIGRAM(S): 5 TABLET ORAL at 19:25

## 2021-09-23 RX ADMIN — CEFTRIAXONE 100 MILLIGRAM(S): 500 INJECTION, POWDER, FOR SOLUTION INTRAMUSCULAR; INTRAVENOUS at 23:19

## 2021-09-23 RX ADMIN — CEFTRIAXONE 100 MILLIGRAM(S): 500 INJECTION, POWDER, FOR SOLUTION INTRAMUSCULAR; INTRAVENOUS at 00:26

## 2021-09-23 RX ADMIN — NAFCILLIN 200 GRAM(S): 10 INJECTION, POWDER, FOR SOLUTION INTRAVENOUS at 05:58

## 2021-09-23 RX ADMIN — Medication 975 MILLIGRAM(S): at 05:46

## 2021-09-23 RX ADMIN — Medication 975 MILLIGRAM(S): at 10:41

## 2021-09-23 RX ADMIN — Medication 975 MILLIGRAM(S): at 22:00

## 2021-09-23 RX ADMIN — POLYETHYLENE GLYCOL 3350 17 GRAM(S): 17 POWDER, FOR SOLUTION ORAL at 18:09

## 2021-09-23 RX ADMIN — Medication 1 TABLET(S): at 12:56

## 2021-09-23 RX ADMIN — SENNA PLUS 2 TABLET(S): 8.6 TABLET ORAL at 21:38

## 2021-09-23 RX ADMIN — OXYCODONE HYDROCHLORIDE 10 MILLIGRAM(S): 5 TABLET ORAL at 06:47

## 2021-09-23 RX ADMIN — Medication 975 MILLIGRAM(S): at 17:54

## 2021-09-23 RX ADMIN — Medication 975 MILLIGRAM(S): at 16:54

## 2021-09-23 RX ADMIN — POLYETHYLENE GLYCOL 3350 17 GRAM(S): 17 POWDER, FOR SOLUTION ORAL at 05:58

## 2021-09-23 RX ADMIN — NAFCILLIN 200 GRAM(S): 10 INJECTION, POWDER, FOR SOLUTION INTRAVENOUS at 21:39

## 2021-09-23 RX ADMIN — Medication 975 MILLIGRAM(S): at 11:41

## 2021-09-23 RX ADMIN — Medication 975 MILLIGRAM(S): at 21:38

## 2021-09-23 RX ADMIN — NAFCILLIN 200 GRAM(S): 10 INJECTION, POWDER, FOR SOLUTION INTRAVENOUS at 18:09

## 2021-09-23 RX ADMIN — ENOXAPARIN SODIUM 40 MILLIGRAM(S): 100 INJECTION SUBCUTANEOUS at 12:55

## 2021-09-23 RX ADMIN — NAFCILLIN 200 GRAM(S): 10 INJECTION, POWDER, FOR SOLUTION INTRAVENOUS at 10:41

## 2021-09-23 RX ADMIN — Medication 1000 UNIT(S): at 12:56

## 2021-09-23 RX ADMIN — OXYCODONE HYDROCHLORIDE 10 MILLIGRAM(S): 5 TABLET ORAL at 19:55

## 2021-09-23 RX ADMIN — Medication 975 MILLIGRAM(S): at 03:10

## 2021-09-23 RX ADMIN — CHLORHEXIDINE GLUCONATE 1 APPLICATION(S): 213 SOLUTION TOPICAL at 05:46

## 2021-09-23 NOTE — PROGRESS NOTE ADULT - SUBJECTIVE AND OBJECTIVE BOX
SUBJECTIVE: pt examined at bedside. Straight cathed at 2am put out 600cc. Pt has not had BM since surgery 3 days ago. Cook doppler was cut. Pain well controlled and nausea improving. Denies CP, SOB, dizziness.      MEDICATIONS  (STANDING):  acetaminophen   Tablet .. 975 milliGRAM(s) Oral every 6 hours  calcium carbonate   1250 mG (OsCal) 1 Tablet(s) Oral daily  cefTRIAXone   IVPB 2000 milliGRAM(s) IV Intermittent every 24 hours  chlorhexidine 2% Cloths 1 Application(s) Topical <User Schedule>  cholecalciferol 1000 Unit(s) Oral daily  enoxaparin Injectable 40 milliGRAM(s) SubCutaneous daily  influenza   Vaccine 0.5 milliLiter(s) IntraMuscular once  multivitamin 1 Tablet(s) Oral daily  nafcillin  IVPB 2 Gram(s) IV Intermittent every 4 hours  polyethylene glycol 3350 17 Gram(s) Oral two times a day  senna 2 Tablet(s) Oral at bedtime    MEDICATIONS  (PRN):  bisacodyl Suppository 10 milliGRAM(s) Rectal daily PRN Constipation  HYDROmorphone  Injectable 0.5 milliGRAM(s) IV Push every 4 hours PRN Breakthrough pain  metoclopramide Injectable 10 milliGRAM(s) IV Push every 6 hours PRN Nausea and/or Vomiting  ondansetron Injectable 4 milliGRAM(s) IV Push every 6 hours PRN Nausea and/or Vomiting  oxyCODONE    IR 5 milliGRAM(s) Oral every 4 hours PRN Moderate Pain (4 - 6)  oxyCODONE    IR 10 milliGRAM(s) Oral every 4 hours PRN Severe Pain (7 - 10)  sodium chloride 0.9% lock flush 10 milliLiter(s) IV Push every 1 hour PRN Pre/post blood products, medications, blood draw, and to maintain line patency      Vital Signs Last 24 Hrs  T(C): 36.8 (23 Sep 2021 04:38), Max: 37.1 (22 Sep 2021 13:50)  T(F): 98.2 (23 Sep 2021 04:38), Max: 98.8 (22 Sep 2021 21:40)  HR: 92 (23 Sep 2021 05:42) (90 - 992)  BP: 107/67 (23 Sep 2021 05:42) (107/67 - 121/74)  BP(mean): 81 (23 Sep 2021 05:42) (81 - 90)  RR: 20 (23 Sep 2021 05:42) (18 - 20)  SpO2: 97% (23 Sep 2021 05:42) (96% - 99%)    Physical Exam:  General: NAD  Extremities: LLE ace wrap in place, flap soft, warm, color appropriate, stable venous and arterial doppler, thigh soft, drain serosanguinous 5cc    I&O's Summary    22 Sep 2021 07:01  -  23 Sep 2021 07:00  --------------------------------------------------------  IN: 700 mL / OUT: 2105 mL / NET: -1405 mL        LABS:                        7.1    8.45  )-----------( 400      ( 21 Sep 2021 07:56 )             21.4     09-21    138  |  104  |  8   ----------------------------<  102<H>  3.5   |  26  |  0.82    Ca    8.9      21 Sep 2021 07:56          CAPILLARY BLOOD GLUCOSE            RADIOLOGY & ADDITIONAL STUDIES:

## 2021-09-23 NOTE — PROGRESS NOTE ADULT - SUBJECTIVE AND OBJECTIVE BOX
INFECTIOUS DISEASES CONSULT FOLLOW-UP NOTE    INTERVAL HPI/OVERNIGHT EVENTS:  no event overnight  patient reports feeling better, still has n/v but improving  pain well controlled       ROS:   Constitutional, eyes, ENT, cardiovascular, respiratory, gastrointestinal, genitourinary, integumentary, neurological, psychiatric and heme/lymph are otherwise negative other than noted above       ANTIBIOTICS/RELEVANT:    MEDICATIONS  (STANDING):  acetaminophen   Tablet .. 975 milliGRAM(s) Oral every 6 hours  calcium carbonate   1250 mG (OsCal) 1 Tablet(s) Oral daily  cefTRIAXone   IVPB 2000 milliGRAM(s) IV Intermittent every 24 hours  chlorhexidine 2% Cloths 1 Application(s) Topical <User Schedule>  cholecalciferol 1000 Unit(s) Oral daily  enoxaparin Injectable 40 milliGRAM(s) SubCutaneous daily  influenza   Vaccine 0.5 milliLiter(s) IntraMuscular once  multivitamin 1 Tablet(s) Oral daily  nafcillin  IVPB 2 Gram(s) IV Intermittent every 4 hours  polyethylene glycol 3350 17 Gram(s) Oral two times a day  senna 2 Tablet(s) Oral at bedtime    MEDICATIONS  (PRN):  bisacodyl Suppository 10 milliGRAM(s) Rectal daily PRN Constipation  HYDROmorphone  Injectable 0.5 milliGRAM(s) IV Push every 4 hours PRN Breakthrough pain  metoclopramide Injectable 10 milliGRAM(s) IV Push every 6 hours PRN Nausea and/or Vomiting  ondansetron Injectable 4 milliGRAM(s) IV Push every 6 hours PRN Nausea and/or Vomiting  oxyCODONE    IR 5 milliGRAM(s) Oral every 4 hours PRN Moderate Pain (4 - 6)  oxyCODONE    IR 10 milliGRAM(s) Oral every 4 hours PRN Severe Pain (7 - 10)  sodium chloride 0.9% lock flush 10 milliLiter(s) IV Push every 1 hour PRN Pre/post blood products, medications, blood draw, and to maintain line patency        Vital Signs Last 24 Hrs  T(C): 36.9 (23 Sep 2021 09:31), Max: 37.1 (22 Sep 2021 21:40)  T(F): 98.4 (23 Sep 2021 09:31), Max: 98.8 (22 Sep 2021 21:40)  HR: 98 (23 Sep 2021 13:02) (88 - 992)  BP: 107/62 (23 Sep 2021 13:02) (107/62 - 121/74)  BP(mean): 80 (23 Sep 2021 13:02) (80 - 90)  RR: 20 (23 Sep 2021 13:02) (18 - 20)  SpO2: 96% (23 Sep 2021 13:02) (96% - 98%)    09-22-21 @ 07:01  -  09-23-21 @ 07:00  --------------------------------------------------------  IN: 700 mL / OUT: 2105 mL / NET: -1405 mL    09-23-21 @ 07:01  -  09-23-21 @ 14:32  --------------------------------------------------------  IN: 100 mL / OUT: 800 mL / NET: -700 mL      PHYSICAL EXAM:  Constitutional: alert, NAD  Eyes: the sclera and conjunctiva were normal.   ENT: the ears and nose were normal in appearance.   Neck: the appearance of the neck was normal and the neck was supple.   Pulmonary: no respiratory distress and lungs were clear to auscultation bilaterally.   Heart: heart rate was normal and rhythm regular, normal S1 and S2  Abdomen: normal bowel sounds, soft, non-tender  Ext: L leg covered with cast   Neurological: no focal deficits.   Psychiatric: the affect was normal        LABS:                MICROBIOLOGY:  9/20 OR #2 L ankle WCx: ngtd  9/20 OR #1 L ankle WCx: MSSA (R erythro)  9/20 OR #3 L ankle WCx: ngtd  9/14 OR culture ngtd x 3  9/12 OR culture ngtd x3  9/9 L lateral ankle WCx: MSSA (S to ox, cef), S. anginosus (S to PenG)        RADIOLOGY & ADDITIONAL STUDIES:  Reviewed

## 2021-09-23 NOTE — PROGRESS NOTE ADULT - SUBJECTIVE AND OBJECTIVE BOX
Ortho    Patient doing well post-op. Pain moderately improved. Seen with plastics team today.   Denies CP, SOB, N/V, numbness/tingling     Vital Signs Last 24 Hrs  T(C): 36.8 (23 Sep 2021 04:38), Max: 37.1 (22 Sep 2021 13:50)  T(F): 98.2 (23 Sep 2021 04:38), Max: 98.8 (22 Sep 2021 21:40)  HR: 92 (23 Sep 2021 05:42) (90 - 992)  BP: 107/67 (23 Sep 2021 05:42) (107/67 - 121/74)  BP(mean): 81 (23 Sep 2021 05:42) (81 - 90)  RR: 20 (23 Sep 2021 05:42) (18 - 20)  SpO2: 97% (23 Sep 2021 05:42) (96% - 99%)    General: Pt Alert and oriented, NAD  L thigh and L foot DSG C/D/I,  intact. Cook signal removed. Flap intact, good skin paddle  Pulses: toes wwp  Sensation: Absent sensation over the 1st and 2nd toes, decreased sensation over the 3rd-5th toes  Motor: 0/0 L  EHL, 3/5 FHL/GS    A/P: 26yFemale s/p Repeat L Ankle I&D, Vac Change by Dr. NEAL Wallis on 09-17, with plastic surgery for flap harvest and closure with plastic surgery (9/2)    - PICC placed, placed for IV abx x 6 weeks  - Dressing evaluation and drain management per plastics team   - F/u OR cx - staph aureus in 1 culture   - Pain Control  - Post op abx: Ceftriaxone + Nafcillin  - DVT ppx: Lovenox  - WBS: NWB LLE  - Plastics primary, ortho to peripherally follow

## 2021-09-23 NOTE — PROGRESS NOTE ADULT - ASSESSMENT
26F h/o L ankle fracture with septic arthritis, postoperative compartment syndrome s/p RICK, I&D, bone debridement and ligament repair on 9/9, I&D with tissue debridement to bone on 9/12, repeat I&D, arthrotomy of ankle, L ankle reconstruction with L gracilis free flap and L thigh STSG, microvascular ansastomosis with recipient AT on 9/20.  OR culture 9/9 grew MSSA and S. anginosus.  9/20 OR culture grew MSSA.  No change in abx regimen.     - cont nafcillin 2g IV q4h  - cont CTX 2g IV q24h  - f/u 9/20 OR cultures   - Place PICC line   - Duration of IV antibiotics is at least 6 weeks (9/20 - 11/1), then hardware placement, then likely another long course of abx  - Weekly labs: CMP, CBC, ESR, CRP faxed to ID office at [phone]  - Patient to follow up with Dr. Carmona in 2 weeks ([address] [place] [address], [phone]), ID office will call patient to schedule       Team 2 will follow you.  Case d/w primary team.    Patito Campbell MD, MS  Infectious Disease attending  work cell [phone]

## 2021-09-23 NOTE — PROGRESS NOTE ADULT - ASSESSMENT
26F s/p L ankle recon with L gracilis free flap and STSG (9/20).  - Reg diet  - pain/nausea  - PICC 9/21, ceftriaxone, nafcillin - ID following  - keep Left leg neutral  - flap check q4 hour  - monitor flap with pencil doppler, cook doppler removed  -  drain care  - bedrest  - Bladder scan if no void by 10AM  - Lovenox/SCD on right leg  - IS

## 2021-09-24 LAB
ANION GAP SERPL CALC-SCNC: 11 MMOL/L — SIGNIFICANT CHANGE UP (ref 5–17)
APPEARANCE UR: CLEAR — SIGNIFICANT CHANGE UP
BILIRUB UR-MCNC: NEGATIVE — SIGNIFICANT CHANGE UP
BUN SERPL-MCNC: 8 MG/DL — SIGNIFICANT CHANGE UP (ref 7–23)
CALCIUM SERPL-MCNC: 9.1 MG/DL — SIGNIFICANT CHANGE UP (ref 8.4–10.5)
CHLORIDE SERPL-SCNC: 103 MMOL/L — SIGNIFICANT CHANGE UP (ref 96–108)
CO2 SERPL-SCNC: 23 MMOL/L — SIGNIFICANT CHANGE UP (ref 22–31)
COLOR SPEC: YELLOW — SIGNIFICANT CHANGE UP
CREAT SERPL-MCNC: 0.7 MG/DL — SIGNIFICANT CHANGE UP (ref 0.5–1.3)
CRP SERPL-MCNC: 109.8 MG/L — HIGH (ref 0–4)
DIFF PNL FLD: NEGATIVE — SIGNIFICANT CHANGE UP
ERYTHROCYTE [SEDIMENTATION RATE] IN BLOOD: 115 MM/HR — HIGH
GLUCOSE SERPL-MCNC: 89 MG/DL — SIGNIFICANT CHANGE UP (ref 70–99)
GLUCOSE UR QL: NEGATIVE — SIGNIFICANT CHANGE UP
HCT VFR BLD CALC: 28 % — LOW (ref 34.5–45)
HGB BLD-MCNC: 8.9 G/DL — LOW (ref 11.5–15.5)
KETONES UR-MCNC: 40 MG/DL
LEUKOCYTE ESTERASE UR-ACNC: NEGATIVE — SIGNIFICANT CHANGE UP
MCHC RBC-ENTMCNC: 29.2 PG — SIGNIFICANT CHANGE UP (ref 27–34)
MCHC RBC-ENTMCNC: 31.8 GM/DL — LOW (ref 32–36)
MCV RBC AUTO: 91.8 FL — SIGNIFICANT CHANGE UP (ref 80–100)
NITRITE UR-MCNC: NEGATIVE — SIGNIFICANT CHANGE UP
NRBC # BLD: 0 /100 WBCS — SIGNIFICANT CHANGE UP (ref 0–0)
PH UR: 7.5 — SIGNIFICANT CHANGE UP (ref 5–8)
PLATELET # BLD AUTO: 412 K/UL — HIGH (ref 150–400)
POTASSIUM SERPL-MCNC: 3.6 MMOL/L — SIGNIFICANT CHANGE UP (ref 3.5–5.3)
POTASSIUM SERPL-SCNC: 3.6 MMOL/L — SIGNIFICANT CHANGE UP (ref 3.5–5.3)
PROT UR-MCNC: NEGATIVE MG/DL — SIGNIFICANT CHANGE UP
RBC # BLD: 3.05 M/UL — LOW (ref 3.8–5.2)
RBC # FLD: 14.5 % — SIGNIFICANT CHANGE UP (ref 10.3–14.5)
SODIUM SERPL-SCNC: 137 MMOL/L — SIGNIFICANT CHANGE UP (ref 135–145)
SP GR SPEC: 1.01 — SIGNIFICANT CHANGE UP (ref 1–1.03)
UROBILINOGEN FLD QL: 0.2 E.U./DL — SIGNIFICANT CHANGE UP
WBC # BLD: 6.2 K/UL — SIGNIFICANT CHANGE UP (ref 3.8–10.5)
WBC # FLD AUTO: 6.2 K/UL — SIGNIFICANT CHANGE UP (ref 3.8–10.5)

## 2021-09-24 PROCEDURE — 99232 SBSQ HOSP IP/OBS MODERATE 35: CPT

## 2021-09-24 RX ORDER — BACITRACIN ZINC 500 UNIT/G
1 OINTMENT IN PACKET (EA) TOPICAL DAILY
Refills: 0 | Status: DISCONTINUED | OUTPATIENT
Start: 2021-09-24 | End: 2021-09-24

## 2021-09-24 RX ORDER — BACITRACIN ZINC 500 UNIT/G
1 OINTMENT IN PACKET (EA) TOPICAL DAILY
Refills: 0 | Status: DISCONTINUED | OUTPATIENT
Start: 2021-09-24 | End: 2021-10-06

## 2021-09-24 RX ORDER — NITROGLYCERIN 6.5 MG
5 CAPSULE, EXTENDED RELEASE ORAL
Refills: 0 | Status: DISCONTINUED | OUTPATIENT
Start: 2021-09-24 | End: 2021-09-24

## 2021-09-24 RX ORDER — GABAPENTIN 400 MG/1
300 CAPSULE ORAL EVERY 8 HOURS
Refills: 0 | Status: DISCONTINUED | OUTPATIENT
Start: 2021-09-24 | End: 2021-10-07

## 2021-09-24 RX ORDER — NITROGLYCERIN 6.5 MG
1 CAPSULE, EXTENDED RELEASE ORAL
Refills: 0 | Status: COMPLETED | OUTPATIENT
Start: 2021-09-24 | End: 2021-09-26

## 2021-09-24 RX ADMIN — HYDROMORPHONE HYDROCHLORIDE 0.5 MILLIGRAM(S): 2 INJECTION INTRAMUSCULAR; INTRAVENOUS; SUBCUTANEOUS at 08:56

## 2021-09-24 RX ADMIN — Medication 1 APPLICATION(S): at 08:56

## 2021-09-24 RX ADMIN — Medication 1 TABLET(S): at 12:40

## 2021-09-24 RX ADMIN — Medication 1 INCH(S): at 17:50

## 2021-09-24 RX ADMIN — Medication 975 MILLIGRAM(S): at 11:12

## 2021-09-24 RX ADMIN — OXYCODONE HYDROCHLORIDE 10 MILLIGRAM(S): 5 TABLET ORAL at 21:58

## 2021-09-24 RX ADMIN — Medication 975 MILLIGRAM(S): at 21:57

## 2021-09-24 RX ADMIN — OXYCODONE HYDROCHLORIDE 10 MILLIGRAM(S): 5 TABLET ORAL at 22:15

## 2021-09-24 RX ADMIN — HYDROMORPHONE HYDROCHLORIDE 0.5 MILLIGRAM(S): 2 INJECTION INTRAMUSCULAR; INTRAVENOUS; SUBCUTANEOUS at 12:39

## 2021-09-24 RX ADMIN — OXYCODONE HYDROCHLORIDE 10 MILLIGRAM(S): 5 TABLET ORAL at 10:12

## 2021-09-24 RX ADMIN — ENOXAPARIN SODIUM 40 MILLIGRAM(S): 100 INJECTION SUBCUTANEOUS at 12:38

## 2021-09-24 RX ADMIN — NAFCILLIN 200 GRAM(S): 10 INJECTION, POWDER, FOR SOLUTION INTRAVENOUS at 02:50

## 2021-09-24 RX ADMIN — OXYCODONE HYDROCHLORIDE 10 MILLIGRAM(S): 5 TABLET ORAL at 02:13

## 2021-09-24 RX ADMIN — Medication 1 TABLET(S): at 14:05

## 2021-09-24 RX ADMIN — Medication 1 INCH(S): at 14:05

## 2021-09-24 RX ADMIN — Medication 975 MILLIGRAM(S): at 16:36

## 2021-09-24 RX ADMIN — POLYETHYLENE GLYCOL 3350 17 GRAM(S): 17 POWDER, FOR SOLUTION ORAL at 06:15

## 2021-09-24 RX ADMIN — OXYCODONE HYDROCHLORIDE 10 MILLIGRAM(S): 5 TABLET ORAL at 03:00

## 2021-09-24 RX ADMIN — NAFCILLIN 200 GRAM(S): 10 INJECTION, POWDER, FOR SOLUTION INTRAVENOUS at 06:15

## 2021-09-24 RX ADMIN — ONDANSETRON 4 MILLIGRAM(S): 8 TABLET, FILM COATED ORAL at 15:28

## 2021-09-24 RX ADMIN — Medication 975 MILLIGRAM(S): at 22:30

## 2021-09-24 RX ADMIN — Medication 975 MILLIGRAM(S): at 04:24

## 2021-09-24 RX ADMIN — NAFCILLIN 200 GRAM(S): 10 INJECTION, POWDER, FOR SOLUTION INTRAVENOUS at 10:44

## 2021-09-24 RX ADMIN — CHLORHEXIDINE GLUCONATE 1 APPLICATION(S): 213 SOLUTION TOPICAL at 06:17

## 2021-09-24 RX ADMIN — NAFCILLIN 200 GRAM(S): 10 INJECTION, POWDER, FOR SOLUTION INTRAVENOUS at 17:49

## 2021-09-24 RX ADMIN — Medication 1000 UNIT(S): at 12:39

## 2021-09-24 RX ADMIN — GABAPENTIN 300 MILLIGRAM(S): 400 CAPSULE ORAL at 21:58

## 2021-09-24 RX ADMIN — Medication 975 MILLIGRAM(S): at 17:07

## 2021-09-24 RX ADMIN — OXYCODONE HYDROCHLORIDE 10 MILLIGRAM(S): 5 TABLET ORAL at 14:13

## 2021-09-24 RX ADMIN — Medication 1 INCH(S): at 22:00

## 2021-09-24 RX ADMIN — HYDROMORPHONE HYDROCHLORIDE 0.5 MILLIGRAM(S): 2 INJECTION INTRAMUSCULAR; INTRAVENOUS; SUBCUTANEOUS at 13:27

## 2021-09-24 RX ADMIN — Medication 975 MILLIGRAM(S): at 10:44

## 2021-09-24 RX ADMIN — OXYCODONE HYDROCHLORIDE 10 MILLIGRAM(S): 5 TABLET ORAL at 15:19

## 2021-09-24 RX ADMIN — Medication 5 INCH(S): at 10:45

## 2021-09-24 RX ADMIN — NAFCILLIN 200 GRAM(S): 10 INJECTION, POWDER, FOR SOLUTION INTRAVENOUS at 14:04

## 2021-09-24 RX ADMIN — SENNA PLUS 2 TABLET(S): 8.6 TABLET ORAL at 21:58

## 2021-09-24 RX ADMIN — Medication 975 MILLIGRAM(S): at 05:00

## 2021-09-24 RX ADMIN — NAFCILLIN 200 GRAM(S): 10 INJECTION, POWDER, FOR SOLUTION INTRAVENOUS at 21:58

## 2021-09-24 RX ADMIN — HYDROMORPHONE HYDROCHLORIDE 0.5 MILLIGRAM(S): 2 INJECTION INTRAMUSCULAR; INTRAVENOUS; SUBCUTANEOUS at 06:32

## 2021-09-24 RX ADMIN — OXYCODONE HYDROCHLORIDE 10 MILLIGRAM(S): 5 TABLET ORAL at 09:32

## 2021-09-24 RX ADMIN — CEFTRIAXONE 100 MILLIGRAM(S): 500 INJECTION, POWDER, FOR SOLUTION INTRAMUSCULAR; INTRAVENOUS at 23:17

## 2021-09-24 NOTE — PROGRESS NOTE ADULT - ASSESSMENT
26F h/o L ankle fracture with septic arthritis, postoperative compartment syndrome s/p RICK, I&D, bone debridement and ligament repair on 9/9, I&D with tissue debridement to bone on 9/12, repeat I&D, arthrotomy of ankle, L ankle reconstruction with L gracilis free flap and L thigh STSG, microvascular ansastomosis with recipient AT on 9/20.  OR culture 9/9 grew MSSA and S. anginosus.  9/20 OR culture grew MSSA.  No change in abx regimen.     - cont nafcillin 2g IV q4h (upon discharge, then convert to 12g/day continuous infusion q24h)  - cont CTX 2g IV q24h  - f/u 9/20 OR cultures   - Place PICC line   - Duration of IV antibiotics is at least 6 weeks (9/20 - 11/1), then hardware placement, then likely another long course of abx  - Weekly labs: CMP, CBC, ESR, CRP faxed to ID office at [phone]  - Patient to follow up with Dr. Carmona in 2 weeks ([address] [place] [address], [phone]), ID office will call patient to schedule       Thank you for your consult.  Please re-consult us or call us with questions.  Case d/w primary team.    Patito Campbell MD, MS  Infectious Disease attending  work cell [phone]  For any questions during evening/weekend/holiday, please page ID on call

## 2021-09-24 NOTE — PROGRESS NOTE ADULT - ASSESSMENT
26F s/p L ankle recon with L gracilis free flap and STSG (9/20).  - keep Left leg elevated  - flap check q4 hour  - monitor flap with pencil doppler  - Nitro-BID to flap area Q4 hours  - Bacitracin over xeroform on flap daily  - Reg diet  - pain/nausea  - PICC 9/21, ceftriaxone, nafcillin - ID following  -  drain care  - bedrest  - st  - Lovenox/SCD on right leg  - IS

## 2021-09-24 NOTE — PROGRESS NOTE ADULT - SUBJECTIVE AND OBJECTIVE BOX
INFECTIOUS DISEASES CONSULT FOLLOW-UP NOTE    INTERVAL HPI/OVERNIGHT EVENTS:  no event overnight  patient reports pain at L foot  denied n/v/d abdominal pain     ROS:   Constitutional, eyes, ENT, cardiovascular, respiratory, gastrointestinal, genitourinary, integumentary, neurological, psychiatric and heme/lymph are otherwise negative other than noted above       ANTIBIOTICS/RELEVANT:    MEDICATIONS  (STANDING):  acetaminophen   Tablet .. 975 milliGRAM(s) Oral every 6 hours  BACItracin   Ointment 1 Application(s) Topical daily  calcium carbonate   1250 mG (OsCal) 1 Tablet(s) Oral daily  cefTRIAXone   IVPB 2000 milliGRAM(s) IV Intermittent every 24 hours  chlorhexidine 2% Cloths 1 Application(s) Topical <User Schedule>  cholecalciferol 1000 Unit(s) Oral daily  enoxaparin Injectable 40 milliGRAM(s) SubCutaneous daily  gabapentin 300 milliGRAM(s) Oral every 8 hours  influenza   Vaccine 0.5 milliLiter(s) IntraMuscular once  multivitamin 1 Tablet(s) Oral daily  nafcillin  IVPB 2 Gram(s) IV Intermittent every 4 hours  nitroglycerin    2% Ointment 1 Inch(s) Transdermal <User Schedule>  polyethylene glycol 3350 17 Gram(s) Oral two times a day  senna 2 Tablet(s) Oral at bedtime    MEDICATIONS  (PRN):  bisacodyl Suppository 10 milliGRAM(s) Rectal daily PRN Constipation  HYDROmorphone  Injectable 0.5 milliGRAM(s) IV Push every 4 hours PRN Breakthrough pain  metoclopramide Injectable 10 milliGRAM(s) IV Push every 6 hours PRN Nausea and/or Vomiting  ondansetron Injectable 4 milliGRAM(s) IV Push every 6 hours PRN Nausea and/or Vomiting  oxyCODONE    IR 5 milliGRAM(s) Oral every 4 hours PRN Moderate Pain (4 - 6)  oxyCODONE    IR 10 milliGRAM(s) Oral every 4 hours PRN Severe Pain (7 - 10)  sodium chloride 0.9% lock flush 10 milliLiter(s) IV Push every 1 hour PRN Pre/post blood products, medications, blood draw, and to maintain line patency        Vital Signs Last 24 Hrs  T(C): 36.9 (24 Sep 2021 13:33), Max: 37.2 (24 Sep 2021 09:05)  T(F): 98.4 (24 Sep 2021 13:33), Max: 98.9 (24 Sep 2021 09:05)  HR: 96 (24 Sep 2021 15:26) (86 - 96)  BP: 124/76 (24 Sep 2021 15:26) (101/57 - 125/70)  BP(mean): 95 (24 Sep 2021 15:26) (73 - 95)  RR: 18 (24 Sep 2021 15:26) (18 - 18)  SpO2: 98% (24 Sep 2021 15:26) (96% - 98%)    21 @ 07:01  -  21 @ 07:00  --------------------------------------------------------  IN: 940 mL / OUT: 2637.5 mL / NET: -1697.5 mL    21 @ 07:01  -  21 @ 17:18  --------------------------------------------------------  IN: 200 mL / OUT: 507 mL / NET: -307 mL      PHYSICAL EXAM:  Constitutional: alert, NAD  Eyes: the sclera and conjunctiva were normal.   ENT: the ears and nose were normal in appearance.   Neck: the appearance of the neck was normal and the neck was supple.   Pulmonary: no respiratory distress and lungs were clear to auscultation bilaterally.   Heart: heart rate was normal and rhythm regular, normal S1 and S2  Abdomen: normal bowel sounds, soft, non-tender  Ext: L foot with flap, L thigh with surgical wound c/d/i   Neurological: no focal deficits.   Psychiatric: the affect was normal        LABS:                        8.9    6.20  )-----------( 412      ( 24 Sep 2021 10:36 )             28.0         137  |  103  |  8   ----------------------------<  89  3.6   |  23  |  0.70    Ca    9.1      24 Sep 2021 10:36        Urinalysis Basic - ( 24 Sep 2021 12:46 )    Color: Yellow / Appearance: Clear / S.015 / pH: x  Gluc: x / Ketone: 40 mg/dL  / Bili: Negative / Urobili: 0.2 E.U./dL   Blood: x / Protein: NEGATIVE mg/dL / Nitrite: NEGATIVE   Leuk Esterase: NEGATIVE / RBC: x / WBC x   Sq Epi: x / Non Sq Epi: x / Bacteria: x        MICROBIOLOGY:   OR #2 L ankle WCx: ngtd   OR #1 L ankle WCx: MSSA (R erythro)   OR #3 L ankle WCx: ngtd   OR culture ngtd x 3   OR culture ngtd x3   L lateral ankle WCx: MSSA (S to ox, cef), S. anginosus (S to PenG)        RADIOLOGY & ADDITIONAL STUDIES:  Reviewed

## 2021-09-24 NOTE — PROGRESS NOTE ADULT - SUBJECTIVE AND OBJECTIVE BOX
Ortho    Patient doing well post-op. Pain moderately improved. Seen with plastics team today.   Denies CP, SOB, N/V, numbness/tingling     Vital Signs Last 24 Hrs  T(C): 36.4 (24 Sep 2021 04:44), Max: 37 (23 Sep 2021 22:06)  T(F): 97.6 (24 Sep 2021 04:44), Max: 98.6 (23 Sep 2021 22:06)  HR: 96 (24 Sep 2021 04:20) (86 - 98)  BP: 108/62 (24 Sep 2021 04:20) (101/57 - 110/67)  BP(mean): 79 (24 Sep 2021 04:20) (73 - 85)  RR: 18 (24 Sep 2021 04:20) (18 - 20)  SpO2: 96% (24 Sep 2021 04:20) (96% - 97%)    General: Pt Alert and oriented, NAD  L thigh and L foot DSG C/D/I,  intact. Flap intact, good skin paddle, slight venous congestion but bleeding on pin prick.   Pulses: toes wwp  Sensation: Absent sensation over the 1st and 2nd toes, decreased sensation over the 3rd-5th toes  Motor: 0/0 L  EHL, 3/5 FHL/GS    A/P: 26yFemale s/p Repeat L Ankle I&D, Vac Change by Dr. NEAL Wallis on 09-17, with plastic surgery for flap harvest and closure with plastic surgery (9/2)    - PICC placed, placed for IV abx x 6 weeks   - Continue elevation, defer dangle protocol per plastics   - Dressing evaluation and drain management per plastics team   - F/u OR cx - staph aureus in 1 culture - continue abx as planned per ID.   - Pain Control  - Post op abx: Ceftriaxone + Nafcillin  - DVT ppx: Lovenox  - WBS: NWB LLE  - Plastics primary, ortho to peripherally follow

## 2021-09-24 NOTE — PROGRESS NOTE ADULT - SUBJECTIVE AND OBJECTIVE BOX
SUBJECTIVE: examined at bedside this morning. left leg dressing taken down to remove penrose drain. Gracillis flap appeared slightly dark but improved with leg elevation and nitro paste. New doppler location found and marked. Reviewed with nurse. left thigh donor skin graft site healing appropriately.      MEDICATIONS  (STANDING):  acetaminophen   Tablet .. 975 milliGRAM(s) Oral every 6 hours  BACItracin   Ointment 1 Application(s) Topical daily  calcium carbonate   1250 mG (OsCal) 1 Tablet(s) Oral daily  cefTRIAXone   IVPB 2000 milliGRAM(s) IV Intermittent every 24 hours  chlorhexidine 2% Cloths 1 Application(s) Topical <User Schedule>  cholecalciferol 1000 Unit(s) Oral daily  enoxaparin Injectable 40 milliGRAM(s) SubCutaneous daily  influenza   Vaccine 0.5 milliLiter(s) IntraMuscular once  multivitamin 1 Tablet(s) Oral daily  nafcillin  IVPB 2 Gram(s) IV Intermittent every 4 hours  nitroglycerin    2% Ointment 5 Inch(s) Transdermal <User Schedule>  polyethylene glycol 3350 17 Gram(s) Oral two times a day  senna 2 Tablet(s) Oral at bedtime    MEDICATIONS  (PRN):  bisacodyl Suppository 10 milliGRAM(s) Rectal daily PRN Constipation  HYDROmorphone  Injectable 0.5 milliGRAM(s) IV Push every 4 hours PRN Breakthrough pain  metoclopramide Injectable 10 milliGRAM(s) IV Push every 6 hours PRN Nausea and/or Vomiting  ondansetron Injectable 4 milliGRAM(s) IV Push every 6 hours PRN Nausea and/or Vomiting  oxyCODONE    IR 5 milliGRAM(s) Oral every 4 hours PRN Moderate Pain (4 - 6)  oxyCODONE    IR 10 milliGRAM(s) Oral every 4 hours PRN Severe Pain (7 - 10)  sodium chloride 0.9% lock flush 10 milliLiter(s) IV Push every 1 hour PRN Pre/post blood products, medications, blood draw, and to maintain line patency      Vital Signs Last 24 Hrs  T(C): 37.2 (24 Sep 2021 09:05), Max: 37.2 (24 Sep 2021 09:05)  T(F): 98.9 (24 Sep 2021 09:05), Max: 98.9 (24 Sep 2021 09:05)  HR: 96 (24 Sep 2021 09:05) (86 - 98)  BP: 118/69 (24 Sep 2021 09:05) (101/57 - 118/69)  BP(mean): 85 (24 Sep 2021 09:05) (73 - 85)  RR: 18 (24 Sep 2021 09:05) (18 - 20)  SpO2: 98% (24 Sep 2021 09:05) (96% - 98%)    Physical Exam:  General: NAD  Extremities: flap soft, warm, blistering on distal aspect of flap, appears darker but improved with leg elevation and nitro paste, cap refill 2-3s, appropriate bleeding to needle scratch, new arterial doppler site found and marked, thigh soft, donor site healing appropriately, drain serosanguinous 12.5cc  I&O's Summary    23 Sep 2021 07:01  -  24 Sep 2021 07:00  --------------------------------------------------------  IN: 940 mL / OUT: 2637.5 mL / NET: -1697.5 mL        LABS:                        8.5    7.16  )-----------( 413      ( 23 Sep 2021 15:20 )             24.9     09-23    140  |  105  |  9   ----------------------------<  83  3.7   |  25  |  0.71    Ca    9.4      23 Sep 2021 15:20          CAPILLARY BLOOD GLUCOSE            RADIOLOGY & ADDITIONAL STUDIES:

## 2021-09-25 LAB
ANION GAP SERPL CALC-SCNC: 9 MMOL/L — SIGNIFICANT CHANGE UP (ref 5–17)
BUN SERPL-MCNC: 8 MG/DL — SIGNIFICANT CHANGE UP (ref 7–23)
CALCIUM SERPL-MCNC: 9.3 MG/DL — SIGNIFICANT CHANGE UP (ref 8.4–10.5)
CHLORIDE SERPL-SCNC: 101 MMOL/L — SIGNIFICANT CHANGE UP (ref 96–108)
CO2 SERPL-SCNC: 27 MMOL/L — SIGNIFICANT CHANGE UP (ref 22–31)
CREAT SERPL-MCNC: 0.68 MG/DL — SIGNIFICANT CHANGE UP (ref 0.5–1.3)
CRP SERPL-MCNC: 107.6 MG/L — HIGH (ref 0–4)
CULTURE RESULTS: NO GROWTH — SIGNIFICANT CHANGE UP
ERYTHROCYTE [SEDIMENTATION RATE] IN BLOOD: 106 MM/HR — HIGH
GLUCOSE SERPL-MCNC: 85 MG/DL — SIGNIFICANT CHANGE UP (ref 70–99)
HCT VFR BLD CALC: 26.4 % — LOW (ref 34.5–45)
HGB BLD-MCNC: 8.6 G/DL — LOW (ref 11.5–15.5)
MCHC RBC-ENTMCNC: 29.8 PG — SIGNIFICANT CHANGE UP (ref 27–34)
MCHC RBC-ENTMCNC: 32.6 GM/DL — SIGNIFICANT CHANGE UP (ref 32–36)
MCV RBC AUTO: 91.3 FL — SIGNIFICANT CHANGE UP (ref 80–100)
NRBC # BLD: 0 /100 WBCS — SIGNIFICANT CHANGE UP (ref 0–0)
PLATELET # BLD AUTO: 410 K/UL — HIGH (ref 150–400)
POTASSIUM SERPL-MCNC: 3.6 MMOL/L — SIGNIFICANT CHANGE UP (ref 3.5–5.3)
POTASSIUM SERPL-SCNC: 3.6 MMOL/L — SIGNIFICANT CHANGE UP (ref 3.5–5.3)
RBC # BLD: 2.89 M/UL — LOW (ref 3.8–5.2)
RBC # FLD: 14.6 % — HIGH (ref 10.3–14.5)
SODIUM SERPL-SCNC: 137 MMOL/L — SIGNIFICANT CHANGE UP (ref 135–145)
SPECIMEN SOURCE: SIGNIFICANT CHANGE UP
WBC # BLD: 6.6 K/UL — SIGNIFICANT CHANGE UP (ref 3.8–10.5)
WBC # FLD AUTO: 6.6 K/UL — SIGNIFICANT CHANGE UP (ref 3.8–10.5)

## 2021-09-25 RX ADMIN — NAFCILLIN 200 GRAM(S): 10 INJECTION, POWDER, FOR SOLUTION INTRAVENOUS at 18:01

## 2021-09-25 RX ADMIN — Medication 1 TABLET(S): at 11:04

## 2021-09-25 RX ADMIN — Medication 1 INCH(S): at 18:01

## 2021-09-25 RX ADMIN — CHLORHEXIDINE GLUCONATE 1 APPLICATION(S): 213 SOLUTION TOPICAL at 05:45

## 2021-09-25 RX ADMIN — Medication 975 MILLIGRAM(S): at 18:40

## 2021-09-25 RX ADMIN — Medication 975 MILLIGRAM(S): at 11:44

## 2021-09-25 RX ADMIN — GABAPENTIN 300 MILLIGRAM(S): 400 CAPSULE ORAL at 21:45

## 2021-09-25 RX ADMIN — Medication 1 INCH(S): at 01:30

## 2021-09-25 RX ADMIN — Medication 1 INCH(S): at 12:03

## 2021-09-25 RX ADMIN — NAFCILLIN 200 GRAM(S): 10 INJECTION, POWDER, FOR SOLUTION INTRAVENOUS at 10:49

## 2021-09-25 RX ADMIN — OXYCODONE HYDROCHLORIDE 10 MILLIGRAM(S): 5 TABLET ORAL at 18:01

## 2021-09-25 RX ADMIN — Medication 1 INCH(S): at 14:04

## 2021-09-25 RX ADMIN — OXYCODONE HYDROCHLORIDE 10 MILLIGRAM(S): 5 TABLET ORAL at 18:35

## 2021-09-25 RX ADMIN — GABAPENTIN 300 MILLIGRAM(S): 400 CAPSULE ORAL at 06:41

## 2021-09-25 RX ADMIN — HYDROMORPHONE HYDROCHLORIDE 0.5 MILLIGRAM(S): 2 INJECTION INTRAMUSCULAR; INTRAVENOUS; SUBCUTANEOUS at 00:55

## 2021-09-25 RX ADMIN — HYDROMORPHONE HYDROCHLORIDE 0.5 MILLIGRAM(S): 2 INJECTION INTRAMUSCULAR; INTRAVENOUS; SUBCUTANEOUS at 01:25

## 2021-09-25 RX ADMIN — NAFCILLIN 200 GRAM(S): 10 INJECTION, POWDER, FOR SOLUTION INTRAVENOUS at 06:42

## 2021-09-25 RX ADMIN — SENNA PLUS 2 TABLET(S): 8.6 TABLET ORAL at 21:45

## 2021-09-25 RX ADMIN — Medication 1 INCH(S): at 11:00

## 2021-09-25 RX ADMIN — Medication 1 INCH(S): at 15:59

## 2021-09-25 RX ADMIN — ENOXAPARIN SODIUM 40 MILLIGRAM(S): 100 INJECTION SUBCUTANEOUS at 11:03

## 2021-09-25 RX ADMIN — Medication 1 INCH(S): at 02:41

## 2021-09-25 RX ADMIN — Medication 1 APPLICATION(S): at 11:03

## 2021-09-25 RX ADMIN — Medication 975 MILLIGRAM(S): at 11:04

## 2021-09-25 RX ADMIN — Medication 1 INCH(S): at 21:46

## 2021-09-25 RX ADMIN — OXYCODONE HYDROCHLORIDE 10 MILLIGRAM(S): 5 TABLET ORAL at 21:46

## 2021-09-25 RX ADMIN — Medication 1 INCH(S): at 05:30

## 2021-09-25 RX ADMIN — OXYCODONE HYDROCHLORIDE 10 MILLIGRAM(S): 5 TABLET ORAL at 07:15

## 2021-09-25 RX ADMIN — NAFCILLIN 200 GRAM(S): 10 INJECTION, POWDER, FOR SOLUTION INTRAVENOUS at 22:43

## 2021-09-25 RX ADMIN — HYDROMORPHONE HYDROCHLORIDE 0.5 MILLIGRAM(S): 2 INJECTION INTRAMUSCULAR; INTRAVENOUS; SUBCUTANEOUS at 23:44

## 2021-09-25 RX ADMIN — Medication 1000 UNIT(S): at 11:04

## 2021-09-25 RX ADMIN — Medication 975 MILLIGRAM(S): at 18:01

## 2021-09-25 RX ADMIN — CEFTRIAXONE 100 MILLIGRAM(S): 500 INJECTION, POWDER, FOR SOLUTION INTRAMUSCULAR; INTRAVENOUS at 21:45

## 2021-09-25 RX ADMIN — Medication 1 INCH(S): at 20:30

## 2021-09-25 RX ADMIN — Medication 1 INCH(S): at 06:42

## 2021-09-25 RX ADMIN — OXYCODONE HYDROCHLORIDE 10 MILLIGRAM(S): 5 TABLET ORAL at 06:42

## 2021-09-25 RX ADMIN — NAFCILLIN 200 GRAM(S): 10 INJECTION, POWDER, FOR SOLUTION INTRAVENOUS at 02:40

## 2021-09-25 RX ADMIN — GABAPENTIN 300 MILLIGRAM(S): 400 CAPSULE ORAL at 14:03

## 2021-09-25 RX ADMIN — NAFCILLIN 200 GRAM(S): 10 INJECTION, POWDER, FOR SOLUTION INTRAVENOUS at 14:03

## 2021-09-25 RX ADMIN — OXYCODONE HYDROCHLORIDE 10 MILLIGRAM(S): 5 TABLET ORAL at 22:15

## 2021-09-25 NOTE — PROGRESS NOTE ADULT - SUBJECTIVE AND OBJECTIVE BOX
Ortho    Patient doing well post-op. Pain has been stable. UA negative. CRP downtrending.  Denies CP, SOB, N/V, numbness/tingling     Vital Signs Last 24 Hrs  T(C): 37.2 (25 Sep 2021 04:51), Max: 37.2 (24 Sep 2021 09:05)  T(F): 98.9 (25 Sep 2021 04:51), Max: 98.9 (24 Sep 2021 09:05)  HR: 96 (25 Sep 2021 04:05) (90 - 96)  BP: 112/77 (25 Sep 2021 04:05) (106/70 - 125/70)  BP(mean): 90 (25 Sep 2021 04:05) (83 - 95)  RR: 18 (25 Sep 2021 04:05) (18 - 20)  SpO2: 96% (25 Sep 2021 04:05) (96% - 98%)    General: Pt Alert and oriented, NAD  L thigh and L foot DSG C/D/I,  intact.  Pulses: toes wwp  Sensation: Absent sensation over the 1st and 2nd toes, decreased sensation over the 3rd-5th toes  Motor: 0/0 L  EHL, 3/5 FHL/GS    A/P: 26yFemale s/p Repeat L Ankle I&D, Vac Change by Dr. NEAL Wallis on 09-17, with plastic surgery for flap harvest and closure with plastic surgery (9/2)    - PICC placed, placed for IV abx x 6 weeks   - Continue elevation, defer dangle protocol per plastics   - Dressing evaluation and drain management per plastics team   - OR cx - staph aureus in 1 culture - continue abx as planned per ID.   - Pain Control  - Post op abx: Ceftriaxone + Nafcillin  - DVT ppx: Lovenox  - WBS: NWB LLE  - Plastics primary, ortho to peripherally follow

## 2021-09-25 NOTE — PROGRESS NOTE ADULT - SUBJECTIVE AND OBJECTIVE BOX
SUBJECTIVE: Seen and examined at bedside.  Improved pain at lateral malleolus compared to during the day yesteday    ICU Vital Signs Last 24 Hrs  T(C): 37.2 (25 Sep 2021 04:51), Max: 37.2 (25 Sep 2021 04:51)  T(F): 98.9 (25 Sep 2021 04:51), Max: 98.9 (25 Sep 2021 04:51)  HR: 112 (25 Sep 2021 08:48) (90 - 112)  BP: 115/69 (25 Sep 2021 08:48) (106/70 - 125/70)  BP(mean): 86 (25 Sep 2021 08:48) (83 - 95)  ABP: --  ABP(mean): --  RR: 18 (25 Sep 2021 08:48) (18 - 20)  SpO2: 95% (25 Sep 2021 08:48) (95% - 98%)    Physical Exam:  General: NAD  Extremities: flap soft, warm, some distal tip necrosis with blistering.  Posterior aspect of flap along suture line is ecchymotic.  Distal aspect of flap with more erythematous appearance likely due to injury from prior congestion.  Arterial signal intact.  STSG with 100% take.  Flap with more swelling compared to yesterday morning.    STSG donor site dry. Flap donor site soft, appropriate TTP. Drain serous

## 2021-09-25 NOTE — PROGRESS NOTE ADULT - ASSESSMENT
26F s/p L ankle recon with L gracilis free flap and STSG (9/20).  - keep Left leg elevated  - flap check q4 hour  - monitor flap with pencil doppler  - Nitro-BID to flap area Q4 hours  - Bacitracin over xeroform on flap daily  - Reg diet  - PICC 9/21, ceftriaxone, nafcillin - ID following  -  drain care  - bedrest  - st  - Lovenox/SCD on right leg  - IS  Monet Cote PGY6  [facility] [phone]  [facility] [phone]  [facility] [address]

## 2021-09-26 LAB — CULTURE RESULTS: SIGNIFICANT CHANGE UP

## 2021-09-26 RX ADMIN — Medication 1 INCH(S): at 02:07

## 2021-09-26 RX ADMIN — Medication 1 APPLICATION(S): at 11:35

## 2021-09-26 RX ADMIN — ENOXAPARIN SODIUM 40 MILLIGRAM(S): 100 INJECTION SUBCUTANEOUS at 11:35

## 2021-09-26 RX ADMIN — HYDROMORPHONE HYDROCHLORIDE 0.5 MILLIGRAM(S): 2 INJECTION INTRAMUSCULAR; INTRAVENOUS; SUBCUTANEOUS at 05:27

## 2021-09-26 RX ADMIN — HYDROMORPHONE HYDROCHLORIDE 0.5 MILLIGRAM(S): 2 INJECTION INTRAMUSCULAR; INTRAVENOUS; SUBCUTANEOUS at 00:05

## 2021-09-26 RX ADMIN — Medication 975 MILLIGRAM(S): at 12:00

## 2021-09-26 RX ADMIN — OXYCODONE HYDROCHLORIDE 10 MILLIGRAM(S): 5 TABLET ORAL at 13:15

## 2021-09-26 RX ADMIN — Medication 1 INCH(S): at 06:00

## 2021-09-26 RX ADMIN — Medication 975 MILLIGRAM(S): at 00:24

## 2021-09-26 RX ADMIN — Medication 975 MILLIGRAM(S): at 23:32

## 2021-09-26 RX ADMIN — OXYCODONE HYDROCHLORIDE 10 MILLIGRAM(S): 5 TABLET ORAL at 18:45

## 2021-09-26 RX ADMIN — GABAPENTIN 300 MILLIGRAM(S): 400 CAPSULE ORAL at 05:26

## 2021-09-26 RX ADMIN — NAFCILLIN 200 GRAM(S): 10 INJECTION, POWDER, FOR SOLUTION INTRAVENOUS at 05:10

## 2021-09-26 RX ADMIN — Medication 1 INCH(S): at 00:15

## 2021-09-26 RX ADMIN — Medication 1000 UNIT(S): at 11:34

## 2021-09-26 RX ADMIN — Medication 1 INCH(S): at 09:30

## 2021-09-26 RX ADMIN — Medication 975 MILLIGRAM(S): at 18:14

## 2021-09-26 RX ADMIN — Medication 1 INCH(S): at 14:06

## 2021-09-26 RX ADMIN — Medication 1 INCH(S): at 17:59

## 2021-09-26 RX ADMIN — NAFCILLIN 200 GRAM(S): 10 INJECTION, POWDER, FOR SOLUTION INTRAVENOUS at 01:59

## 2021-09-26 RX ADMIN — Medication 1 INCH(S): at 10:50

## 2021-09-26 RX ADMIN — Medication 1 INCH(S): at 05:10

## 2021-09-26 RX ADMIN — Medication 975 MILLIGRAM(S): at 05:27

## 2021-09-26 RX ADMIN — OXYCODONE HYDROCHLORIDE 10 MILLIGRAM(S): 5 TABLET ORAL at 01:59

## 2021-09-26 RX ADMIN — HYDROMORPHONE HYDROCHLORIDE 0.5 MILLIGRAM(S): 2 INJECTION INTRAMUSCULAR; INTRAVENOUS; SUBCUTANEOUS at 22:32

## 2021-09-26 RX ADMIN — OXYCODONE HYDROCHLORIDE 10 MILLIGRAM(S): 5 TABLET ORAL at 12:45

## 2021-09-26 RX ADMIN — Medication 975 MILLIGRAM(S): at 11:34

## 2021-09-26 RX ADMIN — NAFCILLIN 200 GRAM(S): 10 INJECTION, POWDER, FOR SOLUTION INTRAVENOUS at 18:15

## 2021-09-26 RX ADMIN — CEFTRIAXONE 100 MILLIGRAM(S): 500 INJECTION, POWDER, FOR SOLUTION INTRAMUSCULAR; INTRAVENOUS at 21:26

## 2021-09-26 RX ADMIN — Medication 1 TABLET(S): at 11:35

## 2021-09-26 RX ADMIN — NAFCILLIN 200 GRAM(S): 10 INJECTION, POWDER, FOR SOLUTION INTRAVENOUS at 14:00

## 2021-09-26 RX ADMIN — Medication 975 MILLIGRAM(S): at 01:02

## 2021-09-26 RX ADMIN — NAFCILLIN 200 GRAM(S): 10 INJECTION, POWDER, FOR SOLUTION INTRAVENOUS at 10:49

## 2021-09-26 RX ADMIN — Medication 975 MILLIGRAM(S): at 18:45

## 2021-09-26 RX ADMIN — Medication 1 INCH(S): at 21:26

## 2021-09-26 RX ADMIN — SENNA PLUS 2 TABLET(S): 8.6 TABLET ORAL at 21:26

## 2021-09-26 RX ADMIN — HYDROMORPHONE HYDROCHLORIDE 0.5 MILLIGRAM(S): 2 INJECTION INTRAMUSCULAR; INTRAVENOUS; SUBCUTANEOUS at 16:15

## 2021-09-26 RX ADMIN — HYDROMORPHONE HYDROCHLORIDE 0.5 MILLIGRAM(S): 2 INJECTION INTRAMUSCULAR; INTRAVENOUS; SUBCUTANEOUS at 15:46

## 2021-09-26 RX ADMIN — HYDROMORPHONE HYDROCHLORIDE 0.5 MILLIGRAM(S): 2 INJECTION INTRAMUSCULAR; INTRAVENOUS; SUBCUTANEOUS at 21:27

## 2021-09-26 RX ADMIN — HYDROMORPHONE HYDROCHLORIDE 0.5 MILLIGRAM(S): 2 INJECTION INTRAMUSCULAR; INTRAVENOUS; SUBCUTANEOUS at 06:00

## 2021-09-26 RX ADMIN — OXYCODONE HYDROCHLORIDE 10 MILLIGRAM(S): 5 TABLET ORAL at 18:15

## 2021-09-26 RX ADMIN — Medication 975 MILLIGRAM(S): at 06:00

## 2021-09-26 RX ADMIN — CHLORHEXIDINE GLUCONATE 1 APPLICATION(S): 213 SOLUTION TOPICAL at 06:00

## 2021-09-26 RX ADMIN — NAFCILLIN 200 GRAM(S): 10 INJECTION, POWDER, FOR SOLUTION INTRAVENOUS at 21:26

## 2021-09-26 RX ADMIN — GABAPENTIN 300 MILLIGRAM(S): 400 CAPSULE ORAL at 21:26

## 2021-09-26 RX ADMIN — GABAPENTIN 300 MILLIGRAM(S): 400 CAPSULE ORAL at 14:06

## 2021-09-26 RX ADMIN — OXYCODONE HYDROCHLORIDE 10 MILLIGRAM(S): 5 TABLET ORAL at 02:35

## 2021-09-26 NOTE — PROGRESS NOTE ADULT - ASSESSMENT
26F s/p L ankle recon with L gracilis free flap and STSG (9/20).  - keep Left leg elevated  - flap check q4 hour  - monitor flap with pencil doppler  - Nitro-BID to flap area Q4 hours  - Bacitracin over xeroform on flap daily  - Reg diet  - PICC 9/21, ceftriaxone, nafcillin - ID following  -  drain care  - bedrest  - st  - Lovenox/SCD on right leg  - IS  Monet Cote PGY6  [facility] [phone]  [facility] [phone]  [facility] [address]

## 2021-09-26 NOTE — PROGRESS NOTE ADULT - SUBJECTIVE AND OBJECTIVE BOX
Ortho    Patient doing well post-op. Pain has been stable.  Denies CP, SOB, N/V, numbness/tingling     Vital Signs Last 24 Hrs  T(C): 36.9 (26 Sep 2021 04:38), Max: 37.3 (25 Sep 2021 20:42)  T(F): 98.5 (26 Sep 2021 04:38), Max: 99.2 (25 Sep 2021 20:42)  HR: 98 (26 Sep 2021 03:50) (98 - 112)  BP: 124/72 (26 Sep 2021 03:50) (109/67 - 124/72)  BP(mean): 89 (26 Sep 2021 03:50) (84 - 91)  RR: 18 (26 Sep 2021 03:50) (18 - 18)  SpO2: 96% (26 Sep 2021 03:50) (95% - 98%)    General: Pt Alert and oriented, NAD  L thigh and L foot DSG C/D/I,  intact.   Heel erythema improving, repadded   Pulses: toes wwp  Sensation: Absent sensation over the 1st and 2nd toes, decreased sensation over the 3rd-5th toes  Motor: 0/0 L  EHL, 3/5 FHL/GS    A/P: 26yFemale s/p Repeat L Ankle I&D, Vac Change by Dr. NEAL Wallis on 09-17, with plastic surgery for flap harvest and closure with plastic surgery (9/2)    - PICC placed, placed for IV abx x 6 weeks   - Continue elevation, defer dangle protocol per plastics   - Dressing evaluation and drain management per plastics team   - OR cx - staph aureus in 1 culture - continue abx as planned per ID.   - Pain Control  - Post op abx: Ceftriaxone + Nafcillin  - DVT ppx: Lovenox  - WBS: NWB LLE  - Plastics primary, ortho to peripherally follow

## 2021-09-27 RX ADMIN — POLYETHYLENE GLYCOL 3350 17 GRAM(S): 17 POWDER, FOR SOLUTION ORAL at 18:14

## 2021-09-27 RX ADMIN — SENNA PLUS 2 TABLET(S): 8.6 TABLET ORAL at 21:37

## 2021-09-27 RX ADMIN — Medication 975 MILLIGRAM(S): at 18:15

## 2021-09-27 RX ADMIN — HYDROMORPHONE HYDROCHLORIDE 0.5 MILLIGRAM(S): 2 INJECTION INTRAMUSCULAR; INTRAVENOUS; SUBCUTANEOUS at 05:58

## 2021-09-27 RX ADMIN — NAFCILLIN 200 GRAM(S): 10 INJECTION, POWDER, FOR SOLUTION INTRAVENOUS at 10:18

## 2021-09-27 RX ADMIN — NAFCILLIN 200 GRAM(S): 10 INJECTION, POWDER, FOR SOLUTION INTRAVENOUS at 05:27

## 2021-09-27 RX ADMIN — Medication 975 MILLIGRAM(S): at 19:00

## 2021-09-27 RX ADMIN — OXYCODONE HYDROCHLORIDE 10 MILLIGRAM(S): 5 TABLET ORAL at 21:00

## 2021-09-27 RX ADMIN — Medication 975 MILLIGRAM(S): at 05:26

## 2021-09-27 RX ADMIN — Medication 975 MILLIGRAM(S): at 07:26

## 2021-09-27 RX ADMIN — NAFCILLIN 200 GRAM(S): 10 INJECTION, POWDER, FOR SOLUTION INTRAVENOUS at 01:59

## 2021-09-27 RX ADMIN — Medication 1000 UNIT(S): at 12:01

## 2021-09-27 RX ADMIN — Medication 975 MILLIGRAM(S): at 13:00

## 2021-09-27 RX ADMIN — HYDROMORPHONE HYDROCHLORIDE 0.5 MILLIGRAM(S): 2 INJECTION INTRAMUSCULAR; INTRAVENOUS; SUBCUTANEOUS at 16:25

## 2021-09-27 RX ADMIN — Medication 1 APPLICATION(S): at 11:55

## 2021-09-27 RX ADMIN — HYDROMORPHONE HYDROCHLORIDE 0.5 MILLIGRAM(S): 2 INJECTION INTRAMUSCULAR; INTRAVENOUS; SUBCUTANEOUS at 15:58

## 2021-09-27 RX ADMIN — OXYCODONE HYDROCHLORIDE 10 MILLIGRAM(S): 5 TABLET ORAL at 21:30

## 2021-09-27 RX ADMIN — HYDROMORPHONE HYDROCHLORIDE 0.5 MILLIGRAM(S): 2 INJECTION INTRAMUSCULAR; INTRAVENOUS; SUBCUTANEOUS at 23:21

## 2021-09-27 RX ADMIN — Medication 1 TABLET(S): at 12:01

## 2021-09-27 RX ADMIN — CHLORHEXIDINE GLUCONATE 1 APPLICATION(S): 213 SOLUTION TOPICAL at 05:26

## 2021-09-27 RX ADMIN — GABAPENTIN 300 MILLIGRAM(S): 400 CAPSULE ORAL at 05:26

## 2021-09-27 RX ADMIN — GABAPENTIN 300 MILLIGRAM(S): 400 CAPSULE ORAL at 21:37

## 2021-09-27 RX ADMIN — ENOXAPARIN SODIUM 40 MILLIGRAM(S): 100 INJECTION SUBCUTANEOUS at 12:00

## 2021-09-27 RX ADMIN — POLYETHYLENE GLYCOL 3350 17 GRAM(S): 17 POWDER, FOR SOLUTION ORAL at 05:27

## 2021-09-27 RX ADMIN — HYDROMORPHONE HYDROCHLORIDE 0.5 MILLIGRAM(S): 2 INJECTION INTRAMUSCULAR; INTRAVENOUS; SUBCUTANEOUS at 22:51

## 2021-09-27 RX ADMIN — HYDROMORPHONE HYDROCHLORIDE 0.5 MILLIGRAM(S): 2 INJECTION INTRAMUSCULAR; INTRAVENOUS; SUBCUTANEOUS at 02:35

## 2021-09-27 RX ADMIN — Medication 975 MILLIGRAM(S): at 02:28

## 2021-09-27 RX ADMIN — OXYCODONE HYDROCHLORIDE 10 MILLIGRAM(S): 5 TABLET ORAL at 07:35

## 2021-09-27 RX ADMIN — GABAPENTIN 300 MILLIGRAM(S): 400 CAPSULE ORAL at 13:25

## 2021-09-27 RX ADMIN — OXYCODONE HYDROCHLORIDE 10 MILLIGRAM(S): 5 TABLET ORAL at 14:00

## 2021-09-27 RX ADMIN — Medication 975 MILLIGRAM(S): at 12:01

## 2021-09-27 RX ADMIN — Medication 1 TABLET(S): at 12:00

## 2021-09-27 RX ADMIN — NAFCILLIN 200 GRAM(S): 10 INJECTION, POWDER, FOR SOLUTION INTRAVENOUS at 18:14

## 2021-09-27 RX ADMIN — OXYCODONE HYDROCHLORIDE 10 MILLIGRAM(S): 5 TABLET ORAL at 13:25

## 2021-09-27 RX ADMIN — OXYCODONE HYDROCHLORIDE 10 MILLIGRAM(S): 5 TABLET ORAL at 08:26

## 2021-09-27 RX ADMIN — NAFCILLIN 200 GRAM(S): 10 INJECTION, POWDER, FOR SOLUTION INTRAVENOUS at 21:37

## 2021-09-27 RX ADMIN — HYDROMORPHONE HYDROCHLORIDE 0.5 MILLIGRAM(S): 2 INJECTION INTRAMUSCULAR; INTRAVENOUS; SUBCUTANEOUS at 10:27

## 2021-09-27 RX ADMIN — NAFCILLIN 200 GRAM(S): 10 INJECTION, POWDER, FOR SOLUTION INTRAVENOUS at 13:53

## 2021-09-27 RX ADMIN — CEFTRIAXONE 100 MILLIGRAM(S): 500 INJECTION, POWDER, FOR SOLUTION INTRAMUSCULAR; INTRAVENOUS at 22:51

## 2021-09-27 RX ADMIN — HYDROMORPHONE HYDROCHLORIDE 0.5 MILLIGRAM(S): 2 INJECTION INTRAMUSCULAR; INTRAVENOUS; SUBCUTANEOUS at 10:09

## 2021-09-27 NOTE — PROGRESS NOTE ADULT - ATTENDING COMMENTS
26yFemale s/p Repeat L Ankle I&D, Vac Change by Dr. NEAL Wallis on 09-17, with plastic surgery for flap harvest and closure with plastic surgery (9/2)  Patients heel with stage 1 pressure injury  - dressing applied- please ensure extremely padded in the setting of multiple re-examinations.   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

## 2021-09-27 NOTE — DIETITIAN NUTRITION RISK NOTIFICATION - TREATMENT: THE FOLLOWING DIET HAS BEEN RECOMMENDED
Diet, Regular:   Liquid Protein Supplement     Qty per Day:  BID  Supplement Feeding Modality:  Oral  Ensure Enlive Cans or Servings Per Day:  1       Frequency:  Daily  Ensure Max Cans or Servings Per Day:  1       Frequency:  Daily (09-27-21 @ 14:35) [Pending Verification By Attending]  Diet, Regular (09-21-21 @ 09:05) [Active]

## 2021-09-27 NOTE — CHART NOTE - NSCHARTNOTEFT_GEN_A_CORE
Admitting Diagnosis:   Patient is a 26y old  Female who presents with a chief complaint of L ankle pain (27 Sep 2021 08:00)    PAST MEDICAL & SURGICAL HISTORY:  Left tibial neuropathy  PAD (peripheral artery disease)  Status post ORIF of fracture of ankle      Current Nutrition Order:  Diet, Regular (09-21-21 @ 09:05)    PO Intake: Good (%) [   ]  Fair (50%) [ x  ] Poor (<25%) [   ]  -Pt states appetite continues to be fair, not hungry most of the time, but understands need to eat. Had not eaten breakfast yet this morning d/t sleeping. Previously added LPS to diet order, but removed now. Pt states tried LPs and would drink if needed. Attempted to page MD x2, rec adding LPS BID, Ensure Max and Ensure Enlive once daily. Pended order to MD today (9/27)  *Emphasized importance of eating nutrient dense foods/ protein at every meal and snack. Encouraged pt to drink at minimum one LPS daily and one ONS daily for additional nutrition.     GI Issues: Pt states last BM 9/20. Bowel meds ordered, but pt has been refusing per RN. Noted miralax and senna bowel meds, suspect will need stronger meds d/t opoid use. RN states will bring up to MD.   Pain: Daily pain medication   Skin Integrity: Left ankle wound (L gracilis free flap and STSG on 9/20)  -Plastics following    Labs: 9/25- .6 H    Medications:  MEDICATIONS  (STANDING):  acetaminophen   Tablet .. 975 milliGRAM(s) Oral every 6 hours  BACItracin   Ointment 1 Application(s) Topical daily  calcium carbonate   1250 mG (OsCal) 1 Tablet(s) Oral daily  cefTRIAXone   IVPB 2000 milliGRAM(s) IV Intermittent every 24 hours  chlorhexidine 2% Cloths 1 Application(s) Topical <User Schedule>  cholecalciferol 1000 Unit(s) Oral daily  enoxaparin Injectable 40 milliGRAM(s) SubCutaneous daily  gabapentin 300 milliGRAM(s) Oral every 8 hours  influenza   Vaccine 0.5 milliLiter(s) IntraMuscular once  multivitamin 1 Tablet(s) Oral daily  nafcillin  IVPB 2 Gram(s) IV Intermittent every 4 hours  polyethylene glycol 3350 17 Gram(s) Oral two times a day  senna 2 Tablet(s) Oral at bedtime    MEDICATIONS  (PRN):  bisacodyl Suppository 10 milliGRAM(s) Rectal daily PRN Constipation  HYDROmorphone  Injectable 0.5 milliGRAM(s) IV Push every 4 hours PRN Breakthrough pain  metoclopramide Injectable 10 milliGRAM(s) IV Push every 6 hours PRN Nausea and/or Vomiting  ondansetron Injectable 4 milliGRAM(s) IV Push every 6 hours PRN Nausea and/or Vomiting  oxyCODONE    IR 5 milliGRAM(s) Oral every 4 hours PRN Moderate Pain (4 - 6)  oxyCODONE    IR 10 milliGRAM(s) Oral every 4 hours PRN Severe Pain (7 - 10)  sodium chloride 0.9% lock flush 10 milliLiter(s) IV Push every 1 hour PRN Pre/post blood products, medications, blood draw, and to maintain line patency    Anthropometrics:  Ht: 175.3cm (69")  Wt: 82.2kg (9/20) BMI: 26.8        86.2kg (9/09)    IBW: 145# (65.9kg)  % IBW: 125%    Weight Change: Per EMR, 4kg (5%) wt loss x 2 weeks (since admit) which is severe per ASPEN standards    Nutrition Focused Physical Exam: Completed [   ]  Not Pertinent [ x  ]  *No overt signs of muscle wasting    Malnutrition: Moderate malnutrition in the context of acute illness AEB <75% of estimated energy requirement for >7 days, 5% wt loss x 2 weeks     Estimated energy needs:  IBW used for calculations as pt >120% of IBW  Needs adjusted for wound healing  Calories: 1650-1950kcals (25-30kcals/kg)  Protein: 95-130g (1.5-2.0g/kg)   Fluid:+1950ml/day (30ml/kcals)    Subjective:   26yFemale s/p Repeat L Ankle I&D, Vac Change by Dr. NEAL Wallis on 09-17, with plastic surgery for flap harvest and closure with plastic surgery (9/2). PICC line with IV abx.     Spoke with pt in room this morning. Continues to have poor to fair po intake. Emphasized importance of consuming nutrient dense foods for wound healing/ recovery. Noted LPS and Ensure not in diet order anymore. Requested additional of supplements and encouraged pt to drink at minimum one of each per day.     Previous Nutrition Diagnosis:  Increased nutrient needs (protein) R/T hypermetabolic, increased protein catabolism AEB 1.5-2.0g/kg protein  Active [  x ]  Resolved [   ]    Goal:   Pt to consistently meet % of estimated needs PO     Recommendations:  1. Continue regular diet  -Add LPS BID, Ensure max and Ensure Enlive once daily   2. Monitor %PO intake, continue to encourage PO at mealtimes  3. Continue MVI. Add on vitamin C and Zinc to promote wound healing.   4. Aggressive bowel regimen (no BM x7 days)  5. Monitor BMP, lytes, replete prn    Education: Continue encourage PO/protein    Risk Level: High [   ] Moderate [ x ] Low [   ].  Will continue to monitor per protocol.   Lisa Dolan RD,,CNSC

## 2021-09-27 NOTE — PROGRESS NOTE ADULT - SUBJECTIVE AND OBJECTIVE BOX
Ortho    Patient doing well post-op. Pain has been stable.  Denies CP, SOB, N/V, numbness/tingling     Vital Signs Last 24 Hrs  T(C): 36.8 (27 Sep 2021 04:46), Max: 37.3 (26 Sep 2021 13:20)  T(F): 98.2 (27 Sep 2021 04:46), Max: 99.1 (26 Sep 2021 13:20)  HR: 88 (27 Sep 2021 04:05) (86 - 106)  BP: 103/67 (27 Sep 2021 04:05) (103/67 - 120/66)  BP(mean): 80 (27 Sep 2021 04:05) (80 - 87)  RR: 17 (27 Sep 2021 04:05) (17 - 18)  SpO2: 97% (27 Sep 2021 04:05) (96% - 98%)    General: Pt Alert and oriented, NAD  L thigh and L foot DSG C/D/I,  intact.   Heel erythema resolving, well padded with allevyn dressing   Pulses: toes wwp  Sensation: Absent sensation over the 1st and 2nd toes, decreased sensation over the 3rd-5th toes  Motor: 0/0 L  EHL, 3/5 FHL/GS    A/P: 26yFemale s/p Repeat L Ankle I&D, Vac Change by Dr. NEAL Wallis on 09-17, with plastic surgery for flap harvest and closure with plastic surgery (9/2)    - PICC placed, placed for IV abx x 6 weeks   - Continue elevation, defer dangle protocol per plastics   - Dressing evaluation and drain management per plastics team   - OR cx - staph aureus in 1 culture - continue abx as planned per ID.   - Pain Control  - Post op abx: Ceftriaxone + Nafcillin  - DVT ppx: Lovenox  - WBS: NWB LLE  - Plastics primary, ortho to peripherally follow

## 2021-09-27 NOTE — PROGRESS NOTE ADULT - SUBJECTIVE AND OBJECTIVE BOX
SUBJECTIVE:  Seen and examined at bedside. Doing well. Flap warm. Doppler faint but audible.   No overnight events.     OBJECTIVE:     VITAL SIGNS / I&O's    Vital Signs Last 24 Hrs  T(C): 36.8 (27 Sep 2021 04:46), Max: 37.3 (26 Sep 2021 13:20)  T(F): 98.2 (27 Sep 2021 04:46), Max: 99.1 (26 Sep 2021 13:20)  HR: 88 (27 Sep 2021 04:05) (86 - 106)  BP: 103/67 (27 Sep 2021 04:05) (103/67 - 120/66)  BP(mean): 80 (27 Sep 2021 04:05) (80 - 87)  RR: 17 (27 Sep 2021 04:05) (17 - 18)  SpO2: 97% (27 Sep 2021 04:05) (96% - 98%)      26 Sep 2021 07:01  -  27 Sep 2021 07:00  --------------------------------------------------------  IN:    IV PiggyBack: 600 mL    Oral Fluid: 240 mL  Total IN: 840 mL    OUT:    Drain (mL): 35 mL    Indwelling Catheter - Urethral (mL): 1200 mL  Total OUT: 1235 mL    Total NET: -395 mL    PHYSICAL EXAM    General: NAD  Extremities: flap soft, warm, some distal tip necrosis with blistering (blisters now ruptured).  Posterior aspect of flap along suture line is ecchymotic.  Evolving ecchymosis of distal and posterior aspect of flap.  Arterial signal intact.  STSG with 100% take, some area with stsg desiccation along suture line due to xeroform being displaced.     STSG donor site dry. Flap donor site soft, appropriate TTP. Drain serous.    LABS    Urinalysis ( @ 12:46):     Color: Yellow / Appearance: Clear / S.015 / pH: 7.5 / Gluc: NEGATIVE / Ketones: 40<!> / Bili: Negative / Urobili: 0.2 / Protein :NEGATIVE / Nitrites: NEGATIVE / Leuk.Est: NEGATIVE / RBC:  / WBC:  / Sq Epi:  / Non Sq Epi:  / Bacteria        -> .Surgical Swab OR-#2-LEFT ANKLE CULTURE Culture ( @ 10:03)       No organisms seen  Rare WBC's    NG    No growth to date    -> .Surgical Swab OR-#1-LEFT ANKLE CULTURE Culture ( @ 10:02)       No organisms seen  Rare WBC's    Staphylococcus aureus    Assessment and Plan:   · Assessment	  26F s/p L ankle recon with L gracilis free flap and STSG ().  - keep Left leg elevated  - flap check q4 hour  - monitor flap with pencil doppler  - Nitro-BID to flap area Q4 hours  - Bacitracin over xeroform on flap daily  - Reg diet  - PICC , ceftriaxone, nafcillin - ID following  -  drain care  - bedrest  - st  - Lovenox/SCD on right leg  - IS  - Dangle left leg 5min BID    Randy Bearden, PGY1 (Otolaryngology)  [facility]

## 2021-09-28 LAB
GLUCOSE BLDC GLUCOMTR-MCNC: 406 MG/DL — HIGH (ref 70–99)
GLUCOSE BLDC GLUCOMTR-MCNC: 93 MG/DL — SIGNIFICANT CHANGE UP (ref 70–99)
SARS-COV-2 RNA SPEC QL NAA+PROBE: SIGNIFICANT CHANGE UP

## 2021-09-28 PROCEDURE — 73610 X-RAY EXAM OF ANKLE: CPT | Mod: 26,LT

## 2021-09-28 RX ORDER — OXYCODONE HYDROCHLORIDE 5 MG/1
10 TABLET ORAL EVERY 4 HOURS
Refills: 0 | Status: DISCONTINUED | OUTPATIENT
Start: 2021-09-28 | End: 2021-10-05

## 2021-09-28 RX ORDER — OXYCODONE HYDROCHLORIDE 5 MG/1
5 TABLET ORAL EVERY 4 HOURS
Refills: 0 | Status: DISCONTINUED | OUTPATIENT
Start: 2021-09-28 | End: 2021-10-03

## 2021-09-28 RX ORDER — HYDROMORPHONE HYDROCHLORIDE 2 MG/ML
0.5 INJECTION INTRAMUSCULAR; INTRAVENOUS; SUBCUTANEOUS ONCE
Refills: 0 | Status: DISCONTINUED | OUTPATIENT
Start: 2021-09-28 | End: 2021-09-28

## 2021-09-28 RX ORDER — HYDROMORPHONE HYDROCHLORIDE 2 MG/ML
0.5 INJECTION INTRAMUSCULAR; INTRAVENOUS; SUBCUTANEOUS EVERY 4 HOURS
Refills: 0 | Status: DISCONTINUED | OUTPATIENT
Start: 2021-09-28 | End: 2021-10-05

## 2021-09-28 RX ADMIN — Medication 975 MILLIGRAM(S): at 06:00

## 2021-09-28 RX ADMIN — CHLORHEXIDINE GLUCONATE 1 APPLICATION(S): 213 SOLUTION TOPICAL at 06:38

## 2021-09-28 RX ADMIN — NAFCILLIN 200 GRAM(S): 10 INJECTION, POWDER, FOR SOLUTION INTRAVENOUS at 21:23

## 2021-09-28 RX ADMIN — Medication 1 TABLET(S): at 12:00

## 2021-09-28 RX ADMIN — Medication 1 TABLET(S): at 13:59

## 2021-09-28 RX ADMIN — Medication 975 MILLIGRAM(S): at 19:44

## 2021-09-28 RX ADMIN — HYDROMORPHONE HYDROCHLORIDE 0.5 MILLIGRAM(S): 2 INJECTION INTRAMUSCULAR; INTRAVENOUS; SUBCUTANEOUS at 08:54

## 2021-09-28 RX ADMIN — Medication 975 MILLIGRAM(S): at 11:59

## 2021-09-28 RX ADMIN — HYDROMORPHONE HYDROCHLORIDE 0.5 MILLIGRAM(S): 2 INJECTION INTRAMUSCULAR; INTRAVENOUS; SUBCUTANEOUS at 21:52

## 2021-09-28 RX ADMIN — OXYCODONE HYDROCHLORIDE 10 MILLIGRAM(S): 5 TABLET ORAL at 22:53

## 2021-09-28 RX ADMIN — Medication 1 APPLICATION(S): at 12:00

## 2021-09-28 RX ADMIN — Medication 975 MILLIGRAM(S): at 18:46

## 2021-09-28 RX ADMIN — HYDROMORPHONE HYDROCHLORIDE 0.5 MILLIGRAM(S): 2 INJECTION INTRAMUSCULAR; INTRAVENOUS; SUBCUTANEOUS at 21:22

## 2021-09-28 RX ADMIN — GABAPENTIN 300 MILLIGRAM(S): 400 CAPSULE ORAL at 14:00

## 2021-09-28 RX ADMIN — NAFCILLIN 200 GRAM(S): 10 INJECTION, POWDER, FOR SOLUTION INTRAVENOUS at 10:17

## 2021-09-28 RX ADMIN — NAFCILLIN 200 GRAM(S): 10 INJECTION, POWDER, FOR SOLUTION INTRAVENOUS at 17:22

## 2021-09-28 RX ADMIN — OXYCODONE HYDROCHLORIDE 10 MILLIGRAM(S): 5 TABLET ORAL at 08:36

## 2021-09-28 RX ADMIN — GABAPENTIN 300 MILLIGRAM(S): 400 CAPSULE ORAL at 21:23

## 2021-09-28 RX ADMIN — OXYCODONE HYDROCHLORIDE 10 MILLIGRAM(S): 5 TABLET ORAL at 22:23

## 2021-09-28 RX ADMIN — SENNA PLUS 2 TABLET(S): 8.6 TABLET ORAL at 21:23

## 2021-09-28 RX ADMIN — Medication 975 MILLIGRAM(S): at 23:39

## 2021-09-28 RX ADMIN — NAFCILLIN 200 GRAM(S): 10 INJECTION, POWDER, FOR SOLUTION INTRAVENOUS at 14:00

## 2021-09-28 RX ADMIN — OXYCODONE HYDROCHLORIDE 10 MILLIGRAM(S): 5 TABLET ORAL at 07:29

## 2021-09-28 RX ADMIN — NAFCILLIN 200 GRAM(S): 10 INJECTION, POWDER, FOR SOLUTION INTRAVENOUS at 03:00

## 2021-09-28 RX ADMIN — Medication 975 MILLIGRAM(S): at 05:23

## 2021-09-28 RX ADMIN — Medication 975 MILLIGRAM(S): at 13:40

## 2021-09-28 RX ADMIN — HYDROMORPHONE HYDROCHLORIDE 0.5 MILLIGRAM(S): 2 INJECTION INTRAMUSCULAR; INTRAVENOUS; SUBCUTANEOUS at 08:36

## 2021-09-28 RX ADMIN — Medication 1000 UNIT(S): at 12:00

## 2021-09-28 RX ADMIN — Medication 975 MILLIGRAM(S): at 00:02

## 2021-09-28 RX ADMIN — CEFTRIAXONE 100 MILLIGRAM(S): 500 INJECTION, POWDER, FOR SOLUTION INTRAMUSCULAR; INTRAVENOUS at 22:52

## 2021-09-28 RX ADMIN — HYDROMORPHONE HYDROCHLORIDE 0.5 MILLIGRAM(S): 2 INJECTION INTRAMUSCULAR; INTRAVENOUS; SUBCUTANEOUS at 06:37

## 2021-09-28 RX ADMIN — ONDANSETRON 4 MILLIGRAM(S): 8 TABLET, FILM COATED ORAL at 16:56

## 2021-09-28 RX ADMIN — POLYETHYLENE GLYCOL 3350 17 GRAM(S): 17 POWDER, FOR SOLUTION ORAL at 17:15

## 2021-09-28 RX ADMIN — POLYETHYLENE GLYCOL 3350 17 GRAM(S): 17 POWDER, FOR SOLUTION ORAL at 05:23

## 2021-09-28 RX ADMIN — HYDROMORPHONE HYDROCHLORIDE 0.5 MILLIGRAM(S): 2 INJECTION INTRAMUSCULAR; INTRAVENOUS; SUBCUTANEOUS at 19:44

## 2021-09-28 RX ADMIN — GABAPENTIN 300 MILLIGRAM(S): 400 CAPSULE ORAL at 05:23

## 2021-09-28 RX ADMIN — Medication 975 MILLIGRAM(S): at 00:32

## 2021-09-28 RX ADMIN — NAFCILLIN 200 GRAM(S): 10 INJECTION, POWDER, FOR SOLUTION INTRAVENOUS at 06:38

## 2021-09-28 RX ADMIN — HYDROMORPHONE HYDROCHLORIDE 0.5 MILLIGRAM(S): 2 INJECTION INTRAMUSCULAR; INTRAVENOUS; SUBCUTANEOUS at 09:47

## 2021-09-28 RX ADMIN — ENOXAPARIN SODIUM 40 MILLIGRAM(S): 100 INJECTION SUBCUTANEOUS at 12:00

## 2021-09-28 RX ADMIN — HYDROMORPHONE HYDROCHLORIDE 0.5 MILLIGRAM(S): 2 INJECTION INTRAMUSCULAR; INTRAVENOUS; SUBCUTANEOUS at 17:14

## 2021-09-28 NOTE — PROGRESS NOTE ADULT - SUBJECTIVE AND OBJECTIVE BOX
SUBJECTIVE:  No overnight events. Doing well this morning. Tolerated dangling protocol yesterday.     OBJECTIVE:     Vital Signs Last 24 Hrs  T(C): 37.2 (28 Sep 2021 04:34), Max: 37.2 (27 Sep 2021 13:31)  T(F): 99 (28 Sep 2021 04:34), Max: 99 (28 Sep 2021 04:34)  HR: 100 (28 Sep 2021 04:14) (96 - 110)  BP: 114/69 (28 Sep 2021 04:14) (108/67 - 123/81)  BP(mean): 85 (28 Sep 2021 04:14) (82 - 97)  RR: 20 (28 Sep 2021 04:14) (15 - 20)  SpO2: 95% (28 Sep 2021 04:14) (95% - 98%)      27 Sep 2021 07:01  -  28 Sep 2021 07:00  --------------------------------------------------------  IN:    IV PiggyBack: 450 mL    Oral Fluid: 390 mL  Total IN: 840 mL    OUT:    Drain (mL): 17.5 mL    Indwelling Catheter - Urethral (mL): 1675 mL  Total OUT: 1692.5 mL    Total NET: -852.5 mL    Urinalysis ( @ 12:46):     Color: Yellow / Appearance: Clear / S.015 / pH: 7.5 / Gluc: NEGATIVE / Ketones: 40<!> / Bili: Negative / Urobili: 0.2 / Protein :NEGATIVE / Nitrites: NEGATIVE / Leuk.Est: NEGATIVE / RBC:  / WBC:  / Sq Epi:  / Non Sq Epi:  / Bacteria        -> .Surgical Swab OR-#2-LEFT ANKLE CULTURE Culture ( @ 10:03)       No organisms seen  Rare WBC's    NG    No growth to date    -> .Surgical Swab OR-#1-LEFT ANKLE CULTURE Culture ( @ 10:02)       No organisms seen  Rare WBC's    Staphylococcus aureus    Rare Staphylococcus aureus    PHYSICAL EXAM    General: NAD  Extremities: flap soft, warm, some distal tip necrosis with blistering (blisters now ruptured).  Posterior aspect of flap along suture line is ecchymotic.  Evolving ecchymosis of distal and posterior aspect of flap.  Arterial signal intact.  STSG with 100% take.     STSG donor site dry. Flap donor site soft, appropriate TTP. Drain serous.      LABS    POCT Blood Glucose.: 406 mg/dL (28 Sep 2021 04:34)

## 2021-09-28 NOTE — PROGRESS NOTE ADULT - ASSESSMENT
ASSESSMENT & PLAN    26F s/p L ankle recon with L gracilis free flap and STSG (9/20).  - keep Left leg elevated  - flap check q4 hour  - monitor flap with pencil doppler  - Nitro-BID to flap area Q4 hours  - Bacitracin over xeroform on flap daily  - Reg diet  - PICC 9/21, ceftriaxone, nafcillin - ID following  -  drain care  - bedrest  - st, TOSAHIL again today  - Lovenox/SCD on right leg  - IS  - Increase dangle protocol to 15min BID  - PT again today ASSESSMENT & PLAN    26F s/p L ankle recon with L gracilis free flap and STSG (9/20).  - keep Left leg elevated  - flap check q4 hour  - monitor flap with pencil doppler  - Nitro-BID to flap area Q4 hours  - Bacitracin over xeroform on flap daily  - Reg diet  - PICC 9/21, ceftriaxone, nafcillin - ID following  -  drain care  - bedrest  - st, TOSAHIL again today  - Lovenox/SCD on right leg  - IS  - Increase dangle protocol to 10 min BID  - PT again today

## 2021-09-28 NOTE — PROGRESS NOTE ADULT - ATTENDING COMMENTS
Patient seen and assessed at bedside  Discussed with physicians assistant  I also spoke with Dr. Dunbar from the plastic surgery team regarding the patient's flap.  There is no plan at this time and to return to the OR for debridement of the tip necrosis.  We are awaiting demarcation of the flap.  The patient's posterior heel stage I decubitus ulcer is stable without significant pain  No evidence of progression to stage II or higher  I had a lengthy conversation with the patient about planned further interventions.  If her heel wound begins to progress with significant padding and unloading, we may need to entertain the idea of placement of a ringed external fixator and an Achilles tendon lengthening.  The patient has been in equinus since the initiation of her treatment.  She understands that she will need an Achilles tendon lengthening with or without a gastrocnemius recession at the time of definitive repair of her ankle.  At this time we will continue to monitor and plan for ongoing definitive conversion on October 18.

## 2021-09-28 NOTE — PROGRESS NOTE ADULT - SUBJECTIVE AND OBJECTIVE BOX
Ortho    Patient doing well post-op. Pain has been stable.  Denies CP, SOB, N/V, numbness/tingling     Vital Signs Last 24 Hrs  T(C): 37.2 (28 Sep 2021 04:34), Max: 37.2 (27 Sep 2021 13:31)  T(F): 99 (28 Sep 2021 04:34), Max: 99 (28 Sep 2021 04:34)  HR: 100 (28 Sep 2021 04:14) (96 - 110)  BP: 114/69 (28 Sep 2021 04:14) (108/67 - 123/81)  BP(mean): 85 (28 Sep 2021 04:14) (82 - 97)  RR: 20 (28 Sep 2021 04:14) (15 - 20)  SpO2: 95% (28 Sep 2021 04:14) (95% - 98%)    General: Pt Alert and oriented, NAD  L thigh and L foot DSG C/D/I,  intact.   Heel pressure ulcer stable, well padded with allevyn dressing   Pulses: toes wwp  Sensation: Absent sensation over the 1st and 2nd toes, decreased sensation over the 3rd-5th toes  Motor: 0/0 L  EHL, 3/5 FHL/GS    A/P: 26yFemale s/p Repeat L Ankle I&D, Vac Change by Dr. NEAL Wallis on 09-17, with plastic surgery for flap harvest and closure with plastic surgery (9/2)    - PICC placed, placed for IV abx x 6 weeks   - Continue elevation, defer dangle protocol per plastics   - Dressing evaluation and drain management per plastics team   - OR cx - staph aureus in 1 culture - continue abx as planned per ID.   - Pain Control  - Post op abx: Ceftriaxone + Nafcillin  - DVT ppx: Lovenox  - WBS: NWB LLE  - Plastics primary, ortho to peripherally follow

## 2021-09-29 LAB
APTT BLD: 37 SEC — HIGH (ref 27.5–35.5)
BLD GP AB SCN SERPL QL: NEGATIVE — SIGNIFICANT CHANGE UP
CRP SERPL-MCNC: 127.3 MG/L — HIGH (ref 0–4)
ERYTHROCYTE [SEDIMENTATION RATE] IN BLOOD: >150 MM/HR — HIGH
INR BLD: 1.4 — HIGH (ref 0.88–1.16)
PROTHROM AB SERPL-ACNC: 16.5 SEC — HIGH (ref 10.6–13.6)
RH IG SCN BLD-IMP: NEGATIVE — SIGNIFICANT CHANGE UP

## 2021-09-29 RX ADMIN — OXYCODONE HYDROCHLORIDE 10 MILLIGRAM(S): 5 TABLET ORAL at 20:15

## 2021-09-29 RX ADMIN — Medication 975 MILLIGRAM(S): at 05:38

## 2021-09-29 RX ADMIN — GABAPENTIN 300 MILLIGRAM(S): 400 CAPSULE ORAL at 22:19

## 2021-09-29 RX ADMIN — OXYCODONE HYDROCHLORIDE 5 MILLIGRAM(S): 5 TABLET ORAL at 12:37

## 2021-09-29 RX ADMIN — Medication 975 MILLIGRAM(S): at 17:21

## 2021-09-29 RX ADMIN — Medication 975 MILLIGRAM(S): at 00:09

## 2021-09-29 RX ADMIN — SENNA PLUS 2 TABLET(S): 8.6 TABLET ORAL at 22:19

## 2021-09-29 RX ADMIN — ENOXAPARIN SODIUM 40 MILLIGRAM(S): 100 INJECTION SUBCUTANEOUS at 12:12

## 2021-09-29 RX ADMIN — CEFTRIAXONE 100 MILLIGRAM(S): 500 INJECTION, POWDER, FOR SOLUTION INTRAMUSCULAR; INTRAVENOUS at 23:20

## 2021-09-29 RX ADMIN — HYDROMORPHONE HYDROCHLORIDE 0.5 MILLIGRAM(S): 2 INJECTION INTRAMUSCULAR; INTRAVENOUS; SUBCUTANEOUS at 16:55

## 2021-09-29 RX ADMIN — HYDROMORPHONE HYDROCHLORIDE 0.5 MILLIGRAM(S): 2 INJECTION INTRAMUSCULAR; INTRAVENOUS; SUBCUTANEOUS at 10:15

## 2021-09-29 RX ADMIN — GABAPENTIN 300 MILLIGRAM(S): 400 CAPSULE ORAL at 05:39

## 2021-09-29 RX ADMIN — NAFCILLIN 200 GRAM(S): 10 INJECTION, POWDER, FOR SOLUTION INTRAVENOUS at 01:50

## 2021-09-29 RX ADMIN — NAFCILLIN 200 GRAM(S): 10 INJECTION, POWDER, FOR SOLUTION INTRAVENOUS at 16:26

## 2021-09-29 RX ADMIN — HYDROMORPHONE HYDROCHLORIDE 0.5 MILLIGRAM(S): 2 INJECTION INTRAMUSCULAR; INTRAVENOUS; SUBCUTANEOUS at 10:29

## 2021-09-29 RX ADMIN — OXYCODONE HYDROCHLORIDE 10 MILLIGRAM(S): 5 TABLET ORAL at 19:41

## 2021-09-29 RX ADMIN — Medication 1 TABLET(S): at 12:13

## 2021-09-29 RX ADMIN — Medication 975 MILLIGRAM(S): at 12:37

## 2021-09-29 RX ADMIN — NAFCILLIN 200 GRAM(S): 10 INJECTION, POWDER, FOR SOLUTION INTRAVENOUS at 22:19

## 2021-09-29 RX ADMIN — POLYETHYLENE GLYCOL 3350 17 GRAM(S): 17 POWDER, FOR SOLUTION ORAL at 05:38

## 2021-09-29 RX ADMIN — NAFCILLIN 200 GRAM(S): 10 INJECTION, POWDER, FOR SOLUTION INTRAVENOUS at 05:38

## 2021-09-29 RX ADMIN — HYDROMORPHONE HYDROCHLORIDE 0.5 MILLIGRAM(S): 2 INJECTION INTRAMUSCULAR; INTRAVENOUS; SUBCUTANEOUS at 16:32

## 2021-09-29 RX ADMIN — HYDROMORPHONE HYDROCHLORIDE 0.5 MILLIGRAM(S): 2 INJECTION INTRAMUSCULAR; INTRAVENOUS; SUBCUTANEOUS at 21:24

## 2021-09-29 RX ADMIN — Medication 1 APPLICATION(S): at 12:12

## 2021-09-29 RX ADMIN — HYDROMORPHONE HYDROCHLORIDE 0.5 MILLIGRAM(S): 2 INJECTION INTRAMUSCULAR; INTRAVENOUS; SUBCUTANEOUS at 06:35

## 2021-09-29 RX ADMIN — Medication 975 MILLIGRAM(S): at 12:12

## 2021-09-29 RX ADMIN — CHLORHEXIDINE GLUCONATE 1 APPLICATION(S): 213 SOLUTION TOPICAL at 10:16

## 2021-09-29 RX ADMIN — Medication 1 TABLET(S): at 16:27

## 2021-09-29 RX ADMIN — HYDROMORPHONE HYDROCHLORIDE 0.5 MILLIGRAM(S): 2 INJECTION INTRAMUSCULAR; INTRAVENOUS; SUBCUTANEOUS at 20:54

## 2021-09-29 RX ADMIN — OXYCODONE HYDROCHLORIDE 5 MILLIGRAM(S): 5 TABLET ORAL at 12:13

## 2021-09-29 RX ADMIN — GABAPENTIN 300 MILLIGRAM(S): 400 CAPSULE ORAL at 16:26

## 2021-09-29 RX ADMIN — NAFCILLIN 200 GRAM(S): 10 INJECTION, POWDER, FOR SOLUTION INTRAVENOUS at 10:16

## 2021-09-29 RX ADMIN — Medication 1000 UNIT(S): at 12:13

## 2021-09-29 NOTE — PROGRESS NOTE ADULT - SUBJECTIVE AND OBJECTIVE BOX
Ortho    Patient doing well post-op. Pain has been stable. Denies heel pain or tenderness throughout the day and overnight.   Denies CP, SOB, N/V, numbness/tingling     Vital Signs Last 24 Hrs  T(C): 37.1 (29 Sep 2021 04:20), Max: 37.3 (28 Sep 2021 20:45)  T(F): 98.7 (29 Sep 2021 04:20), Max: 99.1 (28 Sep 2021 20:45)  HR: 98 (29 Sep 2021 04:27) (96 - 110)  BP: 107/72 (29 Sep 2021 04:27) (107/72 - 123/79)  BP(mean): 85 (29 Sep 2021 04:27) (80 - 94)  RR: 18 (29 Sep 2021 04:27) (18 - 20)  SpO2: 97% (29 Sep 2021 04:27) (95% - 97%)    General: Pt Alert and oriented, NAD  L thigh and L foot DSG C/D/I  Heel pressure ulcer stable, well padded with allevyn dressing   Pulses: toes wwp  Sensation: Absent sensation over the 1st and 2nd toes, decreased sensation over the 3rd-5th toes  Motor: 0/0 L  EHL, 3/5 FHL/GS    A/P: 26yFemale s/p Repeat L Ankle I&D, Vac Change by Dr. NEAL Wallis on 09-17, with plastic surgery for flap harvest and closure with plastic surgery (9/2)    - PICC placed, placed for IV abx x 6 weeks   - Continue elevation, defer dangle protocol per plastics   - Dressing evaluation and drain management per plastics team   - OR cx - staph aureus in 1 culture - continue abx as planned per ID.   - Pain Control  - Post op abx: Ceftriaxone + Nafcillin  - DVT ppx: Lovenox  - WBS: NWB LLE  - Plastics primary, ortho to peripherally follow

## 2021-09-29 NOTE — CONSULT NOTE ADULT - CONSULT REASON
Left ankle wound, candidate for extensive debridement and ORIF revision. In need for soft tissue reconstruction evaluation.
vascular access
Assistance with antibiotics
Pre-op evaluation; Co-management
Urinary retention

## 2021-09-29 NOTE — PROGRESS NOTE ADULT - ASSESSMENT
ASSESSMENT & PLAN  26F s/p L ankle recon with L gracilis free flap and STSG (9/20).  - flap check q4 hour, monitor clinically and with with pencil doppler  - Bacitracin over xeroform on flap daily and to distal edge of flap  - left thigh skin graft donor site dressing will remain in place until it falls off on its own  - keep Left leg elevated  - Increase dangle protocol to 15 min BID  - 9/20 OR Cx: MSSA. PICC 9/21, ceftriaxone, nafcillin until 11/1 per ID recs  - if fails TOV again today will place Macario, urology consult placed  - Lovenox/SCD on right leg  - IS  - Reg diet  - pain control    Plastic Surgery  Pager: [phone]

## 2021-09-29 NOTE — PROGRESS NOTE ADULT - SUBJECTIVE AND OBJECTIVE BOX
Plastic Surgery Progress Note (pg [facility]: [id number], [facility]: [phone])    SUBJECTIVE  The patient was seen and examined this morning during rounds. No acute events overnight. Dangled for 10 minutes BID yesterday. Had some nausea with second dangle. Failed trial of void x2 yesterday and overnight. Pain controlled. Tolerating diet.    OBJECTIVE  ___________________________________________________  VITAL SIGNS / I&O's   Vital Signs Last 24 Hrs  T(C): 37.1 (29 Sep 2021 04:20), Max: 37.3 (28 Sep 2021 20:45)  T(F): 98.7 (29 Sep 2021 04:20), Max: 99.1 (28 Sep 2021 20:45)  HR: 98 (29 Sep 2021 04:27) (90 - 110)  BP: 107/72 (29 Sep 2021 04:27) (107/72 - 123/79)  BP(mean): 85 (29 Sep 2021 04:27) (80 - 94)  RR: 18 (29 Sep 2021 04:27) (18 - 20)  SpO2: 97% (29 Sep 2021 04:27) (95% - 97%)      28 Sep 2021 07:01  -  29 Sep 2021 07:00  --------------------------------------------------------  IN:    IV PiggyBack: 50 mL    IV PiggyBack: 600 mL    Oral Fluid: 920 mL  Total IN: 1570 mL    OUT:    Drain (mL): 18 mL    Indwelling Catheter - Urethral (mL): 100 mL    Intermittent Catheterization - Urethral (mL): 1550 mL  Total OUT: 1668 mL    Total NET: -98 mL        ___________________________________________________  PHYSICAL EXAM    -- CONSTITUTIONAL: NAD  -- NEURO: Awake, alert  -- PULM: Non-labored respirations  -- EXTREMITIES:   LEFT THIGH: gracilis flap donor site incision c/d/i, dermabond prineo in place, area soft, no collection appreciated.  drain serous with scant output. Left thigh skin graft donor site dressing in place: xeroform and telfa  LEFT LOWER LEG: splint in place held loosely with ACE wrap. Gracilis flap skin paddle soft, warm, distal half with demarcated area of dyspigmentation, evidence of ischemia at distal edge. Applied bacitracin. Proximal half with 2-3 sec capillary refill, appropriate color. Arterial pencil doppler signal present. Skin graft taking well on muscle, remains moist. Applied xeroform and bacitracin.  LEFT FOOT: digits warm, well perfused  LEFT HEEL: small area of non-blanching erythema appears stable, continuing Allevyn dressing    ___________________________________________________  LABS          PT/INR - ( 29 Sep 2021 05:38 )   PT: 16.5 sec;   INR: 1.40          PTT - ( 29 Sep 2021 05:38 )  PTT:37.0 sec  CAPILLARY BLOOD GLUCOSE      POCT Blood Glucose.: 93 mg/dL (28 Sep 2021 09:36)          ___________________________________________________  MEDICATIONS  (STANDING):  acetaminophen   Tablet .. 975 milliGRAM(s) Oral every 6 hours  BACItracin   Ointment 1 Application(s) Topical daily  calcium carbonate   1250 mG (OsCal) 1 Tablet(s) Oral daily  cefTRIAXone   IVPB 2000 milliGRAM(s) IV Intermittent every 24 hours  chlorhexidine 2% Cloths 1 Application(s) Topical <User Schedule>  cholecalciferol 1000 Unit(s) Oral daily  enoxaparin Injectable 40 milliGRAM(s) SubCutaneous daily  gabapentin 300 milliGRAM(s) Oral every 8 hours  influenza   Vaccine 0.5 milliLiter(s) IntraMuscular once  multivitamin 1 Tablet(s) Oral daily  nafcillin  IVPB 2 Gram(s) IV Intermittent every 4 hours  polyethylene glycol 3350 17 Gram(s) Oral two times a day  senna 2 Tablet(s) Oral at bedtime    MEDICATIONS  (PRN):  bisacodyl Suppository 10 milliGRAM(s) Rectal daily PRN Constipation  HYDROmorphone  Injectable 0.5 milliGRAM(s) IV Push every 4 hours PRN Breakthrough pain  metoclopramide Injectable 10 milliGRAM(s) IV Push every 6 hours PRN Nausea and/or Vomiting  ondansetron Injectable 4 milliGRAM(s) IV Push every 6 hours PRN Nausea and/or Vomiting  oxyCODONE    IR 5 milliGRAM(s) Oral every 4 hours PRN Moderate Pain (4 - 6)  oxyCODONE    IR 10 milliGRAM(s) Oral every 4 hours PRN Severe Pain (7 - 10)  sodium chloride 0.9% lock flush 10 milliLiter(s) IV Push every 1 hour PRN Pre/post blood products, medications, blood draw, and to maintain line patency

## 2021-09-29 NOTE — CONSULT NOTE ADULT - SUBJECTIVE AND OBJECTIVE BOX
Patient is a 26y old  Female who presents with a chief complaint of L ankle pain (29 Sep 2021 08:37)      HPI:  25 yo F who originally had a left ankle ORIF in [facility] where she lives in March 2021 after a skateboarding accident. She has had wound drainage and numbness for a significant amount of time since the procedure. She was seen in the [facility] ER 2 days ago due to worsening ankle pain. She was advised to follow up with Dr. Wallis in his office this morning and after seeing her in his office, patient was sent to the ER for work up and admission for infection. She has been on oral antibiotics prescribed by other providers prior to presenting to [facility] this week. These have not improved her symptoms. She has also noticed numbness and weakness in her left foot since the procedure. This has not improved. She was previously a community ambulator, but now walks with a cane. She has only been taking ibuprofen for pain. She has pain aggravated by movement and weightbearing, alleviated by rest. There is no radiation of pain.  (09 Sep 2021 10:49)       <<<<<<<<<<  NOTE >>>>>>>>>>    Patient is as steted above,  team consulted for Urinary retention s/p failed TOV x 2.  Patient states that prior to admission, she was urinating well and did not have any issues w/ retention.  She states that even after her prior procedures she did not have any issues w/ emptying her bladder.  She states she is ambulatory, and hydrates well.  She denies n/v/f/c, dysuria, hematuria, LUT's, flank pain, Chest pain, SOB.        Vital Signs Last 24 Hrs  T(C): 37.1 (29 Sep 2021 04:20), Max: 37.3 (28 Sep 2021 20:45)  T(F): 98.7 (29 Sep 2021 04:20), Max: 99.1 (28 Sep 2021 20:45)  HR: 94 (29 Sep 2021 08:36) (94 - 110)  BP: 102/66 (29 Sep 2021 08:36) (102/66 - 123/79)  BP(mean): 78 (29 Sep 2021 08:36) (78 - 94)  RR: 18 (29 Sep 2021 08:36) (18 - 20)  SpO2: 95% (29 Sep 2021 08:36) (95% - 97%)  I&O's Summary    28 Sep 2021 07:01  -  29 Sep 2021 07:00  --------------------------------------------------------  IN: 1570 mL / OUT: 1668 mL / NET: -98 mL        PE:  Gen: Bedbound, NAD, alert and oriented x 3   Abd: soft nt/nd. Negative CVAT B/L  : Negative SP tenderness.   JAZMYNE: Deferred.     LABS:          PT/INR - ( 29 Sep 2021 05:38 )   PT: 16.5 sec;   INR: 1.40          PTT - ( 29 Sep 2021 05:38 )  PTT:37.0 sec  Cultures      A/P

## 2021-09-29 NOTE — CONSULT NOTE ADULT - ASSESSMENT
Patient is 26F w/ Urinary retention, w/ multiple failed TOV's, 2/2 to deconditioning and bedbound status.  Patient requires no acute surgical intervention at moment.  Continue st catheter until ambulatory, bowel regiment, and can follow up outpatient Urology clinic.     Recs:  -Continue St  -Pending final recs  Patient is 26F w/ Urinary retention, w/ multiple failed TOV's, 2/2 to deconditioning and bedbound status.  Patient requires no acute surgical intervention at moment.  Continue st catheter until ambulatory, bowel regiment, and can follow up outpatient Urology clinic.     Recs:  -Can TOV in AM 9/30  -If Fails TOV (PVR >150cc) can replace st catheter and discharge w/ st.     Patient is 26F w/ Urinary retention, w/ multiple failed TOV's, 2/2 to deconditioning and bedbound status.  Patient requires no acute surgical intervention at moment.  Continue st catheter until ambulatory, bowel regiment, and can follow up outpatient Urology clinic.     Recs:  -Can TOV in AM 9/30  -If Fails TOV (PVR >150cc) can replace st catheter and discharge with outpt TOV at the  clinic on Friday 10/1   26F w/ no previous urologic hx. Urology consulted for post operative urinary retention. Pt with failed TOV x2 with st placement  by primary team.     Recs:  -Can TOV in AM 9/30  -If Fails TOV (PVR >150cc) can replace st catheter and discharge with outpt TOV at the  clinic on Friday 10/1  - would minimize narcotics as much as tolerable  - bowel regimen     26F w/ no previous urologic hx. Urology consulted for post operative urinary retention. Pt with failed TOV x2 with st placement  by primary team.     Recs:  -Place St catheter  -Discharge with st catheter and outpt TOV at the  clinic on Friday 10/1  - would minimize narcotics as much as tolerable  - bowel regimen  - Plan discussed w/  team.    26F w/ no previous urologic hx. Urology consulted for post operative urinary retention. Pt with failed TOV x2 with st placement  by primary team.     Recs:  -Place St catheter  - would minimize narcotics as much as tolerable  - bowel regimen  - TOV as inpatient if more mobile, decreased narcotic pain medication needs, and having regular bowel movements  - Plan discussed w/  team.

## 2021-09-30 LAB
CRP SERPL-MCNC: 116.7 MG/L — HIGH (ref 0–4)
ERYTHROCYTE [SEDIMENTATION RATE] IN BLOOD: 145 MM/HR — HIGH

## 2021-09-30 RX ADMIN — NAFCILLIN 200 GRAM(S): 10 INJECTION, POWDER, FOR SOLUTION INTRAVENOUS at 18:52

## 2021-09-30 RX ADMIN — Medication 975 MILLIGRAM(S): at 17:50

## 2021-09-30 RX ADMIN — OXYCODONE HYDROCHLORIDE 10 MILLIGRAM(S): 5 TABLET ORAL at 22:42

## 2021-09-30 RX ADMIN — HYDROMORPHONE HYDROCHLORIDE 0.5 MILLIGRAM(S): 2 INJECTION INTRAMUSCULAR; INTRAVENOUS; SUBCUTANEOUS at 19:45

## 2021-09-30 RX ADMIN — GABAPENTIN 300 MILLIGRAM(S): 400 CAPSULE ORAL at 22:18

## 2021-09-30 RX ADMIN — OXYCODONE HYDROCHLORIDE 5 MILLIGRAM(S): 5 TABLET ORAL at 22:42

## 2021-09-30 RX ADMIN — Medication 975 MILLIGRAM(S): at 23:05

## 2021-09-30 RX ADMIN — Medication 1 TABLET(S): at 11:27

## 2021-09-30 RX ADMIN — NAFCILLIN 200 GRAM(S): 10 INJECTION, POWDER, FOR SOLUTION INTRAVENOUS at 11:27

## 2021-09-30 RX ADMIN — Medication 975 MILLIGRAM(S): at 11:27

## 2021-09-30 RX ADMIN — Medication 1000 UNIT(S): at 11:27

## 2021-09-30 RX ADMIN — Medication 975 MILLIGRAM(S): at 11:26

## 2021-09-30 RX ADMIN — Medication 975 MILLIGRAM(S): at 06:29

## 2021-09-30 RX ADMIN — OXYCODONE HYDROCHLORIDE 10 MILLIGRAM(S): 5 TABLET ORAL at 03:06

## 2021-09-30 RX ADMIN — HYDROMORPHONE HYDROCHLORIDE 0.5 MILLIGRAM(S): 2 INJECTION INTRAMUSCULAR; INTRAVENOUS; SUBCUTANEOUS at 05:11

## 2021-09-30 RX ADMIN — GABAPENTIN 300 MILLIGRAM(S): 400 CAPSULE ORAL at 05:59

## 2021-09-30 RX ADMIN — Medication 975 MILLIGRAM(S): at 00:50

## 2021-09-30 RX ADMIN — OXYCODONE HYDROCHLORIDE 5 MILLIGRAM(S): 5 TABLET ORAL at 22:26

## 2021-09-30 RX ADMIN — Medication 975 MILLIGRAM(S): at 23:32

## 2021-09-30 RX ADMIN — NAFCILLIN 200 GRAM(S): 10 INJECTION, POWDER, FOR SOLUTION INTRAVENOUS at 02:55

## 2021-09-30 RX ADMIN — Medication 975 MILLIGRAM(S): at 05:59

## 2021-09-30 RX ADMIN — OXYCODONE HYDROCHLORIDE 10 MILLIGRAM(S): 5 TABLET ORAL at 21:24

## 2021-09-30 RX ADMIN — NAFCILLIN 200 GRAM(S): 10 INJECTION, POWDER, FOR SOLUTION INTRAVENOUS at 06:25

## 2021-09-30 RX ADMIN — Medication 1 APPLICATION(S): at 11:27

## 2021-09-30 RX ADMIN — Medication 975 MILLIGRAM(S): at 09:13

## 2021-09-30 RX ADMIN — CEFTRIAXONE 100 MILLIGRAM(S): 500 INJECTION, POWDER, FOR SOLUTION INTRAMUSCULAR; INTRAVENOUS at 23:04

## 2021-09-30 RX ADMIN — NAFCILLIN 200 GRAM(S): 10 INJECTION, POWDER, FOR SOLUTION INTRAVENOUS at 22:18

## 2021-09-30 RX ADMIN — CHLORHEXIDINE GLUCONATE 1 APPLICATION(S): 213 SOLUTION TOPICAL at 06:37

## 2021-09-30 RX ADMIN — NAFCILLIN 200 GRAM(S): 10 INJECTION, POWDER, FOR SOLUTION INTRAVENOUS at 15:20

## 2021-09-30 RX ADMIN — OXYCODONE HYDROCHLORIDE 10 MILLIGRAM(S): 5 TABLET ORAL at 02:36

## 2021-09-30 RX ADMIN — Medication 975 MILLIGRAM(S): at 17:51

## 2021-09-30 RX ADMIN — GABAPENTIN 300 MILLIGRAM(S): 400 CAPSULE ORAL at 15:20

## 2021-09-30 RX ADMIN — Medication 975 MILLIGRAM(S): at 00:20

## 2021-09-30 RX ADMIN — HYDROMORPHONE HYDROCHLORIDE 0.5 MILLIGRAM(S): 2 INJECTION INTRAMUSCULAR; INTRAVENOUS; SUBCUTANEOUS at 04:41

## 2021-09-30 RX ADMIN — ENOXAPARIN SODIUM 40 MILLIGRAM(S): 100 INJECTION SUBCUTANEOUS at 11:27

## 2021-09-30 NOTE — PROGRESS NOTE ADULT - ASSESSMENT
ASSESSMENT & PLAN    26F s/p L ankle recon with L gracilis free flap and STSG (9/20).  - flap check q4 hour, monitor clinically and with pencil doppler  - Bacitracin over xeroform on flap daily and to distal edge of flap  - left thigh skin graft donor site dressing will remain in place until it falls off on its own  - keep Left leg elevated  - Dangle protocol 15 min BID, repeat today  - 9/20 OR Cx: MSSA. PICC 9/21, ceftriaxone, nafcillin until 11/1 per ID recs  - Lovenox/SCD on right leg  - IS  - Reg diet  - pain control  - ECG, if normal remove tele

## 2021-09-30 NOTE — PROGRESS NOTE ADULT - SUBJECTIVE AND OBJECTIVE BOX
SUBJECTIVE:  Had brief run of PVC yesterday that were palpable. She says this has never happened before. Otherwise no other overnight events. She ended up having one 30 minute dangling period yesterday which she tolerated. She is now voiding.     OBJECTIVE:     Vital Signs Last 24 Hrs  T(C): 36.9 (30 Sep 2021 04:47), Max: 37.1 (29 Sep 2021 17:32)  T(F): 98.4 (30 Sep 2021 04:47), Max: 98.8 (29 Sep 2021 17:32)  HR: 90 (30 Sep 2021 04:42) (90 - 100)  BP: 109/66 (30 Sep 2021 04:42) (102/66 - 117/62)  BP(mean): 82 (30 Sep 2021 04:42) (78 - 85)  RR: 20 (30 Sep 2021 04:42) (18 - 20)  SpO2: 95% (30 Sep 2021 04:42) (95% - 99%)      29 Sep 2021 07:01  -  30 Sep 2021 07:00  --------------------------------------------------------  IN:    IV PiggyBack: 50 mL    IV PiggyBack: 500 mL    Oral Fluid: 200 mL  Total IN: 750 mL    OUT:    Drain (mL): 0 mL    Voided (mL): 600 mL  Total OUT: 600 mL    Total NET: 150 mL    PHYSICAL EXAM    CONSTITUTIONAL: NAD  NEURO: Awake, alert  PULM: Non-labored respirations  EXTREMITIES:   LEFT THIGH: gracilis flap donor site incision c/d/i, dermabond prineo in place, area soft, no collection appreciated.  drain serous with scant output. Left thigh skin graft donor site dressing in place: xeroform and telfa  LEFT LOWER LEG: splint in place held loosely with ACE wrap. Gracilis flap skin paddle soft, warm, distal half with demarcated area of dyspigmentation appearing more dusky then yesterday, evidence of ischemia at distal edge. Slow red blood flow to pin prick. Applied bacitracin. Proximal half with 2-3 sec capillary refill, appropriate color. Arterial pencil doppler signal present. Skin graft taking well on muscle, remains moist. Applied xeroform and bacitracin.  LEFT FOOT: digits warm, well perfused  LEFT HEEL: small area of non-blanching erythema appears stable, continuing Allevyn dressing    LABS    PT/INR - ( 29 Sep 2021 05:38 )   PT: 16.5 sec;   INR: 1.40        PTT - ( 29 Sep 2021 05:38 )  PTT:37.0 sec    IMAGING  None    MEDICATIONS  (STANDING):  acetaminophen   Tablet .. 975 milliGRAM(s) Oral every 6 hours  BACItracin   Ointment 1 Application(s) Topical daily  calcium carbonate   1250 mG (OsCal) 1 Tablet(s) Oral daily  cefTRIAXone   IVPB 2000 milliGRAM(s) IV Intermittent every 24 hours  chlorhexidine 2% Cloths 1 Application(s) Topical <User Schedule>  cholecalciferol 1000 Unit(s) Oral daily  enoxaparin Injectable 40 milliGRAM(s) SubCutaneous daily  gabapentin 300 milliGRAM(s) Oral every 8 hours  influenza   Vaccine 0.5 milliLiter(s) IntraMuscular once  multivitamin 1 Tablet(s) Oral daily  nafcillin  IVPB 2 Gram(s) IV Intermittent every 4 hours  polyethylene glycol 3350 17 Gram(s) Oral two times a day  senna 2 Tablet(s) Oral at bedtime    MEDICATIONS  (PRN):  bisacodyl Suppository 10 milliGRAM(s) Rectal daily PRN Constipation  HYDROmorphone  Injectable 0.5 milliGRAM(s) IV Push every 4 hours PRN Breakthrough pain  metoclopramide Injectable 10 milliGRAM(s) IV Push every 6 hours PRN Nausea and/or Vomiting  ondansetron Injectable 4 milliGRAM(s) IV Push every 6 hours PRN Nausea and/or Vomiting  oxyCODONE    IR 5 milliGRAM(s) Oral every 4 hours PRN Moderate Pain (4 - 6)  oxyCODONE    IR 10 milliGRAM(s) Oral every 4 hours PRN Severe Pain (7 - 10)  sodium chloride 0.9% lock flush 10 milliLiter(s) IV Push every 1 hour PRN Pre/post blood products, medications, blood draw, and to maintain line patency

## 2021-09-30 NOTE — PROGRESS NOTE ADULT - SUBJECTIVE AND OBJECTIVE BOX
Ortho    Patient doing well post-op. Pain has been stable. Denies heel pain or tenderness throughout the day and overnight.   Denies CP, SOB, N/V, numbness/tingling     Vital Signs Last 24 Hrs  T(C): 36.9 (30 Sep 2021 04:47), Max: 37.1 (29 Sep 2021 17:32)  T(F): 98.4 (30 Sep 2021 04:47), Max: 98.8 (29 Sep 2021 17:32)  HR: 90 (30 Sep 2021 04:42) (90 - 100)  BP: 109/66 (30 Sep 2021 04:42) (102/66 - 117/62)  BP(mean): 82 (30 Sep 2021 04:42) (78 - 85)  RR: 20 (30 Sep 2021 04:42) (18 - 20)  SpO2: 95% (30 Sep 2021 04:42) (95% - 99%)    General: Pt Alert and oriented, NAD  L thigh and L foot DSG C/D/I  Heel pressure ulcer stable, well padded with allevyn dressing   Pulses: toes wwp  Sensation: Absent sensation over the 1st and 2nd toes, decreased sensation over the 3rd-5th toes  Motor: 0/0 L  EHL, 3/5 FHL/GS    A/P: 26yFemale s/p Repeat L Ankle I&D, Vac Change by Dr. NEAL Wallis on 09-17, with plastic surgery for flap harvest and closure with plastic surgery (9/2)    - PICC placed, placed for IV abx x 6 weeks   - Continue elevation, defer dangle protocol per plastics   - Dressing evaluation and drain management per plastics team   - OR cx - staph aureus in 1 culture - continue abx as planned per ID.   - Pain Control  - Post op abx: Ceftriaxone + Nafcillin  - DVT ppx: Lovenox  - WBS: NWB LLE  - Plastics primary, ortho to peripherally follow

## 2021-10-01 LAB
BASOPHILS # BLD AUTO: 0.02 K/UL — SIGNIFICANT CHANGE UP (ref 0–0.2)
BASOPHILS NFR BLD AUTO: 0.5 % — SIGNIFICANT CHANGE UP (ref 0–2)
CULTURE RESULTS: NO GROWTH — SIGNIFICANT CHANGE UP
EOSINOPHIL # BLD AUTO: 0.15 K/UL — SIGNIFICANT CHANGE UP (ref 0–0.5)
EOSINOPHIL NFR BLD AUTO: 3.6 % — SIGNIFICANT CHANGE UP (ref 0–6)
HCT VFR BLD CALC: 27.3 % — LOW (ref 34.5–45)
HGB BLD-MCNC: 8.7 G/DL — LOW (ref 11.5–15.5)
IMM GRANULOCYTES NFR BLD AUTO: 0.5 % — SIGNIFICANT CHANGE UP (ref 0–1.5)
LYMPHOCYTES # BLD AUTO: 0.94 K/UL — LOW (ref 1–3.3)
LYMPHOCYTES # BLD AUTO: 22.6 % — SIGNIFICANT CHANGE UP (ref 13–44)
MCHC RBC-ENTMCNC: 29.5 PG — SIGNIFICANT CHANGE UP (ref 27–34)
MCHC RBC-ENTMCNC: 31.9 GM/DL — LOW (ref 32–36)
MCV RBC AUTO: 92.5 FL — SIGNIFICANT CHANGE UP (ref 80–100)
MONOCYTES # BLD AUTO: 0.41 K/UL — SIGNIFICANT CHANGE UP (ref 0–0.9)
MONOCYTES NFR BLD AUTO: 9.9 % — SIGNIFICANT CHANGE UP (ref 2–14)
NEUTROPHILS # BLD AUTO: 2.62 K/UL — SIGNIFICANT CHANGE UP (ref 1.8–7.4)
NEUTROPHILS NFR BLD AUTO: 62.9 % — SIGNIFICANT CHANGE UP (ref 43–77)
NRBC # BLD: 0 /100 WBCS — SIGNIFICANT CHANGE UP (ref 0–0)
PLATELET # BLD AUTO: 415 K/UL — HIGH (ref 150–400)
RBC # BLD: 2.95 M/UL — LOW (ref 3.8–5.2)
RBC # FLD: 15.3 % — HIGH (ref 10.3–14.5)
SPECIMEN SOURCE: SIGNIFICANT CHANGE UP
WBC # BLD: 4.16 K/UL — SIGNIFICANT CHANGE UP (ref 3.8–10.5)
WBC # FLD AUTO: 4.16 K/UL — SIGNIFICANT CHANGE UP (ref 3.8–10.5)

## 2021-10-01 RX ORDER — DIPHENHYDRAMINE HCL 50 MG
25 CAPSULE ORAL EVERY 4 HOURS
Refills: 0 | Status: DISCONTINUED | OUTPATIENT
Start: 2021-10-01 | End: 2021-10-25

## 2021-10-01 RX ADMIN — CHLORHEXIDINE GLUCONATE 1 APPLICATION(S): 213 SOLUTION TOPICAL at 05:45

## 2021-10-01 RX ADMIN — GABAPENTIN 300 MILLIGRAM(S): 400 CAPSULE ORAL at 05:42

## 2021-10-01 RX ADMIN — Medication 975 MILLIGRAM(S): at 07:34

## 2021-10-01 RX ADMIN — OXYCODONE HYDROCHLORIDE 5 MILLIGRAM(S): 5 TABLET ORAL at 09:37

## 2021-10-01 RX ADMIN — Medication 1 TABLET(S): at 11:10

## 2021-10-01 RX ADMIN — HYDROMORPHONE HYDROCHLORIDE 0.5 MILLIGRAM(S): 2 INJECTION INTRAMUSCULAR; INTRAVENOUS; SUBCUTANEOUS at 02:44

## 2021-10-01 RX ADMIN — HYDROMORPHONE HYDROCHLORIDE 0.5 MILLIGRAM(S): 2 INJECTION INTRAMUSCULAR; INTRAVENOUS; SUBCUTANEOUS at 07:37

## 2021-10-01 RX ADMIN — Medication 975 MILLIGRAM(S): at 17:45

## 2021-10-01 RX ADMIN — GABAPENTIN 300 MILLIGRAM(S): 400 CAPSULE ORAL at 14:02

## 2021-10-01 RX ADMIN — HYDROMORPHONE HYDROCHLORIDE 0.5 MILLIGRAM(S): 2 INJECTION INTRAMUSCULAR; INTRAVENOUS; SUBCUTANEOUS at 00:25

## 2021-10-01 RX ADMIN — HYDROMORPHONE HYDROCHLORIDE 0.5 MILLIGRAM(S): 2 INJECTION INTRAMUSCULAR; INTRAVENOUS; SUBCUTANEOUS at 05:40

## 2021-10-01 RX ADMIN — NAFCILLIN 100 GRAM(S): 10 INJECTION, POWDER, FOR SOLUTION INTRAVENOUS at 23:08

## 2021-10-01 RX ADMIN — ENOXAPARIN SODIUM 40 MILLIGRAM(S): 100 INJECTION SUBCUTANEOUS at 11:09

## 2021-10-01 RX ADMIN — Medication 975 MILLIGRAM(S): at 05:42

## 2021-10-01 RX ADMIN — NAFCILLIN 200 GRAM(S): 10 INJECTION, POWDER, FOR SOLUTION INTRAVENOUS at 05:41

## 2021-10-01 RX ADMIN — Medication 975 MILLIGRAM(S): at 23:09

## 2021-10-01 RX ADMIN — Medication 975 MILLIGRAM(S): at 11:10

## 2021-10-01 RX ADMIN — OXYCODONE HYDROCHLORIDE 5 MILLIGRAM(S): 5 TABLET ORAL at 18:18

## 2021-10-01 RX ADMIN — NAFCILLIN 100 GRAM(S): 10 INJECTION, POWDER, FOR SOLUTION INTRAVENOUS at 15:10

## 2021-10-01 RX ADMIN — Medication 975 MILLIGRAM(S): at 11:52

## 2021-10-01 RX ADMIN — CEFTRIAXONE 100 MILLIGRAM(S): 500 INJECTION, POWDER, FOR SOLUTION INTRAMUSCULAR; INTRAVENOUS at 22:43

## 2021-10-01 RX ADMIN — Medication 975 MILLIGRAM(S): at 17:18

## 2021-10-01 RX ADMIN — OXYCODONE HYDROCHLORIDE 5 MILLIGRAM(S): 5 TABLET ORAL at 23:09

## 2021-10-01 RX ADMIN — Medication 1000 UNIT(S): at 11:09

## 2021-10-01 RX ADMIN — GABAPENTIN 300 MILLIGRAM(S): 400 CAPSULE ORAL at 21:12

## 2021-10-01 RX ADMIN — Medication 1 TABLET(S): at 11:09

## 2021-10-01 RX ADMIN — NAFCILLIN 200 GRAM(S): 10 INJECTION, POWDER, FOR SOLUTION INTRAVENOUS at 02:54

## 2021-10-01 RX ADMIN — NAFCILLIN 200 GRAM(S): 10 INJECTION, POWDER, FOR SOLUTION INTRAVENOUS at 12:21

## 2021-10-01 RX ADMIN — Medication 1 APPLICATION(S): at 11:10

## 2021-10-01 RX ADMIN — OXYCODONE HYDROCHLORIDE 5 MILLIGRAM(S): 5 TABLET ORAL at 09:32

## 2021-10-01 RX ADMIN — OXYCODONE HYDROCHLORIDE 5 MILLIGRAM(S): 5 TABLET ORAL at 18:37

## 2021-10-01 RX ADMIN — NAFCILLIN 100 GRAM(S): 10 INJECTION, POWDER, FOR SOLUTION INTRAVENOUS at 19:20

## 2021-10-01 NOTE — PROGRESS NOTE ADULT - SUBJECTIVE AND OBJECTIVE BOX
SUBJECTIVE:  Patient was seen and examined this morning by the Plastic Surgery team. She is doing well. No acute overnight events. Voiding, tolerating PO. Tolerated yesterdays dangling.     OBJECTIVE:     VITAL SIGNS / I&O's    Vital Signs Last 24 Hrs  T(C): 37 (01 Oct 2021 04:27), Max: 37.6 (30 Sep 2021 17:23)  T(F): 98.6 (01 Oct 2021 04:27), Max: 99.6 (30 Sep 2021 17:23)  HR: 86 (01 Oct 2021 04:26) (84 - 110)  BP: 106/67 (01 Oct 2021 04:26) (104/60 - 116/71)  BP(mean): 78 (01 Oct 2021 04:26) (77 - 87)  RR: 17 (01 Oct 2021 04:26) (17 - 18)  SpO2: 97% (01 Oct 2021 04:26) (95% - 97%)      30 Sep 2021 07:01  -  01 Oct 2021 07:00  --------------------------------------------------------  IN:    IV PiggyBack: 100 mL    IV PiggyBack: 300 mL  Total IN: 400 mL    OUT:    Voided (mL): 600 mL  Total OUT: 600 mL    Total NET: -200 mL    PHYSICAL EXAM    General: NAD  Extremities: flap soft, warm, some distal tip necrosis with blistering (blisters now ruptured).  Posterior aspect of flap along suture line is ecchymotic.  Evolving ecchymosis of distal and posterior aspect of flap.  Arterial signal intact.  STSG with 100% take. Good blood flow on scratch test.     STSG donor site dry. Flap donor site soft, appropriate TTP. Drain serous.    LABS                        8.7    4.16  )-----------( 415      ( 01 Oct 2021 06:53 )             27.3     MEDICATIONS  (STANDING):  acetaminophen   Tablet .. 975 milliGRAM(s) Oral every 6 hours  BACItracin   Ointment 1 Application(s) Topical daily  calcium carbonate   1250 mG (OsCal) 1 Tablet(s) Oral daily  cefTRIAXone   IVPB 2000 milliGRAM(s) IV Intermittent every 24 hours  chlorhexidine 2% Cloths 1 Application(s) Topical <User Schedule>  cholecalciferol 1000 Unit(s) Oral daily  enoxaparin Injectable 40 milliGRAM(s) SubCutaneous daily  gabapentin 300 milliGRAM(s) Oral every 8 hours  influenza   Vaccine 0.5 milliLiter(s) IntraMuscular once  multivitamin 1 Tablet(s) Oral daily  nafcillin  IVPB 2 Gram(s) IV Intermittent every 4 hours  polyethylene glycol 3350 17 Gram(s) Oral two times a day  senna 2 Tablet(s) Oral at bedtime    MEDICATIONS  (PRN):  bisacodyl Suppository 10 milliGRAM(s) Rectal daily PRN Constipation  HYDROmorphone  Injectable 0.5 milliGRAM(s) IV Push every 4 hours PRN Breakthrough pain  metoclopramide Injectable 10 milliGRAM(s) IV Push every 6 hours PRN Nausea and/or Vomiting  ondansetron Injectable 4 milliGRAM(s) IV Push every 6 hours PRN Nausea and/or Vomiting  oxyCODONE    IR 5 milliGRAM(s) Oral every 4 hours PRN Moderate Pain (4 - 6)  oxyCODONE    IR 10 milliGRAM(s) Oral every 4 hours PRN Severe Pain (7 - 10)  sodium chloride 0.9% lock flush 10 milliLiter(s) IV Push every 1 hour PRN Pre/post blood products, medications, blood draw, and to maintain line patency

## 2021-10-01 NOTE — PROGRESS NOTE ADULT - ASSESSMENT
ASSESSMENT & PLAN    26F s/p L ankle recon with L gracilis free flap and STSG (9/20).  - keep Left leg elevated  - flap check q4 hour  - monitor flap with pencil doppler  - Nitro-BID to flap area Q4 hours  - Bacitracin over xeroform on flap daily  - Reg diet  - PICC 9/21, ceftriaxone, nafcillin - ID following  -  drain care  - bedrest  - MARI st again today  - Lovenox/SCD on right leg  - IS  - Increase dangle protocol to 15 min TID

## 2021-10-01 NOTE — PROGRESS NOTE ADULT - SUBJECTIVE AND OBJECTIVE BOX
Ortho    Patient doing well post-op. Patient dangled 30 min yesterday and tolerated well. Denies heel pain or tenderness throughout the day and overnight.   Denies CP, SOB, N/V, numbness/tingling     Vital Signs Last 24 Hrs  T(C): 37 (01 Oct 2021 04:27), Max: 37.6 (30 Sep 2021 17:23)  T(F): 98.6 (01 Oct 2021 04:27), Max: 99.6 (30 Sep 2021 17:23)  HR: 100 (01 Oct 2021 00:29) (84 - 110)  BP: 104/60 (01 Oct 2021 00:29) (104/60 - 116/71)  BP(mean): 77 (01 Oct 2021 00:29) (77 - 87)  RR: 17 (01 Oct 2021 00:29) (17 - 18)  SpO2: 97% (01 Oct 2021 00:29) (95% - 97%)    General: Pt Alert and oriented, NAD  L thigh and L foot DSG C/D/I  Heel pressure ulcer stable, well padded with allevyn dressing   Pulses: toes wwp  Sensation: Absent sensation over the 1st and 2nd toes, decreased sensation over the 3rd-5th toes  Motor: 0/0 L  EHL, 3/5 FHL/GS    A/P: 26yFemale s/p Repeat L Ankle I&D, Vac Change by Dr. NEAL Wallis on 09-17, with plastic surgery for flap harvest and closure with plastic surgery (9/2)    - PICC placed, placed for IV abx x 6 weeks   - Continue elevation, defer dangle protocol per plastics   - Dressing evaluation and drain management per plastics team   - OR cx - staph aureus in 1 culture - continue abx as planned per ID.   - Pain Control  - Post op abx: Ceftriaxone + Nafcillin  - DVT ppx: Lovenox  - WBS: NWB LLE  - Plastics primary, ortho to peripherally follow

## 2021-10-01 NOTE — PROGRESS NOTE ADULT - ATTENDING COMMENTS
A/P: 26yFemale s/p Repeat L Ankle I&D, Vac Change by Dr. NEAL Wallis on 09-17, with plastic surgery for flap harvest and closure with plastic surgery (9/2)  I discussed the patient's care with Dr. Dunbar, The patients flap is maturing with demarcation ongoing  Her inflammatory markers continue to be elevated but does not correlate with signs of infection.  The patient's compartment syndrome status continues to cause muscle imbalance about the left lower extremity leading to increased equinus sedation and flexion contractures of the long flexors of the toes.  The patient has minimal to no dorsiflexion about her EHL and EDL.  The patient will require operative stabilization of her ankle fracture at 6 weeks post IV antibiotics.    Plan continues to be for flap elevation, revision open reduction internal fixation of trimalleolar ankle fracture malunion nonunion, syndesmosis, deltoid ligament repair versus reconstruction, Achilles tendon lengthening, , possible ringed ex fix to correct equinus and will be done in conjunction with Dr. Dunbar from plastic surgery for flap elevation  PATIENT continues to require custom PRAFO PRIOR TO DISCHARGE TO REHAB that does not compress free flap   PLAN FOR READMISSION ON OCTOBER 16TH MORNING FOR RE-EVAL, ANKLE AND FOOT XRAYS, FOR OR ON OCTOBER 17TH  - PICC placed, placed for IV abx x 6 weeks   - Continue elevation, defer dangle protocol per plastics   - Dressing evaluation and drain management per plastics team   - OR cx - staph aureus in 1 culture - continue abx as planned per ID.   - Pain Control  - Post op abx: Ceftriaxone + Nafcillin  - DVT ppx: Lovenox  - WBS: DIANN RAMOS

## 2021-10-02 RX ADMIN — GABAPENTIN 300 MILLIGRAM(S): 400 CAPSULE ORAL at 13:52

## 2021-10-02 RX ADMIN — NAFCILLIN 100 GRAM(S): 10 INJECTION, POWDER, FOR SOLUTION INTRAVENOUS at 17:09

## 2021-10-02 RX ADMIN — GABAPENTIN 300 MILLIGRAM(S): 400 CAPSULE ORAL at 05:31

## 2021-10-02 RX ADMIN — HYDROMORPHONE HYDROCHLORIDE 0.5 MILLIGRAM(S): 2 INJECTION INTRAMUSCULAR; INTRAVENOUS; SUBCUTANEOUS at 00:28

## 2021-10-02 RX ADMIN — NAFCILLIN 100 GRAM(S): 10 INJECTION, POWDER, FOR SOLUTION INTRAVENOUS at 21:09

## 2021-10-02 RX ADMIN — HYDROMORPHONE HYDROCHLORIDE 0.5 MILLIGRAM(S): 2 INJECTION INTRAMUSCULAR; INTRAVENOUS; SUBCUTANEOUS at 12:16

## 2021-10-02 RX ADMIN — OXYCODONE HYDROCHLORIDE 5 MILLIGRAM(S): 5 TABLET ORAL at 21:46

## 2021-10-02 RX ADMIN — HYDROMORPHONE HYDROCHLORIDE 0.5 MILLIGRAM(S): 2 INJECTION INTRAMUSCULAR; INTRAVENOUS; SUBCUTANEOUS at 12:43

## 2021-10-02 RX ADMIN — CEFTRIAXONE 100 MILLIGRAM(S): 500 INJECTION, POWDER, FOR SOLUTION INTRAMUSCULAR; INTRAVENOUS at 22:53

## 2021-10-02 RX ADMIN — Medication 975 MILLIGRAM(S): at 05:30

## 2021-10-02 RX ADMIN — GABAPENTIN 300 MILLIGRAM(S): 400 CAPSULE ORAL at 21:06

## 2021-10-02 RX ADMIN — Medication 1000 UNIT(S): at 11:15

## 2021-10-02 RX ADMIN — Medication 1 TABLET(S): at 11:16

## 2021-10-02 RX ADMIN — HYDROMORPHONE HYDROCHLORIDE 0.5 MILLIGRAM(S): 2 INJECTION INTRAMUSCULAR; INTRAVENOUS; SUBCUTANEOUS at 19:00

## 2021-10-02 RX ADMIN — CHLORHEXIDINE GLUCONATE 1 APPLICATION(S): 213 SOLUTION TOPICAL at 05:31

## 2021-10-02 RX ADMIN — Medication 1 APPLICATION(S): at 11:15

## 2021-10-02 RX ADMIN — NAFCILLIN 100 GRAM(S): 10 INJECTION, POWDER, FOR SOLUTION INTRAVENOUS at 05:30

## 2021-10-02 RX ADMIN — Medication 975 MILLIGRAM(S): at 18:22

## 2021-10-02 RX ADMIN — OXYCODONE HYDROCHLORIDE 10 MILLIGRAM(S): 5 TABLET ORAL at 04:02

## 2021-10-02 RX ADMIN — HYDROMORPHONE HYDROCHLORIDE 0.5 MILLIGRAM(S): 2 INJECTION INTRAMUSCULAR; INTRAVENOUS; SUBCUTANEOUS at 18:33

## 2021-10-02 RX ADMIN — OXYCODONE HYDROCHLORIDE 5 MILLIGRAM(S): 5 TABLET ORAL at 21:16

## 2021-10-02 RX ADMIN — ENOXAPARIN SODIUM 40 MILLIGRAM(S): 100 INJECTION SUBCUTANEOUS at 11:15

## 2021-10-02 RX ADMIN — NAFCILLIN 100 GRAM(S): 10 INJECTION, POWDER, FOR SOLUTION INTRAVENOUS at 09:08

## 2021-10-02 RX ADMIN — Medication 1 TABLET(S): at 11:15

## 2021-10-02 RX ADMIN — Medication 975 MILLIGRAM(S): at 17:19

## 2021-10-02 RX ADMIN — Medication 975 MILLIGRAM(S): at 12:43

## 2021-10-02 RX ADMIN — ONDANSETRON 4 MILLIGRAM(S): 8 TABLET, FILM COATED ORAL at 12:10

## 2021-10-02 RX ADMIN — NAFCILLIN 100 GRAM(S): 10 INJECTION, POWDER, FOR SOLUTION INTRAVENOUS at 13:44

## 2021-10-02 RX ADMIN — Medication 975 MILLIGRAM(S): at 11:16

## 2021-10-02 NOTE — PROGRESS NOTE ADULT - ASSESSMENT
ASSESSMENT & PLAN    26F s/p L ankle recon with L gracilis free flap and STSG (9/20).    - keep Left leg elevated  - flap check q4 hour  - monitor flap with pencil doppler  - Bacitracin over xeroform on flap daily  - Reg diet  - PICC 9/21, ceftriaxone, nafcillin - ID following  - bedrest  - Lovenox/SCD on right leg  - IS  - Increase dangle protocol to 30 min TID - please place on commode during these times to allow voiding.  trying to avoid st placement    BSmith

## 2021-10-02 NOTE — PROGRESS NOTE ADULT - SUBJECTIVE AND OBJECTIVE BOX
Ortho Note    Pt comfortable without complaints, pain controlled  Denies any new onset symptoms this morning    Vital Signs Last 24 Hrs  T(C): 36.9 (10-02-21 @ 04:47), Max: 36.9 (10-02-21 @ 04:47)  T(F): 98.5 (10-02-21 @ 04:47), Max: 98.5 (10-02-21 @ 04:47)  HR: 86 (10-02-21 @ 08:20) (86 - 86)  BP: 102/64 (10-02-21 @ 08:20) (102/64 - 103/65)  BP(mean): 76 (10-02-21 @ 08:20) (76 - 78)  RR: 18 (10-02-21 @ 08:20) (18 - 18)  SpO2: 97% (10-02-21 @ 08:20) (97% - 98%)    VSS  General: A&Ox3, NAD  LLE: GRAFO brace in place; Anterior soft tissue flap with mildly congested underneath overlying Xeroform; Heel checked with Allevyn in place, removed and no underlying skin ulcer, Allevyn replaced  Pulses: Foot WWP; Cap refill < 2 sec  Sensation: Patient with abnormal/absent sensation over most of the dorsum & plantar aspects of the foot consistent with baseline  Motor: Minimal FHL/FDL, 0/5 EHL/EDL activity consistent with baseline        A/P: 26yFemale s/p repeat ankle I&D by Dr. Wallis on 09/17 w L gracilis free flap and STSG on 09/20  - Stable  - Pain/Nausea Control  - Home meds  - DVT ppx: per plastics team  - WBS: NWB LLE in custom GRAFO  - Flap care per plastics team  - Abx care per plastics team  - Continue plan for staged orthopedic/plastics combined surgical intervention later this month      Ortho Pager [phone]

## 2021-10-02 NOTE — PROGRESS NOTE ADULT - SUBJECTIVE AND OBJECTIVE BOX
Plastic Surgery Progress Note    S: Patient seen and examined.  2 episodes of retention yesterday resulting in straight cath.  abdominal rash / pruritis -> benadryl    Vital Signs Last 24 Hrs  T(C): 36.9 (02 Oct 2021 04:47), Max: 37.3 (01 Oct 2021 22:53)  T(F): 98.5 (02 Oct 2021 04:47), Max: 99.1 (01 Oct 2021 22:53)  HR: 86 (02 Oct 2021 08:20) (86 - 99)  BP: 102/64 (02 Oct 2021 08:20) (102/64 - 128/78)  BP(mean): 76 (02 Oct 2021 08:20) (76 - 94)  RR: 18 (02 Oct 2021 08:20) (18 - 18)  SpO2: 97% (02 Oct 2021 08:20) (97% - 98%)  I&O's Detail    01 Oct 2021 07:01  -  02 Oct 2021 07:00  --------------------------------------------------------  IN:    Oral Fluid: 240 mL  Total IN: 240 mL    OUT:    Intermittent Catheterization - Urethral (mL): 2015 mL  Total OUT: 2015 mL    Total NET: -1775 mL          Physical Exam:  General: Awake and alert, NAD  R thigh: soft, incision intact  RLE: flap stable, soft, STSG with good take, skin paddle demarcating but stable.  audible doppler signal.  PRAFO splint in place.

## 2021-10-03 LAB
CULTURE RESULTS: NO GROWTH — SIGNIFICANT CHANGE UP
SPECIMEN SOURCE: SIGNIFICANT CHANGE UP

## 2021-10-03 RX ORDER — KETOROLAC TROMETHAMINE 30 MG/ML
15 SYRINGE (ML) INJECTION EVERY 6 HOURS
Refills: 0 | Status: DISCONTINUED | OUTPATIENT
Start: 2021-10-03 | End: 2021-10-06

## 2021-10-03 RX ADMIN — Medication 15 MILLIGRAM(S): at 17:51

## 2021-10-03 RX ADMIN — Medication 1 TABLET(S): at 11:57

## 2021-10-03 RX ADMIN — HYDROMORPHONE HYDROCHLORIDE 0.5 MILLIGRAM(S): 2 INJECTION INTRAMUSCULAR; INTRAVENOUS; SUBCUTANEOUS at 23:55

## 2021-10-03 RX ADMIN — GABAPENTIN 300 MILLIGRAM(S): 400 CAPSULE ORAL at 12:00

## 2021-10-03 RX ADMIN — Medication 15 MILLIGRAM(S): at 17:18

## 2021-10-03 RX ADMIN — GABAPENTIN 300 MILLIGRAM(S): 400 CAPSULE ORAL at 06:50

## 2021-10-03 RX ADMIN — HYDROMORPHONE HYDROCHLORIDE 0.5 MILLIGRAM(S): 2 INJECTION INTRAMUSCULAR; INTRAVENOUS; SUBCUTANEOUS at 00:50

## 2021-10-03 RX ADMIN — OXYCODONE HYDROCHLORIDE 10 MILLIGRAM(S): 5 TABLET ORAL at 14:32

## 2021-10-03 RX ADMIN — HYDROMORPHONE HYDROCHLORIDE 0.5 MILLIGRAM(S): 2 INJECTION INTRAMUSCULAR; INTRAVENOUS; SUBCUTANEOUS at 00:20

## 2021-10-03 RX ADMIN — Medication 975 MILLIGRAM(S): at 17:51

## 2021-10-03 RX ADMIN — NAFCILLIN 100 GRAM(S): 10 INJECTION, POWDER, FOR SOLUTION INTRAVENOUS at 10:05

## 2021-10-03 RX ADMIN — Medication 975 MILLIGRAM(S): at 07:21

## 2021-10-03 RX ADMIN — Medication 975 MILLIGRAM(S): at 17:18

## 2021-10-03 RX ADMIN — OXYCODONE HYDROCHLORIDE 5 MILLIGRAM(S): 5 TABLET ORAL at 21:28

## 2021-10-03 RX ADMIN — OXYCODONE HYDROCHLORIDE 5 MILLIGRAM(S): 5 TABLET ORAL at 07:19

## 2021-10-03 RX ADMIN — ENOXAPARIN SODIUM 40 MILLIGRAM(S): 100 INJECTION SUBCUTANEOUS at 11:58

## 2021-10-03 RX ADMIN — HYDROMORPHONE HYDROCHLORIDE 0.5 MILLIGRAM(S): 2 INJECTION INTRAMUSCULAR; INTRAVENOUS; SUBCUTANEOUS at 23:25

## 2021-10-03 RX ADMIN — NAFCILLIN 100 GRAM(S): 10 INJECTION, POWDER, FOR SOLUTION INTRAVENOUS at 17:18

## 2021-10-03 RX ADMIN — HYDROMORPHONE HYDROCHLORIDE 0.5 MILLIGRAM(S): 2 INJECTION INTRAMUSCULAR; INTRAVENOUS; SUBCUTANEOUS at 15:32

## 2021-10-03 RX ADMIN — NAFCILLIN 100 GRAM(S): 10 INJECTION, POWDER, FOR SOLUTION INTRAVENOUS at 13:53

## 2021-10-03 RX ADMIN — NAFCILLIN 100 GRAM(S): 10 INJECTION, POWDER, FOR SOLUTION INTRAVENOUS at 20:58

## 2021-10-03 RX ADMIN — Medication 975 MILLIGRAM(S): at 00:34

## 2021-10-03 RX ADMIN — NAFCILLIN 100 GRAM(S): 10 INJECTION, POWDER, FOR SOLUTION INTRAVENOUS at 05:29

## 2021-10-03 RX ADMIN — GABAPENTIN 300 MILLIGRAM(S): 400 CAPSULE ORAL at 21:03

## 2021-10-03 RX ADMIN — CEFTRIAXONE 100 MILLIGRAM(S): 500 INJECTION, POWDER, FOR SOLUTION INTRAMUSCULAR; INTRAVENOUS at 23:17

## 2021-10-03 RX ADMIN — NAFCILLIN 100 GRAM(S): 10 INJECTION, POWDER, FOR SOLUTION INTRAVENOUS at 00:44

## 2021-10-03 RX ADMIN — CHLORHEXIDINE GLUCONATE 1 APPLICATION(S): 213 SOLUTION TOPICAL at 06:53

## 2021-10-03 RX ADMIN — OXYCODONE HYDROCHLORIDE 5 MILLIGRAM(S): 5 TABLET ORAL at 20:58

## 2021-10-03 RX ADMIN — Medication 975 MILLIGRAM(S): at 06:51

## 2021-10-03 RX ADMIN — Medication 975 MILLIGRAM(S): at 11:56

## 2021-10-03 RX ADMIN — OXYCODONE HYDROCHLORIDE 5 MILLIGRAM(S): 5 TABLET ORAL at 07:49

## 2021-10-03 RX ADMIN — Medication 975 MILLIGRAM(S): at 00:04

## 2021-10-03 RX ADMIN — Medication 25 MILLIGRAM(S): at 15:47

## 2021-10-03 RX ADMIN — Medication 1 TABLET(S): at 12:00

## 2021-10-03 RX ADMIN — HYDROMORPHONE HYDROCHLORIDE 0.5 MILLIGRAM(S): 2 INJECTION INTRAMUSCULAR; INTRAVENOUS; SUBCUTANEOUS at 15:05

## 2021-10-03 RX ADMIN — Medication 975 MILLIGRAM(S): at 12:00

## 2021-10-03 RX ADMIN — Medication 1 APPLICATION(S): at 12:00

## 2021-10-03 RX ADMIN — Medication 1000 UNIT(S): at 11:56

## 2021-10-03 RX ADMIN — OXYCODONE HYDROCHLORIDE 10 MILLIGRAM(S): 5 TABLET ORAL at 13:53

## 2021-10-03 NOTE — PROGRESS NOTE ADULT - SUBJECTIVE AND OBJECTIVE BOX
Plastic Surgery Progress Note    S: Patient seen and examined.  dangled 30 minutes x 3 yesterday, had BM and voided during these times.  felt cold overnight.     Vital Signs Last 24 Hrs  T(C): 36.8 (03 Oct 2021 04:53), Max: 37.2 (02 Oct 2021 20:39)  T(F): 98.2 (03 Oct 2021 04:53), Max: 99 (02 Oct 2021 20:39)  HR: 80 (03 Oct 2021 03:41) (80 - 104)  BP: 104/58 (03 Oct 2021 03:41) (104/58 - 121/77)  BP(mean): 76 (03 Oct 2021 03:41) (76 - 91)  RR: 18 (03 Oct 2021 03:41) (18 - 20)  SpO2: 97% (03 Oct 2021 03:41) (97% - 100%)  I&O's Detail    02 Oct 2021 07:01  -  03 Oct 2021 07:00  --------------------------------------------------------  IN:    IV PiggyBack: 50 mL    IV PiggyBack: 450 mL    Oral Fluid: 80 mL  Total IN: 580 mL    OUT:    Voided (mL): 400 mL  Total OUT: 400 mL    Total NET: 180 mL          Physical Exam:  General: Awake and alert, NAD  RLE: flap stable, soft, STSG with good take, skin paddle demarcating but stable.  audible doppler signal.  PRAFO splint in place.

## 2021-10-03 NOTE — PROGRESS NOTE ADULT - SUBJECTIVE AND OBJECTIVE BOX
Ortho Note    Pt comfortable without complaints, pain controlled  Denies any new onset symptoms this morning    Vital Signs Last 24 Hrs  T(C): 37.2 (03 Oct 2021 20:36), Max: 37.2 (03 Oct 2021 09:18)  T(F): 99 (03 Oct 2021 20:36), Max: 99 (03 Oct 2021 20:36)  HR: 98 (03 Oct 2021 20:20) (80 - 104)  BP: 108/64 (03 Oct 2021 20:20) (104/58 - 121/77)  BP(mean): 78 (03 Oct 2021 20:20) (76 - 91)  RR: 19 (03 Oct 2021 20:20) (18 - 19)  SpO2: 94% (03 Oct 2021 20:20) (94% - 98%)    VSS  General: A&Ox3, NAD  LLE: GRAFO brace in place; Anterior soft tissue flap with persistently congested appearance underneath overlying Xeroform; Heel checked with Allevyn in place, removed and no underlying skin ulcer, Allevyn replaced  Pulses: Foot WWP; Cap refill < 2 sec  Sensation: Patient with abnormal/absent sensation over most of the dorsum & plantar aspects of the foot consistent with baseline  Motor: Minimal FHL/FDL, 0/5 EHL/EDL activity consistent with baseline    Labs:                          A/P: 26yFemale s/p repeat ankle I&D by Dr. Wallis on 09/17 w L gracilis free flap and STSG on 09/20  - Stable  - Pain/Nausea Control  - Home meds  - DVT ppx: per plastics team  - WBS: NWB LLE in custom GRAFO  - Flap care per plastics team  - Abx care per plastics team  - Continue plan for staged orthopedic/plastics combined surgical intervention later this month      Ortho Pager [phone]

## 2021-10-03 NOTE — PROGRESS NOTE ADULT - ATTENDING COMMENTS
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 The patient is currently in a PRAFO for unloading of her left heel.  I had a lengthy discussion with the patient regarding her progressive equinus contracture need for correction of this at a future date.  We discussed potential application of a ringed exfix at the time of definitive conversion on October 18.  The patient agrees to the proposed intervention.  I continue to be concerned about the patient's elevated inflammatory markers however this is likely associated with her chronic osteo and flap maturation.  The patients flap is maturing with demarcation ongoing  Plan continues to be for flap elevation, revision open reduction internal fixation of trimalleolar ankle fracture malunion nonunion, syndesmosis, deltoid ligament repair versus reconstruction, Achilles tendon lengthening, , possible ringed ex fix to correct equinus and will be done in conjunction with Dr. Dunbar from plastic surgery for flap elevation  PLAN FOR READMISSION ON OCTOBER 16TH MORNING FOR RE-EVAL, ANKLE AND FOOT XRAYS, FOR OR ON OCTOBER 18TH  - PICC placed, placed for IV abx x 6 weeks   - Continue elevation, defer dangle protocol per plastics   - Dressing evaluation and drain management per plastics team   - OR cx - staph aureus in 1 culture - continue abx as planned per ID.   - Pain Control  - Post op abx: Ceftriaxone + Nafcillin  - DVT ppx: Lovenox  - WBS: NWB LLE .

## 2021-10-04 LAB
ANION GAP SERPL CALC-SCNC: 10 MMOL/L — SIGNIFICANT CHANGE UP (ref 5–17)
BUN SERPL-MCNC: 9 MG/DL — SIGNIFICANT CHANGE UP (ref 7–23)
CALCIUM SERPL-MCNC: 9.2 MG/DL — SIGNIFICANT CHANGE UP (ref 8.4–10.5)
CHLORIDE SERPL-SCNC: 104 MMOL/L — SIGNIFICANT CHANGE UP (ref 96–108)
CO2 SERPL-SCNC: 25 MMOL/L — SIGNIFICANT CHANGE UP (ref 22–31)
CREAT SERPL-MCNC: 0.79 MG/DL — SIGNIFICANT CHANGE UP (ref 0.5–1.3)
CULTURE RESULTS: NO GROWTH — SIGNIFICANT CHANGE UP
GLUCOSE SERPL-MCNC: 90 MG/DL — SIGNIFICANT CHANGE UP (ref 70–99)
HCT VFR BLD CALC: 27.1 % — LOW (ref 34.5–45)
HGB BLD-MCNC: 8.8 G/DL — LOW (ref 11.5–15.5)
MCHC RBC-ENTMCNC: 29.6 PG — SIGNIFICANT CHANGE UP (ref 27–34)
MCHC RBC-ENTMCNC: 32.5 GM/DL — SIGNIFICANT CHANGE UP (ref 32–36)
MCV RBC AUTO: 91.2 FL — SIGNIFICANT CHANGE UP (ref 80–100)
NRBC # BLD: 0 /100 WBCS — SIGNIFICANT CHANGE UP (ref 0–0)
PLATELET # BLD AUTO: 458 K/UL — HIGH (ref 150–400)
POTASSIUM SERPL-MCNC: 3.3 MMOL/L — LOW (ref 3.5–5.3)
POTASSIUM SERPL-SCNC: 3.3 MMOL/L — LOW (ref 3.5–5.3)
RBC # BLD: 2.97 M/UL — LOW (ref 3.8–5.2)
RBC # FLD: 15.3 % — HIGH (ref 10.3–14.5)
SODIUM SERPL-SCNC: 139 MMOL/L — SIGNIFICANT CHANGE UP (ref 135–145)
SPECIMEN SOURCE: SIGNIFICANT CHANGE UP
WBC # BLD: 3.26 K/UL — LOW (ref 3.8–10.5)
WBC # FLD AUTO: 3.26 K/UL — LOW (ref 3.8–10.5)

## 2021-10-04 PROCEDURE — 73600 X-RAY EXAM OF ANKLE: CPT | Mod: 26,LT

## 2021-10-04 RX ADMIN — NAFCILLIN 100 GRAM(S): 10 INJECTION, POWDER, FOR SOLUTION INTRAVENOUS at 13:21

## 2021-10-04 RX ADMIN — Medication 25 MILLIGRAM(S): at 19:53

## 2021-10-04 RX ADMIN — Medication 15 MILLIGRAM(S): at 18:08

## 2021-10-04 RX ADMIN — Medication 975 MILLIGRAM(S): at 00:08

## 2021-10-04 RX ADMIN — Medication 975 MILLIGRAM(S): at 18:08

## 2021-10-04 RX ADMIN — NAFCILLIN 100 GRAM(S): 10 INJECTION, POWDER, FOR SOLUTION INTRAVENOUS at 17:09

## 2021-10-04 RX ADMIN — Medication 15 MILLIGRAM(S): at 12:30

## 2021-10-04 RX ADMIN — GABAPENTIN 300 MILLIGRAM(S): 400 CAPSULE ORAL at 21:36

## 2021-10-04 RX ADMIN — Medication 1 APPLICATION(S): at 11:22

## 2021-10-04 RX ADMIN — Medication 25 MILLIGRAM(S): at 00:57

## 2021-10-04 RX ADMIN — Medication 975 MILLIGRAM(S): at 06:32

## 2021-10-04 RX ADMIN — Medication 15 MILLIGRAM(S): at 00:38

## 2021-10-04 RX ADMIN — Medication 975 MILLIGRAM(S): at 12:30

## 2021-10-04 RX ADMIN — ENOXAPARIN SODIUM 40 MILLIGRAM(S): 100 INJECTION SUBCUTANEOUS at 11:22

## 2021-10-04 RX ADMIN — NAFCILLIN 100 GRAM(S): 10 INJECTION, POWDER, FOR SOLUTION INTRAVENOUS at 09:25

## 2021-10-04 RX ADMIN — Medication 15 MILLIGRAM(S): at 23:25

## 2021-10-04 RX ADMIN — Medication 975 MILLIGRAM(S): at 00:38

## 2021-10-04 RX ADMIN — Medication 1000 UNIT(S): at 11:21

## 2021-10-04 RX ADMIN — NAFCILLIN 100 GRAM(S): 10 INJECTION, POWDER, FOR SOLUTION INTRAVENOUS at 00:31

## 2021-10-04 RX ADMIN — Medication 975 MILLIGRAM(S): at 11:21

## 2021-10-04 RX ADMIN — OXYCODONE HYDROCHLORIDE 10 MILLIGRAM(S): 5 TABLET ORAL at 13:27

## 2021-10-04 RX ADMIN — CHLORHEXIDINE GLUCONATE 1 APPLICATION(S): 213 SOLUTION TOPICAL at 06:03

## 2021-10-04 RX ADMIN — Medication 975 MILLIGRAM(S): at 17:08

## 2021-10-04 RX ADMIN — Medication 1 TABLET(S): at 11:20

## 2021-10-04 RX ADMIN — HYDROMORPHONE HYDROCHLORIDE 0.5 MILLIGRAM(S): 2 INJECTION INTRAMUSCULAR; INTRAVENOUS; SUBCUTANEOUS at 19:04

## 2021-10-04 RX ADMIN — Medication 15 MILLIGRAM(S): at 06:32

## 2021-10-04 RX ADMIN — Medication 15 MILLIGRAM(S): at 00:08

## 2021-10-04 RX ADMIN — HYDROMORPHONE HYDROCHLORIDE 0.5 MILLIGRAM(S): 2 INJECTION INTRAMUSCULAR; INTRAVENOUS; SUBCUTANEOUS at 19:25

## 2021-10-04 RX ADMIN — CEFTRIAXONE 100 MILLIGRAM(S): 500 INJECTION, POWDER, FOR SOLUTION INTRAMUSCULAR; INTRAVENOUS at 23:23

## 2021-10-04 RX ADMIN — GABAPENTIN 300 MILLIGRAM(S): 400 CAPSULE ORAL at 06:02

## 2021-10-04 RX ADMIN — Medication 15 MILLIGRAM(S): at 11:23

## 2021-10-04 RX ADMIN — OXYCODONE HYDROCHLORIDE 10 MILLIGRAM(S): 5 TABLET ORAL at 14:30

## 2021-10-04 RX ADMIN — Medication 15 MILLIGRAM(S): at 06:02

## 2021-10-04 RX ADMIN — Medication 975 MILLIGRAM(S): at 06:02

## 2021-10-04 RX ADMIN — NAFCILLIN 100 GRAM(S): 10 INJECTION, POWDER, FOR SOLUTION INTRAVENOUS at 21:38

## 2021-10-04 RX ADMIN — NAFCILLIN 100 GRAM(S): 10 INJECTION, POWDER, FOR SOLUTION INTRAVENOUS at 04:59

## 2021-10-04 RX ADMIN — Medication 15 MILLIGRAM(S): at 17:08

## 2021-10-04 RX ADMIN — GABAPENTIN 300 MILLIGRAM(S): 400 CAPSULE ORAL at 14:51

## 2021-10-04 RX ADMIN — Medication 975 MILLIGRAM(S): at 23:24

## 2021-10-04 RX ADMIN — SENNA PLUS 2 TABLET(S): 8.6 TABLET ORAL at 21:36

## 2021-10-04 NOTE — PROGRESS NOTE ADULT - ATTENDING COMMENTS
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 The patient is currently in a PRAFO for unloading of her left heel.  I had a lengthy discussion with the patient regarding her progressive equinus contracture need for correction of this at a future date.  We discussed potential application of a ringed exfix at the time of definitive conversion on October 18.  The patient agrees to the proposed intervention.  I continue to be concerned about the patient's elevated inflammatory markers however this is likely associated with her chronic osteo and flap maturation.  The patients flap is maturing with demarcation ongoing  Plan continues to be for flap elevation, revision open reduction internal fixation of trimalleolar ankle fracture malunion nonunion, syndesmosis, deltoid ligament repair versus reconstruction, Achilles tendon lengthening, , possible ringed ex fix to correct equinus and will be done in conjunction with Dr. Dunbar from plastic surgery for flap elevation  APPRECIATE ID COMMENTARY ON ONGOING ELEVATION OF INFLAMMATORY MARKERS.  PLAN FOR READMISSION ON OCTOBER 16TH MORNING FOR RE-EVAL, ANKLE AND FOOT XRAYS, FOR OR ON OCTOBER 18TH  - PICC placed, placed for IV abx x 6 weeks   - Continue elevation, defer dangle protocol per plastics   - Dressing evaluation and drain management per plastics team   - OR cx - staph aureus in 1 culture - continue abx as planned per ID.   - Pain Control  - Post op abx: Ceftriaxone + Nafcillin  - DVT ppx: Lovenox  - WBS: NWB LLE .

## 2021-10-04 NOTE — CHART NOTE - NSCHARTNOTEFT_GEN_A_CORE
Admitting Diagnosis:   Patient is a 26y old  Female who presents with a chief complaint of L ankle pain (04 Oct 2021 08:05)    PAST MEDICAL & SURGICAL HISTORY:  Left tibial neuropathy  PAD (peripheral artery disease)  Status post ORIF of fracture of ankle      Current Nutrition Order:  Diet, Regular (09-21-21 @ 09:05)    PO Intake: Good (%) [   ]  Fair (50-75%) [ x  ] Poor (<25%) [   ]  -Spoke with pt in room today. States appetite is "off and on", but states tries to eat at least 2 meals per day even if not hungry. Tried LPS and states is "very sweet", but willing to try to drink daily if remembers. Continue to emphasize importance of nutrition and focus on protein food at every meal/snack.     GI Issues: +BM 10/2, abd-soft, + normal bowel sounds  Pain: Denies   Skin Integrity: Left ankle wound (L gracilis free flap and STSG on 9/20)  -Plastics following    Labs:   10-04    139  |  104  |  9   ----------------------------<  90  3.3<L>   |  25  |  0.79    Ca    9.2      04 Oct 2021 07:17    Medications:  MEDICATIONS  (STANDING):  acetaminophen   Tablet .. 975 milliGRAM(s) Oral every 6 hours  BACItracin   Ointment 1 Application(s) Topical daily  calcium carbonate   1250 mG (OsCal) 1 Tablet(s) Oral daily  cefTRIAXone   IVPB 2000 milliGRAM(s) IV Intermittent every 24 hours  chlorhexidine 2% Cloths 1 Application(s) Topical <User Schedule>  cholecalciferol 1000 Unit(s) Oral daily  enoxaparin Injectable 40 milliGRAM(s) SubCutaneous daily  gabapentin 300 milliGRAM(s) Oral every 8 hours  ketorolac   Injectable 15 milliGRAM(s) IV Push every 6 hours  multivitamin 1 Tablet(s) Oral daily  nafcillin  IVPB 2 Gram(s) IV Intermittent every 4 hours  polyethylene glycol 3350 17 Gram(s) Oral two times a day  senna 2 Tablet(s) Oral at bedtime    MEDICATIONS  (PRN):  bisacodyl Suppository 10 milliGRAM(s) Rectal daily PRN Constipation  diphenhydrAMINE 25 milliGRAM(s) Oral every 4 hours PRN Rash and/or Itching  HYDROmorphone  Injectable 0.5 milliGRAM(s) IV Push every 4 hours PRN Breakthrough pain  metoclopramide Injectable 10 milliGRAM(s) IV Push every 6 hours PRN Nausea and/or Vomiting  ondansetron Injectable 4 milliGRAM(s) IV Push every 6 hours PRN Nausea and/or Vomiting  oxyCODONE    IR 5 milliGRAM(s) Oral every 4 hours PRN Moderate Pain (4 - 6)  oxyCODONE    IR 10 milliGRAM(s) Oral every 4 hours PRN Severe Pain (7 - 10)  sodium chloride 0.9% lock flush 10 milliLiter(s) IV Push every 1 hour PRN Pre/post blood products, medications, blood draw, and to maintain line patency    Anthropometrics:  Ht: 175.3cm (69")  Wt: 82.2kg (9/20) BMI: 26.8        86.2kg (9/09)    IBW: 145# (65.9kg)  % IBW: 125%    Weight Change: Per EMR, 4kg (5%) wt loss x 2 weeks (since admit) which is severe per ASPEN standards.   *No new weight to assess since 9/20.     Nutrition Focused Physical Exam: Completed [   ]  Not Pertinent [ x  ]  *No overt signs of muscle wasting. Suspect muscle quality decline r/t limited physical activity     Malnutrition: Moderate malnutrition in the context of acute illness AEB <75% of estimated energy requirement for >7 days, 5% wt loss x 2 weeks (diagnosed 9/27)    Estimated energy needs:  IBW used for calculations as pt >120% of IBW  Needs adjusted for wound healing  Calories: 1650-1950kcals (25-30kcals/kg)  Protein: 95-130g (1.5-2.0g/kg)   Fluid:+1950ml/day (30ml/kcals)    Subjective:   26yFemale s/p Repeat L Ankle I&D, Vac Change by Dr. NEAL Wallis on 09-17, with plastic surgery for flap harvest and closure with plastic surgery (9/2). PICC line with IV abx.     F/u 10/4: Plastics following, reports still having pruritis. Tolerating 30 minutes TID dangling over the weekend. Plan for OR 10/17. Rehab once medically ready. Working with therapies while inpatient.     Previous Nutrition Diagnosis:  Increased nutrient needs (protein) R/T hypermetabolic, increased protein catabolism AEB 1.5-2.0g/kg protein  Active [  x ]  Resolved [   ]    Goal:   Pt to consistently meet % of estimated needs PO     Recommendations:  1. Continue regular diet  -Add LPS BID, Ensure max and Ensure Enlive once daily. Pended order   2. Monitor %PO intake, continue to encourage PO at mealtimes  3. Continue MVI, Add vitamin C and Zinc  4. Bowel regimen PRN  5. Monitor BMP, lytes, replete prn    Education: Continue encourage PO/protein    Risk Level: High [   ] Moderate [ x ] Low [   ].  Will continue to monitor per protocol.   Lisa Dolan RD,CDN,,CNSC

## 2021-10-04 NOTE — PROGRESS NOTE ADULT - SUBJECTIVE AND OBJECTIVE BOX
Ortho Note    Pt comfortable without complaints, pain controlled  Denies any new onset symptoms this morning. Comfortable in grAFO     Vital Signs Last 24 Hrs  T(C): 36.7 (04 Oct 2021 05:40), Max: 37.2 (03 Oct 2021 09:18)  T(F): 98.1 (04 Oct 2021 05:40), Max: 99 (03 Oct 2021 20:36)  HR: 72 (04 Oct 2021 04:16) (72 - 104)  BP: 96/53 (04 Oct 2021 04:16) (96/53 - 119/68)  BP(mean): 69 (04 Oct 2021 04:16) (69 - 86)  RR: 19 (04 Oct 2021 04:16) (18 - 20)  SpO2: 97% (04 Oct 2021 04:16) (94% - 98%)    VSS  General: A&Ox3, NAD  LLE: GRAFO brace in place; Anterior soft tissue flap with overlying Xeroform; Heel checked with Allevyn in place, removed and no underlying skin ulcer, Allevyn replaced  Pulses: Foot WWP; Cap refill < 2 sec  Sensation: Patient with abnormal/absent sensation over most of the dorsum & plantar aspects of the foot consistent with baseline  Motor: Minimal FHL/FDL, 0/5 EHL/EDL activity consistent with baseline      A/P: 26yFemale s/p repeat ankle I&D by Dr. Wallis on 09/17 w L gracilis free flap and STSG on 09/20  - Stable  - Pain/Nausea Control  - Home meds  - DVT ppx: per plastics team  - WBS: NWB LLE in custom GRAFO  - Flap care per plastics team  - Abx care per plastics team  - Continue plan for staged orthopedic/plastics combined surgical intervention later this month      Ortho Pager [phone]   Ortho Note    Pt comfortable without complaints, pain controlled  Denies any new onset symptoms this morning. Comfortable in prAFO     Vital Signs Last 24 Hrs  T(C): 36.7 (04 Oct 2021 05:40), Max: 37.2 (03 Oct 2021 09:18)  T(F): 98.1 (04 Oct 2021 05:40), Max: 99 (03 Oct 2021 20:36)  HR: 72 (04 Oct 2021 04:16) (72 - 104)  BP: 96/53 (04 Oct 2021 04:16) (96/53 - 119/68)  BP(mean): 69 (04 Oct 2021 04:16) (69 - 86)  RR: 19 (04 Oct 2021 04:16) (18 - 20)  SpO2: 97% (04 Oct 2021 04:16) (94% - 98%)    VSS  General: A&Ox3, NAD  LLE: prAFO brace in place; Anterior soft tissue flap with overlying Xeroform; Heel checked with Allevyn in place, removed and no underlying skin ulcer, Allevyn replaced  Pulses: Foot WWP; Cap refill < 2 sec  Sensation: Patient with abnormal/absent sensation over most of the dorsum & plantar aspects of the foot consistent with baseline  Motor: Minimal FHL/FDL, 0/5 EHL/EDL activity consistent with baseline      A/P: 26yFemale s/p repeat ankle I&D by Dr. Wallis on 09/17 w L gracilis free flap and STSG on 09/20  - Stable  - Pain/Nausea Control  - Home meds  - DVT ppx: per plastics team  - WBS: NWB LLE in custom prAFO  - Flap care per plastics team  - Abx care per plastics team  - Continue plan for staged orthopedic/plastics combined surgical intervention later this month      Ortho Pager [phone]

## 2021-10-04 NOTE — PROGRESS NOTE ADULT - ASSESSMENT
ASSESSMENT & PLAN    26F s/p L ankle recon with L gracilis free flap and STSG (9/20).    - keep Left leg elevated  - flap check q4 hour  - monitor flap with pencil doppler  - Bacitracin over xeroform on flap daily  - Reg diet  - PICC 9/21, ceftriaxone, nafcillin - ID following  - bedrest  - Lovenox/SCD on right leg  - IS  - dangle 30 min FOUR times a day - please place on commode during these times to allow voiding.  trying to avoid st placement  - Opioid-sparing analgesia - standing tylenol and toradol   - benadryl prn  - appreciate Ortho recs  - coordinate dc to rehab, readmit for OR 10/17

## 2021-10-04 NOTE — PROGRESS NOTE ADULT - SUBJECTIVE AND OBJECTIVE BOX
SUBJECTIVE:  Patient was seen and examined on rounds this morning by the Plastic Surgery team. No overnight events. Still having pruritis. Tolerated 30min TID dangling over the weekend and has had adequate voiding.     OBJECTIVE:     VITAL SIGNS / I&O's    Vital Signs Last 24 Hrs  T(C): 36.7 (04 Oct 2021 05:40), Max: 37.2 (03 Oct 2021 09:18)  T(F): 98.1 (04 Oct 2021 05:40), Max: 99 (03 Oct 2021 20:36)  HR: 72 (04 Oct 2021 04:16) (72 - 104)  BP: 96/53 (04 Oct 2021 04:16) (96/53 - 119/68)  BP(mean): 69 (04 Oct 2021 04:16) (69 - 86)  RR: 19 (04 Oct 2021 04:16) (18 - 20)  SpO2: 97% (04 Oct 2021 04:16) (94% - 98%)    03 Oct 2021 07:01  -  04 Oct 2021 07:00  --------------------------------------------------------  IN:    IV PiggyBack: 50 mL    IV PiggyBack: 100 mL    Oral Fluid: 80 mL  Total IN: 230 mL    OUT:    Voided (mL): 650 mL  Total OUT: 650 mL    Total NET: -420 mL    PHYSICAL EXAM     General: Awake and alert, NAD  RLE: flap stable, soft, STSG with good take, skin paddle demarcating but stable.  audible doppler signal.  PRAFO splint in place.      LABS               8.8    3.26  )-----------( 458      ( 04 Oct 2021 07:17 )             27.1     04 Oct 2021 07:17    139    |  104    |  9      ----------------------------<  90     3.3     |  25     |  0.79     Ca    9.2        04 Oct 2021 07:17    MEDICATIONS  (STANDING):  acetaminophen   Tablet .. 975 milliGRAM(s) Oral every 6 hours  BACItracin   Ointment 1 Application(s) Topical daily  calcium carbonate   1250 mG (OsCal) 1 Tablet(s) Oral daily  cefTRIAXone   IVPB 2000 milliGRAM(s) IV Intermittent every 24 hours  chlorhexidine 2% Cloths 1 Application(s) Topical <User Schedule>  cholecalciferol 1000 Unit(s) Oral daily  enoxaparin Injectable 40 milliGRAM(s) SubCutaneous daily  gabapentin 300 milliGRAM(s) Oral every 8 hours  ketorolac   Injectable 15 milliGRAM(s) IV Push every 6 hours  multivitamin 1 Tablet(s) Oral daily  nafcillin  IVPB 2 Gram(s) IV Intermittent every 4 hours  polyethylene glycol 3350 17 Gram(s) Oral two times a day  senna 2 Tablet(s) Oral at bedtime    MEDICATIONS  (PRN):  bisacodyl Suppository 10 milliGRAM(s) Rectal daily PRN Constipation  diphenhydrAMINE 25 milliGRAM(s) Oral every 4 hours PRN Rash and/or Itching  HYDROmorphone  Injectable 0.5 milliGRAM(s) IV Push every 4 hours PRN Breakthrough pain  metoclopramide Injectable 10 milliGRAM(s) IV Push every 6 hours PRN Nausea and/or Vomiting  ondansetron Injectable 4 milliGRAM(s) IV Push every 6 hours PRN Nausea and/or Vomiting  oxyCODONE    IR 5 milliGRAM(s) Oral every 4 hours PRN Moderate Pain (4 - 6)  oxyCODONE    IR 10 milliGRAM(s) Oral every 4 hours PRN Severe Pain (7 - 10)  sodium chloride 0.9% lock flush 10 milliLiter(s) IV Push every 1 hour PRN Pre/post blood products, medications, blood draw, and to maintain line patency   SUBJECTIVE:  Patient was seen and examined on rounds this morning by the Plastic Surgery team. No overnight events. Still having pruritis. Tolerated 30min TID dangling over the weekend and has had adequate voiding.     OBJECTIVE:     VITAL SIGNS / I&O's    Vital Signs Last 24 Hrs  T(C): 36.7 (04 Oct 2021 05:40), Max: 37.2 (03 Oct 2021 09:18)  T(F): 98.1 (04 Oct 2021 05:40), Max: 99 (03 Oct 2021 20:36)  HR: 72 (04 Oct 2021 04:16) (72 - 104)  BP: 96/53 (04 Oct 2021 04:16) (96/53 - 119/68)  BP(mean): 69 (04 Oct 2021 04:16) (69 - 86)  RR: 19 (04 Oct 2021 04:16) (18 - 20)  SpO2: 97% (04 Oct 2021 04:16) (94% - 98%)    03 Oct 2021 07:01  -  04 Oct 2021 07:00  --------------------------------------------------------  IN:    IV PiggyBack: 50 mL    IV PiggyBack: 100 mL    Oral Fluid: 80 mL  Total IN: 230 mL    OUT:    Voided (mL): 650 mL  Total OUT: 650 mL    Total NET: -420 mL    PHYSICAL EXAM     General: Awake and alert, NAD  RLE: flap stable, soft, STSG with good take, skin paddle demarcating but stable. Xeroform changed, baci applied. Audible doppler signal.  PRAFO splint in place.      LABS               8.8    3.26  )-----------( 458      ( 04 Oct 2021 07:17 )             27.1     04 Oct 2021 07:17    139    |  104    |  9      ----------------------------<  90     3.3     |  25     |  0.79     Ca    9.2        04 Oct 2021 07:17    MEDICATIONS  (STANDING):  acetaminophen   Tablet .. 975 milliGRAM(s) Oral every 6 hours  BACItracin   Ointment 1 Application(s) Topical daily  calcium carbonate   1250 mG (OsCal) 1 Tablet(s) Oral daily  cefTRIAXone   IVPB 2000 milliGRAM(s) IV Intermittent every 24 hours  chlorhexidine 2% Cloths 1 Application(s) Topical <User Schedule>  cholecalciferol 1000 Unit(s) Oral daily  enoxaparin Injectable 40 milliGRAM(s) SubCutaneous daily  gabapentin 300 milliGRAM(s) Oral every 8 hours  ketorolac   Injectable 15 milliGRAM(s) IV Push every 6 hours  multivitamin 1 Tablet(s) Oral daily  nafcillin  IVPB 2 Gram(s) IV Intermittent every 4 hours  polyethylene glycol 3350 17 Gram(s) Oral two times a day  senna 2 Tablet(s) Oral at bedtime    MEDICATIONS  (PRN):  bisacodyl Suppository 10 milliGRAM(s) Rectal daily PRN Constipation  diphenhydrAMINE 25 milliGRAM(s) Oral every 4 hours PRN Rash and/or Itching  HYDROmorphone  Injectable 0.5 milliGRAM(s) IV Push every 4 hours PRN Breakthrough pain  metoclopramide Injectable 10 milliGRAM(s) IV Push every 6 hours PRN Nausea and/or Vomiting  ondansetron Injectable 4 milliGRAM(s) IV Push every 6 hours PRN Nausea and/or Vomiting  oxyCODONE    IR 5 milliGRAM(s) Oral every 4 hours PRN Moderate Pain (4 - 6)  oxyCODONE    IR 10 milliGRAM(s) Oral every 4 hours PRN Severe Pain (7 - 10)  sodium chloride 0.9% lock flush 10 milliLiter(s) IV Push every 1 hour PRN Pre/post blood products, medications, blood draw, and to maintain line patency

## 2021-10-05 LAB
CULTURE RESULTS: NO GROWTH — SIGNIFICANT CHANGE UP
CULTURE RESULTS: NO GROWTH — SIGNIFICANT CHANGE UP
SARS-COV-2 RNA SPEC QL NAA+PROBE: SIGNIFICANT CHANGE UP
SPECIMEN SOURCE: SIGNIFICANT CHANGE UP
SPECIMEN SOURCE: SIGNIFICANT CHANGE UP

## 2021-10-05 RX ORDER — ALTEPLASE 100 MG
2 KIT INTRAVENOUS ONCE
Refills: 0 | Status: DISCONTINUED | OUTPATIENT
Start: 2021-10-05 | End: 2021-10-05

## 2021-10-05 RX ORDER — BACITRACIN ZINC 500 UNIT/G
1 OINTMENT IN PACKET (EA) TOPICAL DAILY
Refills: 0 | Status: DISCONTINUED | OUTPATIENT
Start: 2021-10-05 | End: 2021-10-19

## 2021-10-05 RX ADMIN — GABAPENTIN 300 MILLIGRAM(S): 400 CAPSULE ORAL at 05:34

## 2021-10-05 RX ADMIN — OXYCODONE HYDROCHLORIDE 10 MILLIGRAM(S): 5 TABLET ORAL at 02:14

## 2021-10-05 RX ADMIN — Medication 975 MILLIGRAM(S): at 17:24

## 2021-10-05 RX ADMIN — GABAPENTIN 300 MILLIGRAM(S): 400 CAPSULE ORAL at 22:20

## 2021-10-05 RX ADMIN — OXYCODONE HYDROCHLORIDE 10 MILLIGRAM(S): 5 TABLET ORAL at 22:53

## 2021-10-05 RX ADMIN — NAFCILLIN 100 GRAM(S): 10 INJECTION, POWDER, FOR SOLUTION INTRAVENOUS at 09:42

## 2021-10-05 RX ADMIN — Medication 975 MILLIGRAM(S): at 11:47

## 2021-10-05 RX ADMIN — Medication 15 MILLIGRAM(S): at 06:15

## 2021-10-05 RX ADMIN — GABAPENTIN 300 MILLIGRAM(S): 400 CAPSULE ORAL at 13:42

## 2021-10-05 RX ADMIN — Medication 15 MILLIGRAM(S): at 17:18

## 2021-10-05 RX ADMIN — CHLORHEXIDINE GLUCONATE 1 APPLICATION(S): 213 SOLUTION TOPICAL at 05:36

## 2021-10-05 RX ADMIN — Medication 15 MILLIGRAM(S): at 11:51

## 2021-10-05 RX ADMIN — NAFCILLIN 100 GRAM(S): 10 INJECTION, POWDER, FOR SOLUTION INTRAVENOUS at 00:55

## 2021-10-05 RX ADMIN — OXYCODONE HYDROCHLORIDE 10 MILLIGRAM(S): 5 TABLET ORAL at 16:46

## 2021-10-05 RX ADMIN — ENOXAPARIN SODIUM 40 MILLIGRAM(S): 100 INJECTION SUBCUTANEOUS at 11:49

## 2021-10-05 RX ADMIN — NAFCILLIN 100 GRAM(S): 10 INJECTION, POWDER, FOR SOLUTION INTRAVENOUS at 05:36

## 2021-10-05 RX ADMIN — NAFCILLIN 100 GRAM(S): 10 INJECTION, POWDER, FOR SOLUTION INTRAVENOUS at 13:41

## 2021-10-05 RX ADMIN — Medication 1 TABLET(S): at 11:47

## 2021-10-05 RX ADMIN — OXYCODONE HYDROCHLORIDE 10 MILLIGRAM(S): 5 TABLET ORAL at 23:23

## 2021-10-05 RX ADMIN — Medication 975 MILLIGRAM(S): at 05:35

## 2021-10-05 RX ADMIN — Medication 975 MILLIGRAM(S): at 12:36

## 2021-10-05 RX ADMIN — HYDROMORPHONE HYDROCHLORIDE 0.5 MILLIGRAM(S): 2 INJECTION INTRAMUSCULAR; INTRAVENOUS; SUBCUTANEOUS at 00:56

## 2021-10-05 RX ADMIN — Medication 1 TABLET(S): at 11:48

## 2021-10-05 RX ADMIN — Medication 1000 UNIT(S): at 11:47

## 2021-10-05 RX ADMIN — Medication 15 MILLIGRAM(S): at 17:24

## 2021-10-05 RX ADMIN — Medication 15 MILLIGRAM(S): at 12:30

## 2021-10-05 RX ADMIN — OXYCODONE HYDROCHLORIDE 10 MILLIGRAM(S): 5 TABLET ORAL at 16:44

## 2021-10-05 RX ADMIN — NAFCILLIN 100 GRAM(S): 10 INJECTION, POWDER, FOR SOLUTION INTRAVENOUS at 21:57

## 2021-10-05 RX ADMIN — HYDROMORPHONE HYDROCHLORIDE 0.5 MILLIGRAM(S): 2 INJECTION INTRAMUSCULAR; INTRAVENOUS; SUBCUTANEOUS at 01:15

## 2021-10-05 RX ADMIN — Medication 1 APPLICATION(S): at 11:56

## 2021-10-05 RX ADMIN — Medication 15 MILLIGRAM(S): at 05:35

## 2021-10-05 RX ADMIN — Medication 15 MILLIGRAM(S): at 00:20

## 2021-10-05 RX ADMIN — OXYCODONE HYDROCHLORIDE 10 MILLIGRAM(S): 5 TABLET ORAL at 10:49

## 2021-10-05 RX ADMIN — CEFTRIAXONE 100 MILLIGRAM(S): 500 INJECTION, POWDER, FOR SOLUTION INTRAMUSCULAR; INTRAVENOUS at 23:48

## 2021-10-05 RX ADMIN — Medication 975 MILLIGRAM(S): at 00:20

## 2021-10-05 RX ADMIN — NAFCILLIN 100 GRAM(S): 10 INJECTION, POWDER, FOR SOLUTION INTRAVENOUS at 17:02

## 2021-10-05 RX ADMIN — Medication 975 MILLIGRAM(S): at 06:15

## 2021-10-05 RX ADMIN — Medication 975 MILLIGRAM(S): at 17:19

## 2021-10-05 RX ADMIN — SENNA PLUS 2 TABLET(S): 8.6 TABLET ORAL at 22:20

## 2021-10-05 RX ADMIN — OXYCODONE HYDROCHLORIDE 10 MILLIGRAM(S): 5 TABLET ORAL at 09:49

## 2021-10-05 NOTE — PROVIDER CONTACT NOTE (OTHER) - ASSESSMENT
Patient's flap felt cooler than surrounding extremity temperature since last assessment.  Arterial pulse present.
Red side of the double lumen PICC dose not flush. Attempted to flush with NS flush. No pain advised from patient.
Pt denies any dizziness, SOB, chest pain.

## 2021-10-05 NOTE — PROGRESS NOTE ADULT - ASSESSMENT
26F s/p L ankle recon with L gracilis free flap and STSG (9/20).    - keep Left leg elevated  - flap check q4 hour  - monitor flap with pencil doppler  - Bacitracin over xeroform on flap daily  - Reg diet  - PICC 9/21, ceftriaxone, nafcillin - ID following  - PICC team to evaluate PICC line  - bedrest  - Lovenox/SCD on right leg  - IS  - dangle 30 min FOUR times a day - please place on commode during these times to allow voiding.  trying to avoid st placement  - Opioid-sparing analgesia - standing tylenol and toradol   - benadryl prn  - appreciate Ortho recs  - coordinate dc to rehab, readmit for OR 10/17

## 2021-10-05 NOTE — PROGRESS NOTE ADULT - ATTENDING COMMENTS
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 The patient is currently in a PRAFO for unloading of her left heel.     Plan continues to be for flap elevation, revision open reduction internal fixation of trimalleolar ankle fracture malunion nonunion, syndesmosis, deltoid ligament repair versus reconstruction, Achilles tendon lengthening, , possible ringed ex fix to correct equinus and will be done in conjunction with Dr. Dunbar from plastic surgery for flap elevation    APPRECIATE ID COMMENTARY ON ONGOING ELEVATION OF INFLAMMATORY MARKERS.  PLAN FOR READMISSION ON OCTOBER 16TH MORNING FOR RE-EVAL, ANKLE AND FOOT XRAYS, FOR OR ON OCTOBER 18TH  WILL PLAN TO DISCUSS WITH PATIENTS MOM THE EXPECTATIONS OVER THE NEXT COUPLE MONTHS THIS WEEK PENDING COORDINATION OF   - PICC placed, placed for IV abx x 6 weeks   - Continue elevation, defer dangle protocol per plastics   - Dressing evaluation and drain management per plastics team   - OR cx - staph aureus in 1 culture - continue abx as planned per ID.   - Pain Control  - Post op abx: Ceftriaxone + Nafcillin  - DVT ppx: Lovenox  - WBS: NWGERRY RAMOS .

## 2021-10-05 NOTE — PROGRESS NOTE ADULT - SUBJECTIVE AND OBJECTIVE BOX
Ortho Note    Pt comfortable without complaints, pain controlled at foot/ankle. Reports some tenderness over donor site overnight   Denies any new onset symptoms this morning. Comfortable in prAFO     Vital Signs Last 24 Hrs  T(C): 36.4 (05 Oct 2021 04:58), Max: 37 (04 Oct 2021 13:10)  T(F): 97.6 (05 Oct 2021 04:58), Max: 98.6 (04 Oct 2021 13:10)  HR: 82 (05 Oct 2021 04:11) (80 - 96)  BP: 101/65 (05 Oct 2021 04:11) (101/65 - 115/72)  BP(mean): 77 (05 Oct 2021 04:11) (77 - 88)  RR: 18 (05 Oct 2021 04:11) (18 - 18)  SpO2: 99% (05 Oct 2021 04:11) (93% - 99%)      VSS  General: A&Ox3, NAD  LLE: prAFO brace in place; Anterior soft tissue flap with overlying Xeroform; Heel checked with Allevyn in place, removed and no underlying skin ulcer, Allevyn replaced  Pulses: Foot WWP; Cap refill < 2 sec  Sensation: Patient with abnormal/absent sensation over most of the dorsum & plantar aspects of the foot consistent with baseline  Motor: Minimal FHL/FDL, 0/5 EHL/EDL activity consistent with baseline      A/P: 26yFemale s/p repeat ankle I&D by Dr. Wallis on 09/17 w L gracilis free flap and STSG on 09/20  - Stable  - Pain/Nausea Control  - Home meds  - DVT ppx: per plastics team  - WBS: NWB LLE in custom prAFO  - Flap care per plastics team  - Abx care per plastics team  - Continue plan for staged orthopedic/plastics combined surgical intervention later this month  - Dispo: Rehab planning, plan for re-admission for next staged surgery later this month.       Ortho Pager [phone]

## 2021-10-05 NOTE — PROGRESS NOTE ADULT - SUBJECTIVE AND OBJECTIVE BOX
SUBJECTIVE:  No overnight events. PICC line not working properly. Patient doing well. Tolerated dangling yesterday.     OBJECTIVE:     VITAL SIGNS / I&O's    Vital Signs Last 24 Hrs  T(C): 36.4 (05 Oct 2021 04:58), Max: 37 (04 Oct 2021 13:10)  T(F): 97.6 (05 Oct 2021 04:58), Max: 98.6 (04 Oct 2021 13:10)  HR: 82 (05 Oct 2021 04:11) (80 - 96)  BP: 101/65 (05 Oct 2021 04:11) (101/65 - 115/72)  BP(mean): 77 (05 Oct 2021 04:11) (77 - 88)  RR: 18 (05 Oct 2021 04:11) (18 - 18)  SpO2: 99% (05 Oct 2021 04:11) (93% - 99%)      04 Oct 2021 07:01  -  05 Oct 2021 07:00  --------------------------------------------------------  IN:    IV PiggyBack: 150 mL  Total IN: 150 mL    OUT:    Voided (mL): 0 mL  Total OUT: 0 mL    Total NET: 150 mL    PHYSICAL EXAM     General: Awake and alert, NAD  RLE: flap stable, soft, STSG with good take, skin paddle demarcating but stable. Xeroform changed, baci applied. Audible doppler signal.  PRAFO splint in place.      LABS                        8.8    3.26  )-----------( 458      ( 04 Oct 2021 07:17 )             27.1     04 Oct 2021 07:17    139    |  104    |  9      ----------------------------<  90     3.3     |  25     |  0.79     Ca    9.2        04 Oct 2021 07:17    MEDICATIONS  (STANDING):  acetaminophen   Tablet .. 975 milliGRAM(s) Oral every 6 hours  BACItracin   Ointment 1 Application(s) Topical daily  BACItracin   Ointment 1 Application(s) Topical daily  calcium carbonate   1250 mG (OsCal) 1 Tablet(s) Oral daily  cefTRIAXone   IVPB 2000 milliGRAM(s) IV Intermittent every 24 hours  chlorhexidine 2% Cloths 1 Application(s) Topical <User Schedule>  cholecalciferol 1000 Unit(s) Oral daily  enoxaparin Injectable 40 milliGRAM(s) SubCutaneous daily  gabapentin 300 milliGRAM(s) Oral every 8 hours  ketorolac   Injectable 15 milliGRAM(s) IV Push every 6 hours  multivitamin 1 Tablet(s) Oral daily  nafcillin  IVPB 2 Gram(s) IV Intermittent every 4 hours  polyethylene glycol 3350 17 Gram(s) Oral two times a day  senna 2 Tablet(s) Oral at bedtime    MEDICATIONS  (PRN):  bisacodyl Suppository 10 milliGRAM(s) Rectal daily PRN Constipation  diphenhydrAMINE 25 milliGRAM(s) Oral every 4 hours PRN Rash and/or Itching  HYDROmorphone  Injectable 0.5 milliGRAM(s) IV Push every 4 hours PRN Breakthrough pain  metoclopramide Injectable 10 milliGRAM(s) IV Push every 6 hours PRN Nausea and/or Vomiting  ondansetron Injectable 4 milliGRAM(s) IV Push every 6 hours PRN Nausea and/or Vomiting  oxyCODONE    IR 5 milliGRAM(s) Oral every 4 hours PRN Moderate Pain (4 - 6)  oxyCODONE    IR 10 milliGRAM(s) Oral every 4 hours PRN Severe Pain (7 - 10)  sodium chloride 0.9% lock flush 10 milliLiter(s) IV Push every 1 hour PRN Pre/post blood products, medications, blood draw, and to maintain line patency

## 2021-10-05 NOTE — PROVIDER CONTACT NOTE (OTHER) - SITUATION
Flap, palpable assessment on q4h assessement "felt cooler", but not cold.  Arterial doppler check positive and patient with capillary refill.  Patient without complaints.
Pt hemoglobin 7.1 this AM
Red capped PICC line is not flushing. Was able to flush earlier in the shift.

## 2021-10-05 NOTE — PROVIDER CONTACT NOTE (OTHER) - ACTION/TREATMENT ORDERED:
Continue to monitor closely.
No CBC needed as per MD Gaston
MD Bearden and team advised a consult will be placed with the PICC/Central line team.

## 2021-10-06 RX ORDER — HYDROMORPHONE HYDROCHLORIDE 2 MG/ML
0.5 INJECTION INTRAMUSCULAR; INTRAVENOUS; SUBCUTANEOUS EVERY 4 HOURS
Refills: 0 | Status: DISCONTINUED | OUTPATIENT
Start: 2021-10-06 | End: 2021-10-13

## 2021-10-06 RX ADMIN — Medication 15 MILLIGRAM(S): at 06:06

## 2021-10-06 RX ADMIN — Medication 975 MILLIGRAM(S): at 06:21

## 2021-10-06 RX ADMIN — HYDROMORPHONE HYDROCHLORIDE 0.5 MILLIGRAM(S): 2 INJECTION INTRAMUSCULAR; INTRAVENOUS; SUBCUTANEOUS at 02:41

## 2021-10-06 RX ADMIN — NAFCILLIN 100 GRAM(S): 10 INJECTION, POWDER, FOR SOLUTION INTRAVENOUS at 01:56

## 2021-10-06 RX ADMIN — NAFCILLIN 100 GRAM(S): 10 INJECTION, POWDER, FOR SOLUTION INTRAVENOUS at 18:39

## 2021-10-06 RX ADMIN — Medication 1 TABLET(S): at 12:18

## 2021-10-06 RX ADMIN — Medication 15 MILLIGRAM(S): at 12:18

## 2021-10-06 RX ADMIN — NAFCILLIN 100 GRAM(S): 10 INJECTION, POWDER, FOR SOLUTION INTRAVENOUS at 05:50

## 2021-10-06 RX ADMIN — Medication 975 MILLIGRAM(S): at 05:51

## 2021-10-06 RX ADMIN — Medication 975 MILLIGRAM(S): at 01:19

## 2021-10-06 RX ADMIN — Medication 975 MILLIGRAM(S): at 13:10

## 2021-10-06 RX ADMIN — NAFCILLIN 100 GRAM(S): 10 INJECTION, POWDER, FOR SOLUTION INTRAVENOUS at 09:52

## 2021-10-06 RX ADMIN — CHLORHEXIDINE GLUCONATE 1 APPLICATION(S): 213 SOLUTION TOPICAL at 05:51

## 2021-10-06 RX ADMIN — Medication 1000 UNIT(S): at 12:18

## 2021-10-06 RX ADMIN — GABAPENTIN 300 MILLIGRAM(S): 400 CAPSULE ORAL at 22:48

## 2021-10-06 RX ADMIN — Medication 15 MILLIGRAM(S): at 05:51

## 2021-10-06 RX ADMIN — Medication 15 MILLIGRAM(S): at 00:49

## 2021-10-06 RX ADMIN — Medication 15 MILLIGRAM(S): at 01:04

## 2021-10-06 RX ADMIN — CEFTRIAXONE 100 MILLIGRAM(S): 500 INJECTION, POWDER, FOR SOLUTION INTRAMUSCULAR; INTRAVENOUS at 23:41

## 2021-10-06 RX ADMIN — ENOXAPARIN SODIUM 40 MILLIGRAM(S): 100 INJECTION SUBCUTANEOUS at 12:18

## 2021-10-06 RX ADMIN — Medication 1 APPLICATION(S): at 12:17

## 2021-10-06 RX ADMIN — GABAPENTIN 300 MILLIGRAM(S): 400 CAPSULE ORAL at 05:51

## 2021-10-06 RX ADMIN — HYDROMORPHONE HYDROCHLORIDE 0.5 MILLIGRAM(S): 2 INJECTION INTRAMUSCULAR; INTRAVENOUS; SUBCUTANEOUS at 02:26

## 2021-10-06 RX ADMIN — GABAPENTIN 300 MILLIGRAM(S): 400 CAPSULE ORAL at 13:16

## 2021-10-06 RX ADMIN — NAFCILLIN 100 GRAM(S): 10 INJECTION, POWDER, FOR SOLUTION INTRAVENOUS at 22:49

## 2021-10-06 RX ADMIN — Medication 975 MILLIGRAM(S): at 18:38

## 2021-10-06 RX ADMIN — NAFCILLIN 100 GRAM(S): 10 INJECTION, POWDER, FOR SOLUTION INTRAVENOUS at 13:16

## 2021-10-06 RX ADMIN — Medication 975 MILLIGRAM(S): at 12:17

## 2021-10-06 RX ADMIN — Medication 1 TABLET(S): at 12:22

## 2021-10-06 RX ADMIN — Medication 15 MILLIGRAM(S): at 12:50

## 2021-10-06 RX ADMIN — Medication 975 MILLIGRAM(S): at 19:30

## 2021-10-06 RX ADMIN — Medication 975 MILLIGRAM(S): at 00:49

## 2021-10-06 NOTE — PROGRESS NOTE ADULT - ASSESSMENT
26F s/p L ankle recon with L gracilis free flap and STSG (9/20).    - keep Left leg elevated  - dangle 30 min FOUR times a day with pre/post flap checks - please place on commode during these times to allow voiding  - flap check q4 hour  - monitor flap with pencil doppler  - Bacitracin over xeroform on flap daily  - pain control: opioid-sparing analgesia - standing tylenol and toradol, prn oxycodone for moderate and dilaudid for severe   - benadryl prn for pruritis  - Reg diet  - PICC 9/21, ceftriaxone, nafcillin - ID following  - bedrest  - Lovenox/SCD on right leg  - IS  - appreciate Ortho recs  - precautionary isolation for covid exposure, covid pcr 10/5 negative  - pending authorization to rehab  - ortho planning for definitive conversion mid October    Plastics Pager: [phone]

## 2021-10-06 NOTE — PROGRESS NOTE ADULT - SUBJECTIVE AND OBJECTIVE BOX
SUBJECTIVE:  Transferred to [facility] yesterday. Only did 1 dangle x 30min due to room change. On precautionary isolation due to COVID exposure. COVID swab yesterday negative. Had increased ankle pain overnight. Dilaudid re-ordered which helped though still having pain this morning. No issues with PICC reported. Otherwise, doing well from plastics standpoint.    OBJECTIVE:     VITAL SIGNS / I&O's    Vital Signs Last 24 Hrs  T(C): 36.9 (06 Oct 2021 05:20), Max: 37.1 (05 Oct 2021 16:17)  T(F): 98.4 (06 Oct 2021 05:20), Max: 98.7 (05 Oct 2021 16:17)  HR: 82 (06 Oct 2021 05:20) (73 - 86)  BP: 100/65 (06 Oct 2021 05:20) (100/65 - 117/82)  BP(mean): 94 (05 Oct 2021 21:00) (75 - 94)  RR: 18 (06 Oct 2021 05:20) (17 - 18)  SpO2: 96% (06 Oct 2021 05:20) (96% - 98%)      05 Oct 2021 07:01  -  06 Oct 2021 07:00  --------------------------------------------------------  IN:    IV PiggyBack: 50 mL    IV PiggyBack: 50 mL    IV PiggyBack: 200 mL    Oral Fluid: 480 mL  Total IN: 780 mL    OUT:    Voided (mL): 1600 mL  Total OUT: 1600 mL    Total NET: -820 mL    PHYSICAL EXAM     General: Awake and alert, NAD  RLE: flap stable, soft, STSG with good take, skin paddle demarcating but stable. Xeroform changed. STSG donor site crusted as expected. Audible doppler signal. PRAFO splint in place.      LABS               8.8    3.26  )-----------( 458      ( 04 Oct 2021 07:17 )             27.1     04 Oct 2021 07:17    139    |  104    |  9      ----------------------------<  90     3.3     |  25     |  0.79     Ca    9.2        04 Oct 2021 07:17    MEDICATIONS  (STANDING):  acetaminophen   Tablet .. 975 milliGRAM(s) Oral every 6 hours  BACItracin   Ointment 1 Application(s) Topical daily  calcium carbonate   1250 mG (OsCal) 1 Tablet(s) Oral daily  cefTRIAXone   IVPB 2000 milliGRAM(s) IV Intermittent every 24 hours  chlorhexidine 2% Cloths 1 Application(s) Topical <User Schedule>  cholecalciferol 1000 Unit(s) Oral daily  enoxaparin Injectable 40 milliGRAM(s) SubCutaneous daily  gabapentin 300 milliGRAM(s) Oral every 8 hours  ketorolac   Injectable 15 milliGRAM(s) IV Push every 6 hours  multivitamin 1 Tablet(s) Oral daily  nafcillin  IVPB 2 Gram(s) IV Intermittent every 4 hours  polyethylene glycol 3350 17 Gram(s) Oral two times a day  senna 2 Tablet(s) Oral at bedtime    MEDICATIONS  (PRN):  bisacodyl Suppository 10 milliGRAM(s) Rectal daily PRN Constipation  diphenhydrAMINE 25 milliGRAM(s) Oral every 4 hours PRN Rash and/or Itching  HYDROmorphone  Injectable 0.5 milliGRAM(s) IV Push every 4 hours PRN Severe Pain (7 - 10)  metoclopramide Injectable 10 milliGRAM(s) IV Push every 6 hours PRN Nausea and/or Vomiting  ondansetron Injectable 4 milliGRAM(s) IV Push every 6 hours PRN Nausea and/or Vomiting  sodium chloride 0.9% lock flush 10 milliLiter(s) IV Push every 1 hour PRN Pre/post blood products, medications, blood draw, and to maintain line patency

## 2021-10-06 NOTE — PROGRESS NOTE ADULT - SUBJECTIVE AND OBJECTIVE BOX
Ortho Note    Pt comfortable without complaints, pain controlled at foot/ankle.    Denies any new onset symptoms this morning. Comfortable in prAFO     Vital Signs Last 24 Hrs  T(C): 36.9 (06 Oct 2021 05:20), Max: 37.1 (05 Oct 2021 16:17)  T(F): 98.4 (06 Oct 2021 05:20), Max: 98.7 (05 Oct 2021 16:17)  HR: 82 (06 Oct 2021 05:20) (73 - 86)  BP: 100/65 (06 Oct 2021 05:20) (100/65 - 117/82)  BP(mean): 94 (05 Oct 2021 21:00) (75 - 94)  RR: 18 (06 Oct 2021 05:20) (17 - 18)  SpO2: 96% (06 Oct 2021 05:20) (96% - 98%)      VSS  General: A&Ox3, NAD  LLE: prAFO brace in place; Anterior soft tissue flap and skin graft recipient site with overlying Xeroform; Heel checked with Allevyn in place, removed and no underlying skin ulcer, Allevyn replaced  Pulses: Foot WWP; Cap refill < 2 sec  Sensation: Patient with abnormal/absent sensation over most of the dorsum & plantar aspects of the foot consistent with baseline  Motor: Minimal FHL/FDL, 0/5 EHL/EDL activity consistent with baseline      A/P: 26yFemale s/p repeat ankle I&D by Dr. Wallis on 09/17 w L gracilis free flap and STSG on 09/20  - Stable  - Pain/Nausea Control  - Home meds  - DVT ppx: per plastics team  - WBS: NWB LLE in custom prAFO  - Flap care per plastics team  - Abx care per plastics team  - Continue plan for staged orthopedic/plastics combined surgical intervention later this month  - Dispo: Rehab planning, plan for re-admission for next staged surgery later this month.       Ortho Pager [phone]   Ortho Note    Pt comfortable without complaints, pain controlled at foot/ankle.    Denies any new onset symptoms this morning. Comfortable in prAFO     Vital Signs Last 24 Hrs  T(C): 36.9 (06 Oct 2021 05:20), Max: 37.1 (05 Oct 2021 16:17)  T(F): 98.4 (06 Oct 2021 05:20), Max: 98.7 (05 Oct 2021 16:17)  HR: 82 (06 Oct 2021 05:20) (73 - 86)  BP: 100/65 (06 Oct 2021 05:20) (100/65 - 117/82)  BP(mean): 94 (05 Oct 2021 21:00) (75 - 94)  RR: 18 (06 Oct 2021 05:20) (17 - 18)  SpO2: 96% (06 Oct 2021 05:20) (96% - 98%)      VSS  General: A&Ox3, NAD  LLE: prAFO brace in place; Anterior soft tissue flap and skin graft recipient site with overlying Xeroform; Heel checked with Allevyn in place, removed and no underlying skin ulcer, Allevyn replaced  Pulses: Foot WWP; Cap refill < 2 sec  Sensation: Patient with abnormal/absent sensation over most of the dorsum & plantar aspects of the foot consistent with baseline  Motor: Minimal FHL/FDL, 0/5 EHL/EDL activity consistent with baseline      A/P:26yFemale With 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 The patient is currently in a PRAFO for unloading of her left heel.     Plan continues to be for flap elevation, revision open reduction internal fixation of trimalleolar ankle fracture malunion nonunion, syndesmosis, deltoid ligament repair versus reconstruction, Achilles tendon lengthening, , possible ringed ex fix to correct equinus and will be done in conjunction with Dr. Dunbar from plastic surgery for flap elevation    APPRECIATE ID COMMENTARY ON ONGOING ELEVATION OF INFLAMMATORY MARKERS.  PLAN FOR READMISSION ON OCTOBER 16TH MORNING FOR RE-EVAL, ANKLE AND FOOT XRAYS, FOR OR ON OCTOBER 18TH  WILL PLAN TO DISCUSS WITH PATIENTS MOM THE EXPECTATIONS OVER THE NEXT COUPLE MONTHS THIS WEEK PENDING COORDINATION OF   - PICC placed, placed for IV abx x 6 weeks   - Continue elevation, defer dangle protocol per plastics   - Dressing evaluation and drain management per plastics team   - OR cx - staph aureus in 1 culture - continue abx as planned per ID.   - Pain Control  - Post op abx: Ceftriaxone + Nafcillin  - DVT ppx: Lovenox  - WBS: DIANN RAMOS .

## 2021-10-07 RX ORDER — CALCIUM CARBONATE 500(1250)
1 TABLET ORAL
Qty: 0 | Refills: 0 | DISCHARGE
Start: 2021-10-07

## 2021-10-07 RX ORDER — ACETAMINOPHEN 500 MG
3 TABLET ORAL
Qty: 0 | Refills: 0 | DISCHARGE
Start: 2021-10-07

## 2021-10-07 RX ORDER — CHOLECALCIFEROL (VITAMIN D3) 125 MCG
1000 CAPSULE ORAL
Qty: 0 | Refills: 0 | DISCHARGE
Start: 2021-10-07

## 2021-10-07 RX ORDER — OXYCODONE HYDROCHLORIDE 5 MG/1
5 TABLET ORAL EVERY 4 HOURS
Refills: 0 | Status: DISCONTINUED | OUTPATIENT
Start: 2021-10-07 | End: 2021-10-14

## 2021-10-07 RX ORDER — OXYCODONE HYDROCHLORIDE 5 MG/1
1 TABLET ORAL
Qty: 0 | Refills: 0 | DISCHARGE
Start: 2021-10-07

## 2021-10-07 RX ORDER — SODIUM CHLORIDE 9 MG/ML
10 INJECTION INTRAMUSCULAR; INTRAVENOUS; SUBCUTANEOUS
Qty: 0 | Refills: 0 | DISCHARGE
Start: 2021-10-07

## 2021-10-07 RX ORDER — DIPHENHYDRAMINE HCL 50 MG
1 CAPSULE ORAL
Qty: 0 | Refills: 0 | DISCHARGE
Start: 2021-10-07

## 2021-10-07 RX ORDER — POLYETHYLENE GLYCOL 3350 17 G/17G
17 POWDER, FOR SOLUTION ORAL
Qty: 0 | Refills: 0 | DISCHARGE
Start: 2021-10-07

## 2021-10-07 RX ORDER — OXYCODONE HYDROCHLORIDE 5 MG/1
10 TABLET ORAL EVERY 4 HOURS
Refills: 0 | Status: DISCONTINUED | OUTPATIENT
Start: 2021-10-07 | End: 2021-10-14

## 2021-10-07 RX ORDER — GABAPENTIN 400 MG/1
1 CAPSULE ORAL
Qty: 0 | Refills: 0 | DISCHARGE
Start: 2021-10-07

## 2021-10-07 RX ORDER — BACITRACIN ZINC 500 UNIT/G
1 OINTMENT IN PACKET (EA) TOPICAL
Qty: 0 | Refills: 0 | DISCHARGE
Start: 2021-10-07

## 2021-10-07 RX ORDER — SENNA PLUS 8.6 MG/1
2 TABLET ORAL
Qty: 0 | Refills: 0 | DISCHARGE
Start: 2021-10-07

## 2021-10-07 RX ORDER — ENOXAPARIN SODIUM 100 MG/ML
40 INJECTION SUBCUTANEOUS
Qty: 0 | Refills: 0 | DISCHARGE
Start: 2021-10-07

## 2021-10-07 RX ORDER — GABAPENTIN 400 MG/1
600 CAPSULE ORAL EVERY 8 HOURS
Refills: 0 | Status: DISCONTINUED | OUTPATIENT
Start: 2021-10-07 | End: 2021-10-25

## 2021-10-07 RX ADMIN — NAFCILLIN 100 GRAM(S): 10 INJECTION, POWDER, FOR SOLUTION INTRAVENOUS at 06:56

## 2021-10-07 RX ADMIN — CHLORHEXIDINE GLUCONATE 1 APPLICATION(S): 213 SOLUTION TOPICAL at 08:15

## 2021-10-07 RX ADMIN — OXYCODONE HYDROCHLORIDE 10 MILLIGRAM(S): 5 TABLET ORAL at 14:30

## 2021-10-07 RX ADMIN — Medication 975 MILLIGRAM(S): at 06:56

## 2021-10-07 RX ADMIN — Medication 975 MILLIGRAM(S): at 12:19

## 2021-10-07 RX ADMIN — NAFCILLIN 100 GRAM(S): 10 INJECTION, POWDER, FOR SOLUTION INTRAVENOUS at 02:58

## 2021-10-07 RX ADMIN — OXYCODONE HYDROCHLORIDE 10 MILLIGRAM(S): 5 TABLET ORAL at 13:33

## 2021-10-07 RX ADMIN — CEFTRIAXONE 100 MILLIGRAM(S): 500 INJECTION, POWDER, FOR SOLUTION INTRAMUSCULAR; INTRAVENOUS at 22:14

## 2021-10-07 RX ADMIN — OXYCODONE HYDROCHLORIDE 10 MILLIGRAM(S): 5 TABLET ORAL at 23:01

## 2021-10-07 RX ADMIN — GABAPENTIN 600 MILLIGRAM(S): 400 CAPSULE ORAL at 21:48

## 2021-10-07 RX ADMIN — NAFCILLIN 100 GRAM(S): 10 INJECTION, POWDER, FOR SOLUTION INTRAVENOUS at 09:45

## 2021-10-07 RX ADMIN — NAFCILLIN 100 GRAM(S): 10 INJECTION, POWDER, FOR SOLUTION INTRAVENOUS at 22:14

## 2021-10-07 RX ADMIN — HYDROMORPHONE HYDROCHLORIDE 0.5 MILLIGRAM(S): 2 INJECTION INTRAMUSCULAR; INTRAVENOUS; SUBCUTANEOUS at 00:43

## 2021-10-07 RX ADMIN — GABAPENTIN 300 MILLIGRAM(S): 400 CAPSULE ORAL at 06:58

## 2021-10-07 RX ADMIN — Medication 975 MILLIGRAM(S): at 23:00

## 2021-10-07 RX ADMIN — NAFCILLIN 100 GRAM(S): 10 INJECTION, POWDER, FOR SOLUTION INTRAVENOUS at 14:55

## 2021-10-07 RX ADMIN — Medication 1 TABLET(S): at 12:18

## 2021-10-07 RX ADMIN — NAFCILLIN 100 GRAM(S): 10 INJECTION, POWDER, FOR SOLUTION INTRAVENOUS at 18:45

## 2021-10-07 RX ADMIN — GABAPENTIN 600 MILLIGRAM(S): 400 CAPSULE ORAL at 13:33

## 2021-10-07 RX ADMIN — HYDROMORPHONE HYDROCHLORIDE 0.5 MILLIGRAM(S): 2 INJECTION INTRAMUSCULAR; INTRAVENOUS; SUBCUTANEOUS at 04:31

## 2021-10-07 RX ADMIN — Medication 975 MILLIGRAM(S): at 19:30

## 2021-10-07 RX ADMIN — Medication 975 MILLIGRAM(S): at 13:10

## 2021-10-07 RX ADMIN — Medication 975 MILLIGRAM(S): at 23:48

## 2021-10-07 RX ADMIN — Medication 1 APPLICATION(S): at 12:18

## 2021-10-07 RX ADMIN — OXYCODONE HYDROCHLORIDE 10 MILLIGRAM(S): 5 TABLET ORAL at 23:33

## 2021-10-07 RX ADMIN — Medication 975 MILLIGRAM(S): at 18:45

## 2021-10-07 RX ADMIN — Medication 1000 UNIT(S): at 12:18

## 2021-10-07 RX ADMIN — Medication 975 MILLIGRAM(S): at 07:26

## 2021-10-07 RX ADMIN — HYDROMORPHONE HYDROCHLORIDE 0.5 MILLIGRAM(S): 2 INJECTION INTRAMUSCULAR; INTRAVENOUS; SUBCUTANEOUS at 04:16

## 2021-10-07 RX ADMIN — ENOXAPARIN SODIUM 40 MILLIGRAM(S): 100 INJECTION SUBCUTANEOUS at 12:19

## 2021-10-07 RX ADMIN — HYDROMORPHONE HYDROCHLORIDE 0.5 MILLIGRAM(S): 2 INJECTION INTRAMUSCULAR; INTRAVENOUS; SUBCUTANEOUS at 00:28

## 2021-10-07 NOTE — PROGRESS NOTE ADULT - ASSESSMENT
ASSESSMENT & PLAN    26F s/p L ankle recon with L gracilis free flap and STSG (9/20).    - keep Left leg elevated  - dangle 30 min FOUR times a day with pre/post flap checks - please place on commode during these times to allow voiding  - flap check q4 hour  - monitor flap with pencil doppler  - Bacitracin over xeroform on flap daily  - pain control: opioid-sparing analgesia - standing tylenol and toradol and increased gapapentin today, prn oxycodone for moderate and dilaudid for severe   - benadryl prn for pruritis  - Barrier ointment to dry skin of L buttock.  - Encourage change of bed position regularly.   - Reg diet  - PICC 9/21, ceftriaxone, nafcillin - ID following  - bedrest  - Lovenox/SCD on right leg  - IS  - appreciate Ortho recs  - precautionary isolation for covid exposure, covid pcr 10/5 negative  - pending authorization to rehab  - ortho planning for definitive conversion mid October    Plastics Pager: [phone] ASSESSMENT & PLAN    26F s/p L ankle recon with L gracilis free flap and STSG (9/20).    - keep Left leg elevated  - dangle 30 min FOUR times a day with pre/post flap checks - please place on commode during these times to allow voiding  - flap check q4 hour  - monitor flap with pencil doppler  - Bacitracin over xeroform on flap daily  - pain control: opioid-sparing analgesia - continue standing tylenol and increased standing gapapentin today, prn oxycodone/dilaudid for breakthrough  - benadryl prn for pruritis  - Barrier ointment to dry skin of L buttock.  - Encourage change of bed position regularly.   - Reg diet  - PICC 9/21, ceftriaxone, nafcillin - ID following  - bedrest  - Lovenox/SCD on right leg  - IS  - appreciate Ortho recs  - precautionary isolation for covid exposure, covid pcr 10/5 negative  - pending authorization to rehab  - ortho planning for definitive conversion mid October    Plastics Pager: [phone]

## 2021-10-07 NOTE — PROGRESS NOTE ADULT - SUBJECTIVE AND OBJECTIVE BOX
SUBJECTIVE:  No overnight events. Doing well. Reported some increased tingling in her distal extremities/toes, also a patch of dry skin on L buttocks.    OBJECTIVE:     VITAL SIGNS / I&O's    Vital Signs Last 24 Hrs  T(C): 36.7 (07 Oct 2021 04:09), Max: 36.9 (06 Oct 2021 17:37)  T(F): 98 (07 Oct 2021 04:09), Max: 98.4 (06 Oct 2021 17:37)  HR: 88 (07 Oct 2021 04:09) (69 - 88)  BP: 99/66 (07 Oct 2021 04:09) (97/67 - 110/75)  BP(mean): 87 (06 Oct 2021 20:30) (87 - 87)  RR: 16 (07 Oct 2021 04:09) (16 - 18)  SpO2: 98% (07 Oct 2021 04:09) (97% - 98%)    06 Oct 2021 07:01  -  07 Oct 2021 07:00  --------------------------------------------------------  IN:    IV PiggyBack: 100 mL    IV PiggyBack: 50 mL    Oral Fluid: 710 mL  Total IN: 860 mL    OUT:    Stool (mL): 1 mL    Voided (mL): 1700 mL  Total OUT: 1701 mL    Total NET: -841 mL    PHYSICAL EXAM     General: Awake and alert, NAD  RLE: flap stable, soft, STSG with good take, skin paddle demarcating but stable. Audible doppler signal. Xeroform changed. STSG donor site with crusted bandage, healing as expected underneath. PRAFO splint in place. Patch of pink, pruritic, scaly dry skin over L buttock.      LABS  None    MEDICATIONS  (STANDING):  acetaminophen   Tablet .. 975 milliGRAM(s) Oral every 6 hours  BACItracin   Ointment 1 Application(s) Topical daily  calcium carbonate   1250 mG (OsCal) 1 Tablet(s) Oral daily  cefTRIAXone   IVPB 2000 milliGRAM(s) IV Intermittent every 24 hours  chlorhexidine 2% Cloths 1 Application(s) Topical <User Schedule>  cholecalciferol 1000 Unit(s) Oral daily  enoxaparin Injectable 40 milliGRAM(s) SubCutaneous daily  gabapentin 600 milliGRAM(s) Oral every 8 hours  multivitamin 1 Tablet(s) Oral daily  nafcillin  IVPB 2 Gram(s) IV Intermittent every 4 hours  polyethylene glycol 3350 17 Gram(s) Oral two times a day  senna 2 Tablet(s) Oral at bedtime    MEDICATIONS  (PRN):  bisacodyl Suppository 10 milliGRAM(s) Rectal daily PRN Constipation  diphenhydrAMINE 25 milliGRAM(s) Oral every 4 hours PRN Rash and/or Itching  HYDROmorphone  Injectable 0.5 milliGRAM(s) IV Push every 4 hours PRN Severe Pain (7 - 10)  metoclopramide Injectable 10 milliGRAM(s) IV Push every 6 hours PRN Nausea and/or Vomiting  ondansetron Injectable 4 milliGRAM(s) IV Push every 6 hours PRN Nausea and/or Vomiting  sodium chloride 0.9% lock flush 10 milliLiter(s) IV Push every 1 hour PRN Pre/post blood products, medications, blood draw, and to maintain line patency

## 2021-10-07 NOTE — PROGRESS NOTE ADULT - ATTENDING COMMENTS
Patient seen and examined.  Patient and I had a 40-minute conversation to face time with her mom and 2 other family members regarding the patient's course.  The patient had a precautionary isolation due to potential Covid exposure but has been asymptomatic.  She has been isolated on €9.  I had a lengthy conversation with them about the expected prognosis and potential need for multiple operations after her planned October 18 intervention.  At this point we are expecting flap elevation, open reduction internal fixation treatment of left trimalleolar infected nonunion malunion, open reduction internal fixation left syndesmosis, application of ringed external fixator, potential left Achilles or gastroc lengthening, proximal tibia versus left iliac crest bone graft harvest.  The patient understands that she will require removal of the ringed exfix at a secondary point as well as adjustment of her forefoot pathology if it is symptomatic due to her clawing of her toes from her missed compartment syndrome.

## 2021-10-07 NOTE — PROGRESS NOTE ADULT - SUBJECTIVE AND OBJECTIVE BOX
Ortho Note    Pt comfortable without complaints, pain controlled at foot/ankle.    Denies any new onset symptoms this morning. Comfortable in prAFO     Vital Signs Last 24 Hrs  T(C): 36.8 (07 Oct 2021 12:24), Max: 36.9 (06 Oct 2021 17:37)  T(F): 98.2 (07 Oct 2021 12:24), Max: 98.4 (06 Oct 2021 17:37)  HR: 78 (07 Oct 2021 12:24) (69 - 88)  BP: 97/65 (07 Oct 2021 12:24) (97/65 - 110/75)  BP(mean): 87 (06 Oct 2021 20:30) (87 - 87)  RR: 17 (07 Oct 2021 12:24) (16 - 18)  SpO2: 98% (07 Oct 2021 12:24) (97% - 98%)    VSS  General: A&Ox3, NAD  LLE: prAFO brace in place; Anterior soft tissue flap and skin graft recipient site with overlying Xeroform; Heel checked with Allevyn in place, removed and no underlying skin ulcer, Allevyn replaced  Pulses: Foot WWP; Cap refill < 2 sec  Sensation: Patient with abnormal/absent sensation over most of the dorsum & plantar aspects of the foot consistent with baseline  Motor: Minimal FHL/FDL, 0/5 EHL/EDL activity consistent with baseline    A/P: 26yFemale s/p repeat ankle I&D by Dr. Wallis on 09/17 w L gracilis free flap and STSG on 09/20  - Stable  - Pain/Nausea Control  - Home meds  - DVT ppx: per plastics team  - WBS: NWB LLE in custom prAFO  - Flap care per plastics team  - Abx care per plastics team  - Continue plan for staged orthopedic/plastics combined surgical intervention later this month  - Dispo: Rehab planning, plan for re-admission for next staged surgery later this month.       Ortho Pager [phone]   Ortho Note    Pt comfortable without complaints, pain controlled at foot/ankle.    Denies any new onset symptoms this morning. Comfortable in prAFO     Vital Signs Last 24 Hrs  T(C): 36.8 (07 Oct 2021 12:24), Max: 36.9 (06 Oct 2021 17:37)  T(F): 98.2 (07 Oct 2021 12:24), Max: 98.4 (06 Oct 2021 17:37)  HR: 78 (07 Oct 2021 12:24) (69 - 88)  BP: 97/65 (07 Oct 2021 12:24) (97/65 - 110/75)  BP(mean): 87 (06 Oct 2021 20:30) (87 - 87)  RR: 17 (07 Oct 2021 12:24) (16 - 18)  SpO2: 98% (07 Oct 2021 12:24) (97% - 98%)    VSS  General: A&Ox3, NAD  LLE: prAFO brace in place; Anterior soft tissue flap and skin graft recipient site with overlying Xeroform; Heel checked with Allevyn in place, removed and no underlying skin ulcer, Allevyn replaced  Pulses: Foot WWP; Cap refill < 2 sec  Sensation: Patient with abnormal/absent sensation over most of the dorsum & plantar aspects of the foot consistent with baseline  Motor: Minimal FHL/FDL, 0/5 EHL/EDL activity consistent with baseline    A/P: 26yFemale With 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 The patient is currently in a PRAFO for unloading of her left heel.     Plan continues to be for flap elevation, revision open reduction internal fixation of trimalleolar ankle fracture malunion nonunion, syndesmosis, deltoid ligament repair versus reconstruction, Achilles tendon lengthening, , possible ringed ex fix to correct equinus and will be done in conjunction with Dr. Dunbar from plastic surgery for flap elevation    APPRECIATE ID COMMENTARY ON ONGOING ELEVATION OF INFLAMMATORY MARKERS.  PLAN FOR READMISSION ON OCTOBER 16TH MORNING FOR RE-EVAL, ANKLE AND FOOT XRAYS, FOR OR ON OCTOBER 18TH  WILL PLAN TO DISCUSS WITH PATIENTS MOM THE EXPECTATIONS OVER THE NEXT COUPLE MONTHS THIS WEEK PENDING COORDINATION OF   - PICC placed, placed for IV abx x 6 weeks   - Continue elevation, defer dangle protocol per plastics   - Dressing evaluation and drain management per plastics team   - OR cx - staph aureus in 1 culture - continue abx as planned per ID.   - Pain Control  - Post op abx: Ceftriaxone + Nafcillin  - DVT ppx: Lovenox  - WBS: DIANN RAMOS .     Ortho Pager [phone]

## 2021-10-08 RX ADMIN — Medication 1 APPLICATION(S): at 12:59

## 2021-10-08 RX ADMIN — OXYCODONE HYDROCHLORIDE 10 MILLIGRAM(S): 5 TABLET ORAL at 22:05

## 2021-10-08 RX ADMIN — CEFTRIAXONE 100 MILLIGRAM(S): 500 INJECTION, POWDER, FOR SOLUTION INTRAMUSCULAR; INTRAVENOUS at 22:05

## 2021-10-08 RX ADMIN — Medication 975 MILLIGRAM(S): at 05:15

## 2021-10-08 RX ADMIN — ENOXAPARIN SODIUM 40 MILLIGRAM(S): 100 INJECTION SUBCUTANEOUS at 12:59

## 2021-10-08 RX ADMIN — NAFCILLIN 100 GRAM(S): 10 INJECTION, POWDER, FOR SOLUTION INTRAVENOUS at 22:05

## 2021-10-08 RX ADMIN — OXYCODONE HYDROCHLORIDE 10 MILLIGRAM(S): 5 TABLET ORAL at 23:01

## 2021-10-08 RX ADMIN — HYDROMORPHONE HYDROCHLORIDE 0.5 MILLIGRAM(S): 2 INJECTION INTRAMUSCULAR; INTRAVENOUS; SUBCUTANEOUS at 00:25

## 2021-10-08 RX ADMIN — NAFCILLIN 100 GRAM(S): 10 INJECTION, POWDER, FOR SOLUTION INTRAVENOUS at 14:36

## 2021-10-08 RX ADMIN — GABAPENTIN 600 MILLIGRAM(S): 400 CAPSULE ORAL at 05:15

## 2021-10-08 RX ADMIN — GABAPENTIN 600 MILLIGRAM(S): 400 CAPSULE ORAL at 14:36

## 2021-10-08 RX ADMIN — NAFCILLIN 100 GRAM(S): 10 INJECTION, POWDER, FOR SOLUTION INTRAVENOUS at 01:54

## 2021-10-08 RX ADMIN — Medication 975 MILLIGRAM(S): at 23:20

## 2021-10-08 RX ADMIN — Medication 975 MILLIGRAM(S): at 12:56

## 2021-10-08 RX ADMIN — Medication 975 MILLIGRAM(S): at 06:20

## 2021-10-08 RX ADMIN — NAFCILLIN 100 GRAM(S): 10 INJECTION, POWDER, FOR SOLUTION INTRAVENOUS at 10:19

## 2021-10-08 RX ADMIN — Medication 975 MILLIGRAM(S): at 22:05

## 2021-10-08 RX ADMIN — Medication 1 TABLET(S): at 12:56

## 2021-10-08 RX ADMIN — CHLORHEXIDINE GLUCONATE 1 APPLICATION(S): 213 SOLUTION TOPICAL at 07:34

## 2021-10-08 RX ADMIN — NAFCILLIN 100 GRAM(S): 10 INJECTION, POWDER, FOR SOLUTION INTRAVENOUS at 18:38

## 2021-10-08 RX ADMIN — OXYCODONE HYDROCHLORIDE 5 MILLIGRAM(S): 5 TABLET ORAL at 12:57

## 2021-10-08 RX ADMIN — NAFCILLIN 100 GRAM(S): 10 INJECTION, POWDER, FOR SOLUTION INTRAVENOUS at 05:15

## 2021-10-08 RX ADMIN — Medication 1000 UNIT(S): at 12:59

## 2021-10-08 RX ADMIN — Medication 1 TABLET(S): at 12:59

## 2021-10-08 RX ADMIN — HYDROMORPHONE HYDROCHLORIDE 0.5 MILLIGRAM(S): 2 INJECTION INTRAMUSCULAR; INTRAVENOUS; SUBCUTANEOUS at 00:58

## 2021-10-08 NOTE — PROGRESS NOTE ADULT - ASSESSMENT
ASSESSMENT & PLAN    26F s/p L ankle recon with L gracilis free flap and STSG (9/20).    - keep Left leg elevated  - dangle 30 min FOUR times a day with pre/post flap checks - please place on commode during these times to allow voiding  - flap check q4 hour  - monitor flap with pencil doppler  - Bacitracin over xeroform on flap daily  - pain control: opioid-sparing analgesia - continue standing tylenol and gapapentin , prn oxycodone/dilaudid for breakthrough  - benadryl prn for pruritis  - Barrier ointment to dry skin of L buttock, please apply when patient positions for dangling  - Encourage change of bed position regularly.   - Reg diet  - PICC 9/21, ceftriaxone, nafcillin - ID following  - bedrest  - Lovenox/SCD on right leg  - IS  - appreciate Ortho recs  - precautionary isolation for covid exposure, covid pcr 10/5 negative  - pending authorization to rehab  - ortho planning for definitive conversion mid October    Plastics Pager: [phone]

## 2021-10-08 NOTE — PROGRESS NOTE ADULT - SUBJECTIVE AND OBJECTIVE BOX
SUBJECTIVE:  No overnight events. Pain better controlled this AM. Tolerating dangling. Still complaining of dry rash on L buttocks. Moisturizer wasn't applied yesterday. Overall doing well.    OBJECTIVE:     VITAL SIGNS / I&O's    Vital Signs Last 24 Hrs  T(C): 36.6 (08 Oct 2021 05:20), Max: 37.1 (07 Oct 2021 20:39)  T(F): 97.8 (08 Oct 2021 05:20), Max: 98.7 (07 Oct 2021 20:39)  HR: 73 (08 Oct 2021 05:20) (73 - 84)  BP: 106/76 (08 Oct 2021 05:20) (97/65 - 107/68)  BP(mean): --  RR: 18 (08 Oct 2021 05:20) (17 - 18)  SpO2: 98% (08 Oct 2021 05:20) (97% - 98%)      07 Oct 2021 07:01  -  08 Oct 2021 07:00  --------------------------------------------------------  IN:    Oral Fluid: 440 mL  Total IN: 440 mL    OUT:    Voided (mL): 1350 mL  Total OUT: 1350 mL    Total NET: -910 mL      PHYSICAL EXAM     General: Awake and alert, NAD  RLE: flap stable, soft, STSG with good take, skin paddle demarcating but stable. Audible doppler signal. Xeroform changed. STSG donor site with crusted bandage, healing as expected underneath. PRAFO splint in place. Patch of pink, pruritic, scaly dry skin over L buttock.        LABS  None    MEDICATIONS  (STANDING):  acetaminophen   Tablet .. 975 milliGRAM(s) Oral every 6 hours  BACItracin   Ointment 1 Application(s) Topical daily  calcium carbonate   1250 mG (OsCal) 1 Tablet(s) Oral daily  cefTRIAXone   IVPB 2000 milliGRAM(s) IV Intermittent every 24 hours  chlorhexidine 2% Cloths 1 Application(s) Topical <User Schedule>  cholecalciferol 1000 Unit(s) Oral daily  enoxaparin Injectable 40 milliGRAM(s) SubCutaneous daily  gabapentin 600 milliGRAM(s) Oral every 8 hours  multivitamin 1 Tablet(s) Oral daily  nafcillin  IVPB 2 Gram(s) IV Intermittent every 4 hours  polyethylene glycol 3350 17 Gram(s) Oral two times a day  senna 2 Tablet(s) Oral at bedtime    MEDICATIONS  (PRN):  bisacodyl Suppository 10 milliGRAM(s) Rectal daily PRN Constipation  diphenhydrAMINE 25 milliGRAM(s) Oral every 4 hours PRN Rash and/or Itching  HYDROmorphone  Injectable 0.5 milliGRAM(s) IV Push every 4 hours PRN Severe Pain (7 - 10)  metoclopramide Injectable 10 milliGRAM(s) IV Push every 6 hours PRN Nausea and/or Vomiting  ondansetron Injectable 4 milliGRAM(s) IV Push every 6 hours PRN Nausea and/or Vomiting  oxyCODONE    IR 5 milliGRAM(s) Oral every 4 hours PRN Moderate Pain (4 - 6)  oxyCODONE    IR 10 milliGRAM(s) Oral every 4 hours PRN Severe Pain (7 - 10)  sodium chloride 0.9% lock flush 10 milliLiter(s) IV Push every 1 hour PRN Pre/post blood products, medications, blood draw, and to maintain line patency

## 2021-10-08 NOTE — PROGRESS NOTE ADULT - SUBJECTIVE AND OBJECTIVE BOX
Ortho Note    Pt comfortable without complaints, pain controlled at foot/ankle.    Denies any new onset symptoms this morning. Comfortable in prAFO     Vital Signs Last 24 Hrs  T(C): 36.6 (08 Oct 2021 05:20), Max: 37.1 (07 Oct 2021 20:39)  T(F): 97.8 (08 Oct 2021 05:20), Max: 98.7 (07 Oct 2021 20:39)  HR: 73 (08 Oct 2021 05:20) (73 - 84)  BP: 106/76 (08 Oct 2021 05:20) (97/65 - 107/68)  BP(mean): --  RR: 18 (08 Oct 2021 05:20) (17 - 18)  SpO2: 98% (08 Oct 2021 05:20) (97% - 98%)    VSS  General: A&Ox3, NAD  LLE: prAFO brace in place; Anterior soft tissue flap and skin graft recipient site with overlying Xeroform; Heel checked with Allevyn in place, removed and no underlying skin ulcer, Allevyn replaced  Pulses: Foot WWP; Cap refill < 2 sec  Sensation: Patient with abnormal/absent sensation over most of the dorsum & plantar aspects of the foot consistent with baseline  Motor: Minimal FHL/FDL, 0/5 EHL/EDL activity consistent with baseline    A/P: 26yFemale With 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 The patient is currently in a PRAFO for unloading of her left heel.     Plan continues to be for flap elevation, revision open reduction internal fixation of trimalleolar ankle fracture malunion nonunion, syndesmosis, deltoid ligament repair versus reconstruction, Achilles tendon lengthening, , possible ringed ex fix to correct equinus and will be done in conjunction with Dr. Dunbar from plastic surgery for flap elevation    - PICC placed, placed for IV abx x 6 weeks   - Continue elevation, defer dangle protocol per plastics   - Dressing evaluation and drain management per plastics team   - OR cx - staph aureus in 1 culture - continue abx as planned per ID.   - Pain Control  - Post op abx: Ceftriaxone + Nafcillin  - DVT ppx: Lovenox  - WBS: DIANN RAMOS .     Ortho Pager [phone]   Ortho Note    Pt comfortable without complaints, pain controlled at foot/ankle.    Denies any new onset symptoms this morning. Comfortable in prAFO     Vital Signs Last 24 Hrs  T(C): 36.6 (08 Oct 2021 05:20), Max: 37.1 (07 Oct 2021 20:39)  T(F): 97.8 (08 Oct 2021 05:20), Max: 98.7 (07 Oct 2021 20:39)  HR: 73 (08 Oct 2021 05:20) (73 - 84)  BP: 106/76 (08 Oct 2021 05:20) (97/65 - 107/68)  BP(mean): --  RR: 18 (08 Oct 2021 05:20) (17 - 18)  SpO2: 98% (08 Oct 2021 05:20) (97% - 98%)    VSS  General: A&Ox3, NAD  LLE: prAFO brace in place; Anterior soft tissue flap and skin graft recipient site with overlying Xeroform; Heel checked with Allevyn in place, removed and no underlying skin ulcer, Allevyn replaced  Pulses: Foot WWP; Cap refill < 2 sec  Sensation: Patient with abnormal/absent sensation over most of the dorsum & plantar aspects of the foot consistent with baseline  Motor: Minimal FHL/FDL, 0/5 EHL/EDL activity consistent with baseline    A/P: 26yFemale With 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 The patient is currently in a PRAFO for unloading of her left heel.     Plan continues to be for flap elevation, revision open reduction internal fixation of trimalleolar ankle fracture malunion nonunion, syndesmosis, deltoid ligament repair versus reconstruction, Achilles tendon lengthening, , possible ringed ex fix to correct equinus and will be done in conjunction with Dr. Dubnar from plastic surgery for flap elevation    APPRECIATE ID COMMENTARY ON ONGOING ELEVATION OF INFLAMMATORY MARKERS.  PLAN FOR READMISSION ON OCTOBER 16TH MORNING FOR RE-EVAL, ANKLE AND FOOT XRAYS, FOR OR ON OCTOBER 18TH  WILL PLAN TO DISCUSS WITH PATIENTS MOM THE EXPECTATIONS OVER THE NEXT COUPLE MONTHS THIS WEEK PENDING COORDINATION OF   - PICC placed, placed for IV abx x 6 weeks   - Continue elevation, defer dangle protocol per plastics   - Dressing evaluation and drain management per plastics team   - OR cx - staph aureus in 1 culture - continue abx as planned per ID.   - Pain Control  - Post op abx: Ceftriaxone + Nafcillin  - DVT ppx: Lovenox  - WBS: DIANN RAMOS . .     Ortho Pager [phone]

## 2021-10-08 NOTE — PROGRESS NOTE ADULT - ATTENDING COMMENTS
Patient seen and examined.  Patient and I had a 40-minute conversation to face time with her mom and 2 other family members regarding the patient's course.  The patient had a precautionary isolation due to potential Covid exposure but has been asymptomatic.  She has been isolated on 9 [address].  I had a lengthy conversation with them about the expected prognosis and potential need for multiple operations after her planned October 18 intervention. Patient verbalized understanding and agrees to the purposed and all other indicated procedures in conjunction with her limb salvage of her left lower extremity.  At this point we are expecting flap elevation, open reduction internal fixation treatment of left trimalleolar infected nonunion malunion, open reduction internal fixation left syndesmosis, application of ringed external fixator, potential left Achilles or gastroc lengthening, proximal tibia versus left iliac crest bone graft harvest.  The patient understands that she will require removal of the ringed exfix at a secondary point as well as adjustment of her forefoot pathology if it is symptomatic due to her clawing of her toes from her missed compartment syndrome.

## 2021-10-09 RX ADMIN — Medication 1 APPLICATION(S): at 18:36

## 2021-10-09 RX ADMIN — Medication 975 MILLIGRAM(S): at 05:36

## 2021-10-09 RX ADMIN — HYDROMORPHONE HYDROCHLORIDE 0.5 MILLIGRAM(S): 2 INJECTION INTRAMUSCULAR; INTRAVENOUS; SUBCUTANEOUS at 22:16

## 2021-10-09 RX ADMIN — Medication 1000 UNIT(S): at 13:10

## 2021-10-09 RX ADMIN — NAFCILLIN 100 GRAM(S): 10 INJECTION, POWDER, FOR SOLUTION INTRAVENOUS at 23:36

## 2021-10-09 RX ADMIN — GABAPENTIN 600 MILLIGRAM(S): 400 CAPSULE ORAL at 23:36

## 2021-10-09 RX ADMIN — HYDROMORPHONE HYDROCHLORIDE 0.5 MILLIGRAM(S): 2 INJECTION INTRAMUSCULAR; INTRAVENOUS; SUBCUTANEOUS at 05:34

## 2021-10-09 RX ADMIN — GABAPENTIN 600 MILLIGRAM(S): 400 CAPSULE ORAL at 14:00

## 2021-10-09 RX ADMIN — NAFCILLIN 100 GRAM(S): 10 INJECTION, POWDER, FOR SOLUTION INTRAVENOUS at 10:37

## 2021-10-09 RX ADMIN — HYDROMORPHONE HYDROCHLORIDE 0.5 MILLIGRAM(S): 2 INJECTION INTRAMUSCULAR; INTRAVENOUS; SUBCUTANEOUS at 06:00

## 2021-10-09 RX ADMIN — Medication 975 MILLIGRAM(S): at 06:30

## 2021-10-09 RX ADMIN — GABAPENTIN 600 MILLIGRAM(S): 400 CAPSULE ORAL at 05:44

## 2021-10-09 RX ADMIN — NAFCILLIN 100 GRAM(S): 10 INJECTION, POWDER, FOR SOLUTION INTRAVENOUS at 19:55

## 2021-10-09 RX ADMIN — Medication 975 MILLIGRAM(S): at 23:36

## 2021-10-09 RX ADMIN — OXYCODONE HYDROCHLORIDE 10 MILLIGRAM(S): 5 TABLET ORAL at 19:55

## 2021-10-09 RX ADMIN — HYDROMORPHONE HYDROCHLORIDE 0.5 MILLIGRAM(S): 2 INJECTION INTRAMUSCULAR; INTRAVENOUS; SUBCUTANEOUS at 00:50

## 2021-10-09 RX ADMIN — Medication 1 TABLET(S): at 13:10

## 2021-10-09 RX ADMIN — HYDROMORPHONE HYDROCHLORIDE 0.5 MILLIGRAM(S): 2 INJECTION INTRAMUSCULAR; INTRAVENOUS; SUBCUTANEOUS at 00:05

## 2021-10-09 RX ADMIN — HYDROMORPHONE HYDROCHLORIDE 0.5 MILLIGRAM(S): 2 INJECTION INTRAMUSCULAR; INTRAVENOUS; SUBCUTANEOUS at 23:00

## 2021-10-09 RX ADMIN — NAFCILLIN 100 GRAM(S): 10 INJECTION, POWDER, FOR SOLUTION INTRAVENOUS at 03:07

## 2021-10-09 RX ADMIN — NAFCILLIN 100 GRAM(S): 10 INJECTION, POWDER, FOR SOLUTION INTRAVENOUS at 16:36

## 2021-10-09 RX ADMIN — ENOXAPARIN SODIUM 40 MILLIGRAM(S): 100 INJECTION SUBCUTANEOUS at 13:10

## 2021-10-09 RX ADMIN — SENNA PLUS 2 TABLET(S): 8.6 TABLET ORAL at 23:35

## 2021-10-09 RX ADMIN — NAFCILLIN 100 GRAM(S): 10 INJECTION, POWDER, FOR SOLUTION INTRAVENOUS at 16:53

## 2021-10-09 RX ADMIN — CHLORHEXIDINE GLUCONATE 1 APPLICATION(S): 213 SOLUTION TOPICAL at 05:35

## 2021-10-09 NOTE — PROGRESS NOTE ADULT - SUBJECTIVE AND OBJECTIVE BOX
SUBJECTIVE:  No overnight events. Pain better controlled this AM. Tolerating dangling 30 mins x4    OBJECTIVE:     VITAL SIGNS / I&O's  AVSS        PHYSICAL EXAM     General: Awake and alert, NAD  RLE: flap stable, soft, STSG with good take, skin paddle demarcating but stable. Audible doppler signal. Xeroform changed. STSG donor site healing well.      LABS  None    MEDICATIONS  (STANDING):  acetaminophen   Tablet .. 975 milliGRAM(s) Oral every 6 hours  BACItracin   Ointment 1 Application(s) Topical daily  calcium carbonate   1250 mG (OsCal) 1 Tablet(s) Oral daily  cefTRIAXone   IVPB 2000 milliGRAM(s) IV Intermittent every 24 hours  chlorhexidine 2% Cloths 1 Application(s) Topical <User Schedule>  cholecalciferol 1000 Unit(s) Oral daily  enoxaparin Injectable 40 milliGRAM(s) SubCutaneous daily  gabapentin 600 milliGRAM(s) Oral every 8 hours  multivitamin 1 Tablet(s) Oral daily  nafcillin  IVPB 2 Gram(s) IV Intermittent every 4 hours  polyethylene glycol 3350 17 Gram(s) Oral two times a day  senna 2 Tablet(s) Oral at bedtime    MEDICATIONS  (PRN):  bisacodyl Suppository 10 milliGRAM(s) Rectal daily PRN Constipation  diphenhydrAMINE 25 milliGRAM(s) Oral every 4 hours PRN Rash and/or Itching  HYDROmorphone  Injectable 0.5 milliGRAM(s) IV Push every 4 hours PRN Severe Pain (7 - 10)  metoclopramide Injectable 10 milliGRAM(s) IV Push every 6 hours PRN Nausea and/or Vomiting  ondansetron Injectable 4 milliGRAM(s) IV Push every 6 hours PRN Nausea and/or Vomiting  oxyCODONE    IR 5 milliGRAM(s) Oral every 4 hours PRN Moderate Pain (4 - 6)  oxyCODONE    IR 10 milliGRAM(s) Oral every 4 hours PRN Severe Pain (7 - 10)  sodium chloride 0.9% lock flush 10 milliLiter(s) IV Push every 1 hour PRN Pre/post blood products, medications, blood draw, and to maintain line patency

## 2021-10-09 NOTE — PROGRESS NOTE ADULT - ASSESSMENT
ASSESSMENT & PLAN    26F s/p L ankle recon with L gracilis free flap and STSG (9/20).    - keep Left leg elevated  - dangle 30 min FOUR times a day with pre/post flap checks - please place on commode during these times to allow voiding  - flap check q4 hour  - monitor flap with pencil doppler  - Bacitracin over xeroform on flap daily  - pain control: opioid-sparing analgesia - continue standing tylenol and gapapentin , prn oxycodone/dilaudid for breakthrough  - benadryl prn for pruritis  - Barrier ointment to dry skin of L buttock, please apply when patient positions for dangling  - Encourage change of bed position regularly.   - Reg diet  - PICC 9/21, ceftriaxone, nafcillin - ID following  - bedrest  - Lovenox/SCD on right leg  - IS  - appreciate Ortho recs  - precautionary isolation for covid exposure, covid pcr 10/5 negative  - pending authorization to rehab  - ortho planning for definitive conversion mid October

## 2021-10-10 RX ORDER — LANOLIN ALCOHOL/MO/W.PET/CERES
3 CREAM (GRAM) TOPICAL AT BEDTIME
Refills: 0 | Status: DISCONTINUED | OUTPATIENT
Start: 2021-10-10 | End: 2021-10-25

## 2021-10-10 RX ADMIN — OXYCODONE HYDROCHLORIDE 10 MILLIGRAM(S): 5 TABLET ORAL at 06:20

## 2021-10-10 RX ADMIN — OXYCODONE HYDROCHLORIDE 10 MILLIGRAM(S): 5 TABLET ORAL at 01:55

## 2021-10-10 RX ADMIN — Medication 975 MILLIGRAM(S): at 17:30

## 2021-10-10 RX ADMIN — NAFCILLIN 100 GRAM(S): 10 INJECTION, POWDER, FOR SOLUTION INTRAVENOUS at 17:30

## 2021-10-10 RX ADMIN — Medication 975 MILLIGRAM(S): at 13:26

## 2021-10-10 RX ADMIN — OXYCODONE HYDROCHLORIDE 10 MILLIGRAM(S): 5 TABLET ORAL at 17:36

## 2021-10-10 RX ADMIN — NAFCILLIN 100 GRAM(S): 10 INJECTION, POWDER, FOR SOLUTION INTRAVENOUS at 22:25

## 2021-10-10 RX ADMIN — Medication 975 MILLIGRAM(S): at 05:20

## 2021-10-10 RX ADMIN — Medication 3 MILLIGRAM(S): at 23:10

## 2021-10-10 RX ADMIN — Medication 975 MILLIGRAM(S): at 00:05

## 2021-10-10 RX ADMIN — Medication 1 TABLET(S): at 15:58

## 2021-10-10 RX ADMIN — NAFCILLIN 100 GRAM(S): 10 INJECTION, POWDER, FOR SOLUTION INTRAVENOUS at 13:15

## 2021-10-10 RX ADMIN — CHLORHEXIDINE GLUCONATE 1 APPLICATION(S): 213 SOLUTION TOPICAL at 05:20

## 2021-10-10 RX ADMIN — CEFTRIAXONE 100 MILLIGRAM(S): 500 INJECTION, POWDER, FOR SOLUTION INTRAMUSCULAR; INTRAVENOUS at 02:17

## 2021-10-10 RX ADMIN — Medication 1 APPLICATION(S): at 13:25

## 2021-10-10 RX ADMIN — Medication 975 MILLIGRAM(S): at 05:50

## 2021-10-10 RX ADMIN — GABAPENTIN 600 MILLIGRAM(S): 400 CAPSULE ORAL at 05:19

## 2021-10-10 RX ADMIN — NAFCILLIN 100 GRAM(S): 10 INJECTION, POWDER, FOR SOLUTION INTRAVENOUS at 05:19

## 2021-10-10 RX ADMIN — OXYCODONE HYDROCHLORIDE 10 MILLIGRAM(S): 5 TABLET ORAL at 18:30

## 2021-10-10 RX ADMIN — NAFCILLIN 100 GRAM(S): 10 INJECTION, POWDER, FOR SOLUTION INTRAVENOUS at 02:17

## 2021-10-10 RX ADMIN — Medication 975 MILLIGRAM(S): at 18:30

## 2021-10-10 RX ADMIN — OXYCODONE HYDROCHLORIDE 10 MILLIGRAM(S): 5 TABLET ORAL at 01:25

## 2021-10-10 RX ADMIN — Medication 25 MILLIGRAM(S): at 05:50

## 2021-10-10 RX ADMIN — Medication 975 MILLIGRAM(S): at 14:26

## 2021-10-10 RX ADMIN — Medication 1 TABLET(S): at 13:26

## 2021-10-10 RX ADMIN — OXYCODONE HYDROCHLORIDE 10 MILLIGRAM(S): 5 TABLET ORAL at 05:50

## 2021-10-10 RX ADMIN — NAFCILLIN 100 GRAM(S): 10 INJECTION, POWDER, FOR SOLUTION INTRAVENOUS at 09:40

## 2021-10-10 RX ADMIN — ENOXAPARIN SODIUM 40 MILLIGRAM(S): 100 INJECTION SUBCUTANEOUS at 13:25

## 2021-10-10 RX ADMIN — Medication 1000 UNIT(S): at 12:25

## 2021-10-10 RX ADMIN — GABAPENTIN 600 MILLIGRAM(S): 400 CAPSULE ORAL at 14:00

## 2021-10-10 RX ADMIN — SENNA PLUS 2 TABLET(S): 8.6 TABLET ORAL at 22:25

## 2021-10-10 RX ADMIN — GABAPENTIN 600 MILLIGRAM(S): 400 CAPSULE ORAL at 22:26

## 2021-10-10 NOTE — PROGRESS NOTE ADULT - SUBJECTIVE AND OBJECTIVE BOX
SUBJECTIVE:  No overnight events. insomnia overnight. Tolerating dangling 30 mins x4.     OBJECTIVE:     VITAL SIGNS / I&O's  AVSS    PHYSICAL EXAM     General: Awake and alert, NAD  RLE: flap stable, soft, STSG with good take, skin paddle demarcating but stable. Audible doppler signal. Xeroform changed. STSG donor site healing well.      LABS  None    MEDICATIONS  (STANDING):  acetaminophen   Tablet .. 975 milliGRAM(s) Oral every 6 hours  BACItracin   Ointment 1 Application(s) Topical daily  calcium carbonate   1250 mG (OsCal) 1 Tablet(s) Oral daily  cefTRIAXone   IVPB 2000 milliGRAM(s) IV Intermittent every 24 hours  chlorhexidine 2% Cloths 1 Application(s) Topical <User Schedule>  cholecalciferol 1000 Unit(s) Oral daily  enoxaparin Injectable 40 milliGRAM(s) SubCutaneous daily  gabapentin 600 milliGRAM(s) Oral every 8 hours  multivitamin 1 Tablet(s) Oral daily  nafcillin  IVPB 2 Gram(s) IV Intermittent every 4 hours  polyethylene glycol 3350 17 Gram(s) Oral two times a day  senna 2 Tablet(s) Oral at bedtime    MEDICATIONS  (PRN):  bisacodyl Suppository 10 milliGRAM(s) Rectal daily PRN Constipation  diphenhydrAMINE 25 milliGRAM(s) Oral every 4 hours PRN Rash and/or Itching  HYDROmorphone  Injectable 0.5 milliGRAM(s) IV Push every 4 hours PRN Severe Pain (7 - 10)  metoclopramide Injectable 10 milliGRAM(s) IV Push every 6 hours PRN Nausea and/or Vomiting  ondansetron Injectable 4 milliGRAM(s) IV Push every 6 hours PRN Nausea and/or Vomiting  oxyCODONE    IR 5 milliGRAM(s) Oral every 4 hours PRN Moderate Pain (4 - 6)  oxyCODONE    IR 10 milliGRAM(s) Oral every 4 hours PRN Severe Pain (7 - 10)  sodium chloride 0.9% lock flush 10 milliLiter(s) IV Push every 1 hour PRN Pre/post blood products, medications, blood draw, and to maintain line patency

## 2021-10-11 LAB
CRP SERPL-MCNC: 25.2 MG/L — HIGH (ref 0–4)
CULTURE RESULTS: NO GROWTH — SIGNIFICANT CHANGE UP
CULTURE RESULTS: NO GROWTH — SIGNIFICANT CHANGE UP
ERYTHROCYTE [SEDIMENTATION RATE] IN BLOOD: 96 MM/HR — HIGH
SPECIMEN SOURCE: SIGNIFICANT CHANGE UP

## 2021-10-11 RX ORDER — PETROLATUM,WHITE
1 JELLY (GRAM) TOPICAL
Refills: 0 | Status: DISCONTINUED | OUTPATIENT
Start: 2021-10-11 | End: 2021-10-25

## 2021-10-11 RX ORDER — PETROLATUM,WHITE
1 JELLY (GRAM) TOPICAL
Qty: 0 | Refills: 0 | DISCHARGE
Start: 2021-10-11

## 2021-10-11 RX ADMIN — CEFTRIAXONE 100 MILLIGRAM(S): 500 INJECTION, POWDER, FOR SOLUTION INTRAMUSCULAR; INTRAVENOUS at 23:05

## 2021-10-11 RX ADMIN — Medication 975 MILLIGRAM(S): at 18:31

## 2021-10-11 RX ADMIN — Medication 1 APPLICATION(S): at 13:50

## 2021-10-11 RX ADMIN — NAFCILLIN 100 GRAM(S): 10 INJECTION, POWDER, FOR SOLUTION INTRAVENOUS at 18:31

## 2021-10-11 RX ADMIN — Medication 3 MILLIGRAM(S): at 23:06

## 2021-10-11 RX ADMIN — Medication 1 APPLICATION(S): at 18:30

## 2021-10-11 RX ADMIN — Medication 975 MILLIGRAM(S): at 06:10

## 2021-10-11 RX ADMIN — NAFCILLIN 100 GRAM(S): 10 INJECTION, POWDER, FOR SOLUTION INTRAVENOUS at 10:13

## 2021-10-11 RX ADMIN — OXYCODONE HYDROCHLORIDE 10 MILLIGRAM(S): 5 TABLET ORAL at 22:19

## 2021-10-11 RX ADMIN — Medication 975 MILLIGRAM(S): at 13:51

## 2021-10-11 RX ADMIN — Medication 975 MILLIGRAM(S): at 00:54

## 2021-10-11 RX ADMIN — OXYCODONE HYDROCHLORIDE 10 MILLIGRAM(S): 5 TABLET ORAL at 00:43

## 2021-10-11 RX ADMIN — GABAPENTIN 600 MILLIGRAM(S): 400 CAPSULE ORAL at 06:09

## 2021-10-11 RX ADMIN — Medication 1000 UNIT(S): at 13:49

## 2021-10-11 RX ADMIN — CEFTRIAXONE 100 MILLIGRAM(S): 500 INJECTION, POWDER, FOR SOLUTION INTRAMUSCULAR; INTRAVENOUS at 00:45

## 2021-10-11 RX ADMIN — Medication 1 TABLET(S): at 13:49

## 2021-10-11 RX ADMIN — NAFCILLIN 100 GRAM(S): 10 INJECTION, POWDER, FOR SOLUTION INTRAVENOUS at 02:41

## 2021-10-11 RX ADMIN — GABAPENTIN 600 MILLIGRAM(S): 400 CAPSULE ORAL at 22:08

## 2021-10-11 RX ADMIN — GABAPENTIN 600 MILLIGRAM(S): 400 CAPSULE ORAL at 13:49

## 2021-10-11 RX ADMIN — Medication 975 MILLIGRAM(S): at 01:35

## 2021-10-11 RX ADMIN — NAFCILLIN 100 GRAM(S): 10 INJECTION, POWDER, FOR SOLUTION INTRAVENOUS at 06:10

## 2021-10-11 RX ADMIN — Medication 1 TABLET(S): at 13:50

## 2021-10-11 RX ADMIN — NAFCILLIN 100 GRAM(S): 10 INJECTION, POWDER, FOR SOLUTION INTRAVENOUS at 13:48

## 2021-10-11 RX ADMIN — HYDROMORPHONE HYDROCHLORIDE 0.5 MILLIGRAM(S): 2 INJECTION INTRAMUSCULAR; INTRAVENOUS; SUBCUTANEOUS at 03:29

## 2021-10-11 RX ADMIN — OXYCODONE HYDROCHLORIDE 10 MILLIGRAM(S): 5 TABLET ORAL at 01:36

## 2021-10-11 RX ADMIN — CHLORHEXIDINE GLUCONATE 1 APPLICATION(S): 213 SOLUTION TOPICAL at 06:09

## 2021-10-11 RX ADMIN — ENOXAPARIN SODIUM 40 MILLIGRAM(S): 100 INJECTION SUBCUTANEOUS at 13:52

## 2021-10-11 RX ADMIN — SENNA PLUS 2 TABLET(S): 8.6 TABLET ORAL at 22:08

## 2021-10-11 RX ADMIN — Medication 975 MILLIGRAM(S): at 17:31

## 2021-10-11 RX ADMIN — NAFCILLIN 100 GRAM(S): 10 INJECTION, POWDER, FOR SOLUTION INTRAVENOUS at 22:08

## 2021-10-11 NOTE — PROGRESS NOTE ADULT - ASSESSMENT
ASSESSMENT & PLAN    26F s/p L ankle recon with L gracilis free flap and STSG (9/20).    - keep Left leg elevated  - dangle 30 min FOUR times a day with pre/post flap checks - please place on commode during these times to allow voiding  - flap check q4 hour  - monitor flap with pencil doppler  - Bacitracin over xeroform on flap daily  - pain control: opioid-sparing analgesia - continue standing tylenol and gapapentin , prn oxycodone/dilaudid for breakthrough  - benadryl prn for pruritis  - Barrier ointment to dry skin of L buttock, please apply when patient positions for dangling  - Encourage change of bed position regularly.   - Apply aquafor to STSG donor site on L thigh twice per day or when dries out  - Reg diet  - PICC 9/21, ceftriaxone, nafcillin - ID following  - bedrest  - Lovenox/SCD on right leg  - IS  - appreciate Ortho recs  - precautionary isolation for covid exposure, covid pcr 10/5 negative  - dispo planning, likely rehab  - ortho planning for definitive conversion mid October    Plastics Pager: [phone]

## 2021-10-11 NOTE — PROGRESS NOTE ADULT - SUBJECTIVE AND OBJECTIVE BOX
SUBJECTIVE:  No overnight events. Tolerated dangling yesterday. Some difficulty sleeping overnight.     OBJECTIVE:     VITAL SIGNS / I&O's    Vital Signs Last 24 Hrs  T(C): 36.7 (11 Oct 2021 04:30), Max: 36.9 (10 Oct 2021 20:06)  T(F): 98.1 (11 Oct 2021 04:30), Max: 98.5 (10 Oct 2021 20:06)  HR: 88 (11 Oct 2021 04:30) (77 - 89)  BP: 97/62 (11 Oct 2021 04:30) (97/62 - 114/68)  BP(mean): --  RR: 18 (11 Oct 2021 04:30) (17 - 18)  SpO2: 97% (11 Oct 2021 04:30) (96% - 98%)      10 Oct 2021 07:01  -  11 Oct 2021 07:00  --------------------------------------------------------  IN:    Oral Fluid: 670 mL  Total IN: 670 mL    OUT:    Voided (mL): 900 mL  Total OUT: 900 mL    Total NET: -230 mL    PHYSICAL EXAM     General: Awake and alert, NAD  RLE: flap stable, soft, STSG with good take, skin paddle demarcating but stable. Audible doppler signal. Xeroform changed. STSG donor site healing well.      LABS  None    MEDICATIONS  (STANDING):  acetaminophen   Tablet .. 975 milliGRAM(s) Oral every 6 hours  AQUAPHOR (petrolatum Ointment) 1 Application(s) Topical two times a day  BACItracin   Ointment 1 Application(s) Topical daily  calcium carbonate   1250 mG (OsCal) 1 Tablet(s) Oral daily  cefTRIAXone   IVPB 2000 milliGRAM(s) IV Intermittent every 24 hours  chlorhexidine 2% Cloths 1 Application(s) Topical <User Schedule>  cholecalciferol 1000 Unit(s) Oral daily  enoxaparin Injectable 40 milliGRAM(s) SubCutaneous daily  gabapentin 600 milliGRAM(s) Oral every 8 hours  melatonin 3 milliGRAM(s) Oral at bedtime  multivitamin 1 Tablet(s) Oral daily  nafcillin  IVPB 2 Gram(s) IV Intermittent every 4 hours  polyethylene glycol 3350 17 Gram(s) Oral two times a day  senna 2 Tablet(s) Oral at bedtime    MEDICATIONS  (PRN):  bisacodyl Suppository 10 milliGRAM(s) Rectal daily PRN Constipation  diphenhydrAMINE 25 milliGRAM(s) Oral every 4 hours PRN Rash and/or Itching  HYDROmorphone  Injectable 0.5 milliGRAM(s) IV Push every 4 hours PRN Severe Pain (7 - 10)  metoclopramide Injectable 10 milliGRAM(s) IV Push every 6 hours PRN Nausea and/or Vomiting  ondansetron Injectable 4 milliGRAM(s) IV Push every 6 hours PRN Nausea and/or Vomiting  oxyCODONE    IR 5 milliGRAM(s) Oral every 4 hours PRN Moderate Pain (4 - 6)  oxyCODONE    IR 10 milliGRAM(s) Oral every 4 hours PRN Severe Pain (7 - 10)  sodium chloride 0.9% lock flush 10 milliLiter(s) IV Push every 1 hour PRN Pre/post blood products, medications, blood draw, and to maintain line patency

## 2021-10-11 NOTE — CHART NOTE - NSCHARTNOTEFT_GEN_A_CORE
Admitting Diagnosis:   Patient is a 26y old  Female who presents with a chief complaint of L ankle pain (11 Oct 2021 07:13)    PAST MEDICAL & SURGICAL HISTORY:  Left tibial neuropathy  PAD (peripheral artery disease)  Status post ORIF of fracture of ankle      Current Nutrition Order:  Diet, Regular (09-21-21 @ 09:05)    PO Intake: Good (%) [   ]  Fair (50-75%) [ x ] Poor (<25%) [   ]  -Pt continues to have fair-good po intake, understands importance of eating nutrient dense foods/ high protein foods. Drinking LPS occasionally, trying to drink more though.     GI Issues: +BM 10/10, abd-soft, + normal bowel sounds  Denies N/V or D/C  Pain: Denies   Skin Integrity: Left ankle -recon with L gracilis free flap and STSG (9/20)    Labs: Last labs 10/4- reviewed     Medications:  MEDICATIONS  (STANDING):  acetaminophen   Tablet .. 975 milliGRAM(s) Oral every 6 hours  AQUAPHOR (petrolatum Ointment) 1 Application(s) Topical two times a day  BACItracin   Ointment 1 Application(s) Topical daily  calcium carbonate   1250 mG (OsCal) 1 Tablet(s) Oral daily  cefTRIAXone   IVPB 2000 milliGRAM(s) IV Intermittent every 24 hours  chlorhexidine 2% Cloths 1 Application(s) Topical <User Schedule>  cholecalciferol 1000 Unit(s) Oral daily  enoxaparin Injectable 40 milliGRAM(s) SubCutaneous daily  gabapentin 600 milliGRAM(s) Oral every 8 hours  melatonin 3 milliGRAM(s) Oral at bedtime  multivitamin 1 Tablet(s) Oral daily  nafcillin  IVPB 2 Gram(s) IV Intermittent every 4 hours  polyethylene glycol 3350 17 Gram(s) Oral two times a day  senna 2 Tablet(s) Oral at bedtime    MEDICATIONS  (PRN):  bisacodyl Suppository 10 milliGRAM(s) Rectal daily PRN Constipation  diphenhydrAMINE 25 milliGRAM(s) Oral every 4 hours PRN Rash and/or Itching  HYDROmorphone  Injectable 0.5 milliGRAM(s) IV Push every 4 hours PRN Severe Pain (7 - 10)  metoclopramide Injectable 10 milliGRAM(s) IV Push every 6 hours PRN Nausea and/or Vomiting  ondansetron Injectable 4 milliGRAM(s) IV Push every 6 hours PRN Nausea and/or Vomiting  oxyCODONE    IR 5 milliGRAM(s) Oral every 4 hours PRN Moderate Pain (4 - 6)  oxyCODONE    IR 10 milliGRAM(s) Oral every 4 hours PRN Severe Pain (7 - 10)  sodium chloride 0.9% lock flush 10 milliLiter(s) IV Push every 1 hour PRN Pre/post blood products, medications, blood draw, and to maintain line patency    Anthropometrics:  Ht: 175.3cm (69")  Wt: 82.2kg (9/20) BMI: 26.8        86.2kg (9/09)    IBW: 145# (65.9kg)  % IBW: 125%    Weight Change: Per EMR, 4kg (5%) wt loss x 2 weeks (since admit) which is severe per [employer] standards.   *No new weight to assess since 9/20.     Nutrition Focused Physical Exam: Completed [   ]  Not Pertinent [ x  ]  *No overt signs of muscle wasting. Suspect muscle quality decline r/t limited physical activity     Malnutrition: Moderate malnutrition in the context of acute illness AEB <75% of estimated energy requirement for >7 days, 5% wt loss x 2 weeks (diagnosed 9/27)    Estimated energy needs: 65.9kg  IBW used for calculations as pt >120% of IBW, adjusted for acute illness  Needs adjusted for wound healing  Calories: 1650-1950kcals (25-30kcals/kg)  Protein: 95-130g (1.5-2.0g/kg)   Fluid:+1950ml/day (30ml/kcals)    Subjective:   26yFemale s/p Repeat L Ankle I&D, Vac Change by Dr. NEAL Wallis on 09-17, with plastic surgery for flap harvest and closure with plastic surgery (9/02). PICC line with IV abx.     F/u 10/11: Plastics following, dangle 30 min four times a day with pre/post flap checks. Plan for OR mid October for definitive conversion. Rehab once medically ready. Working with therapies while inpatient. SW following d/t planning.     Previous Nutrition Diagnosis:  Increased nutrient needs (protein) R/T hypermetabolic, increased protein catabolism AEB 1.5-2.0g/kg protein  Active [  x ]  Resolved [   ]    Goal:   Pt to consistently meet % of estimated needs PO     Recommendations:  1. Continue regular diet  -Add LPS BID, Ensure max and Ensure Enlive once daily. Pended order/ attempted to call   2. Monitor %PO intake, continue to encourage PO at mealtimes  3. Continue MVI  4. Bowel regimen PRN    Education: Continue encourage PO/protein    Risk Level: High [   ] Moderate [ x ] Low [   ].  Will continue to monitor per protocol.   Lisa Dolan RD,CDN,,CNSC.

## 2021-10-12 LAB
ALBUMIN SERPL ELPH-MCNC: 3.8 G/DL — SIGNIFICANT CHANGE UP (ref 3.3–5)
ALP SERPL-CCNC: 58 U/L — SIGNIFICANT CHANGE UP (ref 40–120)
ALT FLD-CCNC: 16 U/L — SIGNIFICANT CHANGE UP (ref 10–45)
ANION GAP SERPL CALC-SCNC: 12 MMOL/L — SIGNIFICANT CHANGE UP (ref 5–17)
AST SERPL-CCNC: 15 U/L — SIGNIFICANT CHANGE UP (ref 10–40)
BILIRUB SERPL-MCNC: 0.2 MG/DL — SIGNIFICANT CHANGE UP (ref 0.2–1.2)
BUN SERPL-MCNC: 8 MG/DL — SIGNIFICANT CHANGE UP (ref 7–23)
CALCIUM SERPL-MCNC: 9.5 MG/DL — SIGNIFICANT CHANGE UP (ref 8.4–10.5)
CHLORIDE SERPL-SCNC: 104 MMOL/L — SIGNIFICANT CHANGE UP (ref 96–108)
CO2 SERPL-SCNC: 24 MMOL/L — SIGNIFICANT CHANGE UP (ref 22–31)
CREAT SERPL-MCNC: 0.69 MG/DL — SIGNIFICANT CHANGE UP (ref 0.5–1.3)
GLUCOSE SERPL-MCNC: 93 MG/DL — SIGNIFICANT CHANGE UP (ref 70–99)
HCT VFR BLD CALC: 27.8 % — LOW (ref 34.5–45)
HGB BLD-MCNC: 9.1 G/DL — LOW (ref 11.5–15.5)
MCHC RBC-ENTMCNC: 30.1 PG — SIGNIFICANT CHANGE UP (ref 27–34)
MCHC RBC-ENTMCNC: 32.7 GM/DL — SIGNIFICANT CHANGE UP (ref 32–36)
MCV RBC AUTO: 92.1 FL — SIGNIFICANT CHANGE UP (ref 80–100)
NRBC # BLD: 0 /100 WBCS — SIGNIFICANT CHANGE UP (ref 0–0)
PLATELET # BLD AUTO: 674 K/UL — HIGH (ref 150–400)
POTASSIUM SERPL-MCNC: 3.9 MMOL/L — SIGNIFICANT CHANGE UP (ref 3.5–5.3)
POTASSIUM SERPL-SCNC: 3.9 MMOL/L — SIGNIFICANT CHANGE UP (ref 3.5–5.3)
PROT SERPL-MCNC: 7.2 G/DL — SIGNIFICANT CHANGE UP (ref 6–8.3)
RBC # BLD: 3.02 M/UL — LOW (ref 3.8–5.2)
RBC # FLD: 15.9 % — HIGH (ref 10.3–14.5)
SODIUM SERPL-SCNC: 140 MMOL/L — SIGNIFICANT CHANGE UP (ref 135–145)
WBC # BLD: 6.34 K/UL — SIGNIFICANT CHANGE UP (ref 3.8–10.5)
WBC # FLD AUTO: 6.34 K/UL — SIGNIFICANT CHANGE UP (ref 3.8–10.5)

## 2021-10-12 RX ORDER — SODIUM CHLORIDE 9 MG/ML
1000 INJECTION, SOLUTION INTRAVENOUS
Refills: 0 | Status: DISCONTINUED | OUTPATIENT
Start: 2021-10-17 | End: 2021-10-17

## 2021-10-12 RX ORDER — ENOXAPARIN SODIUM 100 MG/ML
40 INJECTION SUBCUTANEOUS EVERY 24 HOURS
Refills: 0 | Status: COMPLETED | OUTPATIENT
Start: 2021-10-12 | End: 2021-10-17

## 2021-10-12 RX ADMIN — Medication 975 MILLIGRAM(S): at 18:40

## 2021-10-12 RX ADMIN — Medication 975 MILLIGRAM(S): at 23:59

## 2021-10-12 RX ADMIN — NAFCILLIN 100 GRAM(S): 10 INJECTION, POWDER, FOR SOLUTION INTRAVENOUS at 01:15

## 2021-10-12 RX ADMIN — Medication 1 APPLICATION(S): at 12:54

## 2021-10-12 RX ADMIN — NAFCILLIN 100 GRAM(S): 10 INJECTION, POWDER, FOR SOLUTION INTRAVENOUS at 06:43

## 2021-10-12 RX ADMIN — Medication 975 MILLIGRAM(S): at 23:36

## 2021-10-12 RX ADMIN — NAFCILLIN 100 GRAM(S): 10 INJECTION, POWDER, FOR SOLUTION INTRAVENOUS at 12:54

## 2021-10-12 RX ADMIN — Medication 1 TABLET(S): at 12:53

## 2021-10-12 RX ADMIN — NAFCILLIN 100 GRAM(S): 10 INJECTION, POWDER, FOR SOLUTION INTRAVENOUS at 09:28

## 2021-10-12 RX ADMIN — OXYCODONE HYDROCHLORIDE 10 MILLIGRAM(S): 5 TABLET ORAL at 21:09

## 2021-10-12 RX ADMIN — Medication 1 APPLICATION(S): at 18:38

## 2021-10-12 RX ADMIN — Medication 3 MILLIGRAM(S): at 21:09

## 2021-10-12 RX ADMIN — Medication 975 MILLIGRAM(S): at 00:37

## 2021-10-12 RX ADMIN — Medication 975 MILLIGRAM(S): at 13:53

## 2021-10-12 RX ADMIN — CEFTRIAXONE 100 MILLIGRAM(S): 500 INJECTION, POWDER, FOR SOLUTION INTRAMUSCULAR; INTRAVENOUS at 23:36

## 2021-10-12 RX ADMIN — HYDROMORPHONE HYDROCHLORIDE 0.5 MILLIGRAM(S): 2 INJECTION INTRAMUSCULAR; INTRAVENOUS; SUBCUTANEOUS at 01:27

## 2021-10-12 RX ADMIN — NAFCILLIN 100 GRAM(S): 10 INJECTION, POWDER, FOR SOLUTION INTRAVENOUS at 22:11

## 2021-10-12 RX ADMIN — Medication 1 APPLICATION(S): at 06:44

## 2021-10-12 RX ADMIN — Medication 975 MILLIGRAM(S): at 14:51

## 2021-10-12 RX ADMIN — ENOXAPARIN SODIUM 40 MILLIGRAM(S): 100 INJECTION SUBCUTANEOUS at 12:54

## 2021-10-12 RX ADMIN — CHLORHEXIDINE GLUCONATE 1 APPLICATION(S): 213 SOLUTION TOPICAL at 06:44

## 2021-10-12 RX ADMIN — Medication 1000 UNIT(S): at 12:54

## 2021-10-12 RX ADMIN — HYDROMORPHONE HYDROCHLORIDE 0.5 MILLIGRAM(S): 2 INJECTION INTRAMUSCULAR; INTRAVENOUS; SUBCUTANEOUS at 22:30

## 2021-10-12 RX ADMIN — NAFCILLIN 100 GRAM(S): 10 INJECTION, POWDER, FOR SOLUTION INTRAVENOUS at 18:39

## 2021-10-12 RX ADMIN — Medication 975 MILLIGRAM(S): at 12:53

## 2021-10-12 RX ADMIN — GABAPENTIN 600 MILLIGRAM(S): 400 CAPSULE ORAL at 06:43

## 2021-10-12 RX ADMIN — GABAPENTIN 600 MILLIGRAM(S): 400 CAPSULE ORAL at 12:53

## 2021-10-12 RX ADMIN — Medication 1 TABLET(S): at 12:54

## 2021-10-12 RX ADMIN — OXYCODONE HYDROCHLORIDE 10 MILLIGRAM(S): 5 TABLET ORAL at 21:39

## 2021-10-12 RX ADMIN — GABAPENTIN 600 MILLIGRAM(S): 400 CAPSULE ORAL at 21:09

## 2021-10-12 RX ADMIN — Medication 975 MILLIGRAM(S): at 06:46

## 2021-10-12 RX ADMIN — HYDROMORPHONE HYDROCHLORIDE 0.5 MILLIGRAM(S): 2 INJECTION INTRAMUSCULAR; INTRAVENOUS; SUBCUTANEOUS at 22:22

## 2021-10-12 NOTE — PROGRESS NOTE ADULT - SUBJECTIVE AND OBJECTIVE BOX
SUBJECTIVE:  No overnight events. Tolerating dangles. Still having intermittent pruritis. Some difficulty sleeping.     OBJECTIVE:     VITAL SIGNS / I&O's    Vital Signs Last 24 Hrs  T(C): 36.8 (12 Oct 2021 05:59), Max: 37 (11 Oct 2021 21:45)  T(F): 98.2 (12 Oct 2021 05:59), Max: 98.6 (11 Oct 2021 21:45)  HR: 79 (12 Oct 2021 05:59) (79 - 88)  BP: 105/72 (12 Oct 2021 05:59) (105/72 - 110/74)  BP(mean): 84 (11 Oct 2021 12:08) (84 - 84)  RR: 16 (12 Oct 2021 05:59) (16 - 17)  SpO2: 96% (12 Oct 2021 05:59) (95% - 98%)      11 Oct 2021 07:01  -  12 Oct 2021 07:00  --------------------------------------------------------  IN:    Oral Fluid: 360 mL  Total IN: 360 mL    OUT:    Voided (mL): 1300 mL  Total OUT: 1300 mL    Total NET: -940 mL    PHYSICAL EXAM     General: Awake and alert, NAD  RLE: flap stable, soft, STSG with good take, skin paddle demarcating but stable. Audible doppler signal. Xeroform changed. STSG donor site healing well.      LABS                          9.1    6.34  )-----------( 674      ( 12 Oct 2021 06:11 )             27.8     12 Oct 2021 06:11    140    |  104    |  8      ----------------------------<  93     3.9     |  24     |  0.69     Ca    9.5        12 Oct 2021 06:11    TPro  7.2    /  Alb  3.8    /  TBili  0.2    /  DBili  x      /  AST  15     /  ALT  16     /  AlkPhos  58     12 Oct 2021 06:11      MEDICATIONS  (STANDING):  acetaminophen   Tablet .. 975 milliGRAM(s) Oral every 6 hours  AQUAPHOR (petrolatum Ointment) 1 Application(s) Topical two times a day  BACItracin   Ointment 1 Application(s) Topical daily  calcium carbonate   1250 mG (OsCal) 1 Tablet(s) Oral daily  cefTRIAXone   IVPB 2000 milliGRAM(s) IV Intermittent every 24 hours  chlorhexidine 2% Cloths 1 Application(s) Topical <User Schedule>  cholecalciferol 1000 Unit(s) Oral daily  enoxaparin Injectable 40 milliGRAM(s) SubCutaneous daily  gabapentin 600 milliGRAM(s) Oral every 8 hours  melatonin 3 milliGRAM(s) Oral at bedtime  multivitamin 1 Tablet(s) Oral daily  nafcillin  IVPB 2 Gram(s) IV Intermittent every 4 hours  polyethylene glycol 3350 17 Gram(s) Oral two times a day  senna 2 Tablet(s) Oral at bedtime    MEDICATIONS  (PRN):  bisacodyl Suppository 10 milliGRAM(s) Rectal daily PRN Constipation  diphenhydrAMINE 25 milliGRAM(s) Oral every 4 hours PRN Rash and/or Itching  HYDROmorphone  Injectable 0.5 milliGRAM(s) IV Push every 4 hours PRN Severe Pain (7 - 10)  metoclopramide Injectable 10 milliGRAM(s) IV Push every 6 hours PRN Nausea and/or Vomiting  ondansetron Injectable 4 milliGRAM(s) IV Push every 6 hours PRN Nausea and/or Vomiting  oxyCODONE    IR 5 milliGRAM(s) Oral every 4 hours PRN Moderate Pain (4 - 6)  oxyCODONE    IR 10 milliGRAM(s) Oral every 4 hours PRN Severe Pain (7 - 10)  sodium chloride 0.9% lock flush 10 milliLiter(s) IV Push every 1 hour PRN Pre/post blood products, medications, blood draw, and to maintain line patency   SUBJECTIVE:  No overnight events. Tolerating dangles. Still having intermittent pruritis. Some difficulty sleeping.     OBJECTIVE:     VITAL SIGNS / I&O's    Vital Signs Last 24 Hrs  T(C): 36.8 (12 Oct 2021 05:59), Max: 37 (11 Oct 2021 21:45)  T(F): 98.2 (12 Oct 2021 05:59), Max: 98.6 (11 Oct 2021 21:45)  HR: 79 (12 Oct 2021 05:59) (79 - 88)  BP: 105/72 (12 Oct 2021 05:59) (105/72 - 110/74)  BP(mean): 84 (11 Oct 2021 12:08) (84 - 84)  RR: 16 (12 Oct 2021 05:59) (16 - 17)  SpO2: 96% (12 Oct 2021 05:59) (95% - 98%)      11 Oct 2021 07:01  -  12 Oct 2021 07:00  --------------------------------------------------------  IN:    Oral Fluid: 360 mL  Total IN: 360 mL    OUT:    Voided (mL): 1300 mL  Total OUT: 1300 mL    Total NET: -940 mL    PHYSICAL EXAM     General: Awake and alert, NAD  RLE: flap stable, soft, STSG with good take, skin paddle demarcating but stable. Audible doppler signal. Xeroform changed. STSG donor site healing well.      LABS                          9.1    6.34  )-----------( 674      ( 12 Oct 2021 06:11 )             27.8     12 Oct 2021 06:11    140    |  104    |  8      ----------------------------<  93     3.9     |  24     |  0.69     Ca    9.5        12 Oct 2021 06:11    TPro  7.2    /  Alb  3.8    /  TBili  0.2    /  DBili  x      /  AST  15     /  ALT  16     /  AlkPhos  58     12 Oct 2021 06:11    ESR 96 / CRP 25.2      MEDICATIONS  (STANDING):  acetaminophen   Tablet .. 975 milliGRAM(s) Oral every 6 hours  AQUAPHOR (petrolatum Ointment) 1 Application(s) Topical two times a day  BACItracin   Ointment 1 Application(s) Topical daily  calcium carbonate   1250 mG (OsCal) 1 Tablet(s) Oral daily  cefTRIAXone   IVPB 2000 milliGRAM(s) IV Intermittent every 24 hours  chlorhexidine 2% Cloths 1 Application(s) Topical <User Schedule>  cholecalciferol 1000 Unit(s) Oral daily  enoxaparin Injectable 40 milliGRAM(s) SubCutaneous daily  gabapentin 600 milliGRAM(s) Oral every 8 hours  melatonin 3 milliGRAM(s) Oral at bedtime  multivitamin 1 Tablet(s) Oral daily  nafcillin  IVPB 2 Gram(s) IV Intermittent every 4 hours  polyethylene glycol 3350 17 Gram(s) Oral two times a day  senna 2 Tablet(s) Oral at bedtime    MEDICATIONS  (PRN):  bisacodyl Suppository 10 milliGRAM(s) Rectal daily PRN Constipation  diphenhydrAMINE 25 milliGRAM(s) Oral every 4 hours PRN Rash and/or Itching  HYDROmorphone  Injectable 0.5 milliGRAM(s) IV Push every 4 hours PRN Severe Pain (7 - 10)  metoclopramide Injectable 10 milliGRAM(s) IV Push every 6 hours PRN Nausea and/or Vomiting  ondansetron Injectable 4 milliGRAM(s) IV Push every 6 hours PRN Nausea and/or Vomiting  oxyCODONE    IR 5 milliGRAM(s) Oral every 4 hours PRN Moderate Pain (4 - 6)  oxyCODONE    IR 10 milliGRAM(s) Oral every 4 hours PRN Severe Pain (7 - 10)  sodium chloride 0.9% lock flush 10 milliLiter(s) IV Push every 1 hour PRN Pre/post blood products, medications, blood draw, and to maintain line patency

## 2021-10-12 NOTE — PROGRESS NOTE ADULT - SUBJECTIVE AND OBJECTIVE BOX
Ortho Note    Pt comfortable without complaints, pain controlled at foot/ankle.    Denies any new onset symptoms this morning. Comfortable in prAFO     Vital Signs Last 24 Hrs  T(C): 36.8 (12 Oct 2021 05:59), Max: 37 (11 Oct 2021 21:45)  T(F): 98.2 (12 Oct 2021 05:59), Max: 98.6 (11 Oct 2021 21:45)  HR: 79 (12 Oct 2021 05:59) (79 - 88)  BP: 105/72 (12 Oct 2021 05:59) (105/72 - 110/74)  BP(mean): 84 (11 Oct 2021 12:08) (84 - 84)  RR: 16 (12 Oct 2021 05:59) (16 - 17)  SpO2: 96% (12 Oct 2021 05:59) (95% - 98%)    VSS  General: A&Ox3, NAD  LLE: prAFO brace in place; Anterior soft tissue flap and skin graft recipient site with overlying Xeroform; Heel checked with Allevyn in place, removed and no underlying skin ulcer, Allevyn replaced  Pulses: Foot WWP; Cap refill < 2 sec  Sensation: Patient with abnormal/absent sensation over most of the dorsum & plantar aspects of the foot consistent with baseline  Motor: Minimal FHL/FDL, 0/5 EHL/EDL activity consistent with baseline    A/P: 26yFemale With 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 The patient is currently in a PRAFO for unloading of her left heel.     - Plan continues to be for flap elevation, open reduction internal fixation treatment of left trimalleolar infected nonunion malunion, open reduction internal fixation left syndesmosis, application of ringed external fixator, potential left Achilles or gastroc lengthening, proximal tibia versus left iliac crest bone graft harvest, and will be done in conjunction with Dr. Dunbar from plastic surgery for flap elevation.  The patient understands that she will require removal of the ringed exfix at a secondary point as well as adjustment of her forefoot pathology if it is symptomatic due to her clawing of her toes from her missed compartment syndrome.   - Appreciate ID input on inflammatory markers   - Preliminary plan for rehab and then readmission on 10/16 however dispo delayed due to incidental COVID exposure, patient remains asymptomatic. At this point, logistically, patient should stay in-house until next surgery (10/18) to avoid a short discharge to rehab and readmission. Plan for new L foot and ankle XR this weekend.   - PICC placed, placed for IV abx x 6 weeks   - Continue elevation, defer dangle protocol per plastics   - Dressing evaluation and drain management per plastics team   - OR cx - staph aureus - continue abx as planned per ID.   - Pain Control  - Post op abx: Ceftriaxone + Nafcillin  - DVT ppx: Lovenox  - WBS: DIANN RAMOS    Ortho Pager [phone]

## 2021-10-13 LAB — SARS-COV-2 RNA SPEC QL NAA+PROBE: SIGNIFICANT CHANGE UP

## 2021-10-13 RX ORDER — HYDROMORPHONE HYDROCHLORIDE 2 MG/ML
0.5 INJECTION INTRAMUSCULAR; INTRAVENOUS; SUBCUTANEOUS EVERY 4 HOURS
Refills: 0 | Status: DISCONTINUED | OUTPATIENT
Start: 2021-10-13 | End: 2021-10-18

## 2021-10-13 RX ADMIN — OXYCODONE HYDROCHLORIDE 10 MILLIGRAM(S): 5 TABLET ORAL at 06:22

## 2021-10-13 RX ADMIN — CHLORHEXIDINE GLUCONATE 1 APPLICATION(S): 213 SOLUTION TOPICAL at 06:12

## 2021-10-13 RX ADMIN — NAFCILLIN 100 GRAM(S): 10 INJECTION, POWDER, FOR SOLUTION INTRAVENOUS at 06:12

## 2021-10-13 RX ADMIN — NAFCILLIN 100 GRAM(S): 10 INJECTION, POWDER, FOR SOLUTION INTRAVENOUS at 10:06

## 2021-10-13 RX ADMIN — Medication 975 MILLIGRAM(S): at 13:13

## 2021-10-13 RX ADMIN — GABAPENTIN 600 MILLIGRAM(S): 400 CAPSULE ORAL at 06:15

## 2021-10-13 RX ADMIN — NAFCILLIN 100 GRAM(S): 10 INJECTION, POWDER, FOR SOLUTION INTRAVENOUS at 21:34

## 2021-10-13 RX ADMIN — Medication 1 TABLET(S): at 13:13

## 2021-10-13 RX ADMIN — ENOXAPARIN SODIUM 40 MILLIGRAM(S): 100 INJECTION SUBCUTANEOUS at 13:15

## 2021-10-13 RX ADMIN — CEFTRIAXONE 100 MILLIGRAM(S): 500 INJECTION, POWDER, FOR SOLUTION INTRAMUSCULAR; INTRAVENOUS at 22:30

## 2021-10-13 RX ADMIN — GABAPENTIN 600 MILLIGRAM(S): 400 CAPSULE ORAL at 13:13

## 2021-10-13 RX ADMIN — HYDROMORPHONE HYDROCHLORIDE 0.5 MILLIGRAM(S): 2 INJECTION INTRAMUSCULAR; INTRAVENOUS; SUBCUTANEOUS at 07:52

## 2021-10-13 RX ADMIN — Medication 1 APPLICATION(S): at 19:26

## 2021-10-13 RX ADMIN — NAFCILLIN 100 GRAM(S): 10 INJECTION, POWDER, FOR SOLUTION INTRAVENOUS at 13:12

## 2021-10-13 RX ADMIN — Medication 1 APPLICATION(S): at 13:12

## 2021-10-13 RX ADMIN — Medication 975 MILLIGRAM(S): at 06:15

## 2021-10-13 RX ADMIN — Medication 25 MILLIGRAM(S): at 11:06

## 2021-10-13 RX ADMIN — Medication 975 MILLIGRAM(S): at 07:00

## 2021-10-13 RX ADMIN — SENNA PLUS 2 TABLET(S): 8.6 TABLET ORAL at 21:33

## 2021-10-13 RX ADMIN — GABAPENTIN 600 MILLIGRAM(S): 400 CAPSULE ORAL at 21:35

## 2021-10-13 RX ADMIN — NAFCILLIN 100 GRAM(S): 10 INJECTION, POWDER, FOR SOLUTION INTRAVENOUS at 19:16

## 2021-10-13 RX ADMIN — Medication 3 MILLIGRAM(S): at 21:32

## 2021-10-13 RX ADMIN — OXYCODONE HYDROCHLORIDE 10 MILLIGRAM(S): 5 TABLET ORAL at 07:00

## 2021-10-13 RX ADMIN — Medication 1 TABLET(S): at 13:12

## 2021-10-13 RX ADMIN — OXYCODONE HYDROCHLORIDE 10 MILLIGRAM(S): 5 TABLET ORAL at 21:13

## 2021-10-13 RX ADMIN — NAFCILLIN 100 GRAM(S): 10 INJECTION, POWDER, FOR SOLUTION INTRAVENOUS at 02:00

## 2021-10-13 RX ADMIN — OXYCODONE HYDROCHLORIDE 10 MILLIGRAM(S): 5 TABLET ORAL at 22:13

## 2021-10-13 RX ADMIN — Medication 1 APPLICATION(S): at 06:15

## 2021-10-13 RX ADMIN — Medication 975 MILLIGRAM(S): at 19:15

## 2021-10-13 RX ADMIN — HYDROMORPHONE HYDROCHLORIDE 0.5 MILLIGRAM(S): 2 INJECTION INTRAMUSCULAR; INTRAVENOUS; SUBCUTANEOUS at 08:00

## 2021-10-13 RX ADMIN — Medication 1000 UNIT(S): at 13:13

## 2021-10-13 NOTE — PROGRESS NOTE ADULT - SUBJECTIVE AND OBJECTIVE BOX
Ortho Note    Pt comfortable without complaints, pain controlled at foot/ankle.    Denies any new onset symptoms this morning. Comfortable in prAFO     Vital Signs Last 24 Hrs  T(C): 36.6 (13 Oct 2021 06:13), Max: 36.9 (12 Oct 2021 13:45)  T(F): 97.9 (13 Oct 2021 06:13), Max: 98.4 (12 Oct 2021 13:45)  HR: 86 (13 Oct 2021 06:13) (81 - 104)  BP: 114/78 (13 Oct 2021 06:13) (103/76 - 122/73)  BP(mean): 90 (13 Oct 2021 06:13) (85 - 90)  RR: 18 (13 Oct 2021 06:13) (16 - 18)  SpO2: 97% (13 Oct 2021 06:13) (96% - 98%)    VSS  General: A&Ox3, NAD  LLE: prAFO brace in place; Anterior soft tissue flap and skin graft recipient site with overlying Xeroform; Heel checked with Allevyn in place, removed and no underlying skin ulcer, Allevyn replaced  Pulses: Foot WWP; Cap refill < 2 sec  Sensation: Patient with abnormal/absent sensation over most of the dorsum & plantar aspects of the foot consistent with baseline  Motor: Minimal FHL/FDL, 0/5 EHL/EDL activity consistent with baseline    A/P: 26yFemale With 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 The patient is currently in a PRAFO for unloading of her left heel.     -  Gallium scan today for next stage planning   - Plan continues to be for flap elevation, open reduction internal fixation treatment of left trimalleolar infected nonunion malunion, open reduction internal fixation left syndesmosis, application of ringed external fixator, potential left Achilles or gastroc lengthening, proximal tibia versus left iliac crest bone graft harvest, and will be done in conjunction with Dr. Dunbar from plastic surgery for flap elevation.  The patient understands that she will require removal of the ringed exfix at a secondary point as well as adjustment of her forefoot pathology if it is symptomatic due to her clawing of her toes from her missed compartment syndrome.   - Appreciate ID input on inflammatory markers   - Preliminary plan for rehab and then readmission on 10/16 however dispo delayed due to incidental COVID exposure, patient remains asymptomatic. At this point, logistically, patient should stay in-house until next surgery (10/18) to avoid a short discharge to rehab and readmission. Plan for new L foot and ankle XR this weekend.   - PICC placed, placed for IV abx x 6 weeks   - Continue elevation, defer dangle protocol per plastics   - Dressing evaluation and drain management per plastics team   - OR cx - staph aureus - continue abx as planned per ID.   - Pain Control  - Post op abx: Ceftriaxone + Nafcillin  - DVT ppx: Lovenox  - WBS: DIANN RAMOS    Ortho Pager [phone]

## 2021-10-13 NOTE — PROGRESS NOTE ADULT - SUBJECTIVE AND OBJECTIVE BOX
SUBJECTIVE:  No overnight events. Tolerating dangles, did 8l46gor yesterday. Overall, doing well.    OBJECTIVE:     VITAL SIGNS / I&O's    Vital Signs Last 24 Hrs  T(C): 36.6 (13 Oct 2021 06:13), Max: 36.9 (12 Oct 2021 13:45)  T(F): 97.9 (13 Oct 2021 06:13), Max: 98.4 (12 Oct 2021 13:45)  HR: 86 (13 Oct 2021 06:13) (81 - 104)  BP: 114/78 (13 Oct 2021 06:13) (103/76 - 122/73)  BP(mean): 90 (13 Oct 2021 06:13) (85 - 90)  RR: 18 (13 Oct 2021 06:13) (16 - 18)  SpO2: 97% (13 Oct 2021 06:13) (96% - 98%)      12 Oct 2021 07:01  -  13 Oct 2021 07:00  --------------------------------------------------------  IN:    IV PiggyBack: 500 mL    IV PiggyBack: 50 mL    IV PiggyBack: 100 mL  Total IN: 650 mL    OUT:    Blood Loss (mL): 1 mL    Voided (mL): 1500 mL  Total OUT: 1501 mL    Total NET: -851 mL      PHYSICAL EXAM     General: Awake and alert, NAD  LLE: flap stable, soft, STSG with good take, skin paddle demarcating but stable. Audible doppler signal. Xeroform changed. STSG donor site pink, healing well with Aquaphor applied. PRAFO in place.     LABS                          9.1    6.34  )-----------( 674      ( 12 Oct 2021 06:11 )             27.8     12 Oct 2021 06:11    140    |  104    |  8      ----------------------------<  93     3.9     |  24     |  0.69     Ca    9.5        12 Oct 2021 06:11    TPro  7.2    /  Alb  3.8    /  TBili  0.2    /  DBili  x      /  AST  15     /  ALT  16     /  AlkPhos  58     12 Oct 2021 06:11      MEDICATIONS  (STANDING):  acetaminophen   Tablet .. 975 milliGRAM(s) Oral every 6 hours  AQUAPHOR (petrolatum Ointment) 1 Application(s) Topical two times a day  BACItracin   Ointment 1 Application(s) Topical daily  calcium carbonate   1250 mG (OsCal) 1 Tablet(s) Oral daily  cefTRIAXone   IVPB 2000 milliGRAM(s) IV Intermittent every 24 hours  chlorhexidine 2% Cloths 1 Application(s) Topical <User Schedule>  cholecalciferol 1000 Unit(s) Oral daily  enoxaparin Injectable 40 milliGRAM(s) SubCutaneous every 24 hours  gabapentin 600 milliGRAM(s) Oral every 8 hours  melatonin 3 milliGRAM(s) Oral at bedtime  multivitamin 1 Tablet(s) Oral daily  nafcillin  IVPB 2 Gram(s) IV Intermittent every 4 hours  polyethylene glycol 3350 17 Gram(s) Oral two times a day  senna 2 Tablet(s) Oral at bedtime    MEDICATIONS  (PRN):  bisacodyl Suppository 10 milliGRAM(s) Rectal daily PRN Constipation  diphenhydrAMINE 25 milliGRAM(s) Oral every 4 hours PRN Rash and/or Itching  HYDROmorphone  Injectable 0.5 milliGRAM(s) IV Push every 4 hours PRN Severe Pain (7 - 10)  metoclopramide Injectable 10 milliGRAM(s) IV Push every 6 hours PRN Nausea and/or Vomiting  ondansetron Injectable 4 milliGRAM(s) IV Push every 6 hours PRN Nausea and/or Vomiting  oxyCODONE    IR 5 milliGRAM(s) Oral every 4 hours PRN Moderate Pain (4 - 6)  oxyCODONE    IR 10 milliGRAM(s) Oral every 4 hours PRN Severe Pain (7 - 10)  sodium chloride 0.9% lock flush 10 milliLiter(s) IV Push every 1 hour PRN Pre/post blood products, medications, blood draw, and to maintain line patency

## 2021-10-13 NOTE — PROGRESS NOTE ADULT - ASSESSMENT
ASSESSMENT & PLAN    26F s/p L ankle recon with L gracilis free flap and STSG (9/20).    - LLE elevation and dangling 30 min x 4 per day, flap checks before and after  - Xeroform with bacitracin daily over flap  - Pain control - multimodal  - Benadryl prn for pruritis  - Barrier ointment for dry skin on L buttocks, encourage position change regularly  - Reg diet  - Bedrest  - PICC 9/21, ceftriaxone, nafcillin - ID following  - Weekly labs for inflammatory markers, per ID  - Lovenox/SCD on right leg  - IS  - Surgery planning with Ortho, 10/18

## 2021-10-14 PROCEDURE — 78830 RP LOCLZJ TUM SPECT W/CT 1: CPT | Mod: 26

## 2021-10-14 RX ORDER — OXYCODONE HYDROCHLORIDE 5 MG/1
5 TABLET ORAL EVERY 4 HOURS
Refills: 0 | Status: DISCONTINUED | OUTPATIENT
Start: 2021-10-14 | End: 2021-10-21

## 2021-10-14 RX ORDER — OXYCODONE HYDROCHLORIDE 5 MG/1
10 TABLET ORAL EVERY 4 HOURS
Refills: 0 | Status: DISCONTINUED | OUTPATIENT
Start: 2021-10-14 | End: 2021-10-21

## 2021-10-14 RX ADMIN — Medication 1 TABLET(S): at 11:20

## 2021-10-14 RX ADMIN — Medication 1 APPLICATION(S): at 20:03

## 2021-10-14 RX ADMIN — GABAPENTIN 600 MILLIGRAM(S): 400 CAPSULE ORAL at 06:01

## 2021-10-14 RX ADMIN — POLYETHYLENE GLYCOL 3350 17 GRAM(S): 17 POWDER, FOR SOLUTION ORAL at 05:59

## 2021-10-14 RX ADMIN — Medication 975 MILLIGRAM(S): at 06:59

## 2021-10-14 RX ADMIN — NAFCILLIN 100 GRAM(S): 10 INJECTION, POWDER, FOR SOLUTION INTRAVENOUS at 01:16

## 2021-10-14 RX ADMIN — NAFCILLIN 100 GRAM(S): 10 INJECTION, POWDER, FOR SOLUTION INTRAVENOUS at 15:16

## 2021-10-14 RX ADMIN — OXYCODONE HYDROCHLORIDE 10 MILLIGRAM(S): 5 TABLET ORAL at 22:34

## 2021-10-14 RX ADMIN — Medication 975 MILLIGRAM(S): at 02:28

## 2021-10-14 RX ADMIN — NAFCILLIN 100 GRAM(S): 10 INJECTION, POWDER, FOR SOLUTION INTRAVENOUS at 05:56

## 2021-10-14 RX ADMIN — NAFCILLIN 100 GRAM(S): 10 INJECTION, POWDER, FOR SOLUTION INTRAVENOUS at 11:19

## 2021-10-14 RX ADMIN — Medication 25 MILLIGRAM(S): at 16:37

## 2021-10-14 RX ADMIN — Medication 25 MILLIGRAM(S): at 01:16

## 2021-10-14 RX ADMIN — Medication 975 MILLIGRAM(S): at 11:20

## 2021-10-14 RX ADMIN — Medication 975 MILLIGRAM(S): at 00:28

## 2021-10-14 RX ADMIN — GABAPENTIN 600 MILLIGRAM(S): 400 CAPSULE ORAL at 14:20

## 2021-10-14 RX ADMIN — OXYCODONE HYDROCHLORIDE 10 MILLIGRAM(S): 5 TABLET ORAL at 11:19

## 2021-10-14 RX ADMIN — ENOXAPARIN SODIUM 40 MILLIGRAM(S): 100 INJECTION SUBCUTANEOUS at 14:22

## 2021-10-14 RX ADMIN — Medication 1000 UNIT(S): at 11:20

## 2021-10-14 RX ADMIN — Medication 1 APPLICATION(S): at 11:20

## 2021-10-14 RX ADMIN — HYDROMORPHONE HYDROCHLORIDE 0.5 MILLIGRAM(S): 2 INJECTION INTRAMUSCULAR; INTRAVENOUS; SUBCUTANEOUS at 14:17

## 2021-10-14 RX ADMIN — HYDROMORPHONE HYDROCHLORIDE 0.5 MILLIGRAM(S): 2 INJECTION INTRAMUSCULAR; INTRAVENOUS; SUBCUTANEOUS at 01:16

## 2021-10-14 RX ADMIN — HYDROMORPHONE HYDROCHLORIDE 0.5 MILLIGRAM(S): 2 INJECTION INTRAMUSCULAR; INTRAVENOUS; SUBCUTANEOUS at 02:28

## 2021-10-14 RX ADMIN — Medication 975 MILLIGRAM(S): at 05:59

## 2021-10-14 RX ADMIN — NAFCILLIN 100 GRAM(S): 10 INJECTION, POWDER, FOR SOLUTION INTRAVENOUS at 19:48

## 2021-10-14 RX ADMIN — Medication 1 APPLICATION(S): at 06:59

## 2021-10-14 RX ADMIN — CHLORHEXIDINE GLUCONATE 1 APPLICATION(S): 213 SOLUTION TOPICAL at 06:59

## 2021-10-14 NOTE — PROGRESS NOTE ADULT - SUBJECTIVE AND OBJECTIVE BOX
SUBJECTIVE:  No overnight events. Pain controlled. Tolerating dangles. Voiding.     OBJECTIVE:     VITAL SIGNS / I&O's    Vital Signs Last 24 Hrs  T(C): 36.9 (14 Oct 2021 05:14), Max: 37.1 (13 Oct 2021 14:41)  T(F): 98.5 (14 Oct 2021 05:14), Max: 98.7 (13 Oct 2021 14:41)  HR: 91 (14 Oct 2021 05:14) (78 - 107)  BP: 119/71 (14 Oct 2021 05:14) (100/66 - 119/71)  BP(mean): 75 (13 Oct 2021 21:18) (75 - 75)  RR: 19 (14 Oct 2021 05:14) (17 - 19)  SpO2: 96% (14 Oct 2021 05:14) (96% - 98%)      13 Oct 2021 07:01  -  14 Oct 2021 07:00  --------------------------------------------------------  IN:    IV PiggyBack: 200 mL    IV PiggyBack: 100 mL    Oral Fluid: 950 mL  Total IN: 1250 mL    OUT:    Voided (mL): 1650 mL  Total OUT: 1650 mL    Total NET: -400 mL    PHYSICAL EXAM     General: Awake and alert, NAD  LLE: Flap stable, soft, STSG with good take, skin paddle demarcating but stable. Audible doppler signal. Xeroform changed. STSG donor site pink, healing well with Aquaphor applied. PRAFO in place.     LABS  None    MEDICATIONS  (STANDING):  acetaminophen   Tablet .. 975 milliGRAM(s) Oral every 6 hours  AQUAPHOR (petrolatum Ointment) 1 Application(s) Topical two times a day  BACItracin   Ointment 1 Application(s) Topical daily  calcium carbonate   1250 mG (OsCal) 1 Tablet(s) Oral daily  cefTRIAXone   IVPB 2000 milliGRAM(s) IV Intermittent every 24 hours  chlorhexidine 2% Cloths 1 Application(s) Topical <User Schedule>  cholecalciferol 1000 Unit(s) Oral daily  enoxaparin Injectable 40 milliGRAM(s) SubCutaneous every 24 hours  gabapentin 600 milliGRAM(s) Oral every 8 hours  melatonin 3 milliGRAM(s) Oral at bedtime  multivitamin 1 Tablet(s) Oral daily  nafcillin  IVPB 2 Gram(s) IV Intermittent every 4 hours  polyethylene glycol 3350 17 Gram(s) Oral two times a day  senna 2 Tablet(s) Oral at bedtime    MEDICATIONS  (PRN):  bisacodyl Suppository 10 milliGRAM(s) Rectal daily PRN Constipation  diphenhydrAMINE 25 milliGRAM(s) Oral every 4 hours PRN Rash and/or Itching  HYDROmorphone  Injectable 0.5 milliGRAM(s) IV Push every 4 hours PRN Severe Pain (7 - 10)  metoclopramide Injectable 10 milliGRAM(s) IV Push every 6 hours PRN Nausea and/or Vomiting  ondansetron Injectable 4 milliGRAM(s) IV Push every 6 hours PRN Nausea and/or Vomiting  oxyCODONE    IR 5 milliGRAM(s) Oral every 4 hours PRN Moderate Pain (4 - 6)  oxyCODONE    IR 10 milliGRAM(s) Oral every 4 hours PRN Severe Pain (7 - 10)  sodium chloride 0.9% lock flush 10 milliLiter(s) IV Push every 1 hour PRN Pre/post blood products, medications, blood draw, and to maintain line patency

## 2021-10-14 NOTE — PROGRESS NOTE ADULT - ASSESSMENT
ASSESSMENT & PLAN    26F s/p L ankle recon with L gracilis free flap and STSG (9/20).    - LLE elevation and dangling 30 min x 4 per day, flap checks before and after  - Xeroform with bacitracin daily over flap  - Pain control - multimodal  - Benadryl prn for pruritis  - Barrier ointment for dry skin on L buttocks, encourage position change regularly  - Reg diet  - Bedrest  - PICC 9/21, ceftriaxone, nafcillin - ID following  - Weekly labs for inflammatory markers, per ID  - Lovenox/SCD on right leg  - IS  - Surgery planning with Ortho, 10/18    Plastics Pager: [phone]

## 2021-10-15 PROCEDURE — 73600 X-RAY EXAM OF ANKLE: CPT | Mod: 26,LT

## 2021-10-15 RX ADMIN — NAFCILLIN 100 GRAM(S): 10 INJECTION, POWDER, FOR SOLUTION INTRAVENOUS at 07:55

## 2021-10-15 RX ADMIN — NAFCILLIN 100 GRAM(S): 10 INJECTION, POWDER, FOR SOLUTION INTRAVENOUS at 04:48

## 2021-10-15 RX ADMIN — HYDROMORPHONE HYDROCHLORIDE 0.5 MILLIGRAM(S): 2 INJECTION INTRAMUSCULAR; INTRAVENOUS; SUBCUTANEOUS at 11:45

## 2021-10-15 RX ADMIN — ENOXAPARIN SODIUM 40 MILLIGRAM(S): 100 INJECTION SUBCUTANEOUS at 13:38

## 2021-10-15 RX ADMIN — OXYCODONE HYDROCHLORIDE 10 MILLIGRAM(S): 5 TABLET ORAL at 16:21

## 2021-10-15 RX ADMIN — CHLORHEXIDINE GLUCONATE 1 APPLICATION(S): 213 SOLUTION TOPICAL at 07:01

## 2021-10-15 RX ADMIN — Medication 1 APPLICATION(S): at 13:48

## 2021-10-15 RX ADMIN — Medication 25 MILLIGRAM(S): at 01:36

## 2021-10-15 RX ADMIN — HYDROMORPHONE HYDROCHLORIDE 0.5 MILLIGRAM(S): 2 INJECTION INTRAMUSCULAR; INTRAVENOUS; SUBCUTANEOUS at 12:00

## 2021-10-15 RX ADMIN — Medication 975 MILLIGRAM(S): at 18:36

## 2021-10-15 RX ADMIN — GABAPENTIN 600 MILLIGRAM(S): 400 CAPSULE ORAL at 01:36

## 2021-10-15 RX ADMIN — Medication 3 MILLIGRAM(S): at 23:14

## 2021-10-15 RX ADMIN — OXYCODONE HYDROCHLORIDE 10 MILLIGRAM(S): 5 TABLET ORAL at 09:12

## 2021-10-15 RX ADMIN — GABAPENTIN 600 MILLIGRAM(S): 400 CAPSULE ORAL at 22:04

## 2021-10-15 RX ADMIN — NAFCILLIN 100 GRAM(S): 10 INJECTION, POWDER, FOR SOLUTION INTRAVENOUS at 16:20

## 2021-10-15 RX ADMIN — Medication 25 MILLIGRAM(S): at 19:46

## 2021-10-15 RX ADMIN — OXYCODONE HYDROCHLORIDE 10 MILLIGRAM(S): 5 TABLET ORAL at 16:51

## 2021-10-15 RX ADMIN — Medication 1 APPLICATION(S): at 07:55

## 2021-10-15 RX ADMIN — Medication 1 TABLET(S): at 11:49

## 2021-10-15 RX ADMIN — HYDROMORPHONE HYDROCHLORIDE 0.5 MILLIGRAM(S): 2 INJECTION INTRAMUSCULAR; INTRAVENOUS; SUBCUTANEOUS at 18:06

## 2021-10-15 RX ADMIN — HYDROMORPHONE HYDROCHLORIDE 0.5 MILLIGRAM(S): 2 INJECTION INTRAMUSCULAR; INTRAVENOUS; SUBCUTANEOUS at 00:05

## 2021-10-15 RX ADMIN — NAFCILLIN 100 GRAM(S): 10 INJECTION, POWDER, FOR SOLUTION INTRAVENOUS at 23:14

## 2021-10-15 RX ADMIN — NAFCILLIN 100 GRAM(S): 10 INJECTION, POWDER, FOR SOLUTION INTRAVENOUS at 01:43

## 2021-10-15 RX ADMIN — Medication 975 MILLIGRAM(S): at 07:01

## 2021-10-15 RX ADMIN — Medication 975 MILLIGRAM(S): at 12:29

## 2021-10-15 RX ADMIN — SENNA PLUS 2 TABLET(S): 8.6 TABLET ORAL at 22:03

## 2021-10-15 RX ADMIN — Medication 3 MILLIGRAM(S): at 01:36

## 2021-10-15 RX ADMIN — HYDROMORPHONE HYDROCHLORIDE 0.5 MILLIGRAM(S): 2 INJECTION INTRAMUSCULAR; INTRAVENOUS; SUBCUTANEOUS at 18:36

## 2021-10-15 RX ADMIN — GABAPENTIN 600 MILLIGRAM(S): 400 CAPSULE ORAL at 13:38

## 2021-10-15 RX ADMIN — CEFTRIAXONE 100 MILLIGRAM(S): 500 INJECTION, POWDER, FOR SOLUTION INTRAMUSCULAR; INTRAVENOUS at 01:29

## 2021-10-15 RX ADMIN — GABAPENTIN 600 MILLIGRAM(S): 400 CAPSULE ORAL at 07:56

## 2021-10-15 RX ADMIN — OXYCODONE HYDROCHLORIDE 10 MILLIGRAM(S): 5 TABLET ORAL at 03:22

## 2021-10-15 RX ADMIN — Medication 1 APPLICATION(S): at 17:24

## 2021-10-15 RX ADMIN — HYDROMORPHONE HYDROCHLORIDE 0.5 MILLIGRAM(S): 2 INJECTION INTRAMUSCULAR; INTRAVENOUS; SUBCUTANEOUS at 22:07

## 2021-10-15 RX ADMIN — OXYCODONE HYDROCHLORIDE 10 MILLIGRAM(S): 5 TABLET ORAL at 08:42

## 2021-10-15 RX ADMIN — NAFCILLIN 100 GRAM(S): 10 INJECTION, POWDER, FOR SOLUTION INTRAVENOUS at 11:50

## 2021-10-15 RX ADMIN — Medication 975 MILLIGRAM(S): at 11:49

## 2021-10-15 RX ADMIN — Medication 1000 UNIT(S): at 13:38

## 2021-10-15 RX ADMIN — Medication 975 MILLIGRAM(S): at 18:06

## 2021-10-15 RX ADMIN — NAFCILLIN 100 GRAM(S): 10 INJECTION, POWDER, FOR SOLUTION INTRAVENOUS at 19:40

## 2021-10-15 NOTE — PROGRESS NOTE ADULT - SUBJECTIVE AND OBJECTIVE BOX
INTERVAL HPI/OVERNIGHT EVENTS: CASTRO o/n    SUBJECTIVE: Patient seen and examined at bedside.   Re called as pt scheduled for upcoming surgery next week (ringed fixator without external hardware) - due to persistent elevation of inflammatory markers and +gallium scan and >30d since last pre-op note    9/17 - L ankle bone debridement and excision. Vac change  9/20 - L ankle arthrotomy w exploration, bone debridment, I&D  9/21 - L ankle recon w L gracilis free flap and L thigh STSG w plastic surgery.   10/14 - Gallium scan showing uptake in distal tibia/fibula and talus c/w osteomyelitis    OR Cx 10/20 grew MSSA. Continued on IV nafcillin 2g IV q4h, IV CTX 2g q24. Duration of abx at least 6wk thru 11/1    Today pt states she feels alright. States pain in ankle is controlled. Numbness in 3rd toe has been about the same. Reports intermittent body wide itching - most recently overnight that resolved w benadryl. Denies any rash. Has intermittent discomfort over thigh skin graft site. Also reports decreased appetite but admits she has been sleeping in later and is often skipping breakfast.     Denies any fever, chest pain, dyspnea, nausea. Voiding wo dysuria. +BM.       OBJECTIVE:    VITAL SIGNS:  ICU Vital Signs Last 24 Hrs  T(C): 36.7 (15 Oct 2021 21:00), Max: 37.1 (15 Oct 2021 17:38)  T(F): 98 (15 Oct 2021 21:00), Max: 98.7 (15 Oct 2021 17:38)  HR: 93 (15 Oct 2021 21:00) (85 - 99)  BP: 107/74 (15 Oct 2021 21:00) (101/71 - 116/81)  BP(mean): --  ABP: --  ABP(mean): --  RR: 17 (15 Oct 2021 21:00) (16 - 18)  SpO2: 98% (15 Oct 2021 21:00) (78% - 99%)        10-14 @ 07:01  -  10-15 @ 07:00  --------------------------------------------------------  IN: 550 mL / OUT: 701 mL / NET: -151 mL    10-15 @ 07:01  -  10-16 @ 01:57  --------------------------------------------------------  IN: 400 mL / OUT: 600 mL / NET: -200 mL      CAPILLARY BLOOD GLUCOSE          PHYSICAL EXAM:  GEN: female in NAD on RA  HEENT: NC/AT, MMM  CV: RRR, nml S1S2, no murmurs  PULM: nml effort, CTAB  ABD: Soft, non-distended, NABS, non-tender  NEURO  A/O x3,   L thigh skin graft site wo drainage, continues to heal. Gracilis incision site present. L ankle w PRAFO brace in place. Abnormal sensation at dorsum  PSYCH: Appropriate    MEDICATIONS:  MEDICATIONS  (STANDING):  acetaminophen   Tablet .. 975 milliGRAM(s) Oral every 6 hours  AQUAPHOR (petrolatum Ointment) 1 Application(s) Topical two times a day  BACItracin   Ointment 1 Application(s) Topical daily  calcium carbonate   1250 mG (OsCal) 1 Tablet(s) Oral daily  cefTRIAXone   IVPB 2000 milliGRAM(s) IV Intermittent every 24 hours  chlorhexidine 2% Cloths 1 Application(s) Topical <User Schedule>  cholecalciferol 1000 Unit(s) Oral daily  enoxaparin Injectable 40 milliGRAM(s) SubCutaneous every 24 hours  gabapentin 600 milliGRAM(s) Oral every 8 hours  melatonin 3 milliGRAM(s) Oral at bedtime  multivitamin 1 Tablet(s) Oral daily  nafcillin  IVPB 2 Gram(s) IV Intermittent every 4 hours  polyethylene glycol 3350 17 Gram(s) Oral two times a day  senna 2 Tablet(s) Oral at bedtime    MEDICATIONS  (PRN):  bisacodyl Suppository 10 milliGRAM(s) Rectal daily PRN Constipation  diphenhydrAMINE 25 milliGRAM(s) Oral every 4 hours PRN Rash and/or Itching  HYDROmorphone  Injectable 0.5 milliGRAM(s) IV Push every 4 hours PRN Severe Pain (7 - 10)  metoclopramide Injectable 10 milliGRAM(s) IV Push every 6 hours PRN Nausea and/or Vomiting  ondansetron Injectable 4 milliGRAM(s) IV Push every 6 hours PRN Nausea and/or Vomiting  oxyCODONE    IR 10 milliGRAM(s) Oral every 4 hours PRN Severe Pain (7 - 10)  oxyCODONE    IR 5 milliGRAM(s) Oral every 4 hours PRN Moderate Pain (4 - 6)  sodium chloride 0.9% lock flush 10 milliLiter(s) IV Push every 1 hour PRN Pre/post blood products, medications, blood draw, and to maintain line patency      ALLERGIES:  Allergies    No Known Allergies    Intolerances        LABS:                RADIOLOGY & ADDITIONAL TESTS: Reviewed.

## 2021-10-15 NOTE — PROGRESS NOTE ADULT - ATTENDING COMMENTS
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 The patient is currently in a PRAFO for unloading of her left heel. The patients inflammatory markers are trending in the appropriate direction but have not normalized and after review of her gallium scan, it would not be appropriate to place hardware for reconstruction in the field of injury due to potential seeding of implants.   -  Gallium scan reviewed  - likely plan for ringed fixator without internal hardware   due to ongoing evidence of osteomyelitis concerning for talus component of osteo. I discussed with Dr. Dunbar and plan will defer flap elevation and proceed with wound care for the flap until evidence of resolution of inflammatory marker elevation, negative gallium scan.  Plan continues to include potential left Achilles or gastroc lengthening  The patient understands that she will require removal of the ringed exfix at a secondary point as well as adjustment of her forefoot pathology if it is symptomatic due to her clawing of her toes from her missed compartment syndrome.   We will proceed with surgery on Monday as discussed above. The patient is a candidate for ankle fusion using the ringed external fixator with compression technique but we will defer this potential treatment plan if unable to salvage ankle joint and maintain motion.   - Appreciate ID recs on continuation of antibiosis for additional six weeks.   Will get repeat cultures and aspirate ankle joint on monday and eval high power field intraop to assess correlation with gallium scan

## 2021-10-15 NOTE — PROGRESS NOTE ADULT - ASSESSMENT
26F s/p L ankle recon with L gracilis free flap and STSG (9/20).    - LLE elevation and dangling 30 min x 4 per day, flap checks before and after  - Xeroform with bacitracin daily over flap  - Pain control - multimodal  - Benadryl prn for pruritis  - Barrier ointment for dry skin on L buttocks, encourage position change regularly  - Reg diet  - Bedrest  - PICC 9/21, ceftriaxone, nafcillin - ID following  - Weekly labs for inflammatory markers, per ID  - Lovenox/SCD on right leg  - IS  - Surgery planning with Ortho, 10/18

## 2021-10-15 NOTE — PROGRESS NOTE ADULT - ASSESSMENT
26F w prior L ankle ORIF in [facility] 03/2021 complicated by missed compartment syndrome p/w infection and non-union now s/p RICK, arthrotomy, neuroma excision, L ankle ligament repair, and I/D w bone debridement w Dr. Wallis 9/10, Repeat I&D and vac change 9/12, Repeat I&D w vac change, athrotomy 9/14 -- L ankle recon w L gracilis free flap and L thigh STSG w plastic surgery 9/21, surgical culture growing MSSA, strep anginosis - currently on IV CTX and Nafcillin - co-management called for pre-op evaluation - placement of ringed fixator wo internal hardware as gallium scan shows evidence of continued osteomyelitis     #Pre-Op evaluation  METS >4  RCRI Class I risk  RIDER 0.0% risk of MI or cardiac arrest intraoperatively or within 30d post-op    #L ankle OM, septic arthrosis, transected tibial nerve, transection of superficial peroneal nerve during index surgery in [facility] complicated by missed compartment syndrome. Currently on IV CTX 2g q24h and Nafcillin 2g q4h. BCx 9/7 NGTD. Surgical cultures from OR 9/9 w MSSA and strep anginosus. OR Cx 10/20 grew MSSA.  To require at least 6wk course of abx. Currently deferring placement of hardware d/t evidence of osteomyelitis on gallium scan  #Blood loss anemia - stable in 8-9 range. Likely operative loss w element of dilution from IVF. Pt is asymptomatic. Continue to follow  #Tobacco use - intermittently - pt counseled to stop smoking  VTE ppx: SQH    Recommendation  Pt is low risk for intermediate risk procedure. Optimized for OR    Discussed w patient and encouraged PO/Fluid intake and adequate nutrition via 3 meals/day in setting of multiple surgeries, chronic osteomyelitis  Obtain Weight  Can benefit from continued nutrition/dietician eval    DISPO: TBD - pending OR for ex-  Pt will need to establish PMD on discharge  Pt lives in 2nd floor walk-up apartment.

## 2021-10-15 NOTE — PROGRESS NOTE ADULT - SUBJECTIVE AND OBJECTIVE BOX
SUBJECTIVE:  No overnight events. Some pain overnight. Tolerating diet. Tolerated dangling. Voiding.    OBJECTIVE:     VITAL SIGNS / I&O's    Vital Signs Last 24 Hrs  T(C): 36.8 (15 Oct 2021 05:21), Max: 37.3 (14 Oct 2021 22:45)  T(F): 98.2 (15 Oct 2021 05:21), Max: 99.2 (14 Oct 2021 22:45)  HR: 98 (15 Oct 2021 05:21) (86 - 98)  BP: 101/71 (15 Oct 2021 05:21) (101/71 - 119/83)  BP(mean): --  RR: 17 (15 Oct 2021 05:21) (16 - 17)  SpO2: 78% (15 Oct 2021 05:21) (78% - 98%)      14 Oct 2021 07:01  -  15 Oct 2021 07:00  --------------------------------------------------------  IN:    Oral Fluid: 550 mL  Total IN: 550 mL    OUT:    Stool (mL): 1 mL    Voided (mL): 700 mL  Total OUT: 701 mL    Total NET: -151 mL      PHYSICAL EXAM     General: Awake and alert, NAD  LLE: Flap stable, soft, STSG with good take, skin paddle demarcating but stable. Audible doppler signal. Xeroform changed. STSG donor site pink, healing well with Aquaphor applied. PRAFO in place.     LABS  None    CAPILLARY BLOOD GLUCOSE    MEDICATIONS  (STANDING):  acetaminophen   Tablet .. 975 milliGRAM(s) Oral every 6 hours  AQUAPHOR (petrolatum Ointment) 1 Application(s) Topical two times a day  BACItracin   Ointment 1 Application(s) Topical daily  calcium carbonate   1250 mG (OsCal) 1 Tablet(s) Oral daily  cefTRIAXone   IVPB 2000 milliGRAM(s) IV Intermittent every 24 hours  chlorhexidine 2% Cloths 1 Application(s) Topical <User Schedule>  cholecalciferol 1000 Unit(s) Oral daily  enoxaparin Injectable 40 milliGRAM(s) SubCutaneous every 24 hours  gabapentin 600 milliGRAM(s) Oral every 8 hours  melatonin 3 milliGRAM(s) Oral at bedtime  multivitamin 1 Tablet(s) Oral daily  nafcillin  IVPB 2 Gram(s) IV Intermittent every 4 hours  polyethylene glycol 3350 17 Gram(s) Oral two times a day  senna 2 Tablet(s) Oral at bedtime    MEDICATIONS  (PRN):  bisacodyl Suppository 10 milliGRAM(s) Rectal daily PRN Constipation  diphenhydrAMINE 25 milliGRAM(s) Oral every 4 hours PRN Rash and/or Itching  HYDROmorphone  Injectable 0.5 milliGRAM(s) IV Push every 4 hours PRN Severe Pain (7 - 10)  metoclopramide Injectable 10 milliGRAM(s) IV Push every 6 hours PRN Nausea and/or Vomiting  ondansetron Injectable 4 milliGRAM(s) IV Push every 6 hours PRN Nausea and/or Vomiting  oxyCODONE    IR 5 milliGRAM(s) Oral every 4 hours PRN Moderate Pain (4 - 6)  oxyCODONE    IR 10 milliGRAM(s) Oral every 4 hours PRN Severe Pain (7 - 10)  sodium chloride 0.9% lock flush 10 milliLiter(s) IV Push every 1 hour PRN Pre/post blood products, medications, blood draw, and to maintain line patency

## 2021-10-15 NOTE — PROGRESS NOTE ADULT - SUBJECTIVE AND OBJECTIVE BOX
Ortho Note    Pt comfortable without complaints, pain controlled at foot/ankle.    Denies any new onset symptoms this morning. Comfortable in prAFO. Gallium scan performed .    Vital Signs Last 24 Hrs  T(C): 36.8 (15 Oct 2021 05:21), Max: 37.3 (14 Oct 2021 22:45)  T(F): 98.2 (15 Oct 2021 05:21), Max: 99.2 (14 Oct 2021 22:45)  HR: 98 (15 Oct 2021 05:21) (86 - 98)  BP: 101/71 (15 Oct 2021 05:21) (101/71 - 119/83)  BP(mean): --  RR: 17 (15 Oct 2021 05:21) (16 - 17)  SpO2: 78% (15 Oct 2021 05:21) (78% - 98%)    VSS  General: A&Ox3, NAD  LLE: prAFO brace in place; Anterior soft tissue flap and skin graft recipient site with overlying Xeroform; Heel checked with Allevyn in place, removed and no underlying skin ulcer, Allevyn replaced  Pulses: Foot WWP; Cap refill < 2 sec  Sensation: Patient with abnormal/absent sensation over most of the dorsum & plantar aspects of the foot consistent with baseline  Motor: Minimal FHL/FDL, 0/5 EHL/EDL activity consistent with baseline    A/P: 26yFemale With 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 The patient is currently in a PRAFO for unloading of her left heel.     -  Gallium scan performed - likely plan for ringed fixator without internal hardware   - Plan continues to be for flap elevation, open reduction internal fixation treatment of left trimalleolar infected nonunion malunion, open reduction internal fixation left syndesmosis, application of ringed external fixator, potential left Achilles or gastroc lengthening, proximal tibia versus left iliac crest bone graft harvest, and will be done in conjunction with Dr. Dunbar from plastic surgery for flap elevation.  The patient understands that she will require removal of the ringed exfix at a secondary point as well as adjustment of her forefoot pathology if it is symptomatic due to her clawing of her toes from her missed compartment syndrome.   - Appreciate ID input on inflammatory markers   - Preliminary plan for rehab and then readmission on 10/16 however dispo delayed due to incidental COVID exposure, patient remains asymptomatic. At this point, logistically, patient should stay in-house until next surgery (10/18) to avoid a short discharge to rehab and readmission. Plan for new L foot and ankle XR this weekend.   - PICC placed, placed for IV abx x 6 weeks   - Continue elevation, defer dangle protocol per plastics   - Dressing evaluation and drain management per plastics team   - OR cx - staph aureus - continue abx as planned per ID.   - Pain Control  - Post op abx: Ceftriaxone + Nafcillin  - DVT ppx: Lovenox  - WBS: DIANN RAMOS    Ortho Pager [phone]   Ortho Note    Pt comfortable without complaints, pain controlled at foot/ankle.    Denies any new onset symptoms this morning. Comfortable in prAFO. Gallium scan performed .    Vital Signs Last 24 Hrs  T(C): 36.8 (15 Oct 2021 05:21), Max: 37.3 (14 Oct 2021 22:45)  T(F): 98.2 (15 Oct 2021 05:21), Max: 99.2 (14 Oct 2021 22:45)  HR: 98 (15 Oct 2021 05:21) (86 - 98)  BP: 101/71 (15 Oct 2021 05:21) (101/71 - 119/83)  BP(mean): --  RR: 17 (15 Oct 2021 05:21) (16 - 17)  SpO2: 78% (15 Oct 2021 05:21) (78% - 98%)    VSS  General: A&Ox3, NAD  LLE: prAFO brace in place; Anterior soft tissue flap and skin graft recipient site with overlying Xeroform; Heel checked with Allevyn in place, removed and no underlying skin ulcer, Allevyn replaced  Pulses: Foot WWP; Cap refill < 2 sec  Sensation: Patient with abnormal/absent sensation over most of the dorsum & plantar aspects of the foot consistent with baseline  Motor: Minimal FHL/FDL, 0/5 EHL/EDL activity consistent with baseline    A/P: 26yFemale With 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 The patient is currently in a PRAFO for unloading of her left heel.     -  Gallium scan performed - likely plan for ringed fixator without internal hardware   due to ongoing evidence of osteomyelitis concerning for talus component of osteo. I discussed with Dr. Dunbar and plan will defer flap elevation and proceed with wound care for the flap until evidence of resolution of inflammatory marker elevation, negative gallium scan.  Plan continues to include potential left Achilles or gastroc lengthening  The patient understands that she will require removal of the ringed exfix at a secondary point as well as adjustment of her forefoot pathology if it is symptomatic due to her clawing of her toes from her missed compartment syndrome.   We will proceed with surgery on Monday as discussed above. The patient is a candidate for ankle fusion using the ringed external fixator with compression technique but we will defer this potential treatment plan if unable to salvage ankle joint and maintain motion.   - Appreciate ID recs on continuation of antibiosis for additional six weeks.   Will get repeat cultures and aspirate ankle joint on monday and eval high power field intraop to assess correlation with gallium scan  - Continue elevation, defer dangle protocol per plastics   - Dressing evaluation and drain management per plastics team   - OR cx - staph aureus - continue abx as planned per ID.   - Pain Control  - DVT ppx: Lovenox  - WBS: DIANN EDLACRUZE    Ortho Pager [phone]

## 2021-10-16 PROCEDURE — 99233 SBSQ HOSP IP/OBS HIGH 50: CPT

## 2021-10-16 RX ADMIN — GABAPENTIN 600 MILLIGRAM(S): 400 CAPSULE ORAL at 14:32

## 2021-10-16 RX ADMIN — Medication 1 APPLICATION(S): at 18:00

## 2021-10-16 RX ADMIN — GABAPENTIN 600 MILLIGRAM(S): 400 CAPSULE ORAL at 22:20

## 2021-10-16 RX ADMIN — HYDROMORPHONE HYDROCHLORIDE 0.5 MILLIGRAM(S): 2 INJECTION INTRAMUSCULAR; INTRAVENOUS; SUBCUTANEOUS at 07:00

## 2021-10-16 RX ADMIN — Medication 1 APPLICATION(S): at 15:36

## 2021-10-16 RX ADMIN — ENOXAPARIN SODIUM 40 MILLIGRAM(S): 100 INJECTION SUBCUTANEOUS at 14:32

## 2021-10-16 RX ADMIN — NAFCILLIN 100 GRAM(S): 10 INJECTION, POWDER, FOR SOLUTION INTRAVENOUS at 23:20

## 2021-10-16 RX ADMIN — CEFTRIAXONE 100 MILLIGRAM(S): 500 INJECTION, POWDER, FOR SOLUTION INTRAMUSCULAR; INTRAVENOUS at 01:28

## 2021-10-16 RX ADMIN — HYDROMORPHONE HYDROCHLORIDE 0.5 MILLIGRAM(S): 2 INJECTION INTRAMUSCULAR; INTRAVENOUS; SUBCUTANEOUS at 20:48

## 2021-10-16 RX ADMIN — Medication 1000 UNIT(S): at 12:47

## 2021-10-16 RX ADMIN — NAFCILLIN 100 GRAM(S): 10 INJECTION, POWDER, FOR SOLUTION INTRAVENOUS at 12:44

## 2021-10-16 RX ADMIN — GABAPENTIN 600 MILLIGRAM(S): 400 CAPSULE ORAL at 07:00

## 2021-10-16 RX ADMIN — Medication 1 APPLICATION(S): at 07:37

## 2021-10-16 RX ADMIN — NAFCILLIN 100 GRAM(S): 10 INJECTION, POWDER, FOR SOLUTION INTRAVENOUS at 03:11

## 2021-10-16 RX ADMIN — Medication 975 MILLIGRAM(S): at 18:46

## 2021-10-16 RX ADMIN — NAFCILLIN 100 GRAM(S): 10 INJECTION, POWDER, FOR SOLUTION INTRAVENOUS at 07:00

## 2021-10-16 RX ADMIN — Medication 1 TABLET(S): at 12:47

## 2021-10-16 RX ADMIN — Medication 3 MILLIGRAM(S): at 22:20

## 2021-10-16 RX ADMIN — Medication 975 MILLIGRAM(S): at 12:47

## 2021-10-16 RX ADMIN — NAFCILLIN 100 GRAM(S): 10 INJECTION, POWDER, FOR SOLUTION INTRAVENOUS at 19:05

## 2021-10-16 RX ADMIN — HYDROMORPHONE HYDROCHLORIDE 0.5 MILLIGRAM(S): 2 INJECTION INTRAMUSCULAR; INTRAVENOUS; SUBCUTANEOUS at 21:10

## 2021-10-16 RX ADMIN — Medication 975 MILLIGRAM(S): at 17:59

## 2021-10-16 RX ADMIN — Medication 975 MILLIGRAM(S): at 13:00

## 2021-10-16 RX ADMIN — Medication 975 MILLIGRAM(S): at 01:28

## 2021-10-16 RX ADMIN — SENNA PLUS 1 TABLET(S): 8.6 TABLET ORAL at 22:20

## 2021-10-16 RX ADMIN — CHLORHEXIDINE GLUCONATE 1 APPLICATION(S): 213 SOLUTION TOPICAL at 07:00

## 2021-10-16 RX ADMIN — NAFCILLIN 100 GRAM(S): 10 INJECTION, POWDER, FOR SOLUTION INTRAVENOUS at 15:37

## 2021-10-16 NOTE — PROGRESS NOTE ADULT - ASSESSMENT
26F w prior L ankle ORIF in [facility] 03/2021 complicated by missed compartment syndrome p/w infection and non-union now s/p RICK, arthrotomy, neuroma excision, L ankle ligament repair, and I/D w bone debridement w Dr. Wallis 9/10, Repeat I&D and vac change 9/12, Repeat I&D w vac change, athrotomy 9/14 -- L ankle recon w L gracilis free flap and L thigh STSG w plastic surgery 9/21, surgical culture growing MSSA, strep anginosis - currently on IV CTX and Nafcillin - co-management called for pre-op evaluation - placement of ringed fixator wo internal hardware as gallium scan shows evidence of continued osteomyelitis     #Pre-Op evaluation  METS >4  RCRI Class I risk  RIDER 0.0% risk of MI or cardiac arrest intraoperatively or within 30d post-op    #L ankle OM, septic arthrosis, transected tibial nerve, transection of superficial peroneal nerve during index surgery in [facility] complicated by missed compartment syndrome. Currently on IV CTX 2g q24h and Nafcillin 2g q4h. BCx 9/7 NGTD. Surgical cultures from OR 9/9 w MSSA and strep anginosus. OR Cx 10/20 grew MSSA.  To require at least 6wk course of abx. Currently deferring placement of hardware d/t evidence of osteomyelitis on gallium scan; ortho plan: patient is a candidate for ankle fusion using the ringed external fixator with compression technique but we will defer this potential treatment plan if unable to salvage ankle joint and maintain motion.   - Appreciate ID recs on continuation of antibiosis for additional six weeks.   Will get repeat cultures and aspirate ankle joint on monday and eval high power field intraop to assess correlation with gallium scan  - Continue elevation, defer dangle protocol per plastics   - Dressing evaluation and drain management per plastics team   - OR cx - staph aureus - continue abx as planned per ID.   - Pain Control  - DVT ppx: Lovenox  - WBS: NWB LLE    #Blood loss anemia - stable in 8-9 range. Likely operative loss w element of dilution from IVF. Pt is asymptomatic. Continue to follow  #Tobacco use - intermittently - pt counseled to stop smoking  VTE ppx: SQH    Recommendation  Pt is low risk for intermediate risk procedure. Optimized for OR    Discussed w patient and encouraged PO/Fluid intake and adequate nutrition via 3 meals/day in setting of multiple surgeries, chronic osteomyelitis  Obtain Weight  Can benefit from continued nutrition/dietician eval    DISPO: plan as above, apprec ortho and plastics collaboration in care  Pt will need to establish PMD on discharge  Pt lives in 2nd floor walk-up apartment.

## 2021-10-16 NOTE — PROGRESS NOTE ADULT - SUBJECTIVE AND OBJECTIVE BOX
Patient is a 26y old  Female who presents with a chief complaint of L ankle pain (17 Oct 2021 14:37)      INTERVAL HPI:Pt seen and examined. States she feels ok, denies any acute complaints conts to have decreased sensation in L toes 3-5. Denies any other acute complaints at this time.     OVERNIGHT EVENTS: none noted      VSS        REVIEW OF SYSTEMS: comprehensive ros except that stated in hpi    PHYSICAL EXAM:  GEN: female in NAD on RA  HEENT: NC/AT, MMM  CV: RRR, nml S1S2, no murmurs  PULM: nml effort, CTAB  ABD: Soft, non-distended, NABS, non-tender  NEURO:  A/O x3,  L thigh skin graft site wo drainage, continues to heal. Gracilis incision site present. L ankle w PRAFO brace in place. Abnormal sensation at dorsum  PSYCH: Appropriate                  MEDICATIONS  (STANDING):  acetaminophen   Tablet .. 975 milliGRAM(s) Oral every 6 hours  AQUAPHOR (petrolatum Ointment) 1 Application(s) Topical two times a day  BACItracin   Ointment 1 Application(s) Topical daily  calcium carbonate   1250 mG (OsCal) 1 Tablet(s) Oral daily  cefTRIAXone   IVPB 2000 milliGRAM(s) IV Intermittent every 24 hours  chlorhexidine 2% Cloths 1 Application(s) Topical <User Schedule>  cholecalciferol 1000 Unit(s) Oral daily  gabapentin 600 milliGRAM(s) Oral every 8 hours  lactated ringers. 1000 milliLiter(s) (120 mL/Hr) IV Continuous <Continuous>  melatonin 3 milliGRAM(s) Oral at bedtime  multivitamin 1 Tablet(s) Oral daily  nafcillin  IVPB 2 Gram(s) IV Intermittent every 4 hours  polyethylene glycol 3350 17 Gram(s) Oral two times a day  senna 2 Tablet(s) Oral at bedtime    MEDICATIONS  (PRN):  bisacodyl Suppository 10 milliGRAM(s) Rectal daily PRN Constipation  diphenhydrAMINE 25 milliGRAM(s) Oral every 4 hours PRN Rash and/or Itching  HYDROmorphone  Injectable 0.5 milliGRAM(s) IV Push every 4 hours PRN Severe Pain (7 - 10)  metoclopramide Injectable 10 milliGRAM(s) IV Push every 6 hours PRN Nausea and/or Vomiting  ondansetron Injectable 4 milliGRAM(s) IV Push every 6 hours PRN Nausea and/or Vomiting  oxyCODONE    IR 5 milliGRAM(s) Oral every 4 hours PRN Moderate Pain (4 - 6)  oxyCODONE    IR 10 milliGRAM(s) Oral every 4 hours PRN Severe Pain (7 - 10)  sodium chloride 0.9% lock flush 10 milliLiter(s) IV Push every 1 hour PRN Pre/post blood products, medications, blood draw, and to maintain line patency

## 2021-10-17 ENCOUNTER — TRANSCRIPTION ENCOUNTER (OUTPATIENT)
Age: 26
End: 2021-10-17

## 2021-10-17 LAB
ANION GAP SERPL CALC-SCNC: 11 MMOL/L — SIGNIFICANT CHANGE UP (ref 5–17)
APTT BLD: 36.6 SEC — HIGH (ref 27.5–35.5)
BLD GP AB SCN SERPL QL: NEGATIVE — SIGNIFICANT CHANGE UP
BUN SERPL-MCNC: 7 MG/DL — SIGNIFICANT CHANGE UP (ref 7–23)
CALCIUM SERPL-MCNC: 9 MG/DL — SIGNIFICANT CHANGE UP (ref 8.4–10.5)
CHLORIDE SERPL-SCNC: 106 MMOL/L — SIGNIFICANT CHANGE UP (ref 96–108)
CO2 SERPL-SCNC: 23 MMOL/L — SIGNIFICANT CHANGE UP (ref 22–31)
CREAT SERPL-MCNC: 0.67 MG/DL — SIGNIFICANT CHANGE UP (ref 0.5–1.3)
GLUCOSE SERPL-MCNC: 91 MG/DL — SIGNIFICANT CHANGE UP (ref 70–99)
HCG SERPL-ACNC: <0 MIU/ML — SIGNIFICANT CHANGE UP
HCT VFR BLD CALC: 26.5 % — LOW (ref 34.5–45)
HGB BLD-MCNC: 8.8 G/DL — LOW (ref 11.5–15.5)
INR BLD: 1.18 — HIGH (ref 0.88–1.16)
MCHC RBC-ENTMCNC: 30.7 PG — SIGNIFICANT CHANGE UP (ref 27–34)
MCHC RBC-ENTMCNC: 33.2 GM/DL — SIGNIFICANT CHANGE UP (ref 32–36)
MCV RBC AUTO: 92.3 FL — SIGNIFICANT CHANGE UP (ref 80–100)
NRBC # BLD: 0 /100 WBCS — SIGNIFICANT CHANGE UP (ref 0–0)
PLATELET # BLD AUTO: 544 K/UL — HIGH (ref 150–400)
POTASSIUM SERPL-MCNC: 3.4 MMOL/L — LOW (ref 3.5–5.3)
POTASSIUM SERPL-SCNC: 3.4 MMOL/L — LOW (ref 3.5–5.3)
PROTHROM AB SERPL-ACNC: 14.1 SEC — HIGH (ref 10.6–13.6)
RBC # BLD: 2.87 M/UL — LOW (ref 3.8–5.2)
RBC # FLD: 16.9 % — HIGH (ref 10.3–14.5)
RH IG SCN BLD-IMP: NEGATIVE — SIGNIFICANT CHANGE UP
SARS-COV-2 RNA SPEC QL NAA+PROBE: SIGNIFICANT CHANGE UP
SODIUM SERPL-SCNC: 140 MMOL/L — SIGNIFICANT CHANGE UP (ref 135–145)
WBC # BLD: 4.87 K/UL — SIGNIFICANT CHANGE UP (ref 3.8–10.5)
WBC # FLD AUTO: 4.87 K/UL — SIGNIFICANT CHANGE UP (ref 3.8–10.5)

## 2021-10-17 PROCEDURE — 99233 SBSQ HOSP IP/OBS HIGH 50: CPT

## 2021-10-17 RX ORDER — ENOXAPARIN SODIUM 100 MG/ML
40 INJECTION SUBCUTANEOUS DAILY
Refills: 0 | Status: DISCONTINUED | OUTPATIENT
Start: 2021-10-17 | End: 2021-10-17

## 2021-10-17 RX ORDER — POTASSIUM CHLORIDE 20 MEQ
20 PACKET (EA) ORAL
Refills: 0 | Status: COMPLETED | OUTPATIENT
Start: 2021-10-17 | End: 2021-10-17

## 2021-10-17 RX ORDER — ENOXAPARIN SODIUM 100 MG/ML
40 INJECTION SUBCUTANEOUS EVERY 24 HOURS
Refills: 0 | Status: DISCONTINUED | OUTPATIENT
Start: 2021-10-18 | End: 2021-10-25

## 2021-10-17 RX ORDER — SODIUM CHLORIDE 9 MG/ML
1000 INJECTION, SOLUTION INTRAVENOUS
Refills: 0 | Status: DISCONTINUED | OUTPATIENT
Start: 2021-10-17 | End: 2021-10-19

## 2021-10-17 RX ADMIN — NAFCILLIN 100 GRAM(S): 10 INJECTION, POWDER, FOR SOLUTION INTRAVENOUS at 03:04

## 2021-10-17 RX ADMIN — ONDANSETRON 4 MILLIGRAM(S): 8 TABLET, FILM COATED ORAL at 23:56

## 2021-10-17 RX ADMIN — NAFCILLIN 100 GRAM(S): 10 INJECTION, POWDER, FOR SOLUTION INTRAVENOUS at 14:15

## 2021-10-17 RX ADMIN — Medication 3 MILLIGRAM(S): at 21:29

## 2021-10-17 RX ADMIN — GABAPENTIN 600 MILLIGRAM(S): 400 CAPSULE ORAL at 14:19

## 2021-10-17 RX ADMIN — Medication 1 TABLET(S): at 11:54

## 2021-10-17 RX ADMIN — Medication 20 MILLIEQUIVALENT(S): at 14:19

## 2021-10-17 RX ADMIN — NAFCILLIN 100 GRAM(S): 10 INJECTION, POWDER, FOR SOLUTION INTRAVENOUS at 10:23

## 2021-10-17 RX ADMIN — NAFCILLIN 100 GRAM(S): 10 INJECTION, POWDER, FOR SOLUTION INTRAVENOUS at 06:49

## 2021-10-17 RX ADMIN — POLYETHYLENE GLYCOL 3350 17 GRAM(S): 17 POWDER, FOR SOLUTION ORAL at 06:50

## 2021-10-17 RX ADMIN — Medication 1 APPLICATION(S): at 18:02

## 2021-10-17 RX ADMIN — CHLORHEXIDINE GLUCONATE 1 APPLICATION(S): 213 SOLUTION TOPICAL at 05:39

## 2021-10-17 RX ADMIN — Medication 975 MILLIGRAM(S): at 06:50

## 2021-10-17 RX ADMIN — OXYCODONE HYDROCHLORIDE 10 MILLIGRAM(S): 5 TABLET ORAL at 15:30

## 2021-10-17 RX ADMIN — Medication 975 MILLIGRAM(S): at 11:53

## 2021-10-17 RX ADMIN — HYDROMORPHONE HYDROCHLORIDE 0.5 MILLIGRAM(S): 2 INJECTION INTRAMUSCULAR; INTRAVENOUS; SUBCUTANEOUS at 01:29

## 2021-10-17 RX ADMIN — GABAPENTIN 600 MILLIGRAM(S): 400 CAPSULE ORAL at 22:36

## 2021-10-17 RX ADMIN — Medication 20 MILLIEQUIVALENT(S): at 11:53

## 2021-10-17 RX ADMIN — Medication 1 APPLICATION(S): at 05:38

## 2021-10-17 RX ADMIN — Medication 975 MILLIGRAM(S): at 18:16

## 2021-10-17 RX ADMIN — Medication 1 APPLICATION(S): at 11:53

## 2021-10-17 RX ADMIN — Medication 10 MILLIGRAM(S): at 23:56

## 2021-10-17 RX ADMIN — Medication 975 MILLIGRAM(S): at 07:38

## 2021-10-17 RX ADMIN — Medication 975 MILLIGRAM(S): at 12:00

## 2021-10-17 RX ADMIN — Medication 10 MILLIGRAM(S): at 02:46

## 2021-10-17 RX ADMIN — Medication 975 MILLIGRAM(S): at 18:02

## 2021-10-17 RX ADMIN — NAFCILLIN 100 GRAM(S): 10 INJECTION, POWDER, FOR SOLUTION INTRAVENOUS at 18:02

## 2021-10-17 RX ADMIN — Medication 1000 UNIT(S): at 11:53

## 2021-10-17 RX ADMIN — HYDROMORPHONE HYDROCHLORIDE 0.5 MILLIGRAM(S): 2 INJECTION INTRAMUSCULAR; INTRAVENOUS; SUBCUTANEOUS at 06:50

## 2021-10-17 RX ADMIN — CEFTRIAXONE 100 MILLIGRAM(S): 500 INJECTION, POWDER, FOR SOLUTION INTRAMUSCULAR; INTRAVENOUS at 01:20

## 2021-10-17 RX ADMIN — SENNA PLUS 2 TABLET(S): 8.6 TABLET ORAL at 21:28

## 2021-10-17 RX ADMIN — ENOXAPARIN SODIUM 40 MILLIGRAM(S): 100 INJECTION SUBCUTANEOUS at 14:35

## 2021-10-17 RX ADMIN — NAFCILLIN 100 GRAM(S): 10 INJECTION, POWDER, FOR SOLUTION INTRAVENOUS at 21:29

## 2021-10-17 RX ADMIN — HYDROMORPHONE HYDROCHLORIDE 0.5 MILLIGRAM(S): 2 INJECTION INTRAMUSCULAR; INTRAVENOUS; SUBCUTANEOUS at 07:39

## 2021-10-17 RX ADMIN — GABAPENTIN 600 MILLIGRAM(S): 400 CAPSULE ORAL at 07:39

## 2021-10-17 RX ADMIN — HYDROMORPHONE HYDROCHLORIDE 0.5 MILLIGRAM(S): 2 INJECTION INTRAMUSCULAR; INTRAVENOUS; SUBCUTANEOUS at 02:45

## 2021-10-17 RX ADMIN — OXYCODONE HYDROCHLORIDE 10 MILLIGRAM(S): 5 TABLET ORAL at 14:35

## 2021-10-17 NOTE — PROGRESS NOTE ADULT - ASSESSMENT
Pt with persistent OM, surgery postponed 10/18 until later date  d/w Dr Dickens  cont reg diet and abx

## 2021-10-17 NOTE — PROGRESS NOTE ADULT - ASSESSMENT
26F w prior L ankle ORIF in [facility] 03/2021 complicated by missed compartment syndrome p/w infection and non-union now s/p RICK, arthrotomy, neuroma excision, L ankle ligament repair, and I/D w bone debridement w Dr. Wallis 9/10, Repeat I&D and vac change 9/12, Repeat I&D w vac change, athrotomy 9/14 -- L ankle recon w L gracilis free flap and L thigh STSG w plastic surgery 9/21, surgical culture growing MSSA, strep anginosis - currently on IV CTX and Nafcillin - co-management called for pre-op evaluation - placement of ringed fixator wo internal hardware as gallium scan shows evidence of continued osteomyelitis     #Pre-Op evaluation  METS >4  RCRI Class I risk  RIDER 0.0% risk of MI or cardiac arrest intraoperatively or within 30d post-op    #L ankle OM, septic arthrosis, transected tibial nerve, transection of superficial peroneal nerve during index surgery in [facility] complicated by missed compartment syndrome. Currently on IV CTX 2g q24h and Nafcillin 2g q4h. BCx 9/7 NGTD. Surgical cultures from OR 9/9 w MSSA and strep anginosus. OR Cx 10/20 grew MSSA.  To require at least 6wk course of abx. Currently deferring placement of hardware d/t evidence of osteomyelitis on gallium scan; ortho plan: patient is a candidate for ankle fusion using the ringed external fixator with compression technique but we will defer this potential treatment plan if unable to salvage ankle joint and maintain motion.   - Appreciate ID recs on continuation of antibiosis for additional six weeks.   Will get repeat cultures and aspirate ankle joint on monday and eval high power field intraop to assess correlation with gallium scan  - Continue elevation, defer dangle protocol per plastics   - Dressing evaluation and drain management per plastics team   - OR cx - staph aureus - continue abx as planned per ID.   - Pain Control  - DVT ppx: Lovenox  - WBS: NWB LLE    #Blood loss anemia - stable in 8-9 range. Likely operative loss w element of dilution from IVF. Pt is asymptomatic. Continue to follow  #Tobacco use - intermittently - pt counseled to stop smoking  VTE ppx: SQH    Recommendation  Pt is low risk for intermediate risk procedure. Optimized for OR    Discussed w patient and encouraged PO/Fluid intake and adequate nutrition via 3 meals/day in setting of multiple surgeries, chronic osteomyelitis  Obtain Weight  Can benefit from continued nutrition/dietician eval    DISPO: plan as above, apprec ortho and plastics collaboration in care  Pt will need to establish PMD on discharge  Pt lives in 2nd floor walk-up apartment.   26F w prior L ankle ORIF in [facility] 03/2021 complicated by missed compartment syndrome p/w infection and non-union now s/p RICK, arthrotomy, neuroma excision, L ankle ligament repair, and I/D w bone debridement w Dr. Wallis 9/10, Repeat I&D and vac change 9/12, Repeat I&D w vac change, athrotomy 9/14 -- L ankle recon w L gracilis free flap and L thigh STSG w plastic surgery 9/21, surgical culture growing MSSA, strep anginosis - currently on IV CTX and Nafcillin - co-management called for pre-op evaluation - placement of ringed fixator wo internal hardware as gallium scan shows evidence of continued osteomyelitis     #Pre-Op evaluation  METS >4  RCRI Class I risk  RIDER 0.0% risk of MI or cardiac arrest intraoperatively or within 30d post-op    #L ankle OM, septic arthrosis, transected tibial nerve, transection of superficial peroneal nerve during index surgery in [facility] complicated by missed compartment syndrome. Currently on IV CTX 2g q24h and Nafcillin 2g q4h. BCx 9/7 NGTD. Surgical cultures from OR 9/9 w MSSA and strep anginosus. OR Cx 10/20 grew MSSA.  To require at least 6wk course of abx. Currently deferring placement of hardware d/t evidence of osteomyelitis on gallium scan; ortho plan: patient is a candidate for ankle fusion using the ringed external fixator with compression technique but we will defer this potential treatment plan if unable to salvage ankle joint and maintain motion.   - Appreciate ID recs on continuation of antibiosis for additional six weeks.   Will get repeat cultures and aspirate ankle joint on monday and eval high power field intraop to assess correlation with gallium scan  - Continue elevation, defer dangle protocol per plastics   - Dressing evaluation and drain management per plastics team   - OR cx - staph aureus - continue abx as planned per ID.   - Pain Control  - DVT ppx: Lovenox  - WBS: NWB LLE    #Blood loss anemia - stable in 8-9 range. Likely operative loss w element of dilution from IVF. Pt is asymptomatic. Continue to follow  #Tobacco use - intermittently - pt counseled to stop smoking  VTE ppx: SQH  #hypokalemia  -repleted with 20meq kcl, trend and replete prn  Recommendation  Pt is low risk for intermediate risk procedure. Optimized for OR    Discussed w patient and encouraged PO/Fluid intake and adequate nutrition via 3 meals/day in setting of multiple surgeries, chronic osteomyelitis  Obtain Weight  Can benefit from continued nutrition/dietician eval    DISPO: plan as above, apprec ortho and plastics collaboration in care  Pt will need to establish PMD on discharge  Pt lives in 2nd floor walk-up apartment.

## 2021-10-17 NOTE — PROGRESS NOTE ADULT - SUBJECTIVE AND OBJECTIVE BOX
Patient is a 26y old  Female who presents with a chief complaint of L ankle pain (17 Oct 2021 14:03)      INTERVAL HPI: Pt seen and examined. States she feels ok, still has numbness of her three toes but denies any other acute complaints at this time.     OVERNIGHT EVENTS: none noted  T(F): 98.7 (10-17-21 @ 21:28), Max: 98.7 (10-17-21 @ 21:28)  HR: 78 (10-17-21 @ 21:28) (78 - 101)  BP: 117/79 (10-17-21 @ 21:28) (105/71 - 117/79)  RR: 18 (10-17-21 @ 21:28) (17 - 18)  SpO2: 97% (10-17-21 @ 21:28) (97% - 98%)  I&O's Summary    16 Oct 2021 07:01  -  17 Oct 2021 07:00  --------------------------------------------------------  IN: 1420 mL / OUT: 600 mL / NET: 820 mL    17 Oct 2021 07:01  -  17 Oct 2021 23:37  --------------------------------------------------------  IN: 1590 mL / OUT: 1601 mL / NET: -11 mL        REVIEW OF SYSTEMS: comprehensive ros except that stated in hpi    PHYSICAL EXAM:  GEN: female in NAD on RA  HEENT: NC/AT, MMM  CV: RRR, nml S1S2, no murmurs  PULM: nml effort, CTAB  ABD: Soft, non-distended, NABS, non-tender  NEURO:  A/O x3,  L thigh skin graft site wo drainage, continues to heal. Gracilis incision site present. L ankle w PRAFO brace in place. Abnormal sensation at dorsum  PSYCH: Appropriate    LABS:                        8.8    4.87  )-----------( 544      ( 17 Oct 2021 08:42 )             26.5     10-17    140  |  106  |  7   ----------------------------<  91  3.4<L>   |  23  |  0.67    Ca    9.0      17 Oct 2021 08:42      PT/INR - ( 17 Oct 2021 08:42 )   PT: 14.1 sec;   INR: 1.18          PTT - ( 17 Oct 2021 08:42 )  PTT:36.6 sec    CAPILLARY BLOOD GLUCOSE                  MEDICATIONS  (STANDING):  acetaminophen   Tablet .. 975 milliGRAM(s) Oral every 6 hours  AQUAPHOR (petrolatum Ointment) 1 Application(s) Topical two times a day  BACItracin   Ointment 1 Application(s) Topical daily  calcium carbonate   1250 mG (OsCal) 1 Tablet(s) Oral daily  cefTRIAXone   IVPB 2000 milliGRAM(s) IV Intermittent every 24 hours  chlorhexidine 2% Cloths 1 Application(s) Topical <User Schedule>  cholecalciferol 1000 Unit(s) Oral daily  gabapentin 600 milliGRAM(s) Oral every 8 hours  lactated ringers. 1000 milliLiter(s) (120 mL/Hr) IV Continuous <Continuous>  melatonin 3 milliGRAM(s) Oral at bedtime  multivitamin 1 Tablet(s) Oral daily  nafcillin  IVPB 2 Gram(s) IV Intermittent every 4 hours  polyethylene glycol 3350 17 Gram(s) Oral two times a day  senna 2 Tablet(s) Oral at bedtime    MEDICATIONS  (PRN):  bisacodyl Suppository 10 milliGRAM(s) Rectal daily PRN Constipation  diphenhydrAMINE 25 milliGRAM(s) Oral every 4 hours PRN Rash and/or Itching  HYDROmorphone  Injectable 0.5 milliGRAM(s) IV Push every 4 hours PRN Severe Pain (7 - 10)  metoclopramide Injectable 10 milliGRAM(s) IV Push every 6 hours PRN Nausea and/or Vomiting  ondansetron Injectable 4 milliGRAM(s) IV Push every 6 hours PRN Nausea and/or Vomiting  oxyCODONE    IR 5 milliGRAM(s) Oral every 4 hours PRN Moderate Pain (4 - 6)  oxyCODONE    IR 10 milliGRAM(s) Oral every 4 hours PRN Severe Pain (7 - 10)  sodium chloride 0.9% lock flush 10 milliLiter(s) IV Push every 1 hour PRN Pre/post blood products, medications, blood draw, and to maintain line patency

## 2021-10-17 NOTE — PROGRESS NOTE ADULT - ASSESSMENT
26F s/p L ankle recon with L gracilis free flap and STSG (9/20).    - LLE elevation and dangling 30 min x 4 per day, flap checks before and after  - Xeroform with bacitracin daily over flap  - Pain control - multimodal  - Benadryl prn for pruritis  - Barrier ointment for dry skin on L buttocks, encourage position change regularly  - NPO at 12am  -COVID test ordered  - Bedrest  - PICC 9/21, ceftriaxone, nafcillin - ID following  - Weekly labs for inflammatory markers, per ID  - Lovenox/SCD on right leg  - IS  - Surgery planning with Ortho, 10/18

## 2021-10-17 NOTE — PROGRESS NOTE ADULT - SUBJECTIVE AND OBJECTIVE BOX
Ortho Note    Pt comfortable without complaints, pain controlled at foot/ankle.    Denies any new onset symptoms this morning. Comfortable in prAFO.    Vital Signs Last 24 Hrs  T(C): 36.8 (17 Oct 2021 05:45), Max: 37 (16 Oct 2021 17:31)  T(F): 98.3 (17 Oct 2021 05:45), Max: 98.6 (16 Oct 2021 17:31)  HR: 83 (17 Oct 2021 05:45) (83 - 99)  BP: 105/71 (17 Oct 2021 05:45) (93/69 - 129/64)  BP(mean): 82 (17 Oct 2021 05:45) (80 - 82)  RR: 18 (17 Oct 2021 05:45) (16 - 18)  SpO2: 97% (17 Oct 2021 05:45) (97% - 98%)    VSS  General: A&Ox3, NAD  LLE: prAFO brace in place; Anterior soft tissue flap and skin graft recipient site with overlying Xeroform; Heel checked with Allevyn in place, removed and no underlying skin ulcer, Allevyn replaced  Pulses: Foot WWP; Cap refill < 2 sec  Sensation: Patient with abnormal/absent sensation over most of the dorsum & plantar aspects of the foot consistent with baseline  Motor: Minimal FHL/FDL, 0/5 EHL/EDL activity consistent with baseline    A/P: 26yFemale With 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 The patient is currently in a PRAFO for unloading of her left heel.   - RTOR Monday 10/18 with Dr. Wallis and PRS for ORIF vs. Ex fix and revision flap  - Continue elevation, defer dangle protocol per plastics   - Dressing evaluation and drain management per plastics team   - OR cx - staph aureus - continue abx as planned per ID.   - Preop labs  - COVID within 48 hrs of OR  - Repeat HCG within 48 hrs of OR  - Pain Control  - DVT ppx: Lovenox, hold tonight for OR  - WBS: DIANN DELACRUZE    Ortho Pager [phone]

## 2021-10-18 ENCOUNTER — APPOINTMENT (OUTPATIENT)
Dept: ORTHOPEDIC SURGERY | Facility: HOSPITAL | Age: 26
End: 2021-10-18

## 2021-10-18 LAB
GRAM STN FLD: SIGNIFICANT CHANGE UP
HCG UR QL: NEGATIVE — SIGNIFICANT CHANGE UP
SPECIMEN SOURCE: SIGNIFICANT CHANGE UP

## 2021-10-18 PROCEDURE — 73620 X-RAY EXAM OF FOOT: CPT | Mod: 26,LT

## 2021-10-18 PROCEDURE — 20605 DRAIN/INJ JOINT/BURSA W/O US: CPT | Mod: 59,78,LT

## 2021-10-18 PROCEDURE — 27606 INCISION OF ACHILLES TENDON: CPT | Mod: 78,LT

## 2021-10-18 PROCEDURE — 73590 X-RAY EXAM OF LOWER LEG: CPT | Mod: 26,LT

## 2021-10-18 PROCEDURE — 20692 APPL MLTPLN UNI EXT FIXJ SYS: CPT | Mod: 78,LT

## 2021-10-18 PROCEDURE — 73610 X-RAY EXAM OF ANKLE: CPT | Mod: 26,LT

## 2021-10-18 RX ORDER — CEFAZOLIN SODIUM 1 G
2000 VIAL (EA) INJECTION EVERY 8 HOURS
Refills: 0 | Status: DISCONTINUED | OUTPATIENT
Start: 2021-10-18 | End: 2021-10-18

## 2021-10-18 RX ORDER — ACETAMINOPHEN 500 MG
975 TABLET ORAL EVERY 6 HOURS
Refills: 0 | Status: DISCONTINUED | OUTPATIENT
Start: 2021-10-18 | End: 2021-10-25

## 2021-10-18 RX ORDER — HYDROMORPHONE HYDROCHLORIDE 2 MG/ML
0.5 INJECTION INTRAMUSCULAR; INTRAVENOUS; SUBCUTANEOUS
Refills: 0 | Status: DISCONTINUED | OUTPATIENT
Start: 2021-10-18 | End: 2021-10-25

## 2021-10-18 RX ORDER — ACETAMINOPHEN 500 MG
1000 TABLET ORAL ONCE
Refills: 0 | Status: COMPLETED | OUTPATIENT
Start: 2021-10-18 | End: 2021-10-18

## 2021-10-18 RX ORDER — HYDROMORPHONE HYDROCHLORIDE 2 MG/ML
0.5 INJECTION INTRAMUSCULAR; INTRAVENOUS; SUBCUTANEOUS
Refills: 0 | Status: DISCONTINUED | OUTPATIENT
Start: 2021-10-18 | End: 2021-10-19

## 2021-10-18 RX ADMIN — Medication 975 MILLIGRAM(S): at 00:08

## 2021-10-18 RX ADMIN — SODIUM CHLORIDE 120 MILLILITER(S): 9 INJECTION, SOLUTION INTRAVENOUS at 22:02

## 2021-10-18 RX ADMIN — NAFCILLIN 100 GRAM(S): 10 INJECTION, POWDER, FOR SOLUTION INTRAVENOUS at 18:38

## 2021-10-18 RX ADMIN — Medication 3 MILLIGRAM(S): at 22:03

## 2021-10-18 RX ADMIN — Medication 975 MILLIGRAM(S): at 21:13

## 2021-10-18 RX ADMIN — NAFCILLIN 100 GRAM(S): 10 INJECTION, POWDER, FOR SOLUTION INTRAVENOUS at 05:44

## 2021-10-18 RX ADMIN — Medication 1 APPLICATION(S): at 05:56

## 2021-10-18 RX ADMIN — Medication 975 MILLIGRAM(S): at 05:56

## 2021-10-18 RX ADMIN — Medication 400 MILLIGRAM(S): at 15:07

## 2021-10-18 RX ADMIN — Medication 1 APPLICATION(S): at 18:38

## 2021-10-18 RX ADMIN — HYDROMORPHONE HYDROCHLORIDE 0.5 MILLIGRAM(S): 2 INJECTION INTRAMUSCULAR; INTRAVENOUS; SUBCUTANEOUS at 07:40

## 2021-10-18 RX ADMIN — OXYCODONE HYDROCHLORIDE 10 MILLIGRAM(S): 5 TABLET ORAL at 20:24

## 2021-10-18 RX ADMIN — HYDROMORPHONE HYDROCHLORIDE 0.5 MILLIGRAM(S): 2 INJECTION INTRAMUSCULAR; INTRAVENOUS; SUBCUTANEOUS at 22:15

## 2021-10-18 RX ADMIN — HYDROMORPHONE HYDROCHLORIDE 0.5 MILLIGRAM(S): 2 INJECTION INTRAMUSCULAR; INTRAVENOUS; SUBCUTANEOUS at 15:10

## 2021-10-18 RX ADMIN — NAFCILLIN 100 GRAM(S): 10 INJECTION, POWDER, FOR SOLUTION INTRAVENOUS at 22:02

## 2021-10-18 RX ADMIN — HYDROMORPHONE HYDROCHLORIDE 0.5 MILLIGRAM(S): 2 INJECTION INTRAMUSCULAR; INTRAVENOUS; SUBCUTANEOUS at 14:49

## 2021-10-18 RX ADMIN — Medication 975 MILLIGRAM(S): at 05:43

## 2021-10-18 RX ADMIN — HYDROMORPHONE HYDROCHLORIDE 0.5 MILLIGRAM(S): 2 INJECTION INTRAMUSCULAR; INTRAVENOUS; SUBCUTANEOUS at 15:35

## 2021-10-18 RX ADMIN — Medication 1000 UNIT(S): at 18:38

## 2021-10-18 RX ADMIN — CEFTRIAXONE 100 MILLIGRAM(S): 500 INJECTION, POWDER, FOR SOLUTION INTRAMUSCULAR; INTRAVENOUS at 00:09

## 2021-10-18 RX ADMIN — GABAPENTIN 600 MILLIGRAM(S): 400 CAPSULE ORAL at 05:56

## 2021-10-18 RX ADMIN — OXYCODONE HYDROCHLORIDE 10 MILLIGRAM(S): 5 TABLET ORAL at 05:57

## 2021-10-18 RX ADMIN — GABAPENTIN 600 MILLIGRAM(S): 400 CAPSULE ORAL at 22:03

## 2021-10-18 RX ADMIN — Medication 1 TABLET(S): at 18:38

## 2021-10-18 RX ADMIN — OXYCODONE HYDROCHLORIDE 10 MILLIGRAM(S): 5 TABLET ORAL at 20:54

## 2021-10-18 RX ADMIN — ENOXAPARIN SODIUM 40 MILLIGRAM(S): 100 INJECTION SUBCUTANEOUS at 00:08

## 2021-10-18 RX ADMIN — SODIUM CHLORIDE 120 MILLILITER(S): 9 INJECTION, SOLUTION INTRAVENOUS at 06:44

## 2021-10-18 RX ADMIN — Medication 1000 MILLIGRAM(S): at 15:35

## 2021-10-18 RX ADMIN — CHLORHEXIDINE GLUCONATE 1 APPLICATION(S): 213 SOLUTION TOPICAL at 05:56

## 2021-10-18 RX ADMIN — HYDROMORPHONE HYDROCHLORIDE 0.5 MILLIGRAM(S): 2 INJECTION INTRAMUSCULAR; INTRAVENOUS; SUBCUTANEOUS at 15:34

## 2021-10-18 RX ADMIN — HYDROMORPHONE HYDROCHLORIDE 0.5 MILLIGRAM(S): 2 INJECTION INTRAMUSCULAR; INTRAVENOUS; SUBCUTANEOUS at 22:03

## 2021-10-18 RX ADMIN — Medication 975 MILLIGRAM(S): at 22:03

## 2021-10-18 RX ADMIN — Medication 975 MILLIGRAM(S): at 00:09

## 2021-10-18 RX ADMIN — NAFCILLIN 100 GRAM(S): 10 INJECTION, POWDER, FOR SOLUTION INTRAVENOUS at 01:50

## 2021-10-18 RX ADMIN — SODIUM CHLORIDE 120 MILLILITER(S): 9 INJECTION, SOLUTION INTRAVENOUS at 20:25

## 2021-10-18 NOTE — BRIEF OPERATIVE NOTE - NSICDXBRIEFPOSTOP_GEN_ALL_CORE_FT
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  
POST-OP DIAGNOSIS:  Open wound of left ankle 21-Sep-2021 08:43:00  Otf Vogt  
POST-OP DIAGNOSIS:  Open wound of left ankle 21-Sep-2021 08:43:00  Otf Vogt  
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

## 2021-10-18 NOTE — PROGRESS NOTE ADULT - ATTENDING COMMENTS
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.   1.  OR today for ringed fixator, achilles lengthening, I&D and definititive fixation deferred till soft tissue envelope adequate for internal fixation. Dr. Dickens to debride flap and re-evaluate need for further debridement vs revision free flap.   2. Will plan for repeat gallium scan in 6-8 weeks after 6 more weeks of IV abx  3. Patient will be okay for weightbearing in ringed ex fix if plastics team okay from soft tissue perspective  4. F/u intraop cultures to be taken today  5. Please have ID comment on new cultures, gallium scan, and inflammatory markers.  6. DVT ppx: Lovenox, lynnette tonight for OR           Ortho Pager [phone]

## 2021-10-18 NOTE — PROGRESS NOTE ADULT - SUBJECTIVE AND OBJECTIVE BOX
Ortho Post Op Check    Procedure: Application of ringed ex-fix LLE with debridement of gracilis flap by Dr. Dickens  Surgeon: Dr. Wallis    Pt comfortable without complaints, pain controlled. Denies CP, SOB, N/V, numbness/tingling     T(C): 36.2 (10-18-21 @ 15:50), Max: 37.1 (10-17-21 @ 21:28)  HR: 98 (10-18-21 @ 15:50) (78 - 104)  BP: 104/73 (10-18-21 @ 15:50) (104/73 - 124/67)  RR: 39 (10-18-21 @ 15:50) (12 - 39)  SpO2: 99% (10-18-21 @ 15:50) (95% - 99%)  AVSS    General: Pt Alert and oriented, NAD  DSG C/D/I  Pulses: brisk cap refill all 5 toes LLE, 2+ DP RLE   Sensation: SILT RLE, abnormal/absent sensation over most of the dorsum/plantar aspects of the L foot consistent with baseline   Motor: EHL/FHL/TA/GS 5/5 RLE, minimal FHL/FDL, 0/5 EHL/EDL consistent with baseline LLE    A/P: 26yFemale POD#0 s/p above procedure   - Stable  - Pain Control  - DVT ppx: lovenox   - Post op abx: Ancef, nafcillin   - Follow up post-op xrays    Ortho Pager [phone]

## 2021-10-18 NOTE — BRIEF OPERATIVE NOTE - OPERATION/FINDINGS
Application of multiplanar external fixator  Debridement of gracilis flap by Dr. Dickens Application of multiplanar external fixator  Debridement of gracilis flap by Dr. Dickens  sanguinous fluid anterolateral ankle distal to flap

## 2021-10-18 NOTE — CHART NOTE - NSCHARTNOTEFT_GEN_A_CORE
Admitting Diagnosis:   Patient is a 26y old  Female who presents with a chief complaint of L ankle pain (18 Oct 2021 07:23)    PAST MEDICAL & SURGICAL HISTORY:  Left tibial neuropathy  PAD (peripheral artery disease)  Status post ORIF of fracture of ankle    Current Nutrition Order:  Diet, NPO after Midnight:      NPO Start Date: 17-Oct-2021,   NPO Start Time: 23:59 (10-17-21 @ 19:38)  Diet, Regular (09-21-21 @ 09:05)    PO Intake: Good (%) [   ]  Fair (50-75%) [ x  ] Poor (<25%) [   ]  -Pt out of room at visit today. Per RN, pt continues to eat ~50% of meals.     GI Issues: +BM 10/17, abd-soft  Denies N/V or D/C  Pain: Denies   Skin Integrity: Left ankle -recon with L gracilis free flap and STSG (9/20)  -S/P I&D of Left ankle down to bone and application of multiplanar external fixation on 10/18    Labs: Potassium 3.4 L  10-17    140  |  106  |  7   ----------------------------<  91  3.4<L>   |  23  |  0.67    Ca    9.0      17 Oct 2021 08:42    Medications:  MEDICATIONS  (STANDING):  acetaminophen   Tablet .. 975 milliGRAM(s) Oral every 6 hours  AQUAPHOR (petrolatum Ointment) 1 Application(s) Topical two times a day  BACItracin   Ointment 1 Application(s) Topical daily  calcium carbonate   1250 mG (OsCal) 1 Tablet(s) Oral daily  cefTRIAXone   IVPB 2000 milliGRAM(s) IV Intermittent every 24 hours  chlorhexidine 2% Cloths 1 Application(s) Topical <User Schedule>  cholecalciferol 1000 Unit(s) Oral daily  enoxaparin Injectable 40 milliGRAM(s) SubCutaneous every 24 hours  gabapentin 600 milliGRAM(s) Oral every 8 hours  lactated ringers. 1000 milliLiter(s) (120 mL/Hr) IV Continuous <Continuous>  melatonin 3 milliGRAM(s) Oral at bedtime  multivitamin 1 Tablet(s) Oral daily  nafcillin  IVPB 2 Gram(s) IV Intermittent every 4 hours  polyethylene glycol 3350 17 Gram(s) Oral two times a day  senna 2 Tablet(s) Oral at bedtime    MEDICATIONS  (PRN):  bisacodyl Suppository 10 milliGRAM(s) Rectal daily PRN Constipation  diphenhydrAMINE 25 milliGRAM(s) Oral every 4 hours PRN Rash and/or Itching  HYDROmorphone  Injectable 0.5 milliGRAM(s) IV Push every 15 minutes PRN Severe Pain (7 - 10)  HYDROmorphone  Injectable 0.5 milliGRAM(s) IV Push every 3 hours PRN Severe Breakthrough Pain (>10)  metoclopramide Injectable 10 milliGRAM(s) IV Push every 6 hours PRN Nausea and/or Vomiting  ondansetron Injectable 4 milliGRAM(s) IV Push every 6 hours PRN Nausea and/or Vomiting  oxyCODONE    IR 5 milliGRAM(s) Oral every 4 hours PRN Moderate Pain (4 - 6)  oxyCODONE    IR 10 milliGRAM(s) Oral every 4 hours PRN Severe Pain (7 - 10)  sodium chloride 0.9% lock flush 10 milliLiter(s) IV Push every 1 hour PRN Pre/post blood products, medications, blood draw, and to maintain line patency    Ht: 175.3cm (69")  Wt: 82.2kg (10/18) BMI: 26.8        82.2kg (9/20)         86.2kg (9/09)    IBW: 145# (65.9kg)  % IBW: 125%    Weight Change: Per EMR, 4kg (5%) wt loss x 2 weeks (since admit) which is severe per ASPEN standards.   *No further wt decline noted.     Nutrition Focused Physical Exam: Completed [   ]  Not Pertinent [ x  ]  *No overt signs of muscle wasting. Suspect muscle quality decline r/t limited physical activity     Malnutrition: Moderate malnutrition in the context of acute illness AEB <75% of estimated energy requirement for >7 days, 5% wt loss x 2 weeks (diagnosed 9/27)    Estimated energy needs: 65.9kg  IBW used for calculations as pt >120% of IBW, adjusted for acute illness  Needs adjusted for wound healing  Calories: 1650-1950kcals (25-30kcals/kg)  Protein: 95-130g (1.5-2.0g/kg)   Fluid:+1950ml/day (30ml/kcals)    Subjective:   26yFemale s/p Repeat L Ankle I&D, Vac Change by Dr. NEAL Wallis on 09-17, with plastic surgery for flap harvest and closure with plastic surgery (9/02). PICC line with IV abx. Continue dangle 4x per day and working with therapies. Per ortho, S/P I&D down to bone with application of multiplanar external fixation system today (10/18). Paged ortho for additional of ONS daily. Continue MVI and reviewed labs.   Continue to Encourage po intake, protein at every meal/ snack.     Previous Nutrition Diagnosis:  Increased nutrient needs (protein) R/T hypermetabolic, increased protein catabolism AEB 1.5-2.0g/kg protein  Active [  x ]  Resolved [   ]    Goal:   Pt to consistently meet % of estimated needs PO     Recommendations:  1. Continue regular diet  -Add LPS BID, Ensure Max BID. Paged Ortho  2. Monitor %PO intake, continue to encourage PO at mealtimes  3. Continue MVI  4. Bowel regimen PRN    Education: Continue encourage PO/protein    Risk Level: High [   ] Moderate [ x ] Low [   ].  Will continue to monitor per protocol.   Lisa Dolan RD,CDN,,CNSC.

## 2021-10-18 NOTE — PROGRESS NOTE ADULT - ASSESSMENT
26F s/p L ankle recon with L gracilis free flap and STSG (9/20).    - LLE elevation and dangling 30 min x 4 per day, flap checks before and after  - Xeroform with bacitracin daily over flap  - Pain control - multimodal  - Benadryl prn for pruritis  - Barrier ointment for dry skin on L buttocks, encourage position change regularly  - NPO at 12am for OR 10/18  - COVID test ordered  - OR today with ortho, external fixation   - Bedrest  - PICC 9/21, ceftriaxone, nafcillin - ID following  - Weekly labs for inflammatory markers, per ID  - Lovenox/SCD on right leg  - IS

## 2021-10-18 NOTE — BRIEF OPERATIVE NOTE - NSICDXBRIEFPROCEDURE_GEN_ALL_CORE_FT
PROCEDURES:  Irrigation and debridement, tissue, down to bone, excisional, more than 20 sq cm 09-Sep-2021 23:09:03  Chapin Walils  Application of multiplanar external fixation system 18-Oct-2021 15:05:19  Chapin Rosa

## 2021-10-18 NOTE — PROGRESS NOTE ADULT - SUBJECTIVE AND OBJECTIVE BOX
SUBJECTIVE:  No overnight events. Had some increased pain near graft site, said it was positional. Tolerating diet. Tolerating dangling protocol. Voiding. Overall, doing well.    OBJECTIVE:     VITAL SIGNS / I&O's    Vital Signs Last 24 Hrs  T(C): 36.9 (18 Oct 2021 05:47), Max: 37.1 (17 Oct 2021 21:28)  T(F): 98.4 (18 Oct 2021 05:47), Max: 98.7 (17 Oct 2021 21:28)  HR: 84 (18 Oct 2021 05:47) (78 - 101)  BP: 106/72 (18 Oct 2021 05:47) (106/72 - 117/79)  BP(mean): 83 (18 Oct 2021 05:47) (83 - 83)  RR: 18 (18 Oct 2021 05:47) (17 - 18)  SpO2: 95% (18 Oct 2021 05:47) (95% - 98%)      17 Oct 2021 07:01  -  18 Oct 2021 07:00  --------------------------------------------------------  IN:    IV PiggyBack: 150 mL    Oral Fluid: 1440 mL  Total IN: 1590 mL    OUT:    Stool (mL): 2 mL    Voided (mL): 2000 mL  Total OUT: 2002 mL    Total NET: -412 mL      PHYSICAL EXAM     General: Awake and alert, NAD  LLE: Flap stable, soft, STSG with good take, skin paddle demarcating but stable. Audible doppler signal. Xeroform changed. STSG donor site pink, healing well with Aquaphor applied. PRAFO in place.       LABS                          8.8    4.87  )-----------( 544      ( 17 Oct 2021 08:42 )             26.5     17 Oct 2021 08:42    140    |  106    |  7      ----------------------------<  91     3.4     |  23     |  0.67     Ca    9.0        17 Oct 2021 08:42      PT/INR - ( 17 Oct 2021 08:42 )   PT: 14.1 sec;   INR: 1.18          PTT - ( 17 Oct 2021 08:42 )  PTT:36.6 sec  CAPILLARY BLOOD GLUCOSE    MEDICATIONS  (STANDING):  acetaminophen   Tablet .. 975 milliGRAM(s) Oral every 6 hours  AQUAPHOR (petrolatum Ointment) 1 Application(s) Topical two times a day  BACItracin   Ointment 1 Application(s) Topical daily  calcium carbonate   1250 mG (OsCal) 1 Tablet(s) Oral daily  cefTRIAXone   IVPB 2000 milliGRAM(s) IV Intermittent every 24 hours  chlorhexidine 2% Cloths 1 Application(s) Topical <User Schedule>  cholecalciferol 1000 Unit(s) Oral daily  enoxaparin Injectable 40 milliGRAM(s) SubCutaneous every 24 hours  gabapentin 600 milliGRAM(s) Oral every 8 hours  lactated ringers. 1000 milliLiter(s) (120 mL/Hr) IV Continuous <Continuous>  melatonin 3 milliGRAM(s) Oral at bedtime  multivitamin 1 Tablet(s) Oral daily  nafcillin  IVPB 2 Gram(s) IV Intermittent every 4 hours  polyethylene glycol 3350 17 Gram(s) Oral two times a day  senna 2 Tablet(s) Oral at bedtime    MEDICATIONS  (PRN):  bisacodyl Suppository 10 milliGRAM(s) Rectal daily PRN Constipation  diphenhydrAMINE 25 milliGRAM(s) Oral every 4 hours PRN Rash and/or Itching  HYDROmorphone  Injectable 0.5 milliGRAM(s) IV Push every 4 hours PRN Severe Pain (7 - 10)  metoclopramide Injectable 10 milliGRAM(s) IV Push every 6 hours PRN Nausea and/or Vomiting  ondansetron Injectable 4 milliGRAM(s) IV Push every 6 hours PRN Nausea and/or Vomiting  oxyCODONE    IR 5 milliGRAM(s) Oral every 4 hours PRN Moderate Pain (4 - 6)  oxyCODONE    IR 10 milliGRAM(s) Oral every 4 hours PRN Severe Pain (7 - 10)  sodium chloride 0.9% lock flush 10 milliLiter(s) IV Push every 1 hour PRN Pre/post blood products, medications, blood draw, and to maintain line patency

## 2021-10-18 NOTE — PROGRESS NOTE ADULT - SUBJECTIVE AND OBJECTIVE BOX
Ortho Note    Pt comfortable without complaints, pain controlled at foot/ankle.    Denies any new onset symptoms this morning. Comfortable in prAFO. NPO for OR today for ringed fixator.     Vital Signs Last 24 Hrs  T(C): 36.9 (18 Oct 2021 05:47), Max: 37.1 (17 Oct 2021 21:28)  T(F): 98.4 (18 Oct 2021 05:47), Max: 98.7 (17 Oct 2021 21:28)  HR: 84 (18 Oct 2021 05:47) (78 - 101)  BP: 106/72 (18 Oct 2021 05:47) (106/72 - 117/79)  BP(mean): 83 (18 Oct 2021 05:47) (83 - 83)  RR: 18 (18 Oct 2021 05:47) (17 - 18)  SpO2: 95% (18 Oct 2021 05:47) (95% - 98%)    VSS  General: A&Ox3, NAD  LLE: prAFO brace in place; Anterior soft tissue flap and skin graft recipient site with overlying Xeroform   Pulses: Foot WWP; Cap refill < 2 sec  Sensation: Patient with abnormal/absent sensation over most of the dorsum & plantar aspects of the foot consistent with baseline  Motor: Minimal FHL/FDL, 0/5 EHL/EDL activity consistent with baseline    A/P: 26yFemale With 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 The patient is currently in a PRAFO for unloading of her left heel.   - NPO for OR today for ringed fixator, defer internal hardware and flap elevation in setting of continued osteomyelitis per gallium scan. Will get repeat cultures and aspirate ankle joint on monday and eval high power field intraop to assess correlation with gallium scan  -  Gallium scan performed - likely plan for ringed fixator without internal hardware   due to ongoing evidence of osteomyelitis concerning for talus component of osteo. I discussed with Dr. Dunbar and plan will defer flap elevation and proceed with wound care for the flap until evidence of resolution of inflammatory marker elevation, negative gallium scan. Plan continues to include potential left Achilles or gastroc lengthening. The patient understands that she will require removal of the ringed exfix at a secondary point as well as adjustment of her forefoot pathology if it is symptomatic due to her clawing of her toes from her missed compartment syndrome. We will proceed with surgery today. The patient is a candidate for ankle fusion using the ringed external fixator with compression technique but we will defer this potential treatment plan if unable to salvage ankle joint and maintain motion.   - Continue elevation, dangle protocol per plastics   - Dressing evaluation and drain management per plastics team   - OR cx - staph aureus - continue abx as planned per ID, Appreciate ID recs on continuation of antibiotics for additional six weeks.  - Pain Control  - DVT ppx: Lovenox, hold tonight for OR  - WBS: DIANN DELACRUZE           Ortho Pager [phone]

## 2021-10-19 LAB
ALBUMIN SERPL ELPH-MCNC: 3.1 G/DL — LOW (ref 3.3–5)
ALP SERPL-CCNC: 54 U/L — SIGNIFICANT CHANGE UP (ref 40–120)
ALT FLD-CCNC: 13 U/L — SIGNIFICANT CHANGE UP (ref 10–45)
ANION GAP SERPL CALC-SCNC: 9 MMOL/L — SIGNIFICANT CHANGE UP (ref 5–17)
AST SERPL-CCNC: 16 U/L — SIGNIFICANT CHANGE UP (ref 10–40)
BILIRUB SERPL-MCNC: 0.2 MG/DL — SIGNIFICANT CHANGE UP (ref 0.2–1.2)
BUN SERPL-MCNC: 5 MG/DL — LOW (ref 7–23)
CALCIUM SERPL-MCNC: 8.8 MG/DL — SIGNIFICANT CHANGE UP (ref 8.4–10.5)
CHLORIDE SERPL-SCNC: 105 MMOL/L — SIGNIFICANT CHANGE UP (ref 96–108)
CO2 SERPL-SCNC: 23 MMOL/L — SIGNIFICANT CHANGE UP (ref 22–31)
CREAT SERPL-MCNC: 0.65 MG/DL — SIGNIFICANT CHANGE UP (ref 0.5–1.3)
CRP SERPL-MCNC: 34.1 MG/L — HIGH (ref 0–4)
GLUCOSE SERPL-MCNC: 84 MG/DL — SIGNIFICANT CHANGE UP (ref 70–99)
HCT VFR BLD CALC: 29.9 % — LOW (ref 34.5–45)
HGB BLD-MCNC: 9.9 G/DL — LOW (ref 11.5–15.5)
MAGNESIUM SERPL-MCNC: 1.5 MG/DL — LOW (ref 1.6–2.6)
MCHC RBC-ENTMCNC: 29.4 PG — SIGNIFICANT CHANGE UP (ref 27–34)
MCHC RBC-ENTMCNC: 33.1 GM/DL — SIGNIFICANT CHANGE UP (ref 32–36)
MCV RBC AUTO: 88.7 FL — SIGNIFICANT CHANGE UP (ref 80–100)
NIGHT BLUE STAIN TISS: SIGNIFICANT CHANGE UP
NRBC # BLD: 0 /100 WBCS — SIGNIFICANT CHANGE UP (ref 0–0)
PLATELET # BLD AUTO: 480 K/UL — HIGH (ref 150–400)
POTASSIUM SERPL-MCNC: 3.2 MMOL/L — LOW (ref 3.5–5.3)
POTASSIUM SERPL-SCNC: 3.2 MMOL/L — LOW (ref 3.5–5.3)
PROT SERPL-MCNC: 6.1 G/DL — SIGNIFICANT CHANGE UP (ref 6–8.3)
RBC # BLD: 3.37 M/UL — LOW (ref 3.8–5.2)
RBC # FLD: 24.9 % — HIGH (ref 10.3–14.5)
SODIUM SERPL-SCNC: 137 MMOL/L — SIGNIFICANT CHANGE UP (ref 135–145)
SPECIMEN SOURCE: SIGNIFICANT CHANGE UP
WBC # BLD: 6.4 K/UL — SIGNIFICANT CHANGE UP (ref 3.8–10.5)
WBC # FLD AUTO: 6.4 K/UL — SIGNIFICANT CHANGE UP (ref 3.8–10.5)

## 2021-10-19 RX ADMIN — HYDROMORPHONE HYDROCHLORIDE 0.5 MILLIGRAM(S): 2 INJECTION INTRAMUSCULAR; INTRAVENOUS; SUBCUTANEOUS at 05:55

## 2021-10-19 RX ADMIN — HYDROMORPHONE HYDROCHLORIDE 0.5 MILLIGRAM(S): 2 INJECTION INTRAMUSCULAR; INTRAVENOUS; SUBCUTANEOUS at 21:25

## 2021-10-19 RX ADMIN — Medication 975 MILLIGRAM(S): at 10:20

## 2021-10-19 RX ADMIN — Medication 1 TABLET(S): at 12:25

## 2021-10-19 RX ADMIN — NAFCILLIN 100 GRAM(S): 10 INJECTION, POWDER, FOR SOLUTION INTRAVENOUS at 17:37

## 2021-10-19 RX ADMIN — NAFCILLIN 100 GRAM(S): 10 INJECTION, POWDER, FOR SOLUTION INTRAVENOUS at 02:17

## 2021-10-19 RX ADMIN — HYDROMORPHONE HYDROCHLORIDE 0.5 MILLIGRAM(S): 2 INJECTION INTRAMUSCULAR; INTRAVENOUS; SUBCUTANEOUS at 08:30

## 2021-10-19 RX ADMIN — NAFCILLIN 100 GRAM(S): 10 INJECTION, POWDER, FOR SOLUTION INTRAVENOUS at 13:19

## 2021-10-19 RX ADMIN — NAFCILLIN 100 GRAM(S): 10 INJECTION, POWDER, FOR SOLUTION INTRAVENOUS at 05:02

## 2021-10-19 RX ADMIN — NAFCILLIN 100 GRAM(S): 10 INJECTION, POWDER, FOR SOLUTION INTRAVENOUS at 21:11

## 2021-10-19 RX ADMIN — CEFTRIAXONE 100 MILLIGRAM(S): 500 INJECTION, POWDER, FOR SOLUTION INTRAMUSCULAR; INTRAVENOUS at 00:53

## 2021-10-19 RX ADMIN — GABAPENTIN 600 MILLIGRAM(S): 400 CAPSULE ORAL at 13:18

## 2021-10-19 RX ADMIN — NAFCILLIN 100 GRAM(S): 10 INJECTION, POWDER, FOR SOLUTION INTRAVENOUS at 09:20

## 2021-10-19 RX ADMIN — Medication 975 MILLIGRAM(S): at 17:36

## 2021-10-19 RX ADMIN — OXYCODONE HYDROCHLORIDE 10 MILLIGRAM(S): 5 TABLET ORAL at 05:02

## 2021-10-19 RX ADMIN — GABAPENTIN 600 MILLIGRAM(S): 400 CAPSULE ORAL at 21:11

## 2021-10-19 RX ADMIN — Medication 975 MILLIGRAM(S): at 09:20

## 2021-10-19 RX ADMIN — ENOXAPARIN SODIUM 40 MILLIGRAM(S): 100 INJECTION SUBCUTANEOUS at 00:53

## 2021-10-19 RX ADMIN — OXYCODONE HYDROCHLORIDE 10 MILLIGRAM(S): 5 TABLET ORAL at 13:23

## 2021-10-19 RX ADMIN — Medication 975 MILLIGRAM(S): at 21:25

## 2021-10-19 RX ADMIN — OXYCODONE HYDROCHLORIDE 10 MILLIGRAM(S): 5 TABLET ORAL at 05:55

## 2021-10-19 RX ADMIN — HYDROMORPHONE HYDROCHLORIDE 0.5 MILLIGRAM(S): 2 INJECTION INTRAMUSCULAR; INTRAVENOUS; SUBCUTANEOUS at 16:25

## 2021-10-19 RX ADMIN — CHLORHEXIDINE GLUCONATE 1 APPLICATION(S): 213 SOLUTION TOPICAL at 05:03

## 2021-10-19 RX ADMIN — HYDROMORPHONE HYDROCHLORIDE 0.5 MILLIGRAM(S): 2 INJECTION INTRAMUSCULAR; INTRAVENOUS; SUBCUTANEOUS at 06:05

## 2021-10-19 RX ADMIN — OXYCODONE HYDROCHLORIDE 10 MILLIGRAM(S): 5 TABLET ORAL at 01:20

## 2021-10-19 RX ADMIN — Medication 975 MILLIGRAM(S): at 02:54

## 2021-10-19 RX ADMIN — Medication 1 APPLICATION(S): at 17:44

## 2021-10-19 RX ADMIN — Medication 1000 UNIT(S): at 12:25

## 2021-10-19 RX ADMIN — HYDROMORPHONE HYDROCHLORIDE 0.5 MILLIGRAM(S): 2 INJECTION INTRAMUSCULAR; INTRAVENOUS; SUBCUTANEOUS at 08:22

## 2021-10-19 RX ADMIN — HYDROMORPHONE HYDROCHLORIDE 0.5 MILLIGRAM(S): 2 INJECTION INTRAMUSCULAR; INTRAVENOUS; SUBCUTANEOUS at 21:07

## 2021-10-19 RX ADMIN — OXYCODONE HYDROCHLORIDE 10 MILLIGRAM(S): 5 TABLET ORAL at 14:23

## 2021-10-19 RX ADMIN — Medication 3 MILLIGRAM(S): at 21:11

## 2021-10-19 RX ADMIN — HYDROMORPHONE HYDROCHLORIDE 0.5 MILLIGRAM(S): 2 INJECTION INTRAMUSCULAR; INTRAVENOUS; SUBCUTANEOUS at 16:58

## 2021-10-19 RX ADMIN — OXYCODONE HYDROCHLORIDE 10 MILLIGRAM(S): 5 TABLET ORAL at 10:20

## 2021-10-19 RX ADMIN — Medication 975 MILLIGRAM(S): at 21:10

## 2021-10-19 RX ADMIN — GABAPENTIN 600 MILLIGRAM(S): 400 CAPSULE ORAL at 05:02

## 2021-10-19 RX ADMIN — HYDROMORPHONE HYDROCHLORIDE 0.5 MILLIGRAM(S): 2 INJECTION INTRAMUSCULAR; INTRAVENOUS; SUBCUTANEOUS at 02:17

## 2021-10-19 RX ADMIN — HYDROMORPHONE HYDROCHLORIDE 0.5 MILLIGRAM(S): 2 INJECTION INTRAMUSCULAR; INTRAVENOUS; SUBCUTANEOUS at 02:25

## 2021-10-19 RX ADMIN — Medication 1 APPLICATION(S): at 05:05

## 2021-10-19 RX ADMIN — Medication 975 MILLIGRAM(S): at 02:24

## 2021-10-19 RX ADMIN — OXYCODONE HYDROCHLORIDE 10 MILLIGRAM(S): 5 TABLET ORAL at 00:53

## 2021-10-19 RX ADMIN — OXYCODONE HYDROCHLORIDE 10 MILLIGRAM(S): 5 TABLET ORAL at 09:21

## 2021-10-19 RX ADMIN — Medication 975 MILLIGRAM(S): at 16:24

## 2021-10-19 NOTE — PROGRESS NOTE ADULT - SUBJECTIVE AND OBJECTIVE BOX
SUBJECTIVE:  No overnight events. Reporting some increased post-op pain. Overall, doing well.    OBJECTIVE:     VITAL SIGNS / I&O's    Vital Signs Last 24 Hrs  T(C): 37 (19 Oct 2021 05:18), Max: 37 (19 Oct 2021 05:18)  T(F): 98.6 (19 Oct 2021 05:18), Max: 98.6 (19 Oct 2021 05:18)  HR: 90 (19 Oct 2021 05:18) (86 - 104)  BP: 119/73 (19 Oct 2021 05:18) (104/73 - 131/83)  BP(mean): 79 (18 Oct 2021 15:50) (75 - 90)  RR: 18 (19 Oct 2021 05:18) (12 - 39)  SpO2: 97% (19 Oct 2021 05:18) (96% - 99%)      18 Oct 2021 07:01  -  19 Oct 2021 07:00  --------------------------------------------------------  IN:    IV PiggyBack: 150 mL    IV PiggyBack: 160 mL    Lactated Ringers: 1440 mL  Total IN: 1750 mL    OUT:    Oral Fluid: 0 mL    Voided (mL): 600 mL  Total OUT: 600 mL    Total NET: 1150 mL      PHYSICAL EXAM     General: Awake and alert, NAD  LLE: External fixator in place with ace wrap covering. Distal extremity warm and well perfused. Absent sensation to touch on first three toes at baseline.     LABS                          8.8    4.87  )-----------( 544      ( 17 Oct 2021 08:42 )             26.5     17 Oct 2021 08:42    140    |  106    |  7      ----------------------------<  91     3.4     |  23     |  0.67     Ca    9.0        17 Oct 2021 08:42      PT/INR - ( 17 Oct 2021 08:42 )   PT: 14.1 sec;   INR: 1.18          PTT - ( 17 Oct 2021 08:42 )  PTT:36.6 sec  CAPILLARY BLOOD GLUCOSE      MEDICATIONS  (STANDING):  acetaminophen   Tablet .. 975 milliGRAM(s) Oral every 6 hours  AQUAPHOR (petrolatum Ointment) 1 Application(s) Topical two times a day  BACItracin   Ointment 1 Application(s) Topical daily  calcium carbonate   1250 mG (OsCal) 1 Tablet(s) Oral daily  cefTRIAXone   IVPB 2000 milliGRAM(s) IV Intermittent every 24 hours  chlorhexidine 2% Cloths 1 Application(s) Topical <User Schedule>  cholecalciferol 1000 Unit(s) Oral daily  enoxaparin Injectable 40 milliGRAM(s) SubCutaneous every 24 hours  gabapentin 600 milliGRAM(s) Oral every 8 hours  lactated ringers. 1000 milliLiter(s) (120 mL/Hr) IV Continuous <Continuous>  melatonin 3 milliGRAM(s) Oral at bedtime  multivitamin 1 Tablet(s) Oral daily  nafcillin  IVPB 2 Gram(s) IV Intermittent every 4 hours  polyethylene glycol 3350 17 Gram(s) Oral two times a day  senna 2 Tablet(s) Oral at bedtime    MEDICATIONS  (PRN):  bisacodyl Suppository 10 milliGRAM(s) Rectal daily PRN Constipation  diphenhydrAMINE 25 milliGRAM(s) Oral every 4 hours PRN Rash and/or Itching  HYDROmorphone  Injectable 0.5 milliGRAM(s) IV Push every 3 hours PRN Severe Breakthrough Pain (>10)  HYDROmorphone  Injectable 0.5 milliGRAM(s) IV Push every 15 minutes PRN Severe Pain (7 - 10)  metoclopramide Injectable 10 milliGRAM(s) IV Push every 6 hours PRN Nausea and/or Vomiting  ondansetron Injectable 4 milliGRAM(s) IV Push every 6 hours PRN Nausea and/or Vomiting  oxyCODONE    IR 5 milliGRAM(s) Oral every 4 hours PRN Moderate Pain (4 - 6)  oxyCODONE    IR 10 milliGRAM(s) Oral every 4 hours PRN Severe Pain (7 - 10)  sodium chloride 0.9% lock flush 10 milliLiter(s) IV Push every 1 hour PRN Pre/post blood products, medications, blood draw, and to maintain line patency   SUBJECTIVE:  No overnight events. Reporting increased pain/discomfort in leg this morning.     OBJECTIVE:     VITAL SIGNS / I&O's    Vital Signs Last 24 Hrs  T(C): 37 (19 Oct 2021 05:18), Max: 37 (19 Oct 2021 05:18)  T(F): 98.6 (19 Oct 2021 05:18), Max: 98.6 (19 Oct 2021 05:18)  HR: 90 (19 Oct 2021 05:18) (86 - 104)  BP: 119/73 (19 Oct 2021 05:18) (104/73 - 131/83)  BP(mean): 79 (18 Oct 2021 15:50) (75 - 90)  RR: 18 (19 Oct 2021 05:18) (12 - 39)  SpO2: 97% (19 Oct 2021 05:18) (96% - 99%)      18 Oct 2021 07:01  -  19 Oct 2021 07:00  --------------------------------------------------------  IN:    IV PiggyBack: 150 mL    IV PiggyBack: 160 mL    Lactated Ringers: 1440 mL  Total IN: 1750 mL    OUT:    Oral Fluid: 0 mL    Voided (mL): 600 mL  Total OUT: 600 mL    Total NET: 1150 mL      PHYSICAL EXAM     General: Awake and alert, NAD  LLE: External fixator in place with ace wrap covering. Pin sites with clean, dry gauze dressing. Skin graft stable from previous exam, xeroform changed. Flap now debrided of discolored tissue. Now red, beefy with early stages of granulation. Wet to dry dressing changed. Distal extremity warm and well perfused. Absent sensation to touch on first three toes at baseline.     LABS                          8.8    4.87  )-----------( 544      ( 17 Oct 2021 08:42 )             26.5     17 Oct 2021 08:42    140    |  106    |  7      ----------------------------<  91     3.4     |  23     |  0.67     Ca    9.0        17 Oct 2021 08:42      PT/INR - ( 17 Oct 2021 08:42 )   PT: 14.1 sec;   INR: 1.18          PTT - ( 17 Oct 2021 08:42 )  PTT:36.6 sec  CAPILLARY BLOOD GLUCOSE      MEDICATIONS  (STANDING):  acetaminophen   Tablet .. 975 milliGRAM(s) Oral every 6 hours  AQUAPHOR (petrolatum Ointment) 1 Application(s) Topical two times a day  BACItracin   Ointment 1 Application(s) Topical daily  calcium carbonate   1250 mG (OsCal) 1 Tablet(s) Oral daily  cefTRIAXone   IVPB 2000 milliGRAM(s) IV Intermittent every 24 hours  chlorhexidine 2% Cloths 1 Application(s) Topical <User Schedule>  cholecalciferol 1000 Unit(s) Oral daily  enoxaparin Injectable 40 milliGRAM(s) SubCutaneous every 24 hours  gabapentin 600 milliGRAM(s) Oral every 8 hours  lactated ringers. 1000 milliLiter(s) (120 mL/Hr) IV Continuous <Continuous>  melatonin 3 milliGRAM(s) Oral at bedtime  multivitamin 1 Tablet(s) Oral daily  nafcillin  IVPB 2 Gram(s) IV Intermittent every 4 hours  polyethylene glycol 3350 17 Gram(s) Oral two times a day  senna 2 Tablet(s) Oral at bedtime    MEDICATIONS  (PRN):  bisacodyl Suppository 10 milliGRAM(s) Rectal daily PRN Constipation  diphenhydrAMINE 25 milliGRAM(s) Oral every 4 hours PRN Rash and/or Itching  HYDROmorphone  Injectable 0.5 milliGRAM(s) IV Push every 3 hours PRN Severe Breakthrough Pain (>10)  HYDROmorphone  Injectable 0.5 milliGRAM(s) IV Push every 15 minutes PRN Severe Pain (7 - 10)  metoclopramide Injectable 10 milliGRAM(s) IV Push every 6 hours PRN Nausea and/or Vomiting  ondansetron Injectable 4 milliGRAM(s) IV Push every 6 hours PRN Nausea and/or Vomiting  oxyCODONE    IR 5 milliGRAM(s) Oral every 4 hours PRN Moderate Pain (4 - 6)  oxyCODONE    IR 10 milliGRAM(s) Oral every 4 hours PRN Severe Pain (7 - 10)  sodium chloride 0.9% lock flush 10 milliLiter(s) IV Push every 1 hour PRN Pre/post blood products, medications, blood draw, and to maintain line patency

## 2021-10-19 NOTE — PROGRESS NOTE ADULT - NSPROGADDITIONALINFOA_GEN_ALL_CORE
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 She was placed in a ringed ex fix on monday 10.18 for temporizing measure with a OLIVA. Repeat cultures are showing multimicrobial colonization of the necrotic portion of her gracillis free flap. Her inflammatory markers overall have improved however until acceptable soft tissue coverage, conversion to definitive fixation vs fusion will be delayed. Okay to WB with ringed ex fix with rolling walker assistance with PT  - Plan for return to OR on Monday for revision/debridement vs closure with plastics  - Dr. 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 She was placed in a ringed ex fix on monday 10.18 for temporizing measure with a OLIVA. Repeat cultures are pending of the necrotic portion of her gracillis free flap. Her inflammatory markers overall have improved however until acceptable soft tissue coverage, conversion to definitive fixation vs fusion will be delayed. Okay to WB with ringed ex fix with rolling walker assistance with PT from ortho perspective. Plastics okay per Dr. Dickens at this time.   - Plan for return to OR on Monday for revision/debridement vs closure with plastics  - Appreciate re-evaluation by ID depending on cultures and review of gallium scan  - Stable  - Pain/Nausea Control  - Home meds  - DVT ppx: per plastics team  - WBS: WBAT in ringed ex fix  - Flap care per plastics team  - Abx care per ID  - Plan for repeat flap debridement per plastics possibly next week +/- ortho involvement for ex fix adjustment as needed

## 2021-10-19 NOTE — PROGRESS NOTE ADULT - ASSESSMENT
A/P: 26yFemale s/p repeat ankle I&D by Dr. Wallis on 09/17 w L gracilis free flap and STSG on 09/20 now s/p I&D of flap by plastics team and placement of ringed external fixator by ortho 10/18  - Stable  - Pain/Nausea Control  - Home meds  - DVT ppx: per plastics team  - WBS: WBAT in ringed ex fix  - Flap care per plastics team  - Abx care per plastics team  - Continue plan for staged orthopedic/plastics combined surgical intervention later this month      Ortho Pager [phone]   A/P: 26yFemale s/p repeat ankle I&D by Dr. Wallis on 09/17 w L gracilis free flap and STSG on 09/20 now s/p I&D of flap by plastics team and placement of ringed external fixator by ortho 10/18  - Stable  - Pain/Nausea Control  - Home meds  - DVT ppx: per plastics team  - WBS: WBAT in ringed ex fix  - Flap care per plastics team  - Abx care per plastics team  - Plan for repeat flap debridement per plastics possibly next week +/- ortho involvement for ex fix adjustment as needed      Ortho Pager [phone]

## 2021-10-19 NOTE — PROGRESS NOTE ADULT - SUBJECTIVE AND OBJECTIVE BOX
Ortho Note    Pt comfortable without complaints, pain controlled  Denies CP, SOB, N/V, numbness/tingling     Vital Signs Last 24 Hrs  T(C): 37 (19 Oct 2021 05:18), Max: 37 (19 Oct 2021 05:18)  T(F): 98.6 (19 Oct 2021 05:18), Max: 98.6 (19 Oct 2021 05:18)  HR: 90 (19 Oct 2021 05:18) (84 - 104)  BP: 119/73 (19 Oct 2021 05:18) (104/73 - 131/83)  BP(mean): 79 (18 Oct 2021 15:50) (75 - 90)  RR: 18 (19 Oct 2021 05:18) (12 - 39)  SpO2: 97% (19 Oct 2021 05:18) (96% - 99%)    VSS  General: A&Ox3, NAD  LLE: Ringed external fixator in place w ace wrap; Pin site bolster dressings well appearing; Flap with Xeroform overlying portion and wet to dry dressing over open portion  Vascular: Toes WWP; Cap refill < 2 sec  Sensation: Patient with abnormal/absent sensation over most of the dorsum & plantar aspects of the foot consistent with baseline  Motor: Minimal FHL/FDL, 0/5 EHL/EDL activity consistent with baseline    Labs:                        8.8    4.87  )-----------( 544      ( 17 Oct 2021 08:42 )             26.5       10-17    140  |  106  |  7   ----------------------------<  91  3.4<L>   |  23  |  0.67    Ca    9.0      17 Oct 2021 08:42            PT/INR - ( 17 Oct 2021 08:42 )   PT: 14.1 sec;   INR: 1.18          PTT - ( 17 Oct 2021 08:42 )  PTT:36.6 sec

## 2021-10-19 NOTE — PROGRESS NOTE ADULT - ASSESSMENT
26F s/p L ankle recon with L gracilis free flap and STSG (9/20) and now s/p flap debridement and placement of ringed external fixator (10/18).    - Daily dressing changes - xeroform over skin graft, wet to dry over skin paddle  - Pain control - multimodal  - Benadryl prn for pruritis  - Barrier ointment for dry skin on L buttocks, encourage position change regularly  - Regular diet  - Weight bearing activity as tolerated  - PICC 9/21, ceftriaxone, nafcillin - ID following  - Weekly labs for inflammatory markers, per ID  - Lovenox/SCD on right leg  - IS 26F s/p L ankle recon with L gracilis free flap and STSG (9/20) and now s/p flap debridement and placement of ringed external fixator (10/18).    - Daily dressing changes - xeroform over skin graft, wet to dry over open areas of flap  - Keep left leg elevated when in bed  - Weight bearing activity as tolerated  - Pain control - multimodal  - Benadryl prn for pruritis  - Barrier ointment for dry skin on L buttocks, encourage position change regularly  - Regular diet  - Bowel regimen  - Weight bearing activity as tolerated  - PICC 9/21, ceftriaxone, nafcillin - ID following  - Weekly labs for inflammatory markers, per ID  - Lovenox/SCD on right leg  - IS    Plastics Pager: [phone]

## 2021-10-20 RX ADMIN — GABAPENTIN 600 MILLIGRAM(S): 400 CAPSULE ORAL at 05:25

## 2021-10-20 RX ADMIN — OXYCODONE HYDROCHLORIDE 10 MILLIGRAM(S): 5 TABLET ORAL at 12:43

## 2021-10-20 RX ADMIN — OXYCODONE HYDROCHLORIDE 10 MILLIGRAM(S): 5 TABLET ORAL at 03:18

## 2021-10-20 RX ADMIN — OXYCODONE HYDROCHLORIDE 10 MILLIGRAM(S): 5 TABLET ORAL at 03:40

## 2021-10-20 RX ADMIN — NAFCILLIN 100 GRAM(S): 10 INJECTION, POWDER, FOR SOLUTION INTRAVENOUS at 14:16

## 2021-10-20 RX ADMIN — OXYCODONE HYDROCHLORIDE 10 MILLIGRAM(S): 5 TABLET ORAL at 09:30

## 2021-10-20 RX ADMIN — NAFCILLIN 100 GRAM(S): 10 INJECTION, POWDER, FOR SOLUTION INTRAVENOUS at 22:02

## 2021-10-20 RX ADMIN — Medication 3 MILLIGRAM(S): at 22:01

## 2021-10-20 RX ADMIN — HYDROMORPHONE HYDROCHLORIDE 0.5 MILLIGRAM(S): 2 INJECTION INTRAMUSCULAR; INTRAVENOUS; SUBCUTANEOUS at 15:32

## 2021-10-20 RX ADMIN — Medication 975 MILLIGRAM(S): at 15:16

## 2021-10-20 RX ADMIN — Medication 1 APPLICATION(S): at 18:36

## 2021-10-20 RX ADMIN — OXYCODONE HYDROCHLORIDE 10 MILLIGRAM(S): 5 TABLET ORAL at 16:46

## 2021-10-20 RX ADMIN — HYDROMORPHONE HYDROCHLORIDE 0.5 MILLIGRAM(S): 2 INJECTION INTRAMUSCULAR; INTRAVENOUS; SUBCUTANEOUS at 01:20

## 2021-10-20 RX ADMIN — Medication 975 MILLIGRAM(S): at 09:54

## 2021-10-20 RX ADMIN — ENOXAPARIN SODIUM 40 MILLIGRAM(S): 100 INJECTION SUBCUTANEOUS at 00:33

## 2021-10-20 RX ADMIN — NAFCILLIN 100 GRAM(S): 10 INJECTION, POWDER, FOR SOLUTION INTRAVENOUS at 09:55

## 2021-10-20 RX ADMIN — HYDROMORPHONE HYDROCHLORIDE 0.5 MILLIGRAM(S): 2 INJECTION INTRAMUSCULAR; INTRAVENOUS; SUBCUTANEOUS at 00:44

## 2021-10-20 RX ADMIN — CEFTRIAXONE 100 MILLIGRAM(S): 500 INJECTION, POWDER, FOR SOLUTION INTRAMUSCULAR; INTRAVENOUS at 00:34

## 2021-10-20 RX ADMIN — NAFCILLIN 100 GRAM(S): 10 INJECTION, POWDER, FOR SOLUTION INTRAVENOUS at 05:25

## 2021-10-20 RX ADMIN — Medication 1 TABLET(S): at 12:43

## 2021-10-20 RX ADMIN — HYDROMORPHONE HYDROCHLORIDE 0.5 MILLIGRAM(S): 2 INJECTION INTRAMUSCULAR; INTRAVENOUS; SUBCUTANEOUS at 19:54

## 2021-10-20 RX ADMIN — GABAPENTIN 600 MILLIGRAM(S): 400 CAPSULE ORAL at 22:01

## 2021-10-20 RX ADMIN — HYDROMORPHONE HYDROCHLORIDE 0.5 MILLIGRAM(S): 2 INJECTION INTRAMUSCULAR; INTRAVENOUS; SUBCUTANEOUS at 14:47

## 2021-10-20 RX ADMIN — CHLORHEXIDINE GLUCONATE 1 APPLICATION(S): 213 SOLUTION TOPICAL at 05:24

## 2021-10-20 RX ADMIN — NAFCILLIN 100 GRAM(S): 10 INJECTION, POWDER, FOR SOLUTION INTRAVENOUS at 01:11

## 2021-10-20 RX ADMIN — NAFCILLIN 100 GRAM(S): 10 INJECTION, POWDER, FOR SOLUTION INTRAVENOUS at 18:36

## 2021-10-20 RX ADMIN — OXYCODONE HYDROCHLORIDE 10 MILLIGRAM(S): 5 TABLET ORAL at 13:33

## 2021-10-20 RX ADMIN — Medication 1 APPLICATION(S): at 05:25

## 2021-10-20 RX ADMIN — Medication 975 MILLIGRAM(S): at 16:31

## 2021-10-20 RX ADMIN — Medication 1000 UNIT(S): at 12:43

## 2021-10-20 RX ADMIN — OXYCODONE HYDROCHLORIDE 10 MILLIGRAM(S): 5 TABLET ORAL at 17:45

## 2021-10-20 RX ADMIN — Medication 975 MILLIGRAM(S): at 03:18

## 2021-10-20 RX ADMIN — OXYCODONE HYDROCHLORIDE 10 MILLIGRAM(S): 5 TABLET ORAL at 08:26

## 2021-10-20 RX ADMIN — Medication 975 MILLIGRAM(S): at 03:40

## 2021-10-20 RX ADMIN — OXYCODONE HYDROCHLORIDE 10 MILLIGRAM(S): 5 TABLET ORAL at 22:00

## 2021-10-20 RX ADMIN — Medication 975 MILLIGRAM(S): at 22:30

## 2021-10-20 RX ADMIN — GABAPENTIN 600 MILLIGRAM(S): 400 CAPSULE ORAL at 14:16

## 2021-10-20 RX ADMIN — Medication 975 MILLIGRAM(S): at 10:46

## 2021-10-20 RX ADMIN — Medication 975 MILLIGRAM(S): at 22:01

## 2021-10-20 RX ADMIN — OXYCODONE HYDROCHLORIDE 10 MILLIGRAM(S): 5 TABLET ORAL at 22:30

## 2021-10-20 NOTE — BRIEF OPERATIVE NOTE - NSICDXBRIEFPROCEDURE_GEN_ALL_CORE_FT
PROCEDURES:  Irrigation and debridement, tissue, down to bone, excisional, more than 20 sq cm 09-Sep-2021 23:09:03  Chapin Wallis

## 2021-10-20 NOTE — BRIEF OPERATIVE NOTE - BRIEF OP NOTE DRAINS
JPx1 left thigh
medial and lateral wound vacs
wound vac medial and lateral ankle open wounds 4x3 cm laterally   0.5x2cm medially
no

## 2021-10-20 NOTE — PROGRESS NOTE ADULT - SUBJECTIVE AND OBJECTIVE BOX
SUBJECTIVE:  No overnight events.    OBJECTIVE:     VITAL SIGNS / I&O's    Vital Signs Last 24 Hrs  T(C): 37.3 (20 Oct 2021 05:09), Max: 37.3 (19 Oct 2021 17:32)  T(F): 99.1 (20 Oct 2021 05:09), Max: 99.2 (19 Oct 2021 17:32)  HR: 90 (20 Oct 2021 05:09) (90 - 100)  BP: 123/85 (20 Oct 2021 05:09) (110/72 - 123/85)  BP(mean): 98 (20 Oct 2021 05:09) (98 - 98)  RR: 18 (20 Oct 2021 05:09) (18 - 18)  SpO2: 96% (20 Oct 2021 05:09) (96% - 99%)      19 Oct 2021 07:01  -  20 Oct 2021 07:00  --------------------------------------------------------  IN:    IV PiggyBack: 150 mL    Lactated Ringers: 240 mL    Oral Fluid: 1310 mL  Total IN: 1700 mL    OUT:    Stool (mL): 1 mL    Voided (mL): 2100 mL  Total OUT: 2101 mL    Total NET: -401 mL      PHYSICAL EXAM     General: Awake and alert, NAD  LLE: External fixator in place with ace wrap covering. Pin sites with clean, dry gauze dressing. Skin graft stable from previous exam, xeroform changed. Flap now debrided of discolored tissue. Now red, beefy with early stages of granulation. Wet to dry dressing changed. Distal extremity warm and well perfused. Absent sensation to touch on first three toes at baseline.     LABS                          9.9    6.40  )-----------( 480      ( 19 Oct 2021 07:58 )             29.9     19 Oct 2021 07:58    137    |  105    |  5      ----------------------------<  84     3.2     |  23     |  0.65     Ca    8.8        19 Oct 2021 07:58  Mg     1.5       19 Oct 2021 07:58    TPro  6.1    /  Alb  3.1    /  TBili  0.2    /  DBili  x      /  AST  16     /  ALT  13     /  AlkPhos  54     19 Oct 2021 07:58      MEDICATIONS  (STANDING):  acetaminophen   Tablet .. 975 milliGRAM(s) Oral every 6 hours  AQUAPHOR (petrolatum Ointment) 1 Application(s) Topical two times a day  calcium carbonate   1250 mG (OsCal) 1 Tablet(s) Oral daily  cefTRIAXone   IVPB 2000 milliGRAM(s) IV Intermittent every 24 hours  chlorhexidine 2% Cloths 1 Application(s) Topical <User Schedule>  cholecalciferol 1000 Unit(s) Oral daily  enoxaparin Injectable 40 milliGRAM(s) SubCutaneous every 24 hours  gabapentin 600 milliGRAM(s) Oral every 8 hours  melatonin 3 milliGRAM(s) Oral at bedtime  multivitamin 1 Tablet(s) Oral daily  nafcillin  IVPB 2 Gram(s) IV Intermittent every 4 hours  polyethylene glycol 3350 17 Gram(s) Oral two times a day  senna 2 Tablet(s) Oral at bedtime    MEDICATIONS  (PRN):  bisacodyl Suppository 10 milliGRAM(s) Rectal daily PRN Constipation  diphenhydrAMINE 25 milliGRAM(s) Oral every 4 hours PRN Rash and/or Itching  HYDROmorphone  Injectable 0.5 milliGRAM(s) IV Push every 3 hours PRN Severe Breakthrough Pain (>10)  metoclopramide Injectable 10 milliGRAM(s) IV Push every 6 hours PRN Nausea and/or Vomiting  ondansetron Injectable 4 milliGRAM(s) IV Push every 6 hours PRN Nausea and/or Vomiting  oxyCODONE    IR 5 milliGRAM(s) Oral every 4 hours PRN Moderate Pain (4 - 6)  oxyCODONE    IR 10 milliGRAM(s) Oral every 4 hours PRN Severe Pain (7 - 10)  sodium chloride 0.9% lock flush 10 milliLiter(s) IV Push every 1 hour PRN Pre/post blood products, medications, blood draw, and to maintain line patency   SUBJECTIVE:  No overnight events. Still with increased post op pain.    OBJECTIVE:     VITAL SIGNS / I&O's    Vital Signs Last 24 Hrs  T(C): 37.3 (20 Oct 2021 05:09), Max: 37.3 (19 Oct 2021 17:32)  T(F): 99.1 (20 Oct 2021 05:09), Max: 99.2 (19 Oct 2021 17:32)  HR: 90 (20 Oct 2021 05:09) (90 - 100)  BP: 123/85 (20 Oct 2021 05:09) (110/72 - 123/85)  BP(mean): 98 (20 Oct 2021 05:09) (98 - 98)  RR: 18 (20 Oct 2021 05:09) (18 - 18)  SpO2: 96% (20 Oct 2021 05:09) (96% - 99%)      19 Oct 2021 07:01  -  20 Oct 2021 07:00  --------------------------------------------------------  IN:    IV PiggyBack: 150 mL    Lactated Ringers: 240 mL    Oral Fluid: 1310 mL  Total IN: 1700 mL    OUT:    Stool (mL): 1 mL    Voided (mL): 2100 mL  Total OUT: 2101 mL    Total NET: -401 mL      PHYSICAL EXAM     General: Awake and alert, NAD  LLE: External fixator in place with ace wrap covering. Pin sites with clean, dry gauze dressing. Skin graft stable from previous exam, xeroform changed. Flap now debrided of discolored tissue. Now red, beefy with early stages of granulation. Wet to dry dressing changed. Distal extremity warm and well perfused. Absent sensation to touch on first three toes at baseline.     LABS                          9.9    6.40  )-----------( 480      ( 19 Oct 2021 07:58 )             29.9     19 Oct 2021 07:58    137    |  105    |  5      ----------------------------<  84     3.2     |  23     |  0.65     Ca    8.8        19 Oct 2021 07:58  Mg     1.5       19 Oct 2021 07:58    TPro  6.1    /  Alb  3.1    /  TBili  0.2    /  DBili  x      /  AST  16     /  ALT  13     /  AlkPhos  54     19 Oct 2021 07:58      MEDICATIONS  (STANDING):  acetaminophen   Tablet .. 975 milliGRAM(s) Oral every 6 hours  AQUAPHOR (petrolatum Ointment) 1 Application(s) Topical two times a day  calcium carbonate   1250 mG (OsCal) 1 Tablet(s) Oral daily  cefTRIAXone   IVPB 2000 milliGRAM(s) IV Intermittent every 24 hours  chlorhexidine 2% Cloths 1 Application(s) Topical <User Schedule>  cholecalciferol 1000 Unit(s) Oral daily  enoxaparin Injectable 40 milliGRAM(s) SubCutaneous every 24 hours  gabapentin 600 milliGRAM(s) Oral every 8 hours  melatonin 3 milliGRAM(s) Oral at bedtime  multivitamin 1 Tablet(s) Oral daily  nafcillin  IVPB 2 Gram(s) IV Intermittent every 4 hours  polyethylene glycol 3350 17 Gram(s) Oral two times a day  senna 2 Tablet(s) Oral at bedtime    MEDICATIONS  (PRN):  bisacodyl Suppository 10 milliGRAM(s) Rectal daily PRN Constipation  diphenhydrAMINE 25 milliGRAM(s) Oral every 4 hours PRN Rash and/or Itching  HYDROmorphone  Injectable 0.5 milliGRAM(s) IV Push every 3 hours PRN Severe Breakthrough Pain (>10)  metoclopramide Injectable 10 milliGRAM(s) IV Push every 6 hours PRN Nausea and/or Vomiting  ondansetron Injectable 4 milliGRAM(s) IV Push every 6 hours PRN Nausea and/or Vomiting  oxyCODONE    IR 5 milliGRAM(s) Oral every 4 hours PRN Moderate Pain (4 - 6)  oxyCODONE    IR 10 milliGRAM(s) Oral every 4 hours PRN Severe Pain (7 - 10)  sodium chloride 0.9% lock flush 10 milliLiter(s) IV Push every 1 hour PRN Pre/post blood products, medications, blood draw, and to maintain line patency

## 2021-10-20 NOTE — PROGRESS NOTE ADULT - ASSESSMENT
ASSESSMENT & PLAN  26F s/p L ankle recon with L gracilis free flap and STSG (9/20) and now s/p flap debridement and placement of ringed external fixator (10/18).    - Daily dressing changes - xeroform over skin graft, wet to dry over open areas of flap  - Keep left leg elevated when in bed  - Weight bearing activity as tolerated  - Pain control - multimodal  - Benadryl prn for pruritis  - Barrier ointment for dry skin on L buttocks, encourage position change regularly  - Regular diet  - Bowel regimen  - Weight bearing activity as tolerated  - PICC 9/21, ceftriaxone, nafcillin - ID following  - Weekly labs for inflammatory markers, per ID  - Lovenox/SCD on right leg  - IS  - Repeat flap debridement +/- ex fix adjustment by ortho tentatively planned for next week    Plastics Pager: [phone] ASSESSMENT & PLAN  26F s/p L ankle recon with L gracilis free flap and STSG (9/20) and now s/p flap debridement and placement of ringed external fixator (10/18).    - Daily dressing changes - xeroform over skin graft, wet to dry over open areas of flap  - Keep left leg elevated when in bed  - Weight bearing activity as tolerated, PT eval today  - Pain control - multimodal  - Benadryl prn for pruritis  - Barrier ointment for dry skin on L buttocks, encourage position change regularly  - Regular diet  - Bowel regimen  - PICC 9/21, ceftriaxone, nafcillin - ID following  - Weekly labs for inflammatory markers, per ID  - Lovenox/SCD on right leg  - IS  - Repeat flap debridement +/- ex fix adjustment by ortho tentatively planned for next week    Plastics Pager: [phone]

## 2021-10-20 NOTE — BRIEF OPERATIVE NOTE - ASSISTANT(S)
Keysha PGY4
Monet Cote MD, Otf Vogt MD
Keysha PGY4
none
zackary
Chapin Rosa
mitch velazquez
Jesus Wallis

## 2021-10-20 NOTE — PROGRESS NOTE ADULT - SUBJECTIVE AND OBJECTIVE BOX
Ortho Note    Pt comfortable without complaints, pain controlled  Denies CP, SOB, N/V, numbness/tingling     Vital Signs Last 24 Hrs  T(C): 37.3 (20 Oct 2021 05:09), Max: 37.3 (19 Oct 2021 17:32)  T(F): 99.1 (20 Oct 2021 05:09), Max: 99.2 (19 Oct 2021 17:32)  HR: 90 (20 Oct 2021 05:09) (90 - 100)  BP: 123/85 (20 Oct 2021 05:09) (110/72 - 123/85)  BP(mean): 98 (20 Oct 2021 05:09) (98 - 98)  RR: 18 (20 Oct 2021 05:09) (18 - 18)  SpO2: 96% (20 Oct 2021 05:09) (96% - 99%)    VSS  General: A&Ox3, NAD  LLE: Ringed external fixator in place w ace wrap; Pin site bolster dressings well appearing; Flap with Xeroform overlying portion and wet to dry dressing over open portion  Vascular: Toes WWP; Cap refill < 2 sec  Sensation: Patient with abnormal/absent sensation over most of the dorsum & plantar aspects of the foot consistent with baseline  Motor: Minimal FHL/FDL, 0/5 EHL/EDL activity consistent with baseline    Labs:                        9.9    6.40  )-----------( 480      ( 19 Oct 2021 07:58 )             29.9       10-19    137  |  105  |  5<L>  ----------------------------<  84  3.2<L>   |  23  |  0.65    Ca    8.8      19 Oct 2021 07:58  Mg     1.5     10-19    TPro  6.1  /  Alb  3.1<L>  /  TBili  0.2  /  DBili  x   /  AST  16  /  ALT  13  /  AlkPhos  54  10-19

## 2021-10-20 NOTE — PROGRESS NOTE ADULT - ATTENDING COMMENTS
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 She was placed in a ringed ex fix on monday 10.18 for temporizing measure with a OLIVA. Repeat cultures are showing multimicrobial colonization of the necrotic portion of her gracillis free flap. Her inflammatory markers overall have improved however until acceptable soft tissue coverage, conversion to definitive fixation vs fusion will be delayed. Okay to WB with ringed ex fix with rolling walker assistance with PT  - Plan for return to OR on Monday for revision/debridement vs closure with plastics  - Dr. Crocker from ID aware of patient - appreciate re-evaluation by ID  - Stable  - Pain/Nausea Control  - Home meds  - DVT ppx: per plastics team  - WBS: WBAT in ringed ex fix  - Flap care per plastics team  - Abx care per ID  - Plan for repeat flap debridement per plastics possibly next week +/- ortho involvement for ex fix adjustment as needed

## 2021-10-20 NOTE — BRIEF OPERATIVE NOTE - NSICDXBRIEFPREOP_GEN_ALL_CORE_FT
PRE-OP DIAGNOSIS:  Open wound of left ankle 09-Sep-2021 22:57:41  Chapin Wallis  
PRE-OP DIAGNOSIS:  Open wound of left ankle 09-Sep-2021 22:57:41  Chapin Wallis  Left trimalleolar fracture, sequela 09-Sep-2021 22:58:28  Chapin Wallis  
PRE-OP DIAGNOSIS:  Open wound of left ankle 09-Sep-2021 22:57:41  Chapin Wallis  
PRE-OP DIAGNOSIS:  Open wound of left ankle 09-Sep-2021 22:57:41  Chapin Wallis  
PRE-OP DIAGNOSIS:  Open wound of left ankle 09-Sep-2021 22:57:41  Chapin Wallis  Ankle syndesmosis disruption 09-Sep-2021 22:57:53  Chapin Wallis  
PRE-OP DIAGNOSIS:  Open wound of left ankle 09-Sep-2021 22:57:41  Chapin Wallis  Left trimalleolar fracture, sequela 09-Sep-2021 22:58:28  Chaipn Wallis  
PRE-OP DIAGNOSIS:  Neuritis of left lower extremity 09-Sep-2021 22:57:17  Chapin Wallis  Open wound of left ankle 09-Sep-2021 22:57:41  Chapin Wallis  Ankle syndesmosis disruption 09-Sep-2021 22:57:53  Chapin Wallis  Left trimalleolar fracture, sequela 09-Sep-2021 22:58:28  Chapin Wallis  Tear of deltoid ligament of left ankle 09-Sep-2021 22:58:55  Chapin Wallis  
PRE-OP DIAGNOSIS:  Open wound of left ankle 09-Sep-2021 22:57:41  Chapin Wallis  Ankle syndesmosis disruption 09-Sep-2021 22:57:53  Chapin Wallis  Left trimalleolar fracture, sequela 09-Sep-2021 22:58:28  Chapin Wallis  Tear of deltoid ligament of left ankle 09-Sep-2021 22:58:55  Chapin Wallis

## 2021-10-21 LAB
-  AMPICILLIN: SIGNIFICANT CHANGE UP
-  CEFAZOLIN: SIGNIFICANT CHANGE UP
-  CEFTAZIDIME: SIGNIFICANT CHANGE UP
-  CLINDAMYCIN: SIGNIFICANT CHANGE UP
-  DAPTOMYCIN: SIGNIFICANT CHANGE UP
-  ERYTHROMYCIN: SIGNIFICANT CHANGE UP
-  LEVOFLOXACIN: SIGNIFICANT CHANGE UP
-  LINEZOLID: SIGNIFICANT CHANGE UP
-  OXACILLIN: SIGNIFICANT CHANGE UP
-  RIFAMPIN: SIGNIFICANT CHANGE UP
-  TRIMETHOPRIM/SULFAMETHOXAZOLE: SIGNIFICANT CHANGE UP
-  TRIMETHOPRIM/SULFAMETHOXAZOLE: SIGNIFICANT CHANGE UP
-  VANCOMYCIN: SIGNIFICANT CHANGE UP
CULTURE RESULTS: SIGNIFICANT CHANGE UP
METHOD TYPE: SIGNIFICANT CHANGE UP
ORGANISM # SPEC MICROSCOPIC CNT: SIGNIFICANT CHANGE UP
SPECIMEN SOURCE: SIGNIFICANT CHANGE UP

## 2021-10-21 PROCEDURE — 99232 SBSQ HOSP IP/OBS MODERATE 35: CPT

## 2021-10-21 PROCEDURE — 93010 ELECTROCARDIOGRAM REPORT: CPT

## 2021-10-21 RX ORDER — FLUCONAZOLE 150 MG/1
400 TABLET ORAL EVERY 24 HOURS
Refills: 0 | Status: DISCONTINUED | OUTPATIENT
Start: 2021-10-21 | End: 2021-10-25

## 2021-10-21 RX ORDER — OXYCODONE HYDROCHLORIDE 5 MG/1
10 TABLET ORAL EVERY 4 HOURS
Refills: 0 | Status: DISCONTINUED | OUTPATIENT
Start: 2021-10-21 | End: 2021-10-25

## 2021-10-21 RX ORDER — DAPTOMYCIN 500 MG/10ML
500 INJECTION, POWDER, LYOPHILIZED, FOR SOLUTION INTRAVENOUS EVERY 24 HOURS
Refills: 0 | Status: DISCONTINUED | OUTPATIENT
Start: 2021-10-21 | End: 2021-10-25

## 2021-10-21 RX ORDER — OXYCODONE HYDROCHLORIDE 5 MG/1
5 TABLET ORAL EVERY 4 HOURS
Refills: 0 | Status: DISCONTINUED | OUTPATIENT
Start: 2021-10-21 | End: 2021-10-25

## 2021-10-21 RX ADMIN — DAPTOMYCIN 120 MILLIGRAM(S): 500 INJECTION, POWDER, LYOPHILIZED, FOR SOLUTION INTRAVENOUS at 18:41

## 2021-10-21 RX ADMIN — HYDROMORPHONE HYDROCHLORIDE 0.5 MILLIGRAM(S): 2 INJECTION INTRAMUSCULAR; INTRAVENOUS; SUBCUTANEOUS at 15:51

## 2021-10-21 RX ADMIN — Medication 975 MILLIGRAM(S): at 06:30

## 2021-10-21 RX ADMIN — NAFCILLIN 100 GRAM(S): 10 INJECTION, POWDER, FOR SOLUTION INTRAVENOUS at 11:05

## 2021-10-21 RX ADMIN — HYDROMORPHONE HYDROCHLORIDE 0.5 MILLIGRAM(S): 2 INJECTION INTRAMUSCULAR; INTRAVENOUS; SUBCUTANEOUS at 11:21

## 2021-10-21 RX ADMIN — NAFCILLIN 100 GRAM(S): 10 INJECTION, POWDER, FOR SOLUTION INTRAVENOUS at 07:10

## 2021-10-21 RX ADMIN — HYDROMORPHONE HYDROCHLORIDE 0.5 MILLIGRAM(S): 2 INJECTION INTRAMUSCULAR; INTRAVENOUS; SUBCUTANEOUS at 03:47

## 2021-10-21 RX ADMIN — Medication 975 MILLIGRAM(S): at 05:52

## 2021-10-21 RX ADMIN — FLUCONAZOLE 400 MILLIGRAM(S): 150 TABLET ORAL at 18:17

## 2021-10-21 RX ADMIN — Medication 975 MILLIGRAM(S): at 17:59

## 2021-10-21 RX ADMIN — NAFCILLIN 100 GRAM(S): 10 INJECTION, POWDER, FOR SOLUTION INTRAVENOUS at 15:51

## 2021-10-21 RX ADMIN — HYDROMORPHONE HYDROCHLORIDE 0.5 MILLIGRAM(S): 2 INJECTION INTRAMUSCULAR; INTRAVENOUS; SUBCUTANEOUS at 21:54

## 2021-10-21 RX ADMIN — OXYCODONE HYDROCHLORIDE 10 MILLIGRAM(S): 5 TABLET ORAL at 23:04

## 2021-10-21 RX ADMIN — Medication 1 APPLICATION(S): at 18:07

## 2021-10-21 RX ADMIN — GABAPENTIN 600 MILLIGRAM(S): 400 CAPSULE ORAL at 23:08

## 2021-10-21 RX ADMIN — HYDROMORPHONE HYDROCHLORIDE 0.5 MILLIGRAM(S): 2 INJECTION INTRAMUSCULAR; INTRAVENOUS; SUBCUTANEOUS at 20:48

## 2021-10-21 RX ADMIN — GABAPENTIN 600 MILLIGRAM(S): 400 CAPSULE ORAL at 13:32

## 2021-10-21 RX ADMIN — HYDROMORPHONE HYDROCHLORIDE 0.5 MILLIGRAM(S): 2 INJECTION INTRAMUSCULAR; INTRAVENOUS; SUBCUTANEOUS at 16:06

## 2021-10-21 RX ADMIN — GABAPENTIN 600 MILLIGRAM(S): 400 CAPSULE ORAL at 05:49

## 2021-10-21 RX ADMIN — Medication 1 APPLICATION(S): at 05:48

## 2021-10-21 RX ADMIN — Medication 975 MILLIGRAM(S): at 10:09

## 2021-10-21 RX ADMIN — CEFTRIAXONE 100 MILLIGRAM(S): 500 INJECTION, POWDER, FOR SOLUTION INTRAMUSCULAR; INTRAVENOUS at 00:26

## 2021-10-21 RX ADMIN — Medication 975 MILLIGRAM(S): at 18:29

## 2021-10-21 RX ADMIN — HYDROMORPHONE HYDROCHLORIDE 0.5 MILLIGRAM(S): 2 INJECTION INTRAMUSCULAR; INTRAVENOUS; SUBCUTANEOUS at 11:06

## 2021-10-21 RX ADMIN — OXYCODONE HYDROCHLORIDE 10 MILLIGRAM(S): 5 TABLET ORAL at 23:57

## 2021-10-21 RX ADMIN — OXYCODONE HYDROCHLORIDE 10 MILLIGRAM(S): 5 TABLET ORAL at 09:05

## 2021-10-21 RX ADMIN — OXYCODONE HYDROCHLORIDE 10 MILLIGRAM(S): 5 TABLET ORAL at 14:02

## 2021-10-21 RX ADMIN — Medication 3 MILLIGRAM(S): at 23:08

## 2021-10-21 RX ADMIN — Medication 975 MILLIGRAM(S): at 10:39

## 2021-10-21 RX ADMIN — HYDROMORPHONE HYDROCHLORIDE 0.5 MILLIGRAM(S): 2 INJECTION INTRAMUSCULAR; INTRAVENOUS; SUBCUTANEOUS at 00:26

## 2021-10-21 RX ADMIN — Medication 1 TABLET(S): at 11:05

## 2021-10-21 RX ADMIN — OXYCODONE HYDROCHLORIDE 10 MILLIGRAM(S): 5 TABLET ORAL at 09:35

## 2021-10-21 RX ADMIN — NAFCILLIN 100 GRAM(S): 10 INJECTION, POWDER, FOR SOLUTION INTRAVENOUS at 03:35

## 2021-10-21 RX ADMIN — CHLORHEXIDINE GLUCONATE 1 APPLICATION(S): 213 SOLUTION TOPICAL at 05:53

## 2021-10-21 RX ADMIN — SENNA PLUS 2 TABLET(S): 8.6 TABLET ORAL at 23:08

## 2021-10-21 RX ADMIN — ENOXAPARIN SODIUM 40 MILLIGRAM(S): 100 INJECTION SUBCUTANEOUS at 00:26

## 2021-10-21 RX ADMIN — HYDROMORPHONE HYDROCHLORIDE 0.5 MILLIGRAM(S): 2 INJECTION INTRAMUSCULAR; INTRAVENOUS; SUBCUTANEOUS at 00:53

## 2021-10-21 RX ADMIN — OXYCODONE HYDROCHLORIDE 10 MILLIGRAM(S): 5 TABLET ORAL at 13:32

## 2021-10-21 RX ADMIN — HYDROMORPHONE HYDROCHLORIDE 0.5 MILLIGRAM(S): 2 INJECTION INTRAMUSCULAR; INTRAVENOUS; SUBCUTANEOUS at 04:14

## 2021-10-21 RX ADMIN — Medication 1000 UNIT(S): at 11:05

## 2021-10-21 NOTE — PROGRESS NOTE ADULT - ASSESSMENT
A/P: 26yFemale s/p repeat ankle I&D by Dr. Wallis on 09/17 w L gracilis free flap and STSG on 09/20 now s/p I&D of flap by plastics team and placement of ringed external fixator by ortho 10/18  - VS Stable  - Pain/Nausea Control  - Home meds  - DVT ppx: per plastics team  - WBS: WBAT in ringed ex fix w RW assistance  - Continued operative flap care mgmt per plastics team  - 10/18 OR cultures now showing polymicrobial infection versus colonization -- ID recommending Abx coverage switch to address Enterococcus and Candida w Daptomycin and Fluconazole as of 10/21 -- will continue to monitor, continued Abx stewardship appreciated  - Plan for repeat flap debridement per plastics next week +/- ortho involvement for ex fix adjustment as needed  - Definitive surgical plan for bony stabilization pending clinical course re: resolution of infection and appropriate soft tissue coverage      Ortho Pager [phone]

## 2021-10-21 NOTE — PROGRESS NOTE ADULT - SUBJECTIVE AND OBJECTIVE BOX
INTERVAL HPI/OVERNIGHT EVENTS:    Patient was seen and examined at bedside.  Events noted.  Patient went to OR on 10/18.  Had debridement of gracilis flap and placement of external fixator device.  Has been on Nafcillin and Ceftriaxone for > 1 month.    CONSTITUTIONAL:  Negative fever or chills, feels well, good appetite  EYES:  Negative  blurry vision or double vision  CARDIOVASCULAR:  Negative for chest pain or palpitations  RESPIRATORY:  Negative for cough, wheezing, or SOB   GASTROINTESTINAL:  Negative for nausea, vomiting, diarrhea, constipation, or abdominal pain  GENITOURINARY:  Negative frequency, urgency or dysuria  NEUROLOGIC:  No headache, confusion, dizziness, lightheadedness      ANTIBIOTICS/RELEVANT:    MEDICATIONS  (STANDING):  acetaminophen   Tablet .. 975 milliGRAM(s) Oral every 6 hours  AQUAPHOR (petrolatum Ointment) 1 Application(s) Topical two times a day  calcium carbonate   1250 mG (OsCal) 1 Tablet(s) Oral daily  cefTRIAXone   IVPB 2000 milliGRAM(s) IV Intermittent every 24 hours  chlorhexidine 2% Cloths 1 Application(s) Topical <User Schedule>  cholecalciferol 1000 Unit(s) Oral daily  enoxaparin Injectable 40 milliGRAM(s) SubCutaneous every 24 hours  gabapentin 600 milliGRAM(s) Oral every 8 hours  melatonin 3 milliGRAM(s) Oral at bedtime  multivitamin 1 Tablet(s) Oral daily  nafcillin  IVPB 2 Gram(s) IV Intermittent every 4 hours  polyethylene glycol 3350 17 Gram(s) Oral two times a day  senna 2 Tablet(s) Oral at bedtime    MEDICATIONS  (PRN):  bisacodyl Suppository 10 milliGRAM(s) Rectal daily PRN Constipation  diphenhydrAMINE 25 milliGRAM(s) Oral every 4 hours PRN Rash and/or Itching  HYDROmorphone  Injectable 0.5 milliGRAM(s) IV Push every 3 hours PRN Severe Breakthrough Pain (>10)  metoclopramide Injectable 10 milliGRAM(s) IV Push every 6 hours PRN Nausea and/or Vomiting  ondansetron Injectable 4 milliGRAM(s) IV Push every 6 hours PRN Nausea and/or Vomiting  oxyCODONE    IR 10 milliGRAM(s) Oral every 4 hours PRN Severe Pain (7 - 10)  oxyCODONE    IR 5 milliGRAM(s) Oral every 4 hours PRN Moderate Pain (4 - 6)  sodium chloride 0.9% lock flush 10 milliLiter(s) IV Push every 1 hour PRN Pre/post blood products, medications, blood draw, and to maintain line patency        Vital Signs Last 24 Hrs  T(C): 36.3 (21 Oct 2021 12:20), Max: 37.3 (20 Oct 2021 16:48)  T(F): 97.3 (21 Oct 2021 12:20), Max: 99.2 (20 Oct 2021 16:48)  HR: 77 (21 Oct 2021 12:20) (77 - 98)  BP: 112/78 (21 Oct 2021 12:20) (102/71 - 126/79)  BP(mean): --  RR: 18 (21 Oct 2021 12:20) (17 - 18)  SpO2: 97% (21 Oct 2021 12:20) (95% - 97%)    PHYSICAL EXAM:  Constitutional:  non-toxic, no distress  Eyes:  no icterus   Ear/Nose/Throat: no oral lesion, no sinus tenderness on percussion	  Neck:  supple  Respiratory: CTA rhett  Cardiovascular: S1S2 RRR, no murmurs  Gastrointestinal:soft, (+) BS, no HSM  Extremities: external fixator device intact on left foot   Vascular: DP Pulse:	right normal; left normal      LABS:                MICROBIOLOGY:    Culture - Surgical Swab (10.18.21 @ 14:55)    -  Vancomycin: S 2    -  Vancomycin: S 2    -  RIF- Rifampin: R >2 Should not be used as monotherapy    Gram Stain:   No organisms seen  Rare WBC's    -  Ampicillin: S <=2 Predicts results to ampicillin/sulbactam, amoxacillin-clavulanate and  piperacillin-tazobactam.    -  Cefazolin: R <=4    -  Ceftazidime: R >16    -  Erythromycin: R >4    -  Clindamycin: R >4    -  Levofloxacin: R >4    -  Linezolid: S 1    -  Oxacillin: R >2    -  Trimethoprim/Sulfamethoxazole: S <=0.5/9.5    -  Trimethoprim/Sulfamethoxazole: S <=0.5/9.5    Specimen Source: .Surgical Swab Left ankle joint #2    Culture Results:   Rare Staphylococcus epidermidis  Rare Enterococcus faecalis  Rare Stenotrophomonas maltophilia  Rare Candida albicans  Pending further antibiotic susceptibility testing  at [facility]    Organism Identification: Staphylococcus epidermidis  Enterococcus faecalis  Stenotrophomonas maltophilia    Organism: Staphylococcus epidermidis    Organism: Enterococcus faecalis    Organism: Stenotrophomonas maltophilia    Method Type: BASIL    Method Type: BASIL    Method Type: BASIL        RADIOLOGY & ADDITIONAL STUDIES:

## 2021-10-21 NOTE — PROGRESS NOTE ADULT - SUBJECTIVE AND OBJECTIVE BOX
Ortho Note    Pt comfortable without complaints, pain controlled and continuing to improve  Patient ambulated several steps with PT yesterday  Denies CP, SOB, N/V, numbness/tingling   Patient cultures now showing possible polymicrobial infection versus colonization of operative site    Vital Signs Last 24 Hrs  T(C): 37.5 (21 Oct 2021 16:51), Max: 37.5 (21 Oct 2021 16:51)  T(F): 99.5 (21 Oct 2021 16:51), Max: 99.5 (21 Oct 2021 16:51)  HR: 113 (21 Oct 2021 16:51) (77 - 113)  BP: 113/79 (21 Oct 2021 16:51) (103/71 - 126/79)  BP(mean): --  RR: 19 (21 Oct 2021 16:51) (17 - 19)  SpO2: 97% (21 Oct 2021 16:51) (95% - 97%)    VSS  General: A&Ox3, NAD  LLE: Ringed external fixator in place w ace wrap; Pin site bolster dressings well appearing; Flap with Xeroform overlying portion and wet to dry dressing over open portion  Vascular: Toes WWP; Cap refill < 2 sec  Sensation: Patient with abnormal/absent sensation over most of the dorsum & plantar aspects of the foot consistent with baseline  Motor: Minimal FHL/FDL, 0/5 EHL/EDL activity consistent with baseline

## 2021-10-21 NOTE — PROGRESS NOTE ADULT - ATTENDING COMMENTS
I spoke at length with Dr. Crocker regarding postive multimicrobial colonization of flap vs direct communication with ankle joint. Plan to adjust antibiotics for coverage.  Patient understands change in clinical course  Ortho available for ex fix adjustment to allow access for plastics revision sx  Continue abx per ID  okay to WB in ringed ex fix  Recommend Repletion of magnesium and potassium  Consider medical co-management service  Follow weekly inflammatory markers  Plan for conversion to definitive fixation or fusion pending on soft tissue coverage and clinical improvement with clearing of infection

## 2021-10-21 NOTE — PROGRESS NOTE ADULT - SUBJECTIVE AND OBJECTIVE BOX
SUBJECTIVE:  No overnight events. Was able to ambulate with PT yesterday. Good mood.     OBJECTIVE:     VITAL SIGNS / I&O's    Vital Signs Last 24 Hrs  T(C): 37.5 (21 Oct 2021 16:51), Max: 37.5 (21 Oct 2021 16:51)  T(F): 99.5 (21 Oct 2021 16:51), Max: 99.5 (21 Oct 2021 16:51)  HR: 113 (21 Oct 2021 16:51) (77 - 113)  BP: 113/79 (21 Oct 2021 16:51) (103/71 - 126/79)  BP(mean): --  RR: 19 (21 Oct 2021 16:51) (17 - 19)  SpO2: 97% (21 Oct 2021 16:51) (95% - 97%)      20 Oct 2021 07:01  -  21 Oct 2021 07:00  --------------------------------------------------------  IN:    IV PiggyBack: 50 mL    IV PiggyBack: 250 mL    Oral Fluid: 1380 mL  Total IN: 1680 mL    OUT:    Stool (mL): 1 mL    Voided (mL): 2650 mL  Total OUT: 2651 mL    Total NET: -971 mL      21 Oct 2021 07:01  -  21 Oct 2021 17:49  --------------------------------------------------------  IN:    Oral Fluid: 560 mL  Total IN: 560 mL    OUT:    Voided (mL): 500 mL  Total OUT: 500 mL    Total NET: 60 mL      PHYSICAL EXAM     General: Awake and alert, NAD  LLE: External fixator in place with ace wrap covering. Pin sites with clean, dry gauze dressing. Skin graft stable, xeroform changed. Flap red, beefy with granulation and some sloughing. Wet to dry dressing changed. Distal extremity warm and well perfused. Absent sensation to touch on first three toes at baseline.       LABS  MICROBIOLOGY:    Culture - Surgical Swab (10.18.21 @ 14:55)    -  Vancomycin: S 2    -  Vancomycin: S 2    -  RIF- Rifampin: R >2 Should not be used as monotherapy    Gram Stain:   No organisms seen  Rare WBC's    -  Ampicillin: S <=2 Predicts results to ampicillin/sulbactam, amoxacillin-clavulanate and  piperacillin-tazobactam.    -  Cefazolin: R <=4    -  Ceftazidime: R >16    -  Erythromycin: R >4    -  Clindamycin: R >4    -  Levofloxacin: R >4    -  Linezolid: S 1    -  Oxacillin: R >2    -  Trimethoprim/Sulfamethoxazole: S <=0.5/9.5    -  Trimethoprim/Sulfamethoxazole: S <=0.5/9.5    Specimen Source: .Surgical Swab Left ankle joint #2    Culture Results:   Rare Staphylococcus epidermidis  Rare Enterococcus faecalis  Rare Stenotrophomonas maltophilia  Rare Candida albicans  Pending further antibiotic susceptibility testing  at [facility]    Organism Identification: Staphylococcus epidermidis  Enterococcus faecalis  Stenotrophomonas maltophilia    Organism: Staphylococcus epidermidis    Organism: Enterococcus faecalis    Organism: Stenotrophomonas maltophilia    Method Type: BASIL    Method Type: BASIL    Method Type: BASIL      MEDICATIONS  (STANDING):  acetaminophen   Tablet .. 975 milliGRAM(s) Oral every 6 hours  AQUAPHOR (petrolatum Ointment) 1 Application(s) Topical two times a day  calcium carbonate   1250 mG (OsCal) 1 Tablet(s) Oral daily  chlorhexidine 2% Cloths 1 Application(s) Topical <User Schedule>  cholecalciferol 1000 Unit(s) Oral daily  DAPTOmycin IVPB 500 milliGRAM(s) IV Intermittent every 24 hours  enoxaparin Injectable 40 milliGRAM(s) SubCutaneous every 24 hours  fluconAZOLE   Tablet 400 milliGRAM(s) Oral every 24 hours  gabapentin 600 milliGRAM(s) Oral every 8 hours  melatonin 3 milliGRAM(s) Oral at bedtime  multivitamin 1 Tablet(s) Oral daily  polyethylene glycol 3350 17 Gram(s) Oral two times a day  senna 2 Tablet(s) Oral at bedtime    MEDICATIONS  (PRN):  bisacodyl Suppository 10 milliGRAM(s) Rectal daily PRN Constipation  diphenhydrAMINE 25 milliGRAM(s) Oral every 4 hours PRN Rash and/or Itching  HYDROmorphone  Injectable 0.5 milliGRAM(s) IV Push every 3 hours PRN Severe Breakthrough Pain (>10)  metoclopramide Injectable 10 milliGRAM(s) IV Push every 6 hours PRN Nausea and/or Vomiting  ondansetron Injectable 4 milliGRAM(s) IV Push every 6 hours PRN Nausea and/or Vomiting  oxyCODONE    IR 5 milliGRAM(s) Oral every 4 hours PRN Moderate Pain (4 - 6)  oxyCODONE    IR 10 milliGRAM(s) Oral every 4 hours PRN Severe Pain (7 - 10)  sodium chloride 0.9% lock flush 10 milliLiter(s) IV Push every 1 hour PRN Pre/post blood products, medications, blood draw, and to maintain line patency

## 2021-10-21 NOTE — PROGRESS NOTE ADULT - ASSESSMENT
IMPRESSION:  26F h/o L ankle fracture with septic arthritis, postoperative compartment syndrome s/p RICK, I&D, bone debridement and ligament repair on 9/9, I&D with tissue debridement to bone on 9/12, repeat I&D, arthrotomy of ankle, L ankle reconstruction with L gracilis free flap and L thigh STSG, microvascular ansastomosis with recipient AT on 9/20.  OR culture 9/9 grew MSSA and S. anginosus.  9/20 OR culture grew MSSA.      She recently went back to the OR on 10/18 for debridement of left gracilis free flap.  Cultures are polymicrobial - unclear which are pathogens and which are colonizers.  No organisms seem on gram stain.  I would target the candida and Enterococcus given that they are in multiple cultures.  Coag negative staph could be a colonizer however it will be covered with gram positive therapy.  At this time will not treat stenotrophomonas as it is seen on only one culture, could be a colonizer given prolonged hospitalization, it is difficult to treat and there are no gram negative rods on gram stain.      Patient reports she had an infusion reaction to vancomycin (caused itching in her hands) but believes she may tolerate it    Recommend:  1.  Can stop Nafcillin and Ceftriaxone  2.  Start Daptomycin 500 mg IV daily --> will cover all gram positives in her culture  3.  Check CK daily  4.  Add fluconazole 400 mg PO daily to cover Candida albicans and C. parapsilosis  5.  Check EKG to monitor QTc    ID team 2 will follow

## 2021-10-21 NOTE — PROGRESS NOTE ADULT - ASSESSMENT
26F s/p L ankle recon with L gracilis free flap and STSG (9/20) and now s/p flap debridement and placement of ringed external fixator (10/18).    - Daily dressing changes - xeroform over skin graft, wet to dry over open areas of flap  - Keep left leg elevated when in bed  - Weight bearing activity as tolerated, PT   - Pain control - multimodal  - Benadryl prn for pruritis  - Barrier ointment for dry skin on L buttocks, encourage position change regularly  - Regular diet  - Bowel regimen  - Appreciate ID recs  - PICC 9/21, s/p ceftriaxone, nafcillin >1month  - Switching abx to Daptomycin and Fluconazole per ID 10/21-  - Weekly labs for inflammatory markers  - Daily CK  - EKG for QTc  - Lovenox/SCD on right leg  - IS  - Repeat flap debridement +/- ex fix adjustment by ortho tentatively planned for next week    Plastics Pager: [phone]

## 2021-10-22 LAB
CK SERPL-CCNC: 22 U/L — LOW (ref 25–170)
SARS-COV-2 RNA SPEC QL NAA+PROBE: SIGNIFICANT CHANGE UP

## 2021-10-22 PROCEDURE — 99232 SBSQ HOSP IP/OBS MODERATE 35: CPT

## 2021-10-22 PROCEDURE — 99232 SBSQ HOSP IP/OBS MODERATE 35: CPT | Mod: GC

## 2021-10-22 RX ORDER — MAGNESIUM OXIDE 400 MG ORAL TABLET 241.3 MG
400 TABLET ORAL ONCE
Refills: 0 | Status: COMPLETED | OUTPATIENT
Start: 2021-10-22 | End: 2021-10-22

## 2021-10-22 RX ORDER — POTASSIUM CHLORIDE 20 MEQ
20 PACKET (EA) ORAL
Refills: 0 | Status: COMPLETED | OUTPATIENT
Start: 2021-10-22 | End: 2021-10-22

## 2021-10-22 RX ADMIN — OXYCODONE HYDROCHLORIDE 10 MILLIGRAM(S): 5 TABLET ORAL at 23:18

## 2021-10-22 RX ADMIN — OXYCODONE HYDROCHLORIDE 10 MILLIGRAM(S): 5 TABLET ORAL at 18:22

## 2021-10-22 RX ADMIN — CHLORHEXIDINE GLUCONATE 1 APPLICATION(S): 213 SOLUTION TOPICAL at 06:14

## 2021-10-22 RX ADMIN — ENOXAPARIN SODIUM 40 MILLIGRAM(S): 100 INJECTION SUBCUTANEOUS at 00:22

## 2021-10-22 RX ADMIN — OXYCODONE HYDROCHLORIDE 10 MILLIGRAM(S): 5 TABLET ORAL at 14:37

## 2021-10-22 RX ADMIN — GABAPENTIN 600 MILLIGRAM(S): 400 CAPSULE ORAL at 06:13

## 2021-10-22 RX ADMIN — HYDROMORPHONE HYDROCHLORIDE 0.5 MILLIGRAM(S): 2 INJECTION INTRAMUSCULAR; INTRAVENOUS; SUBCUTANEOUS at 10:27

## 2021-10-22 RX ADMIN — HYDROMORPHONE HYDROCHLORIDE 0.5 MILLIGRAM(S): 2 INJECTION INTRAMUSCULAR; INTRAVENOUS; SUBCUTANEOUS at 22:14

## 2021-10-22 RX ADMIN — Medication 975 MILLIGRAM(S): at 01:42

## 2021-10-22 RX ADMIN — Medication 1000 UNIT(S): at 11:08

## 2021-10-22 RX ADMIN — HYDROMORPHONE HYDROCHLORIDE 0.5 MILLIGRAM(S): 2 INJECTION INTRAMUSCULAR; INTRAVENOUS; SUBCUTANEOUS at 04:30

## 2021-10-22 RX ADMIN — Medication 975 MILLIGRAM(S): at 17:40

## 2021-10-22 RX ADMIN — Medication 975 MILLIGRAM(S): at 11:39

## 2021-10-22 RX ADMIN — Medication 1 APPLICATION(S): at 18:22

## 2021-10-22 RX ADMIN — HYDROMORPHONE HYDROCHLORIDE 0.5 MILLIGRAM(S): 2 INJECTION INTRAMUSCULAR; INTRAVENOUS; SUBCUTANEOUS at 00:22

## 2021-10-22 RX ADMIN — Medication 3 MILLIGRAM(S): at 22:58

## 2021-10-22 RX ADMIN — HYDROMORPHONE HYDROCHLORIDE 0.5 MILLIGRAM(S): 2 INJECTION INTRAMUSCULAR; INTRAVENOUS; SUBCUTANEOUS at 21:40

## 2021-10-22 RX ADMIN — SENNA PLUS 2 TABLET(S): 8.6 TABLET ORAL at 22:58

## 2021-10-22 RX ADMIN — MAGNESIUM OXIDE 400 MG ORAL TABLET 400 MILLIGRAM(S): 241.3 TABLET ORAL at 11:08

## 2021-10-22 RX ADMIN — Medication 975 MILLIGRAM(S): at 17:10

## 2021-10-22 RX ADMIN — GABAPENTIN 600 MILLIGRAM(S): 400 CAPSULE ORAL at 13:12

## 2021-10-22 RX ADMIN — HYDROMORPHONE HYDROCHLORIDE 0.5 MILLIGRAM(S): 2 INJECTION INTRAMUSCULAR; INTRAVENOUS; SUBCUTANEOUS at 00:51

## 2021-10-22 RX ADMIN — HYDROMORPHONE HYDROCHLORIDE 0.5 MILLIGRAM(S): 2 INJECTION INTRAMUSCULAR; INTRAVENOUS; SUBCUTANEOUS at 10:12

## 2021-10-22 RX ADMIN — Medication 20 MILLIEQUIVALENT(S): at 15:32

## 2021-10-22 RX ADMIN — OXYCODONE HYDROCHLORIDE 10 MILLIGRAM(S): 5 TABLET ORAL at 18:52

## 2021-10-22 RX ADMIN — HYDROMORPHONE HYDROCHLORIDE 0.5 MILLIGRAM(S): 2 INJECTION INTRAMUSCULAR; INTRAVENOUS; SUBCUTANEOUS at 15:32

## 2021-10-22 RX ADMIN — Medication 975 MILLIGRAM(S): at 00:22

## 2021-10-22 RX ADMIN — HYDROMORPHONE HYDROCHLORIDE 0.5 MILLIGRAM(S): 2 INJECTION INTRAMUSCULAR; INTRAVENOUS; SUBCUTANEOUS at 15:47

## 2021-10-22 RX ADMIN — Medication 975 MILLIGRAM(S): at 06:13

## 2021-10-22 RX ADMIN — GABAPENTIN 600 MILLIGRAM(S): 400 CAPSULE ORAL at 22:58

## 2021-10-22 RX ADMIN — Medication 1 APPLICATION(S): at 06:14

## 2021-10-22 RX ADMIN — Medication 1 TABLET(S): at 11:09

## 2021-10-22 RX ADMIN — Medication 975 MILLIGRAM(S): at 11:09

## 2021-10-22 RX ADMIN — OXYCODONE HYDROCHLORIDE 10 MILLIGRAM(S): 5 TABLET ORAL at 22:57

## 2021-10-22 RX ADMIN — Medication 975 MILLIGRAM(S): at 06:37

## 2021-10-22 RX ADMIN — OXYCODONE HYDROCHLORIDE 10 MILLIGRAM(S): 5 TABLET ORAL at 08:57

## 2021-10-22 RX ADMIN — FLUCONAZOLE 400 MILLIGRAM(S): 150 TABLET ORAL at 17:09

## 2021-10-22 RX ADMIN — OXYCODONE HYDROCHLORIDE 10 MILLIGRAM(S): 5 TABLET ORAL at 14:07

## 2021-10-22 RX ADMIN — Medication 20 MILLIEQUIVALENT(S): at 11:08

## 2021-10-22 RX ADMIN — Medication 1 TABLET(S): at 11:08

## 2021-10-22 RX ADMIN — OXYCODONE HYDROCHLORIDE 10 MILLIGRAM(S): 5 TABLET ORAL at 08:27

## 2021-10-22 RX ADMIN — DAPTOMYCIN 120 MILLIGRAM(S): 500 INJECTION, POWDER, LYOPHILIZED, FOR SOLUTION INTRAVENOUS at 18:22

## 2021-10-22 RX ADMIN — Medication 20 MILLIEQUIVALENT(S): at 13:12

## 2021-10-22 RX ADMIN — HYDROMORPHONE HYDROCHLORIDE 0.5 MILLIGRAM(S): 2 INJECTION INTRAMUSCULAR; INTRAVENOUS; SUBCUTANEOUS at 04:14

## 2021-10-22 RX ADMIN — ONDANSETRON 4 MILLIGRAM(S): 8 TABLET, FILM COATED ORAL at 17:05

## 2021-10-22 NOTE — PROGRESS NOTE ADULT - SUBJECTIVE AND OBJECTIVE BOX
INTERVAL HPI/OVERNIGHT EVENTS:    Patient was seen and examined at bedside.  Tolerating current antibiotics well. No myalgias    CONSTITUTIONAL:  Negative fever or chills, feels well, good appetite  EYES:  Negative  blurry vision or double vision  CARDIOVASCULAR:  Negative for chest pain or palpitations  RESPIRATORY:  Negative for cough, wheezing, or SOB   GASTROINTESTINAL:  Negative for nausea, vomiting, diarrhea, constipation, or abdominal pain  GENITOURINARY:  Negative frequency, urgency or dysuria  NEUROLOGIC:  No headache, confusion, dizziness, lightheadedness      ANTIBIOTICS/RELEVANT:    MEDICATIONS  (STANDING):  acetaminophen   Tablet .. 975 milliGRAM(s) Oral every 6 hours  AQUAPHOR (petrolatum Ointment) 1 Application(s) Topical two times a day  calcium carbonate   1250 mG (OsCal) 1 Tablet(s) Oral daily  chlorhexidine 2% Cloths 1 Application(s) Topical <User Schedule>  cholecalciferol 1000 Unit(s) Oral daily  DAPTOmycin IVPB 500 milliGRAM(s) IV Intermittent every 24 hours  enoxaparin Injectable 40 milliGRAM(s) SubCutaneous every 24 hours  fluconAZOLE   Tablet 400 milliGRAM(s) Oral every 24 hours  gabapentin 600 milliGRAM(s) Oral every 8 hours  melatonin 3 milliGRAM(s) Oral at bedtime  multivitamin 1 Tablet(s) Oral daily  polyethylene glycol 3350 17 Gram(s) Oral two times a day  potassium chloride    Tablet ER 20 milliEquivalent(s) Oral every 2 hours  senna 2 Tablet(s) Oral at bedtime    MEDICATIONS  (PRN):  bisacodyl Suppository 10 milliGRAM(s) Rectal daily PRN Constipation  diphenhydrAMINE 25 milliGRAM(s) Oral every 4 hours PRN Rash and/or Itching  HYDROmorphone  Injectable 0.5 milliGRAM(s) IV Push every 3 hours PRN Severe Breakthrough Pain (>10)  metoclopramide Injectable 10 milliGRAM(s) IV Push every 6 hours PRN Nausea and/or Vomiting  ondansetron Injectable 4 milliGRAM(s) IV Push every 6 hours PRN Nausea and/or Vomiting  oxyCODONE    IR 5 milliGRAM(s) Oral every 4 hours PRN Moderate Pain (4 - 6)  oxyCODONE    IR 10 milliGRAM(s) Oral every 4 hours PRN Severe Pain (7 - 10)  sodium chloride 0.9% lock flush 10 milliLiter(s) IV Push every 1 hour PRN Pre/post blood products, medications, blood draw, and to maintain line patency        Vital Signs Last 24 Hrs  T(C): 36.7 (22 Oct 2021 08:25), Max: 37.5 (21 Oct 2021 16:51)  T(F): 98.1 (22 Oct 2021 08:25), Max: 99.5 (21 Oct 2021 16:51)  HR: 84 (22 Oct 2021 08:25) (84 - 113)  BP: 106/71 (22 Oct 2021 08:25) (101/63 - 113/79)  BP(mean): 76 (22 Oct 2021 05:00) (76 - 76)  RR: 17 (22 Oct 2021 08:25) (17 - 19)  SpO2: 95% (22 Oct 2021 08:25) (95% - 97%)    PHYSICAL EXAM:  Constitutional:  non-toxic, no distress  Eyes:  no icterus   Ear/Nose/Throat: no oral lesion   Neck  supple  Respiratory: CTA rhett  Cardiovascular: S1S2 RRR, no murmurs  Gastrointestinal:soft, (+) BS, no HSM  Extremities:  external fixator device in place   Vascular: DP Pulse:	right normal; left normal      LABS:                MICROBIOLOGY:    Culture - Surgical Swab (10.18.21 @ 14:55)    -  Vancomycin: S 2    -  Vancomycin: S 2    -  Oxacillin: R >2    -  RIF- Rifampin: R >2 Should not be used as monotherapy    -  Trimethoprim/Sulfamethoxazole: S <=0.5/9.5    -  Trimethoprim/Sulfamethoxazole: S <=0.5/9.5    Gram Stain:   No organisms seen  Rare WBC's    -  Ampicillin: S <=2 Predicts results to ampicillin/sulbactam, amoxacillin-clavulanate and  piperacillin-tazobactam.    -  Cefazolin: R <=4    -  Ceftazidime: R >16    -  Clindamycin: R >4    -  Daptomycin: S <=0.25    -  Daptomycin: S 1    -  Erythromycin: R >4    -  Levofloxacin: R >4    -  Linezolid: S 1    Specimen Source: .Surgical Swab Left ankle joint #2    Culture Results:   Rare Staphylococcus epidermidis  Rare Enterococcus faecalis  Rare Stenotrophomonas maltophilia  Rare Candida albicans  Pending further antibiotic susceptibility testing  at [facility]    Organism Identification: Staphylococcus epidermidis  Enterococcus faecalis  Stenotrophomonas maltophilia    Organism: Staphylococcus epidermidis    Organism: Enterococcus faecalis    Organism: Stenotrophomonas maltophilia    Method Type: BASIL    Method Type: BASIL    Method Type: BASIL        RADIOLOGY & ADDITIONAL STUDIES:

## 2021-10-22 NOTE — PROGRESS NOTE ADULT - ASSESSMENT
NOTE IN PROGRESS    26F w prior L ankle ORIF in [facility] 03/2021 complicated by missed compartment syndrome p/w infection and non-union now s/p RICK, arthrotomy, neuroma excision, L ankle ligament repair, and I/D w bone debridement w Dr. Wallis 9/10, Repeat I&D and vac change 9/12, Repeat I&D w vac change, athrotomy 9/14 -- L ankle recon w L gracilis free flap and L thigh STSG w plastic surgery 9/21, surgical culture growing MSSA, strep anginosis - currently on IV Daptomycin and PO fluconazole. Application of ringed ex-fix LLE with debridement of gracilis flap by Plastics on 10/18. Medicine called for electrolyte abnormalities.    #L ankle OM, septic arthrosis, transected tibial nerve, transection of superficial peroneal nerve during index surgery in [facility] complicated by missed compartment syndrome. Currently on IV CTX 2g q24h and Nafcillin 2g q4h. BCx 9/7 NGTD. Surgical cultures from OR 9/9 w MSSA and strep anginosus. OR Cx 10/20 grew MSSA. To require at least 6wk course of abx. Currently deferring placement of hardware d/t evidence of osteomyelitis on gallium scan; ortho plan: patient is a candidate for ankle fusion using the ringed external fixator with compression technique. Application of ringed ex-fix LLE with debridement of gracilis flap by Plastics on 10/18.  - Plan for repeat flap debridement per plastics next week +/- ortho involvement for ex fix adjustment as needed  - ID following  - management per plastics team     # Anemia   Likely operative loss w element of dilution from IVF. Pt is asymptomatic. Normocytic.  - stable in 8-9 range.  - can check ferritin and iron with total binding capacity in AM     #hypokalemia  - 10/19: K 3.2  - Replete for K<4  - Would repeat BMP, Mg, Phos in AM  - Can check BMP, Mg, Phos in AM with routine blood work while inpatient  - Consider nutrition consult for diet optimization    #hypomagnesemia  - 10/19: Mg 1.5  - Replete for Mg<2  - Would repeat BMP, Mg, Phos in AM  - Consider nutrition consult for diet optimization    Pending discussion with Attending Physician Dr. Pollard. Recommendations are considered final after attending attestation.  NOTE IN PROGRESS    26F w prior L ankle ORIF in [facility] 03/2021 complicated by missed compartment syndrome p/w infection and non-union now s/p RICK, arthrotomy, neuroma excision, L ankle ligament repair, and I/D w bone debridement w Dr. Wallis 9/10, Repeat I&D and vac change 9/12, Repeat I&D w vac change, athrotomy 9/14 -- L ankle recon w L gracilis free flap and L thigh STSG w plastic surgery 9/21, surgical culture growing MSSA, strep anginosis - currently on IV Daptomycin and PO fluconazole. Application of ringed ex-fix LLE with debridement of gracilis flap by Plastics on 10/18. Medicine called for electrolyte abnormalities.    #L ankle OM, septic arthrosis, transected tibial nerve, transection of superficial peroneal nerve during index surgery in [facility] complicated by missed compartment syndrome. Currently on IV CTX 2g q24h and Nafcillin 2g q4h. BCx 9/7 NGTD. Surgical cultures from OR 9/9 w MSSA and strep anginosus. OR Cx 10/20 grew MSSA. To require at least 6wk course of abx. Currently deferring placement of hardware d/t evidence of osteomyelitis on gallium scan; ortho plan: patient is a candidate for ankle fusion using the ringed external fixator with compression technique. Application of ringed ex-fix LLE with debridement of gracilis flap by Plastics on 10/18.  - Plan for repeat flap debridement per plastics next week +/- ortho involvement for ex fix adjustment as needed  - ID following  - management per plastics team     # Anemia   Likely operative loss w element of dilution from IVF. Pt is asymptomatic. Normocytic.  - stable in 8-9 range.  - can check ferritin and iron with total binding capacity in AM     #hypokalemia  - 10/19: K 3.2  - Replete for K<4  - Would repeat BMP, Mg, Phos in AM  - Can check BMP, Mg, Phos in AM with routine blood work while inpatient  - Consider nutrition consult for diet optimization    #hypomagnesemia  - 10/19: Mg 1.5  - Replete for Mg<2  - Would repeat BMP, Mg, Phos in AM  - Consider nutrition consult for diet optimization    Discussed with Attending Physician Dr. Pollard. Recommendations are considered final after attending attestation.

## 2021-10-22 NOTE — PROGRESS NOTE ADULT - ASSESSMENT
IMPRESSION:  26F h/o L ankle fracture with septic arthritis, postoperative compartment syndrome s/p RICK, I&D, bone debridement and ligament repair on 9/9, I&D with tissue debridement to bone on 9/12, repeat I&D, arthrotomy of ankle, L ankle reconstruction with L gracilis free flap and L thigh STSG, microvascular ansastomosis with recipient AT on 9/20.  OR culture 9/9 grew MSSA and S. anginosus.  9/20 OR culture grew MSSA.      She recently went back to the OR on 10/18 for debridement of left gracilis free flap.  Cultures are polymicrobial - unclear which are pathogens and which are colonizers.  No organisms seem on gram stain.  I would target the candida and Enterococcus given that they are in multiple cultures.  Coag negative staph could be a colonizer however it will be covered with gram positive therapy.  At this time will not treat stenotrophomonas as it is seen on only one culture, could be a colonizer given prolonged hospitalization, it is difficult to treat and there are no gram negative rods on gram stain.  Gram negative rods also less likely to adhere to hardware.      Patient reports she had an infusion reaction to vancomycin (caused itching in her hands) but believes she may tolerate it    Recommend:  1.  Continue Daptomycin 500 mg IV daily --> will cover all gram positives in her culture  2.  Check CK daily  3.  Continue fluconazole 400 mg PO daily to cover Candida albicans and C. parapsilosis    ID team 2 will follow.  I will be covering the service this weekend.

## 2021-10-22 NOTE — PROGRESS NOTE ADULT - SUBJECTIVE AND OBJECTIVE BOX
Internal Medicine Progress Note    HPI:  26F w prior L ankle ORIF in [facility] 03/2021 complicated by missed compartment syndrome p/w infection and non-union now s/p RICK, arthrotomy, neuroma excision, L ankle ligament repair, and I/D w bone debridement w Dr. Wallis 9/10, Repeat I&D and vac change 9/12, Repeat I&D w vac change, athrotomy 9/14 -- L ankle recon w L gracilis free flap and L thigh STSG w plastic surgery 9/21, surgical culture growing MSSA, strep anginosis - currently on IV Daptomycin and PO fluconazole. Application of ringed ex-fix LLE with debridement of gracilis flap by Plastics on 10/18. Medicine called for electrolyte abnormalities.    SUBJECTIVE: Patient seen and examined at bedside. Pain in left lower leg after procedure, but controlled with pain medication. Denies fever, headache, nausea, vomiting, chest pain, shortness of breath, abdominal pain, changes in bowel movements or urination.     OBJECTIVE:    VITAL SIGNS:  ICU Vital Signs Last 24 Hrs  T(C): 36.8 (22 Oct 2021 12:54), Max: 37.5 (21 Oct 2021 16:51)  T(F): 98.2 (22 Oct 2021 12:54), Max: 99.5 (21 Oct 2021 16:51)  HR: 97 (22 Oct 2021 12:54) (84 - 113)  BP: 106/73 (22 Oct 2021 12:54) (101/63 - 113/79)  BP(mean): 76 (22 Oct 2021 05:00) (76 - 76)  ABP: --  ABP(mean): --  RR: 17 (22 Oct 2021 12:54) (17 - 19)  SpO2: 97% (22 Oct 2021 12:54) (95% - 97%)        10-21 @ 07:01  -  10-22 @ 07:00  --------------------------------------------------------  IN: 1050 mL / OUT: 1600 mL / NET: -550 mL    10-22 @ 07:01  -  10-22 @ 13:47  --------------------------------------------------------  IN: 560 mL / OUT: 0 mL / NET: 560 mL      CAPILLARY BLOOD GLUCOSE          PHYSICAL EXAM:  General: NAD, pleasantly conversant  HEENT: NC/AT; PERRL, clear conjunctiva  Neck: supple  Respiratory: CTA b/l  Cardiovascular: +S1/S2; RRR  Abdomen: soft, NT/ND; +BS x4  Extremities: WWP, 2+ peripheral pulses b/l; LLE post surgical in fixator. No RLE edema or swelling  Skin: normal color and turgor; no rash  Neurological: AAOx3        MEDICATIONS:  MEDICATIONS  (STANDING):  acetaminophen   Tablet .. 975 milliGRAM(s) Oral every 6 hours  AQUAPHOR (petrolatum Ointment) 1 Application(s) Topical two times a day  calcium carbonate   1250 mG (OsCal) 1 Tablet(s) Oral daily  chlorhexidine 2% Cloths 1 Application(s) Topical <User Schedule>  cholecalciferol 1000 Unit(s) Oral daily  DAPTOmycin IVPB 500 milliGRAM(s) IV Intermittent every 24 hours  enoxaparin Injectable 40 milliGRAM(s) SubCutaneous every 24 hours  fluconAZOLE   Tablet 400 milliGRAM(s) Oral every 24 hours  gabapentin 600 milliGRAM(s) Oral every 8 hours  melatonin 3 milliGRAM(s) Oral at bedtime  multivitamin 1 Tablet(s) Oral daily  polyethylene glycol 3350 17 Gram(s) Oral two times a day  potassium chloride    Tablet ER 20 milliEquivalent(s) Oral every 2 hours  senna 2 Tablet(s) Oral at bedtime    MEDICATIONS  (PRN):  bisacodyl Suppository 10 milliGRAM(s) Rectal daily PRN Constipation  diphenhydrAMINE 25 milliGRAM(s) Oral every 4 hours PRN Rash and/or Itching  HYDROmorphone  Injectable 0.5 milliGRAM(s) IV Push every 3 hours PRN Severe Breakthrough Pain (>10)  metoclopramide Injectable 10 milliGRAM(s) IV Push every 6 hours PRN Nausea and/or Vomiting  ondansetron Injectable 4 milliGRAM(s) IV Push every 6 hours PRN Nausea and/or Vomiting  oxyCODONE    IR 5 milliGRAM(s) Oral every 4 hours PRN Moderate Pain (4 - 6)  oxyCODONE    IR 10 milliGRAM(s) Oral every 4 hours PRN Severe Pain (7 - 10)  sodium chloride 0.9% lock flush 10 milliLiter(s) IV Push every 1 hour PRN Pre/post blood products, medications, blood draw, and to maintain line patency      ALLERGIES:  Allergies    No Known Allergies    Intolerances        LABS:                          RADIOLOGY & ADDITIONAL TESTS: Reviewed.   Lab Facility: [id number] Lab Facility: [id number]

## 2021-10-22 NOTE — PROGRESS NOTE ADULT - SUBJECTIVE AND OBJECTIVE BOX
SUBJECTIVE:  No overnight events. Pain in leg a little better. Ambulated again yesterday with PT. Voiding. Overall, doing well.    OBJECTIVE:     VITAL SIGNS / I&O's    Vital Signs Last 24 Hrs  T(C): 36.7 (22 Oct 2021 08:25), Max: 37.5 (21 Oct 2021 16:51)  T(F): 98.1 (22 Oct 2021 08:25), Max: 99.5 (21 Oct 2021 16:51)  HR: 84 (22 Oct 2021 08:25) (77 - 113)  BP: 106/71 (22 Oct 2021 08:25) (101/63 - 113/79)  BP(mean): 76 (22 Oct 2021 05:00) (76 - 76)  RR: 17 (22 Oct 2021 08:25) (17 - 19)  SpO2: 95% (22 Oct 2021 08:25) (95% - 97%)      21 Oct 2021 07:01  -  22 Oct 2021 07:00  --------------------------------------------------------  IN:    IV PiggyBack: 200 mL    IV PiggyBack: 50 mL    Oral Fluid: 800 mL  Total IN: 1050 mL    OUT:    Voided (mL): 1600 mL  Total OUT: 1600 mL    Total NET: -550 mL      PHYSICAL EXAM     General: Awake and alert, NAD  LLE: External fixator in place with ace wrap covering. Pin sites with clean, dry gauze dressing. Skin graft stable, xeroform changed. Flap red, beefy with granulation and some sloughing. Wet to dry dressing changed. Distal extremity warm and well perfused. Absent sensation to touch on first three toes at baseline.     LABS        CARDIAC MARKERS ( 22 Oct 2021 08:17 )  x     / x     / 22 U/L / x     / x          MEDICATIONS  (STANDING):  acetaminophen   Tablet .. 975 milliGRAM(s) Oral every 6 hours  AQUAPHOR (petrolatum Ointment) 1 Application(s) Topical two times a day  calcium carbonate   1250 mG (OsCal) 1 Tablet(s) Oral daily  chlorhexidine 2% Cloths 1 Application(s) Topical <User Schedule>  cholecalciferol 1000 Unit(s) Oral daily  DAPTOmycin IVPB 500 milliGRAM(s) IV Intermittent every 24 hours  enoxaparin Injectable 40 milliGRAM(s) SubCutaneous every 24 hours  fluconAZOLE   Tablet 400 milliGRAM(s) Oral every 24 hours  gabapentin 600 milliGRAM(s) Oral every 8 hours  melatonin 3 milliGRAM(s) Oral at bedtime  multivitamin 1 Tablet(s) Oral daily  polyethylene glycol 3350 17 Gram(s) Oral two times a day  senna 2 Tablet(s) Oral at bedtime    MEDICATIONS  (PRN):  bisacodyl Suppository 10 milliGRAM(s) Rectal daily PRN Constipation  diphenhydrAMINE 25 milliGRAM(s) Oral every 4 hours PRN Rash and/or Itching  HYDROmorphone  Injectable 0.5 milliGRAM(s) IV Push every 3 hours PRN Severe Breakthrough Pain (>10)  metoclopramide Injectable 10 milliGRAM(s) IV Push every 6 hours PRN Nausea and/or Vomiting  ondansetron Injectable 4 milliGRAM(s) IV Push every 6 hours PRN Nausea and/or Vomiting  oxyCODONE    IR 5 milliGRAM(s) Oral every 4 hours PRN Moderate Pain (4 - 6)  oxyCODONE    IR 10 milliGRAM(s) Oral every 4 hours PRN Severe Pain (7 - 10)  sodium chloride 0.9% lock flush 10 milliLiter(s) IV Push every 1 hour PRN Pre/post blood products, medications, blood draw, and to maintain line patency

## 2021-10-22 NOTE — PROGRESS NOTE ADULT - ATTENDING COMMENTS
Patient discussed with resident team and plan of care reviewed. I have personally reviewed all pertinent labs and imaging and performed an independent history and physical. Resident note personally reviewed, and I agree with above resident note with the following additions:    26YOF with history of L ankle ORIF with multiple surgical complications including nonunion, infections requiring multiple surgeries for I&D, reconstruction, and now with ex-fix of LLE.    Doing well, plan for removal of ex-fix early next week. Will work up anemia to evaluate for iron deficiency.

## 2021-10-22 NOTE — PROGRESS NOTE ADULT - ASSESSMENT
A/P: 26yFemale s/p repeat ankle I&D by Dr. Wallis on 09/17 w L gracilis free flap and STSG on 09/20 now s/p I&D of flap by plastics team and placement of ringed external fixator by ortho 10/18  - VS Stable  - Pain/Nausea Control  - Home meds  - DVT ppx: per plastics team  - WBS: WBAT in ringed ex fix w RW assistance  - Continued operative flap care mgmt per plastics team  - 10/18 OR cultures now showing polymicrobial infection versus colonization -- ID recommending Abx coverage switch to address Enterococcus and Candida w Daptomycin and Fluconazole as of 10/21 -- will continue to monitor, continued Abx stewardship appreciated  - Plan for repeat flap debridement per plastics next week +/- ortho involvement for ex fix adjustment as needed  - Definitive surgical plan for bony stabilization pending clinical course re: resolution of infection and appropriate soft tissue coverage  - Please continue to optimize electrolytes/nutrition in order to optimize soft tissue healing potential      Ortho Pager [phone]

## 2021-10-22 NOTE — PROGRESS NOTE ADULT - SUBJECTIVE AND OBJECTIVE BOX
Ortho Note    Pt comfortable without complaints, pain controlled and continuing to improve  Patient ambulated several steps further with PT yesterday  Denies CP, SOB, N/V, numbness/tingling     Vital Signs Last 24 Hrs  T(C): 36.7 (22 Oct 2021 08:25), Max: 37.5 (21 Oct 2021 16:51)  T(F): 98.1 (22 Oct 2021 08:25), Max: 99.5 (21 Oct 2021 16:51)  HR: 84 (22 Oct 2021 08:25) (77 - 113)  BP: 106/71 (22 Oct 2021 08:25) (101/63 - 113/79)  BP(mean): 76 (22 Oct 2021 05:00) (76 - 76)  RR: 17 (22 Oct 2021 08:25) (17 - 19)  SpO2: 95% (22 Oct 2021 08:25) (95% - 97%)    VSS  General: A&Ox3, NAD  LLE: Ringed external fixator in place w ace wrap; Pin site bolster dressings well appearing; Flap with Xeroform overlying portion and wet to dry dressing over open portion  Vascular: Toes WWP; Cap refill < 2 sec  Sensation: Patient with abnormal/absent sensation over most of the dorsum & plantar aspects of the foot consistent with baseline  Motor: Minimal FHL/FDL, 0/5 EHL/EDL activity consistent with baseline

## 2021-10-22 NOTE — PROGRESS NOTE ADULT - ASSESSMENT
26F s/p L ankle recon with L gracilis free flap and STSG (9/20) and now s/p flap debridement and placement of ringed external fixator (10/18).    - Daily dressing changes - xeroform over skin graft, wet to dry over open areas of flap  - Keep left leg elevated when in bed  - Weight bearing activity as tolerated, PT   - Pain control - multimodal  - Regular diet  - Bowel regimen  - Appreciate ID recs  - PICC 9/21, s/p ceftriaxone, nafcillin >1month  - Switching abx to Daptomycin and Fluconazole per ID 10/21-  - Weekly labs for inflammatory markers  - Daily CK  - EKG for QTc  - Lovenox/SCD on right leg  - IS  - Repeat flap debridement +/- ex fix adjustment by ortho tentatively planned for next week  - Medicine co-management consult  - Nutrition for low albumin    Plastics Pager: [phone]

## 2021-10-23 LAB
ALBUMIN SERPL ELPH-MCNC: 3.7 G/DL — SIGNIFICANT CHANGE UP (ref 3.3–5)
ALP SERPL-CCNC: 62 U/L — SIGNIFICANT CHANGE UP (ref 40–120)
ALT FLD-CCNC: 10 U/L — SIGNIFICANT CHANGE UP (ref 10–45)
ANION GAP SERPL CALC-SCNC: 12 MMOL/L — SIGNIFICANT CHANGE UP (ref 5–17)
AST SERPL-CCNC: 13 U/L — SIGNIFICANT CHANGE UP (ref 10–40)
BILIRUB SERPL-MCNC: 0.3 MG/DL — SIGNIFICANT CHANGE UP (ref 0.2–1.2)
BUN SERPL-MCNC: 9 MG/DL — SIGNIFICANT CHANGE UP (ref 7–23)
CALCIUM SERPL-MCNC: 9.9 MG/DL — SIGNIFICANT CHANGE UP (ref 8.4–10.5)
CHLORIDE SERPL-SCNC: 103 MMOL/L — SIGNIFICANT CHANGE UP (ref 96–108)
CK SERPL-CCNC: 24 U/L — LOW (ref 25–170)
CO2 SERPL-SCNC: 21 MMOL/L — LOW (ref 22–31)
CREAT SERPL-MCNC: 0.61 MG/DL — SIGNIFICANT CHANGE UP (ref 0.5–1.3)
FERRITIN SERPL-MCNC: 129 NG/ML — SIGNIFICANT CHANGE UP (ref 15–150)
GLUCOSE SERPL-MCNC: 79 MG/DL — SIGNIFICANT CHANGE UP (ref 70–99)
HCT VFR BLD CALC: 28.6 % — LOW (ref 34.5–45)
HGB BLD-MCNC: 9.5 G/DL — LOW (ref 11.5–15.5)
IRON SATN MFR SERPL: 17 % — SIGNIFICANT CHANGE UP (ref 14–50)
IRON SATN MFR SERPL: 33 UG/DL — SIGNIFICANT CHANGE UP (ref 30–160)
MAGNESIUM SERPL-MCNC: 1.8 MG/DL — SIGNIFICANT CHANGE UP (ref 1.6–2.6)
MCHC RBC-ENTMCNC: 29 PG — SIGNIFICANT CHANGE UP (ref 27–34)
MCHC RBC-ENTMCNC: 33.2 GM/DL — SIGNIFICANT CHANGE UP (ref 32–36)
MCV RBC AUTO: 87.2 FL — SIGNIFICANT CHANGE UP (ref 80–100)
NRBC # BLD: 0 /100 WBCS — SIGNIFICANT CHANGE UP (ref 0–0)
PHOSPHATE SERPL-MCNC: 5.1 MG/DL — HIGH (ref 2.5–4.5)
PLATELET # BLD AUTO: 433 K/UL — HIGH (ref 150–400)
POTASSIUM SERPL-MCNC: 3.9 MMOL/L — SIGNIFICANT CHANGE UP (ref 3.5–5.3)
POTASSIUM SERPL-SCNC: 3.9 MMOL/L — SIGNIFICANT CHANGE UP (ref 3.5–5.3)
PROT SERPL-MCNC: 7.4 G/DL — SIGNIFICANT CHANGE UP (ref 6–8.3)
RBC # BLD: 3.28 M/UL — LOW (ref 3.8–5.2)
RBC # FLD: 23.8 % — HIGH (ref 10.3–14.5)
SODIUM SERPL-SCNC: 136 MMOL/L — SIGNIFICANT CHANGE UP (ref 135–145)
TIBC SERPL-MCNC: 189 UG/DL — LOW (ref 220–430)
UIBC SERPL-MCNC: 156 UG/DL — SIGNIFICANT CHANGE UP (ref 110–370)
WBC # BLD: 7.69 K/UL — SIGNIFICANT CHANGE UP (ref 3.8–10.5)
WBC # FLD AUTO: 7.69 K/UL — SIGNIFICANT CHANGE UP (ref 3.8–10.5)

## 2021-10-23 PROCEDURE — 99232 SBSQ HOSP IP/OBS MODERATE 35: CPT

## 2021-10-23 RX ADMIN — ENOXAPARIN SODIUM 40 MILLIGRAM(S): 100 INJECTION SUBCUTANEOUS at 23:08

## 2021-10-23 RX ADMIN — OXYCODONE HYDROCHLORIDE 10 MILLIGRAM(S): 5 TABLET ORAL at 21:46

## 2021-10-23 RX ADMIN — GABAPENTIN 600 MILLIGRAM(S): 400 CAPSULE ORAL at 13:58

## 2021-10-23 RX ADMIN — Medication 1 APPLICATION(S): at 18:49

## 2021-10-23 RX ADMIN — Medication 975 MILLIGRAM(S): at 18:41

## 2021-10-23 RX ADMIN — Medication 1 TABLET(S): at 12:02

## 2021-10-23 RX ADMIN — HYDROMORPHONE HYDROCHLORIDE 0.5 MILLIGRAM(S): 2 INJECTION INTRAMUSCULAR; INTRAVENOUS; SUBCUTANEOUS at 23:41

## 2021-10-23 RX ADMIN — Medication 975 MILLIGRAM(S): at 00:34

## 2021-10-23 RX ADMIN — HYDROMORPHONE HYDROCHLORIDE 0.5 MILLIGRAM(S): 2 INJECTION INTRAMUSCULAR; INTRAVENOUS; SUBCUTANEOUS at 23:04

## 2021-10-23 RX ADMIN — SENNA PLUS 2 TABLET(S): 8.6 TABLET ORAL at 22:15

## 2021-10-23 RX ADMIN — GABAPENTIN 600 MILLIGRAM(S): 400 CAPSULE ORAL at 22:14

## 2021-10-23 RX ADMIN — HYDROMORPHONE HYDROCHLORIDE 0.5 MILLIGRAM(S): 2 INJECTION INTRAMUSCULAR; INTRAVENOUS; SUBCUTANEOUS at 16:21

## 2021-10-23 RX ADMIN — Medication 975 MILLIGRAM(S): at 12:03

## 2021-10-23 RX ADMIN — HYDROMORPHONE HYDROCHLORIDE 0.5 MILLIGRAM(S): 2 INJECTION INTRAMUSCULAR; INTRAVENOUS; SUBCUTANEOUS at 08:36

## 2021-10-23 RX ADMIN — Medication 975 MILLIGRAM(S): at 18:40

## 2021-10-23 RX ADMIN — DAPTOMYCIN 120 MILLIGRAM(S): 500 INJECTION, POWDER, LYOPHILIZED, FOR SOLUTION INTRAVENOUS at 18:40

## 2021-10-23 RX ADMIN — Medication 975 MILLIGRAM(S): at 23:05

## 2021-10-23 RX ADMIN — Medication 3 MILLIGRAM(S): at 22:14

## 2021-10-23 RX ADMIN — HYDROMORPHONE HYDROCHLORIDE 0.5 MILLIGRAM(S): 2 INJECTION INTRAMUSCULAR; INTRAVENOUS; SUBCUTANEOUS at 08:41

## 2021-10-23 RX ADMIN — HYDROMORPHONE HYDROCHLORIDE 0.5 MILLIGRAM(S): 2 INJECTION INTRAMUSCULAR; INTRAVENOUS; SUBCUTANEOUS at 16:11

## 2021-10-23 RX ADMIN — HYDROMORPHONE HYDROCHLORIDE 0.5 MILLIGRAM(S): 2 INJECTION INTRAMUSCULAR; INTRAVENOUS; SUBCUTANEOUS at 01:32

## 2021-10-23 RX ADMIN — Medication 975 MILLIGRAM(S): at 01:31

## 2021-10-23 RX ADMIN — Medication 1000 UNIT(S): at 12:02

## 2021-10-23 RX ADMIN — FLUCONAZOLE 400 MILLIGRAM(S): 150 TABLET ORAL at 18:40

## 2021-10-23 RX ADMIN — Medication 975 MILLIGRAM(S): at 05:30

## 2021-10-23 RX ADMIN — CHLORHEXIDINE GLUCONATE 1 APPLICATION(S): 213 SOLUTION TOPICAL at 05:34

## 2021-10-23 RX ADMIN — GABAPENTIN 600 MILLIGRAM(S): 400 CAPSULE ORAL at 05:30

## 2021-10-23 RX ADMIN — Medication 1 APPLICATION(S): at 05:33

## 2021-10-23 RX ADMIN — Medication 975 MILLIGRAM(S): at 23:41

## 2021-10-23 RX ADMIN — Medication 975 MILLIGRAM(S): at 06:27

## 2021-10-23 RX ADMIN — OXYCODONE HYDROCHLORIDE 10 MILLIGRAM(S): 5 TABLET ORAL at 07:25

## 2021-10-23 RX ADMIN — ENOXAPARIN SODIUM 40 MILLIGRAM(S): 100 INJECTION SUBCUTANEOUS at 00:33

## 2021-10-23 RX ADMIN — HYDROMORPHONE HYDROCHLORIDE 0.5 MILLIGRAM(S): 2 INJECTION INTRAMUSCULAR; INTRAVENOUS; SUBCUTANEOUS at 01:08

## 2021-10-23 RX ADMIN — OXYCODONE HYDROCHLORIDE 10 MILLIGRAM(S): 5 TABLET ORAL at 21:13

## 2021-10-23 NOTE — PROGRESS NOTE ADULT - SUBJECTIVE AND OBJECTIVE BOX
SUBJECTIVE:  No overnight events. Pain in leg a little better, mostly overnight. Ambulated again yesterday with PT. Voiding. Overall, doing well.    OBJECTIVE:     PHYSICAL EXAM     General: Awake and alert, NAD  LLE: External fixator in place with ace wrap covering. Pin sites with clean, dry gauze dressing. Skin graft stable, xeroform changed. Flap red, beefy with granulation and some sloughing. Wet to dry dressing changed. Distal extremity warm and well perfused. Absent sensation to touch on first three toes at baseline.

## 2021-10-23 NOTE — PROGRESS NOTE ADULT - ASSESSMENT
IMPRESSION:  26F h/o L ankle fracture with septic arthritis, postoperative compartment syndrome s/p RICK, I&D, bone debridement and ligament repair on 9/9, I&D with tissue debridement to bone on 9/12, repeat I&D, arthrotomy of ankle, L ankle reconstruction with L gracilis free flap and L thigh STSG, microvascular ansastomosis with recipient AT on 9/20.  OR culture 9/9 grew MSSA and S. anginosus.  9/20 OR culture grew MSSA.      She recently went back to the OR on 10/18 for debridement of left gracilis free flap.  Cultures are polymicrobial - unclear which are pathogens and which are colonizers.  No organisms seem on gram stain.  I would target the candida and Enterococcus given that they are in multiple cultures.  Coag negative staph could be a colonizer however it will be covered with gram positive therapy.  At this time will not treat stenotrophomonas as it is seen on only one culture, could be a colonizer given prolonged hospitalization, it is difficult to treat and there are no gram negative rods on gram stain.  Gram negative rods also less likely to adhere to hardware.      Patient reports she had an infusion reaction to vancomycin (caused itching in her hands) but believes she may tolerate it    Recommend:  1.  Continue Daptomycin 500 mg IV daily --> will cover all gram positives in her culture  2.  Check CK daily  3.  Continue fluconazole 400 mg PO daily to cover Candida albicans and C. parapsilosis  4. Check repeat EKG on 10/24.  Last QTc was 472.  If QTc > 500, will recommend switching to another agent     ID team 2 will follow.  I will be covering the service this weekend.

## 2021-10-23 NOTE — PROGRESS NOTE ADULT - ASSESSMENT
NOTE IN PROGRESS    26F w prior L ankle ORIF in [facility] 03/2021 complicated by missed compartment syndrome p/w infection and non-union now s/p RICK, arthrotomy, neuroma excision, L ankle ligament repair, and I/D w bone debridement w Dr. Wallis 9/10, Repeat I&D and vac change 9/12, Repeat I&D w vac change, athrotomy 9/14 -- L ankle recon w L gracilis free flap and L thigh STSG w plastic surgery 9/21, surgical culture growing MSSA, strep anginosis - currently on IV Daptomycin and PO fluconazole. Application of ringed ex-fix LLE with debridement of gracilis flap by Plastics on 10/18. Medicine called for electrolyte abnormalities.    #L ankle OM, septic arthrosis, transected tibial nerve, transection of superficial peroneal nerve during index surgery in [facility] complicated by missed compartment syndrome. Currently on IV CTX 2g q24h and Nafcillin 2g q4h. BCx 9/7 NGTD. Surgical cultures from OR 9/9 w MSSA and strep anginosus. OR Cx 10/20 grew MSSA. To require at least 6wk course of abx. Currently deferring placement of hardware d/t evidence of osteomyelitis on gallium scan; ortho plan: patient is a candidate for ankle fusion using the ringed external fixator with compression technique. Application of ringed ex-fix LLE with debridement of gracilis flap by Plastics on 10/18.  - Plan for repeat flap debridement per plastics next week +/- ortho involvement for ex fix adjustment as needed  - ID following  - management per plastics team     # Anemia, stable   Likely operative loss w element of dilution from IVF. Pt is asymptomatic. Normocytic.  - stable in 8-9 range.  - can check ferritin and iron with total binding capacity in AM     #hypokalemia, improved  - 10/19: K 3.2  - Replete for K<4  - Would repeat BMP, Mg, Phos in AM  - Can check BMP, Mg, Phos in AM with routine blood work while inpatient  - Consider nutrition consult for diet optimization    #hypomagnesemia  - 10/19: Mg 1.5  - Replete for Mg<2  - Would repeat BMP, Mg, Phos in AM  - Consider nutrition consult for diet optimization    Discussed with Attending Physician Dr. Pollard. Recommendations are considered final after attending attestation.  NOTE IN PROGRESS    26F w prior L ankle ORIF in [facility] 03/2021 complicated by missed compartment syndrome p/w infection and non-union now s/p RICK, arthrotomy, neuroma excision, L ankle ligament repair, and I/D w bone debridement w Dr. Wallis 9/10, Repeat I&D and vac change 9/12, Repeat I&D w vac change, athrotomy 9/14 -- L ankle recon w L gracilis free flap and L thigh STSG w plastic surgery 9/21, surgical culture growing MSSA, strep anginosis - currently on IV Daptomycin and PO fluconazole. Application of ringed ex-fix LLE with debridement of gracilis flap by Plastics on 10/18. Medicine called for electrolyte abnormalities.    #L ankle OM, septic arthrosis, transected tibial nerve, transection of superficial peroneal nerve during index surgery in [facility] complicated by missed compartment syndrome. Currently on IV CTX 2g q24h and Nafcillin 2g q4h. BCx 9/7 NGTD. Surgical cultures from OR 9/9 w MSSA and strep anginosus. OR Cx 10/20 grew MSSA. To require at least 6wk course of abx. Currently deferring placement of hardware d/t evidence of osteomyelitis on gallium scan; ortho plan: patient is a candidate for ankle fusion using the ringed external fixator with compression technique. Application of ringed ex-fix LLE with debridement of gracilis flap by Plastics on 10/18.  - Plan for repeat flap debridement per plastics next week +/- ortho involvement for ex fix adjustment as needed  - ID following  - management per plastics team     # Anemia, stable   Likely operative loss w element of dilution from IVF. Pt is asymptomatic. Normocytic.  - stable in 8-9 range.  - can check ferritin and iron with total binding capacity in AM  - rec follow up with PCP outpatient after discharge for repeat CBC     #hypokalemia, improved  - Replete for K<4  - Continue with diet optimization as patient is eating well    #hypomagnesemia  - Replete for Mg<2  - Continue with diet optimization as patient is eating well    Will sign off. Please reconsult with questions. Discussed with Attending Physician Dr. Olivas. Recommendations are considered final after attending attestation.  NOTE IN PROGRESS    26F w prior L ankle ORIF in [facility] 03/2021 complicated by missed compartment syndrome p/w infection and non-union now s/p RICK, arthrotomy, neuroma excision, L ankle ligament repair, and I/D w bone debridement w Dr. Wallis 9/10, Repeat I&D and vac change 9/12, Repeat I&D w vac change, athrotomy 9/14 -- L ankle recon w L gracilis free flap and L thigh STSG w plastic surgery 9/21, surgical culture growing MSSA, strep anginosis - currently on IV Daptomycin and PO fluconazole. Application of ringed ex-fix LLE with debridement of gracilis flap by Plastics on 10/18. Medicine called for electrolyte abnormalities.    #L ankle OM, septic arthrosis, transected tibial nerve, transection of superficial peroneal nerve during index surgery in [facility] complicated by missed compartment syndrome. Currently on IV CTX 2g q24h and Nafcillin 2g q4h. BCx 9/7 NGTD. Surgical cultures from OR 9/9 w MSSA and strep anginosus. OR Cx 10/20 grew MSSA. To require at least 6wk course of abx. Currently deferring placement of hardware d/t evidence of osteomyelitis on gallium scan; ortho plan: patient is a candidate for ankle fusion using the ringed external fixator with compression technique. Application of ringed ex-fix LLE with debridement of gracilis flap by Plastics on 10/18.  - Plan for repeat flap debridement per plastics next week +/- ortho involvement for ex fix adjustment as needed  - ID following  - management per plastics team     # Anemia, stable   Likely operative loss w element of dilution from IVF. Pt is asymptomatic. Normocytic.  - stable in 8-9 range.  - can check ferritin and iron with total binding capacity in AM  - rec follow up with PCP outpatient after discharge for repeat CBC     #hypokalemia, improved  - Replete for K<4  - Continue with diet optimization as patient is eating well    #hypomagnesemia, improved  - Replete for Mg<2  - Continue with diet optimization as patient is eating well    Will sign off. Please reconsult with questions. Discussed with Attending Physician Dr. Olivas. Recommendations are considered final after attending attestation.

## 2021-10-23 NOTE — PROGRESS NOTE ADULT - SUBJECTIVE AND OBJECTIVE BOX
INTERVAL HPI/OVERNIGHT EVENTS:    Patient was seen and examined at bedside.  No events.  Tolerating antibiotics well.     CONSTITUTIONAL:  Negative fever or chills, feels well, good appetite  EYES:  Negative  blurry vision or double vision  CARDIOVASCULAR:  Negative for chest pain or palpitations  RESPIRATORY:  Negative for cough, wheezing, or SOB   GASTROINTESTINAL:  Negative for nausea, vomiting, diarrhea, constipation, or abdominal pain  GENITOURINARY:  Negative frequency, urgency or dysuria  NEUROLOGIC:  No headache, confusion, dizziness, lightheadedness      ANTIBIOTICS/RELEVANT:    MEDICATIONS  (STANDING):  acetaminophen   Tablet .. 975 milliGRAM(s) Oral every 6 hours  AQUAPHOR (petrolatum Ointment) 1 Application(s) Topical two times a day  calcium carbonate   1250 mG (OsCal) 1 Tablet(s) Oral daily  chlorhexidine 2% Cloths 1 Application(s) Topical <User Schedule>  cholecalciferol 1000 Unit(s) Oral daily  DAPTOmycin IVPB 500 milliGRAM(s) IV Intermittent every 24 hours  enoxaparin Injectable 40 milliGRAM(s) SubCutaneous every 24 hours  fluconAZOLE   Tablet 400 milliGRAM(s) Oral every 24 hours  gabapentin 600 milliGRAM(s) Oral every 8 hours  melatonin 3 milliGRAM(s) Oral at bedtime  multivitamin 1 Tablet(s) Oral daily  polyethylene glycol 3350 17 Gram(s) Oral two times a day  senna 2 Tablet(s) Oral at bedtime    MEDICATIONS  (PRN):  bisacodyl Suppository 10 milliGRAM(s) Rectal daily PRN Constipation  diphenhydrAMINE 25 milliGRAM(s) Oral every 4 hours PRN Rash and/or Itching  HYDROmorphone  Injectable 0.5 milliGRAM(s) IV Push every 3 hours PRN Severe Breakthrough Pain (>10)  metoclopramide Injectable 10 milliGRAM(s) IV Push every 6 hours PRN Nausea and/or Vomiting  ondansetron Injectable 4 milliGRAM(s) IV Push every 6 hours PRN Nausea and/or Vomiting  oxyCODONE    IR 5 milliGRAM(s) Oral every 4 hours PRN Moderate Pain (4 - 6)  oxyCODONE    IR 10 milliGRAM(s) Oral every 4 hours PRN Severe Pain (7 - 10)  sodium chloride 0.9% lock flush 10 milliLiter(s) IV Push every 1 hour PRN Pre/post blood products, medications, blood draw, and to maintain line patency        Vital Signs Last 24 Hrs  T(C): 36.8 (23 Oct 2021 14:54), Max: 37.1 (22 Oct 2021 17:44)  T(F): 98.2 (23 Oct 2021 14:54), Max: 98.7 (22 Oct 2021 17:44)  HR: 87 (23 Oct 2021 14:54) (85 - 96)  BP: 105/80 (23 Oct 2021 14:54) (103/71 - 122/86)  BP(mean): --  RR: 18 (23 Oct 2021 14:54) (17 - 18)  SpO2: 96% (23 Oct 2021 14:54) (96% - 100%)    PHYSICAL EXAM:  Constitutional:Well-developed, well nourished  Eyes:GLORY, EOMI  Ear/Nose/Throat: no oral lesion, no sinus tenderness on percussion	  Neck:   supple  Respiratory: CTA rhett  Cardiovascular: S1S2 RRR, no murmurs  Gastrointestinal:soft, (+) BS, no HSM  Extremities:no e/e/c  Vascular: DP Pulse:	right normal; left normal      LABS:                        9.5    7.69  )-----------( 433      ( 23 Oct 2021 08:41 )             28.6     10-23    136  |  103  |  9   ----------------------------<  79  3.9   |  21<L>  |  0.61    Ca    9.9      23 Oct 2021 08:41  Phos  5.1     10-23  Mg     1.8     10-23    TPro  7.4  /  Alb  3.7  /  TBili  0.3  /  DBili  x   /  AST  13  /  ALT  10  /  AlkPhos  62  10-23          MICROBIOLOGY:    Fungal Susceptibility (10.21.21 @ 22:52)    Culture Results:   Candida albicans Susceptibility to follow.  Identification provided by submitter        RADIOLOGY & ADDITIONAL STUDIES:

## 2021-10-23 NOTE — PROGRESS NOTE ADULT - ATTENDING COMMENTS
some acute blood loss anemia from operative blood losses.   Iron studies without kalin iron deficiency  Should follow up outpatient for repeat CBC iron studies after suitable interval

## 2021-10-23 NOTE — PROGRESS NOTE ADULT - SUBJECTIVE AND OBJECTIVE BOX
Internal Medicine Progress Note    INTERVAL HPI/OVERNIGHT EVENTS: CASTRO    SUBJECTIVE: Patient seen and examined at bedside. Denies fever, headache, nausea, vomiting, chest pain, shortness of breath, abdominal pain, changes in bowel movements or urination.     OBJECTIVE:    VITAL SIGNS:  ICU Vital Signs Last 24 Hrs  T(C): 36.6 (23 Oct 2021 08:29), Max: 37.1 (22 Oct 2021 17:44)  T(F): 97.9 (23 Oct 2021 08:29), Max: 98.7 (22 Oct 2021 17:44)  HR: 87 (23 Oct 2021 08:29) (85 - 121)  BP: 103/71 (23 Oct 2021 08:29) (103/71 - 122/86)  BP(mean): --  ABP: --  ABP(mean): --  RR: 17 (23 Oct 2021 08:29) (17 - 18)  SpO2: 97% (23 Oct 2021 08:29) (97% - 100%)        10-22 @ 07:01  -  10-23 @ 07:00  --------------------------------------------------------  IN: 410 mL / OUT: 2200 mL / NET: -1790 mL      CAPILLARY BLOOD GLUCOSE    PHYSICAL EXAM:  General: NAD, pleasantly conversant  HEENT: NC/AT; PERRL, clear conjunctiva  Neck: supple  Respiratory: CTA b/l  Cardiovascular: +S1/S2; RRR  Abdomen: soft, NT/ND; +BS x4  Extremities: WWP, 2+ peripheral pulses b/l; LLE post surgical in fixator. No RLE edema or swelling  Skin: normal color and turgor; no rash  Neurological: AAOx3    MEDICATIONS:  MEDICATIONS  (STANDING):  acetaminophen   Tablet .. 975 milliGRAM(s) Oral every 6 hours  AQUAPHOR (petrolatum Ointment) 1 Application(s) Topical two times a day  calcium carbonate   1250 mG (OsCal) 1 Tablet(s) Oral daily  chlorhexidine 2% Cloths 1 Application(s) Topical <User Schedule>  cholecalciferol 1000 Unit(s) Oral daily  DAPTOmycin IVPB 500 milliGRAM(s) IV Intermittent every 24 hours  enoxaparin Injectable 40 milliGRAM(s) SubCutaneous every 24 hours  fluconAZOLE   Tablet 400 milliGRAM(s) Oral every 24 hours  gabapentin 600 milliGRAM(s) Oral every 8 hours  melatonin 3 milliGRAM(s) Oral at bedtime  multivitamin 1 Tablet(s) Oral daily  polyethylene glycol 3350 17 Gram(s) Oral two times a day  senna 2 Tablet(s) Oral at bedtime    MEDICATIONS  (PRN):  bisacodyl Suppository 10 milliGRAM(s) Rectal daily PRN Constipation  diphenhydrAMINE 25 milliGRAM(s) Oral every 4 hours PRN Rash and/or Itching  HYDROmorphone  Injectable 0.5 milliGRAM(s) IV Push every 3 hours PRN Severe Breakthrough Pain (>10)  metoclopramide Injectable 10 milliGRAM(s) IV Push every 6 hours PRN Nausea and/or Vomiting  ondansetron Injectable 4 milliGRAM(s) IV Push every 6 hours PRN Nausea and/or Vomiting  oxyCODONE    IR 5 milliGRAM(s) Oral every 4 hours PRN Moderate Pain (4 - 6)  oxyCODONE    IR 10 milliGRAM(s) Oral every 4 hours PRN Severe Pain (7 - 10)  sodium chloride 0.9% lock flush 10 milliLiter(s) IV Push every 1 hour PRN Pre/post blood products, medications, blood draw, and to maintain line patency      ALLERGIES:  Allergies    No Known Allergies    Intolerances        LABS:                        9.5    7.69  )-----------( 433      ( 23 Oct 2021 08:41 )             28.6     10-23    136  |  103  |  9   ----------------------------<  79  3.9   |  21<L>  |  0.61    Ca    9.9      23 Oct 2021 08:41  Phos  5.1     10-23  Mg     1.8     10-23    TPro  7.4  /  Alb  3.7  /  TBili  0.3  /  DBili  x   /  AST  13  /  ALT  10  /  AlkPhos  62  10-23                    RADIOLOGY & ADDITIONAL TESTS: Reviewed.

## 2021-10-24 ENCOUNTER — TRANSCRIPTION ENCOUNTER (OUTPATIENT)
Age: 26
End: 2021-10-24

## 2021-10-24 LAB
-  FLUCONAZOLE: SIGNIFICANT CHANGE UP
-  INTERPRETATION: SIGNIFICANT CHANGE UP
CULTURE RESULTS: SIGNIFICANT CHANGE UP
METHOD TYPE: SIGNIFICANT CHANGE UP
ORGANISM # SPEC MICROSCOPIC CNT: SIGNIFICANT CHANGE UP
ORGANISM # SPEC MICROSCOPIC CNT: SIGNIFICANT CHANGE UP
SPECIMEN SOURCE: SIGNIFICANT CHANGE UP

## 2021-10-24 PROCEDURE — 99232 SBSQ HOSP IP/OBS MODERATE 35: CPT | Mod: GC

## 2021-10-24 PROCEDURE — 99232 SBSQ HOSP IP/OBS MODERATE 35: CPT

## 2021-10-24 RX ORDER — SODIUM CHLORIDE 9 MG/ML
1000 INJECTION, SOLUTION INTRAVENOUS
Refills: 0 | Status: DISCONTINUED | OUTPATIENT
Start: 2021-10-25 | End: 2021-10-25

## 2021-10-24 RX ADMIN — HYDROMORPHONE HYDROCHLORIDE 0.5 MILLIGRAM(S): 2 INJECTION INTRAMUSCULAR; INTRAVENOUS; SUBCUTANEOUS at 23:40

## 2021-10-24 RX ADMIN — OXYCODONE HYDROCHLORIDE 10 MILLIGRAM(S): 5 TABLET ORAL at 11:53

## 2021-10-24 RX ADMIN — Medication 975 MILLIGRAM(S): at 23:10

## 2021-10-24 RX ADMIN — HYDROMORPHONE HYDROCHLORIDE 0.5 MILLIGRAM(S): 2 INJECTION INTRAMUSCULAR; INTRAVENOUS; SUBCUTANEOUS at 08:30

## 2021-10-24 RX ADMIN — HYDROMORPHONE HYDROCHLORIDE 0.5 MILLIGRAM(S): 2 INJECTION INTRAMUSCULAR; INTRAVENOUS; SUBCUTANEOUS at 08:15

## 2021-10-24 RX ADMIN — CHLORHEXIDINE GLUCONATE 1 APPLICATION(S): 213 SOLUTION TOPICAL at 06:07

## 2021-10-24 RX ADMIN — OXYCODONE HYDROCHLORIDE 10 MILLIGRAM(S): 5 TABLET ORAL at 16:36

## 2021-10-24 RX ADMIN — Medication 1 APPLICATION(S): at 06:05

## 2021-10-24 RX ADMIN — Medication 3 MILLIGRAM(S): at 22:42

## 2021-10-24 RX ADMIN — OXYCODONE HYDROCHLORIDE 10 MILLIGRAM(S): 5 TABLET ORAL at 16:06

## 2021-10-24 RX ADMIN — Medication 1 TABLET(S): at 11:26

## 2021-10-24 RX ADMIN — Medication 975 MILLIGRAM(S): at 17:31

## 2021-10-24 RX ADMIN — HYDROMORPHONE HYDROCHLORIDE 0.5 MILLIGRAM(S): 2 INJECTION INTRAMUSCULAR; INTRAVENOUS; SUBCUTANEOUS at 18:11

## 2021-10-24 RX ADMIN — DAPTOMYCIN 120 MILLIGRAM(S): 500 INJECTION, POWDER, LYOPHILIZED, FOR SOLUTION INTRAVENOUS at 17:02

## 2021-10-24 RX ADMIN — GABAPENTIN 600 MILLIGRAM(S): 400 CAPSULE ORAL at 13:13

## 2021-10-24 RX ADMIN — HYDROMORPHONE HYDROCHLORIDE 0.5 MILLIGRAM(S): 2 INJECTION INTRAMUSCULAR; INTRAVENOUS; SUBCUTANEOUS at 03:11

## 2021-10-24 RX ADMIN — Medication 975 MILLIGRAM(S): at 17:01

## 2021-10-24 RX ADMIN — Medication 975 MILLIGRAM(S): at 22:42

## 2021-10-24 RX ADMIN — GABAPENTIN 600 MILLIGRAM(S): 400 CAPSULE ORAL at 06:04

## 2021-10-24 RX ADMIN — OXYCODONE HYDROCHLORIDE 10 MILLIGRAM(S): 5 TABLET ORAL at 12:23

## 2021-10-24 RX ADMIN — GABAPENTIN 600 MILLIGRAM(S): 400 CAPSULE ORAL at 22:42

## 2021-10-24 RX ADMIN — OXYCODONE HYDROCHLORIDE 10 MILLIGRAM(S): 5 TABLET ORAL at 22:10

## 2021-10-24 RX ADMIN — HYDROMORPHONE HYDROCHLORIDE 0.5 MILLIGRAM(S): 2 INJECTION INTRAMUSCULAR; INTRAVENOUS; SUBCUTANEOUS at 23:21

## 2021-10-24 RX ADMIN — Medication 1000 UNIT(S): at 11:27

## 2021-10-24 RX ADMIN — Medication 975 MILLIGRAM(S): at 11:27

## 2021-10-24 RX ADMIN — OXYCODONE HYDROCHLORIDE 10 MILLIGRAM(S): 5 TABLET ORAL at 06:04

## 2021-10-24 RX ADMIN — Medication 975 MILLIGRAM(S): at 06:04

## 2021-10-24 RX ADMIN — OXYCODONE HYDROCHLORIDE 10 MILLIGRAM(S): 5 TABLET ORAL at 21:40

## 2021-10-24 RX ADMIN — HYDROMORPHONE HYDROCHLORIDE 0.5 MILLIGRAM(S): 2 INJECTION INTRAMUSCULAR; INTRAVENOUS; SUBCUTANEOUS at 02:43

## 2021-10-24 RX ADMIN — Medication 975 MILLIGRAM(S): at 06:57

## 2021-10-24 RX ADMIN — OXYCODONE HYDROCHLORIDE 10 MILLIGRAM(S): 5 TABLET ORAL at 06:57

## 2021-10-24 RX ADMIN — Medication 1 APPLICATION(S): at 17:01

## 2021-10-24 RX ADMIN — ENOXAPARIN SODIUM 40 MILLIGRAM(S): 100 INJECTION SUBCUTANEOUS at 23:09

## 2021-10-24 RX ADMIN — Medication 975 MILLIGRAM(S): at 11:57

## 2021-10-24 RX ADMIN — HYDROMORPHONE HYDROCHLORIDE 0.5 MILLIGRAM(S): 2 INJECTION INTRAMUSCULAR; INTRAVENOUS; SUBCUTANEOUS at 17:56

## 2021-10-24 RX ADMIN — FLUCONAZOLE 400 MILLIGRAM(S): 150 TABLET ORAL at 17:03

## 2021-10-24 RX ADMIN — Medication 1 TABLET(S): at 11:27

## 2021-10-24 NOTE — PROGRESS NOTE ADULT - SUBJECTIVE AND OBJECTIVE BOX
Ortho Note    Pt comfortable without complaints, pain controlled  Denies CP, SOB, N/V, numbness/tingling     Vital Signs Last 24 Hrs  T(C): --  T(F): --  HR: --  BP: --  BP(mean): --  RR: --  SpO2: --  AVSS    General: Pt Alert and oriented, NAD  DSG C/D/I  Pulses:  Sensation:  Motor: EHL/FHL/TA/GS        A/P: 26yFemale POD# s/p   - Stable  - Pain Control  - DVT ppx:  - PT, WBS:     Ortho Pager [phone] Ortho Note    Pt seen and examined at bedside this AM. Appears comfortable without complaints, pain controlled. Denies CP, SOB, N/V, numbness/tingling     Vital Signs Last 24 Hrs  T(C): --  T(F): --  HR: --  BP: --  BP(mean): --  RR: --  SpO2: --  AVSS    General: Pt Alert and oriented, NAD  LLE ringed ex fix in place, ace wrap intact, pin site DSG C/D/I  Flap intact with DSG  Sensation - decreased over plantar and dorsum foot, at baseline   Motor: FHL firing, rest 0/5, at baseline   Toes WWP        A/P: 26yFemale s/p L ankle I&D 09/17 with L gracilis free flap and STSG on 09/20 now s/p I&D of flap by plastics team and placement of ringed external fixator 10/18  - Stable  - Pain Control  - DVT ppx: lovenox  - PT, WBS: WBAT  - Dispo pending OR on Monday with Plastics, Ortho available for exfix adjustment   - Will continue to follow     Ortho Pager [phone]

## 2021-10-24 NOTE — PROGRESS NOTE ADULT - ASSESSMENT
26F s/p L ankle recon with L gracilis free flap and STSG (9/20) and now s/p flap debridement and placement of ringed external fixator (10/18).    - Daily dressing changes - xeroform over skin graft, wet to dry over open areas of flap  - Keep left leg elevated when in bed  - Weight bearing activity as tolerated, PT   - Pain control - multimodal  - Regular diet, NPO at midnight  - Bowel regimen  - Appreciate ID recs  - PICC 9/21, s/p ceftriaxone, nafcillin >1month  - Switching abx to Daptomycin and Fluconazole per ID 10/21-  - Weekly labs for inflammatory markers  - Daily CK  - EKG for QTc  - Lovenox/SCD on right leg  - IS  - Repeat flap debridement +/- ex fix adjustment by ortho tentatively planned for Monday  - Medicine co-management consult  - Nutrition for low albumin    Plastics Pager: [phone]

## 2021-10-24 NOTE — PROGRESS NOTE ADULT - ASSESSMENT
IMPRESSION:  26F h/o L ankle fracture with septic arthritis, postoperative compartment syndrome s/p RICK, I&D, bone debridement and ligament repair on 9/9, I&D with tissue debridement to bone on 9/12, repeat I&D, arthrotomy of ankle, L ankle reconstruction with L gracilis free flap and L thigh STSG, microvascular ansastomosis with recipient AT on 9/20.  OR culture 9/9 grew MSSA and S. anginosus.  9/20 OR culture grew MSSA.      She recently went back to the OR on 10/18 for debridement of left gracilis free flap.  Cultures are polymicrobial - unclear which are pathogens and which are colonizers.  No organisms seem on gram stain.  I would target the candida and Enterococcus given that they are in multiple cultures.  Coag negative staph could be a colonizer however it will be covered with gram positive therapy.  At this time will not treat stenotrophomonas as it is seen on only one culture, could be a colonizer given prolonged hospitalization, it is difficult to treat and there are no gram negative rods on gram stain.  Gram negative rods also less likely to adhere to hardware.          Recommend:  1.  Continue Daptomycin 500 mg IV daily --> will cover all gram positives in her culture  2.  Check CK daily  3.  Continue fluconazole 400 mg PO daily to cover Candida albicans and C. parapsilosis  4. Check repeat EKG on 10/24.  Last QTc was 472.  If QTc > 500, will recommend switching to another agent     ID team 2 will follow.  I will be covering the service this weekend.

## 2021-10-24 NOTE — PROGRESS NOTE ADULT - SUBJECTIVE AND OBJECTIVE BOX
INTERVAL HPI/OVERNIGHT EVENTS:    Patient was seen and examined at bedside.  No complaints.  Tolerating antibiotics     CONSTITUTIONAL:  Negative fever or chills, feels well, good appetite  EYES:  Negative  blurry vision or double vision  CARDIOVASCULAR:  Negative for chest pain or palpitations  RESPIRATORY:  Negative for cough, wheezing, or SOB   GASTROINTESTINAL:  Negative for nausea, vomiting, diarrhea, constipation, or abdominal pain  GENITOURINARY:  Negative frequency, urgency or dysuria  NEUROLOGIC:  No headache, confusion, dizziness, lightheadedness      ANTIBIOTICS/RELEVANT:    MEDICATIONS  (STANDING):  acetaminophen   Tablet .. 975 milliGRAM(s) Oral every 6 hours  AQUAPHOR (petrolatum Ointment) 1 Application(s) Topical two times a day  calcium carbonate   1250 mG (OsCal) 1 Tablet(s) Oral daily  chlorhexidine 2% Cloths 1 Application(s) Topical <User Schedule>  cholecalciferol 1000 Unit(s) Oral daily  DAPTOmycin IVPB 500 milliGRAM(s) IV Intermittent every 24 hours  enoxaparin Injectable 40 milliGRAM(s) SubCutaneous every 24 hours  fluconAZOLE   Tablet 400 milliGRAM(s) Oral every 24 hours  gabapentin 600 milliGRAM(s) Oral every 8 hours  melatonin 3 milliGRAM(s) Oral at bedtime  multivitamin 1 Tablet(s) Oral daily  polyethylene glycol 3350 17 Gram(s) Oral two times a day  senna 2 Tablet(s) Oral at bedtime    MEDICATIONS  (PRN):  bisacodyl Suppository 10 milliGRAM(s) Rectal daily PRN Constipation  diphenhydrAMINE 25 milliGRAM(s) Oral every 4 hours PRN Rash and/or Itching  HYDROmorphone  Injectable 0.5 milliGRAM(s) IV Push every 3 hours PRN Severe Breakthrough Pain (>10)  metoclopramide Injectable 10 milliGRAM(s) IV Push every 6 hours PRN Nausea and/or Vomiting  ondansetron Injectable 4 milliGRAM(s) IV Push every 6 hours PRN Nausea and/or Vomiting  oxyCODONE    IR 5 milliGRAM(s) Oral every 4 hours PRN Moderate Pain (4 - 6)  oxyCODONE    IR 10 milliGRAM(s) Oral every 4 hours PRN Severe Pain (7 - 10)  sodium chloride 0.9% lock flush 10 milliLiter(s) IV Push every 1 hour PRN Pre/post blood products, medications, blood draw, and to maintain line patency        Vital Signs Last 24 Hrs  T(C): 36.9 (24 Oct 2021 12:44), Max: 37 (23 Oct 2021 20:35)  T(F): 98.5 (24 Oct 2021 12:44), Max: 98.6 (23 Oct 2021 20:35)  HR: 87 (24 Oct 2021 12:44) (78 - 97)  BP: 98/65 (24 Oct 2021 12:44) (98/65 - 113/68)  BP(mean): --  RR: 17 (24 Oct 2021 12:44) (17 - 18)  SpO2: 96% (24 Oct 2021 12:44) (96% - 98%)    PHYSICAL EXAM:  Constitutional:Well-developed, well nourished  Eyes:GLORY, EOMI  Ear/Nose/Throat: no oral lesion,   Neck:  supple  Respiratory: CTA rhett  Cardiovascular: S1S2 RRR, no murmurs  Gastrointestinal:soft, (+) BS, no HSM  Extremities:  left foot with dressing  Vascular: DP Pulse:	right normal; left normal      LABS:                        9.5    7.69  )-----------( 433      ( 23 Oct 2021 08:41 )             28.6     10-23    136  |  103  |  9   ----------------------------<  79  3.9   |  21<L>  |  0.61    Ca    9.9      23 Oct 2021 08:41  Phos  5.1     10-23  Mg     1.8     10-23    TPro  7.4  /  Alb  3.7  /  TBili  0.3  /  DBili  x   /  AST  13  /  ALT  10  /  AlkPhos  62  10-23          MICROBIOLOGY:        RADIOLOGY & ADDITIONAL STUDIES: INTERVAL HPI/OVERNIGHT EVENTS:    Patient was seen and examined at bedside.  No complaints.  Tolerating antibiotics     CONSTITUTIONAL:  Negative fever or chills, feels well, good appetite  EYES:  Negative  blurry vision or double vision  CARDIOVASCULAR:  Negative for chest pain or palpitations  RESPIRATORY:  Negative for cough, wheezing, or SOB   GASTROINTESTINAL:  Negative for nausea, vomiting, diarrhea, constipation, or abdominal pain  GENITOURINARY:  Negative frequency, urgency or dysuria  NEUROLOGIC:  No headache, confusion, dizziness, lightheadedness      ANTIBIOTICS/RELEVANT:    MEDICATIONS  (STANDING):  acetaminophen   Tablet .. 975 milliGRAM(s) Oral every 6 hours  AQUAPHOR (petrolatum Ointment) 1 Application(s) Topical two times a day  calcium carbonate   1250 mG (OsCal) 1 Tablet(s) Oral daily  chlorhexidine 2% Cloths 1 Application(s) Topical <User Schedule>  cholecalciferol 1000 Unit(s) Oral daily  DAPTOmycin IVPB 500 milliGRAM(s) IV Intermittent every 24 hours  enoxaparin Injectable 40 milliGRAM(s) SubCutaneous every 24 hours  fluconAZOLE   Tablet 400 milliGRAM(s) Oral every 24 hours  gabapentin 600 milliGRAM(s) Oral every 8 hours  melatonin 3 milliGRAM(s) Oral at bedtime  multivitamin 1 Tablet(s) Oral daily  polyethylene glycol 3350 17 Gram(s) Oral two times a day  senna 2 Tablet(s) Oral at bedtime    MEDICATIONS  (PRN):  bisacodyl Suppository 10 milliGRAM(s) Rectal daily PRN Constipation  diphenhydrAMINE 25 milliGRAM(s) Oral every 4 hours PRN Rash and/or Itching  HYDROmorphone  Injectable 0.5 milliGRAM(s) IV Push every 3 hours PRN Severe Breakthrough Pain (>10)  metoclopramide Injectable 10 milliGRAM(s) IV Push every 6 hours PRN Nausea and/or Vomiting  ondansetron Injectable 4 milliGRAM(s) IV Push every 6 hours PRN Nausea and/or Vomiting  oxyCODONE    IR 5 milliGRAM(s) Oral every 4 hours PRN Moderate Pain (4 - 6)  oxyCODONE    IR 10 milliGRAM(s) Oral every 4 hours PRN Severe Pain (7 - 10)  sodium chloride 0.9% lock flush 10 milliLiter(s) IV Push every 1 hour PRN Pre/post blood products, medications, blood draw, and to maintain line patency        Vital Signs Last 24 Hrs  T(C): 36.9 (24 Oct 2021 12:44), Max: 37 (23 Oct 2021 20:35)  T(F): 98.5 (24 Oct 2021 12:44), Max: 98.6 (23 Oct 2021 20:35)  HR: 87 (24 Oct 2021 12:44) (78 - 97)  BP: 98/65 (24 Oct 2021 12:44) (98/65 - 113/68)  BP(mean): --  RR: 17 (24 Oct 2021 12:44) (17 - 18)  SpO2: 96% (24 Oct 2021 12:44) (96% - 98%)    PHYSICAL EXAM:  Constitutional:Well-developed, well nourished  Eyes:GLORY, EOMI  Ear/Nose/Throat: no oral lesion,   Neck:  supple  Respiratory: CTA rhett  Cardiovascular: S1S2 RRR, no murmurs  Gastrointestinal:soft, (+) BS, no HSM  Extremities:  left foot with dressing  Vascular: DP Pulse:	right normal; left normal      LABS:                        9.5    7.69  )-----------( 433      ( 23 Oct 2021 08:41 )             28.6     10-23    136  |  103  |  9   ----------------------------<  79  3.9   |  21<L>  |  0.61    Ca    9.9      23 Oct 2021 08:41  Phos  5.1     10-23  Mg     1.8     10-23    TPro  7.4  /  Alb  3.7  /  TBili  0.3  /  DBili  x   /  AST  13  /  ALT  10  /  AlkPhos  62  10-23          MICROBIOLOGY:      Culture - Surgical Swab (10.18.21 @ 14:55)    -  Vancomycin: S 2    -  Trimethoprim/Sulfamethoxazole: S <=0.5/9.5    -  Trimethoprim/Sulfamethoxazole: S <=0.5/9.5    -  Vancomycin: S 2    Gram Stain:   No organisms seen  Rare WBC's    -  Ampicillin: S <=2 Predicts results to ampicillin/sulbactam, amoxacillin-clavulanate and  piperacillin-tazobactam.    -  Cefazolin: R <=4    -  Ceftazidime: R >16    -  Clindamycin: R >4    -  Daptomycin: S <=0.25    -  Daptomycin: S 1    -  Erythromycin: R >4    -  Levofloxacin: R >4    -  Linezolid: S 1    -  Oxacillin: R >2    -  RIF- Rifampin: R >2 Should not be used as monotherapy    Specimen Source: .Surgical Swab Left ankle joint #2    Culture Results:   Rare Staphylococcus epidermidis  Rare Enterococcus faecalis  Rare Stenotrophomonas maltophilia  Rare Candida albicans  Pending further antibiotic susceptibility testing  at [facility]    Organism Identification: Staphylococcus epidermidis  Enterococcus faecalis  Stenotrophomonas maltophilia    Organism: Staphylococcus epidermidis    Organism: Enterococcus faecalis    Organism: Stenotrophomonas maltophilia    Method Type: BASIL    Method Type: BASIL    Method Type: BASIL          RADIOLOGY & ADDITIONAL STUDIES:

## 2021-10-25 LAB
ANION GAP SERPL CALC-SCNC: 12 MMOL/L — SIGNIFICANT CHANGE UP (ref 5–17)
APTT BLD: 33.1 SEC — SIGNIFICANT CHANGE UP (ref 27.5–35.5)
BLD GP AB SCN SERPL QL: NEGATIVE — SIGNIFICANT CHANGE UP
BUN SERPL-MCNC: 6 MG/DL — LOW (ref 7–23)
CALCIUM SERPL-MCNC: 9.4 MG/DL — SIGNIFICANT CHANGE UP (ref 8.4–10.5)
CHLORIDE SERPL-SCNC: 105 MMOL/L — SIGNIFICANT CHANGE UP (ref 96–108)
CO2 SERPL-SCNC: 20 MMOL/L — LOW (ref 22–31)
CREAT SERPL-MCNC: 0.55 MG/DL — SIGNIFICANT CHANGE UP (ref 0.5–1.3)
GLUCOSE SERPL-MCNC: 93 MG/DL — SIGNIFICANT CHANGE UP (ref 70–99)
HCG UR QL: NEGATIVE — SIGNIFICANT CHANGE UP
HCT VFR BLD CALC: 27.8 % — LOW (ref 34.5–45)
HGB BLD-MCNC: 8.9 G/DL — LOW (ref 11.5–15.5)
INR BLD: 1.17 — HIGH (ref 0.88–1.16)
MCHC RBC-ENTMCNC: 28.3 PG — SIGNIFICANT CHANGE UP (ref 27–34)
MCHC RBC-ENTMCNC: 32 GM/DL — SIGNIFICANT CHANGE UP (ref 32–36)
MCV RBC AUTO: 88.3 FL — SIGNIFICANT CHANGE UP (ref 80–100)
NRBC # BLD: 0 /100 WBCS — SIGNIFICANT CHANGE UP (ref 0–0)
PLATELET # BLD AUTO: 406 K/UL — HIGH (ref 150–400)
POTASSIUM SERPL-MCNC: 3.6 MMOL/L — SIGNIFICANT CHANGE UP (ref 3.5–5.3)
POTASSIUM SERPL-SCNC: 3.6 MMOL/L — SIGNIFICANT CHANGE UP (ref 3.5–5.3)
PROTHROM AB SERPL-ACNC: 13.9 SEC — HIGH (ref 10.6–13.6)
RBC # BLD: 3.15 M/UL — LOW (ref 3.8–5.2)
RBC # FLD: 23.2 % — HIGH (ref 10.3–14.5)
RH IG SCN BLD-IMP: NEGATIVE — SIGNIFICANT CHANGE UP
SARS-COV-2 RNA SPEC QL NAA+PROBE: SIGNIFICANT CHANGE UP
SODIUM SERPL-SCNC: 137 MMOL/L — SIGNIFICANT CHANGE UP (ref 135–145)
WBC # BLD: 6.4 K/UL — SIGNIFICANT CHANGE UP (ref 3.8–10.5)
WBC # FLD AUTO: 6.4 K/UL — SIGNIFICANT CHANGE UP (ref 3.8–10.5)

## 2021-10-25 RX ORDER — POLYETHYLENE GLYCOL 3350 17 G/17G
17 POWDER, FOR SOLUTION ORAL
Refills: 0 | Status: DISCONTINUED | OUTPATIENT
Start: 2021-10-25 | End: 2021-11-05

## 2021-10-25 RX ORDER — CALCIUM CARBONATE 500(1250)
1 TABLET ORAL DAILY
Refills: 0 | Status: DISCONTINUED | OUTPATIENT
Start: 2021-10-25 | End: 2021-11-05

## 2021-10-25 RX ORDER — DIPHENHYDRAMINE HCL 50 MG
25 CAPSULE ORAL EVERY 4 HOURS
Refills: 0 | Status: DISCONTINUED | OUTPATIENT
Start: 2021-10-25 | End: 2021-11-04

## 2021-10-25 RX ORDER — SENNA PLUS 8.6 MG/1
2 TABLET ORAL AT BEDTIME
Refills: 0 | Status: DISCONTINUED | OUTPATIENT
Start: 2021-10-25 | End: 2021-11-05

## 2021-10-25 RX ORDER — METOCLOPRAMIDE HCL 10 MG
10 TABLET ORAL EVERY 6 HOURS
Refills: 0 | Status: DISCONTINUED | OUTPATIENT
Start: 2021-10-25 | End: 2021-11-05

## 2021-10-25 RX ORDER — ENOXAPARIN SODIUM 100 MG/ML
40 INJECTION SUBCUTANEOUS EVERY 24 HOURS
Refills: 0 | Status: DISCONTINUED | OUTPATIENT
Start: 2021-10-26 | End: 2021-11-05

## 2021-10-25 RX ORDER — DIPHENHYDRAMINE HCL 50 MG
25 CAPSULE ORAL EVERY 4 HOURS
Refills: 0 | Status: DISCONTINUED | OUTPATIENT
Start: 2021-10-25 | End: 2021-11-05

## 2021-10-25 RX ORDER — ACETAMINOPHEN 500 MG
975 TABLET ORAL EVERY 6 HOURS
Refills: 0 | Status: DISCONTINUED | OUTPATIENT
Start: 2021-10-25 | End: 2021-11-05

## 2021-10-25 RX ORDER — SODIUM CHLORIDE 9 MG/ML
1000 INJECTION, SOLUTION INTRAVENOUS
Refills: 0 | Status: DISCONTINUED | OUTPATIENT
Start: 2021-10-25 | End: 2021-11-05

## 2021-10-25 RX ORDER — DAPTOMYCIN 500 MG/10ML
500 INJECTION, POWDER, LYOPHILIZED, FOR SOLUTION INTRAVENOUS EVERY 24 HOURS
Refills: 0 | Status: DISCONTINUED | OUTPATIENT
Start: 2021-10-25 | End: 2021-11-05

## 2021-10-25 RX ORDER — LANOLIN ALCOHOL/MO/W.PET/CERES
3 CREAM (GRAM) TOPICAL AT BEDTIME
Refills: 0 | Status: DISCONTINUED | OUTPATIENT
Start: 2021-10-25 | End: 2021-11-05

## 2021-10-25 RX ORDER — OXYCODONE HYDROCHLORIDE 5 MG/1
5 TABLET ORAL EVERY 4 HOURS
Refills: 0 | Status: DISCONTINUED | OUTPATIENT
Start: 2021-10-25 | End: 2021-11-01

## 2021-10-25 RX ORDER — HYDROMORPHONE HYDROCHLORIDE 2 MG/ML
0.5 INJECTION INTRAMUSCULAR; INTRAVENOUS; SUBCUTANEOUS
Refills: 0 | Status: DISCONTINUED | OUTPATIENT
Start: 2021-10-25 | End: 2021-11-01

## 2021-10-25 RX ORDER — OXYCODONE HYDROCHLORIDE 5 MG/1
10 TABLET ORAL EVERY 4 HOURS
Refills: 0 | Status: DISCONTINUED | OUTPATIENT
Start: 2021-10-25 | End: 2021-11-01

## 2021-10-25 RX ORDER — ACETAMINOPHEN 500 MG
1000 TABLET ORAL ONCE
Refills: 0 | Status: COMPLETED | OUTPATIENT
Start: 2021-10-25 | End: 2021-10-25

## 2021-10-25 RX ORDER — GABAPENTIN 400 MG/1
600 CAPSULE ORAL EVERY 6 HOURS
Refills: 0 | Status: DISCONTINUED | OUTPATIENT
Start: 2021-10-25 | End: 2021-11-05

## 2021-10-25 RX ORDER — FLUCONAZOLE 150 MG/1
400 TABLET ORAL EVERY 24 HOURS
Refills: 0 | Status: DISCONTINUED | OUTPATIENT
Start: 2021-10-25 | End: 2021-10-31

## 2021-10-25 RX ORDER — ONDANSETRON 8 MG/1
4 TABLET, FILM COATED ORAL ONCE
Refills: 0 | Status: DISCONTINUED | OUTPATIENT
Start: 2021-10-25 | End: 2021-11-05

## 2021-10-25 RX ORDER — CHOLECALCIFEROL (VITAMIN D3) 125 MCG
1000 CAPSULE ORAL DAILY
Refills: 0 | Status: DISCONTINUED | OUTPATIENT
Start: 2021-10-25 | End: 2021-11-05

## 2021-10-25 RX ADMIN — HYDROMORPHONE HYDROCHLORIDE 0.5 MILLIGRAM(S): 2 INJECTION INTRAMUSCULAR; INTRAVENOUS; SUBCUTANEOUS at 06:25

## 2021-10-25 RX ADMIN — FLUCONAZOLE 100 MILLIGRAM(S): 150 TABLET ORAL at 18:54

## 2021-10-25 RX ADMIN — Medication 975 MILLIGRAM(S): at 19:42

## 2021-10-25 RX ADMIN — DAPTOMYCIN 120 MILLIGRAM(S): 500 INJECTION, POWDER, LYOPHILIZED, FOR SOLUTION INTRAVENOUS at 18:24

## 2021-10-25 RX ADMIN — HYDROMORPHONE HYDROCHLORIDE 0.5 MILLIGRAM(S): 2 INJECTION INTRAMUSCULAR; INTRAVENOUS; SUBCUTANEOUS at 02:29

## 2021-10-25 RX ADMIN — Medication 3 MILLIGRAM(S): at 22:32

## 2021-10-25 RX ADMIN — POLYETHYLENE GLYCOL 3350 17 GRAM(S): 17 POWDER, FOR SOLUTION ORAL at 19:41

## 2021-10-25 RX ADMIN — HYDROMORPHONE HYDROCHLORIDE 0.5 MILLIGRAM(S): 2 INJECTION INTRAMUSCULAR; INTRAVENOUS; SUBCUTANEOUS at 22:59

## 2021-10-25 RX ADMIN — HYDROMORPHONE HYDROCHLORIDE 0.5 MILLIGRAM(S): 2 INJECTION INTRAMUSCULAR; INTRAVENOUS; SUBCUTANEOUS at 22:39

## 2021-10-25 RX ADMIN — HYDROMORPHONE HYDROCHLORIDE 0.5 MILLIGRAM(S): 2 INJECTION INTRAMUSCULAR; INTRAVENOUS; SUBCUTANEOUS at 06:39

## 2021-10-25 RX ADMIN — Medication 1000 UNIT(S): at 19:41

## 2021-10-25 RX ADMIN — HYDROMORPHONE HYDROCHLORIDE 0.5 MILLIGRAM(S): 2 INJECTION INTRAMUSCULAR; INTRAVENOUS; SUBCUTANEOUS at 02:50

## 2021-10-25 RX ADMIN — Medication 1000 MILLIGRAM(S): at 09:38

## 2021-10-25 RX ADMIN — Medication 1 TABLET(S): at 19:41

## 2021-10-25 RX ADMIN — GABAPENTIN 600 MILLIGRAM(S): 400 CAPSULE ORAL at 19:41

## 2021-10-25 RX ADMIN — Medication 1 TABLET(S): at 19:43

## 2021-10-25 RX ADMIN — Medication 400 MILLIGRAM(S): at 09:08

## 2021-10-25 RX ADMIN — CHLORHEXIDINE GLUCONATE 1 APPLICATION(S): 213 SOLUTION TOPICAL at 06:25

## 2021-10-25 RX ADMIN — Medication 1 APPLICATION(S): at 06:25

## 2021-10-25 NOTE — PACU DISCHARGE NOTE - PAIN:
Controlled with current regime
denies pain

## 2021-10-25 NOTE — CHART NOTE - NSCHARTNOTEFT_GEN_A_CORE
Admitting Diagnosis:   Patient is a 26y old  Female who presents with a chief complaint of L ankle pain (25 Oct 2021 06:04)    PAST MEDICAL & SURGICAL HISTORY:  Left tibial neuropathy  PAD (peripheral artery disease)  Status post ORIF of fracture of ankle    Current Nutrition Order: NPO; Regular after sx     PO Intake: Good (%) [   ]  Fair (50-75%) [X] Poor (<25%) [   ]    GI Issues:   No n/v/d/c    Pain:  3/10 as per flow sheets.       Skin Integrity:  loulou 20  sx incision: L ankle    Labs:   10-25    137  |  105  |  6<L>  ----------------------------<  93  3.6   |  20<L>  |  0.55    Ca    9.4      25 Oct 2021 10:39    Admitting Anthropometrics:   Height: 5'0, 175.3 cm   Weight: 181.2 lb, 82.2kg   BMI: 26.8  IBW: 145    Weight Change:   no weight changes reported.    Estimated energy needs:   65.9kg  IBW used for calculations as pt >120% of IBW, adjusted for acute illness  Needs adjusted for wound healing  Calories: 1650-1950kcals (25-30kcals/kg)  Protein: 95-130g (1.5-2.0g/kg)   Fluid:+1950ml/day (30ml/kcals)    Subjective:   26yFemale s/p Repeat L Ankle I&D, Vac Change by Dr. NEAL Wallis on 09-17, with plastic surgery for flap harvest and closure with plastic surgery (9/02). PICC line with IV abx. Continue dangle 4x per day and working with therapies. Per ortho, S/P I&D down to bone with application of multiplanar external fixation system (10/18). Plan for repeat flap debridement per plastics today 10/25 +/- ortho involvement for ex fix adjustment as needed. D/c date is unknown at this time pending hospital course and clinical improvement. TRACEY disposition is anticipated.     On assessment, pt was resting comfortably in her bed. Pt is currently NPO pending sx but reports having a better appetite over the last few days. She has been enjoying her ensure max supplements, but did not like the LPS. Provided her with extra ensure maxes and spoke with Plastics to put an order in BID. Encouraged continuation of increased PO/protein intake. Pt seemed very receptive. Please see nutrition recommendations below.      Previous Nutrition Diagnosis: Increased nutrient needs (protein) R/T hypermetabolic, increased protein catabolism AEB 1.5-2.0g/kg protein    Active [X]  Resolved [   ]    Goal: Pt to consistently meet % of estimated needs PO     Recommendations:  1. Continue regular diet after surgery, encourage increased PO intake during mealtimes  >>Ensure Max BID  2. Monitor %PO intake   3. Continue MVI   4. Bowel regimen PRN    Education: Continue to encourage PO/protein intake.     Risk Level: High [   ] Moderate [X] Low [   ] Admitting Diagnosis:   Patient is a 26y old  Female who presents with a chief complaint of L ankle pain (25 Oct 2021 06:04)    PAST MEDICAL & SURGICAL HISTORY:  Left tibial neuropathy  PAD (peripheral artery disease)  Status post ORIF of fracture of ankle    Current Nutrition Order: NPO; Regular after sx     PO Intake: Good (%) [   ]  Fair (50-75%) [X] Poor (<25%) [   ]    GI Issues:   No n/v/d/c    Pain:  3/10 as per flow sheets.       Skin Integrity:  loulou 20  sx incision: L ankle    Labs:   10-25    137  |  105  |  6<L>  ----------------------------<  93  3.6   |  20<L>  |  0.55    Ca    9.4      25 Oct 2021 10:39    Admitting Anthropometrics:   Height: 5'0, 175.3 cm   Weight: 181.2 lb, 82.2kg   BMI: 26.8  IBW: 145    Weight Change:   9/07: 194 lb  9/09: 190.1 lb  9/20: 181.3 lb  10/25: 181.3 lb (today)  **6.5% wt loss between September 7th and September 20th, however weight has stabilized since.     Estimated energy needs:   65.9kg  IBW used for calculations as pt >120% of IBW, adjusted for acute illness  Needs adjusted for wound healing  Calories: 1650-1950kcals (25-30kcals/kg)  Protein: 95-130g (1.5-2.0g/kg)   Fluid:+1950ml/day (30ml/kcals)    Subjective:   26yFemale s/p Repeat L Ankle I&D, Vac Change by Dr. NEAL Wallis on 09-17, with plastic surgery for flap harvest and closure with plastic surgery (9/02). PICC line with IV abx. Continue dangle 4x per day and working with therapies. Per ortho, S/P I&D down to bone with application of multiplanar external fixation system (10/18). Plan for repeat flap debridement per plastics today 10/25 +/- ortho involvement for ex fix adjustment as needed. D/c date is unknown at this time pending hospital course and clinical improvement. TRACEY disposition is anticipated.     On assessment, pt was resting comfortably in her bed. Pt is currently NPO pending sx but reports having a better appetite over the last few days. She has been enjoying her ensure max supplements, but did not like the LPS. Provided her with extra ensure maxes and spoke with Plastics to put an order in BID. Encouraged continuation of increased PO/protein intake. Pt seemed very receptive. Please see nutrition recommendations below.      Previous Nutrition Diagnosis: Increased nutrient needs (protein) R/T hypermetabolic, increased protein catabolism AEB 1.5-2.0g/kg protein    Active [X]  Resolved [   ]    Goal: Pt to consistently meet % of estimated needs PO     Recommendations:  1. Continue regular diet after surgery, encourage increased PO intake during mealtimes  >>Ensure Max BID  2. Monitor %PO intake   3. Continue MVI   4. Bowel regimen PRN    Education: Continue to encourage PO/protein intake.     Risk Level: High [   ] Moderate [X] Low [   ]

## 2021-10-25 NOTE — PACU DISCHARGE NOTE - COMMENTS
d/c to floor care in no distress and discomfort,, no significant changes on patient LLE neuromuscular motor/sensory nerve status
patient met PACU criteria, VSS, L ankle wound vac dsng  intact with palpable DP pulse, L thigh skin graft site with ACE Wrap intact, report endorsed to RN Chiara, patient transferred to 9 U[facility] via bed accompanied by PCA x2
Pt s/p I and D and ext fixator application. left foot warm to touch, palpable pulses, dsg dry and intact. A and O x4. Pain relieved by Hydromorphone .5 mg iv x2. Offirmev given with good pain relief. 4/10 from 10/10. Breathing on room air sats 96%. LR at 120cc/hr via left hand HL. Picc 2 lumen heplocked. Report given to 9 U RN, pt okay to transfer upstairs.
Patient is hemodynamically stable and reports no pain.  Meets PACU discharge criteria.  Report given to unit RN.  Patient transported via bed to unit.  Accompanied by PCAx2
Covering RN Cathy gave report to 9 uris RN. Cleared by anesthesiologist to be transferred to floor.

## 2021-10-25 NOTE — PRE-OP CHECKLIST - SELECT TESTS ORDERED
BMP/CBC/CMP/PT/PTT/INR/Type and Screen/Urinalysis/CXR/COVID-19
BMP/CBC/CMP/INR/Type and Cross/Type and Screen/Urinalysis/HCG/UCG/COVID-19
BMP/CBC/PT/PTT/INR/Type and Cross/Type and Screen/Urinalysis/EKG/CXR/COVID-19
BMP/CBC/CMP/PT/PTT/INR/UCG/EKG/COVID-19
CBC/CMP/PT/PTT/INR/Type and Screen/UCG/EKG/COVID-19
Results in MD note

## 2021-10-25 NOTE — PROGRESS NOTE ADULT - SUBJECTIVE AND OBJECTIVE BOX
Ortho Note    Pt comfortable without complaints, pain controlled and continuing to improve  Denies CP, SOB, N/V, numbness/tingling   NPO since midnight    Vital Signs Last 24 Hrs  T(C): 36.7 (25 Oct 2021 05:13), Max: 36.9 (24 Oct 2021 12:44)  T(F): 98.1 (25 Oct 2021 05:13), Max: 98.5 (24 Oct 2021 12:44)  HR: 83 (25 Oct 2021 05:13) (77 - 108)  BP: 107/73 (25 Oct 2021 05:13) (98/65 - 133/82)  BP(mean): --  RR: 17 (25 Oct 2021 05:13) (17 - 17)  SpO2: 95% (25 Oct 2021 05:13) (95% - 100%)    VSS  General: A&Ox3, NAD  LLE: Ringed external fixator in place w ace wrap; Pin site bolster dressings well appearing; Flap with Xeroform overlying portion and wet to dry dressing over open portion  Vascular: Toes WWP; Cap refill < 2 sec  Sensation: Patient with abnormal/absent sensation over most of the dorsum & plantar aspects of the foot consistent with baseline  Motor: Minimal FHL/FDL, 0/5 EHL/EDL activity consistent with baseline    Labs:                        9.5    7.69  )-----------( 433      ( 23 Oct 2021 08:41 )             28.6       10-23    136  |  103  |  9   ----------------------------<  79  3.9   |  21<L>  |  0.61    Ca    9.9      23 Oct 2021 08:41  Phos  5.1     10-23  Mg     1.8     10-23    TPro  7.4  /  Alb  3.7  /  TBili  0.3  /  DBili  x   /  AST  13  /  ALT  10  /  AlkPhos  62  10-23

## 2021-10-25 NOTE — PROGRESS NOTE ADULT - ASSESSMENT
A/P: 26yFemale s/p repeat ankle I&D by Dr. Wallis on 09/17 w L gracilis free flap and STSG on 09/20 now s/p I&D of flap by plastics team and placement of ringed external fixator by ortho 10/18  - VS Stable  - Pain/Nausea Control  - Home meds  - DVT ppx: per plastics team  - WBS: WBAT in ringed ex fix w RW assistance  - Continued operative flap care mgmt per plastics team  - 10/18 OR cultures now showing polymicrobial infection versus colonization -- ID recommending Abx Daptomycin and Fluconazole as of 10/21 -- will continue to monitor, continued Abx stewardship appreciated  - Plan for repeat flap debridement per plastics today +/- ortho involvement for ex fix adjustment as needed  - Definitive surgical plan for bony stabilization pending clinical course re: resolution of infection and appropriate soft tissue coverage  - Please continue to optimize electrolytes/nutrition in order to optimize soft tissue healing potential  - NPO since midnight      Ortho Pager [phone]

## 2021-10-26 LAB
ANION GAP SERPL CALC-SCNC: 12 MMOL/L — SIGNIFICANT CHANGE UP (ref 5–17)
BUN SERPL-MCNC: 8 MG/DL — SIGNIFICANT CHANGE UP (ref 7–23)
CALCIUM SERPL-MCNC: 9.7 MG/DL — SIGNIFICANT CHANGE UP (ref 8.4–10.5)
CHLORIDE SERPL-SCNC: 107 MMOL/L — SIGNIFICANT CHANGE UP (ref 96–108)
CO2 SERPL-SCNC: 20 MMOL/L — LOW (ref 22–31)
CREAT SERPL-MCNC: 0.51 MG/DL — SIGNIFICANT CHANGE UP (ref 0.5–1.3)
GLUCOSE SERPL-MCNC: 122 MG/DL — HIGH (ref 70–99)
HCT VFR BLD CALC: 26.7 % — LOW (ref 34.5–45)
HGB BLD-MCNC: 8.9 G/DL — LOW (ref 11.5–15.5)
MAGNESIUM SERPL-MCNC: 1.8 MG/DL — SIGNIFICANT CHANGE UP (ref 1.6–2.6)
MCHC RBC-ENTMCNC: 28.9 PG — SIGNIFICANT CHANGE UP (ref 27–34)
MCHC RBC-ENTMCNC: 33.3 GM/DL — SIGNIFICANT CHANGE UP (ref 32–36)
MCV RBC AUTO: 86.7 FL — SIGNIFICANT CHANGE UP (ref 80–100)
NRBC # BLD: 0 /100 WBCS — SIGNIFICANT CHANGE UP (ref 0–0)
PHOSPHATE SERPL-MCNC: 2.6 MG/DL — SIGNIFICANT CHANGE UP (ref 2.5–4.5)
PLATELET # BLD AUTO: 505 K/UL — HIGH (ref 150–400)
POTASSIUM SERPL-MCNC: 4.2 MMOL/L — SIGNIFICANT CHANGE UP (ref 3.5–5.3)
POTASSIUM SERPL-SCNC: 4.2 MMOL/L — SIGNIFICANT CHANGE UP (ref 3.5–5.3)
RBC # BLD: 3.08 M/UL — LOW (ref 3.8–5.2)
RBC # FLD: 23.4 % — HIGH (ref 10.3–14.5)
SODIUM SERPL-SCNC: 139 MMOL/L — SIGNIFICANT CHANGE UP (ref 135–145)
WBC # BLD: 8.17 K/UL — SIGNIFICANT CHANGE UP (ref 3.8–10.5)
WBC # FLD AUTO: 8.17 K/UL — SIGNIFICANT CHANGE UP (ref 3.8–10.5)

## 2021-10-26 PROCEDURE — 99232 SBSQ HOSP IP/OBS MODERATE 35: CPT

## 2021-10-26 RX ADMIN — Medication 1 TABLET(S): at 11:51

## 2021-10-26 RX ADMIN — Medication 975 MILLIGRAM(S): at 11:51

## 2021-10-26 RX ADMIN — OXYCODONE HYDROCHLORIDE 10 MILLIGRAM(S): 5 TABLET ORAL at 19:25

## 2021-10-26 RX ADMIN — Medication 1000 UNIT(S): at 11:50

## 2021-10-26 RX ADMIN — Medication 975 MILLIGRAM(S): at 01:22

## 2021-10-26 RX ADMIN — ENOXAPARIN SODIUM 40 MILLIGRAM(S): 100 INJECTION SUBCUTANEOUS at 05:44

## 2021-10-26 RX ADMIN — DAPTOMYCIN 120 MILLIGRAM(S): 500 INJECTION, POWDER, LYOPHILIZED, FOR SOLUTION INTRAVENOUS at 20:13

## 2021-10-26 RX ADMIN — GABAPENTIN 600 MILLIGRAM(S): 400 CAPSULE ORAL at 17:49

## 2021-10-26 RX ADMIN — OXYCODONE HYDROCHLORIDE 10 MILLIGRAM(S): 5 TABLET ORAL at 03:08

## 2021-10-26 RX ADMIN — HYDROMORPHONE HYDROCHLORIDE 0.5 MILLIGRAM(S): 2 INJECTION INTRAMUSCULAR; INTRAVENOUS; SUBCUTANEOUS at 04:23

## 2021-10-26 RX ADMIN — GABAPENTIN 600 MILLIGRAM(S): 400 CAPSULE ORAL at 00:22

## 2021-10-26 RX ADMIN — GABAPENTIN 600 MILLIGRAM(S): 400 CAPSULE ORAL at 05:45

## 2021-10-26 RX ADMIN — Medication 3 MILLIGRAM(S): at 21:56

## 2021-10-26 RX ADMIN — HYDROMORPHONE HYDROCHLORIDE 0.5 MILLIGRAM(S): 2 INJECTION INTRAMUSCULAR; INTRAVENOUS; SUBCUTANEOUS at 08:03

## 2021-10-26 RX ADMIN — Medication 975 MILLIGRAM(S): at 06:22

## 2021-10-26 RX ADMIN — HYDROMORPHONE HYDROCHLORIDE 0.5 MILLIGRAM(S): 2 INJECTION INTRAMUSCULAR; INTRAVENOUS; SUBCUTANEOUS at 16:42

## 2021-10-26 RX ADMIN — Medication 975 MILLIGRAM(S): at 17:49

## 2021-10-26 RX ADMIN — Medication 975 MILLIGRAM(S): at 00:21

## 2021-10-26 RX ADMIN — HYDROMORPHONE HYDROCHLORIDE 0.5 MILLIGRAM(S): 2 INJECTION INTRAMUSCULAR; INTRAVENOUS; SUBCUTANEOUS at 16:27

## 2021-10-26 RX ADMIN — OXYCODONE HYDROCHLORIDE 10 MILLIGRAM(S): 5 TABLET ORAL at 02:38

## 2021-10-26 RX ADMIN — Medication 975 MILLIGRAM(S): at 18:19

## 2021-10-26 RX ADMIN — GABAPENTIN 600 MILLIGRAM(S): 400 CAPSULE ORAL at 11:51

## 2021-10-26 RX ADMIN — Medication 975 MILLIGRAM(S): at 05:45

## 2021-10-26 RX ADMIN — HYDROMORPHONE HYDROCHLORIDE 0.5 MILLIGRAM(S): 2 INJECTION INTRAMUSCULAR; INTRAVENOUS; SUBCUTANEOUS at 04:05

## 2021-10-26 RX ADMIN — OXYCODONE HYDROCHLORIDE 10 MILLIGRAM(S): 5 TABLET ORAL at 11:51

## 2021-10-26 RX ADMIN — FLUCONAZOLE 100 MILLIGRAM(S): 150 TABLET ORAL at 17:55

## 2021-10-26 NOTE — PROGRESS NOTE ADULT - SUBJECTIVE AND OBJECTIVE BOX
SUBJECTIVE:  Doing well POD1.   No overnight events.     OBJECTIVE:     ** VITAL SIGNS / I&O's **    Vital Signs Last 24 Hrs  T(C): 36.7 (26 Oct 2021 05:26), Max: 37 (25 Oct 2021 16:45)  T(F): 98.1 (26 Oct 2021 05:26), Max: 98.6 (25 Oct 2021 16:45)  HR: 77 (26 Oct 2021 05:26) (75 - 98)  BP: 105/70 (26 Oct 2021 05:26) (98/58 - 118/69)  BP(mean): 82 (26 Oct 2021 05:26) (73 - 89)  RR: 18 (26 Oct 2021 05:26) (14 - 24)  SpO2: 95% (26 Oct 2021 05:26) (93% - 99%)      25 Oct 2021 07:01  -  26 Oct 2021 07:00  --------------------------------------------------------  IN:    IV PiggyBack: 100 mL    IV PiggyBack: 60 mL    IV PiggyBack: 200 mL    Lactated Ringers: 100 mL    Lactated Ringers: 1175 mL    Oral Fluid: 110 mL  Total IN: 1745 mL    OUT:    Voided (mL): 2100 mL  Total OUT: 2100 mL    Total NET: -355 mL          ** PHYSICAL EXAM **    -- CONSTITUTIONAL: Alert, Awake. NAD.   -- RESPIRATORY: unlabored breathing, no respiratory distress  -- L thigh: donor site dressing c/d/i  -- L ankle: ex fix in place w ace wrap; STSG with vac in place holding suction, no collections      ** LABS **                          8.9    8.17  )-----------( 505      ( 26 Oct 2021 06:56 )             26.7     25 Oct 2021 10:39    137    |  105    |  6      ----------------------------<  93     3.6     |  20     |  0.55     Ca    9.4        25 Oct 2021 10:39      PT/INR - ( 25 Oct 2021 10:39 )   PT: 13.9 sec;   INR: 1.17          PTT - ( 25 Oct 2021 10:39 )  PTT:33.1 sec  CAPILLARY BLOOD GLUCOSE

## 2021-10-26 NOTE — PROGRESS NOTE ADULT - SUBJECTIVE AND OBJECTIVE BOX
INTERVAL HPI/OVERNIGHT EVENTS:    Patient was seen and examined at bedside.  Went to OR yesterday for closure/further flap debridement     CONSTITUTIONAL:  Negative fever or chills, feels well, good appetite  EYES:  Negative  blurry vision or double vision  CARDIOVASCULAR:  Negative for chest pain or palpitations  RESPIRATORY:  Negative for cough, wheezing, or SOB   GASTROINTESTINAL:  Negative for nausea, vomiting, diarrhea, constipation, or abdominal pain  GENITOURINARY:  Negative frequency, urgency or dysuria  NEUROLOGIC:  No headache, confusion, dizziness, lightheadedness      ANTIBIOTICS/RELEVANT:    MEDICATIONS  (STANDING):  acetaminophen     Tablet .. 975 milliGRAM(s) Oral every 6 hours  calcium carbonate   1250 mG (OsCal) 1 Tablet(s) Oral daily  cholecalciferol 1000 Unit(s) Oral daily  DAPTOmycin IVPB 500 milliGRAM(s) IV Intermittent every 24 hours  enoxaparin Injectable 40 milliGRAM(s) SubCutaneous every 24 hours  fluconAZOLE IVPB 400 milliGRAM(s) IV Intermittent every 24 hours  gabapentin 600 milliGRAM(s) Oral every 6 hours  lactated ringers. 1000 milliLiter(s) (100 mL/Hr) IV Continuous <Continuous>  melatonin 3 milliGRAM(s) Oral at bedtime  multivitamin 1 Tablet(s) Oral daily  ondansetron Injectable 4 milliGRAM(s) IV Push once  polyethylene glycol 3350 17 Gram(s) Oral two times a day  senna 2 Tablet(s) Oral at bedtime    MEDICATIONS  (PRN):  bisacodyl Suppository 10 milliGRAM(s) Rectal daily PRN Constipation  diphenhydrAMINE 25 milliGRAM(s) Oral every 4 hours PRN Rash and/or Itching  diphenhydrAMINE 25 milliGRAM(s) Oral every 4 hours PRN Rash and/or Itching  HYDROmorphone  Injectable 0.5 milliGRAM(s) IV Push every 3 hours PRN breakthrough pain  metoclopramide Injectable 10 milliGRAM(s) IV Push every 6 hours PRN breakthrough nausea or emesis  oxyCODONE    IR 5 milliGRAM(s) Oral every 4 hours PRN Moderate Pain (4 - 6)  oxyCODONE    IR 10 milliGRAM(s) Oral every 4 hours PRN Severe Pain (7 - 10)        Vital Signs Last 24 Hrs  T(C): 36.5 (26 Oct 2021 08:47), Max: 37 (25 Oct 2021 16:45)  T(F): 97.7 (26 Oct 2021 08:47), Max: 98.6 (25 Oct 2021 16:45)  HR: 81 (26 Oct 2021 08:47) (75 - 98)  BP: 117/77 (26 Oct 2021 08:47) (98/58 - 118/69)  BP(mean): 82 (26 Oct 2021 05:26) (73 - 89)  RR: 18 (26 Oct 2021 08:47) (14 - 24)  SpO2: 96% (26 Oct 2021 08:47) (93% - 99%)    PHYSICAL EXAM:  Constitutional:Well-developed, well nourished  Eyes:GLORY, EOMI  Ear/Nose/Throat: no oral lesion, no sinus tenderness on percussion	  Neck:no JVD, no lymphadenopathy, supple  Respiratory: CTA rhett  Cardiovascular: S1S2 RRR, no murmurs  Gastrointestinal:soft, (+) BS, no HSM  Extremities:no e/e/c  Vascular: DP Pulse:	right normal; left normal      LABS:                        8.9    8.17  )-----------( 505      ( 26 Oct 2021 06:56 )             26.7     10-26    139  |  107  |  8   ----------------------------<  122<H>  4.2   |  20<L>  |  0.51    Ca    9.7      26 Oct 2021 06:56  Phos  2.6     10-26  Mg     1.8     10-26      PT/INR - ( 25 Oct 2021 10:39 )   PT: 13.9 sec;   INR: 1.17          PTT - ( 25 Oct 2021 10:39 )  PTT:33.1 sec      MICROBIOLOGY:    RADIOLOGY & ADDITIONAL STUDIES:

## 2021-10-26 NOTE — PROGRESS NOTE ADULT - SUBJECTIVE AND OBJECTIVE BOX
Ortho Note    Pt comfortable without complaints, pain controlled after RTOR yesterday  States she "doesn't quite feel like herself" but feels "not too bad"  Denies CP, SOB, N/V, numbness/tingling     Vital Signs Last 24 Hrs  T(C): 36.7 (26 Oct 2021 05:26), Max: 37 (25 Oct 2021 16:45)  T(F): 98.1 (26 Oct 2021 05:26), Max: 98.6 (25 Oct 2021 16:45)  HR: 77 (26 Oct 2021 05:26) (75 - 98)  BP: 105/70 (26 Oct 2021 05:26) (98/58 - 118/69)  BP(mean): 82 (26 Oct 2021 05:26) (73 - 89)  RR: 18 (26 Oct 2021 05:26) (14 - 24)  SpO2: 95% (26 Oct 2021 05:26) (93% - 99%)    VSS  General: A&Ox3, NAD  LLE: Ringed external fixator in place w ace wrap; Pin site bolster dressings well appearing; Flap with vac in place holding suction without significant output  Vascular: Toes WWP; Cap refill < 2 sec  Sensation: Patient with abnormal/absent sensation over most of the dorsum & plantar aspects of the foot consistent with baseline  Motor: Minimal FHL/FDL, 0/5 EHL/EDL activity consistent with baseline    Labs:                        8.9    8.17  )-----------( 505      ( 26 Oct 2021 06:56 )             26.7       10-25    137  |  105  |  6<L>  ----------------------------<  93  3.6   |  20<L>  |  0.55    Ca    9.4      25 Oct 2021 10:39            PT/INR - ( 25 Oct 2021 10:39 )   PT: 13.9 sec;   INR: 1.17          PTT - ( 25 Oct 2021 10:39 )  PTT:33.1 sec

## 2021-10-26 NOTE — PROGRESS NOTE ADULT - ASSESSMENT
25 y/o female s/p Left ankle recon with gracilis free flap and STSG  - keep vac in place x 7 days  - bed rest (no weight bearing)  - regular diet  - prn analgesia

## 2021-10-26 NOTE — PROGRESS NOTE ADULT - ASSESSMENT
IMPRESSION:  26F h/o L ankle fracture with septic arthritis, postoperative compartment syndrome s/p RICK, I&D, bone debridement and ligament repair on 9/9, I&D with tissue debridement to bone on 9/12, repeat I&D, arthrotomy of ankle, L ankle reconstruction with L gracilis free flap and L thigh STSG, microvascular ansastomosis with recipient AT on 9/20.  OR culture 9/9 grew MSSA and S. anginosus.  9/20 OR culture grew MSSA.      She recently went back to the OR on 10/18 for debridement of left gracilis free flap.  Cultures are polymicrobial - unclear which are pathogens and which are colonizers.  No organisms seem on gram stain.  I would target the candida and Enterococcus given that they are in multiple cultures.  Coag negative staph could be a colonizer however it will be covered with gram positive therapy.  At this time will not treat stenotrophomonas as it is seen on only one culture, could be a colonizer given prolonged hospitalization, it is difficult to treat and there are no gram negative rods on gram stain.  Gram negative rods also less likely to adhere to hardware.    She is s/p flap debridement with closure on 10/25          Recommend:  1.  Continue Daptomycin 500 mg IV daily --> will cover all gram positives in her culture  2.  Check CK daily  3.  Continue fluconazole 400 mg PO daily to cover Candida albicans and C. parapsilosis  4. Check repeat EKG.  Last QTc was 472.  If QTc > 500, will recommend switching to another agent     ID team 2 will follow.

## 2021-10-26 NOTE — PROGRESS NOTE ADULT - ASSESSMENT
A/P: 26yFemale s/p repeat ankle I&D by Dr. Wallis on 09/17 w L gracilis free flap and STSG on 09/20 now s/p I&D of flap by plastics team and placement of ringed external fixator by ortho 10/18 and s/p repeat flap debridement and STSG by plastics on 10/25  - VS Stable  - Pain/Nausea Control  - Home meds  - DVT ppx: per plastics team  - WBS: WBAT in ringed ex fix w RW assistance  - Continued operative flap care mgmt per plastics team  - 10/18 OR cultures now showing polymicrobial infection versus colonization -- ID recommending Abx Daptomycin and Fluconazole as of 10/21 -- will continue to monitor, continued Abx stewardship appreciated  - Plan for repeat surgical flap care per plastics team -- No imminent plans for RTOR as of now  - Definitive surgical plan for bony stabilization pending clinical course re: resolution of infection and appropriate soft tissue coverage --- Possible plan for definitive fixation in approx 6 weeks pending resolution of infection and flap maturation  - Please continue to optimize electrolytes/nutrition in order to optimize soft tissue healing potential  - Remainder of care per primary team      Ortho Pager [phone]

## 2021-10-27 PROCEDURE — 99232 SBSQ HOSP IP/OBS MODERATE 35: CPT

## 2021-10-27 RX ADMIN — Medication 975 MILLIGRAM(S): at 19:19

## 2021-10-27 RX ADMIN — Medication 3 MILLIGRAM(S): at 22:51

## 2021-10-27 RX ADMIN — Medication 975 MILLIGRAM(S): at 11:10

## 2021-10-27 RX ADMIN — OXYCODONE HYDROCHLORIDE 10 MILLIGRAM(S): 5 TABLET ORAL at 00:05

## 2021-10-27 RX ADMIN — HYDROMORPHONE HYDROCHLORIDE 0.5 MILLIGRAM(S): 2 INJECTION INTRAMUSCULAR; INTRAVENOUS; SUBCUTANEOUS at 02:13

## 2021-10-27 RX ADMIN — HYDROMORPHONE HYDROCHLORIDE 0.5 MILLIGRAM(S): 2 INJECTION INTRAMUSCULAR; INTRAVENOUS; SUBCUTANEOUS at 16:51

## 2021-10-27 RX ADMIN — Medication 975 MILLIGRAM(S): at 00:09

## 2021-10-27 RX ADMIN — Medication 1 TABLET(S): at 11:10

## 2021-10-27 RX ADMIN — FLUCONAZOLE 100 MILLIGRAM(S): 150 TABLET ORAL at 19:19

## 2021-10-27 RX ADMIN — OXYCODONE HYDROCHLORIDE 10 MILLIGRAM(S): 5 TABLET ORAL at 04:20

## 2021-10-27 RX ADMIN — GABAPENTIN 600 MILLIGRAM(S): 400 CAPSULE ORAL at 11:11

## 2021-10-27 RX ADMIN — HYDROMORPHONE HYDROCHLORIDE 0.5 MILLIGRAM(S): 2 INJECTION INTRAMUSCULAR; INTRAVENOUS; SUBCUTANEOUS at 11:12

## 2021-10-27 RX ADMIN — OXYCODONE HYDROCHLORIDE 10 MILLIGRAM(S): 5 TABLET ORAL at 19:20

## 2021-10-27 RX ADMIN — GABAPENTIN 600 MILLIGRAM(S): 400 CAPSULE ORAL at 19:19

## 2021-10-27 RX ADMIN — GABAPENTIN 600 MILLIGRAM(S): 400 CAPSULE ORAL at 05:44

## 2021-10-27 RX ADMIN — Medication 975 MILLIGRAM(S): at 00:35

## 2021-10-27 RX ADMIN — OXYCODONE HYDROCHLORIDE 10 MILLIGRAM(S): 5 TABLET ORAL at 03:46

## 2021-10-27 RX ADMIN — SENNA PLUS 2 TABLET(S): 8.6 TABLET ORAL at 22:49

## 2021-10-27 RX ADMIN — OXYCODONE HYDROCHLORIDE 10 MILLIGRAM(S): 5 TABLET ORAL at 07:46

## 2021-10-27 RX ADMIN — ENOXAPARIN SODIUM 40 MILLIGRAM(S): 100 INJECTION SUBCUTANEOUS at 05:45

## 2021-10-27 RX ADMIN — HYDROMORPHONE HYDROCHLORIDE 0.5 MILLIGRAM(S): 2 INJECTION INTRAMUSCULAR; INTRAVENOUS; SUBCUTANEOUS at 02:45

## 2021-10-27 RX ADMIN — Medication 975 MILLIGRAM(S): at 05:41

## 2021-10-27 RX ADMIN — OXYCODONE HYDROCHLORIDE 10 MILLIGRAM(S): 5 TABLET ORAL at 00:35

## 2021-10-27 RX ADMIN — GABAPENTIN 600 MILLIGRAM(S): 400 CAPSULE ORAL at 00:06

## 2021-10-27 RX ADMIN — Medication 1000 UNIT(S): at 11:11

## 2021-10-27 RX ADMIN — Medication 975 MILLIGRAM(S): at 22:52

## 2021-10-27 NOTE — PROGRESS NOTE ADULT - SUBJECTIVE AND OBJECTIVE BOX
Ortho Note    Pt comfortable this AM  States she is mostly having pain at the STSG donor site  Denies CP, SOB, N/V, numbness/tingling     Vital Signs Last 24 Hrs  T(C): 36.8 (27 Oct 2021 04:56), Max: 36.9 (26 Oct 2021 23:43)  T(F): 98.2 (27 Oct 2021 04:56), Max: 98.5 (26 Oct 2021 23:43)  HR: 70 (27 Oct 2021 04:56) (69 - 85)  BP: 100/67 (27 Oct 2021 04:56) (100/67 - 117/77)  BP(mean): --  RR: 18 (27 Oct 2021 04:56) (18 - 19)  SpO2: 97% (27 Oct 2021 04:56) (96% - 98%)    VSS  General: A&Ox3, NAD  LLE: Ringed external fixator in place w ace wrap; Pin site bolster dressings well appearing; Flap with vac in place holding suction without significant output  Vascular: Toes WWP; Cap refill < 2 sec  Sensation: Patient with abnormal/absent sensation over most of the dorsum & plantar aspects of the foot consistent with baseline  Motor: Minimal FHL/FDL, 0/5 EHL/EDL activity consistent with baseline    Labs:                        8.9    8.17  )-----------( 505      ( 26 Oct 2021 06:56 )             26.7       10-26    139  |  107  |  8   ----------------------------<  122<H>  4.2   |  20<L>  |  0.51    Ca    9.7      26 Oct 2021 06:56  Phos  2.6     10-26  Mg     1.8     10-26            PT/INR - ( 25 Oct 2021 10:39 )   PT: 13.9 sec;   INR: 1.17          PTT - ( 25 Oct 2021 10:39 )  PTT:33.1 sec

## 2021-10-27 NOTE — PROGRESS NOTE ADULT - ASSESSMENT
A/P: 26yFemale s/p repeat ankle I&D by Dr. Wallis on 09/17 w L gracilis free flap and STSG on 09/20 now s/p I&D of flap by plastics team and placement of ringed external fixator by ortho 10/18 and s/p repeat flap debridement and STSG by plastics on 10/25  - VS Stable  - Pain/Nausea Control  - Home meds  - DVT ppx: per plastics team  - WBS: WBAT in ringed ex fix w RW assistance from ortho standpoint, WB restrictions per plastics team for now  - Continued operative flap care mgmt per plastics team  - 10/18 OR cultures showing polymicrobial infection versus colonization -- ID maintains Daptomycin and Fluconazole recs -- will continue to monitor, continued Abx stewardship appreciated  - Plan for repeat surgical flap care per plastics team -- No imminent plans for RTOR as of now  - Definitive surgical plan for bony stabilization pending clinical course re: resolution of infection and appropriate soft tissue coverage --- Possible plan for definitive fixation in approx 6 weeks pending resolution of infection and flap maturation  - Plan for repeat infectious imaging/Gallium scan approx 6 weeks from now  - Please continue to optimize electrolytes/nutrition in order to optimize soft tissue healing potential  - Please continue to monitor weekly inflammatory labs  - Remainder of care per primary team      Ortho Pager [phone]

## 2021-10-27 NOTE — PROGRESS NOTE ADULT - SUBJECTIVE AND OBJECTIVE BOX
INTERVAL HPI/OVERNIGHT EVENTS:    Patient was seen and examined at bedside.  No events    CONSTITUTIONAL:  Negative fever or chills, feels well, good appetite  EYES:  Negative  blurry vision or double vision  CARDIOVASCULAR:  Negative for chest pain or palpitations  RESPIRATORY:  Negative for cough, wheezing, or SOB   GASTROINTESTINAL:  Negative for nausea, vomiting, diarrhea, constipation, or abdominal pain  GENITOURINARY:  Negative frequency, urgency or dysuria  NEUROLOGIC:  No headache, confusion, dizziness, lightheadedness      ANTIBIOTICS/RELEVANT:    MEDICATIONS  (STANDING):  acetaminophen     Tablet .. 975 milliGRAM(s) Oral every 6 hours  calcium carbonate   1250 mG (OsCal) 1 Tablet(s) Oral daily  cholecalciferol 1000 Unit(s) Oral daily  DAPTOmycin IVPB 500 milliGRAM(s) IV Intermittent every 24 hours  enoxaparin Injectable 40 milliGRAM(s) SubCutaneous every 24 hours  fluconAZOLE IVPB 400 milliGRAM(s) IV Intermittent every 24 hours  gabapentin 600 milliGRAM(s) Oral every 6 hours  lactated ringers. 1000 milliLiter(s) (100 mL/Hr) IV Continuous <Continuous>  melatonin 3 milliGRAM(s) Oral at bedtime  multivitamin 1 Tablet(s) Oral daily  ondansetron Injectable 4 milliGRAM(s) IV Push once  polyethylene glycol 3350 17 Gram(s) Oral two times a day  senna 2 Tablet(s) Oral at bedtime    MEDICATIONS  (PRN):  bisacodyl Suppository 10 milliGRAM(s) Rectal daily PRN Constipation  diphenhydrAMINE 25 milliGRAM(s) Oral every 4 hours PRN Rash and/or Itching  diphenhydrAMINE 25 milliGRAM(s) Oral every 4 hours PRN Rash and/or Itching  HYDROmorphone  Injectable 0.5 milliGRAM(s) IV Push every 3 hours PRN breakthrough pain  metoclopramide Injectable 10 milliGRAM(s) IV Push every 6 hours PRN breakthrough nausea or emesis  oxyCODONE    IR 10 milliGRAM(s) Oral every 4 hours PRN Severe Pain (7 - 10)  oxyCODONE    IR 5 milliGRAM(s) Oral every 4 hours PRN Moderate Pain (4 - 6)        Vital Signs Last 24 Hrs  T(C): 36.7 (27 Oct 2021 13:27), Max: 36.9 (26 Oct 2021 23:43)  T(F): 98 (27 Oct 2021 13:27), Max: 98.5 (26 Oct 2021 23:43)  HR: 72 (27 Oct 2021 13:27) (70 - 85)  BP: 112/77 (27 Oct 2021 13:27) (100/67 - 120/77)  BP(mean): 89 (27 Oct 2021 13:27) (89 - 89)  RR: 18 (27 Oct 2021 13:27) (16 - 19)  SpO2: 98% (27 Oct 2021 13:27) (97% - 98%)    PHYSICAL EXAM:  Constitutional:  non-toxic, no distress  Eyes:  no icterus   Ear/Nose/Throat: no oral lesion, no sinus tenderness on percussion	  Neck:no JVD, no lymphadenopathy, supple  Respiratory: CTA rhett  Cardiovascular: S1S2 RRR, no murmurs  Gastrointestinal:soft, (+) BS, no HSM  Extremities: left foot in fixator device  Vascular: DP Pulse:	right normal; left normal      LABS:                        8.9    8.17  )-----------( 505      ( 26 Oct 2021 06:56 )             26.7     10-26    139  |  107  |  8   ----------------------------<  122<H>  4.2   |  20<L>  |  0.51    Ca    9.7      26 Oct 2021 06:56  Phos  2.6     10-26  Mg     1.8     10-26            MICROBIOLOGY:    Culture - Surgical Swab (10.18.21 @ 14:55)    -  Ceftazidime: R >16    Gram Stain:   No organisms seen  Rare WBC's    -  Clindamycin: R >4    -  RIF- Rifampin: R >2 Should not be used as monotherapy    -  Vancomycin: S 2    -  Vancomycin: S 2    -  Cefazolin: R <=4    -  Daptomycin: S <=0.25    -  Daptomycin: S 1    -  Trimethoprim/Sulfamethoxazole: S <=0.5/9.5    -  Trimethoprim/Sulfamethoxazole: S <=0.5/9.5    -  Ampicillin: S <=2 Predicts results to ampicillin/sulbactam, amoxacillin-clavulanate and  piperacillin-tazobactam.    -  Erythromycin: R >4    -  Levofloxacin: R >4    -  Linezolid: S 1    -  Oxacillin: R >2    Specimen Source: .Surgical Swab Left ankle joint #2    Culture Results:   Rare Staphylococcus epidermidis  Rare Enterococcus faecalis  Rare Stenotrophomonas maltophilia  Rare Candida albicans  Pending further antibiotic susceptibility testing  at [facility]    Organism Identification: Staphylococcus epidermidis  Enterococcus faecalis  Stenotrophomonas maltophilia    Organism: Staphylococcus epidermidis    Organism: Enterococcus faecalis    Organism: Stenotrophomonas maltophilia    Method Type: BASIL    Method Type: BASIL    Method Type: BASIL        RADIOLOGY & ADDITIONAL STUDIES:

## 2021-10-27 NOTE — PROGRESS NOTE ADULT - ASSESSMENT
IMPRESSION:  26F h/o L ankle fracture with septic arthritis, postoperative compartment syndrome s/p RICK, I&D, bone debridement and ligament repair on 9/9, I&D with tissue debridement to bone on 9/12, repeat I&D, arthrotomy of ankle, L ankle reconstruction with L gracilis free flap and L thigh STSG, microvascular ansastomosis with recipient AT on 9/20.  OR culture 9/9 grew MSSA and S. anginosus.  9/20 OR culture grew MSSA.      She recently went back to the OR on 10/18 for debridement of left gracilis free flap.  Cultures are polymicrobial - unclear which are pathogens and which are colonizers.  No organisms seem on gram stain.  I would target the candida and Enterococcus given that they are in multiple cultures.  Coag negative staph could be a colonizer however it will be covered with gram positive therapy.  At this time will not treat stenotrophomonas as it is seen on only one culture, could be a colonizer given prolonged hospitalization, it is difficult to treat and there are no gram negative rods on gram stain.  Gram negative rods also less likely to adhere to hardware.    She is s/p flap debridement with closure on 10/25          Recommend:  1.  Continue Daptomycin 500 mg IV daily --> will cover all gram positives in her culture  2.  Check CK daily  3.  Continue fluconazole 400 mg PO daily to cover Candida albicans and C. parapsilosis  4. Check repeat EKG.  Last QTc was 472.  If QTc > 500, will recommend switching to another agent   5. Follow up candida parapsilosis sensitivities     ID team 2 will follow. Dr. Carmona will take over the service tomorrow  IMPRESSION:  26F h/o L ankle fracture with septic arthritis, postoperative compartment syndrome s/p RICK, I&D, bone debridement and ligament repair on 9/9, I&D with tissue debridement to bone on 9/12, repeat I&D, arthrotomy of ankle, L ankle reconstruction with L gracilis free flap and L thigh STSG, microvascular ansastomosis with recipient AT on 9/20.  OR culture 9/9 grew MSSA and S. anginosus.  9/20 OR culture grew MSSA.      She recently went back to the OR on 10/18 for debridement of left gracilis free flap.  Cultures are polymicrobial - unclear which are pathogens and which are colonizers.  No organisms seem on gram stain.  I would target the candida and Enterococcus given that they are in multiple cultures.  Coag negative staph could be a colonizer however it will be covered with gram positive therapy.  At this time will not treat stenotrophomonas as it is seen on only one culture, could be a colonizer given prolonged hospitalization, it is difficult to treat and there are no gram negative rods on gram stain.  Gram negative rods also less likely to adhere to hardware.    She is s/p flap debridement with closure on 10/25          Recommend:  1.  Continue Daptomycin 500 mg IV daily --> will cover all gram positives in her culture  2.  Check CK daily  3.  Continue fluconazole 400 mg PO daily to cover Candida albicans and C. parapsilosis  4. Check repeat EKG.  Last QTc was 472.  If QTc > 500, will recommend switching to another agent   5. Follow up candida parapsilosis sensitivities   6.  She will need 6 week of these antibiotics (day 1 = 10/21/21)    ID team 2 will follow. Dr. Carmona will take over the service tomorrow

## 2021-10-28 LAB — CULTURE RESULTS: SIGNIFICANT CHANGE UP

## 2021-10-28 PROCEDURE — 99232 SBSQ HOSP IP/OBS MODERATE 35: CPT

## 2021-10-28 RX ADMIN — GABAPENTIN 600 MILLIGRAM(S): 400 CAPSULE ORAL at 12:29

## 2021-10-28 RX ADMIN — HYDROMORPHONE HYDROCHLORIDE 0.5 MILLIGRAM(S): 2 INJECTION INTRAMUSCULAR; INTRAVENOUS; SUBCUTANEOUS at 23:33

## 2021-10-28 RX ADMIN — OXYCODONE HYDROCHLORIDE 10 MILLIGRAM(S): 5 TABLET ORAL at 23:09

## 2021-10-28 RX ADMIN — FLUCONAZOLE 100 MILLIGRAM(S): 150 TABLET ORAL at 19:36

## 2021-10-28 RX ADMIN — HYDROMORPHONE HYDROCHLORIDE 0.5 MILLIGRAM(S): 2 INJECTION INTRAMUSCULAR; INTRAVENOUS; SUBCUTANEOUS at 21:40

## 2021-10-28 RX ADMIN — HYDROMORPHONE HYDROCHLORIDE 0.5 MILLIGRAM(S): 2 INJECTION INTRAMUSCULAR; INTRAVENOUS; SUBCUTANEOUS at 15:13

## 2021-10-28 RX ADMIN — SENNA PLUS 2 TABLET(S): 8.6 TABLET ORAL at 22:10

## 2021-10-28 RX ADMIN — Medication 3 MILLIGRAM(S): at 22:09

## 2021-10-28 RX ADMIN — OXYCODONE HYDROCHLORIDE 10 MILLIGRAM(S): 5 TABLET ORAL at 22:09

## 2021-10-28 RX ADMIN — HYDROMORPHONE HYDROCHLORIDE 0.5 MILLIGRAM(S): 2 INJECTION INTRAMUSCULAR; INTRAVENOUS; SUBCUTANEOUS at 01:17

## 2021-10-28 RX ADMIN — Medication 975 MILLIGRAM(S): at 12:59

## 2021-10-28 RX ADMIN — HYDROMORPHONE HYDROCHLORIDE 0.5 MILLIGRAM(S): 2 INJECTION INTRAMUSCULAR; INTRAVENOUS; SUBCUTANEOUS at 07:03

## 2021-10-28 RX ADMIN — Medication 1000 UNIT(S): at 12:28

## 2021-10-28 RX ADMIN — POLYETHYLENE GLYCOL 3350 17 GRAM(S): 17 POWDER, FOR SOLUTION ORAL at 06:53

## 2021-10-28 RX ADMIN — Medication 975 MILLIGRAM(S): at 18:09

## 2021-10-28 RX ADMIN — Medication 975 MILLIGRAM(S): at 12:29

## 2021-10-28 RX ADMIN — GABAPENTIN 600 MILLIGRAM(S): 400 CAPSULE ORAL at 23:32

## 2021-10-28 RX ADMIN — HYDROMORPHONE HYDROCHLORIDE 0.5 MILLIGRAM(S): 2 INJECTION INTRAMUSCULAR; INTRAVENOUS; SUBCUTANEOUS at 20:40

## 2021-10-28 RX ADMIN — ENOXAPARIN SODIUM 40 MILLIGRAM(S): 100 INJECTION SUBCUTANEOUS at 06:54

## 2021-10-28 RX ADMIN — OXYCODONE HYDROCHLORIDE 10 MILLIGRAM(S): 5 TABLET ORAL at 18:09

## 2021-10-28 RX ADMIN — Medication 1 TABLET(S): at 12:29

## 2021-10-28 RX ADMIN — HYDROMORPHONE HYDROCHLORIDE 0.5 MILLIGRAM(S): 2 INJECTION INTRAMUSCULAR; INTRAVENOUS; SUBCUTANEOUS at 14:43

## 2021-10-28 RX ADMIN — Medication 975 MILLIGRAM(S): at 06:52

## 2021-10-28 RX ADMIN — OXYCODONE HYDROCHLORIDE 10 MILLIGRAM(S): 5 TABLET ORAL at 12:30

## 2021-10-28 RX ADMIN — OXYCODONE HYDROCHLORIDE 10 MILLIGRAM(S): 5 TABLET ORAL at 13:00

## 2021-10-28 RX ADMIN — GABAPENTIN 600 MILLIGRAM(S): 400 CAPSULE ORAL at 18:10

## 2021-10-28 RX ADMIN — Medication 1 TABLET(S): at 12:30

## 2021-10-28 RX ADMIN — OXYCODONE HYDROCHLORIDE 10 MILLIGRAM(S): 5 TABLET ORAL at 19:00

## 2021-10-28 RX ADMIN — DAPTOMYCIN 120 MILLIGRAM(S): 500 INJECTION, POWDER, LYOPHILIZED, FOR SOLUTION INTRAVENOUS at 19:35

## 2021-10-28 RX ADMIN — Medication 975 MILLIGRAM(S): at 19:00

## 2021-10-28 RX ADMIN — Medication 975 MILLIGRAM(S): at 23:32

## 2021-10-28 NOTE — PROGRESS NOTE ADULT - ASSESSMENT
26F h/o L ankle fracture with septic arthritis, postoperative compartment syndrome s/p RICK, I&D, bone debridement and ligament repair on 9/9, I&D with tissue debridement to bone on 9/12, repeat I&D, arthrotomy of ankle, L ankle reconstruction with L gracilis free flap and L thigh STSG, microvascular ansastomosis with recipient AT on 9/20. Initial cultures from 9/9 grew MSSA and Strep anginosus - for which she was treated with initial course of nafcillin and ceftriaxone, culture from 9/20 with MSSA.  She returned to [place] on 10/18 for debridement of gracilis flap, application of external fixator.  She was found to have partial necrosis of skin and subcutaneous fat of gracilis myocutanous free flap. OR cultures were polymicrobial.  Multiple cultures grew E. faecalis, Candida (albicans and parapsilosis), also with Staph epi (?significance) and one with Stenotrophomonas.  Targeting E. faecalis and Candida for now.  She had RTOR on 10/25 for repeat flap debridement and STSG by Plastics.  Suggest:  - F/U susceptibility testing of C. parapsilosis - pending  - Please ensure ECG is done (listed as ordered) - need QTc - last was 472 prior to starting fluconazole  - Weekly CPK  - Continue daptomycin 500 mg IV q24h   - Continue fluconazole 400 mg q24h - can change to po.  Recommendations discussed with primary team.  Will follow with you - team 1.

## 2021-10-28 NOTE — PROGRESS NOTE ADULT - ASSESSMENT
25 y/o female s/p Left ankle recon with gracilis free flap and STSG  - continue vac  - bed rest  - will remove vac pod7  - prn analgesia

## 2021-10-28 NOTE — PROGRESS NOTE ADULT - SUBJECTIVE AND OBJECTIVE BOX
INTERVAL HPI/OVERNIGHT EVENTS:  Afebrile.  Has pain L thigh from STSG donor site 6-8/10 in intensity, no ankle pain    CONSTITUTIONAL:  No fever, chills, night sweats  EYES:  No photophobia or visual changes  CARDIOVASCULAR:  No chest pain  RESPIRATORY:  No cough, wheezing, or SOB   GASTROINTESTINAL:  No nausea, vomiting, diarrhea, constipation, or abdominal pain  GENITOURINARY:  No frequency, urgency, dysuria or hematuria  NEUROLOGIC:  No headache, lightheadedness      ANTIBIOTICS/RELEVANT:    Daptomycin 500 mg IV q24h (10/21-present)  Fluconazole 400 mg IV q24h (10/21-present)    Vancomycin 9/9-9/11  Cefepime 9/9-9/11  Nafcillin 9/11-10/21  Ceftriaxone 9/12-10/21    Vital Signs Last 24 Hrs  T(C): 36.6 (28 Oct 2021 17:02), Max: 36.7 (28 Oct 2021 08:57)  T(F): 97.8 (28 Oct 2021 17:02), Max: 98 (28 Oct 2021 08:57)  HR: 80 (28 Oct 2021 17:02) (70 - 80)  BP: 117/84 (28 Oct 2021 17:02) (92/61 - 117/84)  BP(mean): --  RR: 17 (28 Oct 2021 17:02) (16 - 18)  SpO2: 95% (28 Oct 2021 17:02) (95% - 97%)    PHYSICAL EXAM:  Constitutional:  Well-developed, well nourished  Eyes:  Sclerae anicteric, conjunctivae clear, PERRL  Ear/Nose/Throat:  No nasal exudate or sinus tenderness;  No buccal mucosal lesions, no pharyngeal erythema or exudate	  Neck:  Supple, no adenopathy  Respiratory:  Clear bilaterally  Cardiovascular:  RRR, S1S2, no murmur appreciated  Gastrointestinal:  Symmetric, normoactive BS, soft, NT, no masses, guarding or rebound.  No HSM  Extremities:  No edema.  L thigh in ace wrap, L ankle with external fixation, VAC dressing, minimal movement of toes      LABS:                    MICROBIOLOGY:  Culture Results:   Candida parapsilosis Susceptibility to follow.  Identification provided by submitter (10-27-21 @ 21:36)        RADIOLOGY & ADDITIONAL STUDIES:

## 2021-10-28 NOTE — PROGRESS NOTE ADULT - ASSESSMENT
A/P: 26yFemale s/p repeat ankle I&D by Dr. Wallis on 09/17 w L gracilis free flap and STSG on 09/20 now s/p I&D of flap by plastics team and placement of ringed external fixator by ortho 10/18 and s/p repeat flap debridement and STSG by plastics on 10/25  - VS Stable  - Pain/Nausea Control  - Home meds  - DVT ppx: per plastics team  - WBS: WBAT in ringed ex fix w RW assistance from ortho standpoint, WB restrictions per plastics team for now  - Continued operative flap care mgmt per plastics team  - 10/18 OR cultures showing polymicrobial infection versus colonization -- ID maintains Daptomycin and Fluconazole recs -- will continue to monitor, continued Abx stewardship appreciated  - Plan for repeat surgical flap care per plastics team -- No imminent plans for RTOR as of now  - Definitive surgical plan for bony stabilization pending clinical course re: resolution of infection and appropriate soft tissue coverage --- Possible plan for definitive fixation in approx 6 weeks pending resolution of infection and flap maturation  - Plan for repeat infectious imaging/Gallium scan approx 6 weeks from now  - Please continue to optimize electrolytes/nutrition in order to optimize soft tissue healing potential  - Please continue to monitor weekly inflammatory labs  - Remainder of care per primary team      Ortho Pager [phone]

## 2021-10-28 NOTE — PROGRESS NOTE ADULT - SUBJECTIVE AND OBJECTIVE BOX
SUBJECTIVE:  Doing well.   No overnight events.     OBJECTIVE:     ** VITAL SIGNS / I&O's **    Vital Signs Last 24 Hrs  T(C): 36.6 (28 Oct 2021 05:20), Max: 36.9 (27 Oct 2021 17:13)  T(F): 97.8 (28 Oct 2021 05:20), Max: 98.4 (27 Oct 2021 17:13)  HR: 70 (28 Oct 2021 05:20) (70 - 80)  BP: 92/61 (28 Oct 2021 05:20) (92/61 - 120/77)  BP(mean): 89 (27 Oct 2021 13:27) (89 - 89)  RR: 18 (28 Oct 2021 05:20) (16 - 18)  SpO2: 96% (28 Oct 2021 05:20) (94% - 98%)      27 Oct 2021 07:01  -  28 Oct 2021 07:00  --------------------------------------------------------  IN:    Oral Fluid: 480 mL  Total IN: 480 mL    OUT:    VAC (Vacuum Assisted Closure) System (mL): 0 mL    Voided (mL): 2600 mL  Total OUT: 2600 mL    Total NET: -2120 mL          ** PHYSICAL EXAM **    -- CONSTITUTIONAL: Alert, Awake. NAD.   -- RESPIRATORY: unlabored breathing, no respiratory distress  -- LLE: ex fix in place, vac in place holding suction, L thigh donor site dressing in place      ** LABS **              CAPILLARY BLOOD GLUCOSE

## 2021-10-28 NOTE — PROGRESS NOTE ADULT - SUBJECTIVE AND OBJECTIVE BOX
Ortho Note    Pt resting comfortably this AM  Denies CP, SOB, N/V, numbness/tingling     Vital Signs Last 24 Hrs  T(C): 36.6 (28 Oct 2021 05:20), Max: 36.9 (27 Oct 2021 17:13)  T(F): 97.8 (28 Oct 2021 05:20), Max: 98.4 (27 Oct 2021 17:13)  HR: 70 (28 Oct 2021 05:20) (70 - 80)  BP: 92/61 (28 Oct 2021 05:20) (92/61 - 120/77)  BP(mean): 89 (27 Oct 2021 13:27) (89 - 89)  RR: 18 (28 Oct 2021 05:20) (16 - 18)  SpO2: 96% (28 Oct 2021 05:20) (94% - 98%)    VSS  General: A&Ox3, NAD  LLE: Ringed external fixator in place w ace wrap; Pin site bolster dressings well appearing; Flap with vac in place holding suction without significant output  Vascular: Toes WWP; Cap refill < 2 sec  Sensation: Patient with abnormal/absent sensation over most of the dorsum & plantar aspects of the foot consistent with baseline  Motor: Minimal FHL/FDL, 0/5 EHL/EDL activity consistent with baseline    Labs:                        8.9    8.17  )-----------( 505      ( 26 Oct 2021 06:56 )             26.7       10-26    139  |  107  |  8   ----------------------------<  122<H>  4.2   |  20<L>  |  0.51    Ca    9.7      26 Oct 2021 06:56  Phos  2.6     10-26  Mg     1.8     10-26

## 2021-10-29 LAB
ALBUMIN SERPL ELPH-MCNC: 4 G/DL — SIGNIFICANT CHANGE UP (ref 3.3–5)
ALP SERPL-CCNC: 74 U/L — SIGNIFICANT CHANGE UP (ref 40–120)
ALT FLD-CCNC: 68 U/L — HIGH (ref 10–45)
ANION GAP SERPL CALC-SCNC: 14 MMOL/L — SIGNIFICANT CHANGE UP (ref 5–17)
AST SERPL-CCNC: 38 U/L — SIGNIFICANT CHANGE UP (ref 10–40)
BILIRUB SERPL-MCNC: 0.2 MG/DL — SIGNIFICANT CHANGE UP (ref 0.2–1.2)
BUN SERPL-MCNC: 14 MG/DL — SIGNIFICANT CHANGE UP (ref 7–23)
CALCIUM SERPL-MCNC: 9.4 MG/DL — SIGNIFICANT CHANGE UP (ref 8.4–10.5)
CHLORIDE SERPL-SCNC: 102 MMOL/L — SIGNIFICANT CHANGE UP (ref 96–108)
CK SERPL-CCNC: 17 U/L — LOW (ref 25–170)
CO2 SERPL-SCNC: 22 MMOL/L — SIGNIFICANT CHANGE UP (ref 22–31)
CREAT SERPL-MCNC: 0.61 MG/DL — SIGNIFICANT CHANGE UP (ref 0.5–1.3)
GLUCOSE SERPL-MCNC: 96 MG/DL — SIGNIFICANT CHANGE UP (ref 70–99)
HCT VFR BLD CALC: 30.3 % — LOW (ref 34.5–45)
HGB BLD-MCNC: 9.9 G/DL — LOW (ref 11.5–15.5)
MCHC RBC-ENTMCNC: 28.5 PG — SIGNIFICANT CHANGE UP (ref 27–34)
MCHC RBC-ENTMCNC: 32.7 GM/DL — SIGNIFICANT CHANGE UP (ref 32–36)
MCV RBC AUTO: 87.3 FL — SIGNIFICANT CHANGE UP (ref 80–100)
NRBC # BLD: 0 /100 WBCS — SIGNIFICANT CHANGE UP (ref 0–0)
PLATELET # BLD AUTO: 508 K/UL — HIGH (ref 150–400)
POTASSIUM SERPL-MCNC: 3.8 MMOL/L — SIGNIFICANT CHANGE UP (ref 3.5–5.3)
POTASSIUM SERPL-SCNC: 3.8 MMOL/L — SIGNIFICANT CHANGE UP (ref 3.5–5.3)
PROT SERPL-MCNC: 7.5 G/DL — SIGNIFICANT CHANGE UP (ref 6–8.3)
RBC # BLD: 3.47 M/UL — LOW (ref 3.8–5.2)
RBC # FLD: 22.3 % — HIGH (ref 10.3–14.5)
SODIUM SERPL-SCNC: 138 MMOL/L — SIGNIFICANT CHANGE UP (ref 135–145)
WBC # BLD: 8.78 K/UL — SIGNIFICANT CHANGE UP (ref 3.8–10.5)
WBC # FLD AUTO: 8.78 K/UL — SIGNIFICANT CHANGE UP (ref 3.8–10.5)

## 2021-10-29 PROCEDURE — 99232 SBSQ HOSP IP/OBS MODERATE 35: CPT

## 2021-10-29 PROCEDURE — 93010 ELECTROCARDIOGRAM REPORT: CPT

## 2021-10-29 RX ADMIN — HYDROMORPHONE HYDROCHLORIDE 0.5 MILLIGRAM(S): 2 INJECTION INTRAMUSCULAR; INTRAVENOUS; SUBCUTANEOUS at 14:58

## 2021-10-29 RX ADMIN — Medication 975 MILLIGRAM(S): at 11:41

## 2021-10-29 RX ADMIN — FLUCONAZOLE 100 MILLIGRAM(S): 150 TABLET ORAL at 18:44

## 2021-10-29 RX ADMIN — DAPTOMYCIN 120 MILLIGRAM(S): 500 INJECTION, POWDER, LYOPHILIZED, FOR SOLUTION INTRAVENOUS at 18:44

## 2021-10-29 RX ADMIN — Medication 3 MILLIGRAM(S): at 21:49

## 2021-10-29 RX ADMIN — HYDROMORPHONE HYDROCHLORIDE 0.5 MILLIGRAM(S): 2 INJECTION INTRAMUSCULAR; INTRAVENOUS; SUBCUTANEOUS at 06:18

## 2021-10-29 RX ADMIN — Medication 1000 UNIT(S): at 11:41

## 2021-10-29 RX ADMIN — Medication 975 MILLIGRAM(S): at 18:46

## 2021-10-29 RX ADMIN — Medication 1 TABLET(S): at 18:43

## 2021-10-29 RX ADMIN — OXYCODONE HYDROCHLORIDE 10 MILLIGRAM(S): 5 TABLET ORAL at 17:17

## 2021-10-29 RX ADMIN — OXYCODONE HYDROCHLORIDE 10 MILLIGRAM(S): 5 TABLET ORAL at 17:14

## 2021-10-29 RX ADMIN — OXYCODONE HYDROCHLORIDE 10 MILLIGRAM(S): 5 TABLET ORAL at 22:20

## 2021-10-29 RX ADMIN — POLYETHYLENE GLYCOL 3350 17 GRAM(S): 17 POWDER, FOR SOLUTION ORAL at 05:16

## 2021-10-29 RX ADMIN — GABAPENTIN 600 MILLIGRAM(S): 400 CAPSULE ORAL at 18:45

## 2021-10-29 RX ADMIN — Medication 1 TABLET(S): at 11:41

## 2021-10-29 RX ADMIN — OXYCODONE HYDROCHLORIDE 10 MILLIGRAM(S): 5 TABLET ORAL at 08:42

## 2021-10-29 RX ADMIN — GABAPENTIN 600 MILLIGRAM(S): 400 CAPSULE ORAL at 11:55

## 2021-10-29 RX ADMIN — HYDROMORPHONE HYDROCHLORIDE 0.5 MILLIGRAM(S): 2 INJECTION INTRAMUSCULAR; INTRAVENOUS; SUBCUTANEOUS at 19:40

## 2021-10-29 RX ADMIN — HYDROMORPHONE HYDROCHLORIDE 0.5 MILLIGRAM(S): 2 INJECTION INTRAMUSCULAR; INTRAVENOUS; SUBCUTANEOUS at 15:12

## 2021-10-29 RX ADMIN — Medication 975 MILLIGRAM(S): at 05:17

## 2021-10-29 RX ADMIN — HYDROMORPHONE HYDROCHLORIDE 0.5 MILLIGRAM(S): 2 INJECTION INTRAMUSCULAR; INTRAVENOUS; SUBCUTANEOUS at 00:03

## 2021-10-29 RX ADMIN — HYDROMORPHONE HYDROCHLORIDE 0.5 MILLIGRAM(S): 2 INJECTION INTRAMUSCULAR; INTRAVENOUS; SUBCUTANEOUS at 23:34

## 2021-10-29 RX ADMIN — GABAPENTIN 600 MILLIGRAM(S): 400 CAPSULE ORAL at 23:33

## 2021-10-29 RX ADMIN — Medication 975 MILLIGRAM(S): at 18:42

## 2021-10-29 RX ADMIN — ENOXAPARIN SODIUM 40 MILLIGRAM(S): 100 INJECTION SUBCUTANEOUS at 05:18

## 2021-10-29 RX ADMIN — HYDROMORPHONE HYDROCHLORIDE 0.5 MILLIGRAM(S): 2 INJECTION INTRAMUSCULAR; INTRAVENOUS; SUBCUTANEOUS at 05:18

## 2021-10-29 RX ADMIN — Medication 975 MILLIGRAM(S): at 00:32

## 2021-10-29 RX ADMIN — Medication 975 MILLIGRAM(S): at 06:17

## 2021-10-29 RX ADMIN — OXYCODONE HYDROCHLORIDE 10 MILLIGRAM(S): 5 TABLET ORAL at 03:24

## 2021-10-29 RX ADMIN — OXYCODONE HYDROCHLORIDE 10 MILLIGRAM(S): 5 TABLET ORAL at 04:00

## 2021-10-29 RX ADMIN — GABAPENTIN 600 MILLIGRAM(S): 400 CAPSULE ORAL at 05:17

## 2021-10-29 RX ADMIN — Medication 975 MILLIGRAM(S): at 11:59

## 2021-10-29 RX ADMIN — OXYCODONE HYDROCHLORIDE 10 MILLIGRAM(S): 5 TABLET ORAL at 21:49

## 2021-10-29 RX ADMIN — Medication 975 MILLIGRAM(S): at 23:54

## 2021-10-29 RX ADMIN — HYDROMORPHONE HYDROCHLORIDE 0.5 MILLIGRAM(S): 2 INJECTION INTRAMUSCULAR; INTRAVENOUS; SUBCUTANEOUS at 23:54

## 2021-10-29 RX ADMIN — SENNA PLUS 2 TABLET(S): 8.6 TABLET ORAL at 21:53

## 2021-10-29 RX ADMIN — Medication 975 MILLIGRAM(S): at 23:34

## 2021-10-29 RX ADMIN — OXYCODONE HYDROCHLORIDE 10 MILLIGRAM(S): 5 TABLET ORAL at 08:54

## 2021-10-29 NOTE — PROGRESS NOTE ADULT - SUBJECTIVE AND OBJECTIVE BOX
Ortho Note    Pt resting comfortably this AM  Denies CP, SOB, N/V, numbness/tingling     Vital Signs Last 24 Hrs  T(C): 36.7 (29 Oct 2021 05:21), Max: 36.7 (28 Oct 2021 08:57)  T(F): 98 (29 Oct 2021 05:21), Max: 98 (28 Oct 2021 08:57)  HR: 74 (29 Oct 2021 05:21) (74 - 87)  BP: 111/76 (29 Oct 2021 05:21) (106/73 - 117/84)  BP(mean): 87 (29 Oct 2021 05:21) (85 - 87)  RR: 16 (29 Oct 2021 05:21) (16 - 17)  SpO2: 96% (29 Oct 2021 05:21) (95% - 98%)    VSS  General: A&Ox3, NAD  LLE: Ringed external fixator in place w ace wrap; Pin site bolster dressings well appearing; Flap with vac in place holding suction without significant output  Vascular: Toes WWP; Cap refill < 2 sec  Sensation: Patient with abnormal/absent sensation over most of the dorsum & plantar aspects of the foot consistent with baseline  Motor: Minimal FHL/FDL, 0/5 EHL/EDL activity consistent with baseline    Labs:

## 2021-10-29 NOTE — PROGRESS NOTE ADULT - ASSESSMENT
A/P: 26yFemale s/p repeat ankle I&D by Dr. Wallis on 09/17 w L gracilis free flap and STSG on 09/20 now s/p I&D of flap by plastics team and placement of ringed external fixator by ortho 10/18 and s/p repeat flap debridement and STSG by plastics on 10/25  - VS Stable  - Pain/Nausea Control  - Home meds  - DVT ppx: per plastics team  - WBS: WBAT in ringed ex fix w RW assistance from ortho standpoint, WB restrictions per plastics team for now  - Continued operative flap care mgmt per plastics team  - 10/18 OR cultures showing polymicrobial infection versus colonization -- ID maintains Daptomycin and Fluconazole recs -- will continue to monitor, continued Abx stewardship appreciated  - Plan for repeat surgical flap care per plastics team -- No imminent plans for RTOR as of now  - Definitive surgical plan for bony stabilization pending clinical course re: resolution of infection and appropriate soft tissue coverage --- Possible plan for definitive fixation in approx 6 weeks pending resolution of infection and flap maturation  - Plan for repeat infectious imaging/Gallium scan approx 6 weeks from now  - Please continue to optimize electrolytes/nutrition in order to optimize soft tissue healing potential  - Please continue to monitor weekly inflammatory labs  - Remainder of care per primary team      Ortho Pager [phone]

## 2021-10-29 NOTE — PROGRESS NOTE ADULT - ASSESSMENT
26F h/o L ankle fracture with septic arthritis, postoperative compartment syndrome s/p RICK, I&D, bone debridement and ligament repair on 9/9, I&D with tissue debridement to bone on 9/12, repeat I&D, arthrotomy of ankle, L ankle reconstruction with L gracilis free flap and L thigh STSG, microvascular ansastomosis with recipient AT on 9/20. Initial cultures from 9/9 grew MSSA and Strep anginosus - for which she was treated with initial course of nafcillin and ceftriaxone, culture from 9/20 with MSSA.  She returned to [place] on 10/18 for debridement of gracilis flap, application of external fixator.  She was found to have partial necrosis of skin and subcutaneous fat of gracilis myocutanous free flap. OR cultures were polymicrobial.  Multiple cultures grew E. faecalis, Candida (albicans and parapsilosis), also with Staph epi (?significance) and one with Stenotrophomonas.  Targeting E. faecalis and Candida for now.  She had RTOR on 10/25 for repeat flap debridement and STSG by Plastics.  Has mild transaminitis on labs from today - is new.  May be related to fluconazole.  QTc is normal on fluconazole - can continue.  Suggest:  - F/U susceptibility testing of C. parapsilosis - pending  - Continue daptomycin 500 mg IV q24h   - Continue fluconazole 400 mg q24h - can change to po.  - Please send CBC with diff, CMP, CPK on 11/1.  Recommendations discussed with primary team.  Will follow with you - team 2.  Dr. Jamal Will will cover from tonight through 10/31, I will return 11/1.

## 2021-10-29 NOTE — PROGRESS NOTE ADULT - SUBJECTIVE AND OBJECTIVE BOX
INTERVAL HPI/OVERNIGHT EVENTS:  Afebrile.  Less L thigh pain.    CONSTITUTIONAL:  No fever, chills, night sweats  EYES:  No photophobia or visual changes  CARDIOVASCULAR:  No chest pain  RESPIRATORY:  No cough, wheezing, or SOB   GASTROINTESTINAL:  No nausea, vomiting, diarrhea, constipation, or abdominal pain  GENITOURINARY:  No frequency, urgency, dysuria or hematuria  NEUROLOGIC:  No headache, lightheadedness      ANTIBIOTICS/RELEVANT:    Daptomycin 500 mg IV q24h (10/21-present)  Fluconazole 400 mg IV q24h (10/21-present)    Vancomycin 9/9-9/11  Cefepime 9/9-9/11  Nafcillin 9/11-10/21  Ceftriaxone 9/12-10/21        Vital Signs Last 24 Hrs  T(C): 36.8 (29 Oct 2021 17:18), Max: 36.8 (29 Oct 2021 17:18)  T(F): 98.3 (29 Oct 2021 17:18), Max: 98.3 (29 Oct 2021 17:18)  HR: 86 (29 Oct 2021 17:18) (67 - 87)  BP: 114/78 (29 Oct 2021 17:18) (97/67 - 114/78)  BP(mean): 87 (29 Oct 2021 05:21) (85 - 87)  RR: 17 (29 Oct 2021 17:18) (16 - 18)  SpO2: 97% (29 Oct 2021 17:18) (96% - 98%)    PHYSICAL EXAM:  Constitutional:  Well-developed, well nourished  Eyes:  Sclerae anicteric, conjunctivae clear, PERRL  Ear/Nose/Throat:  No nasal exudate or sinus tenderness;  No buccal mucosal lesions, no pharyngeal erythema or exudate	  Neck:  Supple, no adenopathy  Respiratory:  Clear bilaterally  Cardiovascular:  RRR, S1S2, no murmur appreciated  Gastrointestinal:  Symmetric, normoactive BS, soft, NT, no masses, guarding or rebound.  No HSM  Extremities:  No edema.  L thigh in ace wrap, L ankle with external fixation, VAC dressing, minimal movement of toes        LABS:                        9.9    8.78  )-----------( 508      ( 29 Oct 2021 16:58 )             30.3         10-29    138  |  102  |  14  ----------------------------<  96  3.8   |  22  |  0.61    Ca    9.4      29 Oct 2021 16:58    TPro  7.5  /  Alb  4.0  /  TBili  0.2  /  DBili  x   /  AST  38  /  ALT  68<H>  /  AlkPhos  74  10-29    Creatine Kinase, Serum: 17 U/L (10.29.21 @ 16:58)      MICROBIOLOGY:        RADIOLOGY & ADDITIONAL STUDIES:    < from: 12 Lead ECG (10.29.21 @ 14:39) >  Ventricular Rate 77 BPM    Atrial Rate 77 BPM    P-R Interval 158 ms    QRS Duration 86 ms    Q-T Interval 412 ms    QTC Calculation(Bazett) 466 ms    < end of copied text >

## 2021-10-29 NOTE — PROGRESS NOTE ADULT - SUBJECTIVE AND OBJECTIVE BOX
S; pt seen and examined doing well    O:  avss    Gen NAD  LLE: ex fix and vac in place; vac holding suction    · Assessment	  25 y/o female s/p Left ankle recon with gracilis free flap and STSG  - continue vac  - bed rest, ok to get up to commode  - will remove vac pod7  - prn analgesia

## 2021-10-30 LAB
-  AMPHOTERICIN B: SIGNIFICANT CHANGE UP
-  ANIDULAFUNGIN: SIGNIFICANT CHANGE UP
-  CASPOFUNGIN: SIGNIFICANT CHANGE UP
-  FLUCONAZOLE: SIGNIFICANT CHANGE UP
-  INTERPRETATION: SIGNIFICANT CHANGE UP
-  MICAFUNGIN: SIGNIFICANT CHANGE UP
-  POSACONAZOLE: SIGNIFICANT CHANGE UP
-  VORICONAZOLE: SIGNIFICANT CHANGE UP
CULTURE RESULTS: SIGNIFICANT CHANGE UP
METHOD TYPE: SIGNIFICANT CHANGE UP
ORGANISM # SPEC MICROSCOPIC CNT: SIGNIFICANT CHANGE UP
ORGANISM # SPEC MICROSCOPIC CNT: SIGNIFICANT CHANGE UP
SPECIMEN SOURCE: SIGNIFICANT CHANGE UP

## 2021-10-30 RX ADMIN — OXYCODONE HYDROCHLORIDE 10 MILLIGRAM(S): 5 TABLET ORAL at 09:01

## 2021-10-30 RX ADMIN — GABAPENTIN 600 MILLIGRAM(S): 400 CAPSULE ORAL at 23:46

## 2021-10-30 RX ADMIN — OXYCODONE HYDROCHLORIDE 10 MILLIGRAM(S): 5 TABLET ORAL at 05:20

## 2021-10-30 RX ADMIN — OXYCODONE HYDROCHLORIDE 10 MILLIGRAM(S): 5 TABLET ORAL at 15:00

## 2021-10-30 RX ADMIN — Medication 975 MILLIGRAM(S): at 11:19

## 2021-10-30 RX ADMIN — HYDROMORPHONE HYDROCHLORIDE 0.5 MILLIGRAM(S): 2 INJECTION INTRAMUSCULAR; INTRAVENOUS; SUBCUTANEOUS at 17:55

## 2021-10-30 RX ADMIN — SENNA PLUS 2 TABLET(S): 8.6 TABLET ORAL at 21:03

## 2021-10-30 RX ADMIN — Medication 1000 UNIT(S): at 11:19

## 2021-10-30 RX ADMIN — HYDROMORPHONE HYDROCHLORIDE 0.5 MILLIGRAM(S): 2 INJECTION INTRAMUSCULAR; INTRAVENOUS; SUBCUTANEOUS at 21:20

## 2021-10-30 RX ADMIN — Medication 1 TABLET(S): at 11:19

## 2021-10-30 RX ADMIN — OXYCODONE HYDROCHLORIDE 10 MILLIGRAM(S): 5 TABLET ORAL at 10:00

## 2021-10-30 RX ADMIN — FLUCONAZOLE 100 MILLIGRAM(S): 150 TABLET ORAL at 18:28

## 2021-10-30 RX ADMIN — OXYCODONE HYDROCHLORIDE 10 MILLIGRAM(S): 5 TABLET ORAL at 14:13

## 2021-10-30 RX ADMIN — Medication 975 MILLIGRAM(S): at 17:34

## 2021-10-30 RX ADMIN — HYDROMORPHONE HYDROCHLORIDE 0.5 MILLIGRAM(S): 2 INJECTION INTRAMUSCULAR; INTRAVENOUS; SUBCUTANEOUS at 18:14

## 2021-10-30 RX ADMIN — Medication 975 MILLIGRAM(S): at 11:50

## 2021-10-30 RX ADMIN — OXYCODONE HYDROCHLORIDE 10 MILLIGRAM(S): 5 TABLET ORAL at 04:46

## 2021-10-30 RX ADMIN — GABAPENTIN 600 MILLIGRAM(S): 400 CAPSULE ORAL at 17:35

## 2021-10-30 RX ADMIN — HYDROMORPHONE HYDROCHLORIDE 0.5 MILLIGRAM(S): 2 INJECTION INTRAMUSCULAR; INTRAVENOUS; SUBCUTANEOUS at 11:00

## 2021-10-30 RX ADMIN — Medication 975 MILLIGRAM(S): at 05:20

## 2021-10-30 RX ADMIN — Medication 975 MILLIGRAM(S): at 18:14

## 2021-10-30 RX ADMIN — Medication 3 MILLIGRAM(S): at 21:03

## 2021-10-30 RX ADMIN — GABAPENTIN 600 MILLIGRAM(S): 400 CAPSULE ORAL at 11:19

## 2021-10-30 RX ADMIN — POLYETHYLENE GLYCOL 3350 17 GRAM(S): 17 POWDER, FOR SOLUTION ORAL at 17:35

## 2021-10-30 RX ADMIN — HYDROMORPHONE HYDROCHLORIDE 0.5 MILLIGRAM(S): 2 INJECTION INTRAMUSCULAR; INTRAVENOUS; SUBCUTANEOUS at 06:40

## 2021-10-30 RX ADMIN — GABAPENTIN 600 MILLIGRAM(S): 400 CAPSULE ORAL at 04:47

## 2021-10-30 RX ADMIN — HYDROMORPHONE HYDROCHLORIDE 0.5 MILLIGRAM(S): 2 INJECTION INTRAMUSCULAR; INTRAVENOUS; SUBCUTANEOUS at 10:22

## 2021-10-30 RX ADMIN — HYDROMORPHONE HYDROCHLORIDE 0.5 MILLIGRAM(S): 2 INJECTION INTRAMUSCULAR; INTRAVENOUS; SUBCUTANEOUS at 21:02

## 2021-10-30 RX ADMIN — DAPTOMYCIN 120 MILLIGRAM(S): 500 INJECTION, POWDER, LYOPHILIZED, FOR SOLUTION INTRAVENOUS at 17:56

## 2021-10-30 RX ADMIN — Medication 975 MILLIGRAM(S): at 23:46

## 2021-10-30 RX ADMIN — ENOXAPARIN SODIUM 40 MILLIGRAM(S): 100 INJECTION SUBCUTANEOUS at 04:47

## 2021-10-30 RX ADMIN — Medication 975 MILLIGRAM(S): at 04:46

## 2021-10-30 RX ADMIN — OXYCODONE HYDROCHLORIDE 10 MILLIGRAM(S): 5 TABLET ORAL at 20:39

## 2021-10-30 RX ADMIN — HYDROMORPHONE HYDROCHLORIDE 0.5 MILLIGRAM(S): 2 INJECTION INTRAMUSCULAR; INTRAVENOUS; SUBCUTANEOUS at 06:18

## 2021-10-30 RX ADMIN — OXYCODONE HYDROCHLORIDE 10 MILLIGRAM(S): 5 TABLET ORAL at 19:48

## 2021-10-30 RX ADMIN — OXYCODONE HYDROCHLORIDE 10 MILLIGRAM(S): 5 TABLET ORAL at 23:46

## 2021-10-30 NOTE — PROGRESS NOTE ADULT - SUBJECTIVE AND OBJECTIVE BOX
S; pt seen and examined doing well    O:  Vital Signs Last 24 Hrs  T(C): 36.9 (30 Oct 2021 08:46), Max: 37.3 (30 Oct 2021 05:13)  T(F): 98.4 (30 Oct 2021 08:46), Max: 99.2 (30 Oct 2021 05:13)  HR: 74 (30 Oct 2021 08:46) (74 - 88)  BP: 103/68 (30 Oct 2021 08:46) (103/68 - 121/82)  BP(mean): --  RR: 15 (30 Oct 2021 08:46) (15 - 18)  SpO2: 97% (30 Oct 2021 08:46) (94% - 98%)    Gen NAD  LLE: ex fix and vac in place; vac holding suction    · Assessment	  27 y/o female s/p Left ankle recon with gracilis free flap and STSG  - continue vac, will remove today  - bed rest, ok to get up to commode  - prn analgesia    Anton Hernandez MD PGY5

## 2021-10-31 PROCEDURE — 99232 SBSQ HOSP IP/OBS MODERATE 35: CPT

## 2021-10-31 RX ORDER — BACITRACIN ZINC 500 UNIT/G
1 OINTMENT IN PACKET (EA) TOPICAL DAILY
Refills: 0 | Status: DISCONTINUED | OUTPATIENT
Start: 2021-10-31 | End: 2021-11-05

## 2021-10-31 RX ORDER — FLUCONAZOLE 150 MG/1
400 TABLET ORAL DAILY
Refills: 0 | Status: DISCONTINUED | OUTPATIENT
Start: 2021-10-31 | End: 2021-11-01

## 2021-10-31 RX ADMIN — HYDROMORPHONE HYDROCHLORIDE 0.5 MILLIGRAM(S): 2 INJECTION INTRAMUSCULAR; INTRAVENOUS; SUBCUTANEOUS at 06:08

## 2021-10-31 RX ADMIN — POLYETHYLENE GLYCOL 3350 17 GRAM(S): 17 POWDER, FOR SOLUTION ORAL at 17:30

## 2021-10-31 RX ADMIN — SENNA PLUS 2 TABLET(S): 8.6 TABLET ORAL at 22:24

## 2021-10-31 RX ADMIN — HYDROMORPHONE HYDROCHLORIDE 0.5 MILLIGRAM(S): 2 INJECTION INTRAMUSCULAR; INTRAVENOUS; SUBCUTANEOUS at 22:24

## 2021-10-31 RX ADMIN — HYDROMORPHONE HYDROCHLORIDE 0.5 MILLIGRAM(S): 2 INJECTION INTRAMUSCULAR; INTRAVENOUS; SUBCUTANEOUS at 01:45

## 2021-10-31 RX ADMIN — Medication 975 MILLIGRAM(S): at 18:29

## 2021-10-31 RX ADMIN — OXYCODONE HYDROCHLORIDE 10 MILLIGRAM(S): 5 TABLET ORAL at 18:29

## 2021-10-31 RX ADMIN — Medication 975 MILLIGRAM(S): at 00:39

## 2021-10-31 RX ADMIN — OXYCODONE HYDROCHLORIDE 10 MILLIGRAM(S): 5 TABLET ORAL at 15:04

## 2021-10-31 RX ADMIN — HYDROMORPHONE HYDROCHLORIDE 0.5 MILLIGRAM(S): 2 INJECTION INTRAMUSCULAR; INTRAVENOUS; SUBCUTANEOUS at 06:39

## 2021-10-31 RX ADMIN — Medication 975 MILLIGRAM(S): at 11:35

## 2021-10-31 RX ADMIN — HYDROMORPHONE HYDROCHLORIDE 0.5 MILLIGRAM(S): 2 INJECTION INTRAMUSCULAR; INTRAVENOUS; SUBCUTANEOUS at 22:48

## 2021-10-31 RX ADMIN — Medication 975 MILLIGRAM(S): at 22:24

## 2021-10-31 RX ADMIN — OXYCODONE HYDROCHLORIDE 10 MILLIGRAM(S): 5 TABLET ORAL at 14:17

## 2021-10-31 RX ADMIN — HYDROMORPHONE HYDROCHLORIDE 0.5 MILLIGRAM(S): 2 INJECTION INTRAMUSCULAR; INTRAVENOUS; SUBCUTANEOUS at 17:23

## 2021-10-31 RX ADMIN — HYDROMORPHONE HYDROCHLORIDE 0.5 MILLIGRAM(S): 2 INJECTION INTRAMUSCULAR; INTRAVENOUS; SUBCUTANEOUS at 16:07

## 2021-10-31 RX ADMIN — FLUCONAZOLE 400 MILLIGRAM(S): 150 TABLET ORAL at 19:25

## 2021-10-31 RX ADMIN — Medication 975 MILLIGRAM(S): at 23:12

## 2021-10-31 RX ADMIN — HYDROMORPHONE HYDROCHLORIDE 0.5 MILLIGRAM(S): 2 INJECTION INTRAMUSCULAR; INTRAVENOUS; SUBCUTANEOUS at 12:00

## 2021-10-31 RX ADMIN — GABAPENTIN 600 MILLIGRAM(S): 400 CAPSULE ORAL at 22:24

## 2021-10-31 RX ADMIN — Medication 975 MILLIGRAM(S): at 06:09

## 2021-10-31 RX ADMIN — Medication 1000 UNIT(S): at 11:35

## 2021-10-31 RX ADMIN — Medication 975 MILLIGRAM(S): at 17:27

## 2021-10-31 RX ADMIN — HYDROMORPHONE HYDROCHLORIDE 0.5 MILLIGRAM(S): 2 INJECTION INTRAMUSCULAR; INTRAVENOUS; SUBCUTANEOUS at 19:32

## 2021-10-31 RX ADMIN — GABAPENTIN 600 MILLIGRAM(S): 400 CAPSULE ORAL at 17:27

## 2021-10-31 RX ADMIN — DAPTOMYCIN 120 MILLIGRAM(S): 500 INJECTION, POWDER, LYOPHILIZED, FOR SOLUTION INTRAVENOUS at 17:41

## 2021-10-31 RX ADMIN — Medication 975 MILLIGRAM(S): at 12:00

## 2021-10-31 RX ADMIN — Medication 1 TABLET(S): at 11:35

## 2021-10-31 RX ADMIN — Medication 1 TABLET(S): at 11:36

## 2021-10-31 RX ADMIN — Medication 3 MILLIGRAM(S): at 22:24

## 2021-10-31 RX ADMIN — HYDROMORPHONE HYDROCHLORIDE 0.5 MILLIGRAM(S): 2 INJECTION INTRAMUSCULAR; INTRAVENOUS; SUBCUTANEOUS at 11:41

## 2021-10-31 RX ADMIN — OXYCODONE HYDROCHLORIDE 10 MILLIGRAM(S): 5 TABLET ORAL at 19:59

## 2021-10-31 RX ADMIN — ENOXAPARIN SODIUM 40 MILLIGRAM(S): 100 INJECTION SUBCUTANEOUS at 06:08

## 2021-10-31 RX ADMIN — HYDROMORPHONE HYDROCHLORIDE 0.5 MILLIGRAM(S): 2 INJECTION INTRAMUSCULAR; INTRAVENOUS; SUBCUTANEOUS at 01:33

## 2021-10-31 RX ADMIN — OXYCODONE HYDROCHLORIDE 10 MILLIGRAM(S): 5 TABLET ORAL at 10:20

## 2021-10-31 RX ADMIN — OXYCODONE HYDROCHLORIDE 10 MILLIGRAM(S): 5 TABLET ORAL at 00:48

## 2021-10-31 RX ADMIN — GABAPENTIN 600 MILLIGRAM(S): 400 CAPSULE ORAL at 06:08

## 2021-10-31 RX ADMIN — GABAPENTIN 600 MILLIGRAM(S): 400 CAPSULE ORAL at 11:35

## 2021-10-31 RX ADMIN — OXYCODONE HYDROCHLORIDE 10 MILLIGRAM(S): 5 TABLET ORAL at 09:33

## 2021-10-31 RX ADMIN — Medication 975 MILLIGRAM(S): at 07:00

## 2021-10-31 NOTE — PROGRESS NOTE ADULT - TIME-BASED BILLING (NON-CRITICAL CARE)
Time-based billing (NON-critical care)

## 2021-10-31 NOTE — PROGRESS NOTE ADULT - ASSESSMENT
26F h/o L ankle fracture with septic arthritis, postoperative compartment syndrome s/p RICK, I&D, bone debridement and ligament repair on 9/9, I&D with tissue debridement to bone on 9/12, repeat I&D, arthrotomy of ankle, L ankle reconstruction with L gracilis free flap and L thigh STSG, microvascular ansastomosis with recipient AT on 9/20. Initial cultures from 9/9 grew MSSA and Strep anginosus - for which she was treated with initial course of nafcillin and ceftriaxone, culture from 9/20 with MSSA.  She returned to [place] on 10/18 for debridement of gracilis flap, application of external fixator.  She was found to have partial necrosis of skin and subcutaneous fat of gracilis myocutanous free flap. OR cultures were polymicrobial.  Multiple cultures grew E. faecalis, Candida (albicans and parapsilosis), also with Staph epi (?significance) and one with Stenotrophomonas.  Targeting E. faecalis and Candida for now.  She had RTOR on 10/25 for repeat flap debridement and STSG by Plastics.  Has mild transaminitis on labs 10/29 - is new.  May be related to fluconazole.  QTc is normal on fluconazole - can continue.  - Continue daptomycin 500 mg IV q24h   - Continue fluconazole 400 mg q24h - change iv to po.  - Please send CBC with diff, CMP, CPK on 11/1.  Recommendations discussed with primary team    Dr. Mathew VILLAR Team 2 resumes care Mon 11/1

## 2021-10-31 NOTE — PROGRESS NOTE ADULT - SUBJECTIVE AND OBJECTIVE BOX
S; pt seen and examined doing well    O:  Vital Signs Last 24 Hrs  T(C): 36.6 (31 Oct 2021 09:26), Max: 36.8 (31 Oct 2021 05:00)  T(F): 97.9 (31 Oct 2021 09:26), Max: 98.2 (31 Oct 2021 05:00)  HR: 74 (31 Oct 2021 09:26) (74 - 96)  BP: 110/77 (31 Oct 2021 09:26) (105/65 - 114/73)  BP(mean): 83 (31 Oct 2021 05:00) (83 - 83)  RR: 16 (31 Oct 2021 09:26) (16 - 18)  SpO2: 98% (31 Oct 2021 09:26) (95% - 99%)    Gen NAD  LLE: stsg with good take exfix in place    · Assessment	  25 y/o female s/p Left ankle recon with gracilis free flap and STSG  - xeroform and bacitracin to stsg daily  - resume work with PT ambulate with assistance   - prn analgesia    Anton Hernandez MD PGY5

## 2021-10-31 NOTE — PROGRESS NOTE ADULT - TIME BILLING
management of PJI, retained hardware
evaluation of left foot septic arthritis
treatment of PJI with retained hardware
treatment of septic arthritis
treatment of septic arthritis
treatment of skin/soft tissue
L ankle hardware infection, septic arthritis, OM management
management of PJI, septic arthritis
management of PJI/septic arthritis
evaluation of septic arthritis
management of L ankle hardware infection, septic arthritis
management of septic arthritis
Management of surgical site infection
management of septic arthritis, PJI

## 2021-10-31 NOTE — PROGRESS NOTE ADULT - SUBJECTIVE AND OBJECTIVE BOX
INTERVAL HPI/OVERNIGHT EVENTS:    CONSTITUTIONAL:  Negative fever or chills, feels well, good appetite  EYES:  Negative  blurry vision or double vision  CARDIOVASCULAR:  Negative for chest pain or palpitations  RESPIRATORY:  Negative for cough, wheezing, or SOB   GASTROINTESTINAL:  Negative for nausea, vomiting, diarrhea, constipation, or abdominal pain  GENITOURINARY:  Negative frequency, urgency or dysuria  NEUROLOGIC:  No headache, confusion, dizziness, lightheadedness      ANTIBIOTICS/RELEVANT:    MEDICATIONS  (STANDING):  acetaminophen     Tablet .. 975 milliGRAM(s) Oral every 6 hours  BACItracin   Ointment 1 Application(s) Topical daily  calcium carbonate   1250 mG (OsCal) 1 Tablet(s) Oral daily  cholecalciferol 1000 Unit(s) Oral daily  DAPTOmycin IVPB 500 milliGRAM(s) IV Intermittent every 24 hours  enoxaparin Injectable 40 milliGRAM(s) SubCutaneous every 24 hours  fluconAZOLE IVPB 400 milliGRAM(s) IV Intermittent every 24 hours  gabapentin 600 milliGRAM(s) Oral every 6 hours  lactated ringers. 1000 milliLiter(s) (5 mL/Hr) IV Continuous <Continuous>  melatonin 3 milliGRAM(s) Oral at bedtime  multivitamin 1 Tablet(s) Oral daily  ondansetron Injectable 4 milliGRAM(s) IV Push once  polyethylene glycol 3350 17 Gram(s) Oral two times a day  senna 2 Tablet(s) Oral at bedtime    MEDICATIONS  (PRN):  bisacodyl Suppository 10 milliGRAM(s) Rectal daily PRN Constipation  diphenhydrAMINE 25 milliGRAM(s) Oral every 4 hours PRN Rash and/or Itching  diphenhydrAMINE 25 milliGRAM(s) Oral every 4 hours PRN Rash and/or Itching  HYDROmorphone  Injectable 0.5 milliGRAM(s) IV Push every 3 hours PRN breakthrough pain  metoclopramide Injectable 10 milliGRAM(s) IV Push every 6 hours PRN breakthrough nausea or emesis  oxyCODONE    IR 5 milliGRAM(s) Oral every 4 hours PRN Moderate Pain (4 - 6)  oxyCODONE    IR 10 milliGRAM(s) Oral every 4 hours PRN Severe Pain (7 - 10)        Vital Signs Last 24 Hrs  T(C): 36.9 (31 Oct 2021 12:56), Max: 36.9 (31 Oct 2021 12:56)  T(F): 98.5 (31 Oct 2021 12:56), Max: 98.5 (31 Oct 2021 12:56)  HR: 83 (31 Oct 2021 12:56) (74 - 96)  BP: 114/78 (31 Oct 2021 12:56) (105/65 - 114/78)  BP(mean): 83 (31 Oct 2021 05:00) (83 - 83)  RR: 17 (31 Oct 2021 12:56) (16 - 18)  SpO2: 96% (31 Oct 2021 12:56) (95% - 98%)    PHYSICAL EXAM:  Constitutional:Well-developed, well nourished  Eyes:GLORY, EOMI  Ear/Nose/Throat: no oral lesion, no sinus tenderness on percussion	  Neck:no JVD, no lymphadenopathy, supple  Respiratory: CTA rhett  Cardiovascular: S1S2 RRR, no murmurs  Gastrointestinal:soft, (+) BS, no HSM  Extremities:no e/e/c  Vascular: DP Pulse:	right normal; left normal      LABS:                        9.9    8.78  )-----------( 508      ( 29 Oct 2021 16:58 )             30.3     10-29    138  |  102  |  14  ----------------------------<  96  3.8   |  22  |  0.61    Ca    9.4      29 Oct 2021 16:58    TPro  7.5  /  Alb  4.0  /  TBili  0.2  /  DBili  x   /  AST  38  /  ALT  68<H>  /  AlkPhos  74  10-29          MICROBIOLOGY:    RADIOLOGY & ADDITIONAL STUDIES: INTERVAL HPI/OVERNIGHT EVENTS: CASTRO. Some LLE discomfort from external fixator.    CONSTITUTIONAL:  Negative fever or chills, feels well, good appetite  EYES:  Negative  blurry vision or double vision  CARDIOVASCULAR:  Negative for chest pain or palpitations  RESPIRATORY:  Negative for cough, wheezing, or SOB   GASTROINTESTINAL:  Negative for nausea, vomiting, diarrhea, constipation, or abdominal pain  GENITOURINARY:  Negative frequency, urgency or dysuria  NEUROLOGIC:  No headache, confusion, dizziness, lightheadedness      ANTIBIOTICS/RELEVANT:    MEDICATIONS  (STANDING):  acetaminophen     Tablet .. 975 milliGRAM(s) Oral every 6 hours  BACItracin   Ointment 1 Application(s) Topical daily  calcium carbonate   1250 mG (OsCal) 1 Tablet(s) Oral daily  cholecalciferol 1000 Unit(s) Oral daily  DAPTOmycin IVPB 500 milliGRAM(s) IV Intermittent every 24 hours  enoxaparin Injectable 40 milliGRAM(s) SubCutaneous every 24 hours  fluconAZOLE IVPB 400 milliGRAM(s) IV Intermittent every 24 hours  gabapentin 600 milliGRAM(s) Oral every 6 hours  lactated ringers. 1000 milliLiter(s) (5 mL/Hr) IV Continuous <Continuous>  melatonin 3 milliGRAM(s) Oral at bedtime  multivitamin 1 Tablet(s) Oral daily  ondansetron Injectable 4 milliGRAM(s) IV Push once  polyethylene glycol 3350 17 Gram(s) Oral two times a day  senna 2 Tablet(s) Oral at bedtime    MEDICATIONS  (PRN):  bisacodyl Suppository 10 milliGRAM(s) Rectal daily PRN Constipation  diphenhydrAMINE 25 milliGRAM(s) Oral every 4 hours PRN Rash and/or Itching  diphenhydrAMINE 25 milliGRAM(s) Oral every 4 hours PRN Rash and/or Itching  HYDROmorphone  Injectable 0.5 milliGRAM(s) IV Push every 3 hours PRN breakthrough pain  metoclopramide Injectable 10 milliGRAM(s) IV Push every 6 hours PRN breakthrough nausea or emesis  oxyCODONE    IR 5 milliGRAM(s) Oral every 4 hours PRN Moderate Pain (4 - 6)  oxyCODONE    IR 10 milliGRAM(s) Oral every 4 hours PRN Severe Pain (7 - 10)        Vital Signs Last 24 Hrs  T(C): 36.9 (31 Oct 2021 12:56), Max: 36.9 (31 Oct 2021 12:56)  T(F): 98.5 (31 Oct 2021 12:56), Max: 98.5 (31 Oct 2021 12:56)  HR: 83 (31 Oct 2021 12:56) (74 - 96)  BP: 114/78 (31 Oct 2021 12:56) (105/65 - 114/78)  BP(mean): 83 (31 Oct 2021 05:00) (83 - 83)  RR: 17 (31 Oct 2021 12:56) (16 - 18)  SpO2: 96% (31 Oct 2021 12:56) (95% - 98%)    PHYSICAL EXAM:  Constitutional:Well-developed, well nourished  Eyes:GLORY, EOMI  Ear/Nose/Throat: no oral lesion, no sinus tenderness on percussion	  Neck:no JVD, no lymphadenopathy, supple  Respiratory: CTA rhett  Cardiovascular: S1S2 RRR, no murmurs  Gastrointestinal:soft, (+) BS, no HSM  Extremities:no e/e/c  Vascular: DP Pulse:	right normal; left normal      LABS:                        9.9    8.78  )-----------( 508      ( 29 Oct 2021 16:58 )             30.3     10-29    138  |  102  |  14  ----------------------------<  96  3.8   |  22  |  0.61    Ca    9.4      29 Oct 2021 16:58    TPro  7.5  /  Alb  4.0  /  TBili  0.2  /  DBili  x   /  AST  38  /  ALT  68<H>  /  AlkPhos  74  10-29          MICROBIOLOGY: reviewed    RADIOLOGY & ADDITIONAL STUDIES: reviewed

## 2021-11-01 LAB
ALBUMIN SERPL ELPH-MCNC: 3.8 G/DL — SIGNIFICANT CHANGE UP (ref 3.3–5)
ALP SERPL-CCNC: 77 U/L — SIGNIFICANT CHANGE UP (ref 40–120)
ALT FLD-CCNC: 83 U/L — HIGH (ref 10–45)
ANION GAP SERPL CALC-SCNC: 12 MMOL/L — SIGNIFICANT CHANGE UP (ref 5–17)
AST SERPL-CCNC: 69 U/L — HIGH (ref 10–40)
BASOPHILS # BLD AUTO: 0.06 K/UL — SIGNIFICANT CHANGE UP (ref 0–0.2)
BASOPHILS NFR BLD AUTO: 0.9 % — SIGNIFICANT CHANGE UP (ref 0–2)
BILIRUB SERPL-MCNC: 0.2 MG/DL — SIGNIFICANT CHANGE UP (ref 0.2–1.2)
BUN SERPL-MCNC: 10 MG/DL — SIGNIFICANT CHANGE UP (ref 7–23)
CALCIUM SERPL-MCNC: 9.7 MG/DL — SIGNIFICANT CHANGE UP (ref 8.4–10.5)
CHLORIDE SERPL-SCNC: 100 MMOL/L — SIGNIFICANT CHANGE UP (ref 96–108)
CK SERPL-CCNC: 17 U/L — LOW (ref 25–170)
CO2 SERPL-SCNC: 24 MMOL/L — SIGNIFICANT CHANGE UP (ref 22–31)
CREAT SERPL-MCNC: 0.73 MG/DL — SIGNIFICANT CHANGE UP (ref 0.5–1.3)
CRP SERPL-MCNC: 21.4 MG/L — HIGH (ref 0–4)
EOSINOPHIL # BLD AUTO: 0.46 K/UL — SIGNIFICANT CHANGE UP (ref 0–0.5)
EOSINOPHIL NFR BLD AUTO: 7.1 % — HIGH (ref 0–6)
ERYTHROCYTE [SEDIMENTATION RATE] IN BLOOD: 63 MM/HR — HIGH
GLUCOSE SERPL-MCNC: 88 MG/DL — SIGNIFICANT CHANGE UP (ref 70–99)
HCT VFR BLD CALC: 31.4 % — LOW (ref 34.5–45)
HGB BLD-MCNC: 10.2 G/DL — LOW (ref 11.5–15.5)
LYMPHOCYTES # BLD AUTO: 1.72 K/UL — SIGNIFICANT CHANGE UP (ref 1–3.3)
LYMPHOCYTES # BLD AUTO: 26.5 % — SIGNIFICANT CHANGE UP (ref 13–44)
MCHC RBC-ENTMCNC: 29.2 PG — SIGNIFICANT CHANGE UP (ref 27–34)
MCHC RBC-ENTMCNC: 32.5 GM/DL — SIGNIFICANT CHANGE UP (ref 32–36)
MCV RBC AUTO: 90 FL — SIGNIFICANT CHANGE UP (ref 80–100)
MONOCYTES # BLD AUTO: 0.52 K/UL — SIGNIFICANT CHANGE UP (ref 0–0.9)
MONOCYTES NFR BLD AUTO: 8 % — SIGNIFICANT CHANGE UP (ref 2–14)
NEUTROPHILS # BLD AUTO: 3.68 K/UL — SIGNIFICANT CHANGE UP (ref 1.8–7.4)
NEUTROPHILS NFR BLD AUTO: 56.6 % — SIGNIFICANT CHANGE UP (ref 43–77)
PLATELET # BLD AUTO: 461 K/UL — HIGH (ref 150–400)
POTASSIUM SERPL-MCNC: 3.9 MMOL/L — SIGNIFICANT CHANGE UP (ref 3.5–5.3)
POTASSIUM SERPL-SCNC: 3.9 MMOL/L — SIGNIFICANT CHANGE UP (ref 3.5–5.3)
PROT SERPL-MCNC: 7.2 G/DL — SIGNIFICANT CHANGE UP (ref 6–8.3)
RBC # BLD: 3.49 M/UL — LOW (ref 3.8–5.2)
RBC # FLD: 21.6 % — HIGH (ref 10.3–14.5)
SODIUM SERPL-SCNC: 136 MMOL/L — SIGNIFICANT CHANGE UP (ref 135–145)
WBC # BLD: 6.5 K/UL — SIGNIFICANT CHANGE UP (ref 3.8–10.5)
WBC # FLD AUTO: 6.5 K/UL — SIGNIFICANT CHANGE UP (ref 3.8–10.5)

## 2021-11-01 PROCEDURE — 99232 SBSQ HOSP IP/OBS MODERATE 35: CPT

## 2021-11-01 RX ORDER — OXYCODONE HYDROCHLORIDE 5 MG/1
5 TABLET ORAL EVERY 4 HOURS
Refills: 0 | Status: DISCONTINUED | OUTPATIENT
Start: 2021-11-01 | End: 2021-11-05

## 2021-11-01 RX ORDER — HYDROMORPHONE HYDROCHLORIDE 2 MG/ML
0.5 INJECTION INTRAMUSCULAR; INTRAVENOUS; SUBCUTANEOUS
Refills: 0 | Status: DISCONTINUED | OUTPATIENT
Start: 2021-11-01 | End: 2021-11-05

## 2021-11-01 RX ORDER — CASPOFUNGIN ACETATE 7 MG/ML
INJECTION, POWDER, LYOPHILIZED, FOR SOLUTION INTRAVENOUS
Refills: 0 | Status: DISCONTINUED | OUTPATIENT
Start: 2021-11-01 | End: 2021-11-05

## 2021-11-01 RX ORDER — OXYCODONE HYDROCHLORIDE 5 MG/1
10 TABLET ORAL EVERY 4 HOURS
Refills: 0 | Status: DISCONTINUED | OUTPATIENT
Start: 2021-11-01 | End: 2021-11-05

## 2021-11-01 RX ORDER — CASPOFUNGIN ACETATE 7 MG/ML
70 INJECTION, POWDER, LYOPHILIZED, FOR SOLUTION INTRAVENOUS ONCE
Refills: 0 | Status: COMPLETED | OUTPATIENT
Start: 2021-11-01 | End: 2021-11-01

## 2021-11-01 RX ORDER — CASPOFUNGIN ACETATE 7 MG/ML
50 INJECTION, POWDER, LYOPHILIZED, FOR SOLUTION INTRAVENOUS EVERY 24 HOURS
Refills: 0 | Status: DISCONTINUED | OUTPATIENT
Start: 2021-11-02 | End: 2021-11-05

## 2021-11-01 RX ADMIN — HYDROMORPHONE HYDROCHLORIDE 0.5 MILLIGRAM(S): 2 INJECTION INTRAMUSCULAR; INTRAVENOUS; SUBCUTANEOUS at 22:15

## 2021-11-01 RX ADMIN — OXYCODONE HYDROCHLORIDE 10 MILLIGRAM(S): 5 TABLET ORAL at 06:21

## 2021-11-01 RX ADMIN — Medication 3 MILLIGRAM(S): at 21:44

## 2021-11-01 RX ADMIN — Medication 1 TABLET(S): at 11:21

## 2021-11-01 RX ADMIN — OXYCODONE HYDROCHLORIDE 10 MILLIGRAM(S): 5 TABLET ORAL at 20:05

## 2021-11-01 RX ADMIN — Medication 975 MILLIGRAM(S): at 07:18

## 2021-11-01 RX ADMIN — SENNA PLUS 2 TABLET(S): 8.6 TABLET ORAL at 21:44

## 2021-11-01 RX ADMIN — Medication 975 MILLIGRAM(S): at 12:00

## 2021-11-01 RX ADMIN — HYDROMORPHONE HYDROCHLORIDE 0.5 MILLIGRAM(S): 2 INJECTION INTRAMUSCULAR; INTRAVENOUS; SUBCUTANEOUS at 18:25

## 2021-11-01 RX ADMIN — OXYCODONE HYDROCHLORIDE 10 MILLIGRAM(S): 5 TABLET ORAL at 07:32

## 2021-11-01 RX ADMIN — HYDROMORPHONE HYDROCHLORIDE 0.5 MILLIGRAM(S): 2 INJECTION INTRAMUSCULAR; INTRAVENOUS; SUBCUTANEOUS at 21:44

## 2021-11-01 RX ADMIN — GABAPENTIN 600 MILLIGRAM(S): 400 CAPSULE ORAL at 11:22

## 2021-11-01 RX ADMIN — HYDROMORPHONE HYDROCHLORIDE 0.5 MILLIGRAM(S): 2 INJECTION INTRAMUSCULAR; INTRAVENOUS; SUBCUTANEOUS at 08:10

## 2021-11-01 RX ADMIN — Medication 975 MILLIGRAM(S): at 06:21

## 2021-11-01 RX ADMIN — HYDROMORPHONE HYDROCHLORIDE 0.5 MILLIGRAM(S): 2 INJECTION INTRAMUSCULAR; INTRAVENOUS; SUBCUTANEOUS at 03:14

## 2021-11-01 RX ADMIN — HYDROMORPHONE HYDROCHLORIDE 0.5 MILLIGRAM(S): 2 INJECTION INTRAMUSCULAR; INTRAVENOUS; SUBCUTANEOUS at 03:36

## 2021-11-01 RX ADMIN — HYDROMORPHONE HYDROCHLORIDE 0.5 MILLIGRAM(S): 2 INJECTION INTRAMUSCULAR; INTRAVENOUS; SUBCUTANEOUS at 07:38

## 2021-11-01 RX ADMIN — ENOXAPARIN SODIUM 40 MILLIGRAM(S): 100 INJECTION SUBCUTANEOUS at 06:22

## 2021-11-01 RX ADMIN — Medication 975 MILLIGRAM(S): at 17:38

## 2021-11-01 RX ADMIN — GABAPENTIN 600 MILLIGRAM(S): 400 CAPSULE ORAL at 06:22

## 2021-11-01 RX ADMIN — Medication 1 TABLET(S): at 11:57

## 2021-11-01 RX ADMIN — HYDROMORPHONE HYDROCHLORIDE 0.5 MILLIGRAM(S): 2 INJECTION INTRAMUSCULAR; INTRAVENOUS; SUBCUTANEOUS at 17:38

## 2021-11-01 RX ADMIN — HYDROMORPHONE HYDROCHLORIDE 0.5 MILLIGRAM(S): 2 INJECTION INTRAMUSCULAR; INTRAVENOUS; SUBCUTANEOUS at 11:25

## 2021-11-01 RX ADMIN — OXYCODONE HYDROCHLORIDE 10 MILLIGRAM(S): 5 TABLET ORAL at 11:20

## 2021-11-01 RX ADMIN — CASPOFUNGIN ACETATE 260 MILLIGRAM(S): 7 INJECTION, POWDER, LYOPHILIZED, FOR SOLUTION INTRAVENOUS at 13:22

## 2021-11-01 RX ADMIN — OXYCODONE HYDROCHLORIDE 10 MILLIGRAM(S): 5 TABLET ORAL at 16:20

## 2021-11-01 RX ADMIN — OXYCODONE HYDROCHLORIDE 10 MILLIGRAM(S): 5 TABLET ORAL at 00:40

## 2021-11-01 RX ADMIN — OXYCODONE HYDROCHLORIDE 10 MILLIGRAM(S): 5 TABLET ORAL at 15:51

## 2021-11-01 RX ADMIN — Medication 975 MILLIGRAM(S): at 18:25

## 2021-11-01 RX ADMIN — HYDROMORPHONE HYDROCHLORIDE 0.5 MILLIGRAM(S): 2 INJECTION INTRAMUSCULAR; INTRAVENOUS; SUBCUTANEOUS at 11:52

## 2021-11-01 RX ADMIN — Medication 975 MILLIGRAM(S): at 11:21

## 2021-11-01 RX ADMIN — OXYCODONE HYDROCHLORIDE 10 MILLIGRAM(S): 5 TABLET ORAL at 10:21

## 2021-11-01 RX ADMIN — GABAPENTIN 600 MILLIGRAM(S): 400 CAPSULE ORAL at 17:38

## 2021-11-01 RX ADMIN — DAPTOMYCIN 120 MILLIGRAM(S): 500 INJECTION, POWDER, LYOPHILIZED, FOR SOLUTION INTRAVENOUS at 17:38

## 2021-11-01 RX ADMIN — OXYCODONE HYDROCHLORIDE 10 MILLIGRAM(S): 5 TABLET ORAL at 01:29

## 2021-11-01 RX ADMIN — POLYETHYLENE GLYCOL 3350 17 GRAM(S): 17 POWDER, FOR SOLUTION ORAL at 17:38

## 2021-11-01 RX ADMIN — Medication 1000 UNIT(S): at 11:22

## 2021-11-01 NOTE — CHART NOTE - NSCHARTNOTESELECT_GEN_ALL_CORE
Follow-up/Nutrition Services
Follow up/Nutrition Services
Follow-up/Nutrition Services
Nutrition Services
Nutrition Services

## 2021-11-01 NOTE — PROGRESS NOTE ADULT - ASSESSMENT
A/P: 26yFemale s/p repeat ankle I&D by Dr. Wallis on 09/17 w L gracilis free flap and STSG on 09/20 now s/p I&D of flap by plastics team and placement of ringed external fixator by ortho 10/18 and s/p repeat flap debridement and STSG by plastics on 10/25  - VS Stable  - Pain/Nausea Control  - Home meds  - DVT ppx: per plastics team -- On LVX 40 QD  - WBS: WBAT in ringed ex fix w RW assistance from ortho standpoint  - Continued operative flap care mgmt per plastics team  - OR cultures showing polymicrobial infection w ID recs Daptomycin and Fluconazole -- continued Abx stewardship appreciated  - Plan for surgical flap care per plastics team -- No imminent plans for RTOR as of now  - Definitive surgical plan for bony stabilization pending clinical course re: resolution of infection and appropriate soft tissue coverage --- Possible plan for definitive fixation in approx 6 weeks for last debridement pending resolution of infection and flap maturation  - Plan for repeat infectious imaging/Gallium scan approx 6 weeks from last flap debridement  - Please continue to optimize electrolytes/nutrition in order to optimize soft tissue healing potential -- Draw periodic lytes/BMP to monitor -- Nutrition recs appreciated  - Please continue to monitor weekly inflammatory labs -- Please draw CBC, ESR, CRP 11/1  - Remainder of care per primary team      Ortho Pager [phone] A/P: 26yFemale s/p repeat ankle I&D by Dr. Wallis on 09/17 w L gracilis free flap and STSG on 09/20 now s/p I&D of flap by plastics team and placement of ringed external fixator by ortho 10/18 and s/p repeat flap debridement and STSG by plastics on 10/25  - VS Stable  - Pain/Nausea Control  - Home meds  - DVT ppx: per plastics team -- On LVX 40 QD  - WBS: WBAT in ringed ex fix w RW assistance from ortho standpoint  - Continued operative flap care mgmt per plastics team  - OR cultures showing polymicrobial infection w ID recs Daptomycin and Fluconazole -- continued Abx stewardship appreciated  - Plan for surgical flap care per plastics team -- No imminent plans for RTOR as of now  - Definitive surgical plan for bony stabilization pending clinical course re: resolution of infection and appropriate soft tissue coverage --- Possible plan for definitive fixation in approx 6 weeks for last debridement pending resolution of infection and flap maturation  - Plan for repeat infectious imaging/Gallium scan approx 6 weeks from last flap debridement  - Please continue to optimize electrolytes/nutrition in order to optimize soft tissue healing potential -- Draw periodic lytes/BMP to monitor -- Nutrition recs appreciated  - Please continue to monitor weekly inflammatory labs -- CBC, ESR, CRP -- 11/1 WBC 6.5, ESR/CRP pending  - Remainder of care per primary team      Ortho Pager [phone]

## 2021-11-01 NOTE — PROGRESS NOTE ADULT - ASSESSMENT
26F h/o L ankle fracture with septic arthritis, postoperative compartment syndrome s/p RICK, I&D, bone debridement and ligament repair on 9/9, I&D with tissue debridement to bone on 9/12, repeat I&D, arthrotomy of ankle, L ankle reconstruction with L gracilis free flap and L thigh STSG, microvascular ansastomosis with recipient AT on 9/20. Initial cultures from 9/9 grew MSSA and Strep anginosus - for which she was treated with initial course of nafcillin and ceftriaxone, culture from 9/20 with MSSA.  She returned to Or on 10/18 for debridement of gracilis flap, application of external fixator.  She was found to have partial necrosis of skin and subcutaneous fat of gracilis myocutanous free flap. OR cultures were polymicrobial.  Multiple cultures grew E. faecalis, Candida (albicans and parapsilosis), also with Staph epi (?significance) and one with Stenotrophomonas.  Targeting E. faecalis and Candida for now.  She had RTOR on 10/25 for repeat flap debridement and STSG by Plastics.  Worsening mild transaminitis from 10/29.  May be related to fluconazole.  Candida parapsilosis isolate is susceptible to caspofungin  Suggest:  - Continue daptomycin 500 mg IV q24h   - Give caspofungin 70 mg IV X 1 today.  Tomorrow, start caspofungin 50 mg IV q24h.  - Please send CBC with diff, CMP, ESR, CRP and CPK on 11/3.  Recommendations discussed with primary team.  Will follow with you - team 2.

## 2021-11-01 NOTE — PROGRESS NOTE ADULT - SUBJECTIVE AND OBJECTIVE BOX
INTERVAL HPI/OVERNIGHT EVENTS:  Remains afebrile.  Continues to feel better.  No thigh or ankle pain.    CONSTITUTIONAL:  No fever, chills, night sweats  EYES:  No photophobia or visual changes  CARDIOVASCULAR:  No chest pain  RESPIRATORY:  No cough, wheezing, or SOB   GASTROINTESTINAL:  No nausea, vomiting, diarrhea, constipation, or abdominal pain  GENITOURINARY:  No frequency, urgency, dysuria or hematuria  NEUROLOGIC:  No headache, lightheadedness      ANTIBIOTICS/RELEVANT:  Daptomycin 500 mg IV q24h (10/21-present)  Fluconazole 400 mg po q24h (10/21-present)    Vancomycin 9/9-9/11  Cefepime 9/9-9/11  Nafcillin 9/11-10/21  Ceftriaxone 9/12-10/21          Vital Signs Last 24 Hrs  T(C): 36.7 (01 Nov 2021 16:40), Max: 37 (31 Oct 2021 20:20)  T(F): 98.1 (01 Nov 2021 16:40), Max: 98.6 (31 Oct 2021 20:20)  HR: 87 (01 Nov 2021 16:40) (84 - 93)  BP: 120/79 (01 Nov 2021 16:40) (100/69 - 120/79)  BP(mean): --  RR: 16 (01 Nov 2021 16:40) (16 - 18)  SpO2: 96% (01 Nov 2021 16:40) (96% - 98%)    PHYSICAL EXAM:  Constitutional:  Well-developed, well nourished  Eyes:  Sclerae anicteric, conjunctivae clear, PERRL  Ear/Nose/Throat:  No nasal exudate or sinus tenderness;  No buccal mucosal lesions, no pharyngeal erythema or exudate	  Neck:  Supple, no adenopathy  Respiratory:  Clear bilaterally  Cardiovascular:  RRR, S1S2, no murmur appreciated  Gastrointestinal:  Symmetric, normoactive BS, soft, NT, no masses, guarding or rebound.  No HSM  Extremities:  No edema.  L thigh in ace wrap, L ankle in external fixation device.  LUE PICC exit site with Biopatch      LABS:                        10.2   6.50  )-----------( 461      ( 01 Nov 2021 06:59 )             31.4         11-01    136  |  100  |  10  ----------------------------<  88  3.9   |  24  |  0.73    Ca    9.7      01 Nov 2021 06:59    TPro  7.2  /  Alb  3.8  /  TBili  0.2  /  DBili  x   /  AST  69<H>  /  ALT  83<H>  /  AlkPhos  77  11-01          MICROBIOLOGY:        RADIOLOGY & ADDITIONAL STUDIES:

## 2021-11-01 NOTE — CHART NOTE - NSCHARTNOTEFT_GEN_A_CORE
Admitting Diagnosis:   Patient is a 26y old  Female who presents with a chief complaint of L ankle pain (01 Nov 2021 06:54)    PAST MEDICAL & SURGICAL HISTORY:  Left tibial neuropathy  PAD (peripheral artery disease)  Status post ORIF of fracture of ankle      Current Nutrition Order:  Diet, Regular:   Supplement Feeding Modality:  Oral  Ensure Max Cans or Servings Per Day:  1       Frequency:  Two Times a day (10-25-21 @ 12:53)    PO Intake: Good (%) [ x  ]  Fair (50-75%) [   ] Poor (<25%) [   ]  -Spoke with pt in room. States appetite is improving and starting to eat 3 meals a day instead of 2 days. Family is bringing in food and continues to drink Ensure Max 1-2 x per day.     GI Issues: +BM 10/30/21, abd-soft. States stools are becoming more formed   Denies N/V or D/C   Pain: 8/8   Skin Integrity:  Left ankle -recon with L gracilis free flap and STSG (9/20)  -S/P I&D of Left ankle down to bone and application of multiplanar external fixation on 10/18    Labs:   11-01    136  |  100  |  10  ----------------------------<  88  3.9   |  24  |  0.73    Ca    9.7      01 Nov 2021 06:59    TPro  7.2  /  Alb  3.8  /  TBili  0.2  /  DBili  x   /  AST  69<H>  /  ALT  83<H>  /  AlkPhos  77  11-01    Medications:  MEDICATIONS  (STANDING):  acetaminophen     Tablet .. 975 milliGRAM(s) Oral every 6 hours  BACItracin   Ointment 1 Application(s) Topical daily  calcium carbonate   1250 mG (OsCal) 1 Tablet(s) Oral daily  caspofungin IVPB      cholecalciferol 1000 Unit(s) Oral daily  DAPTOmycin IVPB 500 milliGRAM(s) IV Intermittent every 24 hours  enoxaparin Injectable 40 milliGRAM(s) SubCutaneous every 24 hours  gabapentin 600 milliGRAM(s) Oral every 6 hours  lactated ringers. 1000 milliLiter(s) (5 mL/Hr) IV Continuous <Continuous>  melatonin 3 milliGRAM(s) Oral at bedtime  multivitamin 1 Tablet(s) Oral daily  ondansetron Injectable 4 milliGRAM(s) IV Push once  polyethylene glycol 3350 17 Gram(s) Oral two times a day  senna 2 Tablet(s) Oral at bedtime    MEDICATIONS  (PRN):  bisacodyl Suppository 10 milliGRAM(s) Rectal daily PRN Constipation  diphenhydrAMINE 25 milliGRAM(s) Oral every 4 hours PRN Rash and/or Itching  diphenhydrAMINE 25 milliGRAM(s) Oral every 4 hours PRN Rash and/or Itching  HYDROmorphone  Injectable 0.5 milliGRAM(s) IV Push every 3 hours PRN breakthrough pain  metoclopramide Injectable 10 milliGRAM(s) IV Push every 6 hours PRN breakthrough nausea or emesis  oxyCODONE    IR 5 milliGRAM(s) Oral every 4 hours PRN Moderate Pain (4 - 6)  oxyCODONE    IR 10 milliGRAM(s) Oral every 4 hours PRN Severe Pain (7 - 10)    Admitting Anthropometrics:   Height: 5'0, 175.3 cm   Weight: 181.2 lb, 82.2kg   BMI: 26.8  IBW: 145    Weight Change:   9/07: 194 lb  9/09: 190.1 lb  9/20: 181.3 lb  10/25: 181.3 lb (today)  **6.5% wt loss between September 7th and September 20th, however weight has stabilized since.     Estimated energy needs:   65.9kg  IBW used for calculations as pt >120% of IBW, adjusted for acute illness  Needs adjusted for wound healing  Calories: 1650-1950kcals (25-30kcals/kg)  Protein: 95-130g (1.5-2.0g/kg)   Fluid:+1950ml/day (30ml/kcals)    Subjective:   26yFemale s/p Repeat L Ankle I&D, Vac Change by Dr. NEAL Wallis on 09-17, with plastic surgery for flap harvest and closure with plastic surgery (9/02). PICC line with IV abx. Continue dangle 4x per day and working with therapies. Per ortho, S/P I&D down to bone with application of multiplanar external fixation system (10/18). Plan for repeat flap debridement per plastics today 10/25 +/- ortho involvement for ex fix adjustment as needed. D/c plan pending PT recommendations, home vs rehab.     Pt states appetite is improving and pain more controlled recently.     Previous Nutrition Diagnosis: Increased nutrient needs (protein) R/T hypermetabolic, increased protein catabolism AEB 1.5-2.0g/kg protein    Active [X]  Resolved [   ]    Goal: Pt to consistently meet % of estimated needs PO     Recommendations:  1. Continue regular diet after surgery, encourage increased PO intake during mealtimes  >>Ensure Max BID  2. Monitor %PO intake   3. Continue MVI   4. Bowel regimen PRN    Education: Continue to encourage PO/protein intake.     Risk Level: High [   ] Moderate [X] Low [   ].  Will continue to monitor per protocol.  Lisa Dolan RD,CDN,,CNSC

## 2021-11-01 NOTE — PROGRESS NOTE ADULT - ATTENDING COMMENTS
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  The patient did very well with physical therapy and is weightbearing with her ringed exfix and was able to do stairs today.  Overall the patient is significantly improving and her coverage appears to be maturing appropriately.  I had a lengthy conversation with the patient regarding her planned next stage of her intervention.  Her antibiotics have been adjusted and are being monitored for liver toxicity by the infectious disease team.  At this point the patient is progressing appropriately and is planned for second stage of her limb salvage with removal of exfix and conversion to definitive fixation in mid December versus early January.  From a Orthopedic perspective, the patient can be discharged and follow-up as an outpatient once her antibiotic regimen and coverage has been ensured to be appropriate for outpatient follow-up.  1. The patient will require a repeat gallium scan left ankle in mid December prior to consideration of conversion to definitive instrumentation.  2.  Q. Monday ESR CRP CBC with differential  3.  Plan to retighten external fixation device left lower extremity prior to dispo  4.  Weight-bear as tolerated left lower extremity on ringed exfix  5.  Repeat left ankle foot and tib-fib x-rays prior to discharge  6.  Initiate toe dorsiflexion exercises with resistance band and manual dorsiflexion to limit contracture of her forefoot.  Patient shown how to do this in the bed today  7.  Care per primary plastics team  8.  Antibiotics per infectious disease  9.  Physical therapy -weight-bear as tolerated left lower extremity and ring to expect

## 2021-11-01 NOTE — PROGRESS NOTE ADULT - SUBJECTIVE AND OBJECTIVE BOX
S; pt seen and examined doing well    O:  AVSS    Gen NAD  LLE: dressing cdi    · Assessment	  25 y/o female s/p Left ankle recon with gracilis free flap and STSG  - xeroform and bacitracin to stsg daily  - resume work with PT ambulate with assistance   - prn analgesia  -dispo planning    Anton Hernandez MD PGY5

## 2021-11-01 NOTE — PROGRESS NOTE ADULT - ATTENDING SUPERVISION STATEMENT
ACP
ACP
Resident

## 2021-11-02 PROCEDURE — 99232 SBSQ HOSP IP/OBS MODERATE 35: CPT

## 2021-11-02 RX ADMIN — HYDROMORPHONE HYDROCHLORIDE 0.5 MILLIGRAM(S): 2 INJECTION INTRAMUSCULAR; INTRAVENOUS; SUBCUTANEOUS at 12:20

## 2021-11-02 RX ADMIN — Medication 975 MILLIGRAM(S): at 12:30

## 2021-11-02 RX ADMIN — GABAPENTIN 600 MILLIGRAM(S): 400 CAPSULE ORAL at 17:17

## 2021-11-02 RX ADMIN — Medication 975 MILLIGRAM(S): at 12:00

## 2021-11-02 RX ADMIN — GABAPENTIN 600 MILLIGRAM(S): 400 CAPSULE ORAL at 05:41

## 2021-11-02 RX ADMIN — Medication 975 MILLIGRAM(S): at 06:30

## 2021-11-02 RX ADMIN — HYDROMORPHONE HYDROCHLORIDE 0.5 MILLIGRAM(S): 2 INJECTION INTRAMUSCULAR; INTRAVENOUS; SUBCUTANEOUS at 07:40

## 2021-11-02 RX ADMIN — HYDROMORPHONE HYDROCHLORIDE 0.5 MILLIGRAM(S): 2 INJECTION INTRAMUSCULAR; INTRAVENOUS; SUBCUTANEOUS at 03:20

## 2021-11-02 RX ADMIN — Medication 975 MILLIGRAM(S): at 05:42

## 2021-11-02 RX ADMIN — Medication 975 MILLIGRAM(S): at 00:07

## 2021-11-02 RX ADMIN — HYDROMORPHONE HYDROCHLORIDE 0.5 MILLIGRAM(S): 2 INJECTION INTRAMUSCULAR; INTRAVENOUS; SUBCUTANEOUS at 23:20

## 2021-11-02 RX ADMIN — Medication 975 MILLIGRAM(S): at 18:44

## 2021-11-02 RX ADMIN — OXYCODONE HYDROCHLORIDE 10 MILLIGRAM(S): 5 TABLET ORAL at 15:50

## 2021-11-02 RX ADMIN — POLYETHYLENE GLYCOL 3350 17 GRAM(S): 17 POWDER, FOR SOLUTION ORAL at 17:17

## 2021-11-02 RX ADMIN — Medication 1 TABLET(S): at 11:59

## 2021-11-02 RX ADMIN — HYDROMORPHONE HYDROCHLORIDE 0.5 MILLIGRAM(S): 2 INJECTION INTRAMUSCULAR; INTRAVENOUS; SUBCUTANEOUS at 17:32

## 2021-11-02 RX ADMIN — HYDROMORPHONE HYDROCHLORIDE 0.5 MILLIGRAM(S): 2 INJECTION INTRAMUSCULAR; INTRAVENOUS; SUBCUTANEOUS at 07:20

## 2021-11-02 RX ADMIN — ENOXAPARIN SODIUM 40 MILLIGRAM(S): 100 INJECTION SUBCUTANEOUS at 05:42

## 2021-11-02 RX ADMIN — HYDROMORPHONE HYDROCHLORIDE 0.5 MILLIGRAM(S): 2 INJECTION INTRAMUSCULAR; INTRAVENOUS; SUBCUTANEOUS at 12:05

## 2021-11-02 RX ADMIN — Medication 975 MILLIGRAM(S): at 18:14

## 2021-11-02 RX ADMIN — OXYCODONE HYDROCHLORIDE 10 MILLIGRAM(S): 5 TABLET ORAL at 00:07

## 2021-11-02 RX ADMIN — DAPTOMYCIN 120 MILLIGRAM(S): 500 INJECTION, POWDER, LYOPHILIZED, FOR SOLUTION INTRAVENOUS at 18:14

## 2021-11-02 RX ADMIN — Medication 3 MILLIGRAM(S): at 22:38

## 2021-11-02 RX ADMIN — OXYCODONE HYDROCHLORIDE 10 MILLIGRAM(S): 5 TABLET ORAL at 06:30

## 2021-11-02 RX ADMIN — HYDROMORPHONE HYDROCHLORIDE 0.5 MILLIGRAM(S): 2 INJECTION INTRAMUSCULAR; INTRAVENOUS; SUBCUTANEOUS at 17:17

## 2021-11-02 RX ADMIN — GABAPENTIN 600 MILLIGRAM(S): 400 CAPSULE ORAL at 12:00

## 2021-11-02 RX ADMIN — OXYCODONE HYDROCHLORIDE 10 MILLIGRAM(S): 5 TABLET ORAL at 15:20

## 2021-11-02 RX ADMIN — OXYCODONE HYDROCHLORIDE 10 MILLIGRAM(S): 5 TABLET ORAL at 10:08

## 2021-11-02 RX ADMIN — CASPOFUNGIN ACETATE 260 MILLIGRAM(S): 7 INJECTION, POWDER, LYOPHILIZED, FOR SOLUTION INTRAVENOUS at 11:58

## 2021-11-02 RX ADMIN — HYDROMORPHONE HYDROCHLORIDE 0.5 MILLIGRAM(S): 2 INJECTION INTRAMUSCULAR; INTRAVENOUS; SUBCUTANEOUS at 02:54

## 2021-11-02 RX ADMIN — OXYCODONE HYDROCHLORIDE 10 MILLIGRAM(S): 5 TABLET ORAL at 00:40

## 2021-11-02 RX ADMIN — OXYCODONE HYDROCHLORIDE 10 MILLIGRAM(S): 5 TABLET ORAL at 09:53

## 2021-11-02 RX ADMIN — OXYCODONE HYDROCHLORIDE 10 MILLIGRAM(S): 5 TABLET ORAL at 05:40

## 2021-11-02 RX ADMIN — Medication 1 APPLICATION(S): at 11:59

## 2021-11-02 RX ADMIN — HYDROMORPHONE HYDROCHLORIDE 0.5 MILLIGRAM(S): 2 INJECTION INTRAMUSCULAR; INTRAVENOUS; SUBCUTANEOUS at 22:33

## 2021-11-02 RX ADMIN — Medication 975 MILLIGRAM(S): at 00:40

## 2021-11-02 RX ADMIN — POLYETHYLENE GLYCOL 3350 17 GRAM(S): 17 POWDER, FOR SOLUTION ORAL at 05:42

## 2021-11-02 RX ADMIN — SENNA PLUS 2 TABLET(S): 8.6 TABLET ORAL at 22:38

## 2021-11-02 RX ADMIN — Medication 1000 UNIT(S): at 12:00

## 2021-11-02 RX ADMIN — GABAPENTIN 600 MILLIGRAM(S): 400 CAPSULE ORAL at 00:07

## 2021-11-02 NOTE — PROGRESS NOTE ADULT - SUBJECTIVE AND OBJECTIVE BOX
SUBJECTIVE:  Doing well.   No overnight events.   Patient walking well with PT and was able to do stairs yesterday.     OBJECTIVE:     ** VITAL SIGNS / I&O's **    Vital Signs Last 24 Hrs  T(C): 36.8 (02 Nov 2021 05:25), Max: 36.8 (02 Nov 2021 05:25)  T(F): 98.2 (02 Nov 2021 05:25), Max: 98.2 (02 Nov 2021 05:25)  HR: 83 (02 Nov 2021 05:25) (83 - 101)  BP: 103/69 (02 Nov 2021 05:25) (103/69 - 127/91)  BP(mean): --  RR: 18 (02 Nov 2021 05:25) (16 - 18)  SpO2: 98% (02 Nov 2021 05:25) (96% - 98%)      01 Nov 2021 07:01  -  02 Nov 2021 07:00  --------------------------------------------------------  IN:    IV PiggyBack: 50 mL    Lactated Ringers: 70 mL    Oral Fluid: 870 mL  Total IN: 990 mL    OUT:    Stool (mL): 1 mL    Voided (mL): 1900 mL  Total OUT: 1901 mL    Total NET: -911 mL          ** PHYSICAL EXAM **    -- CONSTITUTIONAL: Alert, Awake. NAD.   -- RESPIRATORY: unlabored breathing, no respiratory distress  -- LLE: ex fix in place, stsg with good take, +granulation tissue, dressing changed, thigh dressing taken down and aquaphor applied with telfa to donor site      ** LABS **                          10.2   6.50  )-----------( 461      ( 01 Nov 2021 06:59 )             31.4     01 Nov 2021 06:59    136    |  100    |  10     ----------------------------<  88     3.9     |  24     |  0.73     Ca    9.7        01 Nov 2021 06:59    TPro  7.2    /  Alb  3.8    /  TBili  0.2    /  DBili  x      /  AST  69     /  ALT  83     /  AlkPhos  77     01 Nov 2021 06:59      CAPILLARY BLOOD GLUCOSE        CARDIAC MARKERS ( 01 Nov 2021 06:59 )  x     / x     / 17 U/L / x     / x

## 2021-11-02 NOTE — PROGRESS NOTE ADULT - SUBJECTIVE AND OBJECTIVE BOX
INTERVAL HPI/OVERNIGHT EVENTS:  Remains afebrile, feels well.  No thigh or ankle pain    CONSTITUTIONAL:  No fever, chills, night sweats  EYES:  No photophobia or visual changes  CARDIOVASCULAR:  No chest pain  RESPIRATORY:  No cough, wheezing, or SOB   GASTROINTESTINAL:  No nausea, vomiting, diarrhea, constipation, or abdominal pain  GENITOURINARY:  No frequency, urgency, dysuria or hematuria  NEUROLOGIC:  No headache, lightheadedness      ANTIBIOTICS/RELEVANT:    Daptomycin 500 mg IV q24h (10/21-present)  Caspofungin 50 mg IV q24h (11/1-present)    Vancomycin 9/9-9/11  Cefepime 9/9-9/11  Nafcillin 9/11-10/21  Ceftriaxone 9/12-10/21  Fluconazole 10/21-11/1      Vital Signs Last 24 Hrs  T(C): 36.3 (02 Nov 2021 09:01), Max: 36.8 (02 Nov 2021 05:25)  T(F): 97.4 (02 Nov 2021 09:01), Max: 98.2 (02 Nov 2021 05:25)  HR: 86 (02 Nov 2021 09:01) (83 - 101)  BP: 110/77 (02 Nov 2021 09:01) (103/69 - 127/91)  BP(mean): --  RR: 18 (02 Nov 2021 09:01) (16 - 18)  SpO2: 98% (02 Nov 2021 09:01) (96% - 98%)    PHYSICAL EXAM:  Constitutional:  Well-developed, well nourished  Eyes:  Sclerae anicteric, conjunctivae clear, PERRL  Ear/Nose/Throat:  No nasal exudate or sinus tenderness;  No buccal mucosal lesions, no pharyngeal erythema or exudate	  Neck:  Supple, no adenopathy  Respiratory:  Clear bilaterally  Cardiovascular:  RRR, S1S2, no murmur appreciated  Gastrointestinal:  Symmetric, normoactive BS, soft, NT, no masses, guarding or rebound.  No HSM  Extremities:  L thigh donor sites healing well;  L ankle with external fixation device      LABS:                        10.2   6.50  )-----------( 461      ( 01 Nov 2021 06:59 )             31.4         11-01    136  |  100  |  10  ----------------------------<  88  3.9   |  24  |  0.73    Ca    9.7      01 Nov 2021 06:59    TPro  7.2  /  Alb  3.8  /  TBili  0.2  /  DBili  x   /  AST  69<H>  /  ALT  83<H>  /  AlkPhos  77  11-01          MICROBIOLOGY:        RADIOLOGY & ADDITIONAL STUDIES:

## 2021-11-02 NOTE — PROGRESS NOTE ADULT - SUBJECTIVE AND OBJECTIVE BOX
Ortho Note    Pt resting comfortably this AM  Denies CP, SOB, N/V, numbness/tingling     Vital Signs Last 24 Hrs  T(C): 36.8 (02 Nov 2021 05:25), Max: 36.8 (02 Nov 2021 05:25)  T(F): 98.2 (02 Nov 2021 05:25), Max: 98.2 (02 Nov 2021 05:25)  HR: 83 (02 Nov 2021 05:25) (83 - 101)  BP: 103/69 (02 Nov 2021 05:25) (103/69 - 127/91)  BP(mean): --  RR: 18 (02 Nov 2021 05:25) (16 - 18)  SpO2: 98% (02 Nov 2021 05:25) (96% - 98%)    VSS  General: A&Ox3, NAD  LLE: Ringed external fixator in place w ace wrap; Pin site bolster dressings well appearing; Flap with dressing in place  Vascular: Toes WWP; Cap refill < 2 sec  Sensation: Patient with abnormal/absent sensation over most of the dorsum & plantar aspects of the foot consistent with baseline  Motor: Minimal FHL/FDL, 0/5 EHL/EDL activity consistent with baseline    Labs:                        10.2   6.50  )-----------( 461      ( 01 Nov 2021 06:59 )             31.4       11-01    136  |  100  |  10  ----------------------------<  88  3.9   |  24  |  0.73    Ca    9.7      01 Nov 2021 06:59    TPro  7.2  /  Alb  3.8  /  TBili  0.2  /  DBili  x   /  AST  69<H>  /  ALT  83<H>  /  AlkPhos  77  11-01

## 2021-11-02 NOTE — PROGRESS NOTE ADULT - ASSESSMENT
A/P: 26yFemale s/p repeat ankle I&D by Dr. Wallis on 09/17 w L gracilis free flap and STSG on 09/20 now s/p I&D of flap by plastics team and placement of ringed external fixator by ortho 10/18 and s/p repeat flap debridement and STSG by plastics on 10/25  - VS Stable  - Pain/Nausea Control  - Home meds  - DVT ppx: per plastics team -- On LVX 40 QD  - WBS: WBAT in ringed ex fix w RW assistance from ortho standpoint  - Continued operative flap care mgmt per plastics team  - OR cultures showing polymicrobial infection w ID recs Daptomycin and switch to Caspofungin as of 11/1 due to mildly elevated LFTs possibly secondary to Fluconazole -- continued Abx stewardship appreciated  - Plan for surgical flap care per plastics team -- No imminent plans for RTOR as of now  - Definitive surgical plan for bony stabilization pending clinical course re: resolution of infection and appropriate soft tissue coverage --- Possible plan for definitive fixation in approx 6 weeks for last debridement pending resolution of infection and flap maturation  - Plan for repeat infectious imaging/Gallium scan mid December  - Please continue to optimize electrolytes/nutrition in order to optimize soft tissue healing potential -- Draw periodic lytes/BMP to monitor -- Nutrition recs appreciated  - Please continue to monitor weekly inflammatory labs -- CBC, ESR, CRP -- 11/1 WBC 6.5, ESR downtrending 63 from 96, CRP downtrending 21.4 from 34.1  - Remainder of care per primary team      Ortho Pager [phone] A/P: 26yFemale s/p repeat ankle I&D by Dr. Wallis on 09/17 w L gracilis free flap and STSG on 09/20 now s/p I&D of flap by plastics team and placement of ringed external fixator by ortho 10/18 and s/p repeat flap debridement and STSG by plastics on 10/25  - VS Stable  - Pain/Nausea Control  - Home meds  - DVT ppx: per plastics team -- On LVX 40 QD  - WBS: WBAT in ringed ex fix w RW assistance from ortho standpoint  - Continued operative flap care mgmt per plastics team  - OR cultures showing polymicrobial infection w ID recs Daptomycin and switch to Caspofungin as of 11/1 due to mildly elevated LFTs possibly secondary to Fluconazole -- continued Abx stewardship appreciated  - Plan for surgical flap care per plastics team -- No imminent plans for RTOR as of now  - Definitive surgical plan for bony stabilization pending clinical course re: resolution of infection and appropriate soft tissue coverage --- Possible plan for definitive fixation in approx 6 weeks for last debridement pending resolution of infection and flap maturation  - Plan for repeat infectious imaging/Gallium scan mid December  - Please continue to optimize electrolytes/nutrition in order to optimize soft tissue healing potential -- Draw periodic lytes/BMP to monitor -- Nutrition recs appreciated  - Please continue to monitor weekly inflammatory labs -- CBC, ESR, CRP -- 11/1 WBC 6.5, ESR downtrending 63 from 96, CRP downtrending 21.4 from 34.1  - Patient stable for discharge with close outpatient follow up from orthopaedic standpoint  - Remainder of care per primary team      Ortho Pager [phone]

## 2021-11-02 NOTE — PROGRESS NOTE ADULT - ASSESSMENT
25 y/o female s/p Left ankle recon with gracilis free flap and STSG  - continue vac, will remove today  - bed rest, ok to get up to commode  - prn analgesia

## 2021-11-02 NOTE — PROGRESS NOTE ADULT - ASSESSMENT
26F h/o L ankle fracture with septic arthritis, postoperative compartment syndrome s/p RICK, I&D, bone debridement and ligament repair on 9/9, I&D with tissue debridement to bone on 9/12, repeat I&D, arthrotomy of ankle, L ankle reconstruction with L gracilis free flap and L thigh STSG, microvascular ansastomosis with recipient AT on 9/20. Initial cultures from 9/9 grew MSSA and Strep anginosus - for which she was treated with initial course of nafcillin and ceftriaxone, culture from 9/20 with MSSA.  She returned to Or on 10/18 for debridement of gracilis flap, application of external fixator.  She was found to have partial necrosis of skin and subcutaneous fat of gracilis myocutanous free flap. OR cultures were polymicrobial.  Multiple cultures grew E. faecalis, Candida (albicans and parapsilosis), also with Staph epi (?significance) and one with Stenotrophomonas.  Targeting E. faecalis and Candida for now.  She had RTOR on 10/25 for repeat flap debridement and STSG by Plastics.  Worsening mild transaminitis from 10/29.  May be related to fluconazole.  Candida parapsilosis isolate is susceptible to caspofungin.  Suggest:  - Continue daptomycin 500 mg IV q24h   - Continue caspofungin 50 mg IV q24h.  - Please send CBC with diff, CMP, ESR, CRP and CPK on 11/3.  Recommendations discussed with primary team.  Will follow with you - team 2.

## 2021-11-03 LAB
ALBUMIN SERPL ELPH-MCNC: 3.9 G/DL — SIGNIFICANT CHANGE UP (ref 3.3–5)
ALP SERPL-CCNC: 87 U/L — SIGNIFICANT CHANGE UP (ref 40–120)
ALT FLD-CCNC: 95 U/L — HIGH (ref 10–45)
ANION GAP SERPL CALC-SCNC: 13 MMOL/L — SIGNIFICANT CHANGE UP (ref 5–17)
AST SERPL-CCNC: 49 U/L — HIGH (ref 10–40)
BASOPHILS # BLD AUTO: 0.05 K/UL — SIGNIFICANT CHANGE UP (ref 0–0.2)
BASOPHILS NFR BLD AUTO: 0.8 % — SIGNIFICANT CHANGE UP (ref 0–2)
BILIRUB SERPL-MCNC: 0.3 MG/DL — SIGNIFICANT CHANGE UP (ref 0.2–1.2)
BUN SERPL-MCNC: 15 MG/DL — SIGNIFICANT CHANGE UP (ref 7–23)
CALCIUM SERPL-MCNC: 9.9 MG/DL — SIGNIFICANT CHANGE UP (ref 8.4–10.5)
CHLORIDE SERPL-SCNC: 100 MMOL/L — SIGNIFICANT CHANGE UP (ref 96–108)
CK SERPL-CCNC: 25 U/L — SIGNIFICANT CHANGE UP (ref 25–170)
CO2 SERPL-SCNC: 24 MMOL/L — SIGNIFICANT CHANGE UP (ref 22–31)
CREAT SERPL-MCNC: 0.7 MG/DL — SIGNIFICANT CHANGE UP (ref 0.5–1.3)
CRP SERPL-MCNC: 23.6 MG/L — HIGH (ref 0–4)
EOSINOPHIL # BLD AUTO: 0.38 K/UL — SIGNIFICANT CHANGE UP (ref 0–0.5)
EOSINOPHIL NFR BLD AUTO: 6.1 % — HIGH (ref 0–6)
ERYTHROCYTE [SEDIMENTATION RATE] IN BLOOD: 62 MM/HR — HIGH
GLUCOSE SERPL-MCNC: 92 MG/DL — SIGNIFICANT CHANGE UP (ref 70–99)
HCT VFR BLD CALC: 29.7 % — LOW (ref 34.5–45)
HGB BLD-MCNC: 9.4 G/DL — LOW (ref 11.5–15.5)
IMM GRANULOCYTES NFR BLD AUTO: 0.6 % — SIGNIFICANT CHANGE UP (ref 0–1.5)
LYMPHOCYTES # BLD AUTO: 2.37 K/UL — SIGNIFICANT CHANGE UP (ref 1–3.3)
LYMPHOCYTES # BLD AUTO: 38.3 % — SIGNIFICANT CHANGE UP (ref 13–44)
MCHC RBC-ENTMCNC: 27.7 PG — SIGNIFICANT CHANGE UP (ref 27–34)
MCHC RBC-ENTMCNC: 31.6 GM/DL — LOW (ref 32–36)
MCV RBC AUTO: 87.6 FL — SIGNIFICANT CHANGE UP (ref 80–100)
MONOCYTES # BLD AUTO: 0.6 K/UL — SIGNIFICANT CHANGE UP (ref 0–0.9)
MONOCYTES NFR BLD AUTO: 9.7 % — SIGNIFICANT CHANGE UP (ref 2–14)
NEUTROPHILS # BLD AUTO: 2.74 K/UL — SIGNIFICANT CHANGE UP (ref 1.8–7.4)
NEUTROPHILS NFR BLD AUTO: 44.5 % — SIGNIFICANT CHANGE UP (ref 43–77)
NRBC # BLD: 0 /100 WBCS — SIGNIFICANT CHANGE UP (ref 0–0)
PLATELET # BLD AUTO: 433 K/UL — HIGH (ref 150–400)
POTASSIUM SERPL-MCNC: 4 MMOL/L — SIGNIFICANT CHANGE UP (ref 3.5–5.3)
POTASSIUM SERPL-SCNC: 4 MMOL/L — SIGNIFICANT CHANGE UP (ref 3.5–5.3)
PROT SERPL-MCNC: 7.1 G/DL — SIGNIFICANT CHANGE UP (ref 6–8.3)
RBC # BLD: 3.39 M/UL — LOW (ref 3.8–5.2)
RBC # FLD: 21.2 % — HIGH (ref 10.3–14.5)
SARS-COV-2 RNA SPEC QL NAA+PROBE: NEGATIVE — SIGNIFICANT CHANGE UP
SODIUM SERPL-SCNC: 137 MMOL/L — SIGNIFICANT CHANGE UP (ref 135–145)
WBC # BLD: 6.18 K/UL — SIGNIFICANT CHANGE UP (ref 3.8–10.5)
WBC # FLD AUTO: 6.18 K/UL — SIGNIFICANT CHANGE UP (ref 3.8–10.5)

## 2021-11-03 PROCEDURE — 99232 SBSQ HOSP IP/OBS MODERATE 35: CPT

## 2021-11-03 RX ADMIN — OXYCODONE HYDROCHLORIDE 10 MILLIGRAM(S): 5 TABLET ORAL at 21:17

## 2021-11-03 RX ADMIN — ENOXAPARIN SODIUM 40 MILLIGRAM(S): 100 INJECTION SUBCUTANEOUS at 06:26

## 2021-11-03 RX ADMIN — Medication 975 MILLIGRAM(S): at 06:59

## 2021-11-03 RX ADMIN — Medication 975 MILLIGRAM(S): at 23:01

## 2021-11-03 RX ADMIN — Medication 975 MILLIGRAM(S): at 06:25

## 2021-11-03 RX ADMIN — HYDROMORPHONE HYDROCHLORIDE 0.5 MILLIGRAM(S): 2 INJECTION INTRAMUSCULAR; INTRAVENOUS; SUBCUTANEOUS at 10:21

## 2021-11-03 RX ADMIN — HYDROMORPHONE HYDROCHLORIDE 0.5 MILLIGRAM(S): 2 INJECTION INTRAMUSCULAR; INTRAVENOUS; SUBCUTANEOUS at 07:00

## 2021-11-03 RX ADMIN — HYDROMORPHONE HYDROCHLORIDE 0.5 MILLIGRAM(S): 2 INJECTION INTRAMUSCULAR; INTRAVENOUS; SUBCUTANEOUS at 10:06

## 2021-11-03 RX ADMIN — Medication 975 MILLIGRAM(S): at 12:38

## 2021-11-03 RX ADMIN — HYDROMORPHONE HYDROCHLORIDE 0.5 MILLIGRAM(S): 2 INJECTION INTRAMUSCULAR; INTRAVENOUS; SUBCUTANEOUS at 14:44

## 2021-11-03 RX ADMIN — POLYETHYLENE GLYCOL 3350 17 GRAM(S): 17 POWDER, FOR SOLUTION ORAL at 17:54

## 2021-11-03 RX ADMIN — Medication 3 MILLIGRAM(S): at 23:00

## 2021-11-03 RX ADMIN — HYDROMORPHONE HYDROCHLORIDE 0.5 MILLIGRAM(S): 2 INJECTION INTRAMUSCULAR; INTRAVENOUS; SUBCUTANEOUS at 04:04

## 2021-11-03 RX ADMIN — Medication 1 TABLET(S): at 12:08

## 2021-11-03 RX ADMIN — SODIUM CHLORIDE 5 MILLILITER(S): 9 INJECTION, SOLUTION INTRAVENOUS at 12:08

## 2021-11-03 RX ADMIN — HYDROMORPHONE HYDROCHLORIDE 0.5 MILLIGRAM(S): 2 INJECTION INTRAMUSCULAR; INTRAVENOUS; SUBCUTANEOUS at 06:25

## 2021-11-03 RX ADMIN — HYDROMORPHONE HYDROCHLORIDE 0.5 MILLIGRAM(S): 2 INJECTION INTRAMUSCULAR; INTRAVENOUS; SUBCUTANEOUS at 03:15

## 2021-11-03 RX ADMIN — Medication 975 MILLIGRAM(S): at 17:54

## 2021-11-03 RX ADMIN — GABAPENTIN 600 MILLIGRAM(S): 400 CAPSULE ORAL at 12:08

## 2021-11-03 RX ADMIN — Medication 975 MILLIGRAM(S): at 01:09

## 2021-11-03 RX ADMIN — OXYCODONE HYDROCHLORIDE 10 MILLIGRAM(S): 5 TABLET ORAL at 16:35

## 2021-11-03 RX ADMIN — DAPTOMYCIN 120 MILLIGRAM(S): 500 INJECTION, POWDER, LYOPHILIZED, FOR SOLUTION INTRAVENOUS at 17:55

## 2021-11-03 RX ADMIN — HYDROMORPHONE HYDROCHLORIDE 0.5 MILLIGRAM(S): 2 INJECTION INTRAMUSCULAR; INTRAVENOUS; SUBCUTANEOUS at 18:09

## 2021-11-03 RX ADMIN — Medication 975 MILLIGRAM(S): at 02:17

## 2021-11-03 RX ADMIN — Medication 1000 UNIT(S): at 12:07

## 2021-11-03 RX ADMIN — GABAPENTIN 600 MILLIGRAM(S): 400 CAPSULE ORAL at 06:26

## 2021-11-03 RX ADMIN — OXYCODONE HYDROCHLORIDE 10 MILLIGRAM(S): 5 TABLET ORAL at 09:25

## 2021-11-03 RX ADMIN — OXYCODONE HYDROCHLORIDE 10 MILLIGRAM(S): 5 TABLET ORAL at 02:04

## 2021-11-03 RX ADMIN — HYDROMORPHONE HYDROCHLORIDE 0.5 MILLIGRAM(S): 2 INJECTION INTRAMUSCULAR; INTRAVENOUS; SUBCUTANEOUS at 17:54

## 2021-11-03 RX ADMIN — Medication 975 MILLIGRAM(S): at 18:24

## 2021-11-03 RX ADMIN — POLYETHYLENE GLYCOL 3350 17 GRAM(S): 17 POWDER, FOR SOLUTION ORAL at 06:26

## 2021-11-03 RX ADMIN — OXYCODONE HYDROCHLORIDE 10 MILLIGRAM(S): 5 TABLET ORAL at 22:00

## 2021-11-03 RX ADMIN — OXYCODONE HYDROCHLORIDE 10 MILLIGRAM(S): 5 TABLET ORAL at 16:55

## 2021-11-03 RX ADMIN — HYDROMORPHONE HYDROCHLORIDE 0.5 MILLIGRAM(S): 2 INJECTION INTRAMUSCULAR; INTRAVENOUS; SUBCUTANEOUS at 14:59

## 2021-11-03 RX ADMIN — OXYCODONE HYDROCHLORIDE 10 MILLIGRAM(S): 5 TABLET ORAL at 08:55

## 2021-11-03 RX ADMIN — GABAPENTIN 600 MILLIGRAM(S): 400 CAPSULE ORAL at 01:08

## 2021-11-03 RX ADMIN — OXYCODONE HYDROCHLORIDE 10 MILLIGRAM(S): 5 TABLET ORAL at 01:09

## 2021-11-03 RX ADMIN — Medication 975 MILLIGRAM(S): at 12:08

## 2021-11-03 RX ADMIN — GABAPENTIN 600 MILLIGRAM(S): 400 CAPSULE ORAL at 23:01

## 2021-11-03 RX ADMIN — OXYCODONE HYDROCHLORIDE 10 MILLIGRAM(S): 5 TABLET ORAL at 12:55

## 2021-11-03 RX ADMIN — CASPOFUNGIN ACETATE 260 MILLIGRAM(S): 7 INJECTION, POWDER, LYOPHILIZED, FOR SOLUTION INTRAVENOUS at 12:07

## 2021-11-03 RX ADMIN — OXYCODONE HYDROCHLORIDE 10 MILLIGRAM(S): 5 TABLET ORAL at 12:25

## 2021-11-03 RX ADMIN — GABAPENTIN 600 MILLIGRAM(S): 400 CAPSULE ORAL at 17:54

## 2021-11-03 RX ADMIN — Medication 1 APPLICATION(S): at 12:07

## 2021-11-03 NOTE — PROGRESS NOTE ADULT - ASSESSMENT
A/P: 26yFemale s/p repeat ankle I&D by Dr. Wallis on 09/17 w L gracilis free flap and STSG on 09/20 now s/p I&D of flap by plastics team and placement of ringed external fixator by ortho 10/18 and s/p repeat flap debridement and STSG by plastics on 10/25  - VS Stable  - Pain/Nausea Control  - Home meds  - DVT ppx: per plastics team -- On LVX 40 QD  - WBS: WBAT in ringed ex fix w RW assistance from ortho standpoint  - Continued operative flap care mgmt per plastics team  - OR cultures showing polymicrobial infection w ID recs Daptomycin and switch to Caspofungin as of 11/1 due to mildly elevated LFTs possibly secondary to Fluconazole -- continued Abx stewardship appreciated  - Plan for surgical flap care per plastics team -- No imminent plans for RTOR as of now  - Definitive surgical plan for bony stabilization pending clinical course re: resolution of infection and appropriate soft tissue coverage --- Possible plan for definitive fixation in approx 6 weeks for last debridement pending resolution of infection and flap maturation  - Plan for repeat infectious imaging/Gallium scan mid December  - Please continue to optimize electrolytes/nutrition in order to optimize soft tissue healing potential -- Draw periodic lytes/BMP to monitor -- Nutrition recs appreciated  - Patient with resistance bands at bedside for passive dorsiflexion exercises to resist claw toe deformity  - Please continue to monitor weekly inflammatory labs -- CBC, ESR, CRP -- 11/1 WBC 6.5, ESR downtrending 63 from 96, CRP downtrending 21.4 from 34.1  - Patient stable for discharge with close outpatient follow up from orthopaedic standpoint  - Remainder of care per primary team      Ortho Pager [phone]

## 2021-11-03 NOTE — PROGRESS NOTE ADULT - SUBJECTIVE AND OBJECTIVE BOX
Ortho Note    Pt resting comfortably this AM  Denies CP, SOB, N/V, numbness/tingling     Vital Signs Last 24 Hrs  T(C): 36.6 (03 Nov 2021 05:03), Max: 36.9 (02 Nov 2021 14:22)  T(F): 97.8 (03 Nov 2021 05:03), Max: 98.5 (02 Nov 2021 14:22)  HR: 88 (03 Nov 2021 05:03) (84 - 98)  BP: 109/73 (03 Nov 2021 05:03) (109/73 - 123/86)  BP(mean): --  RR: 18 (03 Nov 2021 05:03) (17 - 18)  SpO2: 96% (03 Nov 2021 05:03) (95% - 98%)    VSS  General: A&Ox3, NAD  LLE: Ringed external fixator in place w ace wrap; Pin site bolster dressings well appearing; Flap with dressing in place  Vascular: Toes WWP; Cap refill < 2 sec  Sensation: Patient with abnormal/absent sensation over most of the dorsum & plantar aspects of the foot consistent with baseline  Motor: Minimal FHL/FDL, 0/5 EHL/EDL activity consistent with baseline    Labs:

## 2021-11-03 NOTE — PROGRESS NOTE ADULT - ASSESSMENT
26F h/o L ankle fracture with septic arthritis, postoperative compartment syndrome s/p RICK, I&D, bone debridement and ligament repair on 9/9, I&D with tissue debridement to bone on 9/12, repeat I&D, arthrotomy of ankle, L ankle reconstruction with L gracilis free flap and L thigh STSG, microvascular ansastomosis with recipient AT on 9/20. Initial cultures from 9/9 grew MSSA and Strep anginosus - for which she was treated with initial course of nafcillin and ceftriaxone, culture from 9/20 with MSSA.  She returned to Or on 10/18 for debridement of gracilis flap, application of external fixator.  She was found to have partial necrosis of skin and subcutaneous fat of gracilis myocutanous free flap. OR cultures were polymicrobial.  Multiple cultures grew E. faecalis, Candida (albicans and parapsilosis), also with Staph epi (?significance) and one with Stenotrophomonas.  Targeting E. faecalis and Candida for now.  She had RTOR on 10/25 for repeat flap debridement and STSG by Plastics.  Mild transaminitis from 10/29.  May be related to fluconazole.  She was changed to caspofungin on 11/1 - AST trending down but ALT still trending up a bit.  Want to ensure LFTs improving prior to d/c.  Suggest:  - Please repeat CMP on 11/4  - Continue daptomycin 500 mg IV q24h   - Continue caspofungin 50 mg IV q24h.  - Would plan for at least 6 week course of present anti-infectives (10/21- 12/1)  - She will need weekly labs as follows:  CBC, CMP, ESR, CRP and CPK  Recommendations discussed with primary team.  Will follow with you - team 2.

## 2021-11-03 NOTE — PROGRESS NOTE ADULT - SUBJECTIVE AND OBJECTIVE BOX
SUBJECTIVE:  Doing well.   No overnight events.     OBJECTIVE:     ** VITAL SIGNS / I&O's **    Vital Signs Last 24 Hrs  T(C): 36.6 (03 Nov 2021 05:03), Max: 36.9 (02 Nov 2021 14:22)  T(F): 97.8 (03 Nov 2021 05:03), Max: 98.5 (02 Nov 2021 14:22)  HR: 88 (03 Nov 2021 05:03) (84 - 98)  BP: 109/73 (03 Nov 2021 05:03) (109/73 - 123/86)  BP(mean): --  RR: 18 (03 Nov 2021 05:03) (17 - 18)  SpO2: 96% (03 Nov 2021 05:03) (95% - 98%)      02 Nov 2021 07:01  -  03 Nov 2021 07:00  --------------------------------------------------------  IN:    IV PiggyBack: 250 mL    Lactated Ringers: 40 mL    Oral Fluid: 1040 mL  Total IN: 1330 mL    OUT:    Stool (mL): 2 mL    Voided (mL): 2850 mL  Total OUT: 2852 mL    Total NET: -1522 mL          ** PHYSICAL EXAM **    -- CONSTITUTIONAL: Alert, Awake. NAD.   -- RESPIRATORY: unlabored breathing, no respiratory distress  -- LLE: ex fix in place, stsg with good take, +granulation tissue, dressing changed, thigh dressing taken down and aquaphor applied with telfa to donor site      ** LABS **              CAPILLARY BLOOD GLUCOSE

## 2021-11-03 NOTE — PROGRESS NOTE ADULT - ASSESSMENT
27 y/o female s/p Left ankle recon with gracilis free flap and STSG  - continue dressing changes with xeroform, ABD pad, ace wrap daily  - ambulate with PT  - follow up labs Ambulatory

## 2021-11-03 NOTE — PROGRESS NOTE ADULT - SUBJECTIVE AND OBJECTIVE BOX
INTERVAL HPI/OVERNIGHT EVENTS:  Remains afebrile.  Has L knee pain X 3 d related to positioning/weight of external fixation device, no h/o trauma, currently 8/10.  No ankle or thigh pain.    CONSTITUTIONAL:  No fever, chills, night sweats  EYES:  No photophobia or visual changes  CARDIOVASCULAR:  No chest pain  RESPIRATORY:  No cough, wheezing, or SOB   GASTROINTESTINAL:  No nausea, vomiting, diarrhea, constipation, or abdominal pain  GENITOURINARY:  No frequency, urgency, dysuria or hematuria  NEUROLOGIC:  No headache, lightheadedness      ANTIBIOTICS/RELEVANT:  Daptomycin 500 mg IV q24h (10/21-present)  Caspofungin 50 mg IV q24h (11/1-present)    Vancomycin 9/9-9/11  Cefepime 9/9-9/11  Nafcillin 9/11-10/21  Ceftriaxone 9/12-10/21  Fluconazole 10/21-11/1          Vital Signs Last 24 Hrs  T(C): 37.2 (03 Nov 2021 16:31), Max: 37.2 (03 Nov 2021 16:31)  T(F): 99 (03 Nov 2021 16:31), Max: 99 (03 Nov 2021 16:31)  HR: 98 (03 Nov 2021 16:31) (82 - 98)  BP: 108/69 (03 Nov 2021 16:31) (97/65 - 118/81)  BP(mean): --  RR: 18 (03 Nov 2021 16:31) (18 - 18)  SpO2: 98% (03 Nov 2021 16:31) (95% - 98%)    PHYSICAL EXAM:  Constitutional:  Well-developed, well nourished  Eyes:  Sclerae anicteric, conjunctivae clear, PERRL  Ear/Nose/Throat:  No nasal exudate or sinus tenderness;  No buccal mucosal lesions, no pharyngeal erythema or exudate	  Neck:  Supple, no adenopathy  Respiratory:  Clear bilaterally  Cardiovascular:  RRR, S1S2, no murmur appreciated  Gastrointestinal:  Symmetric, normoactive BS, soft, NT, no masses, guarding or rebound.  No HSM  Extremities:  L thigh donor sites healing well;  L ankle with external fixation device.  L knee with mild warmth/tenderness, no edema or erythema      LABS:                        9.4    6.18  )-----------( 433      ( 03 Nov 2021 09:54 )             29.7         11-03    137  |  100  |  15  ----------------------------<  92  4.0   |  24  |  0.70    Ca    9.9      03 Nov 2021 09:54    TPro  7.1  /  Alb  3.9  /  TBili  0.3  /  DBili  x   /  AST  49<H>  /  ALT  95<H>  /  AlkPhos  87  11-03    Sedimentation Rate, Erythrocyte: 62 mm/Hr (11.03.21 @ 09:54)  C-Reactive Protein, Serum: 23.6 mg/L (11.03.21 @ 09:54)      MICROBIOLOGY:        RADIOLOGY & ADDITIONAL STUDIES:

## 2021-11-04 ENCOUNTER — TRANSCRIPTION ENCOUNTER (OUTPATIENT)
Age: 26
End: 2021-11-04

## 2021-11-04 LAB
ALBUMIN SERPL ELPH-MCNC: 3.7 G/DL — SIGNIFICANT CHANGE UP (ref 3.3–5)
ALP SERPL-CCNC: 89 U/L — SIGNIFICANT CHANGE UP (ref 40–120)
ALT FLD-CCNC: 88 U/L — HIGH (ref 10–45)
ANION GAP SERPL CALC-SCNC: 10 MMOL/L — SIGNIFICANT CHANGE UP (ref 5–17)
AST SERPL-CCNC: 51 U/L — HIGH (ref 10–40)
BILIRUB SERPL-MCNC: 0.3 MG/DL — SIGNIFICANT CHANGE UP (ref 0.2–1.2)
BUN SERPL-MCNC: 15 MG/DL — SIGNIFICANT CHANGE UP (ref 7–23)
CALCIUM SERPL-MCNC: 9.4 MG/DL — SIGNIFICANT CHANGE UP (ref 8.4–10.5)
CHLORIDE SERPL-SCNC: 103 MMOL/L — SIGNIFICANT CHANGE UP (ref 96–108)
CO2 SERPL-SCNC: 24 MMOL/L — SIGNIFICANT CHANGE UP (ref 22–31)
CREAT SERPL-MCNC: 0.72 MG/DL — SIGNIFICANT CHANGE UP (ref 0.5–1.3)
GLUCOSE SERPL-MCNC: 99 MG/DL — SIGNIFICANT CHANGE UP (ref 70–99)
POTASSIUM SERPL-MCNC: 3.7 MMOL/L — SIGNIFICANT CHANGE UP (ref 3.5–5.3)
POTASSIUM SERPL-SCNC: 3.7 MMOL/L — SIGNIFICANT CHANGE UP (ref 3.5–5.3)
PROT SERPL-MCNC: 6.9 G/DL — SIGNIFICANT CHANGE UP (ref 6–8.3)
SODIUM SERPL-SCNC: 137 MMOL/L — SIGNIFICANT CHANGE UP (ref 135–145)

## 2021-11-04 PROCEDURE — 73630 X-RAY EXAM OF FOOT: CPT | Mod: 26,LT

## 2021-11-04 PROCEDURE — 73590 X-RAY EXAM OF LOWER LEG: CPT | Mod: 26,LT

## 2021-11-04 PROCEDURE — 99232 SBSQ HOSP IP/OBS MODERATE 35: CPT

## 2021-11-04 PROCEDURE — 73610 X-RAY EXAM OF ANKLE: CPT | Mod: 26,LT

## 2021-11-04 RX ORDER — DAPTOMYCIN 500 MG/10ML
500 INJECTION, POWDER, LYOPHILIZED, FOR SOLUTION INTRAVENOUS
Qty: 13 | Refills: 0
Start: 2021-11-04 | End: 2021-11-29

## 2021-11-04 RX ORDER — SODIUM CHLORIDE 9 MG/ML
10 INJECTION INTRAMUSCULAR; INTRAVENOUS; SUBCUTANEOUS
Qty: 520 | Refills: 0
Start: 2021-11-04 | End: 2021-11-29

## 2021-11-04 RX ORDER — CASPOFUNGIN ACETATE 7 MG/ML
50 INJECTION, POWDER, LYOPHILIZED, FOR SOLUTION INTRAVENOUS
Qty: 1.3 | Refills: 0
Start: 2021-11-04 | End: 2021-11-29

## 2021-11-04 RX ORDER — ALTEPLASE 100 MG
2 KIT INTRAVENOUS ONCE
Refills: 0 | Status: DISCONTINUED | OUTPATIENT
Start: 2021-11-04 | End: 2021-11-05

## 2021-11-04 RX ADMIN — HYDROMORPHONE HYDROCHLORIDE 0.5 MILLIGRAM(S): 2 INJECTION INTRAMUSCULAR; INTRAVENOUS; SUBCUTANEOUS at 23:16

## 2021-11-04 RX ADMIN — DAPTOMYCIN 120 MILLIGRAM(S): 500 INJECTION, POWDER, LYOPHILIZED, FOR SOLUTION INTRAVENOUS at 18:10

## 2021-11-04 RX ADMIN — Medication 975 MILLIGRAM(S): at 12:59

## 2021-11-04 RX ADMIN — Medication 975 MILLIGRAM(S): at 06:10

## 2021-11-04 RX ADMIN — SENNA PLUS 2 TABLET(S): 8.6 TABLET ORAL at 22:28

## 2021-11-04 RX ADMIN — OXYCODONE HYDROCHLORIDE 10 MILLIGRAM(S): 5 TABLET ORAL at 10:39

## 2021-11-04 RX ADMIN — OXYCODONE HYDROCHLORIDE 10 MILLIGRAM(S): 5 TABLET ORAL at 15:21

## 2021-11-04 RX ADMIN — Medication 975 MILLIGRAM(S): at 18:11

## 2021-11-04 RX ADMIN — HYDROMORPHONE HYDROCHLORIDE 0.5 MILLIGRAM(S): 2 INJECTION INTRAMUSCULAR; INTRAVENOUS; SUBCUTANEOUS at 12:29

## 2021-11-04 RX ADMIN — CASPOFUNGIN ACETATE 260 MILLIGRAM(S): 7 INJECTION, POWDER, LYOPHILIZED, FOR SOLUTION INTRAVENOUS at 13:24

## 2021-11-04 RX ADMIN — ENOXAPARIN SODIUM 40 MILLIGRAM(S): 100 INJECTION SUBCUTANEOUS at 05:28

## 2021-11-04 RX ADMIN — OXYCODONE HYDROCHLORIDE 10 MILLIGRAM(S): 5 TABLET ORAL at 05:30

## 2021-11-04 RX ADMIN — Medication 1 TABLET(S): at 12:29

## 2021-11-04 RX ADMIN — HYDROMORPHONE HYDROCHLORIDE 0.5 MILLIGRAM(S): 2 INJECTION INTRAMUSCULAR; INTRAVENOUS; SUBCUTANEOUS at 15:53

## 2021-11-04 RX ADMIN — Medication 1000 UNIT(S): at 12:28

## 2021-11-04 RX ADMIN — HYDROMORPHONE HYDROCHLORIDE 0.5 MILLIGRAM(S): 2 INJECTION INTRAMUSCULAR; INTRAVENOUS; SUBCUTANEOUS at 08:37

## 2021-11-04 RX ADMIN — HYDROMORPHONE HYDROCHLORIDE 0.5 MILLIGRAM(S): 2 INJECTION INTRAMUSCULAR; INTRAVENOUS; SUBCUTANEOUS at 07:57

## 2021-11-04 RX ADMIN — HYDROMORPHONE HYDROCHLORIDE 0.5 MILLIGRAM(S): 2 INJECTION INTRAMUSCULAR; INTRAVENOUS; SUBCUTANEOUS at 12:44

## 2021-11-04 RX ADMIN — Medication 975 MILLIGRAM(S): at 05:27

## 2021-11-04 RX ADMIN — HYDROMORPHONE HYDROCHLORIDE 0.5 MILLIGRAM(S): 2 INJECTION INTRAMUSCULAR; INTRAVENOUS; SUBCUTANEOUS at 01:00

## 2021-11-04 RX ADMIN — OXYCODONE HYDROCHLORIDE 10 MILLIGRAM(S): 5 TABLET ORAL at 04:48

## 2021-11-04 RX ADMIN — Medication 975 MILLIGRAM(S): at 12:29

## 2021-11-04 RX ADMIN — Medication 1 APPLICATION(S): at 12:28

## 2021-11-04 RX ADMIN — HYDROMORPHONE HYDROCHLORIDE 0.5 MILLIGRAM(S): 2 INJECTION INTRAMUSCULAR; INTRAVENOUS; SUBCUTANEOUS at 16:08

## 2021-11-04 RX ADMIN — Medication 1 TABLET(S): at 12:30

## 2021-11-04 RX ADMIN — HYDROMORPHONE HYDROCHLORIDE 0.5 MILLIGRAM(S): 2 INJECTION INTRAMUSCULAR; INTRAVENOUS; SUBCUTANEOUS at 00:36

## 2021-11-04 RX ADMIN — HYDROMORPHONE HYDROCHLORIDE 0.5 MILLIGRAM(S): 2 INJECTION INTRAMUSCULAR; INTRAVENOUS; SUBCUTANEOUS at 19:45

## 2021-11-04 RX ADMIN — GABAPENTIN 600 MILLIGRAM(S): 400 CAPSULE ORAL at 18:11

## 2021-11-04 RX ADMIN — OXYCODONE HYDROCHLORIDE 10 MILLIGRAM(S): 5 TABLET ORAL at 10:09

## 2021-11-04 RX ADMIN — OXYCODONE HYDROCHLORIDE 10 MILLIGRAM(S): 5 TABLET ORAL at 18:55

## 2021-11-04 RX ADMIN — GABAPENTIN 600 MILLIGRAM(S): 400 CAPSULE ORAL at 05:27

## 2021-11-04 RX ADMIN — Medication 975 MILLIGRAM(S): at 00:00

## 2021-11-04 RX ADMIN — OXYCODONE HYDROCHLORIDE 10 MILLIGRAM(S): 5 TABLET ORAL at 14:51

## 2021-11-04 RX ADMIN — HYDROMORPHONE HYDROCHLORIDE 0.5 MILLIGRAM(S): 2 INJECTION INTRAMUSCULAR; INTRAVENOUS; SUBCUTANEOUS at 22:53

## 2021-11-04 RX ADMIN — Medication 3 MILLIGRAM(S): at 22:27

## 2021-11-04 RX ADMIN — GABAPENTIN 600 MILLIGRAM(S): 400 CAPSULE ORAL at 12:29

## 2021-11-04 RX ADMIN — POLYETHYLENE GLYCOL 3350 17 GRAM(S): 17 POWDER, FOR SOLUTION ORAL at 18:10

## 2021-11-04 NOTE — PROGRESS NOTE ADULT - ASSESSMENT
A/P: 26yFemale s/p repeat ankle I&D by Dr. Wallis on 09/17 w L gracilis free flap and STSG on 09/20 now s/p I&D of flap by plastics team and placement of ringed external fixator by ortho 10/18 and s/p repeat flap debridement and STSG by plastics on 10/25  - VS Stable  - Pain/Nausea Control  - Home meds  - DVT ppx: per plastics team -- On LVX 40 QD  - WBS: WBAT in ringed ex fix w RW assistance from ortho standpoint  - Continued operative flap care mgmt per plastics team  - OR cultures showing polymicrobial infection w ID recs Daptomycin and switch to Caspofungin as of 11/1 due to mildly elevated LFTs possibly secondary to Fluconazole -- continued Abx stewardship appreciated  - Plan for surgical flap care per plastics team -- No imminent plans for RTOR as of now  - Definitive surgical plan for bony stabilization pending clinical course re: resolution of infection and appropriate soft tissue coverage --- Possible plan for definitive fixation in approx 6 weeks for last debridement pending resolution of infection and flap maturation  - Plan for repeat infectious imaging/Gallium scan mid December  - Please continue to optimize electrolytes/nutrition in order to optimize soft tissue healing potential -- Draw periodic lytes/BMP to monitor -- Nutrition recs appreciated  - Patient with resistance bands at bedside for passive dorsiflexion exercises to resist claw toe deformity  - Please continue to monitor weekly inflammatory labs -- CBC, ESR, CRP -- 11/3 WBC 6.1, ESR stable at 62 from 63, CRP stable at 23.6 from 21.4 - Patient stable for discharge with close outpatient follow up from orthopaedic standpoint  - Remainder of care per primary team      Ortho Pager [phone]

## 2021-11-04 NOTE — PROGRESS NOTE ADULT - ASSESSMENT
26F h/o L ankle fracture with septic arthritis, postoperative compartment syndrome s/p RICK, I&D, bone debridement and ligament repair on 9/9, I&D with tissue debridement to bone on 9/12, repeat I&D, arthrotomy of ankle, L ankle reconstruction with L gracilis free flap and L thigh STSG, microvascular ansastomosis with recipient AT on 9/20. Initial cultures from 9/9 grew MSSA and Strep anginosus - for which she was treated with initial course of nafcillin and ceftriaxone, culture from 9/20 with MSSA.  She returned to OR on 10/18 for debridement of gracilis flap, application of external fixator.  She was found to have partial necrosis of skin and subcutaneous fat of gracilis myocutaneous free flap. OR cultures were polymicrobial.  Multiple cultures grew E. faecalis, Candida (albicans and parapsilosis), also with Staph epi (?significance) and one with Stenotrophomonas.  Targeting E. faecalis and Candida for now.  She had RTOR on 10/25 for repeat flap debridement and STSG by Plastics.  Mild transaminitis from 10/29.  May be related to fluconazole.  She was changed to caspofungin on 11/1 - liver enzymes starting to trend down.  Suggest:  - Continue daptomycin 500 mg IV q24h   - Continue caspofungin 50 mg IV q24h.  - Would plan for at least 6 week course of present anti-infectives (10/21- 12/1)  - She will need weekly labs while on IV antibiotics as follows:  CBC, CMP, ESR, CRP and CPK  - I will arrange for outpatient f/u with me in ~2 weeks.  Recommendations discussed with primary team.  Please recall if further ID input is desired - team 2.

## 2021-11-04 NOTE — PROGRESS NOTE ADULT - SUBJECTIVE AND OBJECTIVE BOX
Ortho Note    Pt resting comfortably this AM  Denies CP, SOB, N/V, numbness/tingling     Vital Signs Last 24 Hrs  T(C): 36.7 (03 Nov 2021 20:44), Max: 37.2 (03 Nov 2021 16:31)  T(F): 98.1 (03 Nov 2021 20:44), Max: 99 (03 Nov 2021 16:31)  HR: 60 (03 Nov 2021 20:44) (60 - 98)  BP: 114/80 (03 Nov 2021 20:44) (97/65 - 114/80)  BP(mean): --  RR: 18 (03 Nov 2021 20:44) (18 - 18)  SpO2: 98% (03 Nov 2021 20:44) (96% - 98%)    VSS  General: A&Ox3, NAD  LLE: Ringed external fixator in place w ace wrap; Pin site bolster dressings well appearing; Flap with dressing in place  Vascular: Toes WWP; Cap refill < 2 sec  Sensation: Patient with abnormal/absent sensation over most of the dorsum & plantar aspects of the foot consistent with baseline  Motor: Minimal FHL/FDL, 0/5 EHL/EDL activity consistent with baseline    Labs:                        9.4    6.18  )-----------( 433      ( 03 Nov 2021 09:54 )             29.7       11-04    137  |  103  |  15  ----------------------------<  99  3.7   |  24  |  0.72    Ca    9.4      04 Nov 2021 05:59    TPro  6.9  /  Alb  3.7  /  TBili  0.3  /  DBili  x   /  AST  51<H>  /  ALT  88<H>  /  AlkPhos  89  11-04

## 2021-11-04 NOTE — DISCHARGE NOTE NURSING/CASE MANAGEMENT/SOCIAL WORK - PATIENT PORTAL LINK FT
You can access the FollowMyHealth Patient Portal offered by [facility] by registering at the following website: [email or web address]. By joining [facility]’s FollowMyHealth portal, you will also be able to view your health information using other applications (apps) compatible with our system.

## 2021-11-04 NOTE — PROGRESS NOTE ADULT - SUBJECTIVE AND OBJECTIVE BOX
Progress Note: Plastic Sx    SUBJECTIVE: Patient seen and examined bedside. Resting comfortably in bed. Reports having left knee pain after working with PT yesterday, improved with ice packs. Otherwise anticipating discharge home tomorrow.     alteplase for catheter clearance 2 milliGRAM(s) Catheter once  caspofungin IVPB      caspofungin IVPB 50 milliGRAM(s) IV Intermittent every 24 hours  DAPTOmycin IVPB 500 milliGRAM(s) IV Intermittent every 24 hours  enoxaparin Injectable 40 milliGRAM(s) SubCutaneous every 24 hours      Vital Signs Last 24 Hrs  T(C): 36.3 (04 Nov 2021 13:47), Max: 37.2 (03 Nov 2021 16:31)  T(F): 97.3 (04 Nov 2021 13:47), Max: 99 (03 Nov 2021 16:31)  HR: 80 (04 Nov 2021 13:47) (60 - 98)  BP: 100/62 (04 Nov 2021 13:47) (92/59 - 114/80)  BP(mean): --  RR: 18 (04 Nov 2021 13:47) (18 - 18)  SpO2: 96% (04 Nov 2021 13:47) (95% - 98%)  I&O's Detail    03 Nov 2021 07:01  -  04 Nov 2021 07:00  --------------------------------------------------------  IN:    IV PiggyBack: 250 mL    Oral Fluid: 780 mL  Total IN: 1030 mL    OUT:    Voided (mL): 1200 mL  Total OUT: 1200 mL    Total NET: -170 mL      04 Nov 2021 07:01  -  04 Nov 2021 14:08  --------------------------------------------------------  IN:    IV PiggyBack: 250 mL  Total IN: 250 mL    OUT:  Total OUT: 0 mL    Total NET: 250 mL          General: NAD, resting comfortably in bed  C/V: NSR  Pulm: Nonlabored breathing, no respiratory distress  Extrem: LLE-ex fix in place, stsg with good take, +granulation tissue, dressing changed, thigh dressing taken down and aquaphor applied with telfa to donor site          LABS:                        9.4    6.18  )-----------( 433      ( 03 Nov 2021 09:54 )             29.7     11-04    137  |  103  |  15  ----------------------------<  99  3.7   |  24  |  0.72    Ca    9.4      04 Nov 2021 05:59    TPro  6.9  /  Alb  3.7  /  TBili  0.3  /  DBili  x   /  AST  51<H>  /  ALT  88<H>  /  AlkPhos  89  11-04          RADIOLOGY & ADDITIONAL STUDIES:

## 2021-11-04 NOTE — PROGRESS NOTE ADULT - ASSESSMENT
26F h/o L ankle fracture with septic arthritis, postoperative compartment syndrome s/p Left ankle recon with gracilis free flap and STSG  microvascular anastomosis with recipient AT on 9/20. RTOR on 10/18 for debridement of gracilis flap, application of external fixator c/b partial necrosis of skin and subcutaneous fat of gracilis myocutanous free flap now s/p repeat flap debridement and STSG (10/25)    Regular diet  C/w daptomycon/caspofungin  ID following-appreciate recs  X-ray ankle today for anticipated Ex fix tightening  OOBA/ work w/ PT  F/u AM labs  Daily dressings changes to LLE with xeroform gauze, Abd and Ace bandage.  Aquphor to left anterior thigh  Analgesics PRN  Antiemetics PRN  Dispo: anticipated discharge home 11/5

## 2021-11-04 NOTE — PROGRESS NOTE ADULT - SUBJECTIVE AND OBJECTIVE BOX
INTERVAL HPI/OVERNIGHT EVENTS:  Remains afebrile.  L knee pain is improving    CONSTITUTIONAL:  No fever, chills, night sweats  EYES:  No photophobia or visual changes  CARDIOVASCULAR:  No chest pain  RESPIRATORY:  No cough, wheezing, or SOB   GASTROINTESTINAL:  No nausea, vomiting, diarrhea, constipation, or abdominal pain  GENITOURINARY:  No frequency, urgency, dysuria or hematuria  NEUROLOGIC:  No headache, lightheadedness      ANTIBIOTICS/RELEVANT:    Daptomycin 500 mg IV q24h (10/21-present)  Caspofungin 50 mg IV q24h (11/1-present)    Vancomycin 9/9-9/11  Cefepime 9/9-9/11  Nafcillin 9/11-10/21  Ceftriaxone 9/12-10/21  Fluconazole 10/21-11/1        Vital Signs Last 24 Hrs  T(C): 36.5 (04 Nov 2021 08:37), Max: 37.2 (03 Nov 2021 16:31)  T(F): 97.7 (04 Nov 2021 08:37), Max: 99 (03 Nov 2021 16:31)  HR: 79 (04 Nov 2021 08:37) (60 - 98)  BP: 92/59 (04 Nov 2021 08:37) (92/59 - 114/80)  BP(mean): --  RR: 18 (04 Nov 2021 08:37) (18 - 18)  SpO2: 95% (04 Nov 2021 08:37) (95% - 98%)    PHYSICAL EXAM:  Constitutional:  Well-developed, well nourished  Eyes:  Sclerae anicteric, conjunctivae clear, PERRL  Ear/Nose/Throat:  No nasal exudate or sinus tenderness;  No buccal mucosal lesions, no pharyngeal erythema or exudate	  Neck:  Supple, no adenopathy  Respiratory:  Clear bilaterally  Cardiovascular:  RRR, S1S2, no murmur appreciated  Gastrointestinal:  Symmetric, normoactive BS, soft, NT, no masses, guarding or rebound.  No HSM  Extremities:  No edema.  L thigh donor site healing, L foot in external fixation device      LABS:                        9.4    6.18  )-----------( 433      ( 03 Nov 2021 09:54 )             29.7         11-04    137  |  103  |  15  ----------------------------<  99  3.7   |  24  |  0.72    Ca    9.4      04 Nov 2021 05:59    TPro  6.9  /  Alb  3.7  /  TBili  0.3  /  DBili  x   /  AST  51<H>  /  ALT  88<H>  /  AlkPhos  89  11-04          MICROBIOLOGY:        RADIOLOGY & ADDITIONAL STUDIES:

## 2021-11-05 VITALS
SYSTOLIC BLOOD PRESSURE: 116 MMHG | RESPIRATION RATE: 18 BRPM | HEART RATE: 87 BPM | DIASTOLIC BLOOD PRESSURE: 81 MMHG | OXYGEN SATURATION: 96 % | TEMPERATURE: 98 F

## 2021-11-05 RX ORDER — GABAPENTIN 400 MG/1
1 CAPSULE ORAL
Qty: 120 | Refills: 0
Start: 2021-11-05 | End: 2021-12-04

## 2021-11-05 RX ORDER — ACETAMINOPHEN 500 MG
3 TABLET ORAL
Qty: 360 | Refills: 0
Start: 2021-11-05 | End: 2021-12-04

## 2021-11-05 RX ORDER — OXYCODONE HYDROCHLORIDE 5 MG/1
1 TABLET ORAL
Qty: 60 | Refills: 0
Start: 2021-11-05 | End: 2021-11-14

## 2021-11-05 RX ORDER — CHOLECALCIFEROL (VITAMIN D3) 125 MCG
1000 CAPSULE ORAL
Qty: 1 | Refills: 0
Start: 2021-11-05 | End: 2021-12-04

## 2021-11-05 RX ORDER — OXYCODONE HYDROCHLORIDE 5 MG/1
1 TABLET ORAL
Qty: 45 | Refills: 0
Start: 2021-11-05

## 2021-11-05 RX ORDER — SENNA PLUS 8.6 MG/1
2 TABLET ORAL
Qty: 60 | Refills: 0
Start: 2021-11-05 | End: 2021-12-04

## 2021-11-05 RX ORDER — DIPHENHYDRAMINE HCL 50 MG
1 CAPSULE ORAL
Qty: 14 | Refills: 0
Start: 2021-11-05 | End: 2021-11-18

## 2021-11-05 RX ORDER — DAPTOMYCIN 500 MG/10ML
500 INJECTION, POWDER, LYOPHILIZED, FOR SOLUTION INTRAVENOUS EVERY 24 HOURS
Refills: 0 | Status: DISCONTINUED | OUTPATIENT
Start: 2021-11-05 | End: 2021-11-05

## 2021-11-05 RX ORDER — DAPTOMYCIN 500 MG/10ML
350 INJECTION, POWDER, LYOPHILIZED, FOR SOLUTION INTRAVENOUS EVERY 24 HOURS
Refills: 0 | Status: DISCONTINUED | OUTPATIENT
Start: 2021-11-05 | End: 2021-11-05

## 2021-11-05 RX ORDER — LANOLIN ALCOHOL/MO/W.PET/CERES
1 CREAM (GRAM) TOPICAL
Qty: 30 | Refills: 0
Start: 2021-11-05 | End: 2021-12-04

## 2021-11-05 RX ORDER — DAPTOMYCIN 500 MG/10ML
500 INJECTION, POWDER, LYOPHILIZED, FOR SOLUTION INTRAVENOUS
Qty: 0 | Refills: 0 | DISCHARGE
Start: 2021-11-05 | End: 2021-12-01

## 2021-11-05 RX ORDER — CASPOFUNGIN ACETATE 7 MG/ML
0 INJECTION, POWDER, LYOPHILIZED, FOR SOLUTION INTRAVENOUS
Qty: 0 | Refills: 0 | DISCHARGE
Start: 2021-11-05 | End: 2021-12-01

## 2021-11-05 RX ORDER — CASPOFUNGIN ACETATE 7 MG/ML
50 INJECTION, POWDER, LYOPHILIZED, FOR SOLUTION INTRAVENOUS
Qty: 1.3 | Refills: 0
Start: 2021-11-05 | End: 2021-11-30

## 2021-11-05 RX ORDER — BACITRACIN ZINC 500 UNIT/G
1 OINTMENT IN PACKET (EA) TOPICAL
Qty: 30 | Refills: 0
Start: 2021-11-05 | End: 2021-12-04

## 2021-11-05 RX ORDER — POLYETHYLENE GLYCOL 3350 17 G/17G
17 POWDER, FOR SOLUTION ORAL
Qty: 1020 | Refills: 0
Start: 2021-11-05 | End: 2021-12-04

## 2021-11-05 RX ORDER — CALCIUM CARBONATE 500(1250)
1 TABLET ORAL
Qty: 30 | Refills: 0
Start: 2021-11-05 | End: 2021-12-04

## 2021-11-05 RX ORDER — DAPTOMYCIN 500 MG/10ML
500 INJECTION, POWDER, LYOPHILIZED, FOR SOLUTION INTRAVENOUS
Qty: 0 | Refills: 0 | DISCHARGE
Start: 2021-11-05

## 2021-11-05 RX ORDER — SODIUM CHLORIDE 9 MG/ML
10 INJECTION INTRAMUSCULAR; INTRAVENOUS; SUBCUTANEOUS
Qty: 520 | Refills: 0
Start: 2021-11-05 | End: 2021-11-30

## 2021-11-05 RX ORDER — OXYCODONE HYDROCHLORIDE 5 MG/1
1 TABLET ORAL
Qty: 42 | Refills: 0
Start: 2021-11-05 | End: 2021-11-11

## 2021-11-05 RX ADMIN — Medication 1 TABLET(S): at 12:17

## 2021-11-05 RX ADMIN — Medication 1000 UNIT(S): at 12:17

## 2021-11-05 RX ADMIN — GABAPENTIN 600 MILLIGRAM(S): 400 CAPSULE ORAL at 18:05

## 2021-11-05 RX ADMIN — HYDROMORPHONE HYDROCHLORIDE 0.5 MILLIGRAM(S): 2 INJECTION INTRAMUSCULAR; INTRAVENOUS; SUBCUTANEOUS at 07:21

## 2021-11-05 RX ADMIN — Medication 975 MILLIGRAM(S): at 12:17

## 2021-11-05 RX ADMIN — GABAPENTIN 600 MILLIGRAM(S): 400 CAPSULE ORAL at 12:17

## 2021-11-05 RX ADMIN — HYDROMORPHONE HYDROCHLORIDE 0.5 MILLIGRAM(S): 2 INJECTION INTRAMUSCULAR; INTRAVENOUS; SUBCUTANEOUS at 02:31

## 2021-11-05 RX ADMIN — OXYCODONE HYDROCHLORIDE 10 MILLIGRAM(S): 5 TABLET ORAL at 07:01

## 2021-11-05 RX ADMIN — OXYCODONE HYDROCHLORIDE 10 MILLIGRAM(S): 5 TABLET ORAL at 14:44

## 2021-11-05 RX ADMIN — OXYCODONE HYDROCHLORIDE 10 MILLIGRAM(S): 5 TABLET ORAL at 01:46

## 2021-11-05 RX ADMIN — Medication 975 MILLIGRAM(S): at 07:02

## 2021-11-05 RX ADMIN — GABAPENTIN 600 MILLIGRAM(S): 400 CAPSULE ORAL at 00:35

## 2021-11-05 RX ADMIN — Medication 975 MILLIGRAM(S): at 00:36

## 2021-11-05 RX ADMIN — CASPOFUNGIN ACETATE 260 MILLIGRAM(S): 7 INJECTION, POWDER, LYOPHILIZED, FOR SOLUTION INTRAVENOUS at 12:16

## 2021-11-05 RX ADMIN — Medication 975 MILLIGRAM(S): at 01:35

## 2021-11-05 RX ADMIN — OXYCODONE HYDROCHLORIDE 10 MILLIGRAM(S): 5 TABLET ORAL at 06:17

## 2021-11-05 RX ADMIN — GABAPENTIN 600 MILLIGRAM(S): 400 CAPSULE ORAL at 06:08

## 2021-11-05 RX ADMIN — Medication 975 MILLIGRAM(S): at 12:47

## 2021-11-05 RX ADMIN — OXYCODONE HYDROCHLORIDE 10 MILLIGRAM(S): 5 TABLET ORAL at 11:00

## 2021-11-05 RX ADMIN — HYDROMORPHONE HYDROCHLORIDE 0.5 MILLIGRAM(S): 2 INJECTION INTRAMUSCULAR; INTRAVENOUS; SUBCUTANEOUS at 02:56

## 2021-11-05 RX ADMIN — DAPTOMYCIN 120 MILLIGRAM(S): 500 INJECTION, POWDER, LYOPHILIZED, FOR SOLUTION INTRAVENOUS at 18:06

## 2021-11-05 RX ADMIN — Medication 975 MILLIGRAM(S): at 18:36

## 2021-11-05 RX ADMIN — Medication 1 TABLET(S): at 12:41

## 2021-11-05 RX ADMIN — OXYCODONE HYDROCHLORIDE 10 MILLIGRAM(S): 5 TABLET ORAL at 15:15

## 2021-11-05 RX ADMIN — OXYCODONE HYDROCHLORIDE 10 MILLIGRAM(S): 5 TABLET ORAL at 10:30

## 2021-11-05 RX ADMIN — Medication 975 MILLIGRAM(S): at 18:06

## 2021-11-05 RX ADMIN — HYDROMORPHONE HYDROCHLORIDE 0.5 MILLIGRAM(S): 2 INJECTION INTRAMUSCULAR; INTRAVENOUS; SUBCUTANEOUS at 16:05

## 2021-11-05 RX ADMIN — OXYCODONE HYDROCHLORIDE 10 MILLIGRAM(S): 5 TABLET ORAL at 01:00

## 2021-11-05 RX ADMIN — HYDROMORPHONE HYDROCHLORIDE 0.5 MILLIGRAM(S): 2 INJECTION INTRAMUSCULAR; INTRAVENOUS; SUBCUTANEOUS at 16:25

## 2021-11-05 RX ADMIN — ENOXAPARIN SODIUM 40 MILLIGRAM(S): 100 INJECTION SUBCUTANEOUS at 06:07

## 2021-11-05 RX ADMIN — Medication 975 MILLIGRAM(S): at 06:08

## 2021-11-05 RX ADMIN — Medication 1 APPLICATION(S): at 12:16

## 2021-11-05 NOTE — PROGRESS NOTE ADULT - ASSESSMENT
A/P: 26yFemale s/p repeat ankle I&D by Dr. Wallis on 09/17 w L gracilis free flap and STSG on 09/20 now s/p I&D of flap by plastics team and placement of ringed external fixator by ortho 10/18 and s/p repeat flap debridement and STSG by plastics on 10/25  - VS Stable  - Pain/Nausea Control  - Home meds  - DVT ppx: per plastics team -- On LVX 40 QD  - WBS: WBAT in ringed ex fix w RW assistance from ortho standpoint  - Continued operative flap care mgmt per plastics team  - OR cultures showing polymicrobial infection w ID recs Daptomycin and switch to Caspofungin as of 11/1 due to mildly elevated LFTs possibly secondary to Fluconazole -- continued Abx stewardship appreciated  - Plan for surgical flap care per plastics team -- No imminent plans for RTOR as of now  - Definitive surgical plan for bony stabilization pending clinical course re: resolution of infection and appropriate soft tissue coverage --- Possible plan for definitive fixation in approx 6 weeks for last debridement pending resolution of infection and flap maturation  - Plan for repeat infectious imaging/Gallium scan mid December  - Please continue to optimize electrolytes/nutrition in order to optimize soft tissue healing potential -- Draw periodic lytes/BMP to monitor -- Nutrition recs appreciated  - Patient with resistance bands at bedside for passive dorsiflexion exercises to resist claw toe deformity  - Please continue to monitor weekly inflammatory labs -- CBC, ESR, CRP -- 11/3 WBC 6.1, ESR stable 62 from 63, CRP stable 23.6 from 21.4  - Patient stable for discharge with close outpatient follow up from orthopaedic standpoint  - Repeat XRs obtained 11/4 and reviewed  - Plan to tighten ex fix as outpatient at next office visit  - Remainder of care per primary team      Ortho Pager [phone]

## 2021-11-05 NOTE — PROGRESS NOTE ADULT - PROVIDER SPECIALTY LIST ADULT
Hospitalist
Hospitalist
Infectious Disease
Infectious Disease
Orthopedics
Plastic Surgery
Hospitalist
Hospitalist
Infectious Disease
Internal Medicine
Orthopedics
Plastic Surgery
Hospitalist
Hospitalist
Infectious Disease
Internal Medicine
Orthopedics
Plastic Surgery
Hospitalist
Hospitalist
Infectious Disease
Internal Medicine
Orthopedics
Plastic Surgery
Hospitalist
Infectious Disease
Infectious Disease
Orthopedics
Plastic Surgery
Infectious Disease

## 2021-11-05 NOTE — PROGRESS NOTE ADULT - REASON FOR ADMISSION
L ankle pain

## 2021-11-05 NOTE — PROGRESS NOTE ADULT - ASSESSMENT
26F h/o L ankle fracture with septic arthritis, postoperative compartment syndrome s/p Left ankle recon with gracilis free flap and STSG  microvascular anastomosis with recipient AT on 9/20. RTOR on 10/18 for debridement of gracilis flap, application of external fixator c/b partial necrosis of skin and subcutaneous fat of gracilis myocutaneous free flap now s/p repeat flap debridement and STSG (10/25)    Regular diet  C/w daptomycin/caspofungin  ID following-appreciate recs  X-ray ankle today for anticipated Ex fix tightening  OOBA/ work w/ PT  F/u AM labs  Daily dressings changes to LLE with xeroform gauze, Abd and Ace bandage.  Aquphor to left anterior thigh  Analgesics PRN  Antiemetics PRN  Dispo: anticipated discharge home 11/5

## 2021-11-05 NOTE — PROGRESS NOTE ADULT - SUBJECTIVE AND OBJECTIVE BOX
Progress Note: Plastic Surgery    SUBJECTIVE: Patient seen and examined bedside. Resting comfortably in bed. Continues to report left knee pain while ambulating. Anticipating discharge this evening.     alteplase for catheter clearance 2 milliGRAM(s) Catheter once  caspofungin IVPB      caspofungin IVPB 50 milliGRAM(s) IV Intermittent every 24 hours  DAPTOmycin IVPB 350 milliGRAM(s) IV Intermittent every 24 hours  enoxaparin Injectable 40 milliGRAM(s) SubCutaneous every 24 hours      Vital Signs Last 24 Hrs  T(C): 36.5 (05 Nov 2021 05:32), Max: 36.6 (04 Nov 2021 23:57)  T(F): 97.7 (05 Nov 2021 05:32), Max: 97.9 (04 Nov 2021 23:57)  HR: 89 (05 Nov 2021 05:32) (79 - 89)  BP: 101/68 (05 Nov 2021 05:32) (92/59 - 112/76)  BP(mean): --  RR: 18 (05 Nov 2021 05:32) (18 - 18)  SpO2: 97% (05 Nov 2021 05:32) (95% - 97%)  I&O's Detail    04 Nov 2021 07:01  -  05 Nov 2021 07:00  --------------------------------------------------------  IN:    IV PiggyBack: 250 mL  Total IN: 250 mL    OUT:  Total OUT: 0 mL    Total NET: 250 mL        PHYSICAL EXAM:   General: NAD, resting comfortably in bed  C/V: NSR  Pulm: Nonlabored breathing, no respiratory distress  Extrem: LLE-ex fix in place, stsg with good take, +granulation tissue, dressing changed, thigh dressing taken down and aquaphor applied with telfa to donor site        LABS:                        9.4    6.18  )-----------( 433      ( 03 Nov 2021 09:54 )             29.7     11-04    137  |  103  |  15  ----------------------------<  99  3.7   |  24  |  0.72    Ca    9.4      04 Nov 2021 05:59    TPro  6.9  /  Alb  3.7  /  TBili  0.3  /  DBili  x   /  AST  51<H>  /  ALT  88<H>  /  AlkPhos  89  11-04          RADIOLOGY & ADDITIONAL STUDIES:

## 2021-11-05 NOTE — PROGRESS NOTE ADULT - NUTRITIONAL ASSESSMENT
This patient has been assessed with a concern for Malnutrition and has been determined to have a diagnosis/diagnoses of Moderate protein-calorie malnutrition.    This patient is being managed with:   Diet NPO-  NPO for Procedure/Test     NPO Start Date: 17-Oct-2021   NPO Start Time: 23:59  Except Medications  Entered: Oct 12 2021 10:34AM    Diet Regular-  Liquid Protein Supplement     Qty per Day:  1  Supplement Feeding Modality:  Oral  Ensure Enlive Cans or Servings Per Day:  1       Frequency:  Daily  Ensure Max Cans or Servings Per Day:  1       Frequency:  Daily  Entered: Oct 11 2021  2:25PM    Diet Regular-  Liquid Protein Supplement     Qty per Day:  1  Supplement Feeding Modality:  Oral  Ensure Enlive Cans or Servings Per Day:  1       Frequency:  Daily  Ensure Max Cans or Servings Per Day:  1       Frequency:  Daily  Entered: Oct  4 2021 12:53PM    Diet Regular-  Liquid Protein Supplement     Qty per Day:  BID  Supplement Feeding Modality:  Oral  Ensure Enlive Cans or Servings Per Day:  1       Frequency:  Daily  Ensure Max Cans or Servings Per Day:  1       Frequency:  Daily  Entered: Sep 27 2021  2:34PM    Diet Regular-  Entered: Sep 21 2021  9:05AM    The following pending diet order is being considered for treatment of Moderate protein-calorie malnutrition:null
This patient has been assessed with a concern for Malnutrition and has been determined to have a diagnosis/diagnoses of Moderate protein-calorie malnutrition.    This patient is being managed with:   Diet Regular-  Liquid Protein Supplement     Qty per Day:  1  Supplement Feeding Modality:  Oral  Ensure Enlive Cans or Servings Per Day:  1       Frequency:  Daily  Ensure Max Cans or Servings Per Day:  1       Frequency:  Daily  Entered: Oct  4 2021 12:53PM    Diet Regular-  Liquid Protein Supplement     Qty per Day:  BID  Supplement Feeding Modality:  Oral  Ensure Enlive Cans or Servings Per Day:  1       Frequency:  Daily  Ensure Max Cans or Servings Per Day:  1       Frequency:  Daily  Entered: Sep 27 2021  2:34PM    Diet Regular-  Entered: Sep 21 2021  9:05AM    The following pending diet order is being considered for treatment of Moderate protein-calorie malnutrition:null
This patient has been assessed with a concern for Malnutrition and has been determined to have a diagnosis/diagnoses of Moderate protein-calorie malnutrition.    This patient is being managed with:   Diet Regular-  Liquid Protein Supplement     Qty per Day:  1  Supplement Feeding Modality:  Oral  Ensure Enlive Cans or Servings Per Day:  1       Frequency:  Daily  Ensure Max Cans or Servings Per Day:  1       Frequency:  Daily  Entered: Oct 11 2021  2:25PM    Diet Regular-  Liquid Protein Supplement     Qty per Day:  1  Supplement Feeding Modality:  Oral  Ensure Enlive Cans or Servings Per Day:  1       Frequency:  Daily  Ensure Max Cans or Servings Per Day:  1       Frequency:  Daily  Entered: Oct  4 2021 12:53PM    Diet Regular-  Liquid Protein Supplement     Qty per Day:  BID  Supplement Feeding Modality:  Oral  Ensure Enlive Cans or Servings Per Day:  1       Frequency:  Daily  Ensure Max Cans or Servings Per Day:  1       Frequency:  Daily  Entered: Sep 27 2021  2:34PM    Diet Regular-  Entered: Sep 21 2021  9:05AM    The following pending diet order is being considered for treatment of Moderate protein-calorie malnutrition:null
This patient has been assessed with a concern for Malnutrition and has been determined to have a diagnosis/diagnoses of Moderate protein-calorie malnutrition.    This patient is being managed with:   Diet Regular-  Supplement Feeding Modality:  Oral  Ensure Max Cans or Servings Per Day:  1       Frequency:  Two Times a day  Entered: Oct 22 2021  3:21PM    
This patient has been assessed with a concern for Malnutrition and has been determined to have a diagnosis/diagnoses of Moderate protein-calorie malnutrition.    This patient is being managed with:   Diet Regular-  Supplement Feeding Modality:  Oral  Ensure Max Cans or Servings Per Day:  1       Frequency:  Two Times a day  Entered: Oct 25 2021  5:19PM    
This patient has been assessed with a concern for Malnutrition and has been determined to have a diagnosis/diagnoses of Moderate protein-calorie malnutrition.    This patient is being managed with:   Diet NPO after Midnight-     NPO Start Date: 17-Oct-2021   NPO Start Time: 23:59  Entered: Oct 17 2021  7:37PM    Diet Regular-  Liquid Protein Supplement     Qty per Day:  1  Supplement Feeding Modality:  Oral  Ensure Enlive Cans or Servings Per Day:  1       Frequency:  Daily  Ensure Max Cans or Servings Per Day:  1       Frequency:  Daily  Entered: Oct 11 2021  2:25PM    Diet Regular-  Liquid Protein Supplement     Qty per Day:  1  Supplement Feeding Modality:  Oral  Ensure Enlive Cans or Servings Per Day:  1       Frequency:  Daily  Ensure Max Cans or Servings Per Day:  1       Frequency:  Daily  Entered: Oct  4 2021 12:53PM    Diet Regular-  Liquid Protein Supplement     Qty per Day:  BID  Supplement Feeding Modality:  Oral  Ensure Enlive Cans or Servings Per Day:  1       Frequency:  Daily  Ensure Max Cans or Servings Per Day:  1       Frequency:  Daily  Entered: Sep 27 2021  2:34PM    Diet Regular-  Entered: Sep 21 2021  9:05AM    The following pending diet order is being considered for treatment of Moderate protein-calorie malnutrition:null
This patient has been assessed with a concern for Malnutrition and has been determined to have a diagnosis/diagnoses of Moderate protein-calorie malnutrition.    This patient is being managed with:   Diet NPO after Midnight-     NPO Start Date: 17-Oct-2021   NPO Start Time: 23:59  Entered: Oct 17 2021  7:37PM    Diet Regular-  Liquid Protein Supplement     Qty per Day:  1  Supplement Feeding Modality:  Oral  Ensure Enlive Cans or Servings Per Day:  1       Frequency:  Daily  Ensure Max Cans or Servings Per Day:  1       Frequency:  Daily  Entered: Oct 11 2021  2:25PM    Diet Regular-  Liquid Protein Supplement     Qty per Day:  1  Supplement Feeding Modality:  Oral  Ensure Enlive Cans or Servings Per Day:  1       Frequency:  Daily  Ensure Max Cans or Servings Per Day:  1       Frequency:  Daily  Entered: Oct  4 2021 12:53PM    Diet Regular-  Liquid Protein Supplement     Qty per Day:  BID  Supplement Feeding Modality:  Oral  Ensure Enlive Cans or Servings Per Day:  1       Frequency:  Daily  Ensure Max Cans or Servings Per Day:  1       Frequency:  Daily  Entered: Sep 27 2021  2:34PM    Diet Regular-  Entered: Sep 21 2021  9:05AM    The following pending diet order is being considered for treatment of Moderate protein-calorie malnutrition:null
This patient has been assessed with a concern for Malnutrition and has been determined to have a diagnosis/diagnoses of Moderate protein-calorie malnutrition.    This patient is being managed with:   Diet NPO-  NPO for Procedure/Test     NPO Start Date: 17-Oct-2021   NPO Start Time: 23:59  Except Medications  Entered: Oct 12 2021 10:34AM    Diet Regular-  Liquid Protein Supplement     Qty per Day:  1  Supplement Feeding Modality:  Oral  Ensure Enlive Cans or Servings Per Day:  1       Frequency:  Daily  Ensure Max Cans or Servings Per Day:  1       Frequency:  Daily  Entered: Oct 11 2021  2:25PM    Diet Regular-  Liquid Protein Supplement     Qty per Day:  1  Supplement Feeding Modality:  Oral  Ensure Enlive Cans or Servings Per Day:  1       Frequency:  Daily  Ensure Max Cans or Servings Per Day:  1       Frequency:  Daily  Entered: Oct  4 2021 12:53PM    Diet Regular-  Liquid Protein Supplement     Qty per Day:  BID  Supplement Feeding Modality:  Oral  Ensure Enlive Cans or Servings Per Day:  1       Frequency:  Daily  Ensure Max Cans or Servings Per Day:  1       Frequency:  Daily  Entered: Sep 27 2021  2:34PM    Diet Regular-  Entered: Sep 21 2021  9:05AM    The following pending diet order is being considered for treatment of Moderate protein-calorie malnutrition:null
This patient has been assessed with a concern for Malnutrition and has been determined to have a diagnosis/diagnoses of Moderate protein-calorie malnutrition.    This patient is being managed with:   Diet Regular-  Liquid Protein Supplement     Qty per Day:  1  Supplement Feeding Modality:  Oral  Ensure Enlive Cans or Servings Per Day:  1       Frequency:  Daily  Ensure Max Cans or Servings Per Day:  1       Frequency:  Daily  Entered: Oct  4 2021 12:53PM    Diet Regular-  Liquid Protein Supplement     Qty per Day:  BID  Supplement Feeding Modality:  Oral  Ensure Enlive Cans or Servings Per Day:  1       Frequency:  Daily  Ensure Max Cans or Servings Per Day:  1       Frequency:  Daily  Entered: Sep 27 2021  2:34PM    Diet Regular-  Entered: Sep 21 2021  9:05AM    The following pending diet order is being considered for treatment of Moderate protein-calorie malnutrition:null
This patient has been assessed with a concern for Malnutrition and has been determined to have a diagnosis/diagnoses of Moderate protein-calorie malnutrition.    This patient is being managed with:   Diet Regular-  Liquid Protein Supplement     Qty per Day:  1  Supplement Feeding Modality:  Oral  Ensure Enlive Cans or Servings Per Day:  1       Frequency:  Daily  Ensure Max Cans or Servings Per Day:  1       Frequency:  Daily  Entered: Oct  4 2021 12:53PM    Diet Regular-  Liquid Protein Supplement     Qty per Day:  BID  Supplement Feeding Modality:  Oral  Ensure Enlive Cans or Servings Per Day:  1       Frequency:  Daily  Ensure Max Cans or Servings Per Day:  1       Frequency:  Daily  Entered: Sep 27 2021  2:34PM    Diet Regular-  Entered: Sep 21 2021  9:05AM    The following pending diet order is being considered for treatment of Moderate protein-calorie malnutrition:null
This patient has been assessed with a concern for Malnutrition and has been determined to have a diagnosis/diagnoses of Moderate protein-calorie malnutrition.    This patient is being managed with:   Diet Regular-  Liquid Protein Supplement     Qty per Day:  1  Supplement Feeding Modality:  Oral  Ensure Enlive Cans or Servings Per Day:  1       Frequency:  Daily  Ensure Max Cans or Servings Per Day:  1       Frequency:  Daily  Entered: Oct 11 2021  2:25PM    Diet Regular-  Liquid Protein Supplement     Qty per Day:  1  Supplement Feeding Modality:  Oral  Ensure Enlive Cans or Servings Per Day:  1       Frequency:  Daily  Ensure Max Cans or Servings Per Day:  1       Frequency:  Daily  Entered: Oct  4 2021 12:53PM    Diet Regular-  Liquid Protein Supplement     Qty per Day:  BID  Supplement Feeding Modality:  Oral  Ensure Enlive Cans or Servings Per Day:  1       Frequency:  Daily  Ensure Max Cans or Servings Per Day:  1       Frequency:  Daily  Entered: Sep 27 2021  2:34PM    Diet Regular-  Entered: Sep 21 2021  9:05AM    The following pending diet order is being considered for treatment of Moderate protein-calorie malnutrition:null
This patient has been assessed with a concern for Malnutrition and has been determined to have a diagnosis/diagnoses of Moderate protein-calorie malnutrition.    This patient is being managed with:   Diet Regular-  Liquid Protein Supplement     Qty per Day:  BID  Supplement Feeding Modality:  Oral  Ensure Enlive Cans or Servings Per Day:  1       Frequency:  Daily  Ensure Max Cans or Servings Per Day:  1       Frequency:  Daily  Entered: Sep 27 2021  2:34PM    Diet Regular-  Entered: Sep 21 2021  9:05AM    The following pending diet order is being considered for treatment of Moderate protein-calorie malnutrition:null
This patient has been assessed with a concern for Malnutrition and has been determined to have a diagnosis/diagnoses of Moderate protein-calorie malnutrition.    This patient is being managed with:   Diet Regular-  Supplement Feeding Modality:  Oral  Ensure Max Cans or Servings Per Day:  1       Frequency:  Two Times a day  Entered: Oct 25 2021  5:19PM    
This patient has been assessed with a concern for Malnutrition and has been determined to have a diagnosis/diagnoses of Moderate protein-calorie malnutrition.    This patient is being managed with:   Diet Regular-  Liquid Protein Supplement     Qty per Day:  1  Supplement Feeding Modality:  Oral  Ensure Enlive Cans or Servings Per Day:  1       Frequency:  Daily  Ensure Max Cans or Servings Per Day:  1       Frequency:  Daily  Entered: Oct  4 2021 12:53PM    Diet Regular-  Liquid Protein Supplement     Qty per Day:  BID  Supplement Feeding Modality:  Oral  Ensure Enlive Cans or Servings Per Day:  1       Frequency:  Daily  Ensure Max Cans or Servings Per Day:  1       Frequency:  Daily  Entered: Sep 27 2021  2:34PM    Diet Regular-  Entered: Sep 21 2021  9:05AM    The following pending diet order is being considered for treatment of Moderate protein-calorie malnutrition:null
This patient has been assessed with a concern for Malnutrition and has been determined to have a diagnosis/diagnoses of Moderate protein-calorie malnutrition.    This patient is being managed with:   Diet Regular-  Liquid Protein Supplement     Qty per Day:  BID  Supplement Feeding Modality:  Oral  Ensure Enlive Cans or Servings Per Day:  1       Frequency:  Daily  Ensure Max Cans or Servings Per Day:  1       Frequency:  Daily  Entered: Sep 27 2021  2:34PM    Diet Regular-  Entered: Sep 21 2021  9:05AM    The following pending diet order is being considered for treatment of Moderate protein-calorie malnutrition:null
This patient has been assessed with a concern for Malnutrition and has been determined to have a diagnosis/diagnoses of Moderate protein-calorie malnutrition.    This patient is being managed with:   Diet Regular-  Liquid Protein Supplement     Qty per Day:  BID  Supplement Feeding Modality:  Oral  Ensure Enlive Cans or Servings Per Day:  1       Frequency:  Daily  Ensure Max Cans or Servings Per Day:  1       Frequency:  Daily  Entered: Sep 27 2021  2:34PM    Diet Regular-  Entered: Sep 21 2021  9:05AM    The following pending diet order is being considered for treatment of Moderate protein-calorie malnutrition:null
This patient has been assessed with a concern for Malnutrition and has been determined to have a diagnosis/diagnoses of Moderate protein-calorie malnutrition.    This patient is being managed with:   Diet Regular-  Supplement Feeding Modality:  Oral  Ensure Max Cans or Servings Per Day:  1       Frequency:  Two Times a day  Entered: Oct 25 2021  5:19PM    
This patient has been assessed with a concern for Malnutrition and has been determined to have a diagnosis/diagnoses of Moderate protein-calorie malnutrition.    This patient is being managed with:   Diet NPO after Midnight-     NPO Start Date: 17-Oct-2021   NPO Start Time: 23:59  Entered: Oct 17 2021  7:37PM    Diet Regular-  Liquid Protein Supplement     Qty per Day:  1  Supplement Feeding Modality:  Oral  Ensure Enlive Cans or Servings Per Day:  1       Frequency:  Daily  Ensure Max Cans or Servings Per Day:  1       Frequency:  Daily  Entered: Oct 11 2021  2:25PM    Diet Regular-  Liquid Protein Supplement     Qty per Day:  1  Supplement Feeding Modality:  Oral  Ensure Enlive Cans or Servings Per Day:  1       Frequency:  Daily  Ensure Max Cans or Servings Per Day:  1       Frequency:  Daily  Entered: Oct  4 2021 12:53PM    Diet Regular-  Liquid Protein Supplement     Qty per Day:  BID  Supplement Feeding Modality:  Oral  Ensure Enlive Cans or Servings Per Day:  1       Frequency:  Daily  Ensure Max Cans or Servings Per Day:  1       Frequency:  Daily  Entered: Sep 27 2021  2:34PM    Diet Regular-  Entered: Sep 21 2021  9:05AM    The following pending diet order is being considered for treatment of Moderate protein-calorie malnutrition:null
This patient has been assessed with a concern for Malnutrition and has been determined to have a diagnosis/diagnoses of Moderate protein-calorie malnutrition.    This patient is being managed with:   Diet NPO-  NPO for Procedure/Test     NPO Start Date: 17-Oct-2021   NPO Start Time: 23:59  Except Medications  Entered: Oct 12 2021 10:34AM    Diet Regular-  Liquid Protein Supplement     Qty per Day:  1  Supplement Feeding Modality:  Oral  Ensure Enlive Cans or Servings Per Day:  1       Frequency:  Daily  Ensure Max Cans or Servings Per Day:  1       Frequency:  Daily  Entered: Oct 11 2021  2:25PM    Diet Regular-  Liquid Protein Supplement     Qty per Day:  1  Supplement Feeding Modality:  Oral  Ensure Enlive Cans or Servings Per Day:  1       Frequency:  Daily  Ensure Max Cans or Servings Per Day:  1       Frequency:  Daily  Entered: Oct  4 2021 12:53PM    Diet Regular-  Liquid Protein Supplement     Qty per Day:  BID  Supplement Feeding Modality:  Oral  Ensure Enlive Cans or Servings Per Day:  1       Frequency:  Daily  Ensure Max Cans or Servings Per Day:  1       Frequency:  Daily  Entered: Sep 27 2021  2:34PM    Diet Regular-  Entered: Sep 21 2021  9:05AM    The following pending diet order is being considered for treatment of Moderate protein-calorie malnutrition:null
This patient has been assessed with a concern for Malnutrition and has been determined to have a diagnosis/diagnoses of Moderate protein-calorie malnutrition.    This patient is being managed with:   Diet Regular-  Liquid Protein Supplement     Qty per Day:  1  Supplement Feeding Modality:  Oral  Ensure Enlive Cans or Servings Per Day:  1       Frequency:  Daily  Ensure Max Cans or Servings Per Day:  1       Frequency:  Daily  Entered: Oct  4 2021 12:53PM    Diet Regular-  Liquid Protein Supplement     Qty per Day:  BID  Supplement Feeding Modality:  Oral  Ensure Enlive Cans or Servings Per Day:  1       Frequency:  Daily  Ensure Max Cans or Servings Per Day:  1       Frequency:  Daily  Entered: Sep 27 2021  2:34PM    Diet Regular-  Entered: Sep 21 2021  9:05AM    The following pending diet order is being considered for treatment of Moderate protein-calorie malnutrition:null
This patient has been assessed with a concern for Malnutrition and has been determined to have a diagnosis/diagnoses of Moderate protein-calorie malnutrition.    This patient is being managed with:   Diet Regular-  Liquid Protein Supplement     Qty per Day:  1  Supplement Feeding Modality:  Oral  Ensure Enlive Cans or Servings Per Day:  1       Frequency:  Daily  Ensure Max Cans or Servings Per Day:  1       Frequency:  Daily  Entered: Oct  4 2021 12:53PM    Diet Regular-  Liquid Protein Supplement     Qty per Day:  BID  Supplement Feeding Modality:  Oral  Ensure Enlive Cans or Servings Per Day:  1       Frequency:  Daily  Ensure Max Cans or Servings Per Day:  1       Frequency:  Daily  Entered: Sep 27 2021  2:34PM    Diet Regular-  Entered: Sep 21 2021  9:05AM    The following pending diet order is being considered for treatment of Moderate protein-calorie malnutrition:null
This patient has been assessed with a concern for Malnutrition and has been determined to have a diagnosis/diagnoses of Moderate protein-calorie malnutrition.    This patient is being managed with:   Diet Regular-  Liquid Protein Supplement     Qty per Day:  1  Supplement Feeding Modality:  Oral  Ensure Enlive Cans or Servings Per Day:  1       Frequency:  Daily  Ensure Max Cans or Servings Per Day:  1       Frequency:  Daily  Entered: Oct 11 2021  2:25PM    Diet Regular-  Liquid Protein Supplement     Qty per Day:  1  Supplement Feeding Modality:  Oral  Ensure Enlive Cans or Servings Per Day:  1       Frequency:  Daily  Ensure Max Cans or Servings Per Day:  1       Frequency:  Daily  Entered: Oct  4 2021 12:53PM    Diet Regular-  Liquid Protein Supplement     Qty per Day:  BID  Supplement Feeding Modality:  Oral  Ensure Enlive Cans or Servings Per Day:  1       Frequency:  Daily  Ensure Max Cans or Servings Per Day:  1       Frequency:  Daily  Entered: Sep 27 2021  2:34PM    Diet Regular-  Entered: Sep 21 2021  9:05AM    The following pending diet order is being considered for treatment of Moderate protein-calorie malnutrition:null
This patient has been assessed with a concern for Malnutrition and has been determined to have a diagnosis/diagnoses of Moderate protein-calorie malnutrition.    This patient is being managed with:   Diet Regular-  Liquid Protein Supplement     Qty per Day:  1  Supplement Feeding Modality:  Oral  Ensure Enlive Cans or Servings Per Day:  1       Frequency:  Daily  Ensure Max Cans or Servings Per Day:  1       Frequency:  Daily  Entered: Oct 11 2021  2:25PM    Diet Regular-  Liquid Protein Supplement     Qty per Day:  1  Supplement Feeding Modality:  Oral  Ensure Enlive Cans or Servings Per Day:  1       Frequency:  Daily  Ensure Max Cans or Servings Per Day:  1       Frequency:  Daily  Entered: Oct  4 2021 12:53PM    Diet Regular-  Liquid Protein Supplement     Qty per Day:  BID  Supplement Feeding Modality:  Oral  Ensure Enlive Cans or Servings Per Day:  1       Frequency:  Daily  Ensure Max Cans or Servings Per Day:  1       Frequency:  Daily  Entered: Sep 27 2021  2:34PM    Diet Regular-  Entered: Sep 21 2021  9:05AM    The following pending diet order is being considered for treatment of Moderate protein-calorie malnutrition:null
This patient has been assessed with a concern for Malnutrition and has been determined to have a diagnosis/diagnoses of Moderate protein-calorie malnutrition.    This patient is being managed with:   Diet Regular-  Liquid Protein Supplement     Qty per Day:  BID  Supplement Feeding Modality:  Oral  Ensure Enlive Cans or Servings Per Day:  1       Frequency:  Daily  Ensure Max Cans or Servings Per Day:  1       Frequency:  Daily  Entered: Sep 27 2021  2:34PM    Diet Regular-  Entered: Sep 21 2021  9:05AM    The following pending diet order is being considered for treatment of Moderate protein-calorie malnutrition:null
This patient has been assessed with a concern for Malnutrition and has been determined to have a diagnosis/diagnoses of Moderate protein-calorie malnutrition.    This patient is being managed with:   Diet Regular-  Supplement Feeding Modality:  Oral  Ensure Max Cans or Servings Per Day:  1       Frequency:  Two Times a day  Entered: Oct 25 2021  5:19PM    
This patient has been assessed with a concern for Malnutrition and has been determined to have a diagnosis/diagnoses of Moderate protein-calorie malnutrition.    This patient is being managed with:   Diet NPO after Midnight-     NPO Start Date: 17-Oct-2021   NPO Start Time: 23:59  Entered: Oct 17 2021  7:37PM    Diet Regular-  Liquid Protein Supplement     Qty per Day:  1  Supplement Feeding Modality:  Oral  Ensure Enlive Cans or Servings Per Day:  1       Frequency:  Daily  Ensure Max Cans or Servings Per Day:  1       Frequency:  Daily  Entered: Oct 11 2021  2:25PM    Diet Regular-  Liquid Protein Supplement     Qty per Day:  1  Supplement Feeding Modality:  Oral  Ensure Enlive Cans or Servings Per Day:  1       Frequency:  Daily  Ensure Max Cans or Servings Per Day:  1       Frequency:  Daily  Entered: Oct  4 2021 12:53PM    Diet Regular-  Liquid Protein Supplement     Qty per Day:  BID  Supplement Feeding Modality:  Oral  Ensure Enlive Cans or Servings Per Day:  1       Frequency:  Daily  Ensure Max Cans or Servings Per Day:  1       Frequency:  Daily  Entered: Sep 27 2021  2:34PM    Diet Regular-  Entered: Sep 21 2021  9:05AM    The following pending diet order is being considered for treatment of Moderate protein-calorie malnutrition:null
This patient has been assessed with a concern for Malnutrition and has been determined to have a diagnosis/diagnoses of Moderate protein-calorie malnutrition.    This patient is being managed with:   Diet NPO after Midnight-     NPO Start Date: 24-Oct-2021   NPO Start Time: 23:59  Entered: Oct 24 2021 11:04AM    Diet Regular-  Supplement Feeding Modality:  Oral  Ensure Max Cans or Servings Per Day:  1       Frequency:  Two Times a day  Entered: Oct 22 2021  3:21PM    
This patient has been assessed with a concern for Malnutrition and has been determined to have a diagnosis/diagnoses of Moderate protein-calorie malnutrition.    This patient is being managed with:   Diet NPO-  NPO for Procedure/Test     NPO Start Date: 17-Oct-2021   NPO Start Time: 23:59  Except Medications  Entered: Oct 12 2021 10:34AM    Diet Regular-  Liquid Protein Supplement     Qty per Day:  1  Supplement Feeding Modality:  Oral  Ensure Enlive Cans or Servings Per Day:  1       Frequency:  Daily  Ensure Max Cans or Servings Per Day:  1       Frequency:  Daily  Entered: Oct 11 2021  2:25PM    Diet Regular-  Liquid Protein Supplement     Qty per Day:  1  Supplement Feeding Modality:  Oral  Ensure Enlive Cans or Servings Per Day:  1       Frequency:  Daily  Ensure Max Cans or Servings Per Day:  1       Frequency:  Daily  Entered: Oct  4 2021 12:53PM    Diet Regular-  Liquid Protein Supplement     Qty per Day:  BID  Supplement Feeding Modality:  Oral  Ensure Enlive Cans or Servings Per Day:  1       Frequency:  Daily  Ensure Max Cans or Servings Per Day:  1       Frequency:  Daily  Entered: Sep 27 2021  2:34PM    Diet Regular-  Entered: Sep 21 2021  9:05AM    The following pending diet order is being considered for treatment of Moderate protein-calorie malnutrition:null
This patient has been assessed with a concern for Malnutrition and has been determined to have a diagnosis/diagnoses of Moderate protein-calorie malnutrition.    This patient is being managed with:   Diet Regular-  Liquid Protein Supplement     Qty per Day:  1  Supplement Feeding Modality:  Oral  Ensure Enlive Cans or Servings Per Day:  1       Frequency:  Daily  Ensure Max Cans or Servings Per Day:  1       Frequency:  Daily  Entered: Oct  4 2021 12:53PM    Diet Regular-  Liquid Protein Supplement     Qty per Day:  BID  Supplement Feeding Modality:  Oral  Ensure Enlive Cans or Servings Per Day:  1       Frequency:  Daily  Ensure Max Cans or Servings Per Day:  1       Frequency:  Daily  Entered: Sep 27 2021  2:34PM    Diet Regular-  Entered: Sep 21 2021  9:05AM    The following pending diet order is being considered for treatment of Moderate protein-calorie malnutrition:null
This patient has been assessed with a concern for Malnutrition and has been determined to have a diagnosis/diagnoses of Moderate protein-calorie malnutrition.    This patient is being managed with:   Diet Regular-  Liquid Protein Supplement     Qty per Day:  1  Supplement Feeding Modality:  Oral  Ensure Enlive Cans or Servings Per Day:  1       Frequency:  Daily  Ensure Max Cans or Servings Per Day:  1       Frequency:  Daily  Entered: Oct 11 2021  2:25PM    Diet Regular-  Liquid Protein Supplement     Qty per Day:  1  Supplement Feeding Modality:  Oral  Ensure Enlive Cans or Servings Per Day:  1       Frequency:  Daily  Ensure Max Cans or Servings Per Day:  1       Frequency:  Daily  Entered: Oct  4 2021 12:53PM    Diet Regular-  Liquid Protein Supplement     Qty per Day:  BID  Supplement Feeding Modality:  Oral  Ensure Enlive Cans or Servings Per Day:  1       Frequency:  Daily  Ensure Max Cans or Servings Per Day:  1       Frequency:  Daily  Entered: Sep 27 2021  2:34PM    Diet Regular-  Entered: Sep 21 2021  9:05AM    The following pending diet order is being considered for treatment of Moderate protein-calorie malnutrition:null
This patient has been assessed with a concern for Malnutrition and has been determined to have a diagnosis/diagnoses of Moderate protein-calorie malnutrition.    This patient is being managed with:   Diet Regular-  Liquid Protein Supplement     Qty per Day:  BID  Supplement Feeding Modality:  Oral  Ensure Enlive Cans or Servings Per Day:  1       Frequency:  Daily  Ensure Max Cans or Servings Per Day:  1       Frequency:  Daily  Entered: Sep 27 2021  2:34PM    Diet Regular-  Entered: Sep 21 2021  9:05AM    The following pending diet order is being considered for treatment of Moderate protein-calorie malnutrition:null
This patient has been assessed with a concern for Malnutrition and has been determined to have a diagnosis/diagnoses of Moderate protein-calorie malnutrition.    This patient is being managed with:   Diet Regular-  Supplement Feeding Modality:  Oral  Ensure Max Cans or Servings Per Day:  1       Frequency:  Two Times a day  Entered: Oct 22 2021  3:21PM    
This patient has been assessed with a concern for Malnutrition and has been determined to have a diagnosis/diagnoses of Moderate protein-calorie malnutrition.    This patient is being managed with:   Diet Regular-  Supplement Feeding Modality:  Oral  Ensure Max Cans or Servings Per Day:  1       Frequency:  Two Times a day  Entered: Oct 25 2021  5:19PM    

## 2021-11-05 NOTE — PROGRESS NOTE ADULT - SUBJECTIVE AND OBJECTIVE BOX
Ortho Note    Pt resting comfortably this AM  Denies CP, SOB, N/V, numbness/tingling   New XRs obtained 11/4 and reviewed    Vital Signs Last 24 Hrs  T(C): 36.5 (05 Nov 2021 05:32), Max: 36.6 (04 Nov 2021 23:57)  T(F): 97.7 (05 Nov 2021 05:32), Max: 97.9 (04 Nov 2021 23:57)  HR: 89 (05 Nov 2021 05:32) (79 - 89)  BP: 101/68 (05 Nov 2021 05:32) (92/59 - 112/76)  BP(mean): --  RR: 18 (05 Nov 2021 05:32) (18 - 18)  SpO2: 97% (05 Nov 2021 05:32) (95% - 97%)    VSS  General: A&Ox3, NAD  LLE: Ringed external fixator in place w ace wrap; Pin site bolster dressings well appearing; Flap with dressing in place  Vascular: Toes WWP; Cap refill < 2 sec  Sensation: Patient with abnormal/absent sensation over most of the dorsum & plantar aspects of the foot consistent with baseline  Motor: Minimal FHL/FDL, 0/5 EHL/EDL activity consistent with baseline    Labs:                        9.4    6.18  )-----------( 433      ( 03 Nov 2021 09:54 )             29.7       11-04    137  |  103  |  15  ----------------------------<  99  3.7   |  24  |  0.72    Ca    9.4      04 Nov 2021 05:59    TPro  6.9  /  Alb  3.7  /  TBili  0.3  /  DBili  x   /  AST  51<H>  /  ALT  88<H>  /  AlkPhos  89  11-04

## 2021-11-08 ENCOUNTER — INPATIENT (INPATIENT)
Facility: HOSPITAL | Age: 26
LOS: 10 days | Discharge: HOME HEALTH | DRG: 863 | End: 2021-11-19
Attending: ORTHOPAEDIC SURGERY | Admitting: STUDENT IN AN ORGANIZED HEALTH CARE EDUCATION/TRAINING PROGRAM
Payer: COMMERCIAL

## 2021-11-08 ENCOUNTER — TRANSCRIPTION ENCOUNTER (OUTPATIENT)
Age: 26
End: 2021-11-08

## 2021-11-08 VITALS
HEART RATE: 125 BPM | WEIGHT: 176.37 LBS | SYSTOLIC BLOOD PRESSURE: 122 MMHG | RESPIRATION RATE: 16 BRPM | HEIGHT: 69 IN | TEMPERATURE: 99 F | DIASTOLIC BLOOD PRESSURE: 81 MMHG | OXYGEN SATURATION: 100 %

## 2021-11-08 DIAGNOSIS — Z98.890 OTHER SPECIFIED POSTPROCEDURAL STATES: Chronic | ICD-10-CM

## 2021-11-08 LAB
ALBUMIN SERPL ELPH-MCNC: 3.9 G/DL — SIGNIFICANT CHANGE UP (ref 3.3–5)
ALBUMIN SERPL ELPH-MCNC: 4.2 G/DL — SIGNIFICANT CHANGE UP (ref 3.3–5)
ALP SERPL-CCNC: 68 U/L — SIGNIFICANT CHANGE UP (ref 40–120)
ALP SERPL-CCNC: SIGNIFICANT CHANGE UP U/L (ref 40–120)
ALT FLD-CCNC: 53 U/L — HIGH (ref 10–45)
ALT FLD-CCNC: SIGNIFICANT CHANGE UP U/L (ref 10–45)
ANION GAP SERPL CALC-SCNC: 11 MMOL/L — SIGNIFICANT CHANGE UP (ref 5–17)
ANION GAP SERPL CALC-SCNC: 16 MMOL/L — SIGNIFICANT CHANGE UP (ref 5–17)
AST SERPL-CCNC: 41 U/L — HIGH (ref 10–40)
AST SERPL-CCNC: SIGNIFICANT CHANGE UP U/L (ref 10–40)
BASE EXCESS BLDV CALC-SCNC: -1.7 MMOL/L — SIGNIFICANT CHANGE UP (ref -2–3)
BASOPHILS # BLD AUTO: 0.05 K/UL — SIGNIFICANT CHANGE UP (ref 0–0.2)
BASOPHILS NFR BLD AUTO: 0.4 % — SIGNIFICANT CHANGE UP (ref 0–2)
BILIRUB SERPL-MCNC: 0.5 MG/DL — SIGNIFICANT CHANGE UP (ref 0.2–1.2)
BILIRUB SERPL-MCNC: 0.6 MG/DL — SIGNIFICANT CHANGE UP (ref 0.2–1.2)
BLD GP AB SCN SERPL QL: NEGATIVE — SIGNIFICANT CHANGE UP
BUN SERPL-MCNC: 10 MG/DL — SIGNIFICANT CHANGE UP (ref 7–23)
BUN SERPL-MCNC: 11 MG/DL — SIGNIFICANT CHANGE UP (ref 7–23)
CALCIUM SERPL-MCNC: 10.2 MG/DL — SIGNIFICANT CHANGE UP (ref 8.4–10.5)
CALCIUM SERPL-MCNC: 9.2 MG/DL — SIGNIFICANT CHANGE UP (ref 8.4–10.5)
CHLORIDE SERPL-SCNC: 108 MMOL/L — SIGNIFICANT CHANGE UP (ref 96–108)
CHLORIDE SERPL-SCNC: 110 MMOL/L — HIGH (ref 96–108)
CO2 BLDV-SCNC: 23.1 MMOL/L — SIGNIFICANT CHANGE UP (ref 22–26)
CO2 SERPL-SCNC: 20 MMOL/L — LOW (ref 22–31)
CO2 SERPL-SCNC: 22 MMOL/L — SIGNIFICANT CHANGE UP (ref 22–31)
CREAT SERPL-MCNC: 0.71 MG/DL — SIGNIFICANT CHANGE UP (ref 0.5–1.3)
CREAT SERPL-MCNC: 0.76 MG/DL — SIGNIFICANT CHANGE UP (ref 0.5–1.3)
EOSINOPHIL # BLD AUTO: 0.19 K/UL — SIGNIFICANT CHANGE UP (ref 0–0.5)
EOSINOPHIL NFR BLD AUTO: 1.5 % — SIGNIFICANT CHANGE UP (ref 0–6)
GLUCOSE SERPL-MCNC: 88 MG/DL — SIGNIFICANT CHANGE UP (ref 70–99)
GLUCOSE SERPL-MCNC: 90 MG/DL — SIGNIFICANT CHANGE UP (ref 70–99)
HCO3 BLDV-SCNC: 22 MMOL/L — SIGNIFICANT CHANGE UP (ref 22–29)
HCT VFR BLD CALC: 30.7 % — LOW (ref 34.5–45)
HGB BLD-MCNC: 10.2 G/DL — LOW (ref 11.5–15.5)
IMM GRANULOCYTES NFR BLD AUTO: 0.4 % — SIGNIFICANT CHANGE UP (ref 0–1.5)
LACTATE SERPL-SCNC: 1.2 MMOL/L — SIGNIFICANT CHANGE UP (ref 0.5–2)
LYMPHOCYTES # BLD AUTO: 19.4 % — SIGNIFICANT CHANGE UP (ref 13–44)
LYMPHOCYTES # BLD AUTO: 2.38 K/UL — SIGNIFICANT CHANGE UP (ref 1–3.3)
MCHC RBC-ENTMCNC: 29.6 PG — SIGNIFICANT CHANGE UP (ref 27–34)
MCHC RBC-ENTMCNC: 33.2 GM/DL — SIGNIFICANT CHANGE UP (ref 32–36)
MCV RBC AUTO: 89 FL — SIGNIFICANT CHANGE UP (ref 80–100)
MONOCYTES # BLD AUTO: 0.67 K/UL — SIGNIFICANT CHANGE UP (ref 0–0.9)
MONOCYTES NFR BLD AUTO: 5.5 % — SIGNIFICANT CHANGE UP (ref 2–14)
NEUTROPHILS # BLD AUTO: 8.92 K/UL — HIGH (ref 1.8–7.4)
NEUTROPHILS NFR BLD AUTO: 72.8 % — SIGNIFICANT CHANGE UP (ref 43–77)
NRBC # BLD: 0 /100 WBCS — SIGNIFICANT CHANGE UP (ref 0–0)
PCO2 BLDV: 34 MMHG — LOW (ref 39–42)
PH BLDV: 7.42 — SIGNIFICANT CHANGE UP (ref 7.32–7.43)
PLATELET # BLD AUTO: 471 K/UL — HIGH (ref 150–400)
PO2 BLDV: 73 MMHG — HIGH (ref 25–45)
POTASSIUM SERPL-MCNC: SIGNIFICANT CHANGE UP (ref 3.5–5.3)
POTASSIUM SERPL-MCNC: SIGNIFICANT CHANGE UP MMOL/L (ref 3.5–5.3)
POTASSIUM SERPL-SCNC: SIGNIFICANT CHANGE UP (ref 3.5–5.3)
POTASSIUM SERPL-SCNC: SIGNIFICANT CHANGE UP MMOL/L (ref 3.5–5.3)
PROT SERPL-MCNC: 6.8 G/DL — SIGNIFICANT CHANGE UP (ref 6–8.3)
PROT SERPL-MCNC: 8.1 G/DL — SIGNIFICANT CHANGE UP (ref 6–8.3)
RBC # BLD: 3.45 M/UL — LOW (ref 3.8–5.2)
RBC # FLD: 20.2 % — HIGH (ref 10.3–14.5)
RH IG SCN BLD-IMP: NEGATIVE — SIGNIFICANT CHANGE UP
SAO2 % BLDV: 96.8 % — HIGH (ref 67–88)
SARS-COV-2 RNA SPEC QL NAA+PROBE: NEGATIVE — SIGNIFICANT CHANGE UP
SODIUM SERPL-SCNC: 143 MMOL/L — SIGNIFICANT CHANGE UP (ref 135–145)
SODIUM SERPL-SCNC: 144 MMOL/L — SIGNIFICANT CHANGE UP (ref 135–145)
WBC # BLD: 12.26 K/UL — HIGH (ref 3.8–10.5)
WBC # FLD AUTO: 12.26 K/UL — HIGH (ref 3.8–10.5)

## 2021-11-08 PROCEDURE — 99285 EMERGENCY DEPT VISIT HI MDM: CPT

## 2021-11-08 PROCEDURE — 99221 1ST HOSP IP/OBS SF/LOW 40: CPT | Mod: 24

## 2021-11-08 RX ORDER — ACETAMINOPHEN 500 MG
650 TABLET ORAL EVERY 6 HOURS
Refills: 0 | Status: DISCONTINUED | OUTPATIENT
Start: 2021-11-09 | End: 2021-11-17

## 2021-11-08 RX ORDER — OXYCODONE AND ACETAMINOPHEN 5; 325 MG/1; MG/1
1 TABLET ORAL ONCE
Refills: 0 | Status: DISCONTINUED | OUTPATIENT
Start: 2021-11-08 | End: 2021-11-08

## 2021-11-08 RX ORDER — ACETAMINOPHEN 500 MG
650 TABLET ORAL ONCE
Refills: 0 | Status: COMPLETED | OUTPATIENT
Start: 2021-11-08 | End: 2021-11-08

## 2021-11-08 RX ORDER — ONDANSETRON 8 MG/1
4 TABLET, FILM COATED ORAL EVERY 6 HOURS
Refills: 0 | Status: DISCONTINUED | OUTPATIENT
Start: 2021-11-08 | End: 2021-11-19

## 2021-11-08 RX ORDER — SODIUM CHLORIDE 9 MG/ML
1000 INJECTION INTRAMUSCULAR; INTRAVENOUS; SUBCUTANEOUS ONCE
Refills: 0 | Status: COMPLETED | OUTPATIENT
Start: 2021-11-08 | End: 2021-11-08

## 2021-11-08 RX ORDER — PIPERACILLIN AND TAZOBACTAM 4; .5 G/20ML; G/20ML
3.38 INJECTION, POWDER, LYOPHILIZED, FOR SOLUTION INTRAVENOUS ONCE
Refills: 0 | Status: COMPLETED | OUTPATIENT
Start: 2021-11-08 | End: 2021-11-08

## 2021-11-08 RX ORDER — HYDROMORPHONE HYDROCHLORIDE 2 MG/ML
1 INJECTION INTRAMUSCULAR; INTRAVENOUS; SUBCUTANEOUS EVERY 4 HOURS
Refills: 0 | Status: DISCONTINUED | OUTPATIENT
Start: 2021-11-08 | End: 2021-11-09

## 2021-11-08 RX ORDER — OXYCODONE HYDROCHLORIDE 5 MG/1
5 TABLET ORAL EVERY 4 HOURS
Refills: 0 | Status: DISCONTINUED | OUTPATIENT
Start: 2021-11-08 | End: 2021-11-15

## 2021-11-08 RX ADMIN — SODIUM CHLORIDE 1000 MILLILITER(S): 9 INJECTION INTRAMUSCULAR; INTRAVENOUS; SUBCUTANEOUS at 19:44

## 2021-11-08 RX ADMIN — Medication 650 MILLIGRAM(S): at 19:44

## 2021-11-08 RX ADMIN — OXYCODONE AND ACETAMINOPHEN 1 TABLET(S): 5; 325 TABLET ORAL at 19:44

## 2021-11-08 RX ADMIN — PIPERACILLIN AND TAZOBACTAM 200 GRAM(S): 4; .5 INJECTION, POWDER, LYOPHILIZED, FOR SOLUTION INTRAVENOUS at 20:57

## 2021-11-08 NOTE — ED ADULT NURSE NOTE - OBJECTIVE STATEMENT
27 y/o female c/o left foot pain. pt endorsed having multiple surgeries d/t fracture. now was external fixation device to the left foot. pt states " they told me some of my blood work is abnormal and that I have an  infection"  pt tachycardic and febrile on triage, IV line started, blood work done and sent to lab, ekg presented to [facility].

## 2021-11-08 NOTE — CONSULT NOTE ADULT - ATTENDING COMMENTS
Patient discussed with resident and Dr. Carmona  Agree for admission for fever work up  Concern for tip necrosis proximal lateral and distal of free flap graft  Plan for aspiration of ankle out of plane of the free flap  f/u labs and fever work  Ortho to follow daily with NP/Attg service 27yo F under limb salvage protocol form LLE Patient discussed with resident and Dr. Carmona  Agree for admission for fever work up  Concern for tip necrosis proximal lateral and distal of free flap graft  Consider aspiration of left ankle out of plane of the free flap  Labs from AM show WBC of 6.24 and ESR 58 down from 62 on 11/3, No left shift, CRP 6.7 continuing to trend down from 23.6 on 11/3, PLTs down to 339 which is best value since initiation of treatment  Ortho to follow daily with NP/Attg service  Repeat labs this evening   ESR CRP CBC w/diff  Defer to plastics with regard to free flap maturation

## 2021-11-08 NOTE — CONSULT NOTE ADULT - SUBJECTIVE AND OBJECTIVE BOX
Orthopaedic Surgery Consult Note    For Surgeon: Dr. Wallis    HPI:  26F h/o L ankle fracture with septic arthritis, postoperative compartment syndrome s/p Left ankle recon with gracilis free flap and STSG  microvascular anastomosis with recipient AT on 9/20. RTOR on 10/18 for debridement of gracilis flap, application of external fixator c/b partial necrosis of skin and subcutaneous fat of gracilis myocutaneous free flap now s/p repeat flap debridement and STSG (10/25); discharged 11/5 with PICC line and 6 week course of abx (Daptomycin + Caspofungin), presenting with 2 days of subjective fevers and WBC 11.5 on outpatient labs (last WBC inpatient 6.1 on 11/3). Pt was sent in to ED for fever workup by Dr. Carmona (infectious disease)    Vital Signs Last 24 Hrs  T(C): 37.9 (08 Nov 2021 19:07), Max: 37.9 (08 Nov 2021 19:07)  T(F): 100.2 (08 Nov 2021 19:07), Max: 100.2 (08 Nov 2021 19:07)  HR: 99 (08 Nov 2021 19:07) (99 - 125)  BP: 122/81 (08 Nov 2021 18:07) (122/81 - 122/81)  BP(mean): --  RR: 16 (08 Nov 2021 18:07) (16 - 16)  SpO2: 100% (08 Nov 2021 18:07) (100% - 100%)    Physical Exam:  VSS  General: A&Ox3, NAD  LLE: Ringed external fixator in place w ace wrap; Pin site bolster dressings well appearing; Flap with dressing in place  Vascular: Toes WWP; Cap refill < 2 sec  Sensation: Patient with abnormal/absent sensation over most of the dorsum & plantar aspects of the foot consistent with baseline  Motor: Minimal FHL/FDL, 0/5 EHL/EDL activity consistent with baseline    A/P: 26yFemale with complex L ankle fracture s/p multiple I&D, revision surgeries, ringed external fixator placement, and flap coverage by PRS, discharged 11/4, sent into ED by Dr. Carmona for fever workup  - Pt found to have WBC of 11.5 on outpatient labs (increased from 6.1 as inpatient on 11/3)  - Pt with subjective fevers at home  - Presents with 99F orally and 100.2F rectal temp today in ED  - WBC  -   - No acute orthopedic intervention at this time  -Discussed with Dr. Kadi Foster, PGY-2  Orthopedic Surgery   Orthopaedic Surgery Consult Note    For Surgeon: Dr. Wallis    HPI:  26F h/o L ankle fracture with septic arthritis, postoperative compartment syndrome s/p Left ankle recon with gracilis free flap and STSG  microvascular anastomosis with recipient AT on 9/20. RTOR on 10/18 for debridement of gracilis flap, application of external fixator c/b partial necrosis of skin and subcutaneous fat of gracilis myocutaneous free flap now s/p repeat flap debridement and STSG (10/25); discharged 11/5 with PICC line and 6 week course of abx (Daptomycin + Caspofungin), presenting with 2 days of subjective fevers and WBC 11.5 on outpatient labs (last WBC inpatient 6.1 on 11/3). Pt was sent in to ED for fever workup by Dr. Carmona (infectious disease)    Vital Signs Last 24 Hrs  T(C): 37.9 (08 Nov 2021 19:07), Max: 37.9 (08 Nov 2021 19:07)  T(F): 100.2 (08 Nov 2021 19:07), Max: 100.2 (08 Nov 2021 19:07)  HR: 99 (08 Nov 2021 19:07) (99 - 125)  BP: 122/81 (08 Nov 2021 18:07) (122/81 - 122/81)  BP(mean): --  RR: 16 (08 Nov 2021 18:07) (16 - 16)  SpO2: 100% (08 Nov 2021 18:07) (100% - 100%)    Physical Exam:  VSS  General: A&Ox3, NAD  LLE: Ringed external fixator in place w ace wrap; Pin site bolster dressings well appearing; Flap with dressing in place  Vascular: Toes WWP; Cap refill < 2 sec  Sensation: Patient with abnormal/absent sensation over most of the dorsum & plantar aspects of the foot consistent with baseline  Motor: Minimal FHL/FDL, 0/5 EHL/EDL activity consistent with baseline    A/P: 26yFemale with complex L ankle fracture s/p multiple I&D, revision surgeries, ringed external fixator placement, and flap coverage by PRS, discharged 11/4, sent into ED by Dr. Carmona for fever workup  - Pt found to have WBC of 11.5 on outpatient labs (increased from 6.1 as inpatient on 11/3)  - Pt with subjective fevers at home  - Presents with 99F orally and 100.2F rectal temp today in ED  - WBC 12.2, ESR 58, CRP 6  - Dressings removed, no evidence of flap necrosis, infection, or pin site infection  - Infectious workup as per Dr. Carmona, possibly gallium scan  - No acute orthopedic intervention at this time  - Discussed with Dr. Kadi Foster, PGY-2  Orthopedic Surgery   Orthopaedic Surgery Consult Note    For Surgeon: Dr. Wallis    HPI:  26F h/o L ankle fracture with septic arthritis, postoperative compartment syndrome s/p Left ankle recon with gracilis free flap and STSG  microvascular anastomosis with recipient AT on 9/20. RTOR on 10/18 for debridement of gracilis flap, application of external fixator c/b partial necrosis of skin and subcutaneous fat of gracilis myocutaneous free flap now s/p repeat flap debridement and STSG (10/25); discharged 11/5 with PICC line and 6 week course of abx (Daptomycin + Caspofungin), presenting with 2 days of subjective fevers and WBC 11.5 on outpatient labs (last WBC inpatient 6.1 on 11/3). Pt was sent in to ED for fever workup by Dr. Carmona (infectious disease)    Vital Signs Last 24 Hrs  T(C): 37.9 (08 Nov 2021 19:07), Max: 37.9 (08 Nov 2021 19:07)  T(F): 100.2 (08 Nov 2021 19:07), Max: 100.2 (08 Nov 2021 19:07)  HR: 99 (08 Nov 2021 19:07) (99 - 125)  BP: 122/81 (08 Nov 2021 18:07) (122/81 - 122/81)  BP(mean): --  RR: 16 (08 Nov 2021 18:07) (16 - 16)  SpO2: 100% (08 Nov 2021 18:07) (100% - 100%)    Physical Exam:  VSS  General: A&Ox3, NAD  LLE: Ringed external fixator in place w ace wrap; Pin site bolster dressings well appearing; Flap with dressing in place  Vascular: Toes WWP; Cap refill < 2 sec  Sensation: Patient with abnormal/absent sensation over most of the dorsum & plantar aspects of the foot consistent with baseline  Motor: Minimal FHL/FDL, 0/5 EHL/EDL activity consistent with baseline    A/P: 26yFemale with complex L ankle fracture s/p multiple I&D, revision surgeries, ringed external fixator placement, and flap coverage by PRS, discharged 11/4, sent into ED by Dr. Carmona for fever workup  - Pt found to have WBC of 11.5 on outpatient labs (increased from 6.1 as inpatient on 11/3)  - Pt with subjective fevers at home  - Presents with 99F orally and 100.2F rectal temp today in ED  - WBC 12.2, ESR 58, CRP 6  - Dressings removed, concern for tip flap necrosis laterally proximally and distally. No evidence of active infection at pin site infection  - Infectious workup as per Dr. Carmona, possibly gallium scan  - Discussed with Dr. Kadi Foster, PGY-2  Orthopedic Surgery

## 2021-11-08 NOTE — ED PROVIDER NOTE - PHYSICAL EXAMINATION
CONSTITUTIONAL: Well-appearing;  in no apparent distress.   HEAD: Normocephalic; atraumatic.   EYES: PERRL; EOM intact; conjunctiva and sclera clear  ENT: normal nose; no rhinorrhea; normal pharynx with no erythema or lesions.   NECK: Supple; non-tender; no LAD  CARDIOVASCULAR: Normal S1, S2; No audible murmurs. Regular rate and rhythm.   RESPIRATORY: Breathing easily; breath sounds clear and equal bilaterally; no wheezes, rhonchi, or rales.  GI: Soft; non-distended; non-tender; no palpable organomegaly.   MSK: FROM at all extremities, normal tone   EXT: No cyanosis or edema; N/V intact. L foot in external fixator, skin dry, no erythema, warmth, or swelling.   SKIN: Normal for age and race; warm; dry; good turgor; no apparent lesions or rash.   NEURO: A & O x 3; face symmetric; grossly unremarkable.   PSYCHOLOGICAL: The patient’s mood and manner are appropriate.

## 2021-11-08 NOTE — ED ADULT TRIAGE NOTE - CHIEF COMPLAINT QUOTE
Pt has had multiple surgeries to L foot due to fracture 8 months ago, currently in external fixation to L foot.  Sent in by infectious disease doc for abx due to +blood cultures.

## 2021-11-08 NOTE — ED PROVIDER NOTE - CLINICAL SUMMARY MEDICAL DECISION MAKING FREE TEXT BOX
25 y/o F pt s/p L ankle surgery, complicated by compartment syndrome and infection, on Daptomycin and Caspofungin, presents to ED with subjective fever with elevated WBC. Pt with temp of 100.2 rectally, tachycardic, and L foot in external fixator. Plan for labs, cultures, consult ortho, ID, and plastics.

## 2021-11-08 NOTE — ED PROVIDER NOTE - OBJECTIVE STATEMENT
26F h/o L ankle fracture with septic arthritis, postoperative compartment syndrome s/p Left ankle recon with gracilis free flap and STSG  microvascular anastomosis with recipient AT on 9/20. RTOR on 10/18 for debridement of gracilis flap, application of external fixator c/b partial necrosis of skin and subcutaneous fat of gracilis myocutaneous free flap now s/p repeat flap debridement and STSG (10/25); discharged 11/5 with PICC line and 6 week course of abx (Daptomycin + Caspofungin), presents to ED sent in by her ID doctor for elevated WBC on outpatient labs 2 days ago, with subjective fever at home. She admits to foot pain, but denies chills, sweats, nausea, vomiting, and any other acute complaints.

## 2021-11-08 NOTE — ED ADULT NURSE NOTE - NSIMPLEMENTINTERV_GEN_ALL_ED
Implemented All Fall with Harm Risk Interventions:  [place] to call system. Call bell, personal items and telephone within reach. Instruct patient to call for assistance. Room bathroom lighting operational. Non-slip footwear when patient is off stretcher. Physically safe environment: no spills, clutter or unnecessary equipment. Stretcher in lowest position, wheels locked, appropriate side rails in place. Provide visual cue, wrist band, yellow gown, etc. Monitor gait and stability. Monitor for mental status changes and reorient to person, place, and time. Review medications for side effects contributing to fall risk. Reinforce activity limits and safety measures with patient and family. Provide visual clues: red socks.

## 2021-11-09 LAB
ALBUMIN SERPL ELPH-MCNC: 3.6 G/DL — SIGNIFICANT CHANGE UP (ref 3.3–5)
ALP SERPL-CCNC: 64 U/L — SIGNIFICANT CHANGE UP (ref 40–120)
ALT FLD-CCNC: 45 U/L — SIGNIFICANT CHANGE UP (ref 10–45)
ANION GAP SERPL CALC-SCNC: 13 MMOL/L — SIGNIFICANT CHANGE UP (ref 5–17)
AST SERPL-CCNC: 21 U/L — SIGNIFICANT CHANGE UP (ref 10–40)
BASOPHILS # BLD AUTO: 0.03 K/UL — SIGNIFICANT CHANGE UP (ref 0–0.2)
BASOPHILS NFR BLD AUTO: 0.5 % — SIGNIFICANT CHANGE UP (ref 0–2)
BILIRUB SERPL-MCNC: 0.5 MG/DL — SIGNIFICANT CHANGE UP (ref 0.2–1.2)
BUN SERPL-MCNC: 9 MG/DL — SIGNIFICANT CHANGE UP (ref 7–23)
CALCIUM SERPL-MCNC: 8.8 MG/DL — SIGNIFICANT CHANGE UP (ref 8.4–10.5)
CHLORIDE SERPL-SCNC: 108 MMOL/L — SIGNIFICANT CHANGE UP (ref 96–108)
CO2 SERPL-SCNC: 22 MMOL/L — SIGNIFICANT CHANGE UP (ref 22–31)
COVID-19 NUCLEOCAPSID GAM AB INTERP: POSITIVE
COVID-19 NUCLEOCAPSID TOTAL GAM ANTIBODY RESULT: 48 INDEX — HIGH
COVID-19 SPIKE DOMAIN AB INTERP: POSITIVE
COVID-19 SPIKE DOMAIN ANTIBODY RESULT: >250 U/ML — HIGH
CREAT SERPL-MCNC: 0.83 MG/DL — SIGNIFICANT CHANGE UP (ref 0.5–1.3)
EOSINOPHIL # BLD AUTO: 0.27 K/UL — SIGNIFICANT CHANGE UP (ref 0–0.5)
EOSINOPHIL NFR BLD AUTO: 4.3 % — SIGNIFICANT CHANGE UP (ref 0–6)
GLUCOSE SERPL-MCNC: 90 MG/DL — SIGNIFICANT CHANGE UP (ref 70–99)
HCT VFR BLD CALC: 27.7 % — LOW (ref 34.5–45)
HGB BLD-MCNC: 8.7 G/DL — LOW (ref 11.5–15.5)
IMM GRANULOCYTES NFR BLD AUTO: 0.2 % — SIGNIFICANT CHANGE UP (ref 0–1.5)
LYMPHOCYTES # BLD AUTO: 2.24 K/UL — SIGNIFICANT CHANGE UP (ref 1–3.3)
LYMPHOCYTES # BLD AUTO: 35.9 % — SIGNIFICANT CHANGE UP (ref 13–44)
MCHC RBC-ENTMCNC: 28.9 PG — SIGNIFICANT CHANGE UP (ref 27–34)
MCHC RBC-ENTMCNC: 31.4 GM/DL — LOW (ref 32–36)
MCV RBC AUTO: 92 FL — SIGNIFICANT CHANGE UP (ref 80–100)
MONOCYTES # BLD AUTO: 0.56 K/UL — SIGNIFICANT CHANGE UP (ref 0–0.9)
MONOCYTES NFR BLD AUTO: 9 % — SIGNIFICANT CHANGE UP (ref 2–14)
NEUTROPHILS # BLD AUTO: 3.13 K/UL — SIGNIFICANT CHANGE UP (ref 1.8–7.4)
NEUTROPHILS NFR BLD AUTO: 50.1 % — SIGNIFICANT CHANGE UP (ref 43–77)
NRBC # BLD: 0 /100 WBCS — SIGNIFICANT CHANGE UP (ref 0–0)
PLATELET # BLD AUTO: 339 K/UL — SIGNIFICANT CHANGE UP (ref 150–400)
POTASSIUM SERPL-MCNC: 3.2 MMOL/L — LOW (ref 3.5–5.3)
POTASSIUM SERPL-SCNC: 3.2 MMOL/L — LOW (ref 3.5–5.3)
PROT SERPL-MCNC: 6.5 G/DL — SIGNIFICANT CHANGE UP (ref 6–8.3)
RBC # BLD: 3.01 M/UL — LOW (ref 3.8–5.2)
RBC # FLD: 20.1 % — HIGH (ref 10.3–14.5)
SARS-COV-2 IGG+IGM SERPL QL IA: 48 INDEX — HIGH
SARS-COV-2 IGG+IGM SERPL QL IA: >250 U/ML — HIGH
SARS-COV-2 IGG+IGM SERPL QL IA: POSITIVE
SARS-COV-2 IGG+IGM SERPL QL IA: POSITIVE
SODIUM SERPL-SCNC: 143 MMOL/L — SIGNIFICANT CHANGE UP (ref 135–145)
WBC # BLD: 6.24 K/UL — SIGNIFICANT CHANGE UP (ref 3.8–10.5)
WBC # FLD AUTO: 6.24 K/UL — SIGNIFICANT CHANGE UP (ref 3.8–10.5)

## 2021-11-09 PROCEDURE — 71045 X-RAY EXAM CHEST 1 VIEW: CPT | Mod: 26

## 2021-11-09 PROCEDURE — 99254 IP/OBS CNSLTJ NEW/EST MOD 60: CPT

## 2021-11-09 RX ORDER — ENOXAPARIN SODIUM 100 MG/ML
40 INJECTION SUBCUTANEOUS EVERY 24 HOURS
Refills: 0 | Status: DISCONTINUED | OUTPATIENT
Start: 2021-11-09 | End: 2021-11-19

## 2021-11-09 RX ORDER — SENNA PLUS 8.6 MG/1
2 TABLET ORAL AT BEDTIME
Refills: 0 | Status: DISCONTINUED | OUTPATIENT
Start: 2021-11-09 | End: 2021-11-19

## 2021-11-09 RX ORDER — CASPOFUNGIN ACETATE 7 MG/ML
50 INJECTION, POWDER, LYOPHILIZED, FOR SOLUTION INTRAVENOUS EVERY 24 HOURS
Refills: 0 | Status: DISCONTINUED | OUTPATIENT
Start: 2021-11-09 | End: 2021-11-19

## 2021-11-09 RX ORDER — DIPHENHYDRAMINE HCL 50 MG
25 CAPSULE ORAL EVERY 4 HOURS
Refills: 0 | Status: DISCONTINUED | OUTPATIENT
Start: 2021-11-09 | End: 2021-11-19

## 2021-11-09 RX ORDER — PIPERACILLIN AND TAZOBACTAM 4; .5 G/20ML; G/20ML
3.38 INJECTION, POWDER, LYOPHILIZED, FOR SOLUTION INTRAVENOUS ONCE
Refills: 0 | Status: COMPLETED | OUTPATIENT
Start: 2021-11-09 | End: 2021-11-09

## 2021-11-09 RX ORDER — PIPERACILLIN AND TAZOBACTAM 4; .5 G/20ML; G/20ML
3.38 INJECTION, POWDER, LYOPHILIZED, FOR SOLUTION INTRAVENOUS EVERY 6 HOURS
Refills: 0 | Status: DISCONTINUED | OUTPATIENT
Start: 2021-11-09 | End: 2021-11-14

## 2021-11-09 RX ORDER — DAPTOMYCIN 500 MG/10ML
500 INJECTION, POWDER, LYOPHILIZED, FOR SOLUTION INTRAVENOUS EVERY 24 HOURS
Refills: 0 | Status: DISCONTINUED | OUTPATIENT
Start: 2021-11-09 | End: 2021-11-19

## 2021-11-09 RX ORDER — SODIUM CHLORIDE 9 MG/ML
10 INJECTION INTRAMUSCULAR; INTRAVENOUS; SUBCUTANEOUS
Refills: 0 | Status: DISCONTINUED | OUTPATIENT
Start: 2021-11-09 | End: 2021-11-19

## 2021-11-09 RX ORDER — BACITRACIN ZINC 500 UNIT/G
1 OINTMENT IN PACKET (EA) TOPICAL DAILY
Refills: 0 | Status: DISCONTINUED | OUTPATIENT
Start: 2021-11-09 | End: 2021-11-19

## 2021-11-09 RX ORDER — OXYCODONE HYDROCHLORIDE 5 MG/1
10 TABLET ORAL EVERY 4 HOURS
Refills: 0 | Status: DISCONTINUED | OUTPATIENT
Start: 2021-11-09 | End: 2021-11-16

## 2021-11-09 RX ORDER — LANOLIN ALCOHOL/MO/W.PET/CERES
3 CREAM (GRAM) TOPICAL AT BEDTIME
Refills: 0 | Status: DISCONTINUED | OUTPATIENT
Start: 2021-11-09 | End: 2021-11-19

## 2021-11-09 RX ORDER — GABAPENTIN 400 MG/1
600 CAPSULE ORAL
Refills: 0 | Status: DISCONTINUED | OUTPATIENT
Start: 2021-11-09 | End: 2021-11-19

## 2021-11-09 RX ORDER — CHLORHEXIDINE GLUCONATE 213 G/1000ML
1 SOLUTION TOPICAL
Refills: 0 | Status: DISCONTINUED | OUTPATIENT
Start: 2021-11-09 | End: 2021-11-19

## 2021-11-09 RX ORDER — PETROLATUM,WHITE
1 JELLY (GRAM) TOPICAL DAILY
Refills: 0 | Status: DISCONTINUED | OUTPATIENT
Start: 2021-11-09 | End: 2021-11-19

## 2021-11-09 RX ORDER — HYDROMORPHONE HYDROCHLORIDE 2 MG/ML
0.5 INJECTION INTRAMUSCULAR; INTRAVENOUS; SUBCUTANEOUS EVERY 4 HOURS
Refills: 0 | Status: DISCONTINUED | OUTPATIENT
Start: 2021-11-09 | End: 2021-11-16

## 2021-11-09 RX ORDER — CALCIUM CARBONATE 500(1250)
1 TABLET ORAL DAILY
Refills: 0 | Status: DISCONTINUED | OUTPATIENT
Start: 2021-11-09 | End: 2021-11-19

## 2021-11-09 RX ORDER — POTASSIUM CHLORIDE 20 MEQ
40 PACKET (EA) ORAL EVERY 4 HOURS
Refills: 0 | Status: COMPLETED | OUTPATIENT
Start: 2021-11-09 | End: 2021-11-10

## 2021-11-09 RX ORDER — INFLUENZA VIRUS VACCINE 15; 15; 15; 15 UG/.5ML; UG/.5ML; UG/.5ML; UG/.5ML
0.5 SUSPENSION INTRAMUSCULAR ONCE
Refills: 0 | Status: DISCONTINUED | OUTPATIENT
Start: 2021-11-09 | End: 2021-11-19

## 2021-11-09 RX ORDER — POLYETHYLENE GLYCOL 3350 17 G/17G
17 POWDER, FOR SOLUTION ORAL
Refills: 0 | Status: DISCONTINUED | OUTPATIENT
Start: 2021-11-09 | End: 2021-11-19

## 2021-11-09 RX ADMIN — OXYCODONE HYDROCHLORIDE 10 MILLIGRAM(S): 5 TABLET ORAL at 16:25

## 2021-11-09 RX ADMIN — ONDANSETRON 4 MILLIGRAM(S): 8 TABLET, FILM COATED ORAL at 00:13

## 2021-11-09 RX ADMIN — Medication 650 MILLIGRAM(S): at 08:21

## 2021-11-09 RX ADMIN — Medication 40 MILLIEQUIVALENT(S): at 21:13

## 2021-11-09 RX ADMIN — Medication 1 TABLET(S): at 12:08

## 2021-11-09 RX ADMIN — Medication 650 MILLIGRAM(S): at 08:51

## 2021-11-09 RX ADMIN — Medication 1 TABLET(S): at 12:07

## 2021-11-09 RX ADMIN — GABAPENTIN 600 MILLIGRAM(S): 400 CAPSULE ORAL at 21:12

## 2021-11-09 RX ADMIN — SENNA PLUS 2 TABLET(S): 8.6 TABLET ORAL at 21:13

## 2021-11-09 RX ADMIN — Medication 1 APPLICATION(S): at 12:08

## 2021-11-09 RX ADMIN — GABAPENTIN 600 MILLIGRAM(S): 400 CAPSULE ORAL at 17:22

## 2021-11-09 RX ADMIN — PIPERACILLIN AND TAZOBACTAM 200 GRAM(S): 4; .5 INJECTION, POWDER, LYOPHILIZED, FOR SOLUTION INTRAVENOUS at 16:45

## 2021-11-09 RX ADMIN — HYDROMORPHONE HYDROCHLORIDE 1 MILLIGRAM(S): 2 INJECTION INTRAMUSCULAR; INTRAVENOUS; SUBCUTANEOUS at 00:13

## 2021-11-09 RX ADMIN — HYDROMORPHONE HYDROCHLORIDE 0.5 MILLIGRAM(S): 2 INJECTION INTRAMUSCULAR; INTRAVENOUS; SUBCUTANEOUS at 22:36

## 2021-11-09 RX ADMIN — CASPOFUNGIN ACETATE 260 MILLIGRAM(S): 7 INJECTION, POWDER, LYOPHILIZED, FOR SOLUTION INTRAVENOUS at 09:24

## 2021-11-09 RX ADMIN — HYDROMORPHONE HYDROCHLORIDE 0.5 MILLIGRAM(S): 2 INJECTION INTRAMUSCULAR; INTRAVENOUS; SUBCUTANEOUS at 17:35

## 2021-11-09 RX ADMIN — HYDROMORPHONE HYDROCHLORIDE 0.5 MILLIGRAM(S): 2 INJECTION INTRAMUSCULAR; INTRAVENOUS; SUBCUTANEOUS at 22:13

## 2021-11-09 RX ADMIN — HYDROMORPHONE HYDROCHLORIDE 1 MILLIGRAM(S): 2 INJECTION INTRAMUSCULAR; INTRAVENOUS; SUBCUTANEOUS at 13:35

## 2021-11-09 RX ADMIN — Medication 650 MILLIGRAM(S): at 13:35

## 2021-11-09 RX ADMIN — Medication 650 MILLIGRAM(S): at 14:06

## 2021-11-09 RX ADMIN — PIPERACILLIN AND TAZOBACTAM 200 GRAM(S): 4; .5 INJECTION, POWDER, LYOPHILIZED, FOR SOLUTION INTRAVENOUS at 21:12

## 2021-11-09 RX ADMIN — ENOXAPARIN SODIUM 40 MILLIGRAM(S): 100 INJECTION SUBCUTANEOUS at 08:21

## 2021-11-09 RX ADMIN — HYDROMORPHONE HYDROCHLORIDE 1 MILLIGRAM(S): 2 INJECTION INTRAMUSCULAR; INTRAVENOUS; SUBCUTANEOUS at 04:40

## 2021-11-09 RX ADMIN — DAPTOMYCIN 120 MILLIGRAM(S): 500 INJECTION, POWDER, LYOPHILIZED, FOR SOLUTION INTRAVENOUS at 17:23

## 2021-11-09 RX ADMIN — Medication 650 MILLIGRAM(S): at 01:55

## 2021-11-09 RX ADMIN — OXYCODONE HYDROCHLORIDE 5 MILLIGRAM(S): 5 TABLET ORAL at 02:30

## 2021-11-09 RX ADMIN — Medication 650 MILLIGRAM(S): at 02:30

## 2021-11-09 RX ADMIN — HYDROMORPHONE HYDROCHLORIDE 1 MILLIGRAM(S): 2 INJECTION INTRAMUSCULAR; INTRAVENOUS; SUBCUTANEOUS at 04:24

## 2021-11-09 RX ADMIN — OXYCODONE HYDROCHLORIDE 5 MILLIGRAM(S): 5 TABLET ORAL at 01:51

## 2021-11-09 RX ADMIN — HYDROMORPHONE HYDROCHLORIDE 0.5 MILLIGRAM(S): 2 INJECTION INTRAMUSCULAR; INTRAVENOUS; SUBCUTANEOUS at 17:23

## 2021-11-09 RX ADMIN — OXYCODONE HYDROCHLORIDE 10 MILLIGRAM(S): 5 TABLET ORAL at 21:12

## 2021-11-09 RX ADMIN — Medication 3 MILLIGRAM(S): at 21:13

## 2021-11-09 RX ADMIN — HYDROMORPHONE HYDROCHLORIDE 1 MILLIGRAM(S): 2 INJECTION INTRAMUSCULAR; INTRAVENOUS; SUBCUTANEOUS at 00:05

## 2021-11-09 RX ADMIN — OXYCODONE HYDROCHLORIDE 10 MILLIGRAM(S): 5 TABLET ORAL at 21:50

## 2021-11-09 RX ADMIN — HYDROMORPHONE HYDROCHLORIDE 1 MILLIGRAM(S): 2 INJECTION INTRAMUSCULAR; INTRAVENOUS; SUBCUTANEOUS at 13:45

## 2021-11-09 RX ADMIN — OXYCODONE HYDROCHLORIDE 10 MILLIGRAM(S): 5 TABLET ORAL at 16:55

## 2021-11-09 RX ADMIN — GABAPENTIN 600 MILLIGRAM(S): 400 CAPSULE ORAL at 12:07

## 2021-11-09 NOTE — PROGRESS NOTE ADULT - SUBJECTIVE AND OBJECTIVE BOX
SUBJECTIVE: Pt seen and examined on morning rounds. Pt feeling well without major complaints. Less anxious this morning. Pain controlled    Vital Signs Last 24 Hrs  T(C): 36.8 (09 Nov 2021 05:00), Max: 37.9 (08 Nov 2021 19:07)  T(F): 98.2 (09 Nov 2021 05:00), Max: 100.2 (08 Nov 2021 19:07)  HR: 80 (09 Nov 2021 05:00) (74 - 125)  BP: 95/59 (09 Nov 2021 05:00) (95/59 - 122/81)  BP(mean): 71 (09 Nov 2021 05:00) (71 - 79)  RR: 17 (09 Nov 2021 05:00) (16 - 18)  SpO2: 97% (09 Nov 2021 05:00) (97% - 100%)    Physical Exam:  VSS  General: A&Ox3, NAD  LLE: Ringed external fixator in place w ace wrap; Pin site bolster dressings well appearing; Flap with dressing in place  Vascular: Toes WWP; Cap refill < 2 sec  Sensation: Patient with abnormal/absent sensation over most of the dorsum & plantar aspects of the foot consistent with baseline  Motor: Minimal FHL/FDL, 0/5 EHL/EDL activity consistent with baseline    A/P: 26yFemale with complex L ankle fracture s/p multiple I&D, revision surgeries, ringed external fixator placement, and flap coverage by PRS, discharged 11/4, sent into ED by Dr. Carmona for fever workup  - Pt found to have WBC of 11.5 on outpatient labs (increased from 6.1 as inpatient on 11/3)  - Pt with subjective fevers at home  - Presents with 99F orally and 100.2F rectal temp today in ED  - WBC 12.2, ESR 58, CRP 6  - Dressings removed, no evidence of flap necrosis, infection, or pin site infection  - F/u blood cultures  - Add Zosyn to regimen as per ID  - Infectious workup as per Dr. Carmona, possibly gallium scan  - No acute orthopedic intervention at this time  - Discussed with Dr. Kadi Foster, PGY-2  Orthopedic Surgery

## 2021-11-09 NOTE — CONSULT NOTE ADULT - SUBJECTIVE AND OBJECTIVE BOX
HPI:  25 yo F with complex L ankle fracture, recent prolonged admission (9/9-11/5) readmitted 11/8 with fever, leukocytosis.  ID consult for assistance with source ID and management.  She had L ankle fracture 3/21 initially treated with clinda/levofloxacin IV, then prolonged course po.  She was admitted 9/9 with open wound draining pus.  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  She underwent RICK, I&D with bone debridement, ligament repair.  She was initially started on vanc and cefepime, then changed to nafcillin and ceftriaxone when all OR cultures grew MSSA and Strep anginosus.  (3 also grew Staph epi and 1 grew E. faecalis.)  She underwent additional I&Ds with VAC placement on 9/12, 9/14, 9/17 & 9/20.  Rare MSSA grew from 1/3 cultures on 9/20.  On 9/21, she underwent L ankle reconstruction with L gracilis FF and L thigh STSG. PICC was placed on 9/21.  Plan was for 6-week course of Abx from 9/20.  Her inflammatory markers remained elevated.  On 10/14, gallium showed prominent uptake in gallium compared to Tc99m within L ankle involving distal tibial/fibula and talus most c/c osteomyelitis.  On 10/17, she underwent debridement of gracilis flap with sanguinous fluid anterolateral ankle seen distal to flap and application of multiplanar external fixator.  OR cultures were polymicrobial but consistently grew E. faecalis, Staph epi, Candida albicans and Candida parapsilosis – these organisms were targed.  Stenotrophomonas also grew from 1.  On 10/21, nafcillin and ceftriaxone were d/tyson.  She was started on dapto and fluconazole.  She had RTOR on 10/25 for repeat flap debridement and STSG by Plastics.  She did well until 10/29 when she started to have increasing LFTs.  On 11/1, fluconazole was d/tyson and she was started on caspofungin.  Her LFTs stabilized and she was d/tyson on 11/5.  Overnight into 11/7, she had subjective fever and rigors, followed by sweats.  Did not take temp (no thermometer).  Symptoms recurred nightly X 3 d.  She had blood drawn by infusion service on 11/6 – showed WBC 11.5 (ß6.2), ESR 97 (ß62), CO2 18 (ß24), ALT 97 (ß88).  She was referred to the ED for further evaluation.  She had T 99 (oral), T 100.2 (R).  Labs notable for WBC 12.3 with 72% PMNs, AST 41, ALT 53.  She was given pip-tazo and admitted for further management.  Dapto and caspofungin were continued.    Per Ms. Kohli, her ankle pain is 5/10 (not worsened), new donor site with 7/10 pain at distal end.  She had no HA, photophobia, neck stiffness, rhinorrhea, sore throat, cough, CP, SOB, N, V.  She had 4-5 watery BMs on 11/7 but not on other days.  No abd pain, dysuria, hematuria.      PAST MEDICAL & SURGICAL HISTORY:  Left tibial neuropathy    PAD (peripheral artery disease)    Status post ORIF of fracture of ankle            MEDICATIONS  (STANDING):  acetaminophen     Tablet .. 650 milliGRAM(s) Oral every 6 hours  AQUAPHOR (petrolatum Ointment) 1 Application(s) Topical daily  BACItracin   Ointment 1 Application(s) Topical daily  calcium carbonate   1250 mG (OsCal) 1 Tablet(s) Oral daily  caspofungin IVPB 50 milliGRAM(s) IV Intermittent every 24 hours  chlorhexidine 4% Liquid 1 Application(s) Topical <User Schedule>  DAPTOmycin IVPB 500 milliGRAM(s) IV Intermittent every 24 hours  enoxaparin Injectable 40 milliGRAM(s) SubCutaneous every 24 hours  gabapentin 600 milliGRAM(s) Oral four times a day  influenza   Vaccine 0.5 milliLiter(s) IntraMuscular once  melatonin 3 milliGRAM(s) Oral at bedtime  multivitamin 1 Tablet(s) Oral daily  piperacillin/tazobactam IVPB.. 3.375 Gram(s) IV Intermittent every 6 hours  polyethylene glycol 3350 17 Gram(s) Oral two times a day  potassium chloride    Tablet ER 40 milliEquivalent(s) Oral every 4 hours  senna 2 Tablet(s) Oral at bedtime    MEDICATIONS  (PRN):  bisacodyl Suppository 10 milliGRAM(s) Rectal daily PRN Constipation  diphenhydrAMINE 25 milliGRAM(s) Oral every 4 hours PRN Rash and/or Itching  HYDROmorphone  Injectable 0.5 milliGRAM(s) IV Push every 4 hours PRN severe breakthrough pain if not yet due for PO  ondansetron Injectable 4 milliGRAM(s) IV Push every 6 hours PRN Nausea  oxyCODONE    IR 5 milliGRAM(s) Oral every 4 hours PRN Moderate Pain (4 - 6)  oxyCODONE    IR 10 milliGRAM(s) Oral every 4 hours PRN Severe Pain (7 - 10)  sodium chloride 0.9% lock flush 10 milliLiter(s) IV Push every 1 hour PRN Pre/post blood products, medications, blood draw, and to maintain line patency      Allergies    No Known Allergies    Intolerances        SOCIAL HISTORY:  s from [facility], moved to [place] 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  Single, lives alone.  No animal contact.  Occ tobacco, occ alcohol use, no recreational drug use.    FAMILY HISTORY:  No pertinent family history in first degree relatives        Vital Signs Last 24 Hrs  T(C): 36.3 (09 Nov 2021 17:01), Max: 37.1 (09 Nov 2021 00:21)  T(F): 97.4 (09 Nov 2021 17:01), Max: 98.7 (09 Nov 2021 00:21)  HR: 84 (09 Nov 2021 17:01) (68 - 84)  BP: 106/69 (09 Nov 2021 17:01) (95/59 - 109/74)  BP(mean): 71 (09 Nov 2021 05:00) (71 - 79)  RR: 18 (09 Nov 2021 17:01) (17 - 18)  SpO2: 98% (09 Nov 2021 17:01) (97% - 99%)    PE:  WDWN in no distress  HEENT:  NC, PERRL, sclerae anicteric, conjunctivae clear, EOMI.  Sinuses nontender, no nasal exudate.  No buccal or pharyngeal lesions, erythema or exudate  Neck:  Supple, no adenopathy  Lungs:  Clear to auscultation  Cor:  RRR, S1, S2, no murmur appreciated  Abd:  Symmetric, normoactive BS.  Soft, nontender, no masses, guarding or rebound.  Liver and spleen not enlarged  Extrem:  No cyanosis or edema.  L thigh donor sites without evidence for infection, L ankle in external fixation device.  LUE PICC exit site without erythema/edema  Skin:  No rashes.    LABS:                        8.7    6.24  )-----------( 339      ( 09 Nov 2021 06:33 )             27.7     11-09    143  |  108  |  9   ----------------------------<  90  3.2<L>   |  22  |  0.83    Ca    8.8      09 Nov 2021 06:33    TPro  6.5  /  Alb  3.6  /  TBili  0.5  /  DBili  x   /  AST  21  /  ALT  45  /  AlkPhos  64  11-09    MICROBIOLOGY:  Blood cultures:  11/8 X 2 - NGTD    RADIOLOGY & ADDITIONAL STUDIES:

## 2021-11-09 NOTE — PROGRESS NOTE ADULT - ASSESSMENT
26F h/o L ankle fracture with septic arthritis, postoperative compartment syndrome s/p Left ankle recon with gracilis free flap and STSG  microvascular anastomosis with recipient AT on 9/20. RTOR on 10/18 for debridement of gracilis flap, application of external fixator c/b partial necrosis of skin and subcutaneous fat of gracilis myocutaneous free flap now s/p repeat flap debridement and STSG (10/25). Readmitted on 11/8 for workup of fever of unknown origin.     Regular diet  C/w daptomycin/caspofungin  S/p Zosyn  F/u UA  CXR negative for acute process  ID following-appreciate recs  Possible gallium scan   F/u AM labs  Daily dressings changes to LLE with xeroform gauze, Abd and Ace bandage.  Aquaphor to left anterior thigh  Analgesics PRN  Antiemetics PRN  OOBA/ work w/ PT

## 2021-11-09 NOTE — PHYSICAL THERAPY INITIAL EVALUATION ADULT - PERTINENT HX OF CURRENT PROBLEM, REHAB EVAL
26yFemale with complex L ankle fracture s/p multiple I&D, revision surgeries, ringed external fixator placement, and flap coverage by PRS, discharged 11/4, sent into ED by Dr. Carmona for fever workup

## 2021-11-09 NOTE — CONSULT NOTE ADULT - ASSESSMENT
27 yo F with complex L ankle fracture with chronic OM s/p multiple debridements, s/p gracilis FF and STSG L thigh with ongoing OM, need for revision and external fixation.  She is s/p >6 week course of nafcillin and ceftriaxone (MSSA and Strep anginosus), and was recently d/tyson on daptomycin and caspofungin (E. faecalis, Staph epi, C. albicans and C. parapsilosis) with subjective fevers, onset <48 after d/c, new leukocytosis in outpatient labs from 11/6.  She had low grade temp increase in the ED with WBC 12.5K.  Source unclear.  I asked that pip-tazo be started last night after blood cultures were obtained in case of CLABSI.  Her WBC was ~6K this morning and she has been afebrile, feels better.  She has no localizing findings for infection outside of her ankle.    Suggest:  - F/U blood cultures from 11/8  - Daily CBC, CMP for now  - Gallium scan (whole body)  - Please restart pip-tazo 3.375 g IV q6h  - Continue daptomycin 500 mg IV q24h  - Continue caspofungin 50 mg IV q24h  Recommendations discussed with primary team and Dr. Wallis.  Will follow with you - team 2.

## 2021-11-09 NOTE — PROGRESS NOTE ADULT - SUBJECTIVE AND OBJECTIVE BOX
Progress Note: Plastic Surgery    SUBJECTIVE: Patient seen and examined bedside. Resting comfortably in bed. Reporting subjective fevers over the past four days. Pain well controlled with current pain regimen. Denies cough or dysuria.    caspofungin IVPB 50 milliGRAM(s) IV Intermittent every 24 hours  DAPTOmycin IVPB 500 milliGRAM(s) IV Intermittent every 24 hours  enoxaparin Injectable 40 milliGRAM(s) SubCutaneous daily      Vital Signs Last 24 Hrs  T(C): 36.8 (09 Nov 2021 05:00), Max: 37.9 (08 Nov 2021 19:07)  T(F): 98.2 (09 Nov 2021 05:00), Max: 100.2 (08 Nov 2021 19:07)  HR: 80 (09 Nov 2021 05:00) (74 - 125)  BP: 95/59 (09 Nov 2021 05:00) (95/59 - 122/81)  BP(mean): 71 (09 Nov 2021 05:00) (71 - 79)  RR: 17 (09 Nov 2021 05:00) (16 - 18)  SpO2: 97% (09 Nov 2021 05:00) (97% - 100%)  I&O's Detail    08 Nov 2021 07:01  -  09 Nov 2021 07:00  --------------------------------------------------------  IN:  Total IN: 0 mL    OUT:    Voided (mL): 450 mL  Total OUT: 450 mL    Total NET: -450 mL        PHYSICAL EXAM:   General: NAD, resting comfortably in bed  C/V: NSR  Pulm: Nonlabored breathing, no respiratory distress  Extrem: LLE-ex fix in place, stsg with good take, +granulation tissue, dressing changed, thigh dressing taken down and aquaphor applied to donor         LABS:                        8.7    6.24  )-----------( 339      ( 09 Nov 2021 06:33 )             27.7     11-09    143  |  108  |  9   ----------------------------<  90  3.2<L>   |  22  |  0.83    Ca    8.8      09 Nov 2021 06:33    TPro  6.5  /  Alb  3.6  /  TBili  0.5  /  DBili  x   /  AST  21  /  ALT  45  /  AlkPhos  64  11-09          RADIOLOGY & ADDITIONAL STUDIES:

## 2021-11-10 LAB
ALBUMIN SERPL ELPH-MCNC: 3.9 G/DL — SIGNIFICANT CHANGE UP (ref 3.3–5)
ALP SERPL-CCNC: 65 U/L — SIGNIFICANT CHANGE UP (ref 40–120)
ALT FLD-CCNC: 40 U/L — SIGNIFICANT CHANGE UP (ref 10–45)
ANION GAP SERPL CALC-SCNC: 9 MMOL/L — SIGNIFICANT CHANGE UP (ref 5–17)
APPEARANCE UR: CLEAR — SIGNIFICANT CHANGE UP
AST SERPL-CCNC: 18 U/L — SIGNIFICANT CHANGE UP (ref 10–40)
BILIRUB SERPL-MCNC: 0.5 MG/DL — SIGNIFICANT CHANGE UP (ref 0.2–1.2)
BILIRUB UR-MCNC: NEGATIVE — SIGNIFICANT CHANGE UP
BUN SERPL-MCNC: 5 MG/DL — LOW (ref 7–23)
CALCIUM SERPL-MCNC: 9.5 MG/DL — SIGNIFICANT CHANGE UP (ref 8.4–10.5)
CHLORIDE SERPL-SCNC: 104 MMOL/L — SIGNIFICANT CHANGE UP (ref 96–108)
CK SERPL-CCNC: 36 U/L — SIGNIFICANT CHANGE UP (ref 25–170)
CO2 SERPL-SCNC: 26 MMOL/L — SIGNIFICANT CHANGE UP (ref 22–31)
COLOR SPEC: YELLOW — SIGNIFICANT CHANGE UP
CREAT SERPL-MCNC: 0.68 MG/DL — SIGNIFICANT CHANGE UP (ref 0.5–1.3)
DIFF PNL FLD: NEGATIVE — SIGNIFICANT CHANGE UP
GLUCOSE SERPL-MCNC: 117 MG/DL — HIGH (ref 70–99)
GLUCOSE UR QL: NEGATIVE — SIGNIFICANT CHANGE UP
HCT VFR BLD CALC: 28.4 % — LOW (ref 34.5–45)
HGB BLD-MCNC: 9.2 G/DL — LOW (ref 11.5–15.5)
KETONES UR-MCNC: NEGATIVE — SIGNIFICANT CHANGE UP
LEUKOCYTE ESTERASE UR-ACNC: NEGATIVE — SIGNIFICANT CHANGE UP
MAGNESIUM SERPL-MCNC: 1.8 MG/DL — SIGNIFICANT CHANGE UP (ref 1.6–2.6)
MCHC RBC-ENTMCNC: 28.9 PG — SIGNIFICANT CHANGE UP (ref 27–34)
MCHC RBC-ENTMCNC: 32.4 GM/DL — SIGNIFICANT CHANGE UP (ref 32–36)
MCV RBC AUTO: 89.3 FL — SIGNIFICANT CHANGE UP (ref 80–100)
NITRITE UR-MCNC: NEGATIVE — SIGNIFICANT CHANGE UP
NRBC # BLD: 0 /100 WBCS — SIGNIFICANT CHANGE UP (ref 0–0)
PH UR: 6 — SIGNIFICANT CHANGE UP (ref 5–8)
PHOSPHATE SERPL-MCNC: 4.7 MG/DL — HIGH (ref 2.5–4.5)
PLATELET # BLD AUTO: 344 K/UL — SIGNIFICANT CHANGE UP (ref 150–400)
POTASSIUM SERPL-MCNC: 3.7 MMOL/L — SIGNIFICANT CHANGE UP (ref 3.5–5.3)
POTASSIUM SERPL-SCNC: 3.7 MMOL/L — SIGNIFICANT CHANGE UP (ref 3.5–5.3)
PROT SERPL-MCNC: 6.7 G/DL — SIGNIFICANT CHANGE UP (ref 6–8.3)
PROT UR-MCNC: NEGATIVE MG/DL — SIGNIFICANT CHANGE UP
RBC # BLD: 3.18 M/UL — LOW (ref 3.8–5.2)
RBC # FLD: 19.1 % — HIGH (ref 10.3–14.5)
SODIUM SERPL-SCNC: 139 MMOL/L — SIGNIFICANT CHANGE UP (ref 135–145)
SP GR SPEC: 1.02 — SIGNIFICANT CHANGE UP (ref 1–1.03)
UROBILINOGEN FLD QL: 0.2 E.U./DL — SIGNIFICANT CHANGE UP
WBC # BLD: 4.78 K/UL — SIGNIFICANT CHANGE UP (ref 3.8–10.5)
WBC # FLD AUTO: 4.78 K/UL — SIGNIFICANT CHANGE UP (ref 3.8–10.5)

## 2021-11-10 PROCEDURE — 84100 ASSAY OF PHOSPHORUS: CPT

## 2021-11-10 PROCEDURE — 93005 ELECTROCARDIOGRAM TRACING: CPT

## 2021-11-10 PROCEDURE — 36000 PLACE NEEDLE IN VEIN: CPT

## 2021-11-10 PROCEDURE — 86850 RBC ANTIBODY SCREEN: CPT

## 2021-11-10 PROCEDURE — C9399: CPT

## 2021-11-10 PROCEDURE — 86901 BLOOD TYPING SEROLOGIC RH(D): CPT

## 2021-11-10 PROCEDURE — 73590 X-RAY EXAM OF LOWER LEG: CPT

## 2021-11-10 PROCEDURE — 73610 X-RAY EXAM OF ANKLE: CPT

## 2021-11-10 PROCEDURE — 85025 COMPLETE CBC W/AUTO DIFF WBC: CPT

## 2021-11-10 PROCEDURE — 80048 BASIC METABOLIC PNL TOTAL CA: CPT

## 2021-11-10 PROCEDURE — 87206 SMEAR FLUORESCENT/ACID STAI: CPT

## 2021-11-10 PROCEDURE — 83550 IRON BINDING TEST: CPT

## 2021-11-10 PROCEDURE — 81003 URINALYSIS AUTO W/O SCOPE: CPT

## 2021-11-10 PROCEDURE — 87086 URINE CULTURE/COLONY COUNT: CPT

## 2021-11-10 PROCEDURE — 76000 FLUOROSCOPY <1 HR PHYS/QHP: CPT

## 2021-11-10 PROCEDURE — U0005: CPT

## 2021-11-10 PROCEDURE — 73600 X-RAY EXAM OF ANKLE: CPT

## 2021-11-10 PROCEDURE — 99232 SBSQ HOSP IP/OBS MODERATE 35: CPT

## 2021-11-10 PROCEDURE — 36415 COLL VENOUS BLD VENIPUNCTURE: CPT

## 2021-11-10 PROCEDURE — 73620 X-RAY EXAM OF FOOT: CPT

## 2021-11-10 PROCEDURE — 36430 TRANSFUSION BLD/BLD COMPNT: CPT

## 2021-11-10 PROCEDURE — 86900 BLOOD TYPING SEROLOGIC ABO: CPT

## 2021-11-10 PROCEDURE — 97116 GAIT TRAINING THERAPY: CPT

## 2021-11-10 PROCEDURE — 87181 SC STD AGAR DILUTION PER AGT: CPT

## 2021-11-10 PROCEDURE — 87077 CULTURE AEROBIC IDENTIFY: CPT

## 2021-11-10 PROCEDURE — 85027 COMPLETE CBC AUTOMATED: CPT

## 2021-11-10 PROCEDURE — 83735 ASSAY OF MAGNESIUM: CPT

## 2021-11-10 PROCEDURE — C1713: CPT

## 2021-11-10 PROCEDURE — 80202 ASSAY OF VANCOMYCIN: CPT

## 2021-11-10 PROCEDURE — A9585: CPT

## 2021-11-10 PROCEDURE — C1769: CPT

## 2021-11-10 PROCEDURE — 87184 SC STD DISK METHOD PER PLATE: CPT

## 2021-11-10 PROCEDURE — 85730 THROMBOPLASTIN TIME PARTIAL: CPT

## 2021-11-10 PROCEDURE — 82962 GLUCOSE BLOOD TEST: CPT

## 2021-11-10 PROCEDURE — 81025 URINE PREGNANCY TEST: CPT

## 2021-11-10 PROCEDURE — C1889: CPT

## 2021-11-10 PROCEDURE — 97110 THERAPEUTIC EXERCISES: CPT

## 2021-11-10 PROCEDURE — 86769 SARS-COV-2 COVID-19 ANTIBODY: CPT

## 2021-11-10 PROCEDURE — 86923 COMPATIBILITY TEST ELECTRIC: CPT

## 2021-11-10 PROCEDURE — 87186 SC STD MICRODIL/AGAR DIL: CPT

## 2021-11-10 PROCEDURE — 36573 INSJ PICC RS&I 5 YR+: CPT

## 2021-11-10 PROCEDURE — 97161 PT EVAL LOW COMPLEX 20 MIN: CPT

## 2021-11-10 PROCEDURE — A9503: CPT

## 2021-11-10 PROCEDURE — U0003: CPT

## 2021-11-10 PROCEDURE — 87075 CULTR BACTERIA EXCEPT BLOOD: CPT

## 2021-11-10 PROCEDURE — 88304 TISSUE EXAM BY PATHOLOGIST: CPT

## 2021-11-10 PROCEDURE — 86140 C-REACTIVE PROTEIN: CPT

## 2021-11-10 PROCEDURE — 78830 RP LOCLZJ TUM SPECT W/CT 1: CPT

## 2021-11-10 PROCEDURE — 87116 MYCOBACTERIA CULTURE: CPT

## 2021-11-10 PROCEDURE — P9016: CPT

## 2021-11-10 PROCEDURE — 87635 SARS-COV-2 COVID-19 AMP PRB: CPT

## 2021-11-10 PROCEDURE — 99285 EMERGENCY DEPT VISIT HI MDM: CPT | Mod: 25

## 2021-11-10 PROCEDURE — 83540 ASSAY OF IRON: CPT

## 2021-11-10 PROCEDURE — 97530 THERAPEUTIC ACTIVITIES: CPT

## 2021-11-10 PROCEDURE — 97164 PT RE-EVAL EST PLAN CARE: CPT

## 2021-11-10 PROCEDURE — 82728 ASSAY OF FERRITIN: CPT

## 2021-11-10 PROCEDURE — 80053 COMPREHEN METABOLIC PANEL: CPT

## 2021-11-10 PROCEDURE — 87070 CULTURE OTHR SPECIMN AEROBIC: CPT

## 2021-11-10 PROCEDURE — 85652 RBC SED RATE AUTOMATED: CPT

## 2021-11-10 PROCEDURE — 73630 X-RAY EXAM OF FOOT: CPT

## 2021-11-10 PROCEDURE — 84702 CHORIONIC GONADOTROPIN TEST: CPT

## 2021-11-10 PROCEDURE — 85610 PROTHROMBIN TIME: CPT

## 2021-11-10 PROCEDURE — 88300 SURGICAL PATH GROSS: CPT

## 2021-11-10 PROCEDURE — 82550 ASSAY OF CK (CPK): CPT

## 2021-11-10 PROCEDURE — 88305 TISSUE EXAM BY PATHOLOGIST: CPT

## 2021-11-10 PROCEDURE — 73706 CT ANGIO LWR EXTR W/O&W/DYE: CPT

## 2021-11-10 PROCEDURE — A9556: CPT

## 2021-11-10 PROCEDURE — 88311 DECALCIFY TISSUE: CPT

## 2021-11-10 PROCEDURE — 87102 FUNGUS ISOLATION CULTURE: CPT

## 2021-11-10 PROCEDURE — 87040 BLOOD CULTURE FOR BACTERIA: CPT

## 2021-11-10 PROCEDURE — 73723 MRI JOINT LWR EXTR W/O&W/DYE: CPT

## 2021-11-10 RX ADMIN — Medication 1 TABLET(S): at 11:13

## 2021-11-10 RX ADMIN — GABAPENTIN 600 MILLIGRAM(S): 400 CAPSULE ORAL at 17:31

## 2021-11-10 RX ADMIN — Medication 3 MILLIGRAM(S): at 21:14

## 2021-11-10 RX ADMIN — SENNA PLUS 2 TABLET(S): 8.6 TABLET ORAL at 21:14

## 2021-11-10 RX ADMIN — HYDROMORPHONE HYDROCHLORIDE 0.5 MILLIGRAM(S): 2 INJECTION INTRAMUSCULAR; INTRAVENOUS; SUBCUTANEOUS at 23:14

## 2021-11-10 RX ADMIN — Medication 1 APPLICATION(S): at 11:13

## 2021-11-10 RX ADMIN — HYDROMORPHONE HYDROCHLORIDE 0.5 MILLIGRAM(S): 2 INJECTION INTRAMUSCULAR; INTRAVENOUS; SUBCUTANEOUS at 06:53

## 2021-11-10 RX ADMIN — CASPOFUNGIN ACETATE 260 MILLIGRAM(S): 7 INJECTION, POWDER, LYOPHILIZED, FOR SOLUTION INTRAVENOUS at 09:19

## 2021-11-10 RX ADMIN — Medication 650 MILLIGRAM(S): at 13:45

## 2021-11-10 RX ADMIN — HYDROMORPHONE HYDROCHLORIDE 0.5 MILLIGRAM(S): 2 INJECTION INTRAMUSCULAR; INTRAVENOUS; SUBCUTANEOUS at 23:30

## 2021-11-10 RX ADMIN — OXYCODONE HYDROCHLORIDE 10 MILLIGRAM(S): 5 TABLET ORAL at 21:14

## 2021-11-10 RX ADMIN — GABAPENTIN 600 MILLIGRAM(S): 400 CAPSULE ORAL at 05:10

## 2021-11-10 RX ADMIN — Medication 650 MILLIGRAM(S): at 05:45

## 2021-11-10 RX ADMIN — OXYCODONE HYDROCHLORIDE 10 MILLIGRAM(S): 5 TABLET ORAL at 21:29

## 2021-11-10 RX ADMIN — GABAPENTIN 600 MILLIGRAM(S): 400 CAPSULE ORAL at 11:13

## 2021-11-10 RX ADMIN — ENOXAPARIN SODIUM 40 MILLIGRAM(S): 100 INJECTION SUBCUTANEOUS at 08:49

## 2021-11-10 RX ADMIN — DAPTOMYCIN 120 MILLIGRAM(S): 500 INJECTION, POWDER, LYOPHILIZED, FOR SOLUTION INTRAVENOUS at 17:31

## 2021-11-10 RX ADMIN — HYDROMORPHONE HYDROCHLORIDE 0.5 MILLIGRAM(S): 2 INJECTION INTRAMUSCULAR; INTRAVENOUS; SUBCUTANEOUS at 19:02

## 2021-11-10 RX ADMIN — PIPERACILLIN AND TAZOBACTAM 200 GRAM(S): 4; .5 INJECTION, POWDER, LYOPHILIZED, FOR SOLUTION INTRAVENOUS at 05:10

## 2021-11-10 RX ADMIN — GABAPENTIN 600 MILLIGRAM(S): 400 CAPSULE ORAL at 23:03

## 2021-11-10 RX ADMIN — Medication 650 MILLIGRAM(S): at 05:09

## 2021-11-10 RX ADMIN — HYDROMORPHONE HYDROCHLORIDE 0.5 MILLIGRAM(S): 2 INJECTION INTRAMUSCULAR; INTRAVENOUS; SUBCUTANEOUS at 13:47

## 2021-11-10 RX ADMIN — PIPERACILLIN AND TAZOBACTAM 200 GRAM(S): 4; .5 INJECTION, POWDER, LYOPHILIZED, FOR SOLUTION INTRAVENOUS at 19:02

## 2021-11-10 RX ADMIN — OXYCODONE HYDROCHLORIDE 10 MILLIGRAM(S): 5 TABLET ORAL at 15:40

## 2021-11-10 RX ADMIN — Medication 650 MILLIGRAM(S): at 13:13

## 2021-11-10 RX ADMIN — OXYCODONE HYDROCHLORIDE 10 MILLIGRAM(S): 5 TABLET ORAL at 02:06

## 2021-11-10 RX ADMIN — OXYCODONE HYDROCHLORIDE 10 MILLIGRAM(S): 5 TABLET ORAL at 11:49

## 2021-11-10 RX ADMIN — PIPERACILLIN AND TAZOBACTAM 200 GRAM(S): 4; .5 INJECTION, POWDER, LYOPHILIZED, FOR SOLUTION INTRAVENOUS at 12:02

## 2021-11-10 RX ADMIN — OXYCODONE HYDROCHLORIDE 10 MILLIGRAM(S): 5 TABLET ORAL at 11:14

## 2021-11-10 RX ADMIN — Medication 650 MILLIGRAM(S): at 19:02

## 2021-11-10 RX ADMIN — OXYCODONE HYDROCHLORIDE 10 MILLIGRAM(S): 5 TABLET ORAL at 01:17

## 2021-11-10 RX ADMIN — Medication 650 MILLIGRAM(S): at 19:26

## 2021-11-10 NOTE — PROGRESS NOTE ADULT - SUBJECTIVE AND OBJECTIVE BOX
INTERVAL HPI/OVERNIGHT EVENTS:  Tm 99.3  When seen this morning, she had had no further subjective fevers or chills.  L knee pain 8/10, L foot pain 9/10  following removal of staples    CONSTITUTIONAL:  No fever, chills, night sweats  EYES:  No photophobia or visual changes  CARDIOVASCULAR:  No chest pain  RESPIRATORY:  No cough, wheezing, or SOB   GASTROINTESTINAL:  No nausea, vomiting, diarrhea, constipation, or abdominal pain  GENITOURINARY:  No frequency, urgency, dysuria or hematuria  NEUROLOGIC:  No headache, lightheadedness      ANTIBIOTICS/RELEVANT:    Daptomycin 500 mg IV q24h (10/21-present)  Pip-tazo 3.375 g IV q6h (-present)  Caspofungin 50 mg IV q24h (-present)    Vancomycin -  Cefepime -  Nafcillin -10/21  Ceftriaxone -10/21  Fluconazole 10/21-      Vital Signs Last 24 Hrs  T(C): 37.4 (10 Nov 2021 13:47), Max: 37.4 (10 Nov 2021 13:47)  T(F): 99.3 (10 Nov 2021 13:47), Max: 99.3 (10 Nov 2021 13:47)  HR: 88 (10 Nov 2021 13:47) (77 - 88)  BP: 100/69 (10 Nov 2021 13:47) (96/60 - 106/69)  BP(mean): 77 (10 Nov 2021 05:00) (77 - 77)  RR: 16 (10 Nov 2021 13:47) (16 - 18)  SpO2: 96% (10 Nov 2021 13:47) (96% - 99%)    PHYSICAL EXAM:  Constitutional:  Well-developed, well nourished  Eyes:  Sclerae anicteric, conjunctivae clear, PERRL  Ear/Nose/Throat:  No nasal exudate or sinus tenderness;  No buccal mucosal lesions, no pharyngeal erythema or exudate	  Neck:  Supple, no adenopathy  Respiratory:  Clear bilaterally  Cardiovascular:  RRR, S1S2, no murmur appreciated  Gastrointestinal:  Symmetric, normoactive BS, soft, NT, no masses, guarding or rebound.  No HSM  Extremities:  No edema.  L thigh donor site healing, L knee tender without effusion, erythema or warmth.  L foot in external fixation device.  LUE PICC exit site clean        LABS:                        9.2    4.78  )-----------( 344      ( 10 Nov 2021 09:29 )             28.4         11-10    139  |  104  |  5<L>  ----------------------------<  117<H>  3.7   |  26  |  0.68    Ca    9.5      10 Nov 2021 09:29  Phos  4.7     11-10  Mg     1.8     -10    TPro  6.7  /  Alb  3.9  /  TBili  0.5  /  DBili  x   /  AST  18  /  ALT  40  /  AlkPhos  65  11-10      Urinalysis Basic - ( 10 Nov 2021 11:55 )    Color: Yellow / Appearance: Clear / S.020 / pH: x  Gluc: x / Ketone: NEGATIVE  / Bili: Negative / Urobili: 0.2 E.U./dL   Blood: x / Protein: NEGATIVE mg/dL / Nitrite: NEGATIVE   Leuk Esterase: NEGATIVE / RBC: x / WBC x   Sq Epi: x / Non Sq Epi: x / Bacteria: x        MICROBIOLOGY:    Blood cultures:  11/8 X 2 - NGTD    RADIOLOGY & ADDITIONAL STUDIES:

## 2021-11-10 NOTE — PROGRESS NOTE ADULT - SUBJECTIVE AND OBJECTIVE BOX
Progress Note: Plastic Surgery    SUBJECTIVE: Patient seen and examined bedside. Denies any fevers or chills overnight. Denies nausea/ vomiting, CP, SOB, dysuria, or abdominal pain.     caspofungin IVPB 50 milliGRAM(s) IV Intermittent every 24 hours  DAPTOmycin IVPB 500 milliGRAM(s) IV Intermittent every 24 hours  enoxaparin Injectable 40 milliGRAM(s) SubCutaneous every 24 hours  piperacillin/tazobactam IVPB.. 3.375 Gram(s) IV Intermittent every 6 hours      Vital Signs Last 24 Hrs  T(C): 36.8 (10 Nov 2021 05:00), Max: 37.2 (09 Nov 2021 20:37)  T(F): 98.2 (10 Nov 2021 05:00), Max: 98.9 (09 Nov 2021 20:37)  HR: 77 (10 Nov 2021 05:00) (68 - 87)  BP: 101/65 (10 Nov 2021 05:00) (100/64 - 109/74)  BP(mean): 77 (10 Nov 2021 05:00) (77 - 77)  RR: 17 (10 Nov 2021 05:00) (17 - 18)  SpO2: 96% (10 Nov 2021 05:00) (96% - 99%)  I&O's Detail    09 Nov 2021 07:01  -  10 Nov 2021 07:00  --------------------------------------------------------  IN:    IV PiggyBack: 400 mL  Total IN: 400 mL    OUT:    Oral Fluid: 0 mL    Voided (mL): 2200 mL  Total OUT: 2200 mL    Total NET: -1800 mL        PHYSICAL EXAM:   General: NAD, resting comfortably in bed  C/V: NSR  Pulm: Nonlabored breathing, no respiratory distress  Extrem: LLE-ex fix in place, stsg with good take, +granulation tissue, dressing changed, thigh dressing taken down and aquaphor applied to donor site      LABS:                        8.7    6.24  )-----------( 339      ( 09 Nov 2021 06:33 )             27.7     11-09    143  |  108  |  9   ----------------------------<  90  3.2<L>   |  22  |  0.83    Ca    8.8      09 Nov 2021 06:33    TPro  6.5  /  Alb  3.6  /  TBili  0.5  /  DBili  x   /  AST  21  /  ALT  45  /  AlkPhos  64  11-09          RADIOLOGY & ADDITIONAL STUDIES:

## 2021-11-10 NOTE — PROGRESS NOTE ADULT - ASSESSMENT
25 yo F with complex L ankle fracture with chronic OM s/p multiple debridements, s/p gracilis FF and STSG L thigh with ongoing OM, need for revision and external fixation.  She is s/p >6 week course of nafcillin and ceftriaxone (MSSA and Strep anginosus), and was recently d/tyson on daptomycin and caspofungin (E. faecalis, Staph epi, C. albicans and C. parapsilosis) with subjective fevers, onset <48 h after d/c, new leukocytosis in outpatient labs from 11/6.  She had low grade temp increase in the ED with WBC 12.5K.  Source unclear.  Leukocytosis resolved on pip-tazo.  She has no localizing findings for infection outside of her ankle, PICC is a possibility but blood cultures show NGTD.  Suggest:  - F/U blood cultures from 11/8  - Daily CBC, CMP for now  - Gallium scan (whole body)  - Continue pip-tazo 3.375 g IV q6h for now  - Continue daptomycin 500 mg IV q24h  - Continue caspofungin 50 mg IV q24h  Recommendations discussed with primary team.  Will follow with you - team 2.  Dr. Crocker will resume care tomorrow.   Left detailed message on identified voicemail per HIPAA advising that there are currently lab orders in the system for her. Reviewed fasting requirements and lab hours. Advised to call if any questions or concerns.

## 2021-11-10 NOTE — PROGRESS NOTE ADULT - SUBJECTIVE AND OBJECTIVE BOX
Orthopaedic Surgery Progress Note    Subjective:     Patient seen and examined. Patient comfortable. Denies chest pain, shortness of breath, nausea/vomiting.    Objective:    Vital Signs Last 24 Hrs  T(C): 36.5 (11-10-21 @ 08:36), Max: 36.8 (11-10-21 @ 05:00)  T(F): 97.7 (11-10-21 @ 08:36), Max: 98.2 (11-10-21 @ 05:00)  HR: 80 (11-10-21 @ 08:36) (77 - 80)  BP: 96/60 (11-10-21 @ 08:36) (96/60 - 101/65)  BP(mean): 77 (11-10-21 @ 05:00) (77 - 77)  RR: 17 (11-10-21 @ 08:36) (17 - 17)  SpO2: 97% (11-10-21 @ 08:36) (96% - 97%)  AVSS    PE:  LLE: Ringed external fixator in place w ace wrap; Pin site bolster dressings well appearing; Flap without erythema or drainage, staples removed this AM   Vascular: Toes WWP; Cap refill < 2 sec  Sensation: Patient with abnormal/absent sensation over most of the dorsum & plantar aspects of the foot consistent with baseline  Motor: Minimal FHL/FDL, 0/5 EHL/EDL activity consistent with baseline                        9.2    4.78  )-----------( 344      ( 10 Nov 2021 09:29 )             28.4     11-10    139  |  104  |  5<L>  ----------------------------<  117<H>  3.7   |  26  |  0.68    Ca    9.5      10 Nov 2021 09:29  Phos  4.7     11-10  Mg     1.8     11-10    TPro  6.7  /  Alb  3.9  /  TBili  0.5  /  DBili  x   /  AST  18  /  ALT  40  /  AlkPhos  65  11-10    A/P: 26yFemale s/p L ankle ringed ex-fix with flap by plastic surgery re-admitted due to elevated WBC and subjective fevers   1. Pain control as needed  2. DVT prophylaxis: Lovenox   3. PT, weight-bearing status: NWB LLE with rolling walker   4. Continue antibiotics. WBC normalized. Appreciate ID recs.   5. Gallium scan full body. Gallium injection today and test tomorrow.     Ortho Pager [phone]

## 2021-11-10 NOTE — PROGRESS NOTE ADULT - ASSESSMENT
26F h/o L ankle fracture with septic arthritis, postoperative compartment syndrome s/p Left ankle recon with gracilis free flap and STSG  microvascular anastomosis with recipient AT on 9/20. RTOR on 10/18 for debridement of gracilis flap, application of external fixator c/b partial necrosis of skin and subcutaneous fat of gracilis myocutaneous free flap now s/p repeat flap debridement and STSG (10/25). Readmitted on 11/8 for workup of fever of unknown origin.     Regular diet  C/w daptomycin/caspofungin/ zosyn  F/u UA  Prelim BCx- NGTD  ID following-appreciate recs  F/u Gallium scan   F/u AM labs  Daily dressings changes to LLE with xeroform and ACE bandage  Aquaphor to left anterior thigh  Analgesics PRN  Antiemetics PRN  OOBA/ work w/ PT  Further management per Ortho Sx.

## 2021-11-10 NOTE — PROGRESS NOTE ADULT - ASSESSMENT
Patient is accepted from the plastic surgery service on to the orthopedic foot and ankle service, by myself, Dr. Wallis.  I have discussed the patient's care extensively with the infectious disease team and Dr. Carmona and Dr. Dunbar from plastic surgery.  At this time the plastic surgery team has no plans for flap revision or debridement.  Overall the patient's left lower extremity clinically is improving.  She does have mildly increased pain about her pin sites which may be associated with increased ambulatory demand.  There is no evidence of pin site infection or erythema or drainage.  Based on the patient's elevated white count, night sweats, subjective fevers as an outpatient, she was admitted for fever work-up.  She is also complaining of left knee pain but has no effusion.  Based on the patient's clinical presentation, we will plan to get a gallium scan and follow-up her blood cultures.

## 2021-11-10 NOTE — PROGRESS NOTE ADULT - SUBJECTIVE AND OBJECTIVE BOX
SUBJECTIVE: Pt seen and examined on morning rounds. Pt feeling well without major complaints. Leg pain controlled, unchanged from baseline.     Vital Signs Last 24 Hrs  T(C): 36.8 (10 Nov 2021 05:00), Max: 37.2 (09 Nov 2021 20:37)  T(F): 98.2 (10 Nov 2021 05:00), Max: 98.9 (09 Nov 2021 20:37)  HR: 77 (10 Nov 2021 05:00) (68 - 87)  BP: 101/65 (10 Nov 2021 05:00) (100/64 - 109/74)  BP(mean): 77 (10 Nov 2021 05:00) (77 - 77)  RR: 17 (10 Nov 2021 05:00) (17 - 18)  SpO2: 96% (10 Nov 2021 05:00) (96% - 99%)    Physical Exam:  VSS  General: A&Ox3, NAD  LLE: Ringed external fixator in place w ace wrap; Pin site bolster dressings well appearing; Flap with dressing in place  Vascular: Toes WWP; Cap refill < 2 sec  Sensation: Patient with abnormal/absent sensation over most of the dorsum & plantar aspects of the foot consistent with baseline  Motor: Minimal FHL/FDL, 0/5 EHL/EDL activity consistent with baseline    A/P: 26yFemale with complex L ankle fracture s/p multiple I&D, revision surgeries, ringed external fixator placement, and flap coverage by PRS, discharged 11/4, sent into ED by Dr. Carmona for fever workup  - Pt found to have WBC of 11.5 on outpatient labs (increased from 6.1 as inpatient on 11/3)  - Pt with subjective fevers at home  - Presents with 99F orally and 100.2F rectal temp in ED 11/8  - WBC 12.2, ESR 58, CRP 6 on admission 11/8  - Trend WBC, decreased to 6 on 11/9  - Dressings removed, no evidence of flap necrosis, infection, or pin site infection  - F/u blood cultures, NGTD  - Continue Zosyn, Dapto, and Caspo as per ID  - F/u Gallium Scan  - NWB in Ex Fix w/RW for soft tissue rest at this time  - No acute orthopedic intervention at this time  - Discussed with Dr. Kadi Foster, PGY-2  Orthopedic Surgery     SUBJECTIVE: Pt seen and examined on morning rounds. Pt feeling well without major complaints. Leg pain controlled, unchanged from baseline.     Vital Signs Last 24 Hrs  T(C): 36.8 (10 Nov 2021 05:00), Max: 37.2 (09 Nov 2021 20:37)  T(F): 98.2 (10 Nov 2021 05:00), Max: 98.9 (09 Nov 2021 20:37)  HR: 77 (10 Nov 2021 05:00) (68 - 87)  BP: 101/65 (10 Nov 2021 05:00) (100/64 - 109/74)  BP(mean): 77 (10 Nov 2021 05:00) (77 - 77)  RR: 17 (10 Nov 2021 05:00) (17 - 18)  SpO2: 96% (10 Nov 2021 05:00) (96% - 99%)    Physical Exam:  VSS  General: A&Ox3, NAD  LLE: Ringed external fixator in place w ace wrap; Pin site bolster dressings well appearing; Flap with dressing in place  Vascular: Toes WWP; Cap refill < 2 sec  Sensation: Patient with abnormal/absent sensation over most of the dorsum & plantar aspects of the foot consistent with baseline  Motor: Minimal FHL/FDL, 0/5 EHL/EDL activity consistent with baseline    A/P: 26yFemale with complex L ankle fracture s/p multiple I&D, revision surgeries, ringed external fixator placement, and flap coverage by PRS, discharged 11/4, sent into ED by Dr. Carmona for fever workup  - Pt found to have WBC of 11.5 on outpatient labs (increased from 6.1 as inpatient on 11/3)  - Pt with subjective fevers at home  - Presents with 99F orally and 100.2F rectal temp in ED 11/8  - WBC 12.2, ESR 58, CRP 6 on admission 11/8  - Trend WBC, decreased to 6 on 11/9  - Dressings removed, no evidence of flap necrosis, infection, or pin site infection  - F/u blood cultures, NGTD  - Continue Zosyn, Dapto, and Caspo as per ID  - F/u Gallium Scan  - NWB in Ex Fix w/RW for soft tissue rest at this time  - No acute orthopedic intervention at this time  - Discussed with Dr. Wallis  - Left knee xrays    Lynn Foster, PGY-2  Orthopedic Surgery

## 2021-11-11 LAB
ALBUMIN SERPL ELPH-MCNC: 3.8 G/DL — SIGNIFICANT CHANGE UP (ref 3.3–5)
ALP SERPL-CCNC: 61 U/L — SIGNIFICANT CHANGE UP (ref 40–120)
ALT FLD-CCNC: 31 U/L — SIGNIFICANT CHANGE UP (ref 10–45)
ANION GAP SERPL CALC-SCNC: 13 MMOL/L — SIGNIFICANT CHANGE UP (ref 5–17)
AST SERPL-CCNC: 15 U/L — SIGNIFICANT CHANGE UP (ref 10–40)
BILIRUB SERPL-MCNC: 0.6 MG/DL — SIGNIFICANT CHANGE UP (ref 0.2–1.2)
BUN SERPL-MCNC: 6 MG/DL — LOW (ref 7–23)
CALCIUM SERPL-MCNC: 9.6 MG/DL — SIGNIFICANT CHANGE UP (ref 8.4–10.5)
CHLORIDE SERPL-SCNC: 105 MMOL/L — SIGNIFICANT CHANGE UP (ref 96–108)
CO2 SERPL-SCNC: 25 MMOL/L — SIGNIFICANT CHANGE UP (ref 22–31)
CREAT SERPL-MCNC: 0.84 MG/DL — SIGNIFICANT CHANGE UP (ref 0.5–1.3)
CRP SERPL-MCNC: 5.3 MG/L — HIGH (ref 0–4)
ERYTHROCYTE [SEDIMENTATION RATE] IN BLOOD: 59 MM/HR — HIGH
GLUCOSE SERPL-MCNC: 90 MG/DL — SIGNIFICANT CHANGE UP (ref 70–99)
HCG UR QL: NEGATIVE — SIGNIFICANT CHANGE UP
HCT VFR BLD CALC: 29 % — LOW (ref 34.5–45)
HGB BLD-MCNC: 9.3 G/DL — LOW (ref 11.5–15.5)
MCHC RBC-ENTMCNC: 28.5 PG — SIGNIFICANT CHANGE UP (ref 27–34)
MCHC RBC-ENTMCNC: 32.1 GM/DL — SIGNIFICANT CHANGE UP (ref 32–36)
MCV RBC AUTO: 89 FL — SIGNIFICANT CHANGE UP (ref 80–100)
NRBC # BLD: 0 /100 WBCS — SIGNIFICANT CHANGE UP (ref 0–0)
PLATELET # BLD AUTO: 366 K/UL — SIGNIFICANT CHANGE UP (ref 150–400)
POTASSIUM SERPL-MCNC: 3.8 MMOL/L — SIGNIFICANT CHANGE UP (ref 3.5–5.3)
POTASSIUM SERPL-SCNC: 3.8 MMOL/L — SIGNIFICANT CHANGE UP (ref 3.5–5.3)
PROT SERPL-MCNC: 7 G/DL — SIGNIFICANT CHANGE UP (ref 6–8.3)
RBC # BLD: 3.26 M/UL — LOW (ref 3.8–5.2)
RBC # FLD: 18.6 % — HIGH (ref 10.3–14.5)
SODIUM SERPL-SCNC: 143 MMOL/L — SIGNIFICANT CHANGE UP (ref 135–145)
WBC # BLD: 4.04 K/UL — SIGNIFICANT CHANGE UP (ref 3.8–10.5)
WBC # FLD AUTO: 4.04 K/UL — SIGNIFICANT CHANGE UP (ref 3.8–10.5)

## 2021-11-11 PROCEDURE — 99232 SBSQ HOSP IP/OBS MODERATE 35: CPT | Mod: GC

## 2021-11-11 RX ADMIN — HYDROMORPHONE HYDROCHLORIDE 0.5 MILLIGRAM(S): 2 INJECTION INTRAMUSCULAR; INTRAVENOUS; SUBCUTANEOUS at 14:50

## 2021-11-11 RX ADMIN — OXYCODONE HYDROCHLORIDE 10 MILLIGRAM(S): 5 TABLET ORAL at 23:41

## 2021-11-11 RX ADMIN — Medication 650 MILLIGRAM(S): at 14:29

## 2021-11-11 RX ADMIN — CASPOFUNGIN ACETATE 260 MILLIGRAM(S): 7 INJECTION, POWDER, LYOPHILIZED, FOR SOLUTION INTRAVENOUS at 11:07

## 2021-11-11 RX ADMIN — HYDROMORPHONE HYDROCHLORIDE 0.5 MILLIGRAM(S): 2 INJECTION INTRAMUSCULAR; INTRAVENOUS; SUBCUTANEOUS at 14:37

## 2021-11-11 RX ADMIN — Medication 650 MILLIGRAM(S): at 13:29

## 2021-11-11 RX ADMIN — Medication 1 TABLET(S): at 11:08

## 2021-11-11 RX ADMIN — Medication 3 MILLIGRAM(S): at 23:10

## 2021-11-11 RX ADMIN — Medication 1 APPLICATION(S): at 12:53

## 2021-11-11 RX ADMIN — HYDROMORPHONE HYDROCHLORIDE 0.5 MILLIGRAM(S): 2 INJECTION INTRAMUSCULAR; INTRAVENOUS; SUBCUTANEOUS at 19:20

## 2021-11-11 RX ADMIN — PIPERACILLIN AND TAZOBACTAM 200 GRAM(S): 4; .5 INJECTION, POWDER, LYOPHILIZED, FOR SOLUTION INTRAVENOUS at 06:00

## 2021-11-11 RX ADMIN — CHLORHEXIDINE GLUCONATE 1 APPLICATION(S): 213 SOLUTION TOPICAL at 06:02

## 2021-11-11 RX ADMIN — HYDROMORPHONE HYDROCHLORIDE 0.5 MILLIGRAM(S): 2 INJECTION INTRAMUSCULAR; INTRAVENOUS; SUBCUTANEOUS at 06:54

## 2021-11-11 RX ADMIN — GABAPENTIN 600 MILLIGRAM(S): 400 CAPSULE ORAL at 17:59

## 2021-11-11 RX ADMIN — Medication 1 APPLICATION(S): at 11:08

## 2021-11-11 RX ADMIN — ENOXAPARIN SODIUM 40 MILLIGRAM(S): 100 INJECTION SUBCUTANEOUS at 11:07

## 2021-11-11 RX ADMIN — GABAPENTIN 600 MILLIGRAM(S): 400 CAPSULE ORAL at 23:10

## 2021-11-11 RX ADMIN — OXYCODONE HYDROCHLORIDE 10 MILLIGRAM(S): 5 TABLET ORAL at 04:20

## 2021-11-11 RX ADMIN — Medication 650 MILLIGRAM(S): at 06:30

## 2021-11-11 RX ADMIN — Medication 1 TABLET(S): at 11:07

## 2021-11-11 RX ADMIN — Medication 650 MILLIGRAM(S): at 00:30

## 2021-11-11 RX ADMIN — Medication 650 MILLIGRAM(S): at 20:08

## 2021-11-11 RX ADMIN — Medication 650 MILLIGRAM(S): at 00:00

## 2021-11-11 RX ADMIN — Medication 650 MILLIGRAM(S): at 20:35

## 2021-11-11 RX ADMIN — Medication 650 MILLIGRAM(S): at 06:01

## 2021-11-11 RX ADMIN — GABAPENTIN 600 MILLIGRAM(S): 400 CAPSULE ORAL at 13:29

## 2021-11-11 RX ADMIN — PIPERACILLIN AND TAZOBACTAM 200 GRAM(S): 4; .5 INJECTION, POWDER, LYOPHILIZED, FOR SOLUTION INTRAVENOUS at 13:29

## 2021-11-11 RX ADMIN — PIPERACILLIN AND TAZOBACTAM 200 GRAM(S): 4; .5 INJECTION, POWDER, LYOPHILIZED, FOR SOLUTION INTRAVENOUS at 20:08

## 2021-11-11 RX ADMIN — GABAPENTIN 600 MILLIGRAM(S): 400 CAPSULE ORAL at 06:01

## 2021-11-11 RX ADMIN — OXYCODONE HYDROCHLORIDE 10 MILLIGRAM(S): 5 TABLET ORAL at 23:10

## 2021-11-11 RX ADMIN — PIPERACILLIN AND TAZOBACTAM 200 GRAM(S): 4; .5 INJECTION, POWDER, LYOPHILIZED, FOR SOLUTION INTRAVENOUS at 00:01

## 2021-11-11 RX ADMIN — OXYCODONE HYDROCHLORIDE 10 MILLIGRAM(S): 5 TABLET ORAL at 19:09

## 2021-11-11 RX ADMIN — OXYCODONE HYDROCHLORIDE 10 MILLIGRAM(S): 5 TABLET ORAL at 11:06

## 2021-11-11 RX ADMIN — OXYCODONE HYDROCHLORIDE 10 MILLIGRAM(S): 5 TABLET ORAL at 12:00

## 2021-11-11 RX ADMIN — OXYCODONE HYDROCHLORIDE 10 MILLIGRAM(S): 5 TABLET ORAL at 03:53

## 2021-11-11 RX ADMIN — HYDROMORPHONE HYDROCHLORIDE 0.5 MILLIGRAM(S): 2 INJECTION INTRAMUSCULAR; INTRAVENOUS; SUBCUTANEOUS at 07:10

## 2021-11-11 RX ADMIN — DAPTOMYCIN 120 MILLIGRAM(S): 500 INJECTION, POWDER, LYOPHILIZED, FOR SOLUTION INTRAVENOUS at 19:09

## 2021-11-11 RX ADMIN — HYDROMORPHONE HYDROCHLORIDE 0.5 MILLIGRAM(S): 2 INJECTION INTRAMUSCULAR; INTRAVENOUS; SUBCUTANEOUS at 19:09

## 2021-11-11 RX ADMIN — SENNA PLUS 2 TABLET(S): 8.6 TABLET ORAL at 23:10

## 2021-11-11 RX ADMIN — OXYCODONE HYDROCHLORIDE 10 MILLIGRAM(S): 5 TABLET ORAL at 17:58

## 2021-11-11 NOTE — PROGRESS NOTE ADULT - SUBJECTIVE AND OBJECTIVE BOX
INFECTIOUS DISEASES FOLLOW UP    This is a follow up note for this  26yFemale with  FOOT INFECTION      OVERNIGHT EVENTS/INTERVAL HPI: No complaints. Denies f/c, CP, SOB, n/v/d/c.    ROS:  negative except as above    Allergies    No Known Allergies    Intolerances        ANTIBIOTICS/RELEVANT:  antimicrobials  caspofungin IVPB 50 milliGRAM(s) IV Intermittent every 24 hours  DAPTOmycin IVPB 500 milliGRAM(s) IV Intermittent every 24 hours  piperacillin/tazobactam IVPB.. 3.375 Gram(s) IV Intermittent every 6 hours    immunologic:  influenza   Vaccine 0.5 milliLiter(s) IntraMuscular once    OTHER:  acetaminophen     Tablet .. 650 milliGRAM(s) Oral every 6 hours  AQUAPHOR (petrolatum Ointment) 1 Application(s) Topical daily  BACItracin   Ointment 1 Application(s) Topical daily  bisacodyl Suppository 10 milliGRAM(s) Rectal daily PRN  calcium carbonate   1250 mG (OsCal) 1 Tablet(s) Oral daily  chlorhexidine 4% Liquid 1 Application(s) Topical <User Schedule>  diphenhydrAMINE 25 milliGRAM(s) Oral every 4 hours PRN  enoxaparin Injectable 40 milliGRAM(s) SubCutaneous every 24 hours  gabapentin 600 milliGRAM(s) Oral four times a day  HYDROmorphone  Injectable 0.5 milliGRAM(s) IV Push every 4 hours PRN  melatonin 3 milliGRAM(s) Oral at bedtime  multivitamin 1 Tablet(s) Oral daily  ondansetron Injectable 4 milliGRAM(s) IV Push every 6 hours PRN  oxyCODONE    IR 5 milliGRAM(s) Oral every 4 hours PRN  oxyCODONE    IR 10 milliGRAM(s) Oral every 4 hours PRN  polyethylene glycol 3350 17 Gram(s) Oral two times a day  senna 2 Tablet(s) Oral at bedtime  sodium chloride 0.9% lock flush 10 milliLiter(s) IV Push every 1 hour PRN      Objective:  Vital Signs Last 24 Hrs  T(C): 36.8 (2021 14:05), Max: 37.1 (10 Nov 2021 20:42)  T(F): 98.3 (2021 14:05), Max: 98.8 (10 Nov 2021 20:42)  HR: 78 (2021 14:05) (72 - 87)  BP: 109/74 (2021 14:05) (98/65 - 109/74)  BP(mean): --  RR: 18 (2021 14:05) (17 - 18)  SpO2: 96% (2021 14:05) (95% - 98%)    PHYSICAL EXAM:  PE:  WDWN in no distress  HEENT:  NC, PERRL, sclerae anicteric, conjunctivae clear, EOMI.  Sinuses nontender, no nasal exudate.  No buccal or pharyngeal lesions, erythema or exudate  Neck:  Supple, no adenopathy  Lungs:  Clear to auscultation  Cor:  RRR, S1, S2, no murmur appreciated  Abd:  Symmetric, normoactive BS.  Soft, nontender, no masses, guarding or rebound.  Liver and spleen not enlarged  Extrem:  No cyanosis or edema.  L thigh donor sites without evidence for infection, L ankle in external fixation device.  LUE PICC exit site without erythema/edema  Skin:  No rashes.    LABS:                        9.3    4.04  )-----------( 366      ( 2021 08:43 )             29.0     11-    143  |  105  |  6<L>  ----------------------------<  90  3.8   |  25  |  0.84    Ca    9.6      2021 08:43  Phos  4.7     11-10  Mg     1.8     11-10    TPro  7.0  /  Alb  3.8  /  TBili  0.6  /  DBili  x   /  AST  15  /  ALT  31  /  AlkPhos  61  11-11      Urinalysis Basic - ( 10 Nov 2021 11:55 )    Color: Yellow / Appearance: Clear / S.020 / pH: x  Gluc: x / Ketone: NEGATIVE  / Bili: Negative / Urobili: 0.2 E.U./dL   Blood: x / Protein: NEGATIVE mg/dL / Nitrite: NEGATIVE   Leuk Esterase: NEGATIVE / RBC: x / WBC x   Sq Epi: x / Non Sq Epi: x / Bacteria: x        MICROBIOLOGY:            RECENT CULTURES:   @ 22:14  .Blood Blood-Peripheral  --  --  --    No growth at 2 days.  --      RADIOLOGY & ADDITIONAL STUDIES:

## 2021-11-11 NOTE — PROGRESS NOTE ADULT - ASSESSMENT
26F PMHx complex L ankle fracture with chronic OM s/p multiple debridements, s/p gracilis FF and STSG L thigh with ongoing OM, need for revision and external fixation.  She is s/p >6 week course of nafcillin and ceftriaxone (MSSA and Strep anginosus), and was recently d/tyson on daptomycin and caspofungin (E. faecalis, Staph epi, C. albicans and C. parapsilosis). P/w subjective fevers, onset <48 h after d/c, new leukocytosis in outpatient labs from 11/6.  She had low grade temp increase in the ED with WBC 12.5K.  Source unclear.  Leukocytosis resolved on Zosyn.  She has no localizing findings for infection outside of her ankle, PICC is a possibility. BCx NGTD.  Suggest:  - F/u blood cultures from 11/8  - F/u Gallium scan (whole body)  - C/w Zosyn 3.375g IV q6  - C/w Dapto 500 mg IV daily  - C/w Caspofungin 50mg IV daily  ID Team 1 following

## 2021-11-11 NOTE — PROGRESS NOTE ADULT - SUBJECTIVE AND OBJECTIVE BOX
Progress Note: Plastic Surgery    SUBJECTIVE: Patient seen and examined bedside. Resting comfortably in bed. Denies any fevers or chills. Working with PT, OOB and ambulating.     caspofungin IVPB 50 milliGRAM(s) IV Intermittent every 24 hours  DAPTOmycin IVPB 500 milliGRAM(s) IV Intermittent every 24 hours  enoxaparin Injectable 40 milliGRAM(s) SubCutaneous every 24 hours  piperacillin/tazobactam IVPB.. 3.375 Gram(s) IV Intermittent every 6 hours      Vital Signs Last 24 Hrs  T(C): 36.1 (2021 05:02), Max: 37.4 (10 Nov 2021 13:47)  T(F): 97 (:), Max: 99.3 (10 Nov 2021 13:47)  HR: 72 (2021 05:02) (72 - 88)  BP: 98/65 (2021 05:02) (96/60 - 102/68)  BP(mean): --  RR: 18 (2021 05:02) (16 - 18)  SpO2: 97% (2021 05:02) (95% - 98%)  I&O's Detail    2021 07:01  -  10 Nov 2021 07:00  --------------------------------------------------------  IN:    IV PiggyBack: 400 mL  Total IN: 400 mL    OUT:    Oral Fluid: 0 mL    Voided (mL): 2200 mL  Total OUT: 2200 mL    Total NET: -1800 mL      10 Nov 2021 07:01  -  2021 06:50  --------------------------------------------------------  IN:    IV PiggyBack: 500 mL    Oral Fluid: 660 mL  Total IN: 1160 mL    OUT:    Voided (mL): 3350 mL  Total OUT: 3350 mL    Total NET: -2190 mL          PHYSICAL EXAM:   General: NAD, resting comfortably in bed  C/V: NSR  Pulm: Nonlabored breathing, no respiratory distress  Extrem: LLE-ex fix in place, stsg with good take, +granulation tissue, dressing changed, thigh dressing taken down and aquaphor applied to donor site      LABS:                        9.2    4.78  )-----------( 344      ( 10 Nov 2021 09:29 )             28.4     11-10    139  |  104  |  5<L>  ----------------------------<  117<H>  3.7   |  26  |  0.68    Ca    9.5      10 Nov 2021 09:29  Phos  4.7     11-10  Mg     1.8     11-10    TPro  6.7  /  Alb  3.9  /  TBili  0.5  /  DBili  x   /  AST  18  /  ALT  40  /  AlkPhos  65  11-10      Urinalysis Basic - ( 10 Nov 2021 11:55 )    Color: Yellow / Appearance: Clear / S.020 / pH: x  Gluc: x / Ketone: NEGATIVE  / Bili: Negative / Urobili: 0.2 E.U./dL   Blood: x / Protein: NEGATIVE mg/dL / Nitrite: NEGATIVE   Leuk Esterase: NEGATIVE / RBC: x / WBC x   Sq Epi: x / Non Sq Epi: x / Bacteria: x        RADIOLOGY & ADDITIONAL STUDIES:

## 2021-11-11 NOTE — PROGRESS NOTE ADULT - ASSESSMENT
I discussed the patient with the PA and resident teams. Patient awaiting gallium scan  Overall numbers improving  Depending on results of gallium scan, will consider out of plane aspiration of the tibiotalar joint  Dr. Dunbar to re-evaluate flap over weekend vs Monday depending on gallium scan

## 2021-11-11 NOTE — PROGRESS NOTE ADULT - SUBJECTIVE AND OBJECTIVE BOX
SUBJECTIVE: Pt seen and examined on morning rounds. Pt feeling well without major complaints. Leg pain controlled, unchanged from baseline.     Vital Signs Last 24 Hrs  T(C): 36.1 (11 Nov 2021 05:02), Max: 37.4 (10 Nov 2021 13:47)  T(F): 97 (11 Nov 2021 05:02), Max: 99.3 (10 Nov 2021 13:47)  HR: 72 (11 Nov 2021 05:02) (72 - 88)  BP: 98/65 (11 Nov 2021 05:02) (96/60 - 102/68)  BP(mean): --  RR: 18 (11 Nov 2021 05:02) (16 - 18)  SpO2: 97% (11 Nov 2021 05:02) (95% - 98%)    Physical Exam:  VSS  General: A&Ox3, NAD  LLE: Ringed external fixator in place w ace wrap; Pin site bolster dressings well appearing; Flap with dressing in place  Vascular: Toes WWP; Cap refill < 2 sec  Sensation: Patient with abnormal/absent sensation over most of the dorsum & plantar aspects of the foot consistent with baseline  Motor: Minimal FHL/FDL, 0/5 EHL/EDL activity consistent with baseline    A/P: 26yFemale with complex L ankle fracture s/p multiple I&D, revision surgeries, ringed external fixator placement, and flap coverage by PRS, discharged 11/4, sent into ED by Dr. Carmona for fever workup  - Pt found to have WBC of 11.5 on outpatient labs (increased from 6.1 as inpatient on 11/3)  - Pt with subjective fevers at home  - Presents with 99F orally and 100.2F rectal temp in ED 11/8  - WBC 12.2, ESR 58, CRP 6 on admission 11/8  - Trend WBC, decreased to 6 on 11/9, 4.7 on 11/10  - Dressings removed, no evidence of flap necrosis, infection, or pin site infection  - F/u blood cultures, NGTD  - Continue Zosyn, Dapto, and Caspo as per ID  - F/u Gallium Scan  - NWB in Ex Fix w/RW for soft tissue rest at this time  - No acute orthopedic intervention at this time  - Discussed with Dr. Kadi Foster, PGY-2  Orthopedic Surgery

## 2021-11-11 NOTE — PROGRESS NOTE ADULT - ASSESSMENT
26F h/o L ankle fracture with septic arthritis, postoperative compartment syndrome s/p Left ankle recon with gracilis free flap and STSG  microvascular anastomosis with recipient AT on 9/20. RTOR on 10/18 for debridement of gracilis flap, application of external fixator c/b partial necrosis of skin and subcutaneous fat of gracilis myocutaneous free flap now s/p repeat flap debridement and STSG (10/25). Readmitted on 11/8 for workup of fever of unknown origin.     Regular diet  C/w daptomycin/caspofungin/ zosyn  UA negative  BCx- NGTD  ID following-appreciate recs  F/u Gallium scan   F/u AM labs  Daily dressings changes to LLE with aquaphor and ACE bandage  Aquaphor to left anterior thigh  Analgesics PRN  Antiemetics PRN  OOBA/ work w/ PT  Further management per Ortho Sx.

## 2021-11-11 NOTE — PROGRESS NOTE ADULT - SUBJECTIVE AND OBJECTIVE BOX
Orthopaedic Surgery Progress Note    Subjective:     Patient seen and examined. Patient comfortable. Pain controlled. Denies chest pain, shortness of breath, nausea/vomiting, numbness/tingling.    Objective:    Vital Signs Last 24 Hrs  HR: 75 (11-11-21 @ 06:57) (75 - 75)  BP: 107/63 (11-11-21 @ 06:57) (107/63 - 107/63)  RR: 17 (11-11-21 @ 06:57) (17 - 17)  SpO2: 96% (11-11-21 @ 06:57) (96% - 96%)  AVSS    PE:  General: A&Ox3, NAD, sitting in bed comfortably  LLE: Ringed external fixator in place w ace wrap; Pin site bolster dressings well appearing; Flap without erythema or drainage  Vascular: Toes WWP; Cap refill < 2 sec  Sensation: Patient with abnormal/absent sensation over most of the dorsum & plantar aspects of the foot consistent with baseline  Motor: Minimal FHL/FDL, 0/5 EHL/EDL activity consistent with baseline                          9.3    4.04  )-----------( 366      ( 11 Nov 2021 08:43 )             29.0     11-11    143  |  105  |  6<L>  ----------------------------<  90  3.8   |  25  |  0.84    Ca    9.6      11 Nov 2021 08:43  Phos  4.7     11-10  Mg     1.8     11-10    TPro  7.0  /  Alb  3.8  /  TBili  0.6  /  DBili  x   /  AST  15  /  ALT  31  /  AlkPhos  61  11-11        A/P: 27yo Female s/p L ankle ringed ex-fix with flap by plastic surgery re-admitted due to elevated WBC and subjective fevers   1. Pain control as needed  2. DVT prophylaxis:  3. PT, weight-bearing status: NWB LLE for soft tissue rest  4. Gallium scan. Pt did not get gallium injection yesterday. [facility] states they were supposed to inject yesterday and image today, but there was a miscommunication on their part. Pt will get gallium injection today and then imaging tomorrow and Saturday.  5. Continue antibiotics. WBC normalized. Appreciate ID recs.     Ortho Pager [phone]

## 2021-11-12 LAB
ALBUMIN SERPL ELPH-MCNC: 3.8 G/DL — SIGNIFICANT CHANGE UP (ref 3.3–5)
ALP SERPL-CCNC: 58 U/L — SIGNIFICANT CHANGE UP (ref 40–120)
ALT FLD-CCNC: 31 U/L — SIGNIFICANT CHANGE UP (ref 10–45)
ANION GAP SERPL CALC-SCNC: 13 MMOL/L — SIGNIFICANT CHANGE UP (ref 5–17)
AST SERPL-CCNC: 19 U/L — SIGNIFICANT CHANGE UP (ref 10–40)
BILIRUB SERPL-MCNC: 0.5 MG/DL — SIGNIFICANT CHANGE UP (ref 0.2–1.2)
BUN SERPL-MCNC: 7 MG/DL — SIGNIFICANT CHANGE UP (ref 7–23)
CALCIUM SERPL-MCNC: 9.3 MG/DL — SIGNIFICANT CHANGE UP (ref 8.4–10.5)
CHLORIDE SERPL-SCNC: 105 MMOL/L — SIGNIFICANT CHANGE UP (ref 96–108)
CO2 SERPL-SCNC: 24 MMOL/L — SIGNIFICANT CHANGE UP (ref 22–31)
CREAT SERPL-MCNC: 0.85 MG/DL — SIGNIFICANT CHANGE UP (ref 0.5–1.3)
GLUCOSE SERPL-MCNC: 89 MG/DL — SIGNIFICANT CHANGE UP (ref 70–99)
HCT VFR BLD CALC: 29.1 % — LOW (ref 34.5–45)
HGB BLD-MCNC: 9.5 G/DL — LOW (ref 11.5–15.5)
MCHC RBC-ENTMCNC: 29 PG — SIGNIFICANT CHANGE UP (ref 27–34)
MCHC RBC-ENTMCNC: 32.6 GM/DL — SIGNIFICANT CHANGE UP (ref 32–36)
MCV RBC AUTO: 88.7 FL — SIGNIFICANT CHANGE UP (ref 80–100)
NRBC # BLD: 0 /100 WBCS — SIGNIFICANT CHANGE UP (ref 0–0)
PLATELET # BLD AUTO: 327 K/UL — SIGNIFICANT CHANGE UP (ref 150–400)
POTASSIUM SERPL-MCNC: 4 MMOL/L — SIGNIFICANT CHANGE UP (ref 3.5–5.3)
POTASSIUM SERPL-SCNC: 4 MMOL/L — SIGNIFICANT CHANGE UP (ref 3.5–5.3)
PROT SERPL-MCNC: 6.7 G/DL — SIGNIFICANT CHANGE UP (ref 6–8.3)
RBC # BLD: 3.28 M/UL — LOW (ref 3.8–5.2)
RBC # FLD: 18.3 % — HIGH (ref 10.3–14.5)
SODIUM SERPL-SCNC: 142 MMOL/L — SIGNIFICANT CHANGE UP (ref 135–145)
WBC # BLD: 4.3 K/UL — SIGNIFICANT CHANGE UP (ref 3.8–10.5)
WBC # FLD AUTO: 4.3 K/UL — SIGNIFICANT CHANGE UP (ref 3.8–10.5)

## 2021-11-12 PROCEDURE — 99232 SBSQ HOSP IP/OBS MODERATE 35: CPT | Mod: GC

## 2021-11-12 RX ORDER — CYCLOBENZAPRINE HYDROCHLORIDE 10 MG/1
5 TABLET, FILM COATED ORAL THREE TIMES A DAY
Refills: 0 | Status: DISCONTINUED | OUTPATIENT
Start: 2021-11-12 | End: 2021-11-19

## 2021-11-12 RX ADMIN — Medication 1 TABLET(S): at 12:22

## 2021-11-12 RX ADMIN — SENNA PLUS 2 TABLET(S): 8.6 TABLET ORAL at 22:59

## 2021-11-12 RX ADMIN — HYDROMORPHONE HYDROCHLORIDE 0.5 MILLIGRAM(S): 2 INJECTION INTRAMUSCULAR; INTRAVENOUS; SUBCUTANEOUS at 18:45

## 2021-11-12 RX ADMIN — GABAPENTIN 600 MILLIGRAM(S): 400 CAPSULE ORAL at 12:22

## 2021-11-12 RX ADMIN — Medication 650 MILLIGRAM(S): at 19:00

## 2021-11-12 RX ADMIN — OXYCODONE HYDROCHLORIDE 10 MILLIGRAM(S): 5 TABLET ORAL at 07:02

## 2021-11-12 RX ADMIN — HYDROMORPHONE HYDROCHLORIDE 0.5 MILLIGRAM(S): 2 INJECTION INTRAMUSCULAR; INTRAVENOUS; SUBCUTANEOUS at 00:45

## 2021-11-12 RX ADMIN — Medication 650 MILLIGRAM(S): at 01:40

## 2021-11-12 RX ADMIN — HYDROMORPHONE HYDROCHLORIDE 0.5 MILLIGRAM(S): 2 INJECTION INTRAMUSCULAR; INTRAVENOUS; SUBCUTANEOUS at 05:50

## 2021-11-12 RX ADMIN — Medication 3 MILLIGRAM(S): at 22:59

## 2021-11-12 RX ADMIN — Medication 1 TABLET(S): at 12:23

## 2021-11-12 RX ADMIN — HYDROMORPHONE HYDROCHLORIDE 0.5 MILLIGRAM(S): 2 INJECTION INTRAMUSCULAR; INTRAVENOUS; SUBCUTANEOUS at 18:17

## 2021-11-12 RX ADMIN — Medication 650 MILLIGRAM(S): at 12:21

## 2021-11-12 RX ADMIN — HYDROMORPHONE HYDROCHLORIDE 0.5 MILLIGRAM(S): 2 INJECTION INTRAMUSCULAR; INTRAVENOUS; SUBCUTANEOUS at 05:39

## 2021-11-12 RX ADMIN — PIPERACILLIN AND TAZOBACTAM 200 GRAM(S): 4; .5 INJECTION, POWDER, LYOPHILIZED, FOR SOLUTION INTRAVENOUS at 01:08

## 2021-11-12 RX ADMIN — HYDROMORPHONE HYDROCHLORIDE 0.5 MILLIGRAM(S): 2 INJECTION INTRAMUSCULAR; INTRAVENOUS; SUBCUTANEOUS at 23:15

## 2021-11-12 RX ADMIN — Medication 1 APPLICATION(S): at 12:22

## 2021-11-12 RX ADMIN — Medication 650 MILLIGRAM(S): at 13:20

## 2021-11-12 RX ADMIN — PIPERACILLIN AND TAZOBACTAM 200 GRAM(S): 4; .5 INJECTION, POWDER, LYOPHILIZED, FOR SOLUTION INTRAVENOUS at 18:18

## 2021-11-12 RX ADMIN — Medication 650 MILLIGRAM(S): at 07:30

## 2021-11-12 RX ADMIN — Medication 650 MILLIGRAM(S): at 18:18

## 2021-11-12 RX ADMIN — HYDROMORPHONE HYDROCHLORIDE 0.5 MILLIGRAM(S): 2 INJECTION INTRAMUSCULAR; INTRAVENOUS; SUBCUTANEOUS at 10:56

## 2021-11-12 RX ADMIN — DAPTOMYCIN 120 MILLIGRAM(S): 500 INJECTION, POWDER, LYOPHILIZED, FOR SOLUTION INTRAVENOUS at 18:18

## 2021-11-12 RX ADMIN — ENOXAPARIN SODIUM 40 MILLIGRAM(S): 100 INJECTION SUBCUTANEOUS at 10:56

## 2021-11-12 RX ADMIN — CHLORHEXIDINE GLUCONATE 1 APPLICATION(S): 213 SOLUTION TOPICAL at 05:39

## 2021-11-12 RX ADMIN — OXYCODONE HYDROCHLORIDE 10 MILLIGRAM(S): 5 TABLET ORAL at 03:30

## 2021-11-12 RX ADMIN — HYDROMORPHONE HYDROCHLORIDE 0.5 MILLIGRAM(S): 2 INJECTION INTRAMUSCULAR; INTRAVENOUS; SUBCUTANEOUS at 00:20

## 2021-11-12 RX ADMIN — GABAPENTIN 600 MILLIGRAM(S): 400 CAPSULE ORAL at 05:39

## 2021-11-12 RX ADMIN — PIPERACILLIN AND TAZOBACTAM 200 GRAM(S): 4; .5 INJECTION, POWDER, LYOPHILIZED, FOR SOLUTION INTRAVENOUS at 07:02

## 2021-11-12 RX ADMIN — HYDROMORPHONE HYDROCHLORIDE 0.5 MILLIGRAM(S): 2 INJECTION INTRAMUSCULAR; INTRAVENOUS; SUBCUTANEOUS at 22:53

## 2021-11-12 RX ADMIN — OXYCODONE HYDROCHLORIDE 10 MILLIGRAM(S): 5 TABLET ORAL at 07:30

## 2021-11-12 RX ADMIN — Medication 650 MILLIGRAM(S): at 01:09

## 2021-11-12 RX ADMIN — Medication 650 MILLIGRAM(S): at 06:57

## 2021-11-12 RX ADMIN — Medication 1 APPLICATION(S): at 12:38

## 2021-11-12 RX ADMIN — CASPOFUNGIN ACETATE 260 MILLIGRAM(S): 7 INJECTION, POWDER, LYOPHILIZED, FOR SOLUTION INTRAVENOUS at 12:21

## 2021-11-12 RX ADMIN — OXYCODONE HYDROCHLORIDE 10 MILLIGRAM(S): 5 TABLET ORAL at 03:01

## 2021-11-12 RX ADMIN — HYDROMORPHONE HYDROCHLORIDE 0.5 MILLIGRAM(S): 2 INJECTION INTRAMUSCULAR; INTRAVENOUS; SUBCUTANEOUS at 11:10

## 2021-11-12 RX ADMIN — GABAPENTIN 600 MILLIGRAM(S): 400 CAPSULE ORAL at 18:18

## 2021-11-12 RX ADMIN — PIPERACILLIN AND TAZOBACTAM 200 GRAM(S): 4; .5 INJECTION, POWDER, LYOPHILIZED, FOR SOLUTION INTRAVENOUS at 13:48

## 2021-11-12 NOTE — PROGRESS NOTE ADULT - SUBJECTIVE AND OBJECTIVE BOX
Progress Note: Plastic Surgery    SUBJECTIVE: Patient seen and examined bedside. Resting comfortably in bed. Reports injected with tracer for planned Gallium scan today. Otherwise denies f/c, n/v, CP, cough or dysuria. Advised to not weight bear LLE.     caspofungin IVPB 50 milliGRAM(s) IV Intermittent every 24 hours  DAPTOmycin IVPB 500 milliGRAM(s) IV Intermittent every 24 hours  enoxaparin Injectable 40 milliGRAM(s) SubCutaneous every 24 hours  piperacillin/tazobactam IVPB.. 3.375 Gram(s) IV Intermittent every 6 hours      Vital Signs Last 24 Hrs  T(C): 36.4 (2021 04:52), Max: 36.8 (2021 14:05)  T(F): 97.5 (2021 04:52), Max: 98.3 (2021 14:05)  HR: 75 (2021 04:52) (75 - 83)  BP: 115/70 (2021 04:52) (106/72 - 118/79)  BP(mean): --  RR: 17 (2021 04:52) (17 - 18)  SpO2: 95% (2021 04:52) (95% - 98%)  I&O's Detail    2021 07:01  -  2021 07:00  --------------------------------------------------------  IN:    IV PiggyBack: 100 mL    Oral Fluid: 1150 mL  Total IN: 1250 mL    OUT:    Voided (mL): 2100 mL  Total OUT: 2100 mL    Total NET: -850 mL        PHYSICAL EXAM:   General: NAD, resting comfortably in bed  C/V: NSR  Pulm: Nonlabored breathing, no respiratory distress  Extrem: LLE-ex fix in place, stsg with good take, +granulation tissue, dressing changed, thigh dressing taken down and aquaphor applied to donor site        LABS:                        9.3    4.04  )-----------( 366      ( 2021 08:43 )             29.0     11-11    143  |  105  |  6<L>  ----------------------------<  90  3.8   |  25  |  0.84    Ca    9.6      2021 08:43  Phos  4.7     11-10  Mg     1.8     -10    TPro  7.0  /  Alb  3.8  /  TBili  0.6  /  DBili  x   /  AST  15  /  ALT  31  /  AlkPhos  61  11-11      Urinalysis Basic - ( 10 Nov 2021 11:55 )    Color: Yellow / Appearance: Clear / S.020 / pH: x  Gluc: x / Ketone: NEGATIVE  / Bili: Negative / Urobili: 0.2 E.U./dL   Blood: x / Protein: NEGATIVE mg/dL / Nitrite: NEGATIVE   Leuk Esterase: NEGATIVE / RBC: x / WBC x   Sq Epi: x / Non Sq Epi: x / Bacteria: x        RADIOLOGY & ADDITIONAL STUDIES:

## 2021-11-12 NOTE — PROGRESS NOTE ADULT - SUBJECTIVE AND OBJECTIVE BOX
INTERVAL HPI/OVERNIGHT EVENTS:    Patient was seen and examined at bedside.  No events.  Tolerating antibiotics.  Awaiting gallium scan    CONSTITUTIONAL:  Negative fever or chills, feels well, good appetite  EYES:  Negative  blurry vision or double vision  CARDIOVASCULAR:  Negative for chest pain or palpitations  RESPIRATORY:  Negative for cough, wheezing, or SOB   GASTROINTESTINAL:  Negative for nausea, vomiting, diarrhea, constipation, or abdominal pain  GENITOURINARY:  Negative frequency, urgency or dysuria  NEUROLOGIC:  No headache, confusion, dizziness, lightheadedness      ANTIBIOTICS/RELEVANT:    MEDICATIONS  (STANDING):  acetaminophen     Tablet .. 650 milliGRAM(s) Oral every 6 hours  AQUAPHOR (petrolatum Ointment) 1 Application(s) Topical daily  BACItracin   Ointment 1 Application(s) Topical daily  calcium carbonate   1250 mG (OsCal) 1 Tablet(s) Oral daily  caspofungin IVPB 50 milliGRAM(s) IV Intermittent every 24 hours  chlorhexidine 4% Liquid 1 Application(s) Topical <User Schedule>  DAPTOmycin IVPB 500 milliGRAM(s) IV Intermittent every 24 hours  enoxaparin Injectable 40 milliGRAM(s) SubCutaneous every 24 hours  gabapentin 600 milliGRAM(s) Oral four times a day  influenza   Vaccine 0.5 milliLiter(s) IntraMuscular once  melatonin 3 milliGRAM(s) Oral at bedtime  multivitamin 1 Tablet(s) Oral daily  piperacillin/tazobactam IVPB.. 3.375 Gram(s) IV Intermittent every 6 hours  polyethylene glycol 3350 17 Gram(s) Oral two times a day  senna 2 Tablet(s) Oral at bedtime    MEDICATIONS  (PRN):  bisacodyl Suppository 10 milliGRAM(s) Rectal daily PRN Constipation  cyclobenzaprine 5 milliGRAM(s) Oral three times a day PRN Muscle Spasm  diphenhydrAMINE 25 milliGRAM(s) Oral every 4 hours PRN Rash and/or Itching  HYDROmorphone  Injectable 0.5 milliGRAM(s) IV Push every 4 hours PRN severe breakthrough pain if not yet due for PO  ondansetron Injectable 4 milliGRAM(s) IV Push every 6 hours PRN Nausea  oxyCODONE    IR 5 milliGRAM(s) Oral every 4 hours PRN Moderate Pain (4 - 6)  oxyCODONE    IR 10 milliGRAM(s) Oral every 4 hours PRN Severe Pain (7 - 10)  sodium chloride 0.9% lock flush 10 milliLiter(s) IV Push every 1 hour PRN Pre/post blood products, medications, blood draw, and to maintain line patency        Vital Signs Last 24 Hrs  T(C): 36.4 (12 Nov 2021 08:00), Max: 36.8 (11 Nov 2021 14:05)  T(F): 97.5 (12 Nov 2021 08:00), Max: 98.3 (11 Nov 2021 14:05)  HR: 76 (12 Nov 2021 08:00) (75 - 83)  BP: 100/61 (12 Nov 2021 08:00) (100/61 - 118/79)  BP(mean): --  RR: 18 (12 Nov 2021 08:00) (17 - 18)  SpO2: 94% (12 Nov 2021 08:00) (94% - 98%)    PHYSICAL EXAM:  Constitutional:  non-toxic, no distress  Eyes:  no icterus   Ear/Nose/Throat: no oral lesion  Neck:  supple  Respiratory: CTA rhett  Cardiovascular: S1S2 RRR, no murmurs  Gastrointestinal:soft, (+) BS, no HSM  Extremities:no external fixator device in place  Vascular: DP Pulse:	right normal; left normal      LABS:                        9.5    4.30  )-----------( 327      ( 12 Nov 2021 07:22 )             29.1     11-12    142  |  105  |  7   ----------------------------<  89  4.0   |  24  |  0.85    Ca    9.3      12 Nov 2021 07:22    TPro  6.7  /  Alb  3.8  /  TBili  0.5  /  DBili  x   /  AST  19  /  ALT  31  /  AlkPhos  58  11-12          MICROBIOLOGY:    Culture - Blood (11.08.21 @ 22:14)    Specimen Source: .Blood Blood-Peripheral    Culture Results:   No growth at 3 days.        RADIOLOGY & ADDITIONAL STUDIES:

## 2021-11-12 NOTE — PROGRESS NOTE ADULT - SUBJECTIVE AND OBJECTIVE BOX
Orthopaedic Surgery Progress Note    Subjective:     Patient seen and examined. Patient comfortable without complaints, pain controlled. States she is having muscle spasms/cramping to her right leg.     Objective:    Vital Signs Last 24 Hrs  T(C): 36.4 (11-12-21 @ 08:00), Max: 36.4 (11-12-21 @ 08:00)  T(F): 97.5 (11-12-21 @ 08:00), Max: 97.5 (11-12-21 @ 08:00)  HR: 76 (11-12-21 @ 08:00) (76 - 76)  BP: 100/61 (11-12-21 @ 08:00) (100/61 - 100/61)  BP(mean): --  RR: 18 (11-12-21 @ 08:00) (18 - 18)  SpO2: 94% (11-12-21 @ 08:00) (94% - 94%)  AVSS    PE:  General: Patient alert and oriented, NAD  LLE: ringed external fixator in place with ace wrap, pin site dressings intact, flap w/ with dressing in place, wiggling left toes to patient's baseline, sensation diminished to left 1st-3rd digits consistent with baseline                           9.5    4.30  )-----------( 327      ( 12 Nov 2021 07:22 )             29.1   11-12    142  |  105  |  7   ----------------------------<  89  4.0   |  24  |  0.85    Ca    9.3      12 Nov 2021 07:22    TPro  6.7  /  Alb  3.8  /  TBili  0.5  /  DBili  x   /  AST  19  /  ALT  31  /  AlkPhos  58  11-12      A/P:  26yFemale with complex L ankle fracture s/p multiple I&D, revision surgeries, ringed external fixator placement, and flap coverage by PRS, discharged 11/4, admitted for fever workup  1. Pain control as needed  2. DVT prophylaxis: lovenox   3. PT, weight-bearing status: NWB LLE  4. gallium scan today, f/u results-  may require further imaging tomorrow  5. flexeril ordered for muscle spasms  6. appreciate infectious disease recommendations     Ortho Pager [phone]

## 2021-11-12 NOTE — PROGRESS NOTE ADULT - SUBJECTIVE AND OBJECTIVE BOX
SUBJECTIVE: Pt seen and examined on morning rounds. Pt feeling well without major complaints. Leg pain controlled, unchanged from baseline.     Vital Signs Last 24 Hrs  T(C): 36.4 (12 Nov 2021 04:52), Max: 36.8 (11 Nov 2021 14:05)  T(F): 97.5 (12 Nov 2021 04:52), Max: 98.3 (11 Nov 2021 14:05)  HR: 75 (12 Nov 2021 04:52) (75 - 83)  BP: 115/70 (12 Nov 2021 04:52) (106/72 - 118/79)  BP(mean): --  RR: 17 (12 Nov 2021 04:52) (17 - 18)  SpO2: 95% (12 Nov 2021 04:52) (95% - 98%)    Physical Exam:  VSS  General: A&Ox3, NAD  LLE: Ringed external fixator in place w ace wrap; Pin site bolster dressings well appearing; Flap with dressing in place  Vascular: Toes WWP; Cap refill < 2 sec  Sensation: Patient with abnormal/absent sensation over most of the dorsum & plantar aspects of the foot consistent with baseline  Motor: Minimal FHL/FDL, 0/5 EHL/EDL activity consistent with baseline    A/P: 26yFemale with complex L ankle fracture s/p multiple I&D, revision surgeries, ringed external fixator placement, and flap coverage by PRS, discharged 11/4, sent into ED by Dr. Carmona for fever workup  - Pt found to have WBC of 11.5 on outpatient labs (increased from 6.1 as inpatient on 11/3)  - Pt with subjective fevers at home  - Presents with 99F orally and 100.2F rectal temp in ED 11/8  - WBC 12.2, ESR 58, CRP 6 on admission 11/8  - Trend WBC, decreased to 6 on 11/9, 4.7 on 11/10, 4.1 on 11/11  - Dressings removed, no evidence of flap necrosis, infection, or pin site infection  - F/u blood cultures, NGTD  - Continue Zosyn, Dapto, and Caspo as per ID  - F/u Gallium Scan today  - NWB in Ex Fix w/RW for soft tissue rest at this time  - No acute orthopedic intervention at this time  - Discussed with Dr. Kadi Foster, PGY-2  Orthopedic Surgery

## 2021-11-12 NOTE — DIETITIAN INITIAL EVALUATION ADULT. - ADD RECOMMEND
1. Continue MVI  2. Bowel regimen PRN   3. Bi-weekly weights  4. Encourage protein at meals + nutrition shakes

## 2021-11-12 NOTE — DIETITIAN INITIAL EVALUATION ADULT. - REASON
Unable to complete d/t pt going for scan at visit. Suspect muscle quality decline r/t limited physical activity x2 months d/t extended hospital stay and on unable to walk r/t injury

## 2021-11-12 NOTE — PROGRESS NOTE ADULT - ASSESSMENT
26F PMHx complex L ankle fracture with chronic OM s/p multiple debridements, s/p gracilis FF and STSG L thigh with ongoing OM, need for revision and external fixation.  She is s/p >6 week course of nafcillin and ceftriaxone (MSSA and Strep anginosus), and was recently d/tyson on daptomycin and caspofungin (E. faecalis, Staph epi, C. albicans and C. parapsilosis). P/w subjective fevers, onset <48 h after d/c, new leukocytosis in outpatient labs from 11/6.  She had low grade temp increase in the ED with WBC 12.5K.  Source unclear.  Leukocytosis resolved on Zosyn.  She has no localizing findings for infection outside of her ankle,     Recommend:  1. Follow up blood cultures from 11/8  2.  Follow up Gallium scan (whole body)  3.  Continue Zosyn 3.375g IV q6  4.  Continue Daptomycin 500 mg IV daily  5.  Continue Caspofungin 50mg IV daily    ID Team 1 will follow.  I will cover the service this weekend

## 2021-11-12 NOTE — DIETITIAN INITIAL EVALUATION ADULT. - OTHER INFO
Pt h/o of L ankle fracture with septic arthritis, postoperative compartment syndrome s/p left ankle recon with gracilis free flap and STSG  microvascular anastomosis with recipient AT on 9/20. RTOR on 10/18 for debridement of gracilis flap, application of external fixator c/b partial necrosis of skin and subcutaneous fat of gracilis myocutaneous free flap now s/p repeat flap debridement and STSG (10/25); discharged 11/5 with PICC line and 6 week course of abx (Daptomycin + Caspofungin), presents to ED sent in by her ID doctor for elevated WBC on outpatient labs 2 days ago, with subjective fever at home. She admits to foot pain, but denies chills, sweats, nausea, vomiting, and any other acute complaints. Plan for gallium scan today (11/12).    Attempted to speak with pt, however heading to scan. Did inform pt will leave a few Ensure Max in room and encouraged po intake.     GI: +BM 11/11, abd-soft  Denies N/V or D/C  Skin: surgical incision to Lt ankle    Per EMR, 8kg (9%) wt loss x 2 months which is severe per [employer] standards.   9/7/21: 88kg (194#)  *Unable to complete NFPE. Do suspect muscle quality decline d/t limited physical activity x2 months.

## 2021-11-12 NOTE — DIETITIAN INITIAL EVALUATION ADULT. - ORAL INTAKE PTA/DIET HISTORY
Pt well known to this RD from extended hospital stay 9/7/21 - 11/4/21. Pt's appetite improved over past 1-2 weeks, but previously for >1 month was only eating small amounts and 1-2 meals per day. Now eating 3 meals per day and drinking 1-2 Ensure Max shakes daily. No cultural, ethnic, [employer] food preferences noted, NKFA

## 2021-11-13 LAB
ANION GAP SERPL CALC-SCNC: 11 MMOL/L — SIGNIFICANT CHANGE UP (ref 5–17)
BUN SERPL-MCNC: 6 MG/DL — LOW (ref 7–23)
CALCIUM SERPL-MCNC: 9.2 MG/DL — SIGNIFICANT CHANGE UP (ref 8.4–10.5)
CHLORIDE SERPL-SCNC: 105 MMOL/L — SIGNIFICANT CHANGE UP (ref 96–108)
CO2 SERPL-SCNC: 23 MMOL/L — SIGNIFICANT CHANGE UP (ref 22–31)
CREAT SERPL-MCNC: 0.78 MG/DL — SIGNIFICANT CHANGE UP (ref 0.5–1.3)
CULTURE RESULTS: SIGNIFICANT CHANGE UP
CULTURE RESULTS: SIGNIFICANT CHANGE UP
GLUCOSE SERPL-MCNC: 94 MG/DL — SIGNIFICANT CHANGE UP (ref 70–99)
HCT VFR BLD CALC: 28.5 % — LOW (ref 34.5–45)
HGB BLD-MCNC: 9.5 G/DL — LOW (ref 11.5–15.5)
MCHC RBC-ENTMCNC: 29.8 PG — SIGNIFICANT CHANGE UP (ref 27–34)
MCHC RBC-ENTMCNC: 33.3 GM/DL — SIGNIFICANT CHANGE UP (ref 32–36)
MCV RBC AUTO: 89.3 FL — SIGNIFICANT CHANGE UP (ref 80–100)
NRBC # BLD: 0 /100 WBCS — SIGNIFICANT CHANGE UP (ref 0–0)
PLATELET # BLD AUTO: 322 K/UL — SIGNIFICANT CHANGE UP (ref 150–400)
POTASSIUM SERPL-MCNC: 3.7 MMOL/L — SIGNIFICANT CHANGE UP (ref 3.5–5.3)
POTASSIUM SERPL-SCNC: 3.7 MMOL/L — SIGNIFICANT CHANGE UP (ref 3.5–5.3)
RBC # BLD: 3.19 M/UL — LOW (ref 3.8–5.2)
RBC # FLD: 18 % — HIGH (ref 10.3–14.5)
SODIUM SERPL-SCNC: 139 MMOL/L — SIGNIFICANT CHANGE UP (ref 135–145)
SPECIMEN SOURCE: SIGNIFICANT CHANGE UP
SPECIMEN SOURCE: SIGNIFICANT CHANGE UP
WBC # BLD: 4.25 K/UL — SIGNIFICANT CHANGE UP (ref 3.8–10.5)
WBC # FLD AUTO: 4.25 K/UL — SIGNIFICANT CHANGE UP (ref 3.8–10.5)

## 2021-11-13 PROCEDURE — 99232 SBSQ HOSP IP/OBS MODERATE 35: CPT

## 2021-11-13 PROCEDURE — 78802 RP LOCLZJ TUM WHBDY 1 D IMG: CPT | Mod: 26

## 2021-11-13 RX ORDER — SODIUM CHLORIDE 9 MG/ML
1000 INJECTION INTRAMUSCULAR; INTRAVENOUS; SUBCUTANEOUS ONCE
Refills: 0 | Status: COMPLETED | OUTPATIENT
Start: 2021-11-13 | End: 2021-11-13

## 2021-11-13 RX ADMIN — Medication 650 MILLIGRAM(S): at 01:30

## 2021-11-13 RX ADMIN — GABAPENTIN 600 MILLIGRAM(S): 400 CAPSULE ORAL at 11:43

## 2021-11-13 RX ADMIN — Medication 1 APPLICATION(S): at 11:42

## 2021-11-13 RX ADMIN — DAPTOMYCIN 120 MILLIGRAM(S): 500 INJECTION, POWDER, LYOPHILIZED, FOR SOLUTION INTRAVENOUS at 17:07

## 2021-11-13 RX ADMIN — SODIUM CHLORIDE 1000 MILLILITER(S): 9 INJECTION INTRAMUSCULAR; INTRAVENOUS; SUBCUTANEOUS at 21:00

## 2021-11-13 RX ADMIN — Medication 1 TABLET(S): at 11:42

## 2021-11-13 RX ADMIN — GABAPENTIN 600 MILLIGRAM(S): 400 CAPSULE ORAL at 17:08

## 2021-11-13 RX ADMIN — HYDROMORPHONE HYDROCHLORIDE 0.5 MILLIGRAM(S): 2 INJECTION INTRAMUSCULAR; INTRAVENOUS; SUBCUTANEOUS at 04:15

## 2021-11-13 RX ADMIN — HYDROMORPHONE HYDROCHLORIDE 0.5 MILLIGRAM(S): 2 INJECTION INTRAMUSCULAR; INTRAVENOUS; SUBCUTANEOUS at 09:50

## 2021-11-13 RX ADMIN — Medication 650 MILLIGRAM(S): at 11:42

## 2021-11-13 RX ADMIN — HYDROMORPHONE HYDROCHLORIDE 0.5 MILLIGRAM(S): 2 INJECTION INTRAMUSCULAR; INTRAVENOUS; SUBCUTANEOUS at 22:47

## 2021-11-13 RX ADMIN — CYCLOBENZAPRINE HYDROCHLORIDE 5 MILLIGRAM(S): 10 TABLET, FILM COATED ORAL at 13:54

## 2021-11-13 RX ADMIN — OXYCODONE HYDROCHLORIDE 10 MILLIGRAM(S): 5 TABLET ORAL at 06:06

## 2021-11-13 RX ADMIN — HYDROMORPHONE HYDROCHLORIDE 0.5 MILLIGRAM(S): 2 INJECTION INTRAMUSCULAR; INTRAVENOUS; SUBCUTANEOUS at 10:36

## 2021-11-13 RX ADMIN — Medication 650 MILLIGRAM(S): at 06:06

## 2021-11-13 RX ADMIN — SENNA PLUS 2 TABLET(S): 8.6 TABLET ORAL at 22:17

## 2021-11-13 RX ADMIN — OXYCODONE HYDROCHLORIDE 10 MILLIGRAM(S): 5 TABLET ORAL at 00:51

## 2021-11-13 RX ADMIN — Medication 3 MILLIGRAM(S): at 22:17

## 2021-11-13 RX ADMIN — PIPERACILLIN AND TAZOBACTAM 200 GRAM(S): 4; .5 INJECTION, POWDER, LYOPHILIZED, FOR SOLUTION INTRAVENOUS at 18:25

## 2021-11-13 RX ADMIN — CASPOFUNGIN ACETATE 260 MILLIGRAM(S): 7 INJECTION, POWDER, LYOPHILIZED, FOR SOLUTION INTRAVENOUS at 07:19

## 2021-11-13 RX ADMIN — PIPERACILLIN AND TAZOBACTAM 200 GRAM(S): 4; .5 INJECTION, POWDER, LYOPHILIZED, FOR SOLUTION INTRAVENOUS at 00:52

## 2021-11-13 RX ADMIN — PIPERACILLIN AND TAZOBACTAM 200 GRAM(S): 4; .5 INJECTION, POWDER, LYOPHILIZED, FOR SOLUTION INTRAVENOUS at 13:02

## 2021-11-13 RX ADMIN — GABAPENTIN 600 MILLIGRAM(S): 400 CAPSULE ORAL at 06:07

## 2021-11-13 RX ADMIN — PIPERACILLIN AND TAZOBACTAM 200 GRAM(S): 4; .5 INJECTION, POWDER, LYOPHILIZED, FOR SOLUTION INTRAVENOUS at 06:07

## 2021-11-13 RX ADMIN — Medication 1 APPLICATION(S): at 11:44

## 2021-11-13 RX ADMIN — GABAPENTIN 600 MILLIGRAM(S): 400 CAPSULE ORAL at 00:50

## 2021-11-13 RX ADMIN — Medication 650 MILLIGRAM(S): at 17:08

## 2021-11-13 RX ADMIN — HYDROMORPHONE HYDROCHLORIDE 0.5 MILLIGRAM(S): 2 INJECTION INTRAMUSCULAR; INTRAVENOUS; SUBCUTANEOUS at 22:17

## 2021-11-13 RX ADMIN — ENOXAPARIN SODIUM 40 MILLIGRAM(S): 100 INJECTION SUBCUTANEOUS at 09:49

## 2021-11-13 RX ADMIN — OXYCODONE HYDROCHLORIDE 10 MILLIGRAM(S): 5 TABLET ORAL at 01:15

## 2021-11-13 RX ADMIN — OXYCODONE HYDROCHLORIDE 10 MILLIGRAM(S): 5 TABLET ORAL at 22:00

## 2021-11-13 RX ADMIN — HYDROMORPHONE HYDROCHLORIDE 0.5 MILLIGRAM(S): 2 INJECTION INTRAMUSCULAR; INTRAVENOUS; SUBCUTANEOUS at 03:46

## 2021-11-13 RX ADMIN — OXYCODONE HYDROCHLORIDE 10 MILLIGRAM(S): 5 TABLET ORAL at 21:00

## 2021-11-13 RX ADMIN — Medication 650 MILLIGRAM(S): at 00:49

## 2021-11-13 NOTE — PROGRESS NOTE ADULT - SUBJECTIVE AND OBJECTIVE BOX
INTERVAL HPI/OVERNIGHT EVENTS:    Patient was seen and examined at bedside.  No events.  Completed NM gallium scan     CONSTITUTIONAL:  Negative fever or chills, feels well, good appetite  EYES:  Negative  blurry vision or double vision  CARDIOVASCULAR:  Negative for chest pain or palpitations  RESPIRATORY:  Negative for cough, wheezing, or SOB   GASTROINTESTINAL:  Negative for nausea, vomiting, diarrhea, constipation, or abdominal pain  GENITOURINARY:  Negative frequency, urgency or dysuria  NEUROLOGIC:  No headache, confusion, dizziness, lightheadedness      ANTIBIOTICS/RELEVANT:    MEDICATIONS  (STANDING):  acetaminophen     Tablet .. 650 milliGRAM(s) Oral every 6 hours  AQUAPHOR (petrolatum Ointment) 1 Application(s) Topical daily  BACItracin   Ointment 1 Application(s) Topical daily  calcium carbonate   1250 mG (OsCal) 1 Tablet(s) Oral daily  caspofungin IVPB 50 milliGRAM(s) IV Intermittent every 24 hours  chlorhexidine 4% Liquid 1 Application(s) Topical <User Schedule>  DAPTOmycin IVPB 500 milliGRAM(s) IV Intermittent every 24 hours  enoxaparin Injectable 40 milliGRAM(s) SubCutaneous every 24 hours  gabapentin 600 milliGRAM(s) Oral four times a day  influenza   Vaccine 0.5 milliLiter(s) IntraMuscular once  melatonin 3 milliGRAM(s) Oral at bedtime  multivitamin 1 Tablet(s) Oral daily  piperacillin/tazobactam IVPB.. 3.375 Gram(s) IV Intermittent every 6 hours  polyethylene glycol 3350 17 Gram(s) Oral two times a day  senna 2 Tablet(s) Oral at bedtime    MEDICATIONS  (PRN):  bisacodyl Suppository 10 milliGRAM(s) Rectal daily PRN Constipation  cyclobenzaprine 5 milliGRAM(s) Oral three times a day PRN Muscle Spasm  diphenhydrAMINE 25 milliGRAM(s) Oral every 4 hours PRN Rash and/or Itching  HYDROmorphone  Injectable 0.5 milliGRAM(s) IV Push every 4 hours PRN severe breakthrough pain if not yet due for PO  ondansetron Injectable 4 milliGRAM(s) IV Push every 6 hours PRN Nausea  oxyCODONE    IR 5 milliGRAM(s) Oral every 4 hours PRN Moderate Pain (4 - 6)  oxyCODONE    IR 10 milliGRAM(s) Oral every 4 hours PRN Severe Pain (7 - 10)  sodium chloride 0.9% lock flush 10 milliLiter(s) IV Push every 1 hour PRN Pre/post blood products, medications, blood draw, and to maintain line patency        Vital Signs Last 24 Hrs  T(C): 36.4 (13 Nov 2021 13:46), Max: 36.8 (12 Nov 2021 18:35)  T(F): 97.6 (13 Nov 2021 13:46), Max: 98.3 (12 Nov 2021 18:35)  HR: 72 (13 Nov 2021 13:46) (71 - 91)  BP: 98/60 (13 Nov 2021 13:46) (98/60 - 118/83)  BP(mean): 81 (13 Nov 2021 05:33) (81 - 81)  RR: 16 (13 Nov 2021 13:46) (16 - 18)  SpO2: 97% (13 Nov 2021 13:46) (93% - 97%)    PHYSICAL EXAM:  Constitutional:Well-developed, well nourished  Eyes:GLORY, EOMI  Ear/Nose/Throat: no oral lesion,	  Neck:  supple  Respiratory: CTA rhett  Cardiovascular: S1S2 RRR, no murmurs  Gastrointestinal:soft, (+) BS, no HSM  Extremities:no e/e/c  Vascular: DP Pulse:	right normal; left normal      LABS:                        9.5    4.25  )-----------( 322      ( 13 Nov 2021 07:41 )             28.5     11-13    139  |  105  |  6<L>  ----------------------------<  94  3.7   |  23  |  0.78    Ca    9.2      13 Nov 2021 07:41    TPro  6.7  /  Alb  3.8  /  TBili  0.5  /  DBili  x   /  AST  19  /  ALT  31  /  AlkPhos  58  11-12          MICROBIOLOGY:    Culture - Blood (11.08.21 @ 22:14)    Specimen Source: .Blood Blood-Venous    Culture Results:   No growth at 4 days.        Culture - Blood (11.08.21 @ 22:14)    Specimen Source: .Blood Blood-Peripheral    Culture Results:   No growth at 4 days.    RADIOLOGY & ADDITIONAL STUDIES:

## 2021-11-13 NOTE — PROGRESS NOTE ADULT - ASSESSMENT
IMPRESSION:  26F PMHx complex L ankle fracture with chronic OM s/p multiple debridements, s/p gracilis FF and STSG L thigh with ongoing OM, need for revision and external fixation.  She is s/p >6 week course of nafcillin and ceftriaxone (MSSA and Strep anginosus), and was recently d/tyson on daptomycin and caspofungin (E. faecalis, Staph epi, C. albicans and C. parapsilosis). P/w subjective fevers, onset <48 h after d/c, new leukocytosis in outpatient labs from 11/6.  She had low grade temp increase in the ED with WBC 12.5K.  Source unclear.  Leukocytosis resolved on Zosyn.  She has no localizing findings for infection outside of her ankle,     Recommend:  1. Follow up blood cultures from 11/8  2.  Follow up Gallium scan (whole body)  3.  Continue Zosyn 3.375g IV q6  4.  Continue Daptomycin 500 mg IV daily  5.  Continue Caspofungin 50mg IV daily    ID Team 1 will follow.  I will cover the service this weekend

## 2021-11-13 NOTE — PROGRESS NOTE ADULT - SUBJECTIVE AND OBJECTIVE BOX
Progress Note: Plastic Surgery    SUBJECTIVE: Patient seen and examined bedside. Resting comfortably in bed.  No acute events    caspofungin IVPB 50 milliGRAM(s) IV Intermittent every 24 hours  DAPTOmycin IVPB 500 milliGRAM(s) IV Intermittent every 24 hours  enoxaparin Injectable 40 milliGRAM(s) SubCutaneous every 24 hours  piperacillin/tazobactam IVPB.. 3.375 Gram(s) IV Intermittent every 6 hours      Vital Signs Last 24 Hrs  T(C): 36.4 (2021 04:52), Max: 36.8 (2021 14:05)  T(F): 97.5 (2021 04:52), Max: 98.3 (2021 14:05)  HR: 75 (2021 04:52) (75 - 83)  BP: 115/70 (2021 04:52) (106/72 - 118/79)  BP(mean): --  RR: 17 (2021 04:52) (17 - 18)  SpO2: 95% (2021 04:52) (95% - 98%)  I&O's Detail    2021 07:01  -  2021 07:00  --------------------------------------------------------  IN:    IV PiggyBack: 100 mL    Oral Fluid: 1150 mL  Total IN: 1250 mL    OUT:    Voided (mL): 2100 mL  Total OUT: 2100 mL    Total NET: -850 mL        PHYSICAL EXAM:   General: NAD, resting comfortably in bed  C/V: NSR  Pulm: Nonlabored breathing, no respiratory distress  Extrem: LLE-ex fix in place, stsg with good take, +granulation tissue, aquaphor applied to flap        LABS:                        9.3    4.04  )-----------( 366      ( 2021 08:43 )             29.0         143  |  105  |  6<L>  ----------------------------<  90  3.8   |  25  |  0.84    Ca    9.6      2021 08:43  Phos  4.7     11-10  Mg     1.8     11-10    TPro  7.0  /  Alb  3.8  /  TBili  0.6  /  DBili  x   /  AST  15  /  ALT  31  /  AlkPhos  61  11-11      Urinalysis Basic - ( 10 Nov 2021 11:55 )    Color: Yellow / Appearance: Clear / S.020 / pH: x  Gluc: x / Ketone: NEGATIVE  / Bili: Negative / Urobili: 0.2 E.U./dL   Blood: x / Protein: NEGATIVE mg/dL / Nitrite: NEGATIVE   Leuk Esterase: NEGATIVE / RBC: x / WBC x   Sq Epi: x / Non Sq Epi: x / Bacteria: x        RADIOLOGY & ADDITIONAL STUDIES:

## 2021-11-13 NOTE — PROGRESS NOTE ADULT - SUBJECTIVE AND OBJECTIVE BOX
SUBJECTIVE: Pt seen and examined on morning rounds. Pt feeling well without major complaints. Leg pain controlled, unchanged from baseline.     Vital Signs Last 24 Hrs  T(C): 36.8 (13 Nov 2021 05:33), Max: 36.8 (12 Nov 2021 13:45)  T(F): 98.2 (13 Nov 2021 05:33), Max: 98.3 (12 Nov 2021 13:45)  HR: 84 (13 Nov 2021 05:33) (76 - 91)  BP: 104/69 (13 Nov 2021 05:33) (96/65 - 118/83)  BP(mean): 81 (13 Nov 2021 05:33) (81 - 81)  RR: 18 (13 Nov 2021 05:33) (17 - 18)  SpO2: 93% (13 Nov 2021 05:33) (93% - 96%)    Physical Exam:  VSS  General: A&Ox3, NAD  LLE: Ringed external fixator in place w ace wrap; Pin site bolster dressings well appearing; Flap with dressing in place  Vascular: Toes WWP; Cap refill < 2 sec  Sensation: Patient with abnormal/absent sensation over most of the dorsum & plantar aspects of the foot consistent with baseline  Motor: Minimal FHL/FDL, 0/5 EHL/EDL activity consistent with baseline    A/P: 26yFemale with complex L ankle fracture s/p multiple I&D, revision surgeries, ringed external fixator placement, and flap coverage by PRS, discharged 11/4, sent into ED by Dr. Carmona for fever workup  - Pt found to have WBC of 11.5 on outpatient labs (increased from 6.1 as inpatient on 11/3)  - Pt with subjective fevers at home  - Presents with 99F orally and 100.2F rectal temp in ED 11/8  - WBC 12.2, ESR 58, CRP 6 on admission 11/8  - Trend WBC, stable at ~4 past 3 days  - Dressings removed, no evidence of flap necrosis, infection, or pin site infection  - F/u blood cultures, NGTD  - Continue Zosyn, Dapto, and Caspo as per ID  - F/u Gallium Scan pt 2 today  - NWB in Ex Fix w/RW for soft tissue rest at this time  - No acute orthopedic intervention at this time  - Discussed with Dr. Kadi Foster, PGY-2  Orthopedic Surgery

## 2021-11-14 LAB
ANION GAP SERPL CALC-SCNC: 11 MMOL/L — SIGNIFICANT CHANGE UP (ref 5–17)
BUN SERPL-MCNC: 6 MG/DL — LOW (ref 7–23)
CALCIUM SERPL-MCNC: 9.4 MG/DL — SIGNIFICANT CHANGE UP (ref 8.4–10.5)
CHLORIDE SERPL-SCNC: 108 MMOL/L — SIGNIFICANT CHANGE UP (ref 96–108)
CO2 SERPL-SCNC: 22 MMOL/L — SIGNIFICANT CHANGE UP (ref 22–31)
CREAT SERPL-MCNC: 0.8 MG/DL — SIGNIFICANT CHANGE UP (ref 0.5–1.3)
GLUCOSE SERPL-MCNC: 81 MG/DL — SIGNIFICANT CHANGE UP (ref 70–99)
HCT VFR BLD CALC: 29 % — LOW (ref 34.5–45)
HGB BLD-MCNC: 9.2 G/DL — LOW (ref 11.5–15.5)
MCHC RBC-ENTMCNC: 29.3 PG — SIGNIFICANT CHANGE UP (ref 27–34)
MCHC RBC-ENTMCNC: 31.7 GM/DL — LOW (ref 32–36)
MCV RBC AUTO: 92.4 FL — SIGNIFICANT CHANGE UP (ref 80–100)
NRBC # BLD: 0 /100 WBCS — SIGNIFICANT CHANGE UP (ref 0–0)
PLATELET # BLD AUTO: 282 K/UL — SIGNIFICANT CHANGE UP (ref 150–400)
POTASSIUM SERPL-MCNC: 3.9 MMOL/L — SIGNIFICANT CHANGE UP (ref 3.5–5.3)
POTASSIUM SERPL-SCNC: 3.9 MMOL/L — SIGNIFICANT CHANGE UP (ref 3.5–5.3)
RBC # BLD: 3.14 M/UL — LOW (ref 3.8–5.2)
RBC # FLD: 18 % — HIGH (ref 10.3–14.5)
SODIUM SERPL-SCNC: 141 MMOL/L — SIGNIFICANT CHANGE UP (ref 135–145)
WBC # BLD: 3.51 K/UL — LOW (ref 3.8–10.5)
WBC # FLD AUTO: 3.51 K/UL — LOW (ref 3.8–10.5)

## 2021-11-14 PROCEDURE — 99232 SBSQ HOSP IP/OBS MODERATE 35: CPT

## 2021-11-14 RX ORDER — CEFTRIAXONE 500 MG/1
2000 INJECTION, POWDER, FOR SOLUTION INTRAMUSCULAR; INTRAVENOUS EVERY 24 HOURS
Refills: 0 | Status: DISCONTINUED | OUTPATIENT
Start: 2021-11-14 | End: 2021-11-14

## 2021-11-14 RX ORDER — PIPERACILLIN AND TAZOBACTAM 4; .5 G/20ML; G/20ML
3.38 INJECTION, POWDER, LYOPHILIZED, FOR SOLUTION INTRAVENOUS EVERY 6 HOURS
Refills: 0 | Status: DISCONTINUED | OUTPATIENT
Start: 2021-11-15 | End: 2021-11-19

## 2021-11-14 RX ADMIN — DAPTOMYCIN 120 MILLIGRAM(S): 500 INJECTION, POWDER, LYOPHILIZED, FOR SOLUTION INTRAVENOUS at 18:07

## 2021-11-14 RX ADMIN — OXYCODONE HYDROCHLORIDE 10 MILLIGRAM(S): 5 TABLET ORAL at 07:50

## 2021-11-14 RX ADMIN — PIPERACILLIN AND TAZOBACTAM 200 GRAM(S): 4; .5 INJECTION, POWDER, LYOPHILIZED, FOR SOLUTION INTRAVENOUS at 06:01

## 2021-11-14 RX ADMIN — GABAPENTIN 600 MILLIGRAM(S): 400 CAPSULE ORAL at 12:19

## 2021-11-14 RX ADMIN — HYDROMORPHONE HYDROCHLORIDE 0.5 MILLIGRAM(S): 2 INJECTION INTRAMUSCULAR; INTRAVENOUS; SUBCUTANEOUS at 16:30

## 2021-11-14 RX ADMIN — Medication 650 MILLIGRAM(S): at 00:10

## 2021-11-14 RX ADMIN — HYDROMORPHONE HYDROCHLORIDE 0.5 MILLIGRAM(S): 2 INJECTION INTRAMUSCULAR; INTRAVENOUS; SUBCUTANEOUS at 21:09

## 2021-11-14 RX ADMIN — PIPERACILLIN AND TAZOBACTAM 200 GRAM(S): 4; .5 INJECTION, POWDER, LYOPHILIZED, FOR SOLUTION INTRAVENOUS at 00:08

## 2021-11-14 RX ADMIN — CASPOFUNGIN ACETATE 260 MILLIGRAM(S): 7 INJECTION, POWDER, LYOPHILIZED, FOR SOLUTION INTRAVENOUS at 07:09

## 2021-11-14 RX ADMIN — CEFTRIAXONE 100 MILLIGRAM(S): 500 INJECTION, POWDER, FOR SOLUTION INTRAMUSCULAR; INTRAVENOUS at 17:18

## 2021-11-14 RX ADMIN — OXYCODONE HYDROCHLORIDE 10 MILLIGRAM(S): 5 TABLET ORAL at 15:40

## 2021-11-14 RX ADMIN — GABAPENTIN 600 MILLIGRAM(S): 400 CAPSULE ORAL at 23:03

## 2021-11-14 RX ADMIN — GABAPENTIN 600 MILLIGRAM(S): 400 CAPSULE ORAL at 17:18

## 2021-11-14 RX ADMIN — OXYCODONE HYDROCHLORIDE 10 MILLIGRAM(S): 5 TABLET ORAL at 08:45

## 2021-11-14 RX ADMIN — HYDROMORPHONE HYDROCHLORIDE 0.5 MILLIGRAM(S): 2 INJECTION INTRAMUSCULAR; INTRAVENOUS; SUBCUTANEOUS at 09:30

## 2021-11-14 RX ADMIN — Medication 1 TABLET(S): at 12:19

## 2021-11-14 RX ADMIN — OXYCODONE HYDROCHLORIDE 10 MILLIGRAM(S): 5 TABLET ORAL at 14:44

## 2021-11-14 RX ADMIN — Medication 650 MILLIGRAM(S): at 12:19

## 2021-11-14 RX ADMIN — GABAPENTIN 600 MILLIGRAM(S): 400 CAPSULE ORAL at 05:12

## 2021-11-14 RX ADMIN — HYDROMORPHONE HYDROCHLORIDE 0.5 MILLIGRAM(S): 2 INJECTION INTRAMUSCULAR; INTRAVENOUS; SUBCUTANEOUS at 21:39

## 2021-11-14 RX ADMIN — OXYCODONE HYDROCHLORIDE 10 MILLIGRAM(S): 5 TABLET ORAL at 02:18

## 2021-11-14 RX ADMIN — HYDROMORPHONE HYDROCHLORIDE 0.5 MILLIGRAM(S): 2 INJECTION INTRAMUSCULAR; INTRAVENOUS; SUBCUTANEOUS at 03:20

## 2021-11-14 RX ADMIN — CHLORHEXIDINE GLUCONATE 1 APPLICATION(S): 213 SOLUTION TOPICAL at 05:13

## 2021-11-14 RX ADMIN — Medication 1 TABLET(S): at 12:25

## 2021-11-14 RX ADMIN — Medication 650 MILLIGRAM(S): at 06:12

## 2021-11-14 RX ADMIN — Medication 650 MILLIGRAM(S): at 01:10

## 2021-11-14 RX ADMIN — GABAPENTIN 600 MILLIGRAM(S): 400 CAPSULE ORAL at 00:10

## 2021-11-14 RX ADMIN — HYDROMORPHONE HYDROCHLORIDE 0.5 MILLIGRAM(S): 2 INJECTION INTRAMUSCULAR; INTRAVENOUS; SUBCUTANEOUS at 16:45

## 2021-11-14 RX ADMIN — OXYCODONE HYDROCHLORIDE 10 MILLIGRAM(S): 5 TABLET ORAL at 20:01

## 2021-11-14 RX ADMIN — Medication 650 MILLIGRAM(S): at 05:12

## 2021-11-14 RX ADMIN — HYDROMORPHONE HYDROCHLORIDE 0.5 MILLIGRAM(S): 2 INJECTION INTRAMUSCULAR; INTRAVENOUS; SUBCUTANEOUS at 02:51

## 2021-11-14 RX ADMIN — HYDROMORPHONE HYDROCHLORIDE 0.5 MILLIGRAM(S): 2 INJECTION INTRAMUSCULAR; INTRAVENOUS; SUBCUTANEOUS at 09:12

## 2021-11-14 RX ADMIN — ENOXAPARIN SODIUM 40 MILLIGRAM(S): 100 INJECTION SUBCUTANEOUS at 09:12

## 2021-11-14 RX ADMIN — Medication 3 MILLIGRAM(S): at 21:09

## 2021-11-14 RX ADMIN — SENNA PLUS 2 TABLET(S): 8.6 TABLET ORAL at 21:09

## 2021-11-14 RX ADMIN — Medication 1 APPLICATION(S): at 12:19

## 2021-11-14 RX ADMIN — OXYCODONE HYDROCHLORIDE 10 MILLIGRAM(S): 5 TABLET ORAL at 01:18

## 2021-11-14 RX ADMIN — Medication 650 MILLIGRAM(S): at 18:10

## 2021-11-14 RX ADMIN — Medication 1 APPLICATION(S): at 12:20

## 2021-11-14 RX ADMIN — Medication 650 MILLIGRAM(S): at 13:00

## 2021-11-14 RX ADMIN — Medication 650 MILLIGRAM(S): at 23:04

## 2021-11-14 RX ADMIN — Medication 650 MILLIGRAM(S): at 17:18

## 2021-11-14 RX ADMIN — OXYCODONE HYDROCHLORIDE 10 MILLIGRAM(S): 5 TABLET ORAL at 21:01

## 2021-11-14 RX ADMIN — PIPERACILLIN AND TAZOBACTAM 200 GRAM(S): 4; .5 INJECTION, POWDER, LYOPHILIZED, FOR SOLUTION INTRAVENOUS at 12:18

## 2021-11-14 NOTE — PROGRESS NOTE ADULT - SUBJECTIVE AND OBJECTIVE BOX
Progress Note: Plastic Surgery    SUBJECTIVE: Patient seen and examined bedside. Resting comfortably in bed.  No acute events    caspofungin IVPB 50 milliGRAM(s) IV Intermittent every 24 hours  DAPTOmycin IVPB 500 milliGRAM(s) IV Intermittent every 24 hours  enoxaparin Injectable 40 milliGRAM(s) SubCutaneous every 24 hours  piperacillin/tazobactam IVPB.. 3.375 Gram(s) IV Intermittent every 6 hours      Vital Signs Last 24 Hrs  T(C): 36.4 (2021 04:52), Max: 36.8 (2021 14:05)  T(F): 97.5 (2021 04:52), Max: 98.3 (2021 14:05)  HR: 75 (2021 04:52) (75 - 83)  BP: 115/70 (2021 04:52) (106/72 - 118/79)  BP(mean): --  RR: 17 (2021 04:52) (17 - 18)  SpO2: 95% (2021 04:52) (95% - 98%)  I&O's Detail    2021 07:01  -  2021 07:00  --------------------------------------------------------  IN:    IV PiggyBack: 100 mL    Oral Fluid: 1150 mL  Total IN: 1250 mL    OUT:    Voided (mL): 2100 mL  Total OUT: 2100 mL    Total NET: -850 mL        PHYSICAL EXAM:   General: NAD, resting comfortably in bed  C/V: NSR  Pulm: Nonlabored breathing, no respiratory distress  Extrem: LLE-ex fix in place, stsg with good take, +granulation tissue, aquaphor applied to flap. No signs of localized infection        LABS:                        9.3    4.04  )-----------( 366      ( 2021 08:43 )             29.0         143  |  105  |  6<L>  ----------------------------<  90  3.8   |  25  |  0.84    Ca    9.6      2021 08:43  Phos  4.7     11-10  Mg     1.8     11-10    TPro  7.0  /  Alb  3.8  /  TBili  0.6  /  DBili  x   /  AST  15  /  ALT  31  /  AlkPhos  61  11-11      Urinalysis Basic - ( 10 Nov 2021 11:55 )    Color: Yellow / Appearance: Clear / S.020 / pH: x  Gluc: x / Ketone: NEGATIVE  / Bili: Negative / Urobili: 0.2 E.U./dL   Blood: x / Protein: NEGATIVE mg/dL / Nitrite: NEGATIVE   Leuk Esterase: NEGATIVE / RBC: x / WBC x   Sq Epi: x / Non Sq Epi: x / Bacteria: x        RADIOLOGY & ADDITIONAL STUDIES:

## 2021-11-14 NOTE — PROGRESS NOTE ADULT - ASSESSMENT
IMPRESSION:  26F PMHx complex L ankle fracture with chronic OM s/p multiple debridements, s/p gracilis FF and STSG L thigh with ongoing OM, need for revision and external fixation.  She is s/p >6 week course of nafcillin and ceftriaxone (MSSA and Strep anginosus), and was recently d/tyson on daptomycin and caspofungin (E. faecalis, Staph epi, C. albicans and C. parapsilosis). P/w subjective fevers, onset <48 h after d/c, new leukocytosis in outpatient labs from 11/6.  She had low grade temp increase in the ED with WBC 12.5K.  Source unclear.  Leukocytosis resolved on Zosyn.  She has no localizing findings for infection outside of her ankle,     Of note, her WBC count has been trending down as has her hemoglobin and platelets.  This suggests beginnings of bone marrow suppression which could be due to her antibiotics.     Recommend:  1.  Stop Zosyn  2.  Start Ceftriaxone 2 grams IV daily  3.  Continue Daptomycin 500 mg IV daily  4.  Continue Caspofungin 50mg IV daily  5.  Follow up gallium scan   6.  Check CBC with differential daily     ID Team 1 will follow.

## 2021-11-14 NOTE — PROGRESS NOTE ADULT - ASSESSMENT
26F h/o L ankle fracture with septic arthritis, postoperative compartment syndrome s/p Left ankle recon with gracilis free flap and STSG  microvascular anastomosis with recipient AT on 9/20. RTOR on 10/18 for debridement of gracilis flap, application of external fixator c/b partial necrosis of skin and subcutaneous fat of gracilis myocutaneous free flap now s/p repeat flap debridement and STSG (10/25). Readmitted on 11/8 for workup of fever of unknown origin. No external signs of LE infection.     Regular diet  C/w daptomycin/caspofungin/ zosyn  UA negative  BCx- NGTD  ID following-appreciate recs  F/u Gallium scan   F/u AM labs  Daily dressings changes to LLE with aquaphor and ACE bandage  Aquaphor to left anterior thigh  Analgesics PRN  Antiemetics PRN  OOBA/ work w/ PT  Further management per Ortho Sx.

## 2021-11-14 NOTE — PROGRESS NOTE ADULT - SUBJECTIVE AND OBJECTIVE BOX
INTERVAL HPI/OVERNIGHT EVENTS:    patient was seen and examined at bedside.  No complaints     CONSTITUTIONAL:  Negative fever or chills, feels well, good appetite  EYES:  Negative  blurry vision or double vision  CARDIOVASCULAR:  Negative for chest pain or palpitations  RESPIRATORY:  Negative for cough, wheezing, or SOB   GASTROINTESTINAL:  Negative for nausea, vomiting, diarrhea, constipation, or abdominal pain  GENITOURINARY:  Negative frequency, urgency or dysuria  NEUROLOGIC:  No headache, confusion, dizziness, lightheadedness      ANTIBIOTICS/RELEVANT:    MEDICATIONS  (STANDING):  acetaminophen     Tablet .. 650 milliGRAM(s) Oral every 6 hours  AQUAPHOR (petrolatum Ointment) 1 Application(s) Topical daily  BACItracin   Ointment 1 Application(s) Topical daily  calcium carbonate   1250 mG (OsCal) 1 Tablet(s) Oral daily  caspofungin IVPB 50 milliGRAM(s) IV Intermittent every 24 hours  chlorhexidine 4% Liquid 1 Application(s) Topical <User Schedule>  DAPTOmycin IVPB 500 milliGRAM(s) IV Intermittent every 24 hours  enoxaparin Injectable 40 milliGRAM(s) SubCutaneous every 24 hours  gabapentin 600 milliGRAM(s) Oral four times a day  influenza   Vaccine 0.5 milliLiter(s) IntraMuscular once  melatonin 3 milliGRAM(s) Oral at bedtime  multivitamin 1 Tablet(s) Oral daily  piperacillin/tazobactam IVPB.. 3.375 Gram(s) IV Intermittent every 6 hours  polyethylene glycol 3350 17 Gram(s) Oral two times a day  senna 2 Tablet(s) Oral at bedtime    MEDICATIONS  (PRN):  bisacodyl Suppository 10 milliGRAM(s) Rectal daily PRN Constipation  cyclobenzaprine 5 milliGRAM(s) Oral three times a day PRN Muscle Spasm  diphenhydrAMINE 25 milliGRAM(s) Oral every 4 hours PRN Rash and/or Itching  HYDROmorphone  Injectable 0.5 milliGRAM(s) IV Push every 4 hours PRN severe breakthrough pain if not yet due for PO  ondansetron Injectable 4 milliGRAM(s) IV Push every 6 hours PRN Nausea  oxyCODONE    IR 5 milliGRAM(s) Oral every 4 hours PRN Moderate Pain (4 - 6)  oxyCODONE    IR 10 milliGRAM(s) Oral every 4 hours PRN Severe Pain (7 - 10)  sodium chloride 0.9% lock flush 10 milliLiter(s) IV Push every 1 hour PRN Pre/post blood products, medications, blood draw, and to maintain line patency        Vital Signs Last 24 Hrs  T(C): 36.7 (14 Nov 2021 14:25), Max: 36.9 (13 Nov 2021 22:12)  T(F): 98.1 (14 Nov 2021 14:25), Max: 98.5 (13 Nov 2021 22:12)  HR: 69 (14 Nov 2021 14:25) (69 - 97)  BP: 100/67 (14 Nov 2021 14:25) (90/60 - 114/77)  BP(mean): 75 (14 Nov 2021 05:16) (75 - 75)  RR: 17 (14 Nov 2021 14:25) (15 - 18)  SpO2: 98% (14 Nov 2021 14:25) (96% - 98%)    PHYSICAL EXAM:  Constitutional:  non-toxic, no distress  Eyes:  no icterus   Ear/Nose/Throat: no oral lesion   Neck:  supple  Respiratory: CTA rhett  Cardiovascular: S1S2 RRR, no murmurs  Gastrointestinal:soft, (+) BS, no HSM  Extremities: left leg with external fixator device  Vascular: DP Pulse:	right normal; left normal      LABS:                        9.2    3.51  )-----------( 282      ( 14 Nov 2021 07:17 )             29.0     11-14    141  |  108  |  6<L>  ----------------------------<  81  3.9   |  22  |  0.80    Ca    9.4      14 Nov 2021 07:17            MICROBIOLOGY:    Culture - Blood (11.08.21 @ 22:14)    Specimen Source: .Blood Blood-Venous    Culture Results:   No growth at 5 days.        RADIOLOGY & ADDITIONAL STUDIES:

## 2021-11-15 ENCOUNTER — TRANSCRIPTION ENCOUNTER (OUTPATIENT)
Age: 26
End: 2021-11-15

## 2021-11-15 LAB
ANION GAP SERPL CALC-SCNC: 10 MMOL/L — SIGNIFICANT CHANGE UP (ref 5–17)
BASOPHILS # BLD AUTO: 0.02 K/UL — SIGNIFICANT CHANGE UP (ref 0–0.2)
BASOPHILS NFR BLD AUTO: 0.5 % — SIGNIFICANT CHANGE UP (ref 0–2)
BUN SERPL-MCNC: 11 MG/DL — SIGNIFICANT CHANGE UP (ref 7–23)
CALCIUM SERPL-MCNC: 9.6 MG/DL — SIGNIFICANT CHANGE UP (ref 8.4–10.5)
CHLORIDE SERPL-SCNC: 104 MMOL/L — SIGNIFICANT CHANGE UP (ref 96–108)
CO2 SERPL-SCNC: 25 MMOL/L — SIGNIFICANT CHANGE UP (ref 22–31)
CREAT SERPL-MCNC: 0.82 MG/DL — SIGNIFICANT CHANGE UP (ref 0.5–1.3)
CRP SERPL-MCNC: 7.3 MG/L — HIGH (ref 0–4)
EOSINOPHIL # BLD AUTO: 0.27 K/UL — SIGNIFICANT CHANGE UP (ref 0–0.5)
EOSINOPHIL NFR BLD AUTO: 6.4 % — HIGH (ref 0–6)
ERYTHROCYTE [SEDIMENTATION RATE] IN BLOOD: 50 MM/HR — HIGH
GLUCOSE SERPL-MCNC: 98 MG/DL — SIGNIFICANT CHANGE UP (ref 70–99)
HCT VFR BLD CALC: 28.8 % — LOW (ref 34.5–45)
HGB BLD-MCNC: 9.6 G/DL — LOW (ref 11.5–15.5)
IMM GRANULOCYTES NFR BLD AUTO: 0.2 % — SIGNIFICANT CHANGE UP (ref 0–1.5)
LYMPHOCYTES # BLD AUTO: 1.54 K/UL — SIGNIFICANT CHANGE UP (ref 1–3.3)
LYMPHOCYTES # BLD AUTO: 36.6 % — SIGNIFICANT CHANGE UP (ref 13–44)
MCHC RBC-ENTMCNC: 29.7 PG — SIGNIFICANT CHANGE UP (ref 27–34)
MCHC RBC-ENTMCNC: 33.3 GM/DL — SIGNIFICANT CHANGE UP (ref 32–36)
MCV RBC AUTO: 89.2 FL — SIGNIFICANT CHANGE UP (ref 80–100)
MONOCYTES # BLD AUTO: 0.43 K/UL — SIGNIFICANT CHANGE UP (ref 0–0.9)
MONOCYTES NFR BLD AUTO: 10.2 % — SIGNIFICANT CHANGE UP (ref 2–14)
NEUTROPHILS # BLD AUTO: 1.94 K/UL — SIGNIFICANT CHANGE UP (ref 1.8–7.4)
NEUTROPHILS NFR BLD AUTO: 46.1 % — SIGNIFICANT CHANGE UP (ref 43–77)
NRBC # BLD: 0 /100 WBCS — SIGNIFICANT CHANGE UP (ref 0–0)
PLATELET # BLD AUTO: 313 K/UL — SIGNIFICANT CHANGE UP (ref 150–400)
POTASSIUM SERPL-MCNC: 3.5 MMOL/L — SIGNIFICANT CHANGE UP (ref 3.5–5.3)
POTASSIUM SERPL-SCNC: 3.5 MMOL/L — SIGNIFICANT CHANGE UP (ref 3.5–5.3)
RBC # BLD: 3.23 M/UL — LOW (ref 3.8–5.2)
RBC # FLD: 17.5 % — HIGH (ref 10.3–14.5)
SARS-COV-2 RNA SPEC QL NAA+PROBE: SIGNIFICANT CHANGE UP
SODIUM SERPL-SCNC: 139 MMOL/L — SIGNIFICANT CHANGE UP (ref 135–145)
WBC # BLD: 4.21 K/UL — SIGNIFICANT CHANGE UP (ref 3.8–10.5)
WBC # FLD AUTO: 4.21 K/UL — SIGNIFICANT CHANGE UP (ref 3.8–10.5)

## 2021-11-15 PROCEDURE — 99232 SBSQ HOSP IP/OBS MODERATE 35: CPT | Mod: GC

## 2021-11-15 RX ORDER — OXYCODONE HYDROCHLORIDE 5 MG/1
5 TABLET ORAL EVERY 4 HOURS
Refills: 0 | Status: DISCONTINUED | OUTPATIENT
Start: 2021-11-15 | End: 2021-11-19

## 2021-11-15 RX ADMIN — ENOXAPARIN SODIUM 40 MILLIGRAM(S): 100 INJECTION SUBCUTANEOUS at 09:48

## 2021-11-15 RX ADMIN — HYDROMORPHONE HYDROCHLORIDE 0.5 MILLIGRAM(S): 2 INJECTION INTRAMUSCULAR; INTRAVENOUS; SUBCUTANEOUS at 01:51

## 2021-11-15 RX ADMIN — GABAPENTIN 600 MILLIGRAM(S): 400 CAPSULE ORAL at 05:22

## 2021-11-15 RX ADMIN — GABAPENTIN 600 MILLIGRAM(S): 400 CAPSULE ORAL at 23:02

## 2021-11-15 RX ADMIN — OXYCODONE HYDROCHLORIDE 10 MILLIGRAM(S): 5 TABLET ORAL at 01:15

## 2021-11-15 RX ADMIN — Medication 650 MILLIGRAM(S): at 06:22

## 2021-11-15 RX ADMIN — Medication 650 MILLIGRAM(S): at 05:22

## 2021-11-15 RX ADMIN — HYDROMORPHONE HYDROCHLORIDE 0.5 MILLIGRAM(S): 2 INJECTION INTRAMUSCULAR; INTRAVENOUS; SUBCUTANEOUS at 19:20

## 2021-11-15 RX ADMIN — Medication 650 MILLIGRAM(S): at 00:04

## 2021-11-15 RX ADMIN — PIPERACILLIN AND TAZOBACTAM 200 GRAM(S): 4; .5 INJECTION, POWDER, LYOPHILIZED, FOR SOLUTION INTRAVENOUS at 23:05

## 2021-11-15 RX ADMIN — Medication 3 MILLIGRAM(S): at 21:51

## 2021-11-15 RX ADMIN — HYDROMORPHONE HYDROCHLORIDE 0.5 MILLIGRAM(S): 2 INJECTION INTRAMUSCULAR; INTRAVENOUS; SUBCUTANEOUS at 18:57

## 2021-11-15 RX ADMIN — Medication 1 TABLET(S): at 11:39

## 2021-11-15 RX ADMIN — HYDROMORPHONE HYDROCHLORIDE 0.5 MILLIGRAM(S): 2 INJECTION INTRAMUSCULAR; INTRAVENOUS; SUBCUTANEOUS at 01:21

## 2021-11-15 RX ADMIN — OXYCODONE HYDROCHLORIDE 10 MILLIGRAM(S): 5 TABLET ORAL at 22:50

## 2021-11-15 RX ADMIN — DAPTOMYCIN 120 MILLIGRAM(S): 500 INJECTION, POWDER, LYOPHILIZED, FOR SOLUTION INTRAVENOUS at 17:50

## 2021-11-15 RX ADMIN — OXYCODONE HYDROCHLORIDE 10 MILLIGRAM(S): 5 TABLET ORAL at 00:15

## 2021-11-15 RX ADMIN — Medication 650 MILLIGRAM(S): at 12:30

## 2021-11-15 RX ADMIN — OXYCODONE HYDROCHLORIDE 10 MILLIGRAM(S): 5 TABLET ORAL at 12:40

## 2021-11-15 RX ADMIN — OXYCODONE HYDROCHLORIDE 10 MILLIGRAM(S): 5 TABLET ORAL at 17:14

## 2021-11-15 RX ADMIN — GABAPENTIN 600 MILLIGRAM(S): 400 CAPSULE ORAL at 17:50

## 2021-11-15 RX ADMIN — OXYCODONE HYDROCHLORIDE 10 MILLIGRAM(S): 5 TABLET ORAL at 21:50

## 2021-11-15 RX ADMIN — CHLORHEXIDINE GLUCONATE 1 APPLICATION(S): 213 SOLUTION TOPICAL at 06:08

## 2021-11-15 RX ADMIN — HYDROMORPHONE HYDROCHLORIDE 0.5 MILLIGRAM(S): 2 INJECTION INTRAMUSCULAR; INTRAVENOUS; SUBCUTANEOUS at 13:45

## 2021-11-15 RX ADMIN — Medication 1 TABLET(S): at 11:40

## 2021-11-15 RX ADMIN — Medication 650 MILLIGRAM(S): at 18:50

## 2021-11-15 RX ADMIN — CASPOFUNGIN ACETATE 260 MILLIGRAM(S): 7 INJECTION, POWDER, LYOPHILIZED, FOR SOLUTION INTRAVENOUS at 06:19

## 2021-11-15 RX ADMIN — Medication 650 MILLIGRAM(S): at 17:50

## 2021-11-15 RX ADMIN — HYDROMORPHONE HYDROCHLORIDE 0.5 MILLIGRAM(S): 2 INJECTION INTRAMUSCULAR; INTRAVENOUS; SUBCUTANEOUS at 23:30

## 2021-11-15 RX ADMIN — HYDROMORPHONE HYDROCHLORIDE 0.5 MILLIGRAM(S): 2 INJECTION INTRAMUSCULAR; INTRAVENOUS; SUBCUTANEOUS at 13:28

## 2021-11-15 RX ADMIN — HYDROMORPHONE HYDROCHLORIDE 0.5 MILLIGRAM(S): 2 INJECTION INTRAMUSCULAR; INTRAVENOUS; SUBCUTANEOUS at 23:02

## 2021-11-15 RX ADMIN — HYDROMORPHONE HYDROCHLORIDE 0.5 MILLIGRAM(S): 2 INJECTION INTRAMUSCULAR; INTRAVENOUS; SUBCUTANEOUS at 06:08

## 2021-11-15 RX ADMIN — Medication 650 MILLIGRAM(S): at 23:02

## 2021-11-15 RX ADMIN — OXYCODONE HYDROCHLORIDE 10 MILLIGRAM(S): 5 TABLET ORAL at 04:34

## 2021-11-15 RX ADMIN — PIPERACILLIN AND TAZOBACTAM 200 GRAM(S): 4; .5 INJECTION, POWDER, LYOPHILIZED, FOR SOLUTION INTRAVENOUS at 17:14

## 2021-11-15 RX ADMIN — HYDROMORPHONE HYDROCHLORIDE 0.5 MILLIGRAM(S): 2 INJECTION INTRAMUSCULAR; INTRAVENOUS; SUBCUTANEOUS at 06:38

## 2021-11-15 RX ADMIN — SENNA PLUS 2 TABLET(S): 8.6 TABLET ORAL at 21:50

## 2021-11-15 RX ADMIN — OXYCODONE HYDROCHLORIDE 10 MILLIGRAM(S): 5 TABLET ORAL at 11:40

## 2021-11-15 RX ADMIN — Medication 1 APPLICATION(S): at 11:40

## 2021-11-15 RX ADMIN — Medication 1 APPLICATION(S): at 11:57

## 2021-11-15 RX ADMIN — Medication 650 MILLIGRAM(S): at 11:39

## 2021-11-15 RX ADMIN — GABAPENTIN 600 MILLIGRAM(S): 400 CAPSULE ORAL at 11:40

## 2021-11-15 RX ADMIN — OXYCODONE HYDROCHLORIDE 10 MILLIGRAM(S): 5 TABLET ORAL at 18:14

## 2021-11-15 NOTE — DISCHARGE NOTE PROVIDER - CARE PROVIDERS DIRECT ADDRESSES
[email or web address]

## 2021-11-15 NOTE — DISCHARGE NOTE PROVIDER - NSDCMRMEDTOKEN_GEN_ALL_CORE_FT
acetaminophen 325 mg oral tablet: 3 tab(s) orally every 6 hours, As Needed -for mild pain   Allergy Relief (Diphenhydramine HCl) 25 mg oral capsule: 1 cap(s) orally once a day (at bedtime), As Needed -Rash and/or Itching   Baciguent 500 units/g topical ointment: 1 application topically once a day  bacitracin 500 units/g topical ointment: 1 application topically once a day  bisacodyl 10 mg rectal suppository: 1 suppository(ies) rectal once a day, As needed, Constipation  calcium carbonate 1250 mg (500 mg elemental calcium) oral tablet: 1 tab(s) orally once a day  Calcium Oyster Shell 1250 mg (500 mg elemental calcium) oral tablet: 1 tab(s) orally once a day  caspofungin:   caspofungin 50 mg intravenous injection: 50 milligram(s) intravenously every 24 hours   Caspofungin 50mg q24hrs IV via PICC x 6 weeks until12/1/21:   cholecalciferol oral tablet: 1000 unit(s) orally once a day  cholecalciferol oral tablet: 1000 unit(s) orally once a day  DAPTOmycin 500 mg intravenous injection: 500 milligram(s) intravenous every 24 hours  Daptomycin 500mg q24hrs IV via PICC x 6 weeks until 12/1/21:   diphenhydrAMINE 25 mg oral capsule: 1 cap(s) orally every 4 hours, As needed, Rash and/or Itching  Heparin 10units/ml 3ml post infusions:   heparin flush 10 units/mL intravenous solution: 3 milliliter(s) intravenously 2 times a day   Melatonin 3 mg oral tablet: 1 tab(s) orally once a day (at bedtime)  Multiple Vitamins oral tablet: 1 tab(s) orally once a day  Multiple Vitamins oral tablet: 1 tab(s) orally once a day  Neurontin 600 mg oral tablet: 1 tab(s) orally every 6 hours MDD:4  Normal saline 0.9% 10ml pre/post infusions:   Normal Saline Flush 0.9% injectable solution: 10 milliliter(s) injectable 2 times a day   oxyCODONE 5 mg oral tablet: 1 tab(s) orally every 4 hours, As Needed -for severe pain MDD:10   petrolatum topical ointment: 1 application topically 2 times a day  Please obtain the following labs once every week: CBC, CMP, ESR, CRP and CPK:   polyethylene glycol 3350 oral powder for reconstitution: 17 gram(s) orally 2 times a day  polyethylene glycol 3350 oral powder for reconstitution: 17 gram(s) orally 2 times a day  senna oral tablet: 2 tab(s) orally once a day (at bedtime)  senna oral tablet: 2 tab(s) orally once a day (at bedtime)  sodium chloride 0.9% injectable solution: 10 milliliter(s) injectable every 4 hours to flush picc line before/after medication is administered  Weekly labs x 4 weeks starting 11/17/21: CBC, CMP, ESR, CRP Results to Dr. Gregorio Herman Fax [phone]:   Zosyn 3.375g q6hrs IV via PICC x 4 weeks until 12/7/21:    acetaminophen 325 mg oral tablet: 3 tab(s) orally every 8 hours  bacitracin 500 units/g topical ointment: 1 application topically once a day  calcium carbonate 1250 mg (500 mg elemental calcium) oral tablet: 1 tab(s) orally once a day  Calcium Oyster Shell 1250 mg (500 mg elemental calcium) oral tablet: 1 tab(s) orally once a day  caspofungin 50 mg intravenous injection: 50 milligram(s) intravenous every 24 hours  Caspofungin 50mg q24hrs IV via PICC x 6 weeks until12/1/21:   cholecalciferol oral tablet: 1000 unit(s) orally once a day  Daptomycin 500mg q24hrs IV via PICC x 6 weeks until 12/1/21:   Heparin 10units/ml 3ml post infusions:   Multiple Vitamins oral tablet: 1 tab(s) orally once a day  Multiple Vitamins oral tablet: 1 tab(s) orally once a day  Neurontin 600 mg oral tablet: 1 tab(s) orally every 6 hours MDD:4  Normal saline 0.9% 10ml pre/post infusions:   Normal Saline Flush 0.9% injectable solution: 10 milliliter(s) injectable 2 times a day   oxyCODONE 5 mg oral tablet: 1-2 tab(s) orally every 4-6 hours, As needed, Moderate to severe pain MDD:6  petrolatum topical ointment: 1 application topically once a day  Please obtain the following labs once every week: CBC, CMP, ESR, CRP and CPK:   polyethylene glycol 3350 oral powder for reconstitution: 17 gram(s) orally 2 times a day  polyethylene glycol 3350 oral powder for reconstitution: 17 gram(s) orally 2 times a day  senna oral tablet: 2 tab(s) orally once a day (at bedtime)  sodium chloride 0.9% injectable solution: 10 milliliter(s) injectable every 4 hours to flush picc line before/after medication is administered  Weekly labs x 4 weeks starting 11/17/21: CBC, CMP, ESR, CRP Results to Dr. Gregorio Herman Fax [phone]:   Zosyn 3.375g q6hrs IV via PICC x 4 weeks until 12/7/21:

## 2021-11-15 NOTE — DISCHARGE NOTE PROVIDER - NSDCFUSCHEDAPPT_GEN_ALL_CORE_FT
NAPOLEON RODRIGUEZ ; 11/19/2021 ; [facility]  [address]   NAPOLEON RODRIGUEZ ; 11/22/2021 ; NP Orthosurg [address] NAPOLEON RODRIGUEZ ; 11/22/2021 ; NPP Orthosurg [address]

## 2021-11-15 NOTE — DISCHARGE NOTE PROVIDER - NSDCFUADDINST_GEN_ALL_CORE_FT
Weight bearing status:  IV Antibiotics via PICC: Daptomycin 500mg daily till 12/1, Caspofungin 50mg daily till 12/1, Zosyn 3.375g q6hrs till 12/7.  Plastic surgery recommendations: Daily dressings changes to LLE with aquaphor, petroleum gauze, and ACE bandage. Aquaphor to left anterior thigh.    No strenuous activity, heavy lifting, driving or returning to work until cleared by MD.  Try to have regular bowel movements, take stool softener or laxative if necessary.  Contact your doctor if you experience: fever greater than 101.5, chills, chest pain, difficulty breathing, redness or excessive drainage around the incision, other concerns.  Follow up with your primary care provider.  Weight bearing status:  IV Antibiotics via PICC: Daptomycin 500mg daily till 12/1, Caspofungin 50mg daily till 12/1, Zosyn 3.375g q6hrs till 12/7.  Plastic surgery recommendations: Daily dressings changes to LLE with aquaphor, petroleum gauze, wet to dry dressing to anterolateral proximal and distal areas of wound breakdown, and ACE bandage. Aquaphor to left anterior thigh.    No strenuous activity, heavy lifting, driving or returning to work until cleared by MD.  Try to have regular bowel movements, take stool softener or laxative if necessary.  Contact your doctor if you experience: fever greater than 101.5, chills, chest pain, difficulty breathing, redness or excessive drainage around the incision, other concerns.  Follow up with your primary care provider.  Weight bearing status: WBAT left lower extremity in external fixator   IV Antibiotics via PICC: Daptomycin 500mg daily till 12/1, Caspofungin 50mg daily till 12/1, Zosyn 3.375g q6hrs till 12/7.  Plastic surgery recommendations: Daily dressings changes to LLE with aquaphor, petroleum gauze, wet to dry dressing to anterolateral proximal and distal areas of wound breakdown, and ACE bandage. Aquaphor to left anterior thigh.  Take Aspirin 81mg twice daily for 30 days   No strenuous activity, heavy lifting, driving or returning to work until cleared by MD.  Try to have regular bowel movements, take stool softener or laxative if necessary.  Contact your doctor if you experience: fever greater than 101.5, chills, chest pain, difficulty breathing, redness or excessive drainage around the incision, other concerns.  Follow up with your primary care provider.   Outpatient follow up with Dr. Carmona within 1 week of discharge

## 2021-11-15 NOTE — PROGRESS NOTE ADULT - SUBJECTIVE AND OBJECTIVE BOX
Ortho Note    Pt comfortable without complaints, pain controlled  Denies CP, SOB, N/V, numbness/tingling     Vital Signs Last 24 Hrs  T(C): 36.8 (11-15-21 @ 14:06), Max: 36.8 (11-15-21 @ 14:06)  T(F): 98.2 (11-15-21 @ 14:06), Max: 98.2 (11-15-21 @ 14:06)  HR: 85 (11-15-21 @ 14:06) (85 - 85)  BP: 103/75 (11-15-21 @ 14:06) (103/75 - 103/75)  BP(mean): --  RR: 17 (11-15-21 @ 14:06) (17 - 17)  SpO2: 98% (11-15-21 @ 14:06) (98% - 98%)  I&O's Summary    14 Nov 2021 07:01  -  15 Nov 2021 07:00  --------------------------------------------------------  IN: 960 mL / OUT: 2200 mL / NET: -1240 mL    15 Nov 2021 07:01  -  15 Nov 2021 16:16  --------------------------------------------------------  IN: 780 mL / OUT: 850 mL / NET: -70 mL        General: Pt Alert and oriented, NAD  PICC in place Left arm  DSG C/D/I LLE  Pulses intact LLE  Sensation intact LLE  Motor: EHL/FHL intact LLE                          9.6    4.21  )-----------( 313      ( 15 Nov 2021 06:58 )             28.8     11-15    139  |  104  |  11  ----------------------------<  98  3.5   |  25  |  0.82    Ca    9.6      15 Nov 2021 06:58        A/P: 26yFemale s/p complex L ankle fracture s/p multiple I&D, revision surgeries, ringed external fixator placement, and flap coverage by PRS admitted for elevated wbc  - Stable  - wbc normalized  - trend cbc w/ diff  - ID recs appreciated  - plastics recs appreciated  - IV abx  - Pain Control  - DVT ppx: lovenox  - PT, WBS: NWB LLE    Ortho Pager [phone]

## 2021-11-15 NOTE — PROGRESS NOTE ADULT - SUBJECTIVE AND OBJECTIVE BOX
INTERVAL HPI/OVERNIGHT EVENTS:    Patient was seen and examined at bedside.  No complaints     CONSTITUTIONAL:  Negative fever or chills, feels well, good appetite  EYES:  Negative  blurry vision or double vision  CARDIOVASCULAR:  Negative for chest pain or palpitations  RESPIRATORY:  Negative for cough, wheezing, or SOB   GASTROINTESTINAL:  Negative for nausea, vomiting, diarrhea, constipation, or abdominal pain  GENITOURINARY:  Negative frequency, urgency or dysuria  NEUROLOGIC:  No headache, confusion, dizziness, lightheadedness      ANTIBIOTICS/RELEVANT:    MEDICATIONS  (STANDING):  acetaminophen     Tablet .. 650 milliGRAM(s) Oral every 6 hours  AQUAPHOR (petrolatum Ointment) 1 Application(s) Topical daily  BACItracin   Ointment 1 Application(s) Topical daily  calcium carbonate   1250 mG (OsCal) 1 Tablet(s) Oral daily  caspofungin IVPB 50 milliGRAM(s) IV Intermittent every 24 hours  chlorhexidine 4% Liquid 1 Application(s) Topical <User Schedule>  DAPTOmycin IVPB 500 milliGRAM(s) IV Intermittent every 24 hours  enoxaparin Injectable 40 milliGRAM(s) SubCutaneous every 24 hours  gabapentin 600 milliGRAM(s) Oral four times a day  influenza   Vaccine 0.5 milliLiter(s) IntraMuscular once  melatonin 3 milliGRAM(s) Oral at bedtime  multivitamin 1 Tablet(s) Oral daily  piperacillin/tazobactam IVPB.. 3.375 Gram(s) IV Intermittent every 6 hours  polyethylene glycol 3350 17 Gram(s) Oral two times a day  senna 2 Tablet(s) Oral at bedtime    MEDICATIONS  (PRN):  bisacodyl Suppository 10 milliGRAM(s) Rectal daily PRN Constipation  cyclobenzaprine 5 milliGRAM(s) Oral three times a day PRN Muscle Spasm  diphenhydrAMINE 25 milliGRAM(s) Oral every 4 hours PRN Rash and/or Itching  HYDROmorphone  Injectable 0.5 milliGRAM(s) IV Push every 4 hours PRN severe breakthrough pain if not yet due for PO  ondansetron Injectable 4 milliGRAM(s) IV Push every 6 hours PRN Nausea  oxyCODONE    IR 5 milliGRAM(s) Oral every 4 hours PRN Moderate Pain (4 - 6)  oxyCODONE    IR 10 milliGRAM(s) Oral every 4 hours PRN Severe Pain (7 - 10)  sodium chloride 0.9% lock flush 10 milliLiter(s) IV Push every 1 hour PRN Pre/post blood products, medications, blood draw, and to maintain line patency        Vital Signs Last 24 Hrs  T(C): 36.9 (15 Nov 2021 08:50), Max: 36.9 (15 Nov 2021 05:00)  T(F): 98.4 (15 Nov 2021 08:50), Max: 98.5 (15 Nov 2021 05:00)  HR: 79 (15 Nov 2021 08:50) (69 - 86)  BP: 98/65 (15 Nov 2021 08:50) (91/58 - 110/77)  BP(mean): 69 (15 Nov 2021 05:00) (69 - 69)  RR: 16 (15 Nov 2021 08:50) (16 - 17)  SpO2: 99% (15 Nov 2021 08:50) (96% - 99%)    PHYSICAL EXAM:  Constitutional:  non-toxic, no distress  Eyes:  no icterus   Ear/Nose/Throat: no oral lesion, no sinus tenderness on percussion	  Neck:  supple  Respiratory: CTA rhett  Cardiovascular: S1S2 RRR, no murmurs  Gastrointestinal:soft, (+) BS, no HSM  Extremities:  left foot with external fixator device  Vascular: DP Pulse:	right normal; left normal      LABS:                        9.6    4.21  )-----------( 313      ( 15 Nov 2021 06:58 )             28.8     11-15    139  |  104  |  11  ----------------------------<  98  3.5   |  25  |  0.82    Ca    9.6      15 Nov 2021 06:58            MICROBIOLOGY:    Culture - Blood (11.08.21 @ 22:14)    Specimen Source: .Blood Blood-Peripheral    Culture Results:   No growth at 5 days.        RADIOLOGY & ADDITIONAL STUDIES:    < from: NM Inflammatory Loc Wholebody Gallium, Single Day (11.13.21 @ 13:03) >  Uptake within the left ankle left mid tibia which may be due to postsurgical/posttreatment changes.    Uptake within the lungs which decreases between the 24 and 48-hour images therefore may reflect physiologic uptake although clinical correlation for pneumonia is recommended.    < end of copied text >

## 2021-11-15 NOTE — DISCHARGE NOTE PROVIDER - CARE PROVIDER_API CALL
Chapin Wallis)  Orthopaedic Surgery  [address] [place] [address]  Phone: [phone]  Fax: [phone]  Follow Up Time: 2 weeks    Jose D Crocker)  Internal Medicine  [address] [place] [address]  Phone: [phone]  Fax: [phone]  Follow Up Time: 2 weeks    Jac Dickens)  Plastic Surgery  [address] [place] [id number]  Phone: [phone]  Fax: [phone]  Follow Up Time: 2 weeks   Jose D Crocker)  Internal Medicine  [address] [place] [address]  Phone: [phone]  Fax: [phone]  Follow Up Time: 2 weeks    Jac Dickens)  Plastic Surgery  [address] [place] [address]  Phone: [phone]  Fax: [phone]  Follow Up Time: 2 weeks    Kathleen Carmona)  Infectious Disease; Internal Medicine  [address] [place] [address]  Phone: [phone]  Fax: [phone]  Follow Up Time: 1 week

## 2021-11-15 NOTE — DISCHARGE NOTE PROVIDER - PROVIDER TOKENS
PROVIDER:[TOKEN:[[id number]],FOLLOWUP:[2 weeks]],PROVIDER:[TOKEN:[[id number]],FOLLOWUP:[2 weeks]],PROVIDER:[TOKEN:[[id number]],FOLLOWUP:[2 weeks]] PROVIDER:[TOKEN:[[id number]],FOLLOWUP:[2 weeks]],PROVIDER:[TOKEN:[[id number]],FOLLOWUP:[2 weeks]],PROVIDER:[TOKEN:[[id number]],FOLLOWUP:[1 week]]

## 2021-11-15 NOTE — DISCHARGE NOTE PROVIDER - NSDCACTIVITY_GEN_ALL_CORE
Do not drive or operate machinery/No heavy lifting/straining/Follow Instructions Provided by your Surgical Team

## 2021-11-15 NOTE — PROGRESS NOTE ADULT - SUBJECTIVE AND OBJECTIVE BOX
SUBJECTIVE: Pt seen and examined on morning rounds. Pt feeling well without major complaints. Leg pain controlled, unchanged from baseline.     Vital Signs Last 24 Hrs  T(C): 36.9 (15 Nov 2021 05:00), Max: 36.9 (15 Nov 2021 05:00)  T(F): 98.5 (15 Nov 2021 05:00), Max: 98.5 (15 Nov 2021 05:00)  HR: 86 (15 Nov 2021 05:00) (69 - 86)  BP: 91/58 (15 Nov 2021 05:00) (91/58 - 110/77)  BP(mean): 69 (15 Nov 2021 05:00) (69 - 69)  RR: 17 (15 Nov 2021 05:00) (17 - 17)  SpO2: 96% (15 Nov 2021 05:00) (96% - 99%)    Physical Exam:  VSS  General: A&Ox3, NAD  LLE: Ringed external fixator in place w ace wrap; Pin site bolster dressings well appearing; Flap with dressing in place  Vascular: Toes WWP; Cap refill < 2 sec  Sensation: Patient with abnormal/absent sensation over most of the dorsum & plantar aspects of the foot consistent with baseline  Motor: Minimal FHL/FDL, 0/5 EHL/EDL activity consistent with baseline    A/P: 26yFemale with complex L ankle fracture s/p multiple I&D, revision surgeries, ringed external fixator placement, and flap coverage by PRS, discharged 11/4, sent into ED by Dr. Carmona for fever workup  - Pt found to have WBC of 11.5 on outpatient labs (increased from 6.1 as inpatient on 11/3)  - Pt with subjective fevers at home  - Presents with 99F orally and 100.2F rectal temp in ED 11/8  - WBC 12.2, ESR 58, CRP 6 on admission 11/8  - Trend WBC, stable at ~4, but now dropping to 3.5, will continue to trend; possibly from abx  - Dressings removed, no evidence of flap necrosis, infection, or pin site infection  - F/u blood cultures, NGTD  - Continue Zosyn, Dapto, and Caspo as per ID  - Gallium scan neg  - NWB in Ex Fix w/RW for soft tissue rest at this time  - No acute orthopedic intervention at this time  - Discussed with Dr. Kadi Foster, PGY-2  Orthopedic Surgery     SUBJECTIVE: Pt seen and examined on morning rounds. Pt feeling well without major complaints. Leg pain controlled, unchanged from baseline.     Vital Signs Last 24 Hrs  T(C): 36.9 (15 Nov 2021 05:00), Max: 36.9 (15 Nov 2021 05:00)  T(F): 98.5 (15 Nov 2021 05:00), Max: 98.5 (15 Nov 2021 05:00)  HR: 86 (15 Nov 2021 05:00) (69 - 86)  BP: 91/58 (15 Nov 2021 05:00) (91/58 - 110/77)  BP(mean): 69 (15 Nov 2021 05:00) (69 - 69)  RR: 17 (15 Nov 2021 05:00) (17 - 17)  SpO2: 96% (15 Nov 2021 05:00) (96% - 99%)    Physical Exam:  VSS  General: A&Ox3, NAD  LLE: Ringed external fixator in place w ace wrap; Pin site bolster dressings well appearing; Flap with dressing in place  Vascular: Toes WWP; Cap refill < 2 sec  Sensation: Patient with abnormal/absent sensation over most of the dorsum & plantar aspects of the foot consistent with baseline  Motor: Minimal FHL/FDL, 0/5 EHL/EDL activity consistent with baseline    A/P: 26yFemale with complex L ankle fracture s/p multiple I&D, revision surgeries, ringed external fixator placement, and flap coverage by PRS, discharged 11/4, sent into ED by Dr. Carmona for fever workup  - Pt found to have WBC of 11.5 on outpatient labs (increased from 6.1 as inpatient on 11/3)  - Pt with subjective fevers at home  - Presents with 99F orally and 100.2F rectal temp in ED 11/8  - WBC 12.2, ESR 58, CRP 6 on admission 11/8  - Trend WBC, stable at ~4, but now dropping to 3.5, will continue to trend; possibly from abx  - Dressings removed, no evidence of flap necrosis, infection, or pin site infection  - F/u blood cultures, NGTD  - Continue Zosyn, Dapto, and Caspo as per ID  - Gallium scan neg  - NWB in Ex Fix w/RW for soft tissue rest at this time; Ex fix to be tightened by Dr. Wallis today at bedside, will be WBAT afterwards  - No acute orthopedic intervention at this time  - Discussed with Dr. Kadi Foster, PGY-2  Orthopedic Surgery

## 2021-11-15 NOTE — PROGRESS NOTE ADULT - ASSESSMENT
IMPRESSION:  26F PMHx complex L ankle fracture with chronic OM s/p multiple debridements, s/p gracilis FF and STSG L thigh with ongoing OM, need for revision and external fixation.  She is s/p >6 week course of nafcillin and ceftriaxone (MSSA and Strep anginosus), and was recently d/tyson on daptomycin and caspofungin (E. faecalis, Staph epi, C. albicans and C. parapsilosis). P/w subjective fevers, onset <48 h after d/c, new leukocytosis in outpatient labs from 11/6.  She had low grade temp increase in the ED with WBC 12.5K.  Source unclear.  Leukocytosis resolved on Zosyn.  She has no localizing findings for infection outside of her ankle,     Of note, her WBC count had been trending down as had her hemoglobin and platelets but all are increasing today without major change in therapy.      She has no cough or SOB; therefore I doubt the NM findings in the lung are of pathological significance    Recommend:  1.  Continue Zosyn 3.375 grams IV q6hrs  2.  Continue Daptomycin 500 mg IV daily  3.  Continue Caspofungin 50mg IV daily  4.  Check CBC with differential daily     ID Team 1 will follow.

## 2021-11-15 NOTE — DISCHARGE NOTE PROVIDER - NSDCCPCAREPLAN_GEN_ALL_CORE_FT
PRINCIPAL DISCHARGE DIAGNOSIS  Diagnosis: Foot infection  Assessment and Plan of Treatment: improvement s/p IV antibiotics, wound care

## 2021-11-15 NOTE — DISCHARGE NOTE PROVIDER - HOSPITAL COURSE
Admitted 11/8  Gallium scan  Medicine consult  Plastics consult  ID consult  IV Antibiotics  Pain control  DVT prophylaxis  OOB/Physical Therapy Admitted 11/8 for fever workup in the setting of preexisting left septic ankle arthritis, ankle fx with ORIF   Gallium scan  Medicine consult  Plastics consult  ID consult  IV Antibiotics  Pain control  DVT prophylaxis  OOB/Physical Therapy   Outpatient follow up with Plastics, Infectious Disease

## 2021-11-15 NOTE — PROGRESS NOTE ADULT - ASSESSMENT
26F h/o L ankle fracture with septic arthritis, postoperative compartment syndrome s/p Left ankle recon with gracilis free flap and STSG  microvascular anastomosis with recipient AT on 9/20. RTOR on 10/18 for debridement of gracilis flap, application of external fixator c/b partial necrosis of skin and subcutaneous fat of gracilis myocutaneous free flap now s/p repeat flap debridement and STSG (10/25). Readmitted on 11/8 for workup of fever of unknown origin.     Regular diet  C/w daptomycin/caspofungin/ CTX  UA negative  BCx- NGTD  ID following-appreciate recs  F/u Gallium scan- final read  F/u AM labs  Daily dressings changes to LLE with aquaphor, petroleum gauze, and ACE bandage  Aquaphor to left anterior thigh  Analgesics PRN  Antiemetics PRN  OOBA/ work w/ PT  Further management per Ortho Sx.

## 2021-11-15 NOTE — PROGRESS NOTE ADULT - SUBJECTIVE AND OBJECTIVE BOX
Progress Note: Plastic Surgery    SUBJECTIVE: Patient seen and examined bedside. Resting comfortably in bed. Reports improved pain management in her ankle. Otherwise denies f/c, n/v, or dysuria.     caspofungin IVPB 50 milliGRAM(s) IV Intermittent every 24 hours  DAPTOmycin IVPB 500 milliGRAM(s) IV Intermittent every 24 hours  enoxaparin Injectable 40 milliGRAM(s) SubCutaneous every 24 hours  piperacillin/tazobactam IVPB.. 3.375 Gram(s) IV Intermittent every 6 hours      Vital Signs Last 24 Hrs  T(C): 36.9 (15 Nov 2021 05:00), Max: 36.9 (15 Nov 2021 05:00)  T(F): 98.5 (15 Nov 2021 05:00), Max: 98.5 (15 Nov 2021 05:00)  HR: 86 (15 Nov 2021 05:00) (69 - 86)  BP: 91/58 (15 Nov 2021 05:00) (91/58 - 110/77)  BP(mean): 69 (15 Nov 2021 05:00) (69 - 69)  RR: 17 (15 Nov 2021 05:00) (17 - 17)  SpO2: 96% (15 Nov 2021 05:00) (96% - 99%)  I&O's Detail    14 Nov 2021 07:01  -  15 Nov 2021 07:00  --------------------------------------------------------  IN:    Oral Fluid: 960 mL  Total IN: 960 mL    OUT:    Voided (mL): 2200 mL  Total OUT: 2200 mL    Total NET: -1240 mL        PHYSICAL EXAM:   General: NAD, resting comfortably in bed  C/V: NSR  Pulm: Nonlabored breathing, no respiratory distress  Extrem: LLE-ex fix in place, STSG with good take, +granulation tissue, dressing changed, thigh dressing taken down and aquaphor and petroleum gauze applied to donor site        LABS:                        9.2    3.51  )-----------( 282      ( 14 Nov 2021 07:17 )             29.0     11-14    141  |  108  |  6<L>  ----------------------------<  81  3.9   |  22  |  0.80    Ca    9.4      14 Nov 2021 07:17            RADIOLOGY & ADDITIONAL STUDIES:

## 2021-11-16 LAB
ANION GAP SERPL CALC-SCNC: 10 MMOL/L — SIGNIFICANT CHANGE UP (ref 5–17)
BASOPHILS # BLD AUTO: 0.03 K/UL — SIGNIFICANT CHANGE UP (ref 0–0.2)
BASOPHILS NFR BLD AUTO: 0.6 % — SIGNIFICANT CHANGE UP (ref 0–2)
BUN SERPL-MCNC: 13 MG/DL — SIGNIFICANT CHANGE UP (ref 7–23)
CALCIUM SERPL-MCNC: 9.5 MG/DL — SIGNIFICANT CHANGE UP (ref 8.4–10.5)
CHLORIDE SERPL-SCNC: 105 MMOL/L — SIGNIFICANT CHANGE UP (ref 96–108)
CO2 SERPL-SCNC: 24 MMOL/L — SIGNIFICANT CHANGE UP (ref 22–31)
CREAT SERPL-MCNC: 0.74 MG/DL — SIGNIFICANT CHANGE UP (ref 0.5–1.3)
CRP SERPL-MCNC: 7 MG/L — HIGH (ref 0–4)
EOSINOPHIL # BLD AUTO: 0.29 K/UL — SIGNIFICANT CHANGE UP (ref 0–0.5)
EOSINOPHIL NFR BLD AUTO: 6.2 % — HIGH (ref 0–6)
ERYTHROCYTE [SEDIMENTATION RATE] IN BLOOD: 48 MM/HR — HIGH
GLUCOSE SERPL-MCNC: 118 MG/DL — HIGH (ref 70–99)
HCT VFR BLD CALC: 29.7 % — LOW (ref 34.5–45)
HGB BLD-MCNC: 9.7 G/DL — LOW (ref 11.5–15.5)
IMM GRANULOCYTES NFR BLD AUTO: 0.2 % — SIGNIFICANT CHANGE UP (ref 0–1.5)
LYMPHOCYTES # BLD AUTO: 1.67 K/UL — SIGNIFICANT CHANGE UP (ref 1–3.3)
LYMPHOCYTES # BLD AUTO: 35.6 % — SIGNIFICANT CHANGE UP (ref 13–44)
MCHC RBC-ENTMCNC: 29 PG — SIGNIFICANT CHANGE UP (ref 27–34)
MCHC RBC-ENTMCNC: 32.7 GM/DL — SIGNIFICANT CHANGE UP (ref 32–36)
MCV RBC AUTO: 88.9 FL — SIGNIFICANT CHANGE UP (ref 80–100)
MONOCYTES # BLD AUTO: 0.51 K/UL — SIGNIFICANT CHANGE UP (ref 0–0.9)
MONOCYTES NFR BLD AUTO: 10.9 % — SIGNIFICANT CHANGE UP (ref 2–14)
NEUTROPHILS # BLD AUTO: 2.18 K/UL — SIGNIFICANT CHANGE UP (ref 1.8–7.4)
NEUTROPHILS NFR BLD AUTO: 46.5 % — SIGNIFICANT CHANGE UP (ref 43–77)
NRBC # BLD: 0 /100 WBCS — SIGNIFICANT CHANGE UP (ref 0–0)
PLATELET # BLD AUTO: 304 K/UL — SIGNIFICANT CHANGE UP (ref 150–400)
POTASSIUM SERPL-MCNC: 3.6 MMOL/L — SIGNIFICANT CHANGE UP (ref 3.5–5.3)
POTASSIUM SERPL-SCNC: 3.6 MMOL/L — SIGNIFICANT CHANGE UP (ref 3.5–5.3)
RBC # BLD: 3.34 M/UL — LOW (ref 3.8–5.2)
RBC # FLD: 17.2 % — HIGH (ref 10.3–14.5)
SODIUM SERPL-SCNC: 139 MMOL/L — SIGNIFICANT CHANGE UP (ref 135–145)
WBC # BLD: 4.69 K/UL — SIGNIFICANT CHANGE UP (ref 3.8–10.5)
WBC # FLD AUTO: 4.69 K/UL — SIGNIFICANT CHANGE UP (ref 3.8–10.5)

## 2021-11-16 PROCEDURE — 99232 SBSQ HOSP IP/OBS MODERATE 35: CPT | Mod: GC

## 2021-11-16 RX ORDER — OXYCODONE HYDROCHLORIDE 5 MG/1
10 TABLET ORAL EVERY 4 HOURS
Refills: 0 | Status: DISCONTINUED | OUTPATIENT
Start: 2021-11-16 | End: 2021-11-19

## 2021-11-16 RX ORDER — HYDROMORPHONE HYDROCHLORIDE 2 MG/ML
0.5 INJECTION INTRAMUSCULAR; INTRAVENOUS; SUBCUTANEOUS EVERY 4 HOURS
Refills: 0 | Status: DISCONTINUED | OUTPATIENT
Start: 2021-11-16 | End: 2021-11-19

## 2021-11-16 RX ADMIN — OXYCODONE HYDROCHLORIDE 10 MILLIGRAM(S): 5 TABLET ORAL at 21:35

## 2021-11-16 RX ADMIN — HYDROMORPHONE HYDROCHLORIDE 0.5 MILLIGRAM(S): 2 INJECTION INTRAMUSCULAR; INTRAVENOUS; SUBCUTANEOUS at 16:01

## 2021-11-16 RX ADMIN — HYDROMORPHONE HYDROCHLORIDE 0.5 MILLIGRAM(S): 2 INJECTION INTRAMUSCULAR; INTRAVENOUS; SUBCUTANEOUS at 16:16

## 2021-11-16 RX ADMIN — GABAPENTIN 600 MILLIGRAM(S): 400 CAPSULE ORAL at 11:36

## 2021-11-16 RX ADMIN — HYDROMORPHONE HYDROCHLORIDE 0.5 MILLIGRAM(S): 2 INJECTION INTRAMUSCULAR; INTRAVENOUS; SUBCUTANEOUS at 05:45

## 2021-11-16 RX ADMIN — GABAPENTIN 600 MILLIGRAM(S): 400 CAPSULE ORAL at 05:47

## 2021-11-16 RX ADMIN — PIPERACILLIN AND TAZOBACTAM 200 GRAM(S): 4; .5 INJECTION, POWDER, LYOPHILIZED, FOR SOLUTION INTRAVENOUS at 23:41

## 2021-11-16 RX ADMIN — Medication 1 TABLET(S): at 11:38

## 2021-11-16 RX ADMIN — HYDROMORPHONE HYDROCHLORIDE 0.5 MILLIGRAM(S): 2 INJECTION INTRAMUSCULAR; INTRAVENOUS; SUBCUTANEOUS at 10:15

## 2021-11-16 RX ADMIN — CHLORHEXIDINE GLUCONATE 1 APPLICATION(S): 213 SOLUTION TOPICAL at 05:48

## 2021-11-16 RX ADMIN — OXYCODONE HYDROCHLORIDE 10 MILLIGRAM(S): 5 TABLET ORAL at 06:37

## 2021-11-16 RX ADMIN — Medication 650 MILLIGRAM(S): at 23:40

## 2021-11-16 RX ADMIN — Medication 650 MILLIGRAM(S): at 11:52

## 2021-11-16 RX ADMIN — OXYCODONE HYDROCHLORIDE 10 MILLIGRAM(S): 5 TABLET ORAL at 15:33

## 2021-11-16 RX ADMIN — Medication 1 TABLET(S): at 11:37

## 2021-11-16 RX ADMIN — DAPTOMYCIN 120 MILLIGRAM(S): 500 INJECTION, POWDER, LYOPHILIZED, FOR SOLUTION INTRAVENOUS at 17:32

## 2021-11-16 RX ADMIN — Medication 1 APPLICATION(S): at 11:38

## 2021-11-16 RX ADMIN — Medication 650 MILLIGRAM(S): at 06:06

## 2021-11-16 RX ADMIN — Medication 650 MILLIGRAM(S): at 00:03

## 2021-11-16 RX ADMIN — GABAPENTIN 600 MILLIGRAM(S): 400 CAPSULE ORAL at 17:31

## 2021-11-16 RX ADMIN — HYDROMORPHONE HYDROCHLORIDE 0.5 MILLIGRAM(S): 2 INJECTION INTRAMUSCULAR; INTRAVENOUS; SUBCUTANEOUS at 22:29

## 2021-11-16 RX ADMIN — Medication 650 MILLIGRAM(S): at 17:49

## 2021-11-16 RX ADMIN — HYDROMORPHONE HYDROCHLORIDE 0.5 MILLIGRAM(S): 2 INJECTION INTRAMUSCULAR; INTRAVENOUS; SUBCUTANEOUS at 22:14

## 2021-11-16 RX ADMIN — Medication 650 MILLIGRAM(S): at 11:37

## 2021-11-16 RX ADMIN — PIPERACILLIN AND TAZOBACTAM 200 GRAM(S): 4; .5 INJECTION, POWDER, LYOPHILIZED, FOR SOLUTION INTRAVENOUS at 11:36

## 2021-11-16 RX ADMIN — OXYCODONE HYDROCHLORIDE 10 MILLIGRAM(S): 5 TABLET ORAL at 07:37

## 2021-11-16 RX ADMIN — PIPERACILLIN AND TAZOBACTAM 200 GRAM(S): 4; .5 INJECTION, POWDER, LYOPHILIZED, FOR SOLUTION INTRAVENOUS at 18:38

## 2021-11-16 RX ADMIN — Medication 3 MILLIGRAM(S): at 23:41

## 2021-11-16 RX ADMIN — OXYCODONE HYDROCHLORIDE 10 MILLIGRAM(S): 5 TABLET ORAL at 21:05

## 2021-11-16 RX ADMIN — OXYCODONE HYDROCHLORIDE 10 MILLIGRAM(S): 5 TABLET ORAL at 14:33

## 2021-11-16 RX ADMIN — Medication 650 MILLIGRAM(S): at 17:30

## 2021-11-16 RX ADMIN — ENOXAPARIN SODIUM 40 MILLIGRAM(S): 100 INJECTION SUBCUTANEOUS at 08:54

## 2021-11-16 RX ADMIN — HYDROMORPHONE HYDROCHLORIDE 0.5 MILLIGRAM(S): 2 INJECTION INTRAMUSCULAR; INTRAVENOUS; SUBCUTANEOUS at 06:10

## 2021-11-16 RX ADMIN — Medication 1 APPLICATION(S): at 11:39

## 2021-11-16 RX ADMIN — Medication 650 MILLIGRAM(S): at 05:06

## 2021-11-16 RX ADMIN — HYDROMORPHONE HYDROCHLORIDE 0.5 MILLIGRAM(S): 2 INJECTION INTRAMUSCULAR; INTRAVENOUS; SUBCUTANEOUS at 10:00

## 2021-11-16 RX ADMIN — PIPERACILLIN AND TAZOBACTAM 200 GRAM(S): 4; .5 INJECTION, POWDER, LYOPHILIZED, FOR SOLUTION INTRAVENOUS at 05:42

## 2021-11-16 RX ADMIN — CASPOFUNGIN ACETATE 260 MILLIGRAM(S): 7 INJECTION, POWDER, LYOPHILIZED, FOR SOLUTION INTRAVENOUS at 06:36

## 2021-11-16 RX ADMIN — SENNA PLUS 2 TABLET(S): 8.6 TABLET ORAL at 21:05

## 2021-11-16 NOTE — PROGRESS NOTE ADULT - NSPROGADDITIONALINFOA_GEN_ALL_CORE
Agree with above plan after discussing with ID, and ortho PA team  Patient with complaints about tibial pin sites  Plan to continue ABX regimen per ID  Dispo pending coordination of home infusion and clearance by PT

## 2021-11-16 NOTE — PROGRESS NOTE ADULT - SUBJECTIVE AND OBJECTIVE BOX
Progress Note: Plastics Sx    SUBJECTIVE: Patient seen and examined bedside. Resting comfortably in bed. AVSS. No acute events overnight. Reports anticipated discharge home soon. Pain improving.     caspofungin IVPB 50 milliGRAM(s) IV Intermittent every 24 hours  DAPTOmycin IVPB 500 milliGRAM(s) IV Intermittent every 24 hours  enoxaparin Injectable 40 milliGRAM(s) SubCutaneous every 24 hours  piperacillin/tazobactam IVPB.. 3.375 Gram(s) IV Intermittent every 6 hours      Vital Signs Last 24 Hrs  T(C): 36.8 (16 Nov 2021 05:00), Max: 36.9 (15 Nov 2021 08:50)  T(F): 98.3 (16 Nov 2021 05:00), Max: 98.4 (15 Nov 2021 08:50)  HR: 97 (16 Nov 2021 05:00) (76 - 97)  BP: 101/65 (16 Nov 2021 05:00) (91/60 - 117/80)  BP(mean): 77 (16 Nov 2021 05:00) (77 - 92)  RR: 17 (16 Nov 2021 05:00) (16 - 18)  SpO2: 97% (16 Nov 2021 05:00) (95% - 99%)  I&O's Detail    14 Nov 2021 07:01  -  15 Nov 2021 07:00  --------------------------------------------------------  IN:    Oral Fluid: 960 mL  Total IN: 960 mL    OUT:    Voided (mL): 2200 mL  Total OUT: 2200 mL    Total NET: -1240 mL      15 Nov 2021 07:01  -  16 Nov 2021 06:49  --------------------------------------------------------  IN:    IV PiggyBack: 200 mL    Oral Fluid: 1080 mL  Total IN: 1280 mL    OUT:    Voided (mL): 850 mL  Total OUT: 850 mL    Total NET: 430 mL          PHYSICAL EXAM:   General: NAD, resting comfortably in bed  C/V: NSR  Pulm: Nonlabored breathing, no respiratory distress  Extrem: LLE-ex fix in place, STSG with good take, +granulation tissue, dressing changed, thigh dressing taken down and Aquaphor, petroleum gauze, WtD applied to donor site        LABS:                        9.7    4.69  )-----------( 304      ( 16 Nov 2021 06:32 )             29.7     11-15    139  |  104  |  11  ----------------------------<  98  3.5   |  25  |  0.82    Ca    9.6      15 Nov 2021 06:58            RADIOLOGY & ADDITIONAL STUDIES:

## 2021-11-16 NOTE — PROGRESS NOTE ADULT - SUBJECTIVE AND OBJECTIVE BOX
infectious diseases progress note:  NAPOLEON RODRIGUEZ is a 26yFemale patient    FOOT INFECTION          ROS:  CONSTITUTIONAL:  Negative fever or chills, feels well, good appetite  EYES:  Negative  blurry vision or double vision  CARDIOVASCULAR:  Negative for chest pain or palpitations  RESPIRATORY:  Negative for cough, wheezing, or SOB   GASTROINTESTINAL:  Negative for nausea, vomiting, diarrhea, constipation, or abdominal pain  GENITOURINARY:  Negative frequency, urgency or dysuria  NEUROLOGIC:  No headache, confusion, dizziness, lightheadedness    Allergies    No Known Allergies    Intolerances        ANTIBIOTICS/RELEVANT:  antimicrobials  caspofungin IVPB 50 milliGRAM(s) IV Intermittent every 24 hours  DAPTOmycin IVPB 500 milliGRAM(s) IV Intermittent every 24 hours  piperacillin/tazobactam IVPB.. 3.375 Gram(s) IV Intermittent every 6 hours    immunologic:  influenza   Vaccine 0.5 milliLiter(s) IntraMuscular once    OTHER:  acetaminophen     Tablet .. 650 milliGRAM(s) Oral every 6 hours  AQUAPHOR (petrolatum Ointment) 1 Application(s) Topical daily  BACItracin   Ointment 1 Application(s) Topical daily  bisacodyl Suppository 10 milliGRAM(s) Rectal daily PRN  calcium carbonate   1250 mG (OsCal) 1 Tablet(s) Oral daily  chlorhexidine 4% Liquid 1 Application(s) Topical <User Schedule>  cyclobenzaprine 5 milliGRAM(s) Oral three times a day PRN  diphenhydrAMINE 25 milliGRAM(s) Oral every 4 hours PRN  enoxaparin Injectable 40 milliGRAM(s) SubCutaneous every 24 hours  gabapentin 600 milliGRAM(s) Oral four times a day  HYDROmorphone  Injectable 0.5 milliGRAM(s) IV Push every 4 hours PRN  melatonin 3 milliGRAM(s) Oral at bedtime  multivitamin 1 Tablet(s) Oral daily  ondansetron Injectable 4 milliGRAM(s) IV Push every 6 hours PRN  oxyCODONE    IR 10 milliGRAM(s) Oral every 4 hours PRN  oxyCODONE    IR 5 milliGRAM(s) Oral every 4 hours PRN  polyethylene glycol 3350 17 Gram(s) Oral two times a day  senna 2 Tablet(s) Oral at bedtime  sodium chloride 0.9% lock flush 10 milliLiter(s) IV Push every 1 hour PRN      Objective:  Vital Signs Last 24 Hrs  T(C): 36.8 (16 Nov 2021 05:00), Max: 36.9 (15 Nov 2021 08:50)  T(F): 98.3 (16 Nov 2021 05:00), Max: 98.4 (15 Nov 2021 08:50)  HR: 97 (16 Nov 2021 05:00) (76 - 97)  BP: 101/65 (16 Nov 2021 05:00) (91/60 - 117/80)  BP(mean): 77 (16 Nov 2021 05:00) (77 - 92)  RR: 17 (16 Nov 2021 05:00) (16 - 18)  SpO2: 97% (16 Nov 2021 05:00) (95% - 99%)    PHYSICAL EXAM:  Constitutional:Well-developed, well nourishe  Eyes:LGORY, EOMI  Ear/Nose/Throat: no oral lesion, no sinus tenderness on percussion	  Neck:no JVD, no lymphadenopathy, supple  Respiratory: CTA rhett  Cardiovascular: S1S2 RRR, no murmurs  Gastrointestinal:soft, (+) BS, no HSM  Extremities:no e/e/c        LABS:                        9.7    4.69  )-----------( 304      ( 16 Nov 2021 06:32 )             29.7     11-16    139  |  105  |  13  ----------------------------<  118<H>  3.6   |  24  |  0.74    Ca    9.5      16 Nov 2021 06:32              MICROBIOLOGY:        RADIOLOGY & ADDITIONAL STUDIES: Infectious diseases progress note:  NAPOLEON RODRIGUEZ is a 26yFemale patient    FOOT INFECTION      OVERNIGHT/ INTERVAL HPI:     Patient seen and examined at bedside. Left foot/ ankle clinically much improved. She has no complaints at this time.     ROS:  CONSTITUTIONAL:  Negative fever or chills, feels well, good appetite  EYES:  Negative  blurry vision or double vision  CARDIOVASCULAR:  Negative for chest pain or palpitations  RESPIRATORY:  Negative for cough, wheezing, or SOB   GASTROINTESTINAL:  Negative for nausea, vomiting, diarrhea, constipation, or abdominal pain  GENITOURINARY:  Negative frequency, urgency or dysuria  NEUROLOGIC:  No headache, confusion, dizziness, lightheadedness    Allergies    No Known Allergies    Intolerances        ANTIBIOTICS/RELEVANT:  antimicrobials  caspofungin IVPB 50 milliGRAM(s) IV Intermittent every 24 hours  DAPTOmycin IVPB 500 milliGRAM(s) IV Intermittent every 24 hours  piperacillin/tazobactam IVPB.. 3.375 Gram(s) IV Intermittent every 6 hours    immunologic:  influenza   Vaccine 0.5 milliLiter(s) IntraMuscular once    OTHER:  acetaminophen     Tablet .. 650 milliGRAM(s) Oral every 6 hours  AQUAPHOR (petrolatum Ointment) 1 Application(s) Topical daily  BACItracin   Ointment 1 Application(s) Topical daily  bisacodyl Suppository 10 milliGRAM(s) Rectal daily PRN  calcium carbonate   1250 mG (OsCal) 1 Tablet(s) Oral daily  chlorhexidine 4% Liquid 1 Application(s) Topical <User Schedule>  cyclobenzaprine 5 milliGRAM(s) Oral three times a day PRN  diphenhydrAMINE 25 milliGRAM(s) Oral every 4 hours PRN  enoxaparin Injectable 40 milliGRAM(s) SubCutaneous every 24 hours  gabapentin 600 milliGRAM(s) Oral four times a day  HYDROmorphone  Injectable 0.5 milliGRAM(s) IV Push every 4 hours PRN  melatonin 3 milliGRAM(s) Oral at bedtime  multivitamin 1 Tablet(s) Oral daily  ondansetron Injectable 4 milliGRAM(s) IV Push every 6 hours PRN  oxyCODONE    IR 10 milliGRAM(s) Oral every 4 hours PRN  oxyCODONE    IR 5 milliGRAM(s) Oral every 4 hours PRN  polyethylene glycol 3350 17 Gram(s) Oral two times a day  senna 2 Tablet(s) Oral at bedtime  sodium chloride 0.9% lock flush 10 milliLiter(s) IV Push every 1 hour PRN      Objective:  Vital Signs Last 24 Hrs  T(C): 36.8 (16 Nov 2021 05:00), Max: 36.9 (15 Nov 2021 08:50)  T(F): 98.3 (16 Nov 2021 05:00), Max: 98.4 (15 Nov 2021 08:50)  HR: 97 (16 Nov 2021 05:00) (76 - 97)  BP: 101/65 (16 Nov 2021 05:00) (91/60 - 117/80)  BP(mean): 77 (16 Nov 2021 05:00) (77 - 92)  RR: 17 (16 Nov 2021 05:00) (16 - 18)  SpO2: 97% (16 Nov 2021 05:00) (95% - 99%)    PHYSICAL EXAM:  Constitutional: Well-developed, well nourished  Eyes: GLORY, EOMI  Ear/Nose/Throat: no oral lesion, no sinus tenderness on percussion	  Neck:no JVD, no lymphadenopathy, supple  Respiratory: CTA b/l  Cardiovascular: S1S2 RRR, no murmurs  Gastrointestinal: soft, (+) BS, no HSM  Extremities: left foot in immobilized boot with ACE bandage.         LABS:                        9.7    4.69  )-----------( 304      ( 16 Nov 2021 06:32 )             29.7     11-16    139  |  105  |  13  ----------------------------<  118<H>  3.6   |  24  |  0.74    Ca    9.5      16 Nov 2021 06:32              MICROBIOLOGY:        RADIOLOGY & ADDITIONAL STUDIES:

## 2021-11-16 NOTE — PROGRESS NOTE ADULT - SUBJECTIVE AND OBJECTIVE BOX
Ortho Note    26yFemale with complex L ankle fracture s/p multiple I&D, revision surgeries, ringed external fixator placement, and flap coverage by PRS, discharged 11/4, sent into ED by Infectious disease for fever workup, admitted since 11/8     Pt comfortable without complaints, pain controlled this AM during bedside rounds   Denies CP, SOB, N/V, numbness/tingling     Vital Signs Last 24 Hrs  T(C): 36.6 (11-16-21 @ 10:06), Max: 36.6 (11-16-21 @ 10:06)  T(F): 97.9 (11-16-21 @ 10:06), Max: 97.9 (11-16-21 @ 10:06)  HR: 73 (11-16-21 @ 10:06) (73 - 73)  BP: 97/69 (11-16-21 @ 10:06) (97/69 - 97/69)  BP(mean): --  RR: 18 (11-16-21 @ 10:06) (18 - 18)  SpO2: 98% (11-16-21 @ 10:06) (98% - 98%)  AVSS    General: Pt Alert and oriented, NAD  Dressing C/D/I: ex fix in place LLE, tightened yesterday 11/15 by Dr. Wallis   extremity warm, well perfused        A/P: 26yFemale with complex L ankle fracture s/p multiple I&D, revision surgeries, ringed external fixator placement, and flap coverage by PRS, discharged 11/4, sent into ED by Infectious disease for fever workup, admitted since 11/8     - Stable, trending daily labs   - Pain Control PRN  - DVT ppx: lovenox 40sq daily   - PT, WBS: WBAT, LLE, PT reordered  - per ID, continue current regimen: daptomycin; capsofungin; zosyn   - dispo: home with home infusion pending daily lab trend/final ID recs     Ortho Pager [phone]

## 2021-11-16 NOTE — PROGRESS NOTE ADULT - ASSESSMENT
IMPRESSION:  26F PMHx complex L ankle fracture with chronic OM s/p multiple debridements, s/p gracilis FF and STSG L thigh with ongoing OM, need for revision and external fixation.  She is s/p >6 week course of nafcillin and ceftriaxone (MSSA and Strep anginosus), and was recently d/tyson on daptomycin and caspofungin (E. faecalis, Staph epi, C. albicans and C. parapsilosis). P/w subjective fevers, onset <48 h after d/c, new leukocytosis in outpatient labs from 11/6.  She had low grade temp increase in the ED with WBC 12.5K.  Source unclear.  Leukocytosis resolved on Zosyn.  She has no localizing findings for infection outside of her ankle,     Of note, her WBC count had been trending down as had her hemoglobin and platelets but all are increasing today without major change in therapy.      She has no cough or SOB; therefore I doubt the NM findings in the lung are of pathological significance    Recommend:  1.  Continue Zosyn 3.375 grams IV q6hrs  2.  Continue Daptomycin 500 mg IV daily  3.  Continue Caspofungin 50mg IV daily  4.  Check CBC with differential daily     ID Team 1 will follow.   IMPRESSION:  26F PMHx complex L ankle fracture with chronic OM s/p multiple debridements, s/p gracilis FF and STSG L thigh with ongoing OM, need for revision and external fixation.  She is s/p >6 week course of nafcillin and ceftriaxone (MSSA and Strep anginosus), and was recently d/tyson on daptomycin and caspofungin (left ankle joint cx: E. faecalis, Staph epi, C. albicans and C. parapsilosis). P/w subjective fevers, onset <48 h after d/c, new leukocytosis in outpatient labs from 11/6.  She had low grade temp increase in the ED with WBC 12.5K.  Leukocytosis resolved on Zosyn.  She has no localizing findings for infection outside of her ankle.   NM scan done which shows uptake in left ankle and lungs, She has no cough or SOB; therefore doubt the NM findings in the lung are of pathological significance.     Will continue to treat Left ankle septic arthritis.       #Left ankle septic arthritis     Recommend:  1.  Continue Zosyn 3.375 grams IV q6hrs for 4 weeks, through 12/8   2.  Continue Daptomycin 500 mg IV daily through 12/1 for 6 week duration   3.  Continue Caspofungin 50mg IV daily through 12/1 for 6 week duration   4.  Check CBC with differential daily   5.  Check CK level as pt on dapto   6.  Follow up with Dr. Carmona within 1 week, has apt on 11/19     ID Team 1 will follow.      Plan discussed with attending physician.

## 2021-11-16 NOTE — PROGRESS NOTE ADULT - SUBJECTIVE AND OBJECTIVE BOX
SUBJECTIVE: Pt seen and examined on morning rounds. Pt feeling well without major complaints. Leg pain controlled, unchanged from baseline.     Vital Signs Last 24 Hrs  T(C): 36.8 (16 Nov 2021 05:00), Max: 36.9 (15 Nov 2021 08:50)  T(F): 98.3 (16 Nov 2021 05:00), Max: 98.4 (15 Nov 2021 08:50)  HR: 97 (16 Nov 2021 05:00) (76 - 97)  BP: 101/65 (16 Nov 2021 05:00) (91/60 - 117/80)  BP(mean): 77 (16 Nov 2021 05:00) (77 - 92)  RR: 17 (16 Nov 2021 05:00) (16 - 18)  SpO2: 97% (16 Nov 2021 05:00) (95% - 99%)    Physical Exam:  VSS  General: A&Ox3, NAD  LLE: Ringed external fixator in place w ace wrap; Pin site bolster dressings well appearing; Flap with dressing in place  Vascular: Toes WWP; Cap refill < 2 sec  Sensation: Patient with abnormal/absent sensation over most of the dorsum & plantar aspects of the foot consistent with baseline  Motor: Minimal FHL/FDL, 0/5 EHL/EDL activity consistent with baseline    A/P: 26yFemale with complex L ankle fracture s/p multiple I&D, revision surgeries, ringed external fixator placement, and flap coverage by PRS, discharged 11/4, sent into ED by Dr. Carmona for fever workup  - Pt found to have WBC of 11.5 on outpatient labs (increased from 6.1 as inpatient on 11/3)  - Pt with subjective fevers at home  - Presents with 99F orally and 100.2F rectal temp in ED 11/8  - WBC 12.2, ESR 58, CRP 6 on admission 11/8  - Trend WBC, stable at ~4 but developing eosinophilia; f/u ID recs  - Dressings removed, no evidence of flap necrosis, infection, or pin site infection  - F/u blood cultures, NGTD  - Continue Zosyn, Dapto, and Caspo as per ID  - Gallium scan neg  - Ex fix tightened at bedside 11/15, can be WBAT   - DC with home infusion services pending final ID recs  - Discussed with Dr. Kadi Foster, PGY-2  Orthopedic Surgery

## 2021-11-16 NOTE — PROGRESS NOTE ADULT - ASSESSMENT
26F h/o L ankle fracture with septic arthritis, postoperative compartment syndrome s/p Left ankle recon with gracilis free flap and STSG  microvascular anastomosis with recipient AT on 9/20. RTOR on 10/18 for debridement of gracilis flap, application of external fixator c/b partial necrosis of skin and subcutaneous fat of gracilis myocutaneous free flap now s/p repeat flap debridement and STSG (10/25). Readmitted on 11/8 for workup of fever of unknown origin. Gallium scan (11/12) c/w with postop changes to the L ankle.     Regular diet  C/w daptomycin/caspofungin/zosyn  ID following-appreciate recs  F/u Gallium scan- uptake c/w postoperative chnages  F/u AM labs  Daily dressings changes to LLE with aquaphor, petroleum gauze, and ACE bandage  Aquaphor to left anterior thigh  Analgesics PRN  Antiemetics PRN  OOBA/ work w/ PT  Further management per Ortho Sx.

## 2021-11-17 LAB
ANION GAP SERPL CALC-SCNC: 10 MMOL/L — SIGNIFICANT CHANGE UP (ref 5–17)
BASOPHILS # BLD AUTO: 0.04 K/UL — SIGNIFICANT CHANGE UP (ref 0–0.2)
BASOPHILS NFR BLD AUTO: 0.9 % — SIGNIFICANT CHANGE UP (ref 0–2)
BUN SERPL-MCNC: 12 MG/DL — SIGNIFICANT CHANGE UP (ref 7–23)
CALCIUM SERPL-MCNC: 10 MG/DL — SIGNIFICANT CHANGE UP (ref 8.4–10.5)
CHLORIDE SERPL-SCNC: 105 MMOL/L — SIGNIFICANT CHANGE UP (ref 96–108)
CK SERPL-CCNC: 20 U/L — LOW (ref 25–170)
CO2 SERPL-SCNC: 23 MMOL/L — SIGNIFICANT CHANGE UP (ref 22–31)
CREAT SERPL-MCNC: 0.73 MG/DL — SIGNIFICANT CHANGE UP (ref 0.5–1.3)
CRP SERPL-MCNC: 6 MG/L — HIGH (ref 0–4)
CULTURE RESULTS: SIGNIFICANT CHANGE UP
EOSINOPHIL # BLD AUTO: 0.29 K/UL — SIGNIFICANT CHANGE UP (ref 0–0.5)
EOSINOPHIL NFR BLD AUTO: 6.5 % — HIGH (ref 0–6)
ERYTHROCYTE [SEDIMENTATION RATE] IN BLOOD: 60 MM/HR — HIGH
GLUCOSE SERPL-MCNC: 92 MG/DL — SIGNIFICANT CHANGE UP (ref 70–99)
HCT VFR BLD CALC: 29.1 % — LOW (ref 34.5–45)
HGB BLD-MCNC: 9.7 G/DL — LOW (ref 11.5–15.5)
IMM GRANULOCYTES NFR BLD AUTO: 0.2 % — SIGNIFICANT CHANGE UP (ref 0–1.5)
LYMPHOCYTES # BLD AUTO: 1.59 K/UL — SIGNIFICANT CHANGE UP (ref 1–3.3)
LYMPHOCYTES # BLD AUTO: 35.6 % — SIGNIFICANT CHANGE UP (ref 13–44)
MCHC RBC-ENTMCNC: 29.9 PG — SIGNIFICANT CHANGE UP (ref 27–34)
MCHC RBC-ENTMCNC: 33.3 GM/DL — SIGNIFICANT CHANGE UP (ref 32–36)
MCV RBC AUTO: 89.8 FL — SIGNIFICANT CHANGE UP (ref 80–100)
MONOCYTES # BLD AUTO: 0.4 K/UL — SIGNIFICANT CHANGE UP (ref 0–0.9)
MONOCYTES NFR BLD AUTO: 8.9 % — SIGNIFICANT CHANGE UP (ref 2–14)
NEUTROPHILS # BLD AUTO: 2.14 K/UL — SIGNIFICANT CHANGE UP (ref 1.8–7.4)
NEUTROPHILS NFR BLD AUTO: 47.9 % — SIGNIFICANT CHANGE UP (ref 43–77)
NRBC # BLD: 0 /100 WBCS — SIGNIFICANT CHANGE UP (ref 0–0)
PLATELET # BLD AUTO: 292 K/UL — SIGNIFICANT CHANGE UP (ref 150–400)
POTASSIUM SERPL-MCNC: 3.7 MMOL/L — SIGNIFICANT CHANGE UP (ref 3.5–5.3)
POTASSIUM SERPL-SCNC: 3.7 MMOL/L — SIGNIFICANT CHANGE UP (ref 3.5–5.3)
RBC # BLD: 3.24 M/UL — LOW (ref 3.8–5.2)
RBC # FLD: 16.9 % — HIGH (ref 10.3–14.5)
SODIUM SERPL-SCNC: 138 MMOL/L — SIGNIFICANT CHANGE UP (ref 135–145)
SPECIMEN SOURCE: SIGNIFICANT CHANGE UP
WBC # BLD: 4.47 K/UL — SIGNIFICANT CHANGE UP (ref 3.8–10.5)
WBC # FLD AUTO: 4.47 K/UL — SIGNIFICANT CHANGE UP (ref 3.8–10.5)

## 2021-11-17 PROCEDURE — 99232 SBSQ HOSP IP/OBS MODERATE 35: CPT | Mod: GC

## 2021-11-17 RX ORDER — ACETAMINOPHEN 500 MG
1000 TABLET ORAL EVERY 8 HOURS
Refills: 0 | Status: DISCONTINUED | OUTPATIENT
Start: 2021-11-17 | End: 2021-11-19

## 2021-11-17 RX ADMIN — Medication 1 TABLET(S): at 11:51

## 2021-11-17 RX ADMIN — Medication 1 APPLICATION(S): at 11:52

## 2021-11-17 RX ADMIN — HYDROMORPHONE HYDROCHLORIDE 0.5 MILLIGRAM(S): 2 INJECTION INTRAMUSCULAR; INTRAVENOUS; SUBCUTANEOUS at 17:36

## 2021-11-17 RX ADMIN — OXYCODONE HYDROCHLORIDE 10 MILLIGRAM(S): 5 TABLET ORAL at 09:17

## 2021-11-17 RX ADMIN — GABAPENTIN 600 MILLIGRAM(S): 400 CAPSULE ORAL at 04:30

## 2021-11-17 RX ADMIN — OXYCODONE HYDROCHLORIDE 10 MILLIGRAM(S): 5 TABLET ORAL at 09:45

## 2021-11-17 RX ADMIN — OXYCODONE HYDROCHLORIDE 10 MILLIGRAM(S): 5 TABLET ORAL at 21:41

## 2021-11-17 RX ADMIN — Medication 3 MILLIGRAM(S): at 23:53

## 2021-11-17 RX ADMIN — CHLORHEXIDINE GLUCONATE 1 APPLICATION(S): 213 SOLUTION TOPICAL at 06:15

## 2021-11-17 RX ADMIN — PIPERACILLIN AND TAZOBACTAM 200 GRAM(S): 4; .5 INJECTION, POWDER, LYOPHILIZED, FOR SOLUTION INTRAVENOUS at 18:16

## 2021-11-17 RX ADMIN — Medication 1 APPLICATION(S): at 11:51

## 2021-11-17 RX ADMIN — Medication 650 MILLIGRAM(S): at 05:58

## 2021-11-17 RX ADMIN — HYDROMORPHONE HYDROCHLORIDE 0.5 MILLIGRAM(S): 2 INJECTION INTRAMUSCULAR; INTRAVENOUS; SUBCUTANEOUS at 05:28

## 2021-11-17 RX ADMIN — HYDROMORPHONE HYDROCHLORIDE 0.5 MILLIGRAM(S): 2 INJECTION INTRAMUSCULAR; INTRAVENOUS; SUBCUTANEOUS at 23:20

## 2021-11-17 RX ADMIN — Medication 650 MILLIGRAM(S): at 00:10

## 2021-11-17 RX ADMIN — HYDROMORPHONE HYDROCHLORIDE 0.5 MILLIGRAM(S): 2 INJECTION INTRAMUSCULAR; INTRAVENOUS; SUBCUTANEOUS at 11:00

## 2021-11-17 RX ADMIN — Medication 650 MILLIGRAM(S): at 18:45

## 2021-11-17 RX ADMIN — PIPERACILLIN AND TAZOBACTAM 200 GRAM(S): 4; .5 INJECTION, POWDER, LYOPHILIZED, FOR SOLUTION INTRAVENOUS at 23:55

## 2021-11-17 RX ADMIN — GABAPENTIN 600 MILLIGRAM(S): 400 CAPSULE ORAL at 15:59

## 2021-11-17 RX ADMIN — OXYCODONE HYDROCHLORIDE 10 MILLIGRAM(S): 5 TABLET ORAL at 04:30

## 2021-11-17 RX ADMIN — CASPOFUNGIN ACETATE 260 MILLIGRAM(S): 7 INJECTION, POWDER, LYOPHILIZED, FOR SOLUTION INTRAVENOUS at 06:21

## 2021-11-17 RX ADMIN — ENOXAPARIN SODIUM 40 MILLIGRAM(S): 100 INJECTION SUBCUTANEOUS at 09:17

## 2021-11-17 RX ADMIN — HYDROMORPHONE HYDROCHLORIDE 0.5 MILLIGRAM(S): 2 INJECTION INTRAMUSCULAR; INTRAVENOUS; SUBCUTANEOUS at 19:50

## 2021-11-17 RX ADMIN — OXYCODONE HYDROCHLORIDE 10 MILLIGRAM(S): 5 TABLET ORAL at 15:28

## 2021-11-17 RX ADMIN — OXYCODONE HYDROCHLORIDE 10 MILLIGRAM(S): 5 TABLET ORAL at 21:26

## 2021-11-17 RX ADMIN — GABAPENTIN 600 MILLIGRAM(S): 400 CAPSULE ORAL at 09:17

## 2021-11-17 RX ADMIN — HYDROMORPHONE HYDROCHLORIDE 0.5 MILLIGRAM(S): 2 INJECTION INTRAMUSCULAR; INTRAVENOUS; SUBCUTANEOUS at 05:43

## 2021-11-17 RX ADMIN — HYDROMORPHONE HYDROCHLORIDE 0.5 MILLIGRAM(S): 2 INJECTION INTRAMUSCULAR; INTRAVENOUS; SUBCUTANEOUS at 10:56

## 2021-11-17 RX ADMIN — PIPERACILLIN AND TAZOBACTAM 200 GRAM(S): 4; .5 INJECTION, POWDER, LYOPHILIZED, FOR SOLUTION INTRAVENOUS at 11:51

## 2021-11-17 RX ADMIN — Medication 650 MILLIGRAM(S): at 18:16

## 2021-11-17 RX ADMIN — Medication 650 MILLIGRAM(S): at 05:28

## 2021-11-17 RX ADMIN — OXYCODONE HYDROCHLORIDE 10 MILLIGRAM(S): 5 TABLET ORAL at 05:00

## 2021-11-17 RX ADMIN — OXYCODONE HYDROCHLORIDE 10 MILLIGRAM(S): 5 TABLET ORAL at 15:45

## 2021-11-17 RX ADMIN — DAPTOMYCIN 120 MILLIGRAM(S): 500 INJECTION, POWDER, LYOPHILIZED, FOR SOLUTION INTRAVENOUS at 18:58

## 2021-11-17 RX ADMIN — SENNA PLUS 2 TABLET(S): 8.6 TABLET ORAL at 21:26

## 2021-11-17 RX ADMIN — HYDROMORPHONE HYDROCHLORIDE 0.5 MILLIGRAM(S): 2 INJECTION INTRAMUSCULAR; INTRAVENOUS; SUBCUTANEOUS at 23:05

## 2021-11-17 RX ADMIN — GABAPENTIN 600 MILLIGRAM(S): 400 CAPSULE ORAL at 23:11

## 2021-11-17 RX ADMIN — PIPERACILLIN AND TAZOBACTAM 200 GRAM(S): 4; .5 INJECTION, POWDER, LYOPHILIZED, FOR SOLUTION INTRAVENOUS at 05:29

## 2021-11-17 NOTE — PROGRESS NOTE ADULT - ASSESSMENT
26F h/o L ankle fracture with septic arthritis, postoperative compartment syndrome s/p Left ankle recon with gracilis free flap and STSG  microvascular anastomosis with recipient AT on 9/20. RTOR on 10/18 for debridement of gracilis flap, application of external fixator c/b partial necrosis of skin and subcutaneous fat of gracilis myocutaneous free flap now s/p repeat flap debridement and STSG (10/25). Readmitted on 11/8 for workup of fever of unknown origin. Gallium scan (11/12) c/w with postop changes to the L ankle.     Regular diet  C/w daptomycin/caspofungin/zosyn  ID following-appreciate recs  F/u Gallium scan- uptake c/w postoperative changes  F/u AM labs  Daily dressings changes to LLE with aquaphor, petroleum gauze, and ACE bandage  Aquaphor to left anterior thigh  Analgesics PRN  Antiemetics PRN  OOBA/ work w/ PT  Further management per Ortho Sx.

## 2021-11-17 NOTE — PROGRESS NOTE ADULT - ASSESSMENT
IMPRESSION:  26F PMHx complex L ankle fracture with chronic OM s/p multiple debridements, s/p gracilis FF and STSG L thigh with ongoing OM, need for revision and external fixation.  She is s/p >6 week course of nafcillin and ceftriaxone (MSSA and Strep anginosus), and was recently d/tyson on daptomycin and caspofungin (left ankle joint cx: E. faecalis, Staph epi, C. albicans and C. parapsilosis). P/w subjective fevers, onset <48 h after d/c, new leukocytosis in outpatient labs from 11/6.  She had low grade temp increase in the ED with WBC 12.5K.  Leukocytosis resolved on Zosyn.  She has no localizing findings for infection outside of her ankle.   NM scan done which shows uptake in left ankle and lungs, She has no cough or SOB; therefore doubt the NM findings in the lung are of pathological significance.     Will continue to treat Left ankle septic arthritis.       #Left ankle septic arthritis     Recommend:  1.  Continue Zosyn 3.375 grams IV q6hrs for 4 weeks, through 12/8   2.  Continue Daptomycin 500 mg IV daily through 12/1 for 6 week duration   3.  Continue Caspofungin 50mg IV daily through 12/1 for 6 week duration   4.  Check CBC with differential daily   5.  Check CK level as pt on dapto   6.  Follow up with Dr. Carmona within 1 week, has apt on 11/19     ID Team 1 will follow.      Plan discussed with attending physician.  IMPRESSION:  26F PMHx complex L ankle fracture with chronic OM s/p multiple debridements, s/p gracilis FF and STSG L thigh with ongoing OM, need for revision and external fixation.  She is s/p >6 week course of nafcillin and ceftriaxone (MSSA and Strep anginosus), and was recently d/tyson on daptomycin and caspofungin (left ankle joint cx: E. faecalis, Staph epi, C. albicans and C. parapsilosis). P/w subjective fevers, onset <48 h after d/c, new leukocytosis in outpatient labs from 11/6.  She had low grade temp increase in the ED with WBC 12.5K.  Leukocytosis resolved on Zosyn.  She has no localizing findings for infection outside of her ankle.   NM scan done which shows uptake in left ankle and lungs, She has no cough or SOB; therefore doubt the NM findings in the lung are of pathological significance.     Will continue to treat Left ankle septic arthritis.       #Left ankle septic arthritis     Recommend:  1.  Continue Zosyn 3.375 grams IV q6hrs for 4 weeks, through 12/8   2.  Continue Daptomycin 500 mg IV daily through 12/1 for 6 week duration   3.  Continue Caspofungin 50mg IV daily through 12/1 for 6 week duration   4.  Check CBC with differential daily   5.  Check CK level as pt on dapto   6.  Follow up with Dr. Carmona on discharge, patient has apt next week     ID Team 1 will follow.      Plan discussed with attending physician.

## 2021-11-17 NOTE — PROGRESS NOTE ADULT - SUBJECTIVE AND OBJECTIVE BOX
Ortho Note    Patient seen and examined at bedside this afternoon   Notes pain with activity in the foot  Denies CP, SOB, N/V, numbness/tingling     Vital Signs Last 24 Hrs  T(C): 36.9 (11-17-21 @ 13:42), Max: 36.9 (11-17-21 @ 13:42)  T(F): 98.5 (11-17-21 @ 13:42), Max: 98.5 (11-17-21 @ 13:42)  HR: 92 (11-17-21 @ 13:42) (92 - 92)  BP: 102/65 (11-17-21 @ 13:42) (102/65 - 102/65)  BP(mean): --  RR: 18 (11-17-21 @ 13:42) (18 - 18)  SpO2: 97% (11-17-21 @ 13:42) (97% - 97%)  AVSS    General: Pt Alert and oriented, NAD  Dressing C/D/I: ex fix, dressing changed by plastics this AM   wiggles digits , extremity warm, well perfused      /P: 26yFemale with complex L ankle fracture s/p multiple I&D, revision surgeries,   ringed external fixator placement, and flap coverage by PRS, discharged 11/4, sent into ED by Dr. Carmona for fever workup  - ID recs final:  -  Continue Zosyn 3.375 grams IV q6hrs for 4 weeks, through 12/8   - Continue Daptomycin 500 mg IV daily through 12/1 for 6 week duration   - Continue Caspofungin 50mg IV daily through 12/1 for 6 week duration   -Follow up with Dr. Carmona within 1 week, has apt on 11/19     - F/u blood cultures, NGTD  - Gallium scan neg  - Ex fix tightened at bedside 11/15, WBAT LLE   - DC with home infusion services likely tomorrow 11/18    Ortho Pager [phone] yes

## 2021-11-17 NOTE — PROGRESS NOTE ADULT - NSPROGADDITIONALINFOA_GEN_ALL_CORE
Patients notes reviewed, discussed with PA service, and ID service  Patient cleared PT for home with assistance device  Plan for discharge 11/18 with f/u outpatient week of 11/29  DVT PPX  ABX per ID team  dispo pending infusion coordination

## 2021-11-17 NOTE — PROGRESS NOTE ADULT - SUBJECTIVE AND OBJECTIVE BOX
SUBJECTIVE: Pt seen and examined on morning rounds. Pt feeling well without major complaints. Having some increased foot pain recently likely 2/2 being made WBAT.    Vital Signs Last 24 Hrs  T(C): 36.6 (17 Nov 2021 05:06), Max: 36.8 (16 Nov 2021 20:58)  T(F): 97.9 (17 Nov 2021 05:06), Max: 98.3 (16 Nov 2021 20:58)  HR: 81 (17 Nov 2021 05:06) (71 - 88)  BP: 112/71 (17 Nov 2021 05:06) (97/69 - 120/76)  BP(mean): --  RR: 17 (17 Nov 2021 05:06) (16 - 18)  SpO2: 97% (17 Nov 2021 05:06) (96% - 98%)    Physical Exam:  VSS  General: A&Ox3, NAD  LLE: Ringed external fixator in place w ace wrap; Pin site bolster dressings well appearing; Flap with dressing in place  Vascular: Toes WWP; Cap refill < 2 sec  Sensation: Patient with abnormal/absent sensation over most of the dorsum & plantar aspects of the foot consistent with baseline  Motor: Minimal FHL/FDL, 0/5 EHL/EDL activity consistent with baseline    A/P: 26yFemale with complex L ankle fracture s/p multiple I&D, revision surgeries, ringed external fixator placement, and flap coverage by PRS, discharged 11/4, sent into ED by Dr. Carmona for fever workup  - Trend WBC, stable at ~4 but developing eosinophilia; f/u ID recs  - Dressings removed, no evidence of flap necrosis, infection, or pin site infection  - F/u blood cultures, NGTD  - Continue Zosyn, Dapto, and Caspo as per ID  - Gallium scan neg  - Ex fix tightened at bedside 11/15, can be WBAT   - DC with home infusion services pending final ID recs  - Discussed with Dr. Kadi Foster, PGY-2  Orthopedic Surgery

## 2021-11-17 NOTE — PROGRESS NOTE ADULT - TIME BILLING
treatment of septic arthritis

## 2021-11-17 NOTE — PROGRESS NOTE ADULT - SUBJECTIVE AND OBJECTIVE BOX
INFECTIOUS DISEASE PROGRESS NOTE:     INTERVAL HPI/OVERNIGHT EVENTS:    OVERNIGHT: No overnight events.  SUBJECTIVE: Patient seen and examined at bedside.     REVIEW OF SYSTEMS:    CONSTITUTIONAL: No weakness, fevers or chills  EYES/ENT: No visual changes;  No vertigo or throat pain   NECK: No pain or stiffness  RESPIRATORY: No cough, wheezing, hemoptysis; No shortness of breath  CARDIOVASCULAR: No chest pain or palpitations  GASTROINTESTINAL: No abdominal or epigastric pain. No nausea, vomiting, or hematemesis; No diarrhea or constipation. No melena or hematochezia.  GENITOURINARY: No dysuria, frequency or hematuria  NEUROLOGICAL: No numbness or weakness  SKIN: No itching, burning, rashes, or lesions   MSK: no joint pain, no joint swelling  All other review of systems is negative unless indicated above.      Allergies    No Known Allergies    Intolerances        ANTIBIOTICS/RELEVANT:  antimicrobials  caspofungin IVPB 50 milliGRAM(s) IV Intermittent every 24 hours  DAPTOmycin IVPB 500 milliGRAM(s) IV Intermittent every 24 hours  piperacillin/tazobactam IVPB.. 3.375 Gram(s) IV Intermittent every 6 hours    immunologic:  influenza   Vaccine 0.5 milliLiter(s) IntraMuscular once    OTHER:  acetaminophen     Tablet .. 650 milliGRAM(s) Oral every 6 hours  AQUAPHOR (petrolatum Ointment) 1 Application(s) Topical daily  BACItracin   Ointment 1 Application(s) Topical daily  bisacodyl Suppository 10 milliGRAM(s) Rectal daily PRN  calcium carbonate   1250 mG (OsCal) 1 Tablet(s) Oral daily  chlorhexidine 4% Liquid 1 Application(s) Topical <User Schedule>  cyclobenzaprine 5 milliGRAM(s) Oral three times a day PRN  diphenhydrAMINE 25 milliGRAM(s) Oral every 4 hours PRN  enoxaparin Injectable 40 milliGRAM(s) SubCutaneous every 24 hours  gabapentin 600 milliGRAM(s) Oral four times a day  HYDROmorphone  Injectable 0.5 milliGRAM(s) IV Push every 4 hours PRN  melatonin 3 milliGRAM(s) Oral at bedtime  multivitamin 1 Tablet(s) Oral daily  ondansetron Injectable 4 milliGRAM(s) IV Push every 6 hours PRN  oxyCODONE    IR 5 milliGRAM(s) Oral every 4 hours PRN  oxyCODONE    IR 10 milliGRAM(s) Oral every 4 hours PRN  polyethylene glycol 3350 17 Gram(s) Oral two times a day  senna 2 Tablet(s) Oral at bedtime  sodium chloride 0.9% lock flush 10 milliLiter(s) IV Push every 1 hour PRN      Objective:  Vital Signs Last 24 Hrs  T(C): 36.6 (17 Nov 2021 05:06), Max: 36.8 (16 Nov 2021 20:58)  T(F): 97.9 (17 Nov 2021 05:06), Max: 98.3 (16 Nov 2021 20:58)  HR: 81 (17 Nov 2021 05:06) (71 - 88)  BP: 112/71 (17 Nov 2021 05:06) (97/69 - 120/76)  BP(mean): --  RR: 17 (17 Nov 2021 05:06) (16 - 18)  SpO2: 97% (17 Nov 2021 05:06) (96% - 98%)      PHYSICAL EXAM:    Constitutional: WDWN resting comfortably in bed; NAD  Head: NC/AT  Eyes: PERRLA, EOMI, clear conjunctiva  ENT: no nasal discharge; no oropharyngeal erythema or exudates; dry oral mucosa  Neck: supple; no JVD or thyromegaly  Respiratory: CTA B/L; no W/R/R, no retractions  Cardiac: +S1/S2; RRR; no M/R/G; PMI non-displaced  Gastrointestinal: soft, NT/ND; no rebound or guarding; +BS, no hepatosplenomegaly  Extremities: WWP, no clubbing or cyanosis; no peripheral edema  Vascular: 2+ radial, DP/PT pulses B/L  Lymphatic: no submandibular or cervical LAD  Skin: no rash, no ulcers  Neurologic: AAOx3; answers questions appropriately, follows commands, moves all extremities, CNII-XII grossly intact; no focal deficits, motor 5/5 in UE and LE, Reflexes 2+ in UE and LE b/l        LABS:                        9.7    4.47  )-----------( 292      ( 17 Nov 2021 06:11 )             29.1     11-17    138  |  105  |  12  ----------------------------<  92  3.7   |  23  |  0.73    Ca    10.0      17 Nov 2021 06:11            MICROBIOLOGY:            RECENT CULTURES:      RADIOLOGY & ADDITIONAL STUDIES: INFECTIOUS DISEASE PROGRESS NOTE:     INTERVAL HPI/OVERNIGHT EVENTS:    OVERNIGHT: No overnight events.   SUBJECTIVE: Patient seen and examined at bedside. She is having pain of left lower extremity throughout exam. Has remained afebrile.     REVIEW OF SYSTEMS:      All other review of systems is negative unless indicated above.      Allergies    No Known Allergies    Intolerances        ANTIBIOTICS/RELEVANT:  antimicrobials  caspofungin IVPB 50 milliGRAM(s) IV Intermittent every 24 hours  DAPTOmycin IVPB 500 milliGRAM(s) IV Intermittent every 24 hours  piperacillin/tazobactam IVPB.. 3.375 Gram(s) IV Intermittent every 6 hours    immunologic:  influenza   Vaccine 0.5 milliLiter(s) IntraMuscular once    OTHER:  acetaminophen     Tablet .. 650 milliGRAM(s) Oral every 6 hours  AQUAPHOR (petrolatum Ointment) 1 Application(s) Topical daily  BACItracin   Ointment 1 Application(s) Topical daily  bisacodyl Suppository 10 milliGRAM(s) Rectal daily PRN  calcium carbonate   1250 mG (OsCal) 1 Tablet(s) Oral daily  chlorhexidine 4% Liquid 1 Application(s) Topical <User Schedule>  cyclobenzaprine 5 milliGRAM(s) Oral three times a day PRN  diphenhydrAMINE 25 milliGRAM(s) Oral every 4 hours PRN  enoxaparin Injectable 40 milliGRAM(s) SubCutaneous every 24 hours  gabapentin 600 milliGRAM(s) Oral four times a day  HYDROmorphone  Injectable 0.5 milliGRAM(s) IV Push every 4 hours PRN  melatonin 3 milliGRAM(s) Oral at bedtime  multivitamin 1 Tablet(s) Oral daily  ondansetron Injectable 4 milliGRAM(s) IV Push every 6 hours PRN  oxyCODONE    IR 5 milliGRAM(s) Oral every 4 hours PRN  oxyCODONE    IR 10 milliGRAM(s) Oral every 4 hours PRN  polyethylene glycol 3350 17 Gram(s) Oral two times a day  senna 2 Tablet(s) Oral at bedtime  sodium chloride 0.9% lock flush 10 milliLiter(s) IV Push every 1 hour PRN      Objective:  Vital Signs Last 24 Hrs  T(C): 36.6 (17 Nov 2021 05:06), Max: 36.8 (16 Nov 2021 20:58)  T(F): 97.9 (17 Nov 2021 05:06), Max: 98.3 (16 Nov 2021 20:58)  HR: 81 (17 Nov 2021 05:06) (71 - 88)  BP: 112/71 (17 Nov 2021 05:06) (97/69 - 120/76)  BP(mean): --  RR: 17 (17 Nov 2021 05:06) (16 - 18)  SpO2: 97% (17 Nov 2021 05:06) (96% - 98%)      PHYSICAL EXAM:    Constitutional: WDWN resting comfortably in bed; NAD  Head: NC/AT  Eyes: PERRLA, EOMI, clear conjunctiva  ENT: no nasal discharge; no oropharyngeal erythema or exudates; dry oral mucosa  Neck: supple; no JVD or thyromegaly  Respiratory: CTA B/L; no W/R/R, no retractions  Cardiac: +S1/S2; RRR; no M/R/G; PMI non-displaced  Gastrointestinal: soft, NT/ND; no rebound or guarding; +BS, no hepatosplenomegaly  Extremities: left foot in immobilized boot and ace wrap   Lymphatic: no submandibular or cervical LAD  Skin: no rash, no ulcers  Neurologic: AAOx3;        LABS:                        9.7    4.47  )-----------( 292      ( 17 Nov 2021 06:11 )             29.1     11-17    138  |  105  |  12  ----------------------------<  92  3.7   |  23  |  0.73    Ca    10.0      17 Nov 2021 06:11            MICROBIOLOGY:            RECENT CULTURES:      RADIOLOGY & ADDITIONAL STUDIES:

## 2021-11-17 NOTE — PROGRESS NOTE ADULT - SUBJECTIVE AND OBJECTIVE BOX
Progress Note: Plastic Sx    SUBJECTIVE: Patient seen and examined bedside. No acute events overnight. AVSS. Continues to report improved pain in L ankle and knee. Otherwise has no complaints.     caspofungin IVPB 50 milliGRAM(s) IV Intermittent every 24 hours  DAPTOmycin IVPB 500 milliGRAM(s) IV Intermittent every 24 hours  enoxaparin Injectable 40 milliGRAM(s) SubCutaneous every 24 hours  piperacillin/tazobactam IVPB.. 3.375 Gram(s) IV Intermittent every 6 hours      Vital Signs Last 24 Hrs  T(C): 36.6 (17 Nov 2021 05:06), Max: 36.8 (16 Nov 2021 20:58)  T(F): 97.9 (17 Nov 2021 05:06), Max: 98.3 (16 Nov 2021 20:58)  HR: 81 (17 Nov 2021 05:06) (71 - 88)  BP: 112/71 (17 Nov 2021 05:06) (97/69 - 120/76)  BP(mean): --  RR: 17 (17 Nov 2021 05:06) (16 - 18)  SpO2: 97% (17 Nov 2021 05:06) (96% - 98%)  I&O's Detail    15 Nov 2021 07:01  -  16 Nov 2021 07:00  --------------------------------------------------------  IN:    IV PiggyBack: 200 mL    Oral Fluid: 1080 mL  Total IN: 1280 mL    OUT:    Voided (mL): 850 mL  Total OUT: 850 mL    Total NET: 430 mL      16 Nov 2021 07:01  -  17 Nov 2021 06:42  --------------------------------------------------------  IN:    IV PiggyBack: 300 mL    Oral Fluid: 960 mL  Total IN: 1260 mL    OUT:    Voided (mL): 1750 mL  Total OUT: 1750 mL    Total NET: -490 mL          PHYSICAL EXAM:   General: NAD, resting comfortably in bed  C/V: NSR  Pulm: Nonlabored breathing, no respiratory distress  Extrem: LLE-ex fix in place, STSG with good take, +granulation tissue, dressing changed, thigh dressing taken down and Aquaphor, petroleum gauze, WtD applied to donor site        LABS:                        9.7    4.47  )-----------( 292      ( 17 Nov 2021 06:11 )             29.1     11-17    138  |  105  |  12  ----------------------------<  92  3.7   |  23  |  0.73    Ca    10.0      17 Nov 2021 06:11            RADIOLOGY & ADDITIONAL STUDIES:

## 2021-11-18 PROCEDURE — 99232 SBSQ HOSP IP/OBS MODERATE 35: CPT

## 2021-11-18 RX ADMIN — OXYCODONE HYDROCHLORIDE 10 MILLIGRAM(S): 5 TABLET ORAL at 19:13

## 2021-11-18 RX ADMIN — CHLORHEXIDINE GLUCONATE 1 APPLICATION(S): 213 SOLUTION TOPICAL at 05:15

## 2021-11-18 RX ADMIN — Medication 1000 MILLIGRAM(S): at 05:44

## 2021-11-18 RX ADMIN — Medication 1000 MILLIGRAM(S): at 14:58

## 2021-11-18 RX ADMIN — Medication 1 TABLET(S): at 12:52

## 2021-11-18 RX ADMIN — Medication 1 APPLICATION(S): at 12:55

## 2021-11-18 RX ADMIN — ENOXAPARIN SODIUM 40 MILLIGRAM(S): 100 INJECTION SUBCUTANEOUS at 10:25

## 2021-11-18 RX ADMIN — Medication 1000 MILLIGRAM(S): at 05:14

## 2021-11-18 RX ADMIN — GABAPENTIN 600 MILLIGRAM(S): 400 CAPSULE ORAL at 16:21

## 2021-11-18 RX ADMIN — PIPERACILLIN AND TAZOBACTAM 200 GRAM(S): 4; .5 INJECTION, POWDER, LYOPHILIZED, FOR SOLUTION INTRAVENOUS at 05:14

## 2021-11-18 RX ADMIN — DAPTOMYCIN 120 MILLIGRAM(S): 500 INJECTION, POWDER, LYOPHILIZED, FOR SOLUTION INTRAVENOUS at 19:13

## 2021-11-18 RX ADMIN — OXYCODONE HYDROCHLORIDE 10 MILLIGRAM(S): 5 TABLET ORAL at 02:02

## 2021-11-18 RX ADMIN — HYDROMORPHONE HYDROCHLORIDE 0.5 MILLIGRAM(S): 2 INJECTION INTRAMUSCULAR; INTRAVENOUS; SUBCUTANEOUS at 16:49

## 2021-11-18 RX ADMIN — HYDROMORPHONE HYDROCHLORIDE 0.5 MILLIGRAM(S): 2 INJECTION INTRAMUSCULAR; INTRAVENOUS; SUBCUTANEOUS at 21:25

## 2021-11-18 RX ADMIN — HYDROMORPHONE HYDROCHLORIDE 0.5 MILLIGRAM(S): 2 INJECTION INTRAMUSCULAR; INTRAVENOUS; SUBCUTANEOUS at 21:15

## 2021-11-18 RX ADMIN — CASPOFUNGIN ACETATE 260 MILLIGRAM(S): 7 INJECTION, POWDER, LYOPHILIZED, FOR SOLUTION INTRAVENOUS at 07:47

## 2021-11-18 RX ADMIN — GABAPENTIN 600 MILLIGRAM(S): 400 CAPSULE ORAL at 05:14

## 2021-11-18 RX ADMIN — OXYCODONE HYDROCHLORIDE 10 MILLIGRAM(S): 5 TABLET ORAL at 08:22

## 2021-11-18 RX ADMIN — Medication 1000 MILLIGRAM(S): at 23:20

## 2021-11-18 RX ADMIN — GABAPENTIN 600 MILLIGRAM(S): 400 CAPSULE ORAL at 10:25

## 2021-11-18 RX ADMIN — HYDROMORPHONE HYDROCHLORIDE 0.5 MILLIGRAM(S): 2 INJECTION INTRAMUSCULAR; INTRAVENOUS; SUBCUTANEOUS at 10:20

## 2021-11-18 RX ADMIN — PIPERACILLIN AND TAZOBACTAM 200 GRAM(S): 4; .5 INJECTION, POWDER, LYOPHILIZED, FOR SOLUTION INTRAVENOUS at 12:53

## 2021-11-18 RX ADMIN — GABAPENTIN 600 MILLIGRAM(S): 400 CAPSULE ORAL at 23:07

## 2021-11-18 RX ADMIN — Medication 1000 MILLIGRAM(S): at 14:28

## 2021-11-18 RX ADMIN — OXYCODONE HYDROCHLORIDE 10 MILLIGRAM(S): 5 TABLET ORAL at 07:52

## 2021-11-18 RX ADMIN — HYDROMORPHONE HYDROCHLORIDE 0.5 MILLIGRAM(S): 2 INJECTION INTRAMUSCULAR; INTRAVENOUS; SUBCUTANEOUS at 16:19

## 2021-11-18 RX ADMIN — OXYCODONE HYDROCHLORIDE 10 MILLIGRAM(S): 5 TABLET ORAL at 01:32

## 2021-11-18 RX ADMIN — OXYCODONE HYDROCHLORIDE 10 MILLIGRAM(S): 5 TABLET ORAL at 14:28

## 2021-11-18 RX ADMIN — HYDROMORPHONE HYDROCHLORIDE 0.5 MILLIGRAM(S): 2 INJECTION INTRAMUSCULAR; INTRAVENOUS; SUBCUTANEOUS at 04:08

## 2021-11-18 RX ADMIN — HYDROMORPHONE HYDROCHLORIDE 0.5 MILLIGRAM(S): 2 INJECTION INTRAMUSCULAR; INTRAVENOUS; SUBCUTANEOUS at 03:53

## 2021-11-18 RX ADMIN — PIPERACILLIN AND TAZOBACTAM 200 GRAM(S): 4; .5 INJECTION, POWDER, LYOPHILIZED, FOR SOLUTION INTRAVENOUS at 18:55

## 2021-11-18 NOTE — PROGRESS NOTE ADULT - REASON FOR ADMISSION
Fever
Fever
Fever of unknown origin
Fever w/u
Fever workup
Fever workup
Fever or unknown origin
Fever w/u
Fever w/u
W/u fever of unknown origin
Fever workup
fever
Fever

## 2021-11-18 NOTE — PROGRESS NOTE ADULT - ATTENDING COMMENTS
Agree with above.  She is clinically doing well without signs/symptoms of infection.  Continue current antibiotics and follow up gallium scan
Patient seen and examined at bedside at 1600.  Patient in better spirits than yesterday evening.  Left lower extremity examined in  Moderate tenderness to palpation about her left knee  No evidence of effusion  Able to flex and extend the knee  Intact extensor mechanism  Mild distal femoral condyle tenderness to palpation as well as proximal tibia tenderness to palpation  No evidence of fluctuance  Pin sites appear to be intact without erythema or drainage  Moderate edema about the right forefoot and midfoot that appears to be dependent edema  Mild to moderate tenderness to palpation about the right forefoot midfoot  Mild serous drainage from the lateral flap distally  No evidence of full-thickness flap necrosis    Assessment and plan this is a 26-year-old female undergoing limb salvage for 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  The patient was readmitted for subjective fevers and a new white count.  She is responding appropriately to the Zosyn and her white count ESR CRP and CBC appear to be improving appropriately.  I spoke with the infectious disease team and at this time it is imperative to localize any further seeding of her operative extremity with possible skip osteomyelitis.  Based on the clinical concern, a full body gallium scan is indicated.  Plan to follow-up blood cultures  Appreciate ID Jarred  Follow-up gallium scan  Nonweightbearing left lower extremity for soft tissue rest  Physical therapy for knee range of motion quad strengthening and left lower extremity mobilization  Appreciated ongoing plastics following for flap optimization  DVT prophylaxis  Dispo pending source identification and coordination of antibiotic regimen
Agree with above,  She is clinically doing well.  Continue current therapy as above.  Ok to discharge from ID standpoint
Agree with above.  Can continue current therapy
Agree with above.  Her counts remain stable.  No change in current therapy

## 2021-11-18 NOTE — PROGRESS NOTE ADULT - SUBJECTIVE AND OBJECTIVE BOX
Ortho Note    Seen and examined at bedside AM and PM   Progressed with PT today  Endorses pain LLE   Discharge discussed- patient agreeable to home tomorrow    Vital Signs Last 24 Hrs  T(C): 36.9 (11-18-21 @ 13:56), Max: 36.9 (11-18-21 @ 13:56)  T(F): 98.5 (11-18-21 @ 13:56), Max: 98.5 (11-18-21 @ 13:56)  HR: 94 (11-18-21 @ 13:56) (94 - 94)  BP: 106/65 (11-18-21 @ 13:56) (106/65 - 106/65)  BP(mean): --  RR: 15 (11-18-21 @ 13:56) (15 - 15)  SpO2: 99% (11-18-21 @ 13:56) (99% - 99%)  AVSS    General: Pt Alert and oriented, NAD  Ex fix in place   patient wiggles digits       A/P: 26yFemale with complex L ankle fracture s/p multiple I&D, revision surgeries,   ringed external fixator placement, and flap coverage by PRS, discharged 11/4, sent into ED by Dr. Carmona for fever workup  - ID recs final:  - Continue Zosyn 3.375 grams IV q6hrs for 4 weeks, through 12/8   - Continue Daptomycin 500 mg IV daily through 12/1 for 6 week duration   - Continue Caspofungin 50mg IV daily through 12/1 for 6 week duration   - Follow up with Dr. Carmona within 1 week, has apt on 11/19     - F/u blood cultures, NGTD  - Gallium scan neg  - Ex fix tightened at bedside 11/15, WBAT LLE   - DC with home infusion services tomorrow 11/19    Ortho Pager [phone]

## 2021-11-18 NOTE — PROGRESS NOTE ADULT - SUBJECTIVE AND OBJECTIVE BOX
INFECTIOUS DISEASE PROGRESS NOTE:     INTERVAL HPI/OVERNIGHT EVENTS:    OVERNIGHT: No overnight events.  SUBJECTIVE: Patient seen and examined at bedside.     REVIEW OF SYSTEMS:      All other review of systems is negative unless indicated above.      Allergies    No Known Allergies    Intolerances        ANTIBIOTICS/RELEVANT:  antimicrobials  caspofungin IVPB 50 milliGRAM(s) IV Intermittent every 24 hours  DAPTOmycin IVPB 500 milliGRAM(s) IV Intermittent every 24 hours  piperacillin/tazobactam IVPB.. 3.375 Gram(s) IV Intermittent every 6 hours    immunologic:  influenza   Vaccine 0.5 milliLiter(s) IntraMuscular once    OTHER:  acetaminophen     Tablet .. 1000 milliGRAM(s) Oral every 8 hours  AQUAPHOR (petrolatum Ointment) 1 Application(s) Topical daily  BACItracin   Ointment 1 Application(s) Topical daily  bisacodyl Suppository 10 milliGRAM(s) Rectal daily PRN  calcium carbonate   1250 mG (OsCal) 1 Tablet(s) Oral daily  chlorhexidine 4% Liquid 1 Application(s) Topical <User Schedule>  cyclobenzaprine 5 milliGRAM(s) Oral three times a day PRN  diphenhydrAMINE 25 milliGRAM(s) Oral every 4 hours PRN  enoxaparin Injectable 40 milliGRAM(s) SubCutaneous every 24 hours  gabapentin 600 milliGRAM(s) Oral four times a day  HYDROmorphone  Injectable 0.5 milliGRAM(s) IV Push every 4 hours PRN  melatonin 3 milliGRAM(s) Oral at bedtime  multivitamin 1 Tablet(s) Oral daily  ondansetron Injectable 4 milliGRAM(s) IV Push every 6 hours PRN  oxyCODONE    IR 5 milliGRAM(s) Oral every 4 hours PRN  oxyCODONE    IR 10 milliGRAM(s) Oral every 4 hours PRN  polyethylene glycol 3350 17 Gram(s) Oral two times a day  senna 2 Tablet(s) Oral at bedtime  sodium chloride 0.9% lock flush 10 milliLiter(s) IV Push every 1 hour PRN      Objective:  Vital Signs Last 24 Hrs  T(C): 36.6 (18 Nov 2021 04:58), Max: 36.9 (17 Nov 2021 13:42)  T(F): 97.8 (18 Nov 2021 04:58), Max: 98.5 (17 Nov 2021 13:42)  HR: 91 (18 Nov 2021 04:58) (72 - 95)  BP: 95/64 (18 Nov 2021 04:58) (95/64 - 110/74)  BP(mean): 79 (17 Nov 2021 20:45) (79 - 79)  RR: 17 (18 Nov 2021 04:58) (17 - 18)  SpO2: 95% (18 Nov 2021 04:58) (94% - 97%)      PHYSICAL EXAM:    Constitutional:  NAD  Head: NC/AT  Eyes: PERRLA, EOMI, clear conjunctiva  ENT: no nasal discharge; no oropharyngeal erythema or exudates; dry oral mucosa  Neck: supple; no JVD   Respiratory: CTA B/L; no W/R/R, no retractions  Cardiac: +S1/S2; RRR; no M/R/G; PMI non-displaced  Gastrointestinal: soft, NT/ND; no rebound or guarding; +BS, no hepatosplenomegaly  Extremities: WWP, no clubbing or cyanosis; no peripheral edema  Vascular: 2+ radial, DP/PT pulses B/L  Lymphatic: no submandibular or cervical LAD  Skin: no rash, no ulcers  Neurologic: AAOx3;         LABS:                        9.7    4.47  )-----------( 292      ( 17 Nov 2021 06:11 )             29.1     11-17    138  |  105  |  12  ----------------------------<  92  3.7   |  23  |  0.73    Ca    10.0      17 Nov 2021 06:11            MICROBIOLOGY:            RECENT CULTURES:      RADIOLOGY & ADDITIONAL STUDIES: INFECTIOUS DISEASE PROGRESS NOTE:     INTERVAL HPI/OVERNIGHT EVENTS:    OVERNIGHT: No overnight events.  SUBJECTIVE: Patient seen and examined at bedside. Patient appears overall well, reports overall clinical improvement of left leg, pain present but improving.     REVIEW OF SYSTEMS:      All other review of systems is negative unless indicated above.      Allergies    No Known Allergies    Intolerances        ANTIBIOTICS/RELEVANT:  antimicrobials  caspofungin IVPB 50 milliGRAM(s) IV Intermittent every 24 hours  DAPTOmycin IVPB 500 milliGRAM(s) IV Intermittent every 24 hours  piperacillin/tazobactam IVPB.. 3.375 Gram(s) IV Intermittent every 6 hours    immunologic:  influenza   Vaccine 0.5 milliLiter(s) IntraMuscular once    OTHER:  acetaminophen     Tablet .. 1000 milliGRAM(s) Oral every 8 hours  AQUAPHOR (petrolatum Ointment) 1 Application(s) Topical daily  BACItracin   Ointment 1 Application(s) Topical daily  bisacodyl Suppository 10 milliGRAM(s) Rectal daily PRN  calcium carbonate   1250 mG (OsCal) 1 Tablet(s) Oral daily  chlorhexidine 4% Liquid 1 Application(s) Topical <User Schedule>  cyclobenzaprine 5 milliGRAM(s) Oral three times a day PRN  diphenhydrAMINE 25 milliGRAM(s) Oral every 4 hours PRN  enoxaparin Injectable 40 milliGRAM(s) SubCutaneous every 24 hours  gabapentin 600 milliGRAM(s) Oral four times a day  HYDROmorphone  Injectable 0.5 milliGRAM(s) IV Push every 4 hours PRN  melatonin 3 milliGRAM(s) Oral at bedtime  multivitamin 1 Tablet(s) Oral daily  ondansetron Injectable 4 milliGRAM(s) IV Push every 6 hours PRN  oxyCODONE    IR 5 milliGRAM(s) Oral every 4 hours PRN  oxyCODONE    IR 10 milliGRAM(s) Oral every 4 hours PRN  polyethylene glycol 3350 17 Gram(s) Oral two times a day  senna 2 Tablet(s) Oral at bedtime  sodium chloride 0.9% lock flush 10 milliLiter(s) IV Push every 1 hour PRN      Objective:  Vital Signs Last 24 Hrs  T(C): 36.6 (18 Nov 2021 04:58), Max: 36.9 (17 Nov 2021 13:42)  T(F): 97.8 (18 Nov 2021 04:58), Max: 98.5 (17 Nov 2021 13:42)  HR: 91 (18 Nov 2021 04:58) (72 - 95)  BP: 95/64 (18 Nov 2021 04:58) (95/64 - 110/74)  BP(mean): 79 (17 Nov 2021 20:45) (79 - 79)  RR: 17 (18 Nov 2021 04:58) (17 - 18)  SpO2: 95% (18 Nov 2021 04:58) (94% - 97%)      PHYSICAL EXAM:    Constitutional:  NAD  Head: NC/AT  Eyes: PERRLA, EOMI, clear conjunctiva  ENT: no nasal discharge; no oropharyngeal erythema or exudates; dry oral mucosa  Neck: supple; no JVD   Respiratory: CTA B/L; no W/R/R, no retractions  Cardiac: +S1/S2; RRR; no M/R/G; PMI non-displaced  Gastrointestinal: soft, NT/ND; no rebound or guarding; +BS, no hepatosplenomegaly  Extremities: Left leg in immobilized boot with ace wrapping. left foot warm, with +pulses, able to move toes   Vascular: 2+ radial, DP/PT pulses B/L  Lymphatic: no submandibular or cervical LAD  Skin: no rash, no ulcers  Neurologic: AAOx3;         LABS:                        9.7    4.47  )-----------( 292      ( 17 Nov 2021 06:11 )             29.1     11-17    138  |  105  |  12  ----------------------------<  92  3.7   |  23  |  0.73    Ca    10.0      17 Nov 2021 06:11            MICROBIOLOGY:            RECENT CULTURES:      RADIOLOGY & ADDITIONAL STUDIES:

## 2021-11-18 NOTE — PROGRESS NOTE ADULT - ASSESSMENT
26F h/o L ankle fracture with septic arthritis, postoperative compartment syndrome s/p Left ankle recon with gracilis free flap and STSG  microvascular anastomosis with recipient AT on 9/20. RTOR on 10/18 for debridement of gracilis flap, application of external fixator c/b partial necrosis of skin and subcutaneous fat of gracilis myocutaneous free flap now s/p repeat flap debridement and STSG (10/25). Readmitted on 11/8 for workup of fever of unknown origin. Gallium scan (11/12) c/w with postop changes to the L ankle.     Regular diet  C/w daptomycin/caspofungin/zosyn  ID following-appreciate recs  F/u Gallium scan- uptake c/w postoperative changes  F/u AM labs  Daily dressings changes to LLE with aquaphor, petroleum gauze, and ACE bandage  Aquaphor to left anterior thigh  Analgesics PRN  Antiemetics PRN  OOBA/ work w/ PT-rec OT  Further management per Ortho Sx.

## 2021-11-18 NOTE — CHART NOTE - NSCHARTNOTEFT_GEN_A_CORE
Admitting Diagnosis:   Patient is a 26y old  Female who presents with a chief complaint of Fever w/u (18 Nov 2021 06:51)    PAST MEDICAL & SURGICAL HISTORY:  Left tibial neuropathy    PAD (peripheral artery disease)    Status post ORIF of fracture of ankle    Current Nutrition Order: Regular + 1 can of Ensure Max x2/day + supplement     PO Intake: Good (%) [ x ]  Fair (50-75%) [   ] Poor (<25%) [   ]    GI Issues:   Denies N/V/C/D with last BM 11/16. Pt suspects no BM yesterday 11/17 due to lack of appetite and intake on 11/16 which has since improved starting last night 11/17. Usually BM x1/day     Pain:  Reports no pain/discomfort     Skin Integrity:  Per flowsheet: skin intact, no edema documented.     Labs:   11-17    138  |  105  |  12  ----------------------------<  92  3.7   |  23  |  0.73    Ca    10.0      17 Nov 2021 06:11    CAPILLARY BLOOD GLUCOSE  Medications:  MEDICATIONS  (STANDING):  acetaminophen     Tablet .. 1000 milliGRAM(s) Oral every 8 hours  AQUAPHOR (petrolatum Ointment) 1 Application(s) Topical daily  BACItracin   Ointment 1 Application(s) Topical daily  calcium carbonate   1250 mG (OsCal) 1 Tablet(s) Oral daily  caspofungin IVPB 50 milliGRAM(s) IV Intermittent every 24 hours  chlorhexidine 4% Liquid 1 Application(s) Topical <User Schedule>  DAPTOmycin IVPB 500 milliGRAM(s) IV Intermittent every 24 hours  enoxaparin Injectable 40 milliGRAM(s) SubCutaneous every 24 hours  gabapentin 600 milliGRAM(s) Oral four times a day  influenza   Vaccine 0.5 milliLiter(s) IntraMuscular once  melatonin 3 milliGRAM(s) Oral at bedtime  multivitamin 1 Tablet(s) Oral daily  piperacillin/tazobactam IVPB.. 3.375 Gram(s) IV Intermittent every 6 hours  polyethylene glycol 3350 17 Gram(s) Oral two times a day  senna 2 Tablet(s) Oral at bedtime    MEDICATIONS  (PRN):  bisacodyl Suppository 10 milliGRAM(s) Rectal daily PRN Constipation  cyclobenzaprine 5 milliGRAM(s) Oral three times a day PRN Muscle Spasm  diphenhydrAMINE 25 milliGRAM(s) Oral every 4 hours PRN Rash and/or Itching  HYDROmorphone  Injectable 0.5 milliGRAM(s) IV Push every 4 hours PRN severe breakthrough pain if not yet due for PO  ondansetron Injectable 4 milliGRAM(s) IV Push every 6 hours PRN Nausea  oxyCODONE    IR 5 milliGRAM(s) Oral every 4 hours PRN Moderate Pain (4 - 6)  oxyCODONE    IR 10 milliGRAM(s) Oral every 4 hours PRN Severe Pain (7 - 10)  sodium chloride 0.9% lock flush 10 milliLiter(s) IV Push every 1 hour PRN Pre/post blood products, medications, blood draw, and to maintain line patency    ADMITTED ANTHROPOMETRICS  Weight: 80kg (176.6#)  Height: 175.3cm (5'9")   BMI: 26  IBW: 65.9kg (145#) - 121.4%    Weight Change:   Pt reports weight loss. However, per bed weight scale, 89.1kg (196#). 1 leg cast noted.     Estimated energy needs:   Used IBW to estimate requirements as %IBW>120% (121.4%), adjusted for wound healing     Subjective:   25 y/o female with h/o L ankle fracture with septic arthritis, postoperative compartment syndrome s/p left ankle recon with gracilis free flap and STSG microvascular anastomosis with recipient AT on 9/20. RTOR on 10/18 for debridement of gracilis flap and application of external fixator c/b partial necrosis of skin and subcutaneous fat of gracilis myocutaneous free flap, s/p repeat flap debridement and STSG 10/25. D/c with PICC line and 6 week course of abx (Daptomycin + Caspofungin) 11/5. Readmitted by ID doctor for elevated WBC on outpatient labs 2 days ago with subjective fever at home 11/8. During admission: resolved leukocytosis with Zosyn + gallium scan 11/13 shows uptake c/w postoperative changes + NM scan 11/18 shows uptake in left ankle and lungs, no cough or SOB; therefore doubt the NM findings in the lung are of pathological significance + no localizing findings for infection outside of ankle. Plan d/c today 11/8 with home infusion when setup.     Pt was seen resting in bed. Reports lack of appetite and intake since 11/06 due to pain, has improved starting 11/07 night. Consumed Ensure Max x2 on 11/06, Ensure Max x1 and 100% [place] food brought from outside on 11/7. Has good appetite this morning, plan to have 100% of breakfast. Follows no diet restrictions, NKFA. Please see below for nutrition recommendations.     Previous Nutrition Diagnosis: Increased energy and protein requirements RT healing and hypermetabolic AEB 25-30cal/kg energy and 1.3-1.5g protein/kg requirements of IBW.     Active [ x ]  Resolved [   ]    If resolved, new PES:     Goal: Pt to meet >75% of nutrient requirements via PO intake.    Recommendations:  1. Continue regular diet + 1 can Ensure Max x2/day + multivitamin   2. Obtain bi-weekly weights   3. Monitor labs, skin integrity, intake, diet tolerance and GI distress   4. Provide additional education as needed  5. Bowel and pain regimen per team     Education: Provided education regarding importance of protein for wound healing. Encouraged fiber, fluid and prune juice intake in case constipation persists. Pt demonstrated understanding.     Risk Level: High [   ] Moderate [ x ] Low [   ] Admitting Diagnosis:   Patient is a 26y old  Female who presents with a chief complaint of Fever w/u (18 Nov 2021 06:51)    PAST MEDICAL & SURGICAL HISTORY:  Left tibial neuropathy    PAD (peripheral artery disease)    Status post ORIF of fracture of ankle    Current Nutrition Order: Regular + 1 can of Ensure Max x2/day + supplement     PO Intake: Good (%) [ x ]  Fair (50-75%) [   ] Poor (<25%) [   ]    GI Issues:   Denies N/V/C/D with last BM 11/16. Pt suspects no BM yesterday 11/17 due to lack of appetite and intake on 11/16 which has since improved starting last night 11/17. Usually BM x1/day     Pain:  Reports no pain/discomfort     Skin Integrity:  Per flowsheet: skin intact, no edema documented.     Labs:   11-17    138  |  105  |  12  ----------------------------<  92  3.7   |  23  |  0.73    Ca    10.0      17 Nov 2021 06:11    CAPILLARY BLOOD GLUCOSE  Medications:  MEDICATIONS  (STANDING):  acetaminophen     Tablet .. 1000 milliGRAM(s) Oral every 8 hours  AQUAPHOR (petrolatum Ointment) 1 Application(s) Topical daily  BACItracin   Ointment 1 Application(s) Topical daily  calcium carbonate   1250 mG (OsCal) 1 Tablet(s) Oral daily  caspofungin IVPB 50 milliGRAM(s) IV Intermittent every 24 hours  chlorhexidine 4% Liquid 1 Application(s) Topical <User Schedule>  DAPTOmycin IVPB 500 milliGRAM(s) IV Intermittent every 24 hours  enoxaparin Injectable 40 milliGRAM(s) SubCutaneous every 24 hours  gabapentin 600 milliGRAM(s) Oral four times a day  influenza   Vaccine 0.5 milliLiter(s) IntraMuscular once  melatonin 3 milliGRAM(s) Oral at bedtime  multivitamin 1 Tablet(s) Oral daily  piperacillin/tazobactam IVPB.. 3.375 Gram(s) IV Intermittent every 6 hours  polyethylene glycol 3350 17 Gram(s) Oral two times a day  senna 2 Tablet(s) Oral at bedtime    MEDICATIONS  (PRN):  bisacodyl Suppository 10 milliGRAM(s) Rectal daily PRN Constipation  cyclobenzaprine 5 milliGRAM(s) Oral three times a day PRN Muscle Spasm  diphenhydrAMINE 25 milliGRAM(s) Oral every 4 hours PRN Rash and/or Itching  HYDROmorphone  Injectable 0.5 milliGRAM(s) IV Push every 4 hours PRN severe breakthrough pain if not yet due for PO  ondansetron Injectable 4 milliGRAM(s) IV Push every 6 hours PRN Nausea  oxyCODONE    IR 5 milliGRAM(s) Oral every 4 hours PRN Moderate Pain (4 - 6)  oxyCODONE    IR 10 milliGRAM(s) Oral every 4 hours PRN Severe Pain (7 - 10)  sodium chloride 0.9% lock flush 10 milliLiter(s) IV Push every 1 hour PRN Pre/post blood products, medications, blood draw, and to maintain line patency    ADMITTED ANTHROPOMETRICS  Weight: 80kg (176.6#)  Height: 175.3cm (5'9")   BMI: 26  IBW: 65.9kg (145#) - 121.4%    Weight Change:   Pt reports weight loss. However, per bed weight scale, 89.1kg (196#). 1 leg cast noted.     Estimated energy needs:   Used IBW to estimate requirements as %IBW>120% (121.4%), adjusted for wound healing     Subjective:   25 y/o female with h/o L ankle fracture with septic arthritis, postoperative compartment syndrome s/p left ankle recon with gracilis free flap and STSG microvascular anastomosis with recipient AT on 9/20. RTOR on 10/18 for debridement of gracilis flap and application of external fixator c/b partial necrosis of skin and subcutaneous fat of gracilis myocutaneous free flap, s/p repeat flap debridement and STSG 10/25. D/c with PICC line and 6 week course of abx (Daptomycin + Caspofungin) 11/5. Readmitted by ID doctor for elevated WBC on outpatient labs 2 days ago with subjective fever at home 11/8. During admission: resolved leukocytosis with Zosyn + gallium scan 11/13 shows uptake c/w postoperative changes + NM scan 11/18 shows uptake in left ankle and lungs, no cough or SOB; therefore doubt the NM findings in the lung are of pathological significance + no localizing findings for infection outside of ankle. Plan d/c today 11/8 with home infusion when setup.     Pt was seen resting in bed. Reports lack of appetite and intake since 11/06 due to pain, has improved starting 11/07 night. Consumed Ensure Max x2 on 11/06, Ensure Max x1 and 100% [place] food brought from outside on 11/7. Has good appetite this morning, plan to have 100% of breakfast. Follows no diet restrictions, NKFA. Pt made aware RD remain available. Please see below for nutrition recommendations.     Previous Nutrition Diagnosis: Increased energy and protein requirements RT healing and hypermetabolic AEB 25-30cal/kg energy and 1.3-1.5g protein/kg requirements of IBW.     Active [ x ]  Resolved [   ]    If resolved, new PES:     Goal: Pt to meet >75% of nutrient requirements via PO intake.    Recommendations:  1. Continue regular diet + 1 can Ensure Max x2/day + multivitamin   2. Obtain bi-weekly weights   3. Monitor labs, skin integrity, intake, diet tolerance and GI distress   4. Provide additional education as needed  5. Bowel and pain regimen per team     Education: Provided education regarding importance of protein for wound healing. Encouraged fiber, fluid and prune juice intake in case constipation persists. Pt demonstrated understanding.     Risk Level: High [   ] Moderate [ x ] Low [   ]

## 2021-11-18 NOTE — PROGRESS NOTE ADULT - SUBJECTIVE AND OBJECTIVE BOX
Progress Note: Plastic Surgery    SUBJECTIVE: Patient seen and examined bedside. Resting comfortably in bed. AVSS. Reports needing to work with OT given limited mobility along with ADLs 2/2 to pain. Otherwise no complaints.     caspofungin IVPB 50 milliGRAM(s) IV Intermittent every 24 hours  DAPTOmycin IVPB 500 milliGRAM(s) IV Intermittent every 24 hours  enoxaparin Injectable 40 milliGRAM(s) SubCutaneous every 24 hours  piperacillin/tazobactam IVPB.. 3.375 Gram(s) IV Intermittent every 6 hours      Vital Signs Last 24 Hrs  T(C): 36.6 (18 Nov 2021 04:58), Max: 36.9 (17 Nov 2021 13:42)  T(F): 97.8 (18 Nov 2021 04:58), Max: 98.5 (17 Nov 2021 13:42)  HR: 91 (18 Nov 2021 04:58) (72 - 95)  BP: 95/64 (18 Nov 2021 04:58) (95/64 - 110/74)  BP(mean): 79 (17 Nov 2021 20:45) (79 - 79)  RR: 17 (18 Nov 2021 04:58) (17 - 18)  SpO2: 95% (18 Nov 2021 04:58) (94% - 97%)  I&O's Detail    16 Nov 2021 07:01  -  17 Nov 2021 07:00  --------------------------------------------------------  IN:    IV PiggyBack: 300 mL    IV PiggyBack: 250 mL    Oral Fluid: 960 mL  Total IN: 1510 mL    OUT:    Voided (mL): 1750 mL  Total OUT: 1750 mL    Total NET: -240 mL      17 Nov 2021 07:01  -  18 Nov 2021 06:51  --------------------------------------------------------  IN:    IV PiggyBack: 100 mL    IV PiggyBack: 400 mL    Oral Fluid: 320 mL  Total IN: 820 mL    OUT:    Voided (mL): 1650 mL  Total OUT: 1650 mL    Total NET: -830 mL          PHYSICAL EXAM:   General: NAD, resting comfortably in bed  C/V: NSR  Pulm: Nonlabored breathing, no respiratory distress  Extrem: LLE-ex fix in place, STSG with good take, +granulation tissue, dressing changed, thigh dressing taken down and Aquaphor, petroleum gauze, WtD applied to donor site        LABS:                        9.7    4.47  )-----------( 292      ( 17 Nov 2021 06:11 )             29.1     11-17    138  |  105  |  12  ----------------------------<  92  3.7   |  23  |  0.73    Ca    10.0      17 Nov 2021 06:11            RADIOLOGY & ADDITIONAL STUDIES:

## 2021-11-18 NOTE — PROGRESS NOTE ADULT - ASSESSMENT
IMPRESSION:  26F PMHx complex L ankle fracture with chronic OM s/p multiple debridements, s/p gracilis FF and STSG L thigh with ongoing OM, need for revision and external fixation.  She is s/p >6 week course of nafcillin and ceftriaxone (MSSA and Strep anginosus), and was recently d/tyson on daptomycin and caspofungin (left ankle joint cx: E. faecalis, Staph epi, C. albicans and C. parapsilosis). P/w subjective fevers, onset <48 h after d/c, new leukocytosis in outpatient labs from 11/6.  She had low grade temp increase in the ED with WBC 12.5K.  Leukocytosis resolved on Zosyn.  She has no localizing findings for infection outside of her ankle.   NM scan done which shows uptake in left ankle and lungs, She has no cough or SOB; therefore doubt the NM findings in the lung are of pathological significance.     Will continue to treat Left ankle septic arthritis.       #Left ankle septic arthritis     Recommend:  1.  Continue Zosyn 3.375 grams IV q6hrs for 4 weeks, through 12/8   2.  Continue Daptomycin 500 mg IV daily through 12/1 for 6 week duration   3.  Continue Caspofungin 50mg IV daily through 12/1 for 6 week duration   4.  Check CBC with differential daily   5.  Check CK level as pt on dapto   6.  Follow up with Dr. Carmona on discharge, patient has apt next week     ID Team 1 will follow.      Plan discussed with attending physician.  IMPRESSION:  26F PMHx complex L ankle fracture with chronic OM s/p multiple debridements, s/p gracilis FF and STSG L thigh with ongoing OM, need for revision and external fixation.  She is s/p >6 week course of nafcillin and ceftriaxone (MSSA and Strep anginosus), and was recently d/tyson on daptomycin and caspofungin (left ankle joint cx: E. faecalis, Staph epi, C. albicans and C. parapsilosis). P/w subjective fevers, onset <48 h after d/c, new leukocytosis in outpatient labs from 11/6.  She had low grade temp increase in the ED with WBC 12.5K.  Leukocytosis resolved on Zosyn.  She has no localizing findings for infection outside of her ankle.   NM scan done which shows uptake in left ankle and lungs, She has no cough or SOB; therefore doubt the NM findings in the lung are of pathological significance.     Will continue to treat Left ankle septic arthritis.       #Left ankle septic arthritis     Recommend:  1.  Continue Zosyn 3.375 grams IV q6hrs for 4 weeks, through 12/8   2.  Continue Daptomycin 500 mg IV daily through 12/1 for 6 week duration   3.  Continue Caspofungin 50mg IV daily through 12/1 for 6 week duration   4.  Check CBC with differential daily   5.  Follow up with Dr. Carmona on discharge, patient has apt next week, check weekly labs: CBC, CMP, ESR, CRP, CK and fax to [phone]     ID Team 1will sign off, please reconsult as needed.     Plan discussed with attending physician.

## 2021-11-18 NOTE — PROGRESS NOTE ADULT - SUBJECTIVE AND OBJECTIVE BOX
SUBJECTIVE: Pt seen and examined on morning rounds. Pt feeling well without major complaints. Foot pain unchanged.    Vital Signs Last 24 Hrs  T(C): 36.6 (18 Nov 2021 04:58), Max: 36.9 (17 Nov 2021 13:42)  T(F): 97.8 (18 Nov 2021 04:58), Max: 98.5 (17 Nov 2021 13:42)  HR: 91 (18 Nov 2021 04:58) (72 - 95)  BP: 95/64 (18 Nov 2021 04:58) (95/64 - 110/74)  BP(mean): 79 (17 Nov 2021 20:45) (79 - 79)  RR: 17 (18 Nov 2021 04:58) (17 - 18)  SpO2: 95% (18 Nov 2021 04:58) (94% - 97%)    Physical Exam:  VSS  General: A&Ox3, NAD  LLE: Ringed external fixator in place w ace wrap; Pin site bolster dressings well appearing; Flap with dressing in place  Vascular: Toes WWP; Cap refill < 2 sec  Sensation: Patient with abnormal/absent sensation over most of the dorsum & plantar aspects of the foot consistent with baseline  Motor: Minimal FHL/FDL, 0/5 EHL/EDL activity consistent with baseline    A/P: 26yFemale with complex L ankle fracture s/p multiple I&D, revision surgeries, ringed external fixator placement, and flap coverage by PRS, discharged 11/4, sent into ED by Dr. Carmona for fever workup  - WBC stable  - Dressings removed, no evidence of flap necrosis, infection, or pin site infection  - F/u blood cultures, NGTD  - Continue Zosyn, Dapto, and Caspo as per ID  - Gallium scan neg  - Ex fix tightened at bedside 11/15, can be WBAT   - DC with home infusion services today when setup  - Discussed with Dr. Kadi Foster, PGY-2  Orthopedic Surgery

## 2021-11-19 ENCOUNTER — TRANSCRIPTION ENCOUNTER (OUTPATIENT)
Age: 26
End: 2021-11-19

## 2021-11-19 ENCOUNTER — APPOINTMENT (OUTPATIENT)
Dept: INFECTIOUS DISEASE | Facility: CLINIC | Age: 26
End: 2021-11-19

## 2021-11-19 VITALS
TEMPERATURE: 98 F | DIASTOLIC BLOOD PRESSURE: 52 MMHG | HEART RATE: 81 BPM | OXYGEN SATURATION: 98 % | RESPIRATION RATE: 18 BRPM | SYSTOLIC BLOOD PRESSURE: 88 MMHG

## 2021-11-19 RX ORDER — ASPIRIN/CALCIUM CARB/MAGNESIUM 324 MG
81 TABLET ORAL
Qty: 4860 | Refills: 0
Start: 2021-11-19 | End: 2021-12-18

## 2021-11-19 RX ORDER — ASPIRIN/CALCIUM CARB/MAGNESIUM 324 MG
1 TABLET ORAL
Qty: 60 | Refills: 0
Start: 2021-11-19 | End: 2021-12-18

## 2021-11-19 RX ORDER — KETOROLAC TROMETHAMINE 30 MG/ML
15 SYRINGE (ML) INJECTION ONCE
Refills: 0 | Status: DISCONTINUED | OUTPATIENT
Start: 2021-11-19 | End: 2021-11-19

## 2021-11-19 RX ORDER — CASPOFUNGIN ACETATE 7 MG/ML
50 INJECTION, POWDER, LYOPHILIZED, FOR SOLUTION INTRAVENOUS
Qty: 0 | Refills: 0 | DISCHARGE
Start: 2021-11-19

## 2021-11-19 RX ORDER — BACITRACIN ZINC 500 UNIT/G
1 OINTMENT IN PACKET (EA) TOPICAL
Qty: 0 | Refills: 0 | DISCHARGE
Start: 2021-11-19

## 2021-11-19 RX ORDER — ACETAMINOPHEN 500 MG
3 TABLET ORAL
Qty: 126 | Refills: 0
Start: 2021-11-19 | End: 2021-12-02

## 2021-11-19 RX ORDER — OXYCODONE HYDROCHLORIDE 5 MG/1
1 TABLET ORAL
Qty: 40 | Refills: 0
Start: 2021-11-19

## 2021-11-19 RX ORDER — PETROLATUM,WHITE
1 JELLY (GRAM) TOPICAL
Qty: 0 | Refills: 0 | DISCHARGE
Start: 2021-11-19

## 2021-11-19 RX ORDER — SODIUM CHLORIDE 9 MG/ML
500 INJECTION INTRAMUSCULAR; INTRAVENOUS; SUBCUTANEOUS ONCE
Refills: 0 | Status: COMPLETED | OUTPATIENT
Start: 2021-11-19 | End: 2021-11-19

## 2021-11-19 RX ADMIN — PIPERACILLIN AND TAZOBACTAM 200 GRAM(S): 4; .5 INJECTION, POWDER, LYOPHILIZED, FOR SOLUTION INTRAVENOUS at 00:16

## 2021-11-19 RX ADMIN — HYDROMORPHONE HYDROCHLORIDE 0.5 MILLIGRAM(S): 2 INJECTION INTRAMUSCULAR; INTRAVENOUS; SUBCUTANEOUS at 02:17

## 2021-11-19 RX ADMIN — OXYCODONE HYDROCHLORIDE 10 MILLIGRAM(S): 5 TABLET ORAL at 07:15

## 2021-11-19 RX ADMIN — OXYCODONE HYDROCHLORIDE 10 MILLIGRAM(S): 5 TABLET ORAL at 06:46

## 2021-11-19 RX ADMIN — OXYCODONE HYDROCHLORIDE 10 MILLIGRAM(S): 5 TABLET ORAL at 00:17

## 2021-11-19 RX ADMIN — CASPOFUNGIN ACETATE 260 MILLIGRAM(S): 7 INJECTION, POWDER, LYOPHILIZED, FOR SOLUTION INTRAVENOUS at 06:46

## 2021-11-19 RX ADMIN — Medication 15 MILLIGRAM(S): at 09:47

## 2021-11-19 RX ADMIN — HYDROMORPHONE HYDROCHLORIDE 0.5 MILLIGRAM(S): 2 INJECTION INTRAMUSCULAR; INTRAVENOUS; SUBCUTANEOUS at 02:47

## 2021-11-19 RX ADMIN — SODIUM CHLORIDE 8.33 MILLILITER(S): 9 INJECTION INTRAMUSCULAR; INTRAVENOUS; SUBCUTANEOUS at 10:35

## 2021-11-19 RX ADMIN — GABAPENTIN 600 MILLIGRAM(S): 400 CAPSULE ORAL at 06:46

## 2021-11-19 RX ADMIN — Medication 1000 MILLIGRAM(S): at 06:46

## 2021-11-19 RX ADMIN — GABAPENTIN 600 MILLIGRAM(S): 400 CAPSULE ORAL at 12:34

## 2021-11-19 RX ADMIN — Medication 15 MILLIGRAM(S): at 09:33

## 2021-11-19 RX ADMIN — OXYCODONE HYDROCHLORIDE 10 MILLIGRAM(S): 5 TABLET ORAL at 13:06

## 2021-11-19 RX ADMIN — PIPERACILLIN AND TAZOBACTAM 200 GRAM(S): 4; .5 INJECTION, POWDER, LYOPHILIZED, FOR SOLUTION INTRAVENOUS at 05:38

## 2021-11-19 RX ADMIN — ENOXAPARIN SODIUM 40 MILLIGRAM(S): 100 INJECTION SUBCUTANEOUS at 09:33

## 2021-11-19 RX ADMIN — OXYCODONE HYDROCHLORIDE 10 MILLIGRAM(S): 5 TABLET ORAL at 00:47

## 2021-11-19 RX ADMIN — PIPERACILLIN AND TAZOBACTAM 200 GRAM(S): 4; .5 INJECTION, POWDER, LYOPHILIZED, FOR SOLUTION INTRAVENOUS at 12:34

## 2021-11-19 RX ADMIN — CHLORHEXIDINE GLUCONATE 1 APPLICATION(S): 213 SOLUTION TOPICAL at 06:46

## 2021-11-19 RX ADMIN — Medication 1000 MILLIGRAM(S): at 07:15

## 2021-11-19 NOTE — PROGRESS NOTE ADULT - ASSESSMENT
26F h/o L ankle fracture with septic arthritis, postoperative compartment syndrome s/p Left ankle recon with gracilis free flap and STSG  microvascular anastomosis with recipient AT on 9/20. RTOR on 10/18 for debridement of gracilis flap, application of external fixator c/b partial necrosis of skin and subcutaneous fat of gracilis myocutaneous free flap now s/p repeat flap debridement and STSG (10/25). Readmitted on 11/8 for workup of fever of unknown origin. Gallium scan (11/12) c/w with postop changes to the L ankle.     -daily dressing: WTD to small distal wound, single layer petrolatum gauze to healed skin graft areas, held in place with loose kerlix; L thigh skin graft donor site, aquaphor BID  -ambulation OK from plastic surgery standpoint  -abx per ID and ortho  -dc hopefully today    BSmith

## 2021-11-19 NOTE — PROGRESS NOTE ADULT - PROVIDER SPECIALTY LIST ADULT
Infectious Disease
Infectious Disease
Orthopedics
Infectious Disease
Orthopedics
Plastic Surgery
Infectious Disease
Plastic Surgery

## 2021-11-19 NOTE — DISCHARGE NOTE NURSING/CASE MANAGEMENT/SOCIAL WORK - NSSCNAMETXT_GEN_ALL_CORE
[facility] home infusion   [facility]

## 2021-11-19 NOTE — DISCHARGE NOTE NURSING/CASE MANAGEMENT/SOCIAL WORK - PATIENT PORTAL LINK FT
You can access the FollowMyHealth Patient Portal offered by [facility] by registering at the following website: [email or web address]. By joining [employer]’s FollowMyHealth portal, you will also be able to view your health information using other applications (apps) compatible with our system.

## 2021-11-19 NOTE — PROGRESS NOTE ADULT - SUBJECTIVE AND OBJECTIVE BOX
SUBJECTIVE: Pt seen and examined on morning rounds. Pt feeling well without major complaints. Foot pain improved this AM    Vital Signs Last 24 Hrs  T(C): 36.8 (19 Nov 2021 04:56), Max: 37.4 (18 Nov 2021 16:25)  T(F): 98.3 (19 Nov 2021 04:56), Max: 99.3 (18 Nov 2021 16:25)  HR: 81 (19 Nov 2021 04:56) (81 - 94)  BP: 98/65 (19 Nov 2021 04:56) (96/63 - 106/65)  BP(mean): --  RR: 18 (19 Nov 2021 04:56) (15 - 18)  SpO2: 95% (19 Nov 2021 04:56) (93% - 99%)    Physical Exam:  VSS  General: A&Ox3, NAD  LLE: Ringed external fixator in place w ace wrap; Pin site bolster dressings well appearing; Flap with dressing in place  Vascular: Toes WWP; Cap refill < 2 sec  Sensation: Patient with abnormal/absent sensation over most of the dorsum & plantar aspects of the foot consistent with baseline  Motor: Minimal FHL/FDL, 0/5 EHL/EDL activity consistent with baseline    A/P: 26yFemale with complex L ankle fracture s/p multiple I&D, revision surgeries, ringed external fixator placement, and flap coverage by PRS, discharged 11/4, sent into ED by Dr. Carmona for fever workup  - WBC stable  - Dressings removed, no evidence of flap necrosis, infection, or pin site infection  - F/u blood cultures, NGTD  - Continue Zosyn, Dapto, and Caspo as per ID  - Gallium scan neg  - Ex fix tightened at bedside 11/15, can be WBAT   - DC with home infusion services today   - Discussed with Dr. Kadi Foster, PGY-2  Orthopedic Surgery

## 2021-11-19 NOTE — PROGRESS NOTE ADULT - SUBJECTIVE AND OBJECTIVE BOX
Plastic Surgery Progress Note    S: Patient seen and examined. did better with PT yesterday, able to do stairs.    Vital Signs Last 24 Hrs  T(C): 36.8 (19 Nov 2021 04:56), Max: 37.4 (18 Nov 2021 16:25)  T(F): 98.3 (19 Nov 2021 04:56), Max: 99.3 (18 Nov 2021 16:25)  HR: 81 (19 Nov 2021 04:56) (81 - 94)  BP: 98/65 (19 Nov 2021 04:56) (96/63 - 106/65)  BP(mean): --  RR: 18 (19 Nov 2021 04:56) (15 - 18)  SpO2: 95% (19 Nov 2021 04:56) (93% - 99%)  I&O's Detail    18 Nov 2021 07:01  -  19 Nov 2021 07:00  --------------------------------------------------------  IN:    IV PiggyBack: 100 mL    IV PiggyBack: 250 mL    Oral Fluid: 660 mL  Total IN: 1010 mL    OUT:    Voided (mL): 1900 mL  Total OUT: 1900 mL    Total NET: -890 mL          Physical Exam:  General: Awake and alert, NAD  LLE: flap soft, skin grafted areas stable with good take.  small 2x2cm area distally with poor take, wound clean

## 2021-11-22 ENCOUNTER — APPOINTMENT (OUTPATIENT)
Dept: ORTHOPEDIC SURGERY | Facility: CLINIC | Age: 26
End: 2021-11-22
Payer: COMMERCIAL

## 2021-11-24 ENCOUNTER — APPOINTMENT (OUTPATIENT)
Dept: ORTHOPEDIC SURGERY | Facility: CLINIC | Age: 26
End: 2021-11-24
Payer: COMMERCIAL

## 2021-11-24 VITALS — WEIGHT: 190 LBS | HEIGHT: 68 IN | RESPIRATION RATE: 16 BRPM | BODY MASS INDEX: 28.79 KG/M2
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
## 2021-11-24 DIAGNOSIS — T81.41XA INFECTION FOLLOWING A PROCEDURE, SUPERFICIAL INCISIONAL SURGICAL SITE, INITIAL ENCOUNTER: ICD-10-CM

## 2021-11-24 DIAGNOSIS — B96.89 OTHER SPECIFIED BACTERIAL AGENTS AS THE CAUSE OF DISEASES CLASSIFIED ELSEWHERE: ICD-10-CM

## 2021-11-24 DIAGNOSIS — D75.89 OTHER SPECIFIED DISEASES OF BLOOD AND BLOOD-FORMING ORGANS: ICD-10-CM

## 2021-11-24 DIAGNOSIS — M00.872 ARTHRITIS DUE TO OTHER BACTERIA, LEFT ANKLE AND FOOT: ICD-10-CM

## 2021-11-24 DIAGNOSIS — I73.9 PERIPHERAL VASCULAR DISEASE, UNSPECIFIED: ICD-10-CM

## 2021-11-24 DIAGNOSIS — M86.672 OTHER CHRONIC OSTEOMYELITIS, LEFT ANKLE AND FOOT: ICD-10-CM

## 2021-11-24 PROCEDURE — 99024 POSTOP FOLLOW-UP VISIT: CPT

## 2021-12-01 ENCOUNTER — APPOINTMENT (OUTPATIENT)
Dept: INFECTIOUS DISEASE | Facility: CLINIC | Age: 26
End: 2021-12-01
Payer: COMMERCIAL

## 2021-12-01 VITALS
OXYGEN SATURATION: 99 % | BODY MASS INDEX: 28.79 KG/M2 | SYSTOLIC BLOOD PRESSURE: 116 MMHG | HEART RATE: 100 BPM | WEIGHT: 190 LBS | TEMPERATURE: 97.2 F | HEIGHT: 68 IN | DIASTOLIC BLOOD PRESSURE: 75 MMHG

## 2021-12-01 DIAGNOSIS — Z83.3 FAMILY HISTORY OF DIABETES MELLITUS: ICD-10-CM

## 2021-12-01 DIAGNOSIS — Z87.891 PERSONAL HISTORY OF NICOTINE DEPENDENCE: ICD-10-CM

## 2021-12-01 DIAGNOSIS — Z82.49 FAMILY HISTORY OF ISCHEMIC HEART DISEASE AND OTHER DISEASES OF THE CIRCULATORY SYSTEM: ICD-10-CM

## 2021-12-01 DIAGNOSIS — Z78.9 OTHER SPECIFIED HEALTH STATUS: ICD-10-CM

## 2021-12-01 PROCEDURE — 99213 OFFICE O/P EST LOW 20 MIN: CPT

## 2021-12-01 NOTE — PHYSICAL EXAM
[General Appearance - Alert] : alert [Sclera] : the sclera and conjunctiva were normal [Outer Ear] : the ears and nose were normal in appearance [Neck Appearance] : the appearance of the neck was normal [Edema] : there was no peripheral edema [Bowel Sounds] : normal bowel sounds [No Palpable Adenopathy] : no palpable adenopathy [] : no rash [Motor Exam] : the motor exam was normal [Oriented To Time, Place, And Person] : oriented to person, place, and time [FreeTextEntry1] : external fixator device in place; skin flap without tenderness, erythema, discharge, healing well

## 2021-12-01 NOTE — HISTORY OF PRESENT ILLNESS
[FreeTextEntry1] : 27 yo F with complex L ankle fracture, recent prolonged admission (9/9-11/5) readmitted 11/8 with fever, leukocytosis.  ID consult for assistance with source ID and management.\par She had L ankle fracture 3/21 initially treated with clinda/levofloxacin IV, then prolonged course po.  She was admitted 9/9 with open wound draining pus.  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  She underwent RICK, I&D with bone debridement, ligament repair.  She was initially started on vanc and cefepime, then changed to nafcillin and ceftriaxone when all OR cultures grew MSSA and Strep anginosus.  (3 also grew Staph epi and 1 grew E. faecalis.)  She underwent additional I&Ds with VAC placement on 9/12, 9/14, 9/17 & 9/20.  Rare MSSA grew from 1/3 cultures on 9/20.  On 9/21, she underwent L ankle reconstruction with L gracilis FF and L thigh STSG. PICC was placed on 9/21.  Plan was for 6-week course of Abx from 9/20.  Her inflammatory markers remained elevated.  On 10/14, gallium showed prominent uptake in gallium compared to Tc99m within L ankle involving distal tibial/fibula and talus most c/c osteomyelitis.  On 10/17, she underwent debridement of gracilis flap with sanguinous fluid anterolateral ankle seen distal to flap and application of multiplanar external fixator.  OR cultures were polymicrobial but consistently grew E. faecalis, Staph epi, Candida albicans and Candida parapsilosis – these organisms were targed.  Stenotrophomonas also grew from 1.  On 10/21, nafcillin and ceftriaxone were d/tyson.  She was started on dapto and fluconazole.  She had RTOR on 10/25 for repeat flap debridement and STSG by Plastics.  She did well until 10/29 when she started to have increasing LFTs.  On 11/1, fluconazole was d/tyson and she was started on caspofungin.  Her LFTs stabilized and she was d/tyson on 11/5. She returned on 11/8 with fever and leukocytosis.  She was started on zosyn with resolution.  Gallium was done and it showed some uptake in the left ankle which could be due to hardware placement.  \par \par She was discharged with IV Zosyn, Daptomycin and Caspofungin.  She reports her foot is healing well.  Compliant with medications.  Was feeling well until yesterday when she had an episode of dizziness.  Felt like she was going to place out.  Resolved with drinking juice.

## 2021-12-08 LAB
CULTURE RESULTS: SIGNIFICANT CHANGE UP
SPECIMEN SOURCE: SIGNIFICANT CHANGE UP

## 2021-12-13 ENCOUNTER — EMERGENCY (EMERGENCY)
Facility: HOSPITAL | Age: 26
LOS: 1 days | Discharge: HOME | End: 2021-12-13
Attending: EMERGENCY MEDICINE | Admitting: EMERGENCY MEDICINE
Payer: COMMERCIAL

## 2021-12-13 ENCOUNTER — NON-APPOINTMENT (OUTPATIENT)
Age: 26
End: 2021-12-13

## 2021-12-13 VITALS
DIASTOLIC BLOOD PRESSURE: 84 MMHG | HEIGHT: 69 IN | TEMPERATURE: 97 F | RESPIRATION RATE: 18 BRPM | HEART RATE: 100 BPM | OXYGEN SATURATION: 98 % | SYSTOLIC BLOOD PRESSURE: 120 MMHG

## 2021-12-13 DIAGNOSIS — Z20.822 CONTACT WITH AND (SUSPECTED) EXPOSURE TO COVID-19: ICD-10-CM

## 2021-12-13 DIAGNOSIS — R07.89 OTHER CHEST PAIN: ICD-10-CM

## 2021-12-13 DIAGNOSIS — R10.9 UNSPECIFIED ABDOMINAL PAIN: ICD-10-CM

## 2021-12-13 DIAGNOSIS — Z79.82 LONG TERM (CURRENT) USE OF ASPIRIN: ICD-10-CM

## 2021-12-13 DIAGNOSIS — Z98.890 OTHER SPECIFIED POSTPROCEDURAL STATES: Chronic | ICD-10-CM

## 2021-12-13 DIAGNOSIS — R11.2 NAUSEA WITH VOMITING, UNSPECIFIED: ICD-10-CM

## 2021-12-13 PROCEDURE — 99285 EMERGENCY DEPT VISIT HI MDM: CPT

## 2021-12-13 RX ORDER — SODIUM CHLORIDE 9 MG/ML
1000 INJECTION INTRAMUSCULAR; INTRAVENOUS; SUBCUTANEOUS ONCE
Refills: 0 | Status: COMPLETED | OUTPATIENT
Start: 2021-12-13 | End: 2021-12-13

## 2021-12-13 RX ORDER — MORPHINE SULFATE 50 MG/1
4 CAPSULE, EXTENDED RELEASE ORAL ONCE
Refills: 0 | Status: DISCONTINUED | OUTPATIENT
Start: 2021-12-13 | End: 2021-12-13

## 2021-12-13 RX ORDER — IOHEXOL 300 MG/ML
30 INJECTION, SOLUTION INTRAVENOUS ONCE
Refills: 0 | Status: COMPLETED | OUTPATIENT
Start: 2021-12-13 | End: 2021-12-14

## 2021-12-13 NOTE — ED PROVIDER NOTE - CLINICAL SUMMARY MEDICAL DECISION MAKING FREE TEXT BOX
25yo Female with complex L ankle fracture s/p multiple I&D, revision surgeries, ringed external fixator placement, and flap coverage by PRS, discharged 11/4 with complaints of one day of abd pain, inability to tolerate PO. States constant pain to LLE, unchanged. Mid abd pain, given nausea, vomiting, will obtain labs, CT to eval for infectious process/sbo, no acute changes, fever, warmth, redness to LLE.

## 2021-12-13 NOTE — ED PROVIDER NOTE - PATIENT PORTAL LINK FT
You can access the FollowMyHealth Patient Portal offered by [facility] by registering at the following website: [email or web address]. By joining [employer]’s FollowMyHealth portal, you will also be able to view your health information using other applications (apps) compatible with our system.

## 2021-12-13 NOTE — ED PROVIDER NOTE - PHYSICAL EXAMINATION

## 2021-12-13 NOTE — ED PROVIDER NOTE - NSFOLLOWUPINSTRUCTIONS_ED_ALL_ED_FT
Can take tylenol 650mg every 6hrs as needed for pain.  Can take pepcid 20mg once or twice a day.   Follow up with your primary doctor within 1-2 days.   Return for persistent fever/vomit, uncontrolled pain, difficulty breathing, worsening lightheaded.  Follow up with gastroenterologist for persistent symptoms.     SEEK IMMEDIATE MEDICAL CARE IF YOU HAVE ANY OF THE FOLLOWING SYMPTOMS: worsening abdominal pain, uncontrollable vomiting, profuse diarrhea, inability to have bowel movements or pass gas, black or bloody stools, fever accompanying chest pain or back pain, or fainting. These symptoms may represent a serious problem that is an emergency. Do not wait to see if the symptoms will go away. Get medical help right away. Call 911 and do not drive yourself to the hospital.    Abdominal Pain, Adult    Abdominal pain can be caused by many things. Often, abdominal pain is not serious and it gets better with no treatment or by being treated at home. However, sometimes abdominal pain is serious. Your health care provider will do a medical history and a physical exam to try to determine the cause of your abdominal pain.    Follow these instructions at home:  Take over-the-counter and prescription medicines only as told by your health care provider. Do not take a laxative unless told by your health care provider.  Drink enough fluid to keep your urine clear or pale yellow.  Watch your condition for any changes.  Keep all follow-up visits as told by your health care provider. This is important.    Contact a health care provider if:  Your abdominal pain changes or gets worse.  You are not hungry or you lose weight without trying.  You are constipated or have diarrhea for more than 2–3 days.  You have pain when you urinate or have a bowel movement.  Your abdominal pain wakes you up at night.  Your pain gets worse with meals, after eating, or with certain foods.  You are throwing up and cannot keep anything down.  You have a fever.    Get help right away if:  Your pain does not go away as soon as your health care provider told you to expect.  You cannot stop throwing up.  Your pain is only in areas of the abdomen, such as the right side or the left lower portion of the abdomen.  You have bloody or black stools, or stools that look like tar.  You have severe pain, cramping, or bloating in your abdomen.  You have signs of dehydration, such as:  Dark urine, very little urine, or no urine.  Cracked lips.  Dry mouth.  Sunken eyes.  Sleepiness.  Weakness.

## 2021-12-13 NOTE — ED PROVIDER NOTE - PROGRESS NOTE DETAILS
well appearing, pain controlled, no active vomiting, afebrile, no wbc elevation, CT wnl, Discussed with pt results of work up, strict return precautions, and need for follow up.  Pt expressed understanding and agrees with plan.

## 2021-12-13 NOTE — ED ADULT TRIAGE NOTE - ARRIVAL INFO ADDITIONAL COMMENTS
pt had infected foot with several surgeries and PCC Line with antibiotics.  c/o 3-4 days of increased foot pain, n/v/d

## 2021-12-13 NOTE — ED PROVIDER NOTE - OBJECTIVE STATEMENT
27yo Female with complex L ankle fracture s/p multiple I&D, revision surgeries, ringed external fixator placement, and flap coverage by PRS, discharged 11/4 with complaints of one day of abd pain, inability to tolerate PO. States constant pain to LLE, unchanged. Mid abd pain, states pressure to chest when 25yo Female with complex L ankle fracture s/p multiple I&D, revision surgeries, ringed external fixator placement, and flap coverage by PRS, discharged 11/4 with complaints of one day of abd pain, inability to tolerate PO. States constant pain to LLE, unchanged. Mid abd pain, states pressure to chest when abd pain occurs, no other acute complaints to LLE, no warmth, fever, swelling or redness.

## 2021-12-14 VITALS
OXYGEN SATURATION: 98 % | RESPIRATION RATE: 16 BRPM | HEART RATE: 94 BPM | SYSTOLIC BLOOD PRESSURE: 122 MMHG | TEMPERATURE: 98 F | DIASTOLIC BLOOD PRESSURE: 74 MMHG

## 2021-12-14 LAB
ALBUMIN SERPL ELPH-MCNC: 4.8 G/DL — SIGNIFICANT CHANGE UP (ref 3.3–5)
ALP SERPL-CCNC: 103 U/L — SIGNIFICANT CHANGE UP (ref 40–120)
ALT FLD-CCNC: 114 U/L — HIGH (ref 10–45)
ANION GAP SERPL CALC-SCNC: 10 MMOL/L — SIGNIFICANT CHANGE UP (ref 5–17)
APPEARANCE UR: ABNORMAL
AST SERPL-CCNC: 46 U/L — HIGH (ref 10–40)
BACTERIA # UR AUTO: PRESENT /HPF
BASOPHILS # BLD AUTO: 0.03 K/UL — SIGNIFICANT CHANGE UP (ref 0–0.2)
BASOPHILS NFR BLD AUTO: 0.3 % — SIGNIFICANT CHANGE UP (ref 0–2)
BILIRUB SERPL-MCNC: 0.5 MG/DL — SIGNIFICANT CHANGE UP (ref 0.2–1.2)
BILIRUB UR-MCNC: ABNORMAL
BUN SERPL-MCNC: 11 MG/DL — SIGNIFICANT CHANGE UP (ref 7–23)
CALCIUM SERPL-MCNC: 10 MG/DL — SIGNIFICANT CHANGE UP (ref 8.4–10.5)
CHLORIDE SERPL-SCNC: 102 MMOL/L — SIGNIFICANT CHANGE UP (ref 96–108)
CO2 SERPL-SCNC: 25 MMOL/L — SIGNIFICANT CHANGE UP (ref 22–31)
COD CRY URNS QL: ABNORMAL /HPF
COLOR SPEC: YELLOW — SIGNIFICANT CHANGE UP
COMMENT - URINE: SIGNIFICANT CHANGE UP
CREAT SERPL-MCNC: 0.76 MG/DL — SIGNIFICANT CHANGE UP (ref 0.5–1.3)
DIFF PNL FLD: ABNORMAL
EOSINOPHIL # BLD AUTO: 0.11 K/UL — SIGNIFICANT CHANGE UP (ref 0–0.5)
EOSINOPHIL NFR BLD AUTO: 1 % — SIGNIFICANT CHANGE UP (ref 0–6)
EPI CELLS # UR: SIGNIFICANT CHANGE UP /HPF (ref 0–5)
GLUCOSE SERPL-MCNC: 90 MG/DL — SIGNIFICANT CHANGE UP (ref 70–99)
GLUCOSE UR QL: NEGATIVE — SIGNIFICANT CHANGE UP
HCT VFR BLD CALC: 32.7 % — LOW (ref 34.5–45)
HGB BLD-MCNC: 11 G/DL — LOW (ref 11.5–15.5)
HYALINE CASTS # UR AUTO: SIGNIFICANT CHANGE UP /LPF (ref 0–2)
IMM GRANULOCYTES NFR BLD AUTO: 0.4 % — SIGNIFICANT CHANGE UP (ref 0–1.5)
KETONES UR-MCNC: 15 MG/DL
LACTATE SERPL-SCNC: 1 MMOL/L — SIGNIFICANT CHANGE UP (ref 0.5–2)
LEUKOCYTE ESTERASE UR-ACNC: NEGATIVE — SIGNIFICANT CHANGE UP
LIDOCAIN IGE QN: 37 U/L — SIGNIFICANT CHANGE UP (ref 7–60)
LYMPHOCYTES # BLD AUTO: 2.89 K/UL — SIGNIFICANT CHANGE UP (ref 1–3.3)
LYMPHOCYTES # BLD AUTO: 27 % — SIGNIFICANT CHANGE UP (ref 13–44)
MCHC RBC-ENTMCNC: 28.6 PG — SIGNIFICANT CHANGE UP (ref 27–34)
MCHC RBC-ENTMCNC: 33.6 GM/DL — SIGNIFICANT CHANGE UP (ref 32–36)
MCV RBC AUTO: 84.9 FL — SIGNIFICANT CHANGE UP (ref 80–100)
MONOCYTES # BLD AUTO: 0.77 K/UL — SIGNIFICANT CHANGE UP (ref 0–0.9)
MONOCYTES NFR BLD AUTO: 7.2 % — SIGNIFICANT CHANGE UP (ref 2–14)
NEUTROPHILS # BLD AUTO: 6.88 K/UL — SIGNIFICANT CHANGE UP (ref 1.8–7.4)
NEUTROPHILS NFR BLD AUTO: 64.1 % — SIGNIFICANT CHANGE UP (ref 43–77)
NITRITE UR-MCNC: NEGATIVE — SIGNIFICANT CHANGE UP
NRBC # BLD: 0 /100 WBCS — SIGNIFICANT CHANGE UP (ref 0–0)
PH UR: 6 — SIGNIFICANT CHANGE UP (ref 5–8)
PLATELET # BLD AUTO: 604 K/UL — HIGH (ref 150–400)
POTASSIUM SERPL-MCNC: 3.8 MMOL/L — SIGNIFICANT CHANGE UP (ref 3.5–5.3)
POTASSIUM SERPL-SCNC: 3.8 MMOL/L — SIGNIFICANT CHANGE UP (ref 3.5–5.3)
PROT SERPL-MCNC: 7.5 G/DL — SIGNIFICANT CHANGE UP (ref 6–8.3)
PROT UR-MCNC: ABNORMAL MG/DL
RBC # BLD: 3.85 M/UL — SIGNIFICANT CHANGE UP (ref 3.8–5.2)
RBC # FLD: 14 % — SIGNIFICANT CHANGE UP (ref 10.3–14.5)
RBC CASTS # UR COMP ASSIST: < 5 /HPF — SIGNIFICANT CHANGE UP
SARS-COV-2 RNA SPEC QL NAA+PROBE: NEGATIVE — SIGNIFICANT CHANGE UP
SODIUM SERPL-SCNC: 137 MMOL/L — SIGNIFICANT CHANGE UP (ref 135–145)
SP GR SPEC: >=1.03 — SIGNIFICANT CHANGE UP (ref 1–1.03)
UROBILINOGEN FLD QL: 0.2 E.U./DL — SIGNIFICANT CHANGE UP
WBC # BLD: 10.72 K/UL — HIGH (ref 3.8–10.5)
WBC # FLD AUTO: 10.72 K/UL — HIGH (ref 3.8–10.5)
WBC UR QL: < 5 /HPF — SIGNIFICANT CHANGE UP

## 2021-12-14 PROCEDURE — 96374 THER/PROPH/DIAG INJ IV PUSH: CPT

## 2021-12-14 PROCEDURE — 99284 EMERGENCY DEPT VISIT MOD MDM: CPT | Mod: 25

## 2021-12-14 PROCEDURE — 87635 SARS-COV-2 COVID-19 AMP PRB: CPT

## 2021-12-14 PROCEDURE — 74177 CT ABD & PELVIS W/CONTRAST: CPT | Mod: MA

## 2021-12-14 PROCEDURE — 80053 COMPREHEN METABOLIC PANEL: CPT

## 2021-12-14 PROCEDURE — 83605 ASSAY OF LACTIC ACID: CPT

## 2021-12-14 PROCEDURE — 36415 COLL VENOUS BLD VENIPUNCTURE: CPT

## 2021-12-14 PROCEDURE — 96361 HYDRATE IV INFUSION ADD-ON: CPT

## 2021-12-14 PROCEDURE — 81001 URINALYSIS AUTO W/SCOPE: CPT

## 2021-12-14 PROCEDURE — 85025 COMPLETE CBC W/AUTO DIFF WBC: CPT

## 2021-12-14 PROCEDURE — 83690 ASSAY OF LIPASE: CPT

## 2021-12-14 PROCEDURE — 96376 TX/PRO/DX INJ SAME DRUG ADON: CPT

## 2021-12-14 PROCEDURE — 74177 CT ABD & PELVIS W/CONTRAST: CPT | Mod: 26,MA

## 2021-12-14 RX ORDER — ONDANSETRON 8 MG/1
1 TABLET, FILM COATED ORAL
Qty: 10 | Refills: 0
Start: 2021-12-14

## 2021-12-14 RX ORDER — LOPERAMIDE HCL 2 MG
1 TABLET ORAL
Qty: 10 | Refills: 0
Start: 2021-12-14

## 2021-12-14 RX ORDER — MORPHINE SULFATE 50 MG/1
4 CAPSULE, EXTENDED RELEASE ORAL ONCE
Refills: 0 | Status: DISCONTINUED | OUTPATIENT
Start: 2021-12-14 | End: 2021-12-14

## 2021-12-14 RX ADMIN — MORPHINE SULFATE 4 MILLIGRAM(S): 50 CAPSULE, EXTENDED RELEASE ORAL at 02:57

## 2021-12-14 RX ADMIN — SODIUM CHLORIDE 1000 MILLILITER(S): 9 INJECTION INTRAMUSCULAR; INTRAVENOUS; SUBCUTANEOUS at 01:12

## 2021-12-14 RX ADMIN — IOHEXOL 30 MILLILITER(S): 300 INJECTION, SOLUTION INTRAVENOUS at 00:39

## 2021-12-14 RX ADMIN — MORPHINE SULFATE 4 MILLIGRAM(S): 50 CAPSULE, EXTENDED RELEASE ORAL at 00:39

## 2021-12-14 RX ADMIN — SODIUM CHLORIDE 1000 MILLILITER(S): 9 INJECTION INTRAMUSCULAR; INTRAVENOUS; SUBCUTANEOUS at 02:12

## 2021-12-14 RX ADMIN — MORPHINE SULFATE 4 MILLIGRAM(S): 50 CAPSULE, EXTENDED RELEASE ORAL at 01:10

## 2021-12-14 NOTE — ED ADULT NURSE NOTE - NSIMPLEMENTINTERV_GEN_ALL_ED
Implemented All Fall Risk Interventions:  [place] to call system. Call bell, personal items and telephone within reach. Instruct patient to call for assistance. Room bathroom lighting operational. Non-slip footwear when patient is off stretcher. Physically safe environment: no spills, clutter or unnecessary equipment. Stretcher in lowest position, wheels locked, appropriate side rails in place. Provide visual cue, wrist band, yellow gown, etc. Monitor gait and stability. Monitor for mental status changes and reorient to person, place, and time. Review medications for side effects contributing to fall risk. Reinforce activity limits and safety measures with patient and family.

## 2021-12-14 NOTE — ED ADULT NURSE NOTE - OBJECTIVE STATEMENT
patient presents with 4-5 days of abdominal pain and worsening pain in LLE. Patient d/c from hospital on 11/4 witth multiple revisions of complex left ankle fx. Pt reports she has been ambulating more (with walker). Pt denies any purulent drainage coming from pin insertion sites of left ankle.  Patient also complaining of multiple episodes of emesis following ingestion of anything PO. Pt denying nausea at this point. Pt is afebrile, denying chills, cp, sob, diarrhea.

## 2021-12-15 ENCOUNTER — APPOINTMENT (OUTPATIENT)
Dept: INFECTIOUS DISEASE | Facility: CLINIC | Age: 26
End: 2021-12-15

## 2021-12-17 ENCOUNTER — LABORATORY RESULT (OUTPATIENT)
Age: 26
End: 2021-12-17

## 2021-12-17 ENCOUNTER — APPOINTMENT (OUTPATIENT)
Dept: ORTHOPEDIC SURGERY | Facility: CLINIC | Age: 26
End: 2021-12-17
Payer: COMMERCIAL

## 2021-12-17 VITALS — BODY MASS INDEX: 28.79 KG/M2 | WEIGHT: 190 LBS | RESPIRATION RATE: 16 BRPM | HEIGHT: 68 IN

## 2021-12-17 PROCEDURE — 73630 X-RAY EXAM OF FOOT: CPT | Mod: LT

## 2021-12-17 PROCEDURE — 73610 X-RAY EXAM OF ANKLE: CPT | Mod: LT

## 2021-12-17 PROCEDURE — 99214 OFFICE O/P EST MOD 30 MIN: CPT | Mod: 24

## 2021-12-17 PROCEDURE — 73590 X-RAY EXAM OF LOWER LEG: CPT | Mod: LT

## 2021-12-17 RX ORDER — CHOLECALCIFEROL (VITAMIN D3) 1250 MCG
1.25 MG CAPSULE ORAL
Qty: 10 | Refills: 0 | Status: ACTIVE | COMMUNITY
Start: 2021-12-17 | End: 1900-01-01

## 2021-12-20 LAB — BACTERIA WND CULT: ABNORMAL

## 2021-12-23 ENCOUNTER — NON-APPOINTMENT (OUTPATIENT)
Age: 26
End: 2021-12-23

## 2021-12-23 LAB — BACTERIA WND CULT: NORMAL

## 2022-01-07 ENCOUNTER — APPOINTMENT (OUTPATIENT)
Dept: INTERNAL MEDICINE | Facility: CLINIC | Age: 27
End: 2022-01-07
Payer: COMMERCIAL

## 2022-01-07 VITALS
DIASTOLIC BLOOD PRESSURE: 78 MMHG | HEIGHT: 68 IN | SYSTOLIC BLOOD PRESSURE: 120 MMHG | TEMPERATURE: 97.6 F | OXYGEN SATURATION: 97 % | WEIGHT: 190 LBS | HEART RATE: 115 BPM | BODY MASS INDEX: 28.79 KG/M2

## 2022-01-07 DIAGNOSIS — Z01.818 ENCOUNTER FOR OTHER PREPROCEDURAL EXAMINATION: ICD-10-CM

## 2022-01-07 DIAGNOSIS — Z00.00 ENCOUNTER FOR GENERAL ADULT MEDICAL EXAMINATION W/OUT ABNORMAL FINDINGS: ICD-10-CM

## 2022-01-07 PROCEDURE — 99204 OFFICE O/P NEW MOD 45 MIN: CPT

## 2022-01-07 PROCEDURE — 36415 COLL VENOUS BLD VENIPUNCTURE: CPT

## 2022-01-08 LAB
ALBUMIN SERPL ELPH-MCNC: 4.7 G/DL
ALP BLD-CCNC: 84 U/L
ALT SERPL-CCNC: 47 U/L
ANION GAP SERPL CALC-SCNC: 16 MMOL/L
APTT BLD: 29.8 SEC
AST SERPL-CCNC: 25 U/L
BASOPHILS # BLD AUTO: 0.02 K/UL
BASOPHILS NFR BLD AUTO: 0.4 %
BILIRUB SERPL-MCNC: 0.6 MG/DL
BUN SERPL-MCNC: 7 MG/DL
CALCIUM SERPL-MCNC: 9.9 MG/DL
CHLORIDE SERPL-SCNC: 105 MMOL/L
CO2 SERPL-SCNC: 20 MMOL/L
CREAT SERPL-MCNC: 0.86 MG/DL
EOSINOPHIL # BLD AUTO: 0.17 K/UL
EOSINOPHIL NFR BLD AUTO: 3.5 %
GLUCOSE SERPL-MCNC: 87 MG/DL
HCG SERPL-MCNC: <1 MIU/ML
HCT VFR BLD CALC: 38.7 %
HGB BLD-MCNC: 12.2 G/DL
IMM GRANULOCYTES NFR BLD AUTO: 0.2 %
INR PPP: 1.07 RATIO
LYMPHOCYTES # BLD AUTO: 1.65 K/UL
LYMPHOCYTES NFR BLD AUTO: 33.6 %
MAN DIFF?: NORMAL
MCHC RBC-ENTMCNC: 28.7 PG
MCHC RBC-ENTMCNC: 31.5 GM/DL
MCV RBC AUTO: 91.1 FL
MONOCYTES # BLD AUTO: 0.33 K/UL
MONOCYTES NFR BLD AUTO: 6.7 %
NEUTROPHILS # BLD AUTO: 2.73 K/UL
NEUTROPHILS NFR BLD AUTO: 55.6 %
PLATELET # BLD AUTO: 439 K/UL
POTASSIUM SERPL-SCNC: 3.9 MMOL/L
PROT SERPL-MCNC: 7 G/DL
PT BLD: 12.6 SEC
RBC # BLD: 4.25 M/UL
RBC # FLD: 13.8 %
SODIUM SERPL-SCNC: 141 MMOL/L
WBC # FLD AUTO: 4.91 K/UL

## 2022-01-08 NOTE — HISTORY OF PRESENT ILLNESS
[No Pertinent Cardiac History] : no history of aortic stenosis, atrial fibrillation, coronary artery disease, recent myocardial infarction, or implantable device/pacemaker [No Pertinent Pulmonary History] : no history of asthma, COPD, sleep apnea, or smoking [No Adverse Anesthesia Reaction] : no adverse anesthesia reaction in self or family member [(Patient denies any chest pain, claudication, dyspnea on exertion, orthopnea, palpitations or syncope)] : Patient denies any chest pain, claudication, dyspnea on exertion, orthopnea, palpitations or syncope [FreeTextEntry1] : left ankle surgery.   [FreeTextEntry2] : 1/2022 [FreeTextEntry3] : Dr Wallis [FreeTextEntry4] : 25 yo f s/p skate boarding accident in [facility]. Complex left ankle fracture including septic joint. She is now requiring another procedure.\par She denies any other significant past medical history \par Former smoker, quit last year.\par

## 2022-01-10 ENCOUNTER — APPOINTMENT (OUTPATIENT)
Dept: ORTHOPEDIC SURGERY | Facility: CLINIC | Age: 27
End: 2022-01-10
Payer: COMMERCIAL

## 2022-01-10 ENCOUNTER — LABORATORY RESULT (OUTPATIENT)
Age: 27
End: 2022-01-10

## 2022-01-10 VITALS — WEIGHT: 190 LBS | HEIGHT: 68 IN | BODY MASS INDEX: 28.79 KG/M2 | RESPIRATION RATE: 16 BRPM

## 2022-01-10 PROCEDURE — 99024 POSTOP FOLLOW-UP VISIT: CPT

## 2022-01-13 RX ORDER — POVIDONE-IODINE 5 %
1 AEROSOL (ML) TOPICAL ONCE
Refills: 0 | Status: COMPLETED | OUTPATIENT
Start: 2022-01-18 | End: 2022-01-18

## 2022-01-13 NOTE — H&P ADULT - NSHPPHYSICALEXAM_GEN_ALL_CORE
Gen: 25y/o female, NAD   MSK: Decreased ROM secondary to pain at the left ankle       Rest of PE per MD clearance Gen: 25 y/o, NAD  MSK: Limited left ankle ROM secondary to pain  Left ankle in ex-fix. Skin appears without signs of infection  Sensation intact to touch bilateral lower extremities  Patient appears well perfused distally, appropraitely warm to touch  PE limited by exfix    Rest of PE per MD clearance

## 2022-01-13 NOTE — H&P ADULT - NSHPLABSRESULTS_GEN_ALL_CORE
Preop CBC, BMP, PT/INR, PTT within normal range  Preop Cr. 0.86  3M DOS   Preop CXR:   Preop EKG:   Preop Covid Preop CBC, BMP, PT/INR, PTT within normal range  Preop Cr. 0.86  3M DOS   Preop EKG: wnl  Preop Covid pending

## 2022-01-13 NOTE — H&P ADULT - PROBLEM SELECTOR PLAN 1
Admit to Orthopedic Service   For left ankle Removal of exfix, removal abx spacer, left ankle arthrotomy, open tx left tib/fib malunion, ORIF left syndesmosis, left deltoid repair/recon, left tenoachilles lengthening with Dr. Wallis   Medically cleared and optimized for surgery by Admit to Orthopedic Service   For left ankle Removal of exfix, removal abx spacer, left ankle arthrotomy, open tx left tib/fib malunion, ORIF left syndesmosis, left deltoid repair/recon, left tenoachilles lengthening with Dr. Wallis   Medically cleared and optimized for surgery by Dr. Smith

## 2022-01-13 NOTE — H&P ADULT - ATTENDING COMMENTS
27 y/o female with complex left ankle injury including infected nonunion/malunions fibula, syndesmosis, tibia in the setting of osteomyelitis, septic arthrosis, compartment syndrome, tibial and SPN injury now 3 months status post I&d, clearence of infection, ringed external fixation application, with plans for conversion to definitive fixation. The patient had a free flap for her significant soft tissue injury.    Plan for removal of ex fix, cultures, open treatment of fibular malunion, orif syndesmosis, possible tenotomies of toe deformities.   Goals of treatment are to get the patient to an ambulatory state in 3 months. She understands that she has a significant injury and may be a candidate for a TAR vs fusion depending on the results of her continued care. At this time, we will continue with limb salvage.     Consult ID post op  Follow cultures  post op tibia foot and ankle xrays  NWB LLE likely 3 months post op  bone stimulator post op  vitamin d and calcium supplementation  asa 325mg po bid for dvt ppx post op

## 2022-01-13 NOTE — H&P ADULT - HISTORY OF PRESENT ILLNESS
Patient is 25 y/o female who presents with c/o left ankle pain   Patient elects to undergo removal of ex fix; removal abx spacer, open tx left tib/fib malunion, ORIF left syndesmosis, left deltoid ligament repair, left achiles lengthening with Dr. Wallis/ Dr. Dickens  Patient is 25 y/o female who presents with c/o left ankle pain following an accident last year. She is well known to [facility]. She was discharged from Hospital in Nov 2021. Since then she denies new medical problems. She has been using a walker and in ex fix.  She d/c IV abx in December and d/c PO abx last week.     Patient elects to undergo removal of ex fix; removal abx spacer, open tx left tib/fib malunion, ORIF left syndesmosis, left deltoid ligament repair, left achilles lengthening with Dr. Wallis/ Dr. Dickens

## 2022-01-14 VITALS
OXYGEN SATURATION: 97 % | RESPIRATION RATE: 16 BRPM | TEMPERATURE: 98 F | SYSTOLIC BLOOD PRESSURE: 119 MMHG | WEIGHT: 175.71 LBS | DIASTOLIC BLOOD PRESSURE: 82 MMHG | HEIGHT: 69 IN | HEART RATE: 100 BPM

## 2022-01-14 NOTE — ASU PATIENT PROFILE, ADULT - FALL HARM RISK - UNIVERSAL INTERVENTIONS
Bed in lowest position, wheels locked, appropriate side rails in place/Call bell, personal items and telephone in reach/Instruct patient to call for assistance before getting out of bed or chair/Non-slip footwear when patient is out of bed/[place] to call system/Physically safe environment - no spills, clutter or unnecessary equipment/Purposeful Proactive Rounding/Room/bathroom lighting operational, light cord in reach

## 2022-01-16 LAB — SARS-COV-2 N GENE NPH QL NAA+PROBE: NOT DETECTED

## 2022-01-17 ENCOUNTER — TRANSCRIPTION ENCOUNTER (OUTPATIENT)
Age: 27
End: 2022-01-17

## 2022-01-18 ENCOUNTER — APPOINTMENT (OUTPATIENT)
Dept: ORTHOPEDIC SURGERY | Facility: HOSPITAL | Age: 27
End: 2022-01-18

## 2022-01-18 ENCOUNTER — INPATIENT (INPATIENT)
Facility: HOSPITAL | Age: 27
LOS: 6 days | Discharge: HOME | DRG: 493 | End: 2022-01-25
Attending: ORTHOPAEDIC SURGERY | Admitting: ORTHOPAEDIC SURGERY
Payer: COMMERCIAL

## 2022-01-18 DIAGNOSIS — Z98.890 OTHER SPECIFIED POSTPROCEDURAL STATES: Chronic | ICD-10-CM

## 2022-01-18 DIAGNOSIS — S82.892K: ICD-10-CM

## 2022-01-18 LAB
CK SERPL-CCNC: 54 U/L — SIGNIFICANT CHANGE UP (ref 25–170)
GRAM STN FLD: SIGNIFICANT CHANGE UP
SPECIMEN SOURCE: SIGNIFICANT CHANGE UP

## 2022-01-18 PROCEDURE — 81003 URINALYSIS AUTO W/O SCOPE: CPT

## 2022-01-18 PROCEDURE — 85652 RBC SED RATE AUTOMATED: CPT

## 2022-01-18 PROCEDURE — 20900 REMOVAL OF BONE FOR GRAFT: CPT | Mod: 59,LT

## 2022-01-18 PROCEDURE — 80053 COMPREHEN METABOLIC PANEL: CPT

## 2022-01-18 PROCEDURE — 82803 BLOOD GASES ANY COMBINATION: CPT

## 2022-01-18 PROCEDURE — 99285 EMERGENCY DEPT VISIT HI MDM: CPT | Mod: 25

## 2022-01-18 PROCEDURE — 97530 THERAPEUTIC ACTIVITIES: CPT

## 2022-01-18 PROCEDURE — 86900 BLOOD TYPING SEROLOGIC ABO: CPT

## 2022-01-18 PROCEDURE — 97116 GAIT TRAINING THERAPY: CPT

## 2022-01-18 PROCEDURE — U0005: CPT

## 2022-01-18 PROCEDURE — 78802 RP LOCLZJ TUM WHBDY 1 D IMG: CPT

## 2022-01-18 PROCEDURE — 81025 URINE PREGNANCY TEST: CPT

## 2022-01-18 PROCEDURE — 87040 BLOOD CULTURE FOR BACTERIA: CPT

## 2022-01-18 PROCEDURE — 27829 TREAT LOWER LEG JOINT: CPT | Mod: LT

## 2022-01-18 PROCEDURE — U0003: CPT

## 2022-01-18 PROCEDURE — A9556: CPT

## 2022-01-18 PROCEDURE — 28230 INCISION OF FOOT TENDON(S): CPT | Mod: LT

## 2022-01-18 PROCEDURE — 86140 C-REACTIVE PROTEIN: CPT

## 2022-01-18 PROCEDURE — 96374 THER/PROPH/DIAG INJ IV PUSH: CPT

## 2022-01-18 PROCEDURE — 80048 BASIC METABOLIC PNL TOTAL CA: CPT

## 2022-01-18 PROCEDURE — 86850 RBC ANTIBODY SCREEN: CPT

## 2022-01-18 PROCEDURE — 27726 REPAIR FIBULA NONUNION: CPT | Mod: LT

## 2022-01-18 PROCEDURE — 97161 PT EVAL LOW COMPLEX 20 MIN: CPT

## 2022-01-18 PROCEDURE — 86901 BLOOD TYPING SEROLOGIC RH(D): CPT

## 2022-01-18 PROCEDURE — 84100 ASSAY OF PHOSPHORUS: CPT

## 2022-01-18 PROCEDURE — 82550 ASSAY OF CK (CPK): CPT

## 2022-01-18 PROCEDURE — 85025 COMPLETE CBC W/AUTO DIFF WBC: CPT

## 2022-01-18 PROCEDURE — 86769 SARS-COV-2 COVID-19 ANTIBODY: CPT

## 2022-01-18 PROCEDURE — 71045 X-RAY EXAM CHEST 1 VIEW: CPT

## 2022-01-18 PROCEDURE — 87635 SARS-COV-2 COVID-19 AMP PRB: CPT

## 2022-01-18 PROCEDURE — 85027 COMPLETE CBC AUTOMATED: CPT

## 2022-01-18 PROCEDURE — 36415 COLL VENOUS BLD VENIPUNCTURE: CPT

## 2022-01-18 PROCEDURE — 28232 INCISION OF TOE TENDON: CPT | Mod: 59,LT

## 2022-01-18 PROCEDURE — 99254 IP/OBS CNSLTJ NEW/EST MOD 60: CPT

## 2022-01-18 PROCEDURE — 77071 MNL APPL STRS JT RADIOGRAPHY: CPT

## 2022-01-18 PROCEDURE — 83735 ASSAY OF MAGNESIUM: CPT

## 2022-01-18 PROCEDURE — 83605 ASSAY OF LACTIC ACID: CPT

## 2022-01-18 PROCEDURE — 20694 RMVL EXT FIXJ SYS UNDER ANES: CPT | Mod: LT

## 2022-01-18 DEVICE — IMPLANTABLE DEVICE
Type: IMPLANTABLE DEVICE | Status: NON-FUNCTIONAL
Removed: 2022-01-18

## 2022-01-18 DEVICE — SCREW VAL TI 2.4X20MM
Type: IMPLANTABLE DEVICE | Status: NON-FUNCTIONAL
Removed: 2022-01-18

## 2022-01-18 DEVICE — GWIRE TROCAR TIP 1.6X150MM
Type: IMPLANTABLE DEVICE | Status: NON-FUNCTIONAL
Removed: 2022-01-18

## 2022-01-18 DEVICE — GWIRE TROCAR TIP 1.35X150MM
Type: IMPLANTABLE DEVICE | Status: NON-FUNCTIONAL
Removed: 2022-01-18

## 2022-01-18 DEVICE — ARTHREX BB-TAK NON-THREADED
Type: IMPLANTABLE DEVICE | Status: NON-FUNCTIONAL
Removed: 2022-01-18

## 2022-01-18 DEVICE — SCREW LO PRO LOKG VA 3X14MM
Type: IMPLANTABLE DEVICE | Status: NON-FUNCTIONAL
Removed: 2022-01-18

## 2022-01-18 DEVICE — SCREW CORT 3.5X14MM
Type: IMPLANTABLE DEVICE | Status: NON-FUNCTIONAL
Removed: 2022-01-18

## 2022-01-18 RX ORDER — OXYCODONE HYDROCHLORIDE 5 MG/1
5 TABLET ORAL EVERY 4 HOURS
Refills: 0 | Status: DISCONTINUED | OUTPATIENT
Start: 2022-01-18 | End: 2022-01-19

## 2022-01-18 RX ORDER — CEFAZOLIN SODIUM 1 G
2000 VIAL (EA) INJECTION EVERY 8 HOURS
Refills: 0 | Status: DISCONTINUED | OUTPATIENT
Start: 2022-01-18 | End: 2022-01-18

## 2022-01-18 RX ORDER — CEFTRIAXONE 500 MG/1
2000 INJECTION, POWDER, FOR SOLUTION INTRAMUSCULAR; INTRAVENOUS EVERY 24 HOURS
Refills: 0 | Status: DISCONTINUED | OUTPATIENT
Start: 2022-01-19 | End: 2022-01-24

## 2022-01-18 RX ORDER — MAGNESIUM HYDROXIDE 400 MG/1
30 TABLET, CHEWABLE ORAL DAILY
Refills: 0 | Status: DISCONTINUED | OUTPATIENT
Start: 2022-01-18 | End: 2022-01-25

## 2022-01-18 RX ORDER — ASPIRIN/CALCIUM CARB/MAGNESIUM 324 MG
325 TABLET ORAL
Refills: 0 | Status: DISCONTINUED | OUTPATIENT
Start: 2022-01-19 | End: 2022-01-25

## 2022-01-18 RX ORDER — ONDANSETRON 8 MG/1
4 TABLET, FILM COATED ORAL EVERY 6 HOURS
Refills: 0 | Status: DISCONTINUED | OUTPATIENT
Start: 2022-01-18 | End: 2022-01-25

## 2022-01-18 RX ORDER — DAPTOMYCIN 500 MG/10ML
500 INJECTION, POWDER, LYOPHILIZED, FOR SOLUTION INTRAVENOUS EVERY 24 HOURS
Refills: 0 | Status: DISCONTINUED | OUTPATIENT
Start: 2022-01-18 | End: 2022-01-24

## 2022-01-18 RX ORDER — ACETAMINOPHEN 500 MG
975 TABLET ORAL EVERY 8 HOURS
Refills: 0 | Status: DISCONTINUED | OUTPATIENT
Start: 2022-01-18 | End: 2022-01-25

## 2022-01-18 RX ORDER — HYDROMORPHONE HYDROCHLORIDE 2 MG/ML
0.5 INJECTION INTRAMUSCULAR; INTRAVENOUS; SUBCUTANEOUS EVERY 6 HOURS
Refills: 0 | Status: DISCONTINUED | OUTPATIENT
Start: 2022-01-18 | End: 2022-01-18

## 2022-01-18 RX ORDER — OXYCODONE HYDROCHLORIDE 5 MG/1
10 TABLET ORAL EVERY 4 HOURS
Refills: 0 | Status: DISCONTINUED | OUTPATIENT
Start: 2022-01-18 | End: 2022-01-19

## 2022-01-18 RX ORDER — POLYETHYLENE GLYCOL 3350 17 G/17G
17 POWDER, FOR SOLUTION ORAL AT BEDTIME
Refills: 0 | Status: DISCONTINUED | OUTPATIENT
Start: 2022-01-18 | End: 2022-01-25

## 2022-01-18 RX ORDER — HYDROMORPHONE HYDROCHLORIDE 2 MG/ML
0.5 INJECTION INTRAMUSCULAR; INTRAVENOUS; SUBCUTANEOUS
Refills: 0 | Status: DISCONTINUED | OUTPATIENT
Start: 2022-01-18 | End: 2022-01-18

## 2022-01-18 RX ORDER — HYDROMORPHONE HYDROCHLORIDE 2 MG/ML
0.5 INJECTION INTRAMUSCULAR; INTRAVENOUS; SUBCUTANEOUS EVERY 4 HOURS
Refills: 0 | Status: DISCONTINUED | OUTPATIENT
Start: 2022-01-18 | End: 2022-01-25

## 2022-01-18 RX ORDER — SODIUM CHLORIDE 9 MG/ML
1000 INJECTION, SOLUTION INTRAVENOUS
Refills: 0 | Status: DISCONTINUED | OUTPATIENT
Start: 2022-01-18 | End: 2022-01-25

## 2022-01-18 RX ORDER — CASPOFUNGIN ACETATE 7 MG/ML
50 INJECTION, POWDER, LYOPHILIZED, FOR SOLUTION INTRAVENOUS EVERY 24 HOURS
Refills: 0 | Status: DISCONTINUED | OUTPATIENT
Start: 2022-01-18 | End: 2022-01-24

## 2022-01-18 RX ORDER — ACETAMINOPHEN 500 MG
1000 TABLET ORAL ONCE
Refills: 0 | Status: COMPLETED | OUTPATIENT
Start: 2022-01-18 | End: 2022-01-18

## 2022-01-18 RX ORDER — CHLORHEXIDINE GLUCONATE 213 G/1000ML
1 SOLUTION TOPICAL ONCE
Refills: 0 | Status: COMPLETED | OUTPATIENT
Start: 2022-01-18 | End: 2022-01-18

## 2022-01-18 RX ORDER — SENNA PLUS 8.6 MG/1
2 TABLET ORAL AT BEDTIME
Refills: 0 | Status: DISCONTINUED | OUTPATIENT
Start: 2022-01-18 | End: 2022-01-25

## 2022-01-18 RX ORDER — ACETAMINOPHEN 500 MG
975 TABLET ORAL EVERY 8 HOURS
Refills: 0 | Status: DISCONTINUED | OUTPATIENT
Start: 2022-01-18 | End: 2022-01-18

## 2022-01-18 RX ORDER — HYDROMORPHONE HYDROCHLORIDE 2 MG/ML
0.5 INJECTION INTRAMUSCULAR; INTRAVENOUS; SUBCUTANEOUS
Refills: 0 | Status: COMPLETED | OUTPATIENT
Start: 2022-01-18

## 2022-01-18 RX ORDER — PANTOPRAZOLE SODIUM 20 MG/1
40 TABLET, DELAYED RELEASE ORAL
Refills: 0 | Status: DISCONTINUED | OUTPATIENT
Start: 2022-01-18 | End: 2022-01-25

## 2022-01-18 RX ADMIN — HYDROMORPHONE HYDROCHLORIDE 0.5 MILLIGRAM(S): 2 INJECTION INTRAMUSCULAR; INTRAVENOUS; SUBCUTANEOUS at 19:25

## 2022-01-18 RX ADMIN — OXYCODONE HYDROCHLORIDE 10 MILLIGRAM(S): 5 TABLET ORAL at 17:04

## 2022-01-18 RX ADMIN — Medication 975 MILLIGRAM(S): at 22:02

## 2022-01-18 RX ADMIN — OXYCODONE HYDROCHLORIDE 5 MILLIGRAM(S): 5 TABLET ORAL at 18:17

## 2022-01-18 RX ADMIN — HYDROMORPHONE HYDROCHLORIDE 0.5 MILLIGRAM(S): 2 INJECTION INTRAMUSCULAR; INTRAVENOUS; SUBCUTANEOUS at 19:39

## 2022-01-18 RX ADMIN — Medication 975 MILLIGRAM(S): at 22:35

## 2022-01-18 RX ADMIN — Medication 400 MILLIGRAM(S): at 14:37

## 2022-01-18 RX ADMIN — Medication 1 APPLICATION(S): at 06:34

## 2022-01-18 RX ADMIN — OXYCODONE HYDROCHLORIDE 5 MILLIGRAM(S): 5 TABLET ORAL at 18:22

## 2022-01-18 RX ADMIN — SENNA PLUS 2 TABLET(S): 8.6 TABLET ORAL at 22:02

## 2022-01-18 RX ADMIN — HYDROMORPHONE HYDROCHLORIDE 0.5 MILLIGRAM(S): 2 INJECTION INTRAMUSCULAR; INTRAVENOUS; SUBCUTANEOUS at 13:30

## 2022-01-18 RX ADMIN — HYDROMORPHONE HYDROCHLORIDE 0.5 MILLIGRAM(S): 2 INJECTION INTRAMUSCULAR; INTRAVENOUS; SUBCUTANEOUS at 23:29

## 2022-01-18 RX ADMIN — Medication 1000 MILLIGRAM(S): at 14:56

## 2022-01-18 RX ADMIN — DAPTOMYCIN 120 MILLIGRAM(S): 500 INJECTION, POWDER, LYOPHILIZED, FOR SOLUTION INTRAVENOUS at 22:00

## 2022-01-18 RX ADMIN — HYDROMORPHONE HYDROCHLORIDE 0.5 MILLIGRAM(S): 2 INJECTION INTRAMUSCULAR; INTRAVENOUS; SUBCUTANEOUS at 23:45

## 2022-01-18 RX ADMIN — OXYCODONE HYDROCHLORIDE 10 MILLIGRAM(S): 5 TABLET ORAL at 16:45

## 2022-01-18 RX ADMIN — CHLORHEXIDINE GLUCONATE 1 APPLICATION(S): 213 SOLUTION TOPICAL at 06:34

## 2022-01-18 RX ADMIN — Medication 100 MILLIGRAM(S): at 18:10

## 2022-01-18 RX ADMIN — OXYCODONE HYDROCHLORIDE 10 MILLIGRAM(S): 5 TABLET ORAL at 22:15

## 2022-01-18 RX ADMIN — CASPOFUNGIN ACETATE 260 MILLIGRAM(S): 7 INJECTION, POWDER, LYOPHILIZED, FOR SOLUTION INTRAVENOUS at 19:22

## 2022-01-18 RX ADMIN — OXYCODONE HYDROCHLORIDE 10 MILLIGRAM(S): 5 TABLET ORAL at 22:03

## 2022-01-18 RX ADMIN — POLYETHYLENE GLYCOL 3350 17 GRAM(S): 17 POWDER, FOR SOLUTION ORAL at 22:02

## 2022-01-18 NOTE — PROGRESS NOTE ADULT - SUBJECTIVE AND OBJECTIVE BOX
Orthopaedics Post Op Check    Procedure: Left ankle ex-fix removal, abx spacer explant, ORIF of Left fibula, flexor tendon tenotomies 2nd-4th toes and complex closure by plastics with prevena placement and x1 .     Surgeon: Dr. Wallis    Pt comfortable, without complaints  Denies CP, SOB, N/V; pt reports numbness to distal left lower extremity   Vital Signs Last 24 Hrs  T(C): 36.3 (18 Jan 2022 13:26), Max: 36.3 (18 Jan 2022 13:26)  T(F): 97.4 (18 Jan 2022 13:26), Max: 97.4 (18 Jan 2022 13:26)  HR: 106 (18 Jan 2022 13:56) (96 - 106)  BP: 135/67 (18 Jan 2022 13:56) (116/78 - 135/67)  BP(mean): 92 (18 Jan 2022 13:41) (92 - 95)  RR: 18 (18 Jan 2022 13:56) (16 - 20)  SpO2: 98% (18 Jan 2022 13:56) (93% - 98%)  AVSS, NAD    Dressing C/D/I: splint in place, prevena and  holding suction   General: Pt Alert and oriented     Pulses: Brisk capillary refill distal left lower extremity   F/u sensorimotor exam distal left lower extremity, pt received intra op marcaine , reports numbness distal to knee       Post op XR: fluoroscopy utilized intra op to confirm hardware placement     A/P: 26yFemale POD#0 s/p ex-fix removal, abx spacer explant, ORIF of L fibula, flexor tendon tenotomies 2nd-4th toes and complex closure by plastics with prevena placement and x1 .     - Stable  - Pain Control  - DVT ppx: ASA, SCD  - Post op abx: Ancef  - PT, WBS: NWB LLE   - F/U AM Labs

## 2022-01-18 NOTE — BRIEF OPERATIVE NOTE - NSICDXBRIEFPROCEDURE_GEN_ALL_CORE_FT
PROCEDURES:  Repair of nonunion of fibula 18-Jan-2022 12:37:16  Solitario Dsouza   PROCEDURES:  Repair of nonunion of fibula 18-Jan-2022 12:37:16  Solitario Dsouza  Release, hammer toe 18-Jan-2022 15:14:15  Solitario Dsouza  Tenotomy, flexor, toe, open, 1 tendon 18-Jan-2022 15:15:07  Solitario Dsouza   PROCEDURES:  ORIF, syndesmosis, ankle 19-Jan-2022 09:48:07  Chapin Wallis bone graft 19-Jan-2022 09:48:50  Chapin Wallis

## 2022-01-18 NOTE — CONSULT NOTE ADULT - SUBJECTIVE AND OBJECTIVE BOX
HPI:  Patient is 27 y/o female who presents with c/o left ankle pain following an accident last year. She is well known to [facility]. She was discharged from Hospital in Nov 2021. Since then she denies new medical problems. She has been using a walker and in ex fix.  She d/c IV abx in December and d/c PO abx last week.   Patient elects to undergo removal of ex fix; removal abx spacer, open tx left tib/fib malunion, ORIF left syndesmosis, left deltoid ligament repair, left achilles lengthening with Dr. Wallis/ Dr. Dickens       PAST MEDICAL & SURGICAL HISTORY:  Left tibial neuropathy    PAD (peripheral artery disease)    Status post ORIF of fracture of ankle          REVIEW OF SYSTEMS:    General:	 no weakness; no fevers, no chills  Skin/Breast: no rash  Respiratory and Thorax: no SOB, no cough  Cardiovascular:	No chest pain  Gastrointestinal:	 no nausea, vomiting , diarrhea  Genitourinary:	no dysuria, no difficulty urinating, no hematuria  Musculoskeletal:	no weakness, no joint swelling/pain  Neurological:	no focal weakness/numbness  Endocrine:	no polyuria, no polydipsia      ANTIBIOTICS:  MEDICATIONS  (STANDING):  acetaminophen     Tablet .. 975 milliGRAM(s) Oral every 8 hours  ceFAZolin   IVPB 2000 milliGRAM(s) IV Intermittent every 8 hours  lactated ringers. 1000 milliLiter(s) (150 mL/Hr) IV Continuous <Continuous>  pantoprazole    Tablet 40 milliGRAM(s) Oral before breakfast  polyethylene glycol 3350 17 Gram(s) Oral at bedtime  senna 2 Tablet(s) Oral at bedtime    MEDICATIONS  (PRN):  aluminum hydroxide/magnesium hydroxide/simethicone Suspension 30 milliLiter(s) Oral four times a day PRN Indigestion  magnesium hydroxide Suspension 30 milliLiter(s) Oral daily PRN Constipation  ondansetron Injectable 4 milliGRAM(s) IV Push every 6 hours PRN Nausea and/or Vomiting  oxyCODONE    IR 5 milliGRAM(s) Oral every 4 hours PRN Moderate Pain (4 - 6)  oxyCODONE    IR 10 milliGRAM(s) Oral every 4 hours PRN Severe Pain (7 - 10)      Allergies    No Known Allergies    Intolerances        SOCIAL HISTORY:    FAMILY HISTORY:      Vital Signs Last 24 Hrs  T(C): 36.8 (18 Jan 2022 16:22), Max: 36.8 (18 Jan 2022 16:22)  T(F): 98.3 (18 Jan 2022 16:22), Max: 98.3 (18 Jan 2022 16:22)  HR: 103 (18 Jan 2022 16:22) (77 - 106)  BP: 113/78 (18 Jan 2022 16:22) (113/78 - 135/67)  BP(mean): 89 (18 Jan 2022 14:56) (84 - 95)  RR: 17 (18 Jan 2022 16:22) (9 - 20)  SpO2: 96% (18 Jan 2022 16:22) (93% - 99%)    PHYSICAL EXAM:  Constitutional:  non-toxic, no distress  Eyes:  no icterus   Ear/Nose/Throat: no oral lesion  Neck:  supple  Respiratory: CTA rhett  Cardiovascular: S1S2 RRR, no murmurs  Gastrointestinal:soft, (+) BS, no HSM  Extremities:  left foot/ankle with dressing in place   Vascular: DP Pulse:	right normal; left normal            LABS:                MICROBIOLOGY:  RADIOLOGY & ADDITIONAL STUDIES: Cardiac

## 2022-01-18 NOTE — BRIEF OPERATIVE NOTE - OPERATION/FINDINGS
sinus tract, no pus, extensive bone loss, arthritis of ankle joint sinus tract, no pus, extensive bone loss, arthritis of ankle joint  flexor tendon tenotomy 1st - 4th toes superficial, no pus, extensive bone loss, malreduced syndesmosis, postraumatic arthrosis of the talofibular joint, tibio talar joint  flexor tendon tenotomy 2nd - 4th toes  FHL tenotomy

## 2022-01-18 NOTE — CONSULT NOTE ADULT - ASSESSMENT
IMPRESSION:  26 year old female with left ankle fracture here for removal of external fixator device and antibiotic spacer.  She is s/p insertion of 2 plates, bone chips, bone graft and wound closure    Recommend:  1.  Can start Daptomycin 500 mg IV daily.  Check serum CK daily for now  2.  Start Ceftriaxone 2 grams IV daily  3.  Caspofungin 50 mg IV daily   4. Follow up tissue cultures from OR.  Will need several weeks of antibiotics post-op.  Final recommendations based on these cultures     ID team 1 will follow  IMPRESSION:  26 year old female with left ankle fracture here for removal of external fixator device and antibiotic spacer.  She is s/p insertion of 2 plates, bone chips, bone graft and wound closure    Recommend:  1.  Can start Daptomycin 500 mg IV daily.  Check serum CK daily for now  2.  Start Ceftriaxone 2 grams IV daily ( start 8 hrs after the last cefazolin dose)  3.  Caspofungin 50 mg IV daily   4. Follow up tissue cultures from OR.  Will need several weeks of antibiotics post-op.  Final recommendations based on these cultures     ID team 1 will follow

## 2022-01-19 LAB
ANION GAP SERPL CALC-SCNC: 10 MMOL/L — SIGNIFICANT CHANGE UP (ref 5–17)
BUN SERPL-MCNC: 4 MG/DL — LOW (ref 7–23)
CALCIUM SERPL-MCNC: 8.7 MG/DL — SIGNIFICANT CHANGE UP (ref 8.4–10.5)
CHLORIDE SERPL-SCNC: 104 MMOL/L — SIGNIFICANT CHANGE UP (ref 96–108)
CK SERPL-CCNC: 48 U/L — SIGNIFICANT CHANGE UP (ref 25–170)
CO2 SERPL-SCNC: 24 MMOL/L — SIGNIFICANT CHANGE UP (ref 22–31)
CREAT SERPL-MCNC: 0.63 MG/DL — SIGNIFICANT CHANGE UP (ref 0.5–1.3)
GLUCOSE SERPL-MCNC: 93 MG/DL — SIGNIFICANT CHANGE UP (ref 70–99)
HCT VFR BLD CALC: 24.4 % — LOW (ref 34.5–45)
HGB BLD-MCNC: 8.5 G/DL — LOW (ref 11.5–15.5)
MCHC RBC-ENTMCNC: 30.1 PG — SIGNIFICANT CHANGE UP (ref 27–34)
MCHC RBC-ENTMCNC: 34.8 GM/DL — SIGNIFICANT CHANGE UP (ref 32–36)
MCV RBC AUTO: 86.5 FL — SIGNIFICANT CHANGE UP (ref 80–100)
NRBC # BLD: 0 /100 WBCS — SIGNIFICANT CHANGE UP (ref 0–0)
PLATELET # BLD AUTO: 264 K/UL — SIGNIFICANT CHANGE UP (ref 150–400)
POTASSIUM SERPL-MCNC: 3.4 MMOL/L — LOW (ref 3.5–5.3)
POTASSIUM SERPL-SCNC: 3.4 MMOL/L — LOW (ref 3.5–5.3)
RBC # BLD: 2.82 M/UL — LOW (ref 3.8–5.2)
RBC # FLD: 12.7 % — SIGNIFICANT CHANGE UP (ref 10.3–14.5)
SODIUM SERPL-SCNC: 138 MMOL/L — SIGNIFICANT CHANGE UP (ref 135–145)
WBC # BLD: 9.41 K/UL — SIGNIFICANT CHANGE UP (ref 3.8–10.5)
WBC # FLD AUTO: 9.41 K/UL — SIGNIFICANT CHANGE UP (ref 3.8–10.5)

## 2022-01-19 PROCEDURE — 73630 X-RAY EXAM OF FOOT: CPT | Mod: 26,LT

## 2022-01-19 PROCEDURE — 73610 X-RAY EXAM OF ANKLE: CPT | Mod: 26,LT

## 2022-01-19 PROCEDURE — 99232 SBSQ HOSP IP/OBS MODERATE 35: CPT | Mod: GC

## 2022-01-19 PROCEDURE — 73590 X-RAY EXAM OF LOWER LEG: CPT | Mod: 26,LT

## 2022-01-19 RX ORDER — CYCLOBENZAPRINE HYDROCHLORIDE 10 MG/1
5 TABLET, FILM COATED ORAL THREE TIMES A DAY
Refills: 0 | Status: DISCONTINUED | OUTPATIENT
Start: 2022-01-19 | End: 2022-01-25

## 2022-01-19 RX ORDER — OXYCODONE HYDROCHLORIDE 5 MG/1
10 TABLET ORAL
Refills: 0 | Status: DISCONTINUED | OUTPATIENT
Start: 2022-01-19 | End: 2022-01-20

## 2022-01-19 RX ORDER — OXYCODONE HYDROCHLORIDE 5 MG/1
5 TABLET ORAL
Refills: 0 | Status: DISCONTINUED | OUTPATIENT
Start: 2022-01-19 | End: 2022-01-20

## 2022-01-19 RX ORDER — OXYCODONE HYDROCHLORIDE 5 MG/1
10 TABLET ORAL EVERY 12 HOURS
Refills: 0 | Status: DISCONTINUED | OUTPATIENT
Start: 2022-01-19 | End: 2022-01-25

## 2022-01-19 RX ADMIN — HYDROMORPHONE HYDROCHLORIDE 0.5 MILLIGRAM(S): 2 INJECTION INTRAMUSCULAR; INTRAVENOUS; SUBCUTANEOUS at 11:45

## 2022-01-19 RX ADMIN — SODIUM CHLORIDE 150 MILLILITER(S): 9 INJECTION, SOLUTION INTRAVENOUS at 22:49

## 2022-01-19 RX ADMIN — OXYCODONE HYDROCHLORIDE 10 MILLIGRAM(S): 5 TABLET ORAL at 01:51

## 2022-01-19 RX ADMIN — OXYCODONE HYDROCHLORIDE 10 MILLIGRAM(S): 5 TABLET ORAL at 10:30

## 2022-01-19 RX ADMIN — OXYCODONE HYDROCHLORIDE 5 MILLIGRAM(S): 5 TABLET ORAL at 20:14

## 2022-01-19 RX ADMIN — OXYCODONE HYDROCHLORIDE 10 MILLIGRAM(S): 5 TABLET ORAL at 18:30

## 2022-01-19 RX ADMIN — PANTOPRAZOLE SODIUM 40 MILLIGRAM(S): 20 TABLET, DELAYED RELEASE ORAL at 06:00

## 2022-01-19 RX ADMIN — HYDROMORPHONE HYDROCHLORIDE 0.5 MILLIGRAM(S): 2 INJECTION INTRAMUSCULAR; INTRAVENOUS; SUBCUTANEOUS at 23:15

## 2022-01-19 RX ADMIN — SODIUM CHLORIDE 150 MILLILITER(S): 9 INJECTION, SOLUTION INTRAVENOUS at 11:10

## 2022-01-19 RX ADMIN — OXYCODONE HYDROCHLORIDE 10 MILLIGRAM(S): 5 TABLET ORAL at 02:20

## 2022-01-19 RX ADMIN — Medication 325 MILLIGRAM(S): at 17:48

## 2022-01-19 RX ADMIN — OXYCODONE HYDROCHLORIDE 10 MILLIGRAM(S): 5 TABLET ORAL at 15:00

## 2022-01-19 RX ADMIN — POLYETHYLENE GLYCOL 3350 17 GRAM(S): 17 POWDER, FOR SOLUTION ORAL at 21:50

## 2022-01-19 RX ADMIN — HYDROMORPHONE HYDROCHLORIDE 0.5 MILLIGRAM(S): 2 INJECTION INTRAMUSCULAR; INTRAVENOUS; SUBCUTANEOUS at 22:49

## 2022-01-19 RX ADMIN — CASPOFUNGIN ACETATE 260 MILLIGRAM(S): 7 INJECTION, POWDER, LYOPHILIZED, FOR SOLUTION INTRAVENOUS at 19:26

## 2022-01-19 RX ADMIN — HYDROMORPHONE HYDROCHLORIDE 0.5 MILLIGRAM(S): 2 INJECTION INTRAMUSCULAR; INTRAVENOUS; SUBCUTANEOUS at 04:45

## 2022-01-19 RX ADMIN — OXYCODONE HYDROCHLORIDE 5 MILLIGRAM(S): 5 TABLET ORAL at 00:21

## 2022-01-19 RX ADMIN — Medication 975 MILLIGRAM(S): at 06:00

## 2022-01-19 RX ADMIN — HYDROMORPHONE HYDROCHLORIDE 0.5 MILLIGRAM(S): 2 INJECTION INTRAMUSCULAR; INTRAVENOUS; SUBCUTANEOUS at 04:22

## 2022-01-19 RX ADMIN — OXYCODONE HYDROCHLORIDE 10 MILLIGRAM(S): 5 TABLET ORAL at 09:30

## 2022-01-19 RX ADMIN — OXYCODONE HYDROCHLORIDE 10 MILLIGRAM(S): 5 TABLET ORAL at 17:48

## 2022-01-19 RX ADMIN — HYDROMORPHONE HYDROCHLORIDE 0.5 MILLIGRAM(S): 2 INJECTION INTRAMUSCULAR; INTRAVENOUS; SUBCUTANEOUS at 11:18

## 2022-01-19 RX ADMIN — OXYCODONE HYDROCHLORIDE 10 MILLIGRAM(S): 5 TABLET ORAL at 14:17

## 2022-01-19 RX ADMIN — HYDROMORPHONE HYDROCHLORIDE 0.5 MILLIGRAM(S): 2 INJECTION INTRAMUSCULAR; INTRAVENOUS; SUBCUTANEOUS at 16:46

## 2022-01-19 RX ADMIN — Medication 975 MILLIGRAM(S): at 06:30

## 2022-01-19 RX ADMIN — HYDROMORPHONE HYDROCHLORIDE 0.5 MILLIGRAM(S): 2 INJECTION INTRAMUSCULAR; INTRAVENOUS; SUBCUTANEOUS at 16:10

## 2022-01-19 RX ADMIN — Medication 975 MILLIGRAM(S): at 15:00

## 2022-01-19 RX ADMIN — CEFTRIAXONE 100 MILLIGRAM(S): 500 INJECTION, POWDER, FOR SOLUTION INTRAMUSCULAR; INTRAVENOUS at 01:03

## 2022-01-19 RX ADMIN — SENNA PLUS 2 TABLET(S): 8.6 TABLET ORAL at 21:50

## 2022-01-19 RX ADMIN — Medication 975 MILLIGRAM(S): at 14:17

## 2022-01-19 RX ADMIN — Medication 325 MILLIGRAM(S): at 06:00

## 2022-01-19 NOTE — CONSULT NOTE ADULT - SUBJECTIVE AND OBJECTIVE BOX
Pain Management Consult Note - [facility] [phone]    Chief Complaint: left ankle pain     HPI: Patient seen examined today. This is a 27 y/o female PMH of PAD POD 1 s/p exfix removal, antibiotic spacer explant, ORIF of left fibula, flexor tendon tenotomies 1st - 4th toes and complex plastic closure with reports of left ankle pain. Patient reports endorsing pain to the surgical site, sharp in nature that does not radiate. Patient rates current pain level to be a 7 out of 10. Patient reports taking Oxycodone PO for pain, but reports this only lasts about 1 hours. Patient was started on Oxycontin 10 mg PO BID yesterday, patient reports she does not feel any improvement of pain with this regimen. At home, patient reports she does not take medications for pain. Patient denies numbness/tingling to the bilateral lower extremities. Patient denies side effects from current pain regimen.    Pain is __X_ sharp ____dull ___burning ___achy ___ Intensity: ____ mild ___mod ___severe     Location __X__surgical site ____cervical _____lumbar ____abd ____upper ext_X___lower ext    Worse with ___X_activity _X___movement _____physical therapy___ Rest    Improved with ___X_medication __X__rest ____physical therapy    ROS: Const:  _N__febrile   Eyes:___ENT:___CV: __N_chest pain  Resp: __N__sob  GI:__N_nausea _N__vomiting _N__abd pain ___npo ___clears _Y_full diet __bm  :___ Musk: _Y__pain ___spasm  Skin:___ Neuro:  _N__sedation__N_confusion__N_ numbness _N__weakness _N__paresth  Psych:_N_anxiety  Endo:___ Heme:___Allergy:__NKDA_______, _Y__all others reviewed and negative    PAST MEDICAL & SURGICAL HISTORY:  Left tibial neuropathy  PAD (peripheral artery disease)  Status post ORIF of fracture of ankle    SH: __N_Tobacco   __N_Alcohol                          FH:FAMILY HISTORY: no pertinent medical history noted in first degree relatives    acetaminophen     Tablet .. 975 milliGRAM(s) Oral every 8 hours  aluminum hydroxide/magnesium hydroxide/simethicone Suspension 30 milliLiter(s) Oral four times a day PRN  aspirin enteric coated 325 milliGRAM(s) Oral two times a day  caspofungin IVPB 50 milliGRAM(s) IV Intermittent every 24 hours  cefTRIAXone   IVPB 2000 milliGRAM(s) IV Intermittent every 24 hours  DAPTOmycin IVPB 500 milliGRAM(s) IV Intermittent every 24 hours  HYDROmorphone  Injectable 0.5 milliGRAM(s) IV Push every 4 hours PRN  lactated ringers. 1000 milliLiter(s) IV Continuous <Continuous>  magnesium hydroxide Suspension 30 milliLiter(s) Oral daily PRN  ondansetron Injectable 4 milliGRAM(s) IV Push every 6 hours PRN  oxyCODONE    IR 5 milliGRAM(s) Oral every 4 hours PRN  oxyCODONE    IR 10 milliGRAM(s) Oral every 4 hours PRN  oxyCODONE  ER Tablet 10 milliGRAM(s) Oral every 12 hours  pantoprazole    Tablet 40 milliGRAM(s) Oral before breakfast  polyethylene glycol 3350 17 Gram(s) Oral at bedtime  senna 2 Tablet(s) Oral at bedtime      T(C): 36.6 (01-19-22 @ 09:30), Max: 36.9 (01-19-22 @ 05:10)  HR: 84 (01-19-22 @ 09:30) (77 - 106)  BP: 102/69 (01-19-22 @ 09:30) (99/61 - 135/67)  RR: 18 (01-19-22 @ 09:30) (9 - 20)  SpO2: 98% (01-19-22 @ 09:30) (93% - 99%)  Wt(kg): --    T(C): 36.6 (01-19-22 @ 09:30), Max: 36.9 (01-19-22 @ 05:10)  HR: 84 (01-19-22 @ 09:30) (77 - 106)  BP: 102/69 (01-19-22 @ 09:30) (99/61 - 135/67)  RR: 18 (01-19-22 @ 09:30) (9 - 20)  SpO2: 98% (01-19-22 @ 09:30) (93% - 99%)  Wt(kg): --    T(C): 36.6 (01-19-22 @ 09:30), Max: 36.9 (01-19-22 @ 05:10)  HR: 84 (01-19-22 @ 09:30) (77 - 106)  BP: 102/69 (01-19-22 @ 09:30) (99/61 - 135/67)  RR: 18 (01-19-22 @ 09:30) (9 - 20)  SpO2: 98% (01-19-22 @ 09:30) (93% - 99%)  Wt(kg): --    PHYSICAL EXAM:  Gen Appearance: _X__no acute distress __X_appropriate        Neuro: __X_SILT feet____ EOM Intact Psych: AAOX_3_, _X__mood/affect appropriate        Eyes: __X_conjunctiva WNL  _X____ Pupils equal and round        ENT: _X__ears and nose atraumatic__X_ Hearing grossly intact        Neck: _X__trachea midline, no visible masses ___thyroid without palpable mass    Resp: _X__Nml WOB____No tactile fremitus ___clear to auscultation    Cardio: _X__extremities free from edema ____pedal pulses palpable    GI/Abdomen: ___soft _____ Nontender_____X_Nondistended_____HSM    Lymphatic: ___no palpable nodes in neck  ___no palpable nodes calves and feet    Skin/Wound: ___Incision, __X_Dressing c/d/i,   ____surrounding tissues soft,  __X_drain/chest tube present____    Muscular: EHL ___/5  Gastrocnemius___/5    _X__absent clubbing/cyanosis      ASSESSMENT: This is a 26y old Female with a history of PAD POD 1 s/p exfix removal, antibiotic spacer explant, ORIF of left fibula, flexor tendon tenotomies 1st - 4th toes and complex plastic closure with reports of left ankle pain.    Recommended Treatment PLAN:  1. Consider increasing to Oxycodone 5-10mg Po q3h PRN moderate-severe pain  2. Continue Dilaudid 0.5 mg IVP q4h PRN breakthrough pain  3. Continue Oxydone ER 10 mg PO BID  4. Continue Tylenol 975 mg PO TID  5. Start Flexeril 5 mg PO q8h PRN muscle spasm  Plan discussed with Dr. Rene                Pain Management Consult Note - [facility] [phone]    Chief Complaint: left ankle pain     HPI: Patient seen examined today. This is a 27 y/o female PMH of PAD POD 1 s/p exfix removal, antibiotic spacer explant, ORIF of left fibula, flexor tendon tenotomies 1st - 4th toes and complex plastic closure with reports of left ankle pain. Patient reports endorsing pain to the surgical site, sharp in nature that does not radiate. Patient rates current pain level to be a 7 out of 10. Patient reports taking Oxycodone PO for pain, but reports this only lasts about 1 hours. Patient was started on Oxycontin 10 mg PO BID yesterday, patient reports she does not feel any improvement of pain with this regimen. At home, patient reports she does not take medications for pain. Patient denies numbness/tingling to the bilateral lower extremities. Patient denies side effects from current pain regimen.    Pain is __X_ sharp ____dull ___burning ___achy ___ Intensity: ____ mild ___mod ___severe     Location __X__surgical site ____cervical _____lumbar ____abd ____upper ext_X___lower ext    Worse with ___X_activity _X___movement _____physical therapy___ Rest    Improved with ___X_medication __X__rest ____physical therapy    ROS: Const:  _N__febrile   Eyes:___ENT:___CV: __N_chest pain  Resp: __N__sob  GI:__N_nausea _N__vomiting _N__abd pain ___npo ___clears _Y_full diet __bm  :___ Musk: _Y__pain ___spasm  Skin:___ Neuro:  _N__sedation__N_confusion__N_ numbness _N__weakness _N__paresth  Psych:_N_anxiety  Endo:___ Heme:___Allergy:__NKDA_______, _Y__all others reviewed and negative    PAST MEDICAL & SURGICAL HISTORY:  Left tibial neuropathy  PAD (peripheral artery disease)  Status post ORIF of fracture of ankle    SH: __N_Tobacco   __N_Alcohol                          FH:FAMILY HISTORY: no pertinent medical history noted in first degree relatives    acetaminophen     Tablet .. 975 milliGRAM(s) Oral every 8 hours  aluminum hydroxide/magnesium hydroxide/simethicone Suspension 30 milliLiter(s) Oral four times a day PRN  aspirin enteric coated 325 milliGRAM(s) Oral two times a day  caspofungin IVPB 50 milliGRAM(s) IV Intermittent every 24 hours  cefTRIAXone   IVPB 2000 milliGRAM(s) IV Intermittent every 24 hours  DAPTOmycin IVPB 500 milliGRAM(s) IV Intermittent every 24 hours  HYDROmorphone  Injectable 0.5 milliGRAM(s) IV Push every 4 hours PRN  lactated ringers. 1000 milliLiter(s) IV Continuous <Continuous>  magnesium hydroxide Suspension 30 milliLiter(s) Oral daily PRN  ondansetron Injectable 4 milliGRAM(s) IV Push every 6 hours PRN  oxyCODONE    IR 5 milliGRAM(s) Oral every 4 hours PRN  oxyCODONE    IR 10 milliGRAM(s) Oral every 4 hours PRN  oxyCODONE  ER Tablet 10 milliGRAM(s) Oral every 12 hours  pantoprazole    Tablet 40 milliGRAM(s) Oral before breakfast  polyethylene glycol 3350 17 Gram(s) Oral at bedtime  senna 2 Tablet(s) Oral at bedtime      T(C): 36.6 (01-19-22 @ 09:30), Max: 36.9 (01-19-22 @ 05:10)  HR: 84 (01-19-22 @ 09:30) (77 - 106)  BP: 102/69 (01-19-22 @ 09:30) (99/61 - 135/67)  RR: 18 (01-19-22 @ 09:30) (9 - 20)  SpO2: 98% (01-19-22 @ 09:30) (93% - 99%)  Wt(kg): --    T(C): 36.6 (01-19-22 @ 09:30), Max: 36.9 (01-19-22 @ 05:10)  HR: 84 (01-19-22 @ 09:30) (77 - 106)  BP: 102/69 (01-19-22 @ 09:30) (99/61 - 135/67)  RR: 18 (01-19-22 @ 09:30) (9 - 20)  SpO2: 98% (01-19-22 @ 09:30) (93% - 99%)  Wt(kg): --    T(C): 36.6 (01-19-22 @ 09:30), Max: 36.9 (01-19-22 @ 05:10)  HR: 84 (01-19-22 @ 09:30) (77 - 106)  BP: 102/69 (01-19-22 @ 09:30) (99/61 - 135/67)  RR: 18 (01-19-22 @ 09:30) (9 - 20)  SpO2: 98% (01-19-22 @ 09:30) (93% - 99%)  Wt(kg): --    PHYSICAL EXAM:  Gen Appearance: _X__no acute distress __X_appropriate        Neuro: __X_SILT feet____ EOM Intact Psych: AAOX_3_, _X__mood/affect appropriate        Eyes: __X_conjunctiva WNL  _X____ Pupils equal and round        ENT: _X__ears and nose atraumatic__X_ Hearing grossly intact        Neck: _X__trachea midline, no visible masses ___thyroid without palpable mass    Resp: _X__Nml WOB____No tactile fremitus ___clear to auscultation    Cardio: _X__extremities free from edema ____pedal pulses palpable    GI/Abdomen: ___soft _____ Nontender_____X_Nondistended_____HSM    Lymphatic: ___no palpable nodes in neck  ___no palpable nodes calves and feet    Skin/Wound: ___Incision, __X_Dressing c/d/i,   ____surrounding tissues soft,  __X_drain/chest tube present____    Muscular: EHL ___/5  Gastrocnemius___/5    _X__absent clubbing/cyanosis      ASSESSMENT: This is a 26y old Female with a history of PAD POD 1 s/p exfix removal, antibiotic spacer explant, ORIF of left fibula, flexor tendon tenotomies 1st - 4th toes and complex plastic closure with reports of left ankle pain.    Recommended Treatment PLAN:  1. Consider increasing to Oxycodone 5-10mg Po q3h PRN moderate-severe pain  2. Continue Dilaudid 0.5 mg IVP q4h PRN breakthrough pain  3. Continue Oxycodone ER 10 mg PO BID  4. Continue Tylenol 975 mg PO TID  5. Start Flexeril 5 mg PO q8h PRN muscle spasm  Plan discussed with Dr. Rene                Pain Management Consult Note - [facility] [phone]    Chief Complaint: left ankle pain     HPI: Patient seen examined today. This is a 27 y/o female PMH of PAD POD 1 s/p exfix removal, antibiotic spacer explant, ORIF of left fibula, flexor tendon tenotomies 1st - 4th toes and complex plastic closure with reports of left ankle pain. Patient reports endorsing pain to the surgical site, sharp in nature that does not radiate. Patient rates current pain level to be a 7 out of 10. Patient reports taking Oxycodone PO for pain, but reports this only lasts about 1 hours. Patient was started on Oxycontin 10 mg PO BID yesterday, patient reports she does not feel any improvement of pain with this regimen. At home, patient reports she does not take medications for pain. Patient denies numbness/tingling to the bilateral lower extremities. Patient denies side effects from current pain regimen.    Pain is __X_ sharp ____dull ___burning ___achy ___ Intensity: ____ mild ___mod ___severe     Location __X__surgical site ____cervical _____lumbar ____abd ____upper ext_X___lower ext    Worse with ___X_activity _X___movement _____physical therapy___ Rest    Improved with ___X_medication __X__rest ____physical therapy    ROS: Const:  _N__febrile   Eyes:___ENT:___CV: __N_chest pain  Resp: __N__sob  GI:__N_nausea _N__vomiting _N__abd pain ___npo ___clears _Y_full diet __bm  :___ Musk: _Y__pain ___spasm  Skin:___ Neuro:  _N__sedation__N_confusion__N_ numbness _N__weakness _N__paresth  Psych:_N_anxiety  Endo:___ Heme:___Allergy:__NKDA_______, _Y__all others reviewed and negative    PAST MEDICAL & SURGICAL HISTORY:  Left tibial neuropathy  PAD (peripheral artery disease)  Status post ORIF of fracture of ankle    SH: __N_Tobacco   __N_Alcohol                          FH:FAMILY HISTORY: no pertinent medical history noted in first degree relatives    acetaminophen     Tablet .. 975 milliGRAM(s) Oral every 8 hours  aluminum hydroxide/magnesium hydroxide/simethicone Suspension 30 milliLiter(s) Oral four times a day PRN  aspirin enteric coated 325 milliGRAM(s) Oral two times a day  caspofungin IVPB 50 milliGRAM(s) IV Intermittent every 24 hours  cefTRIAXone   IVPB 2000 milliGRAM(s) IV Intermittent every 24 hours  DAPTOmycin IVPB 500 milliGRAM(s) IV Intermittent every 24 hours  HYDROmorphone  Injectable 0.5 milliGRAM(s) IV Push every 4 hours PRN  lactated ringers. 1000 milliLiter(s) IV Continuous <Continuous>  magnesium hydroxide Suspension 30 milliLiter(s) Oral daily PRN  ondansetron Injectable 4 milliGRAM(s) IV Push every 6 hours PRN  oxyCODONE    IR 5 milliGRAM(s) Oral every 4 hours PRN  oxyCODONE    IR 10 milliGRAM(s) Oral every 4 hours PRN  oxyCODONE  ER Tablet 10 milliGRAM(s) Oral every 12 hours  pantoprazole    Tablet 40 milliGRAM(s) Oral before breakfast  polyethylene glycol 3350 17 Gram(s) Oral at bedtime  senna 2 Tablet(s) Oral at bedtime      T(C): 36.6 (01-19-22 @ 09:30), Max: 36.9 (01-19-22 @ 05:10)  HR: 84 (01-19-22 @ 09:30) (77 - 106)  BP: 102/69 (01-19-22 @ 09:30) (99/61 - 135/67)  RR: 18 (01-19-22 @ 09:30) (9 - 20)  SpO2: 98% (01-19-22 @ 09:30) (93% - 99%)  Wt(kg): --    T(C): 36.6 (01-19-22 @ 09:30), Max: 36.9 (01-19-22 @ 05:10)  HR: 84 (01-19-22 @ 09:30) (77 - 106)  BP: 102/69 (01-19-22 @ 09:30) (99/61 - 135/67)  RR: 18 (01-19-22 @ 09:30) (9 - 20)  SpO2: 98% (01-19-22 @ 09:30) (93% - 99%)  Wt(kg): --    T(C): 36.6 (01-19-22 @ 09:30), Max: 36.9 (01-19-22 @ 05:10)  HR: 84 (01-19-22 @ 09:30) (77 - 106)  BP: 102/69 (01-19-22 @ 09:30) (99/61 - 135/67)  RR: 18 (01-19-22 @ 09:30) (9 - 20)  SpO2: 98% (01-19-22 @ 09:30) (93% - 99%)  Wt(kg): --    PHYSICAL EXAM:  Gen Appearance: _X__no acute distress __X_appropriate        Neuro: __X_SILT feet____ EOM Intact Psych: AAOX_3_, _X__mood/affect appropriate        Eyes: __X_conjunctiva WNL  _X____ Pupils equal and round        ENT: _X__ears and nose atraumatic__X_ Hearing grossly intact        Neck: _X__trachea midline, no visible masses ___thyroid without palpable mass    Resp: _X__Nml WOB____No tactile fremitus ___clear to auscultation    Cardio: _X__extremities free from edema ____pedal pulses palpable    GI/Abdomen: ___soft _____ Nontender_____X_Nondistended_____HSM    Lymphatic: ___no palpable nodes in neck  ___no palpable nodes calves and feet    Skin/Wound: ___Incision, __X_Dressing c/d/i,   ____surrounding tissues soft,  __X_drain/chest tube present____    Muscular: EHL ___/5  Gastrocnemius___/5    _X__absent clubbing/cyanosis

## 2022-01-19 NOTE — PROGRESS NOTE ADULT - SUBJECTIVE AND OBJECTIVE BOX
infectious diseases progress note    INTERVAL HPI/OVERNIGHT EVENTS:      ROS:  CONSTITUTIONAL:  Negative fever or chills, feels well, good appetite  EYES:  Negative  blurry vision or double vision  CARDIOVASCULAR:  Negative for chest pain or palpitations  RESPIRATORY:  Negative for cough, wheezing, or SOB   GASTROINTESTINAL:  Negative for nausea, vomiting, diarrhea, constipation, or abdominal pain  GENITOURINARY:  Negative frequency, urgency or dysuria  NEUROLOGIC:  No headache, confusion, dizziness, lightheadedness    Allergies    No Known Allergies    Intolerances        ANTIBIOTICS/RELEVANT:  antimicrobials  caspofungin IVPB 50 milliGRAM(s) IV Intermittent every 24 hours  cefTRIAXone   IVPB 2000 milliGRAM(s) IV Intermittent every 24 hours  DAPTOmycin IVPB 500 milliGRAM(s) IV Intermittent every 24 hours    immunologic:    OTHER:  acetaminophen     Tablet .. 975 milliGRAM(s) Oral every 8 hours  aluminum hydroxide/magnesium hydroxide/simethicone Suspension 30 milliLiter(s) Oral four times a day PRN  aspirin enteric coated 325 milliGRAM(s) Oral two times a day  cyclobenzaprine 5 milliGRAM(s) Oral three times a day PRN  HYDROmorphone  Injectable 0.5 milliGRAM(s) IV Push every 4 hours PRN  lactated ringers. 1000 milliLiter(s) IV Continuous <Continuous>  magnesium hydroxide Suspension 30 milliLiter(s) Oral daily PRN  ondansetron Injectable 4 milliGRAM(s) IV Push every 6 hours PRN  oxyCODONE    IR 5 milliGRAM(s) Oral every 3 hours PRN  oxyCODONE    IR 10 milliGRAM(s) Oral every 3 hours PRN  oxyCODONE  ER Tablet 10 milliGRAM(s) Oral every 12 hours  pantoprazole    Tablet 40 milliGRAM(s) Oral before breakfast  polyethylene glycol 3350 17 Gram(s) Oral at bedtime  senna 2 Tablet(s) Oral at bedtime      Objective:  Vital Signs Last 24 Hrs  T(C): 36.6 (19 Jan 2022 09:30), Max: 36.9 (19 Jan 2022 05:10)  T(F): 97.8 (19 Jan 2022 09:30), Max: 98.4 (19 Jan 2022 05:10)  HR: 84 (19 Jan 2022 09:30) (77 - 106)  BP: 102/69 (19 Jan 2022 09:30) (99/61 - 135/67)  BP(mean): 89 (18 Jan 2022 14:56) (84 - 92)  RR: 18 (19 Jan 2022 09:30) (9 - 18)  SpO2: 98% (19 Jan 2022 09:30) (96% - 99%)    PHYSICAL EXAM:  Constitutional:Well-developed, well nourishe  Eyes:GLORY, EOMI  Ear/Nose/Throat: no oral lesion, no sinus tenderness on percussion	  Neck:no JVD, no lymphadenopathy, supple  Respiratory: CTA rhett  Cardiovascular: S1S2 RRR, no murmurs  Gastrointestinal:soft, (+) BS, no HSM  Extremities:no e/e/c        LABS:                        8.5    9.41  )-----------( 264      ( 19 Jan 2022 05:24 )             24.4     01-19    138  |  104  |  4<L>  ----------------------------<  93  3.4<L>   |  24  |  0.63    Ca    8.7      19 Jan 2022 05:24              MICROBIOLOGY:  Culture Results:   Testing in progress (01-18 @ 18:53)  Culture Results:   Testing in progress (01-18 @ 18:53)  Culture Results:   Testing in progress (01-18 @ 18:53)        RADIOLOGY & ADDITIONAL STUDIES: infectious diseases progress note    INTERVAL HPI/OVERNIGHT EVENTS:  s/p left ankle ex-fix removal, antibiotic spacer explant, left fibula ORIF, flexor tendon tenotomies 1st-4th toes with complex plastics closure. no complaints today. minimal pain. remains afebrile w/o leukocytosis       ROS:  CONSTITUTIONAL:  Negative fever or chills, feels well, good appetite  EYES:  Negative  blurry vision or double vision  CARDIOVASCULAR:  Negative for chest pain or palpitations  RESPIRATORY:  Negative for cough, wheezing, or SOB   GASTROINTESTINAL:  Negative for nausea, vomiting, diarrhea, constipation, or abdominal pain  GENITOURINARY:  Negative frequency, urgency or dysuria  NEUROLOGIC:  No headache, confusion, dizziness, lightheadedness    Allergies    No Known Allergies    Intolerances        ANTIBIOTICS/RELEVANT:  antimicrobials  caspofungin IVPB 50 milliGRAM(s) IV Intermittent every 24 hours  cefTRIAXone   IVPB 2000 milliGRAM(s) IV Intermittent every 24 hours  DAPTOmycin IVPB 500 milliGRAM(s) IV Intermittent every 24 hours    immunologic:    OTHER:  acetaminophen     Tablet .. 975 milliGRAM(s) Oral every 8 hours  aluminum hydroxide/magnesium hydroxide/simethicone Suspension 30 milliLiter(s) Oral four times a day PRN  aspirin enteric coated 325 milliGRAM(s) Oral two times a day  cyclobenzaprine 5 milliGRAM(s) Oral three times a day PRN  HYDROmorphone  Injectable 0.5 milliGRAM(s) IV Push every 4 hours PRN  lactated ringers. 1000 milliLiter(s) IV Continuous <Continuous>  magnesium hydroxide Suspension 30 milliLiter(s) Oral daily PRN  ondansetron Injectable 4 milliGRAM(s) IV Push every 6 hours PRN  oxyCODONE    IR 5 milliGRAM(s) Oral every 3 hours PRN  oxyCODONE    IR 10 milliGRAM(s) Oral every 3 hours PRN  oxyCODONE  ER Tablet 10 milliGRAM(s) Oral every 12 hours  pantoprazole    Tablet 40 milliGRAM(s) Oral before breakfast  polyethylene glycol 3350 17 Gram(s) Oral at bedtime  senna 2 Tablet(s) Oral at bedtime      Objective:  Vital Signs Last 24 Hrs  T(C): 36.6 (19 Jan 2022 09:30), Max: 36.9 (19 Jan 2022 05:10)  T(F): 97.8 (19 Jan 2022 09:30), Max: 98.4 (19 Jan 2022 05:10)  HR: 84 (19 Jan 2022 09:30) (77 - 106)  BP: 102/69 (19 Jan 2022 09:30) (99/61 - 135/67)  BP(mean): 89 (18 Jan 2022 14:56) (84 - 92)  RR: 18 (19 Jan 2022 09:30) (9 - 18)  SpO2: 98% (19 Jan 2022 09:30) (96% - 99%)    PHYSICAL EXAM:  Constitutional:  non-toxic, no distress  Eyes:  no icterus   Ear/Nose/Throat: no oral lesion  Neck:  supple  Respiratory: CTA rhett  Cardiovascular: S1S2 RRR, no murmurs  Gastrointestinal:soft, (+) BS, no HSM  Extremities:  left foot/ankle with dressing in place w/ drain          LABS:                        8.5    9.41  )-----------( 264      ( 19 Jan 2022 05:24 )             24.4     01-19    138  |  104  |  4<L>  ----------------------------<  93  3.4<L>   |  24  |  0.63    Ca    8.7      19 Jan 2022 05:24              MICROBIOLOGY:  Culture Results:   Testing in progress (01-18 @ 18:53)  Culture Results:   Testing in progress (01-18 @ 18:53)  Culture Results:   Testing in progress (01-18 @ 18:53)        RADIOLOGY & ADDITIONAL STUDIES:

## 2022-01-19 NOTE — PHYSICAL THERAPY INITIAL EVALUATION ADULT - ACTIVE RANGE OF MOTION EXAMINATION, REHAB EVAL
LLE knee and hip AROM WFL, ankle ROM NT due to s/p surgery/bilateral upper extremity Active ROM was WFL (within functional limits)/Right LE Active ROM was WFL (within functional limits)

## 2022-01-19 NOTE — PHYSICAL THERAPY INITIAL EVALUATION ADULT - ADDITIONAL COMMENTS
Pt lives with a roommate in a 5th floor walkup. Her mother is staying with her to assist for the foreseeable future. Since last discharge from [facility] pt has been ambulating independently with a RW. Noted that she brought RW with her to [facility] however it is not in her room (to follow up regarding RW.)

## 2022-01-19 NOTE — PHYSICAL THERAPY INITIAL EVALUATION ADULT - LIGHT TOUCH SENSATION, LLE, REHAB EVAL
intact to light touch sensation testing knee and above//lower leg NT due to ace bandage//absent sensation to toes 1-3//moderate impairment to 4th toe (unable to test 5th toe due covered by ace bandage)

## 2022-01-19 NOTE — PROGRESS NOTE ADULT - SUBJECTIVE AND OBJECTIVE BOX
Ortho Note    Pt seen and examined on morning rounds. Pt comfortable without complaints, pain controlled.  Denies CP, SOB, N/V, numbness/tingling     Vital Signs Last 24 Hrs  T(C): 36.9 (01-19-22 @ 05:10), Max: 36.9 (01-19-22 @ 05:10)  T(F): 98.4 (01-19-22 @ 05:10), Max: 98.4 (01-19-22 @ 05:10)  HR: 85 (01-19-22 @ 05:50) (85 - 93)  BP: 104/66 (01-19-22 @ 05:50) (99/61 - 104/66)  BP(mean): --  RR: 17 (01-19-22 @ 05:50) (17 - 17)  SpO2: 96% (01-19-22 @ 05:50) (96% - 96%)  I&O's Summary    18 Jan 2022 07:01  -  19 Jan 2022 07:00  --------------------------------------------------------  IN: 2850 mL / OUT: 2365 mL / NET: 485 mL        Physical Exam:  General: Pt Alert and oriented, NAD  DSG C/D/I, prevena was off, reconnected to power source, now holding suction  Pulses: toes wwp, cap refill <3 seconds  firing                          8.5    9.41  )-----------( 264      ( 19 Jan 2022 05:24 )             24.4     01-19    138  |  104  |  4<L>  ----------------------------<  93  3.4<L>   |  24  |  0.63    Ca    8.7      19 Jan 2022 05:24        A/P: 26yFemale POD#1 s/p ex-fix removal, abx spacer explant, ORIF of L fibula, flexor tendon tenotomies 2nd-4th toes, flap elevation and complex wound closure by Dr. Dickens with prevena placement and x1 .   - VSS, pain controlled  - ID following: Daptomycin 500 mg IV daily.  Check serum CK daily for now, Ceftriaxone 2 grams IV daily, Caspofungin 50 mg IV daily   - ensure prevena is on and holding suction, was off for an unknown amount of time last night.  - DVT ppx: ASA, SCD  - PT, WBS: DIANN Clements, PGY-1  Ortho Pager [phone]

## 2022-01-19 NOTE — PHYSICAL THERAPY INITIAL EVALUATION ADULT - MANUAL MUSCLE TESTING RESULTS, REHAB EVAL
functional mobility testing; >/=3+/5 bilateral UE and RLE (LLE NT due to s/p surgery)/grossly assessed due to

## 2022-01-19 NOTE — PHYSICAL THERAPY INITIAL EVALUATION ADULT - DID THE PATIENT HAVE SURGERY?
left ankle ex-fix removal, antibiotic spacer explant, left fibula ORIF, flexor tendon tenotomies 1st-4th toes with complex plastics closure/yes

## 2022-01-19 NOTE — PHYSICAL THERAPY INITIAL EVALUATION ADULT - PERTINENT HX OF CURRENT PROBLEM, REHAB EVAL
Patient is 27 y/o female who presents with c/o left ankle pain following an accident last year. She is well known to [facility]. She was discharged from Hospital in Nov 2021. Since then she denies new medical problems. She has been using a walker and in ex fix.  She d/c IV abx in December and d/c PO abx last week.

## 2022-01-19 NOTE — CONSULT NOTE ADULT - ATTENDING COMMENTS
ASSESSMENT: This is a 26y old Female with a history of PAD POD 1 s/p exfix removal, antibiotic spacer explant, ORIF of left fibula, flexor tendon tenotomies 1st - 4th toes and complex plastic closure with reports of left ankle pain.    Recommended Treatment PLAN:  1. Consider increasing to Oxycodone 5-10mg Po q3h PRN moderate-severe pain  2. Continue Dilaudid 0.5 mg IVP q4h PRN breakthrough pain  3. Continue Oxycodone ER 10 mg PO BID  4. Continue Tylenol 975 mg PO TID  5. Start Flexeril 5 mg PO q8h PRN muscle spasm  Pt seen and examined by me, np acted as scribe

## 2022-01-19 NOTE — PROGRESS NOTE ADULT - SUBJECTIVE AND OBJECTIVE BOX
Orthopaedic Surgery Progress Note    Patient seen and examined. CASTRO. Patient without acute complaints. Pain moderately controlled - pain management consulted this AM. Denies CP, SOB, N/V, tactile fevers, calf pain.  Pt is POD #1 s/p left ankle ex-fix removal, antibiotic spacer explant, left fibula ORIF, flexor tendon tenotomies 1st-4th toes with complex plastics closure     Vital Signs Last 24 Hrs  T(C): 36.6 (19 Jan 2022 09:30), Max: 36.9 (19 Jan 2022 05:10)  T(F): 97.8 (19 Jan 2022 09:30), Max: 98.4 (19 Jan 2022 05:10)  HR: 84 (19 Jan 2022 09:30) (77 - 106)  BP: 102/69 (19 Jan 2022 09:30) (99/61 - 135/67)  BP(mean): 89 (18 Jan 2022 14:56) (84 - 95)  RR: 18 (19 Jan 2022 09:30) (9 - 20)  SpO2: 98% (19 Jan 2022 09:30) (93% - 99%)      Physical Exam:  Pt laying comfortably in bed, NAD.  Skin warm and well perfused, no visible erythema/ecchymoses.  Dressing C/D/I; LLE splint in place, prevena and  in place   Brisk capillary refill distal LLE   Pt fires 4th and 5th toes with SLT in tact; 1-3 toes no sensation to light touch, unable to wiggle per baseline    LABS                        8.5    9.41  )-----------( 264      ( 19 Jan 2022 05:24 )             24.4                                01-19    138  |  104  |  4<L>  ----------------------------<  93  3.4<L>   |  24  |  0.63    Ca    8.7      19 Jan 2022 05:24          A/P: 26F POD #1 s/p left ankle ex-fix removal, antibiotic spacer explant, left fibula ORIF, flexor tendon tenotomies 1st-4th toes with complex plastics closure     CONTINUE:        1. PT: NWB LLE , OOB today   2. DVT prophylaxis:  bid   3. Pain Control as needed   4. Appreciate pain management recs  5. Appreciate plastics co management   6. f/u OR cultures   7. F/u Xrays left tibia, anle, foot - ordered   8. Dispo: pending

## 2022-01-19 NOTE — PROGRESS NOTE ADULT - SUBJECTIVE AND OBJECTIVE BOX
Ortho Note    Pt lying in bed, endorses "7/10" pain in L ankle, states that dilaudid IV works for ~30 min before pain appears in LLE again, states that PO oxy is minimally working to alleviate pain   Denies CP, SOB, N/V, numbness/tingling     Vital Signs Last 24 Hrs  T(C): 36.9 (01-19-22 @ 05:10), Max: 36.9 (01-19-22 @ 05:10)  T(F): 98.4 (01-19-22 @ 05:10), Max: 98.4 (01-19-22 @ 05:10)  HR: 85 (01-19-22 @ 05:50) (85 - 93)  BP: 104/66 (01-19-22 @ 05:50) (99/61 - 104/66)  BP(mean): --  RR: 17 (01-19-22 @ 05:50) (17 - 17)  SpO2: 96% (01-19-22 @ 05:50) (96% - 96%)  AVSS    General: Pt Alert and oriented, NAD  DSG LLE: ace wrapped from below knee to toe, as plastics is in charge of dressing change, deferring looking at wound during exam  Pulses: unable to assess given ace wrap dressing  Sensation: tips 4th and 5th L toe SILT, no sensation to light touch from tips of 1st to 3rd L toe   Motor: able to wiggle 4th and 5th L toe     A/P: 26yFemale s/p L ankle ORIF, removal of abx spacer by Dr. Wallis on 1/18/22   - Stable  - Pain Control, will consult pain mgmt given inadequate pain control, will add oxy 10 ER BID standing  - DVT ppx: asa 325  - appreciate ID recs, c/w caspofungin, ctx, dapto  - f/u AM labs including daily CK   - PT, WBS: NWB LLE, elevate LLE   - nausea control/bowel regimen  - c/w home meds  - Drains:  x 1  - Diet  - Dispo planning: pending clinical course         Ortho Pager [phone]

## 2022-01-20 DIAGNOSIS — E87.6 HYPOKALEMIA: ICD-10-CM

## 2022-01-20 LAB
ANION GAP SERPL CALC-SCNC: 9 MMOL/L — SIGNIFICANT CHANGE UP (ref 5–17)
BUN SERPL-MCNC: 6 MG/DL — LOW (ref 7–23)
CALCIUM SERPL-MCNC: 8 MG/DL — LOW (ref 8.4–10.5)
CHLORIDE SERPL-SCNC: 105 MMOL/L — SIGNIFICANT CHANGE UP (ref 96–108)
CK SERPL-CCNC: 35 U/L — SIGNIFICANT CHANGE UP (ref 25–170)
CO2 SERPL-SCNC: 26 MMOL/L — SIGNIFICANT CHANGE UP (ref 22–31)
CREAT SERPL-MCNC: 0.7 MG/DL — SIGNIFICANT CHANGE UP (ref 0.5–1.3)
GLUCOSE SERPL-MCNC: 95 MG/DL — SIGNIFICANT CHANGE UP (ref 70–99)
HCT VFR BLD CALC: 25.3 % — LOW (ref 34.5–45)
HGB BLD-MCNC: 8.5 G/DL — LOW (ref 11.5–15.5)
MCHC RBC-ENTMCNC: 29.5 PG — SIGNIFICANT CHANGE UP (ref 27–34)
MCHC RBC-ENTMCNC: 33.6 GM/DL — SIGNIFICANT CHANGE UP (ref 32–36)
MCV RBC AUTO: 87.8 FL — SIGNIFICANT CHANGE UP (ref 80–100)
NRBC # BLD: 0 /100 WBCS — SIGNIFICANT CHANGE UP (ref 0–0)
PLATELET # BLD AUTO: 223 K/UL — SIGNIFICANT CHANGE UP (ref 150–400)
POTASSIUM SERPL-MCNC: 3.2 MMOL/L — LOW (ref 3.5–5.3)
POTASSIUM SERPL-SCNC: 3.2 MMOL/L — LOW (ref 3.5–5.3)
RBC # BLD: 2.88 M/UL — LOW (ref 3.8–5.2)
RBC # FLD: 13 % — SIGNIFICANT CHANGE UP (ref 10.3–14.5)
SODIUM SERPL-SCNC: 140 MMOL/L — SIGNIFICANT CHANGE UP (ref 135–145)
WBC # BLD: 5.18 K/UL — SIGNIFICANT CHANGE UP (ref 3.8–10.5)
WBC # FLD AUTO: 5.18 K/UL — SIGNIFICANT CHANGE UP (ref 3.8–10.5)

## 2022-01-20 PROCEDURE — 99253 IP/OBS CNSLTJ NEW/EST LOW 45: CPT

## 2022-01-20 PROCEDURE — 99232 SBSQ HOSP IP/OBS MODERATE 35: CPT | Mod: GC

## 2022-01-20 RX ORDER — HYDROMORPHONE HYDROCHLORIDE 2 MG/ML
4 INJECTION INTRAMUSCULAR; INTRAVENOUS; SUBCUTANEOUS EVERY 4 HOURS
Refills: 0 | Status: DISCONTINUED | OUTPATIENT
Start: 2022-01-20 | End: 2022-01-25

## 2022-01-20 RX ORDER — POTASSIUM CHLORIDE 20 MEQ
40 PACKET (EA) ORAL EVERY 4 HOURS
Refills: 0 | Status: COMPLETED | OUTPATIENT
Start: 2022-01-20 | End: 2022-01-20

## 2022-01-20 RX ORDER — HYDROMORPHONE HYDROCHLORIDE 2 MG/ML
2 INJECTION INTRAMUSCULAR; INTRAVENOUS; SUBCUTANEOUS EVERY 4 HOURS
Refills: 0 | Status: DISCONTINUED | OUTPATIENT
Start: 2022-01-20 | End: 2022-01-25

## 2022-01-20 RX ORDER — PETROLATUM,WHITE
1 JELLY (GRAM) TOPICAL DAILY
Refills: 0 | Status: DISCONTINUED | OUTPATIENT
Start: 2022-01-20 | End: 2022-01-25

## 2022-01-20 RX ADMIN — Medication 975 MILLIGRAM(S): at 01:08

## 2022-01-20 RX ADMIN — Medication 40 MILLIEQUIVALENT(S): at 22:41

## 2022-01-20 RX ADMIN — CEFTRIAXONE 100 MILLIGRAM(S): 500 INJECTION, POWDER, FOR SOLUTION INTRAMUSCULAR; INTRAVENOUS at 01:00

## 2022-01-20 RX ADMIN — Medication 975 MILLIGRAM(S): at 23:32

## 2022-01-20 RX ADMIN — OXYCODONE HYDROCHLORIDE 10 MILLIGRAM(S): 5 TABLET ORAL at 01:40

## 2022-01-20 RX ADMIN — SENNA PLUS 2 TABLET(S): 8.6 TABLET ORAL at 22:42

## 2022-01-20 RX ADMIN — HYDROMORPHONE HYDROCHLORIDE 0.5 MILLIGRAM(S): 2 INJECTION INTRAMUSCULAR; INTRAVENOUS; SUBCUTANEOUS at 03:22

## 2022-01-20 RX ADMIN — DAPTOMYCIN 120 MILLIGRAM(S): 500 INJECTION, POWDER, LYOPHILIZED, FOR SOLUTION INTRAVENOUS at 00:08

## 2022-01-20 RX ADMIN — Medication 975 MILLIGRAM(S): at 00:08

## 2022-01-20 RX ADMIN — HYDROMORPHONE HYDROCHLORIDE 4 MILLIGRAM(S): 2 INJECTION INTRAMUSCULAR; INTRAVENOUS; SUBCUTANEOUS at 17:51

## 2022-01-20 RX ADMIN — PANTOPRAZOLE SODIUM 40 MILLIGRAM(S): 20 TABLET, DELAYED RELEASE ORAL at 06:55

## 2022-01-20 RX ADMIN — Medication 25 MILLIGRAM(S): at 14:37

## 2022-01-20 RX ADMIN — Medication 975 MILLIGRAM(S): at 07:55

## 2022-01-20 RX ADMIN — HYDROMORPHONE HYDROCHLORIDE 0.5 MILLIGRAM(S): 2 INJECTION INTRAMUSCULAR; INTRAVENOUS; SUBCUTANEOUS at 12:27

## 2022-01-20 RX ADMIN — OXYCODONE HYDROCHLORIDE 10 MILLIGRAM(S): 5 TABLET ORAL at 06:27

## 2022-01-20 RX ADMIN — Medication 40 MILLIEQUIVALENT(S): at 17:51

## 2022-01-20 RX ADMIN — POLYETHYLENE GLYCOL 3350 17 GRAM(S): 17 POWDER, FOR SOLUTION ORAL at 22:42

## 2022-01-20 RX ADMIN — HYDROMORPHONE HYDROCHLORIDE 4 MILLIGRAM(S): 2 INJECTION INTRAMUSCULAR; INTRAVENOUS; SUBCUTANEOUS at 22:59

## 2022-01-20 RX ADMIN — HYDROMORPHONE HYDROCHLORIDE 0.5 MILLIGRAM(S): 2 INJECTION INTRAMUSCULAR; INTRAVENOUS; SUBCUTANEOUS at 12:57

## 2022-01-20 RX ADMIN — OXYCODONE HYDROCHLORIDE 10 MILLIGRAM(S): 5 TABLET ORAL at 00:40

## 2022-01-20 RX ADMIN — Medication 975 MILLIGRAM(S): at 15:06

## 2022-01-20 RX ADMIN — HYDROMORPHONE HYDROCHLORIDE 4 MILLIGRAM(S): 2 INJECTION INTRAMUSCULAR; INTRAVENOUS; SUBCUTANEOUS at 18:15

## 2022-01-20 RX ADMIN — Medication 975 MILLIGRAM(S): at 06:55

## 2022-01-20 RX ADMIN — HYDROMORPHONE HYDROCHLORIDE 0.5 MILLIGRAM(S): 2 INJECTION INTRAMUSCULAR; INTRAVENOUS; SUBCUTANEOUS at 03:47

## 2022-01-20 RX ADMIN — Medication 25 MILLIGRAM(S): at 22:41

## 2022-01-20 RX ADMIN — OXYCODONE HYDROCHLORIDE 10 MILLIGRAM(S): 5 TABLET ORAL at 20:04

## 2022-01-20 RX ADMIN — Medication 325 MILLIGRAM(S): at 05:27

## 2022-01-20 RX ADMIN — Medication 325 MILLIGRAM(S): at 17:51

## 2022-01-20 RX ADMIN — Medication 975 MILLIGRAM(S): at 22:41

## 2022-01-20 RX ADMIN — OXYCODONE HYDROCHLORIDE 10 MILLIGRAM(S): 5 TABLET ORAL at 10:27

## 2022-01-20 RX ADMIN — HYDROMORPHONE HYDROCHLORIDE 4 MILLIGRAM(S): 2 INJECTION INTRAMUSCULAR; INTRAVENOUS; SUBCUTANEOUS at 21:59

## 2022-01-20 RX ADMIN — CYCLOBENZAPRINE HYDROCHLORIDE 5 MILLIGRAM(S): 10 TABLET, FILM COATED ORAL at 20:36

## 2022-01-20 RX ADMIN — CASPOFUNGIN ACETATE 260 MILLIGRAM(S): 7 INJECTION, POWDER, LYOPHILIZED, FOR SOLUTION INTRAVENOUS at 19:38

## 2022-01-20 RX ADMIN — CYCLOBENZAPRINE HYDROCHLORIDE 5 MILLIGRAM(S): 10 TABLET, FILM COATED ORAL at 01:57

## 2022-01-20 RX ADMIN — OXYCODONE HYDROCHLORIDE 10 MILLIGRAM(S): 5 TABLET ORAL at 09:57

## 2022-01-20 RX ADMIN — OXYCODONE HYDROCHLORIDE 10 MILLIGRAM(S): 5 TABLET ORAL at 19:32

## 2022-01-20 RX ADMIN — HYDROMORPHONE HYDROCHLORIDE 0.5 MILLIGRAM(S): 2 INJECTION INTRAMUSCULAR; INTRAVENOUS; SUBCUTANEOUS at 23:35

## 2022-01-20 RX ADMIN — OXYCODONE HYDROCHLORIDE 10 MILLIGRAM(S): 5 TABLET ORAL at 05:27

## 2022-01-20 RX ADMIN — Medication 975 MILLIGRAM(S): at 14:36

## 2022-01-20 RX ADMIN — Medication 1 APPLICATION(S): at 19:45

## 2022-01-20 NOTE — PROGRESS NOTE ADULT - SUBJECTIVE AND OBJECTIVE BOX
Ortho Note    Surgery: POD #2 s/p ex-fix removal, abx spacer explant, ORIF of L fibula, flexor tendon tenotomies 1st-4th toes and complex closure by plastics with prevena placement and x1 .     Patient seen and examined at bedside this AM   Endorsing LLE pain, medications providing short term relief   Denies CP, SOB, N/V, numbness/tingling     Vital Signs Last 24 Hrs  T(C): 36.7 (01-20-22 @ 12:55), Max: 36.8 (01-20-22 @ 09:06)  T(F): 98.1 (01-20-22 @ 12:55), Max: 98.3 (01-20-22 @ 09:06)  HR: 85 (01-20-22 @ 12:55) (78 - 85)  BP: 110/71 (01-20-22 @ 12:55) (99/65 - 110/71)  BP(mean): --  RR: 17 (01-20-22 @ 12:55) (16 - 17)  SpO2: 96% (01-20-22 @ 12:55) (95% - 96%)  AVSS    General: Pt Alert and oriented, NAD  Dressing C/D/I: prevena holding suction/ace/splint in place   Sensation: decreased LLE   Motor: EHL/FHL/TA/GS not firing LLE         A/P: 26yFemale POD#2 s/p ex-fix removal, abx spacer explant, ORIF of L fibula, flexor tendon tenotomies 1st-4th toes and complex closure by plastics with prevena placement and x1 .   - K 3.2 today; replete with 40meq x2 today, recheck AM labs   - Pain Control- appreciate pain management recs   - DVT ppx: ASA 325mg BID   - PT, WBS: NWB LLE  - OR cultures NGTD  - d/c [facility]   - management of prevena and drain per plastics, appreciate recs   - continue dapto/capsofungin/ceftriaxone    Ortho Pager [phone] Ortho Note    Surgery: 26F POD #2 s/p left ankle ex-fix removal, antibiotic spacer explant, Open treatment left fibula malunion nonunion, open reduction internal fixation syndesmosis, Proximal tibia autograft harvest flexor tendon tenotomies 1st-4th toes with complex plastics closure       Patient seen and examined at bedside this AM   Endorsing LLE pain, medications providing short term relief   Denies CP, SOB, N/V, numbness/tingling     Vital Signs Last 24 Hrs  T(C): 36.7 (01-20-22 @ 12:55), Max: 36.8 (01-20-22 @ 09:06)  T(F): 98.1 (01-20-22 @ 12:55), Max: 98.3 (01-20-22 @ 09:06)  HR: 85 (01-20-22 @ 12:55) (78 - 85)  BP: 110/71 (01-20-22 @ 12:55) (99/65 - 110/71)  BP(mean): --  RR: 17 (01-20-22 @ 12:55) (16 - 17)  SpO2: 96% (01-20-22 @ 12:55) (95% - 96%)  AVSS    General: Pt Alert and oriented, NAD  Dressing C/D/I: prevena holding suction/ace/splint in place   Sensation: decreased LLE   Motor: EHL/TA/GS not firing LLE         26F POD #2 s/p left ankle ex-fix removal, antibiotic spacer explant, Open treatment left fibula malunion nonunion, open reduction internal fixation syndesmosis, Proximal tibia autograft harvest flexor tendon tenotomies 1st-4th toes with complex plastics closure     - K 3.2 today; replete with 40meq x2 today, recheck AM labs   - Pain Control- appreciate pain management recs   - DVT ppx: ASA 325mg BID   - PT, WBS: NWB LLE  - OR cultures NGTD  - d/c [facility]   - management of prevena and drain per plastics, appreciate recs   - continue dapto/capsofungin/ceftriaxone    Ortho Pager [phone]

## 2022-01-20 NOTE — CONSULT NOTE ADULT - PROBLEM SELECTOR RECOMMENDATION 9
s/p L ankle ex-fix removal, antibiotic spacer explant, L fibula ORIF, flexor tendon tenotomies 1st-4th toes with complex plastics closure; cont. post-op mgmt per Ortho and Plastics, abx per ID, analgesic regimen per Pain Mgmt

## 2022-01-20 NOTE — PROGRESS NOTE ADULT - SUBJECTIVE AND OBJECTIVE BOX
INTERVAL HPI/OVERNIGHT EVENTS:  Patient was seen and examined at bedside. As per nurse and patient, no o/n events, patient resting comfortably. Complaining of well controlled foot pain. Patient denies: fever, chills, lightheadedness, weakness, CP, palpitations, SOB, cough, N/V. ROS otherwise negative.    VITAL SIGNS:  T(F): 98 (01-20-22 @ 16:51)  HR: 79 (01-20-22 @ 16:51)  BP: 99/66 (01-20-22 @ 16:51)  RR: 16 (01-20-22 @ 16:51)  SpO2: 96% (01-20-22 @ 16:51)  Wt(kg): --      01-19-22 @ 07:01  -  01-20-22 @ 07:00  --------------------------------------------------------  IN: 2915 mL / OUT: 4465 mL / NET: -1550 mL    01-20-22 @ 07:01  -  01-20-22 @ 19:40  --------------------------------------------------------  IN: 220 mL / OUT: 1500 mL / NET: -1280 mL        PHYSICAL EXAM:    Constitutional: resting comfortably in bed; NAD  HEENT: NC/AT, PER, anicteric sclera, no nasal discharge; MMM  Respiratory: CTA B/L; no W/R/R, no retractions  Cardiac: +S1/S2; RRR; no M/R/G  Gastrointestinal: soft, NT/ND; no rebound or guarding  Extremities: WWP, no clubbing or cyanosis; no peripheral edema; L leg wrapped with erythematous marks on thigh from prior graft  Vascular: 2+ radial, DP/PT pulses B/L  Dermatologic: skin warm, dry and intact; no rashes, wounds, or scars  Neurologic: AAOx3; CNII-XII grossly intact; no focal deficits  Psychiatric: affect and characteristics of appearance, verbalizations, behaviors are appropriate    MEDICATIONS  (STANDING):  acetaminophen     Tablet .. 975 milliGRAM(s) Oral every 8 hours  AQUAPHOR (petrolatum Ointment) 1 Application(s) Topical daily  aspirin enteric coated 325 milliGRAM(s) Oral two times a day  caspofungin IVPB 50 milliGRAM(s) IV Intermittent every 24 hours  cefTRIAXone   IVPB 2000 milliGRAM(s) IV Intermittent every 24 hours  DAPTOmycin IVPB 500 milliGRAM(s) IV Intermittent every 24 hours  lactated ringers. 1000 milliLiter(s) (150 mL/Hr) IV Continuous <Continuous>  oxyCODONE  ER Tablet 10 milliGRAM(s) Oral every 12 hours  pantoprazole    Tablet 40 milliGRAM(s) Oral before breakfast  polyethylene glycol 3350 17 Gram(s) Oral at bedtime  potassium chloride    Tablet ER 40 milliEquivalent(s) Oral every 4 hours  pregabalin 25 milliGRAM(s) Oral three times a day  senna 2 Tablet(s) Oral at bedtime    MEDICATIONS  (PRN):  aluminum hydroxide/magnesium hydroxide/simethicone Suspension 30 milliLiter(s) Oral four times a day PRN Indigestion  bisacodyl Suppository 10 milliGRAM(s) Rectal once PRN Constipation  cyclobenzaprine 5 milliGRAM(s) Oral three times a day PRN Muscle Spasm  HYDROmorphone   Tablet 2 milliGRAM(s) Oral every 4 hours PRN Moderate Pain (4 - 6)  HYDROmorphone   Tablet 4 milliGRAM(s) Oral every 4 hours PRN Severe Pain (7 - 10)  HYDROmorphone  Injectable 0.5 milliGRAM(s) IV Push every 4 hours PRN Breakthrough Pain  magnesium hydroxide Suspension 30 milliLiter(s) Oral daily PRN Constipation  ondansetron Injectable 4 milliGRAM(s) IV Push every 6 hours PRN Nausea and/or Vomiting      Allergies    No Known Allergies    Intolerances        LABS:                        8.5    5.18  )-----------( 223      ( 20 Jan 2022 06:45 )             25.3     01-20    140  |  105  |  6<L>  ----------------------------<  95  3.2<L>   |  26  |  0.70    Ca    8.0<L>      20 Jan 2022 06:45            RADIOLOGY & ADDITIONAL TESTS:  Reviewed

## 2022-01-20 NOTE — PROGRESS NOTE ADULT - SUBJECTIVE AND OBJECTIVE BOX
Pain Management Progress Note - [facility] [phone]    HPI: Patient seen and examined today. Patient reports still endorsing significant pain to the left ankle today. Patient was started on Oxycontin 10 mg PO BID and increased to Oxycodone 5-10mg PO q3h PRN mod-severe pain yesterday (1/20), patient reports this regimen is helping her pain but still with significant pain. Patient reports pain at the incisions. Patient reports Oxycodone works but does not last long enough. Patient denies side effects from current pain regimen.    Pertinent PMH: Pain at: ___Back ___Neck___Knee ___Hip ___Shoulder ___ Opioid tolerance    Pain is __X_ sharp ____dull ___burning ___achy ___ Intensity: ____ mild ____mod _X___severe     Location __X___surgical site _____cervical _____lumbar ____abd _____upper ext__X__lower ext    Worse with __X__activity _X___movement _____physical therapy___ Rest    Improved with ___X_medication __X__rest ____physical therapy    PMH:  Left tibial neuropathy  PAD (peripheral artery disease)  Status post ORIF of fracture of ankle    Medications:  pregabalin  HYDROmorphone   Tablet  HYDROmorphone   Tablet  AQUAPHOR (petrolatum Ointment)  cyclobenzaprine  oxyCODONE    IR  oxyCODONE    IR  oxyCODONE  ER Tablet  HYDROmorphone  Injectable  cefTRIAXone   IVPB  caspofungin IVPB  DAPTOmycin IVPB  HYDROmorphone  Injectable  acetaminophen     Tablet ..  acetaminophen   IVPB ..  HYDROmorphone  Injectable  aspirin enteric coated  polyethylene glycol 3350  senna  magnesium hydroxide Suspension  bisacodyl Suppository  pantoprazole    Tablet  ondansetron Injectable  aluminum hydroxide/magnesium hydroxide/simethicone Suspension  ceFAZolin   IVPB  HYDROmorphone  Injectable  oxyCODONE    IR  oxyCODONE    IR  acetaminophen     Tablet ..  lactated ringers.  chlorhexidine 2% Cloths  povidone iodine 5% Nasal Swab    ROS: Const:  __N_febrile   Eyes:___ENT:___CV: _N__chest pain  Resp: _N___sob  GI:_N__nausea _N__vomiting _N___abd pain ___npo ___clears __Y_full diet __bm  :___ Musk: Y___pain ___spasm  Skin:___ Neuro:  _N__sedation__N_confusion__N__ numbness __N_weakness _N__paresthesia  Psych:___anxiety  Endo:___ Heme:___Allergy:___NKDA      01-20 @ 06:83557 mL/min/1.73M2  Hemoglobin: 8.5 g/dL (01-20 @ 06:45)  Hemoglobin: 8.5 g/dL (01-19 @ 05:24)    T(C): 36.8 (01-20-22 @ 09:06), Max: 37.1 (01-20-22 @ 04:56)  HR: 78 (01-20-22 @ 09:06) (78 - 100)  BP: 99/65 (01-20-22 @ 09:06) (97/64 - 111/71)  RR: 16 (01-20-22 @ 09:06) (16 - 18)  SpO2: 95% (01-20-22 @ 09:06) (94% - 95%)  Wt(kg): --     PHYSICAL EXAM:  Gen Appearance: __X_no acute distress __X_appropriate       Neuro: ___SILT feet____ EOM Intact Psych: AAOX_3_, _X__mood/affect appropriate        Eyes: __X_conjunctiva WNL  ____X_ Pupils equal and round        ENT: __X_ears and nose atraumatic_X__ Hearing grossly intact        Neck: _X__trachea midline, no visible masses ___thyroid without palpable mass    Resp: __X_Nml WOB____No tactile fremitus ___clear to auscultation    Cardio: _X__extremities free from edema ____pedal pulses palpable    GI/Abdomen: ___soft _____ Nontender___X___Nondistended_____HSM    Lymphatic: ___no palpable nodes in neck  ___no palpable nodes calves and feet    Skin/Wound: ___Incision, _X__Dressing c/d/i,   ____surrounding tissues soft,  __X_drain/chest tube present____    Muscular: EHL ___/5  Gastrocnemius___/5    __X_absent clubbing/cyanosis         ASSESSMENT:  This is a 26y old Female with a history of PAD POD 2 s/p exfix removal, antibiotic spacer explant, ORIF of left fibula, flexor tendon tenotomies 1st - 4th toes and complex plastic closure with reports of left ankle pain.    Recommended Treatment PLAN:  1. D/C Oxycodone 5-10 mg PO q3h PRN moderate-severe pain  2. Start Dilaudid 2-4 mg PO q4h PRN breakthrough pain  3. Continue Dilaudid 0.5 mg IVP q4h PRN breakthrough pain  4. Continue Oxycodone ER 10 mg PO BID  5. Continue Tylenol 975 mg PO TID  6. Continue Flexeril 5 mg PO q8h PRN muscle spasm  7. Start Lyrica 25 mg PO TID  Plan discussed with Dr. Rene, pt seen and examined by Dr. Rene at PM rounds     [facility] [phone]    HPI: Patient seen and examined today. Patient reports still endorsing significant pain to the left ankle today, rated a 9 out of 10. Patient was started on Oxycontin 10 mg PO BID and increased to Oxycodone 5-10mg PO q3h PRN mod-severe pain yesterday (1/20), patient reports this regimen is helping her pain but still with significant pain. Patient reports pain at the incisions. Patient reports Oxycodone works but does not last long enough. Patient denies side effects from current pain regimen.    Pertinent PMH: Pain at: ___Back ___Neck___Knee ___Hip ___Shoulder ___ Opioid tolerance    Pain is __X_ sharp ____dull ___burning ___achy ___ Intensity: ____ mild ____mod _X___severe     Location __X___surgical site _____cervical _____lumbar ____abd _____upper ext__X__lower ext    Worse with __X__activity _X___movement _____physical therapy___ Rest    Improved with ___X_medication __X__rest ____physical therapy    PMH:  Left tibial neuropathy  PAD (peripheral artery disease)  Status post ORIF of fracture of ankle    Medications:  pregabalin  HYDROmorphone   Tablet  HYDROmorphone   Tablet  AQUAPHOR (petrolatum Ointment)  cyclobenzaprine  oxyCODONE    IR  oxyCODONE    IR  oxyCODONE  ER Tablet  HYDROmorphone  Injectable  cefTRIAXone   IVPB  caspofungin IVPB  DAPTOmycin IVPB  HYDROmorphone  Injectable  acetaminophen     Tablet ..  acetaminophen   IVPB ..  HYDROmorphone  Injectable  aspirin enteric coated  polyethylene glycol 3350  senna  magnesium hydroxide Suspension  bisacodyl Suppository  pantoprazole    Tablet  ondansetron Injectable  aluminum hydroxide/magnesium hydroxide/simethicone Suspension  ceFAZolin   IVPB  HYDROmorphone  Injectable  oxyCODONE    IR  oxyCODONE    IR  acetaminophen     Tablet ..  lactated ringers.  chlorhexidine 2% Cloths  povidone iodine 5% Nasal Swab    ROS: Const:  __N_febrile   Eyes:___ENT:___CV: _N__chest pain  Resp: _N___sob  GI:_N__nausea _N__vomiting _N___abd pain ___npo ___clears __Y_full diet __bm  :___ Musk: Y___pain ___spasm  Skin:___ Neuro:  _N__sedation__N_confusion__N__ numbness __N_weakness _N__paresthesia  Psych:___anxiety  Endo:___ Heme:___Allergy:___NKDA      01-20 @ 06:70398 mL/min/1.73M2  Hemoglobin: 8.5 g/dL (01-20 @ 06:45)  Hemoglobin: 8.5 g/dL (01-19 @ 05:24)    T(C): 36.8 (01-20-22 @ 09:06), Max: 37.1 (01-20-22 @ 04:56)  HR: 78 (01-20-22 @ 09:06) (78 - 100)  BP: 99/65 (01-20-22 @ 09:06) (97/64 - 111/71)  RR: 16 (01-20-22 @ 09:06) (16 - 18)  SpO2: 95% (01-20-22 @ 09:06) (94% - 95%)  Wt(kg): --     PHYSICAL EXAM:  Gen Appearance: __X_no acute distress __X_appropriate       Neuro: ___SILT feet____ EOM Intact Psych: AAOX_3_, _X__mood/affect appropriate        Eyes: __X_conjunctiva WNL  ____X_ Pupils equal and round        ENT: __X_ears and nose atraumatic_X__ Hearing grossly intact        Neck: _X__trachea midline, no visible masses ___thyroid without palpable mass    Resp: __X_Nml WOB____No tactile fremitus ___clear to auscultation    Cardio: _X__extremities free from edema ____pedal pulses palpable    GI/Abdomen: ___soft _____ Nontender___X___Nondistended_____HSM    Lymphatic: ___no palpable nodes in neck  ___no palpable nodes calves and feet    Skin/Wound: ___Incision, _X__Dressing c/d/i,   ____surrounding tissues soft,  __X_drain/chest tube present____    Muscular: EHL ___/5  Gastrocnemius___/5    __X_absent clubbing/cyanosis         ASSESSMENT:  This is a 26y old Female with a history of PAD POD 2 s/p exfix removal, antibiotic spacer explant, ORIF of left fibula, flexor tendon tenotomies 1st - 4th toes and complex plastic closure with reports of left ankle pain.    Recommended Treatment PLAN:  1. D/C Oxycodone 5-10 mg PO q3h PRN moderate-severe pain  2. Start Dilaudid 2-4 mg PO q4h PRN breakthrough pain  3. Continue Dilaudid 0.5 mg IVP q4h PRN breakthrough pain  4. Continue Oxycodone ER 10 mg PO BID  5. Continue Tylenol 975 mg PO TID  6. Continue Flexeril 5 mg PO q8h PRN muscle spasm  7. Start Lyrica 25 mg PO TID  Plan discussed with Dr. Rene, pt seen and examined by Dr. Rene at PM rounds     [facility] [phone]    HPI: Patient seen and examined today. Patient reports still endorsing significant pain to the left ankle today, rated a 9 out of 10. Patient was started on Oxycontin 10 mg PO BID and increased to Oxycodone 5-10mg PO q3h PRN mod-severe pain yesterday (1/20), patient reports this regimen is helping her pain but still with significant pain. Patient reports pain at the incisions. Patient reports Oxycodone works but does not last long enough. Patient denies side effects from current pain regimen.    Pertinent PMH: Pain at: ___Back ___Neck___Knee ___Hip ___Shoulder ___ Opioid tolerance    Pain is __X_ sharp ____dull ___burning ___achy ___ Intensity: ____ mild ____mod _X___severe     Location __X___surgical site _____cervical _____lumbar ____abd _____upper ext__X__lower ext    Worse with __X__activity _X___movement _____physical therapy___ Rest    Improved with ___X_medication __X__rest ____physical therapy    PMH:  Left tibial neuropathy  PAD (peripheral artery disease)  Status post ORIF of fracture of ankle    Medications:  pregabalin  HYDROmorphone   Tablet  HYDROmorphone   Tablet  AQUAPHOR (petrolatum Ointment)  cyclobenzaprine  oxyCODONE    IR  oxyCODONE    IR  oxyCODONE  ER Tablet  HYDROmorphone  Injectable  cefTRIAXone   IVPB  caspofungin IVPB  DAPTOmycin IVPB  HYDROmorphone  Injectable  acetaminophen     Tablet ..  acetaminophen   IVPB ..  HYDROmorphone  Injectable  aspirin enteric coated  polyethylene glycol 3350  senna  magnesium hydroxide Suspension  bisacodyl Suppository  pantoprazole    Tablet  ondansetron Injectable  aluminum hydroxide/magnesium hydroxide/simethicone Suspension  ceFAZolin   IVPB  HYDROmorphone  Injectable  oxyCODONE    IR  oxyCODONE    IR  acetaminophen     Tablet ..  lactated ringers.  chlorhexidine 2% Cloths  povidone iodine 5% Nasal Swab    ROS: Const:  __N_febrile   Eyes:___ENT:___CV: _N__chest pain  Resp: _N___sob  GI:_N__nausea _N__vomiting _N___abd pain ___npo ___clears __Y_full diet __bm  :___ Musk: Y___pain ___spasm  Skin:___ Neuro:  _N__sedation__N_confusion__N__ numbness __N_weakness _N__paresthesia  Psych:___anxiety  Endo:___ Heme:___Allergy:___NKDA      01-20 @ 06:43529 mL/min/1.73M2  Hemoglobin: 8.5 g/dL (01-20 @ 06:45)  Hemoglobin: 8.5 g/dL (01-19 @ 05:24)    T(C): 36.8 (01-20-22 @ 09:06), Max: 37.1 (01-20-22 @ 04:56)  HR: 78 (01-20-22 @ 09:06) (78 - 100)  BP: 99/65 (01-20-22 @ 09:06) (97/64 - 111/71)  RR: 16 (01-20-22 @ 09:06) (16 - 18)  SpO2: 95% (01-20-22 @ 09:06) (94% - 95%)  Wt(kg): --     PHYSICAL EXAM:  Gen Appearance: __X_no acute distress __X_appropriate       Neuro: ___SILT feet____ EOM Intact Psych: AAOX_3_, _X__mood/affect appropriate        Eyes: __X_conjunctiva WNL  ____X_ Pupils equal and round        ENT: __X_ears and nose atraumatic_X__ Hearing grossly intact        Neck: _X__trachea midline, no visible masses ___thyroid without palpable mass    Resp: __X_Nml WOB____No tactile fremitus ___clear to auscultation    Cardio: _X__extremities free from edema ____pedal pulses palpable    GI/Abdomen: ___soft _____ Nontender___X___Nondistended_____HSM    Lymphatic: ___no palpable nodes in neck  ___no palpable nodes calves and feet    Skin/Wound: ___Incision, _X__Dressing c/d/i,   ____surrounding tissues soft,  __X_drain/chest tube present____    Muscular: EHL ___/5  Gastrocnemius___/5    __X_absent clubbing/cyanosis

## 2022-01-20 NOTE — PROGRESS NOTE ADULT - SUBJECTIVE AND OBJECTIVE BOX
Plastic Surgery Note    Pt seen and examined on morning rounds. Pain mgmt consulted yesterday for uncontrolled pain and increased frequency of oxycodones as well as adding oxycodone ER and flexeril. Pt still endorsing pain this AM. Possibly considering adding gabapentin for neuroma-related pain.  Denies CP, SOB, N/V, numbness/tingling     Vital Signs Last 24 Hrs  T(C): 37.1 (01-20-22 @ 04:56), Max: 37.1 (01-20-22 @ 04:56)  T(F): 98.8 (01-20-22 @ 04:56), Max: 98.8 (01-20-22 @ 04:56)  HR: 100 (01-20-22 @ 04:56) (100 - 100)  BP: 110/72 (01-20-22 @ 04:56) (110/72 - 110/72)  BP(mean): --  RR: 17 (01-20-22 @ 04:56) (17 - 17)  SpO2: 94% (01-20-22 @ 04:56) (94% - 94%)  I&O's Summary    19 Jan 2022 07:01  -  20 Jan 2022 07:00  --------------------------------------------------------  IN: 2915 mL / OUT: 4465 mL / NET: -1550 mL        Physical Exam:  General: Pt Alert and oriented, NAD  DSG C/D/I, prevena holding suction  Pulses: toes wwp, cap refill <3 seconds  firing                          8.5    5.18  )-----------( 223      ( 20 Jan 2022 06:45 )             25.3     01-20    140  |  105  |  6<L>  ----------------------------<  95  3.2<L>   |  26  |  0.70    Ca    8.0<L>      20 Jan 2022 06:45        A/P: 26yFemale POD#2 s/p ex-fix removal, abx spacer explant, ORIF of L fibula, flexor tendon tenotomies 2nd-4th toes, flap elevation and complex wound closure by Dr. Dickens with prevena placement and x1 .   - VSS  - ID following: Daptomycin 500 mg IV daily.  Check serum CK daily for now, Ceftriaxone 2 grams IV daily, Caspofungin 50 mg IV daily   - ensure prevena is on and holding suction, was off for an unknown amount of time last night.  - Pain MGMT following  - continue to monitor drain outputs  - DVT ppx: ASA, SCD  - PT, WBS: NWGERRY Clements, PGY-1

## 2022-01-20 NOTE — PROGRESS NOTE ADULT - SUBJECTIVE AND OBJECTIVE BOX
Ortho Note    Pt examined at bedside, in pain this AM, rates LLE pain as 7/10 in pain. She was about to receive another dose of oxycodone as I was examining her.   Denies CP, SOB, N/V, numbness/tingling     Vital Signs Last 24 Hrs  T(C): 37.1 (01-20-22 @ 04:56), Max: 37.1 (01-20-22 @ 04:56)  T(F): 98.8 (01-20-22 @ 04:56), Max: 98.8 (01-20-22 @ 04:56)  HR: 100 (01-20-22 @ 04:56) (100 - 100)  BP: 110/72 (01-20-22 @ 04:56) (110/72 - 110/72)  BP(mean): --  RR: 17 (01-20-22 @ 04:56) (17 - 17)  SpO2: 94% (01-20-22 @ 04:56) (94% - 94%)  AVSS    General: Pt Alert and oriented, NAD  LLE wrapped in ACE from below knee to toe,  x 1 and Prevena x 1 holding suction; prevena has no output while  output is ~ 5-10 ml SS fluid  Pulses: unable to assess LLE pulse due to dsg  Sensation: no sensation in L 1st - 3rd toe, minimal sensation in 4th toe, + SILT in 5th toe (consistent w/ baseline)   Motor: able to wiggle 4th and 5th toe, unable to wiggle 1st-erd L toe     A/P: 26yFemale s/p by    - Stable  - Pain Control, will d/w with Dr Wallis and pain mgmt if need to alter pain regimen   - c/w ID recs (dapto, ctx, caspo)   - f/u daily CK am labs   - DVT ppx: asa 325  - PT, WBS: NWB LLE   - nausea control/bowel regimen  - c/w home meds  - Drains:  x 1 and prevena x 1   - DVT ppx: SCDs  - Diet  - Dispo planning: pending clinical course         Ortho Pager [phone] Ortho Note    Pt examined at bedside, in pain this AM, rates LLE pain as 7/10 in pain. She was about to receive another dose of oxycodone as I was examining her.   Denies CP, SOB, N/V, numbness/tingling     Vital Signs Last 24 Hrs  T(C): 37.1 (01-20-22 @ 04:56), Max: 37.1 (01-20-22 @ 04:56)  T(F): 98.8 (01-20-22 @ 04:56), Max: 98.8 (01-20-22 @ 04:56)  HR: 100 (01-20-22 @ 04:56) (100 - 100)  BP: 110/72 (01-20-22 @ 04:56) (110/72 - 110/72)  BP(mean): --  RR: 17 (01-20-22 @ 04:56) (17 - 17)  SpO2: 94% (01-20-22 @ 04:56) (94% - 94%)  AVSS    General: Pt Alert and oriented, NAD  LLE wrapped in ACE from below knee to toe,  x 1 and Prevena x 1 holding suction; prevena has no output while  output is ~ 5-10 ml SS fluid  Pulses: unable to assess LLE pulse due to dsg  Sensation: no sensation in L 1st - 3rd toe, minimal sensation in 4th toe, + SILT in 5th toe (consistent w/ baseline)   Motor: able to wiggle 4th and 5th toe, unable to wiggle 1st-erd L toe     A/P: 26yFemale POD#2 s/p ex-fix removal, abx spacer explant, ORIF of L fibula, flexor tendon tenotomies 1st-4th toes and complex closure by plastics with prevena placement and x1 .   - Stable  - Pain Control, will d/w with Dr Wallis and pain mgmt if need to alter pain regimen   - c/w ID recs (dapto, ctx, caspo)   - f/u daily CK am labs   - DVT ppx: asa 325  - PT, WBS: NWB LLE   - nausea control/bowel regimen  - c/w home meds  - Drains:  x 1 and prevena x 1   - DVT ppx: SCDs  - Diet  - Dispo planning: pending clinical course         Ortho Pager [phone]

## 2022-01-21 DIAGNOSIS — T81.40XA INFECTION FOLLOWING A PROCEDURE, UNSPECIFIED, INITIAL ENCOUNTER: ICD-10-CM

## 2022-01-21 DIAGNOSIS — D62 ACUTE POSTHEMORRHAGIC ANEMIA: ICD-10-CM

## 2022-01-21 LAB
ANION GAP SERPL CALC-SCNC: 10 MMOL/L — SIGNIFICANT CHANGE UP (ref 5–17)
CALCIUM SERPL-MCNC: 8.6 MG/DL — SIGNIFICANT CHANGE UP (ref 8.4–10.5)
CHLORIDE SERPL-SCNC: 104 MMOL/L — SIGNIFICANT CHANGE UP (ref 96–108)
CO2 SERPL-SCNC: 24 MMOL/L — SIGNIFICANT CHANGE UP (ref 22–31)
CREAT SERPL-MCNC: 0.63 MG/DL — SIGNIFICANT CHANGE UP (ref 0.5–1.3)
GLUCOSE SERPL-MCNC: 90 MG/DL — SIGNIFICANT CHANGE UP (ref 70–99)
HCT VFR BLD CALC: 25.9 % — LOW (ref 34.5–45)
HGB BLD-MCNC: 8.8 G/DL — LOW (ref 11.5–15.5)
MAGNESIUM SERPL-MCNC: 1.7 MG/DL — SIGNIFICANT CHANGE UP (ref 1.6–2.6)
MCHC RBC-ENTMCNC: 29.6 PG — SIGNIFICANT CHANGE UP (ref 27–34)
MCHC RBC-ENTMCNC: 34 GM/DL — SIGNIFICANT CHANGE UP (ref 32–36)
MCV RBC AUTO: 87.2 FL — SIGNIFICANT CHANGE UP (ref 80–100)
NRBC # BLD: 0 /100 WBCS — SIGNIFICANT CHANGE UP (ref 0–0)
PLATELET # BLD AUTO: 249 K/UL — SIGNIFICANT CHANGE UP (ref 150–400)
RBC # BLD: 2.97 M/UL — LOW (ref 3.8–5.2)
RBC # FLD: 13 % — SIGNIFICANT CHANGE UP (ref 10.3–14.5)
SODIUM SERPL-SCNC: 138 MMOL/L — SIGNIFICANT CHANGE UP (ref 135–145)
WBC # BLD: 6.04 K/UL — SIGNIFICANT CHANGE UP (ref 3.8–10.5)
WBC # FLD AUTO: 6.04 K/UL — SIGNIFICANT CHANGE UP (ref 3.8–10.5)

## 2022-01-21 PROCEDURE — 99253 IP/OBS CNSLTJ NEW/EST LOW 45: CPT

## 2022-01-21 PROCEDURE — 99232 SBSQ HOSP IP/OBS MODERATE 35: CPT | Mod: GC

## 2022-01-21 RX ORDER — POTASSIUM CHLORIDE 20 MEQ
40 PACKET (EA) ORAL EVERY 4 HOURS
Refills: 0 | Status: COMPLETED | OUTPATIENT
Start: 2022-01-21 | End: 2022-01-21

## 2022-01-21 RX ORDER — MAGNESIUM SULFATE 500 MG/ML
2 VIAL (ML) INJECTION ONCE
Refills: 0 | Status: COMPLETED | OUTPATIENT
Start: 2022-01-21 | End: 2022-01-21

## 2022-01-21 RX ADMIN — HYDROMORPHONE HYDROCHLORIDE 0.5 MILLIGRAM(S): 2 INJECTION INTRAMUSCULAR; INTRAVENOUS; SUBCUTANEOUS at 18:00

## 2022-01-21 RX ADMIN — CASPOFUNGIN ACETATE 260 MILLIGRAM(S): 7 INJECTION, POWDER, LYOPHILIZED, FOR SOLUTION INTRAVENOUS at 18:25

## 2022-01-21 RX ADMIN — Medication 975 MILLIGRAM(S): at 14:30

## 2022-01-21 RX ADMIN — HYDROMORPHONE HYDROCHLORIDE 0.5 MILLIGRAM(S): 2 INJECTION INTRAMUSCULAR; INTRAVENOUS; SUBCUTANEOUS at 04:45

## 2022-01-21 RX ADMIN — HYDROMORPHONE HYDROCHLORIDE 4 MILLIGRAM(S): 2 INJECTION INTRAMUSCULAR; INTRAVENOUS; SUBCUTANEOUS at 07:53

## 2022-01-21 RX ADMIN — DAPTOMYCIN 120 MILLIGRAM(S): 500 INJECTION, POWDER, LYOPHILIZED, FOR SOLUTION INTRAVENOUS at 00:24

## 2022-01-21 RX ADMIN — OXYCODONE HYDROCHLORIDE 10 MILLIGRAM(S): 5 TABLET ORAL at 07:00

## 2022-01-21 RX ADMIN — HYDROMORPHONE HYDROCHLORIDE 4 MILLIGRAM(S): 2 INJECTION INTRAMUSCULAR; INTRAVENOUS; SUBCUTANEOUS at 14:30

## 2022-01-21 RX ADMIN — Medication 325 MILLIGRAM(S): at 06:00

## 2022-01-21 RX ADMIN — CEFTRIAXONE 100 MILLIGRAM(S): 500 INJECTION, POWDER, FOR SOLUTION INTRAMUSCULAR; INTRAVENOUS at 01:24

## 2022-01-21 RX ADMIN — Medication 975 MILLIGRAM(S): at 06:00

## 2022-01-21 RX ADMIN — HYDROMORPHONE HYDROCHLORIDE 0.5 MILLIGRAM(S): 2 INJECTION INTRAMUSCULAR; INTRAVENOUS; SUBCUTANEOUS at 09:40

## 2022-01-21 RX ADMIN — PANTOPRAZOLE SODIUM 40 MILLIGRAM(S): 20 TABLET, DELAYED RELEASE ORAL at 06:01

## 2022-01-21 RX ADMIN — HYDROMORPHONE HYDROCHLORIDE 4 MILLIGRAM(S): 2 INJECTION INTRAMUSCULAR; INTRAVENOUS; SUBCUTANEOUS at 02:03

## 2022-01-21 RX ADMIN — HYDROMORPHONE HYDROCHLORIDE 4 MILLIGRAM(S): 2 INJECTION INTRAMUSCULAR; INTRAVENOUS; SUBCUTANEOUS at 08:28

## 2022-01-21 RX ADMIN — DAPTOMYCIN 120 MILLIGRAM(S): 500 INJECTION, POWDER, LYOPHILIZED, FOR SOLUTION INTRAVENOUS at 23:48

## 2022-01-21 RX ADMIN — HYDROMORPHONE HYDROCHLORIDE 0.5 MILLIGRAM(S): 2 INJECTION INTRAMUSCULAR; INTRAVENOUS; SUBCUTANEOUS at 22:57

## 2022-01-21 RX ADMIN — OXYCODONE HYDROCHLORIDE 10 MILLIGRAM(S): 5 TABLET ORAL at 19:21

## 2022-01-21 RX ADMIN — HYDROMORPHONE HYDROCHLORIDE 0.5 MILLIGRAM(S): 2 INJECTION INTRAMUSCULAR; INTRAVENOUS; SUBCUTANEOUS at 04:16

## 2022-01-21 RX ADMIN — Medication 975 MILLIGRAM(S): at 15:30

## 2022-01-21 RX ADMIN — Medication 40 MILLIEQUIVALENT(S): at 18:23

## 2022-01-21 RX ADMIN — Medication 975 MILLIGRAM(S): at 07:00

## 2022-01-21 RX ADMIN — SENNA PLUS 2 TABLET(S): 8.6 TABLET ORAL at 21:14

## 2022-01-21 RX ADMIN — Medication 975 MILLIGRAM(S): at 23:21

## 2022-01-21 RX ADMIN — HYDROMORPHONE HYDROCHLORIDE 0.5 MILLIGRAM(S): 2 INJECTION INTRAMUSCULAR; INTRAVENOUS; SUBCUTANEOUS at 18:15

## 2022-01-21 RX ADMIN — HYDROMORPHONE HYDROCHLORIDE 4 MILLIGRAM(S): 2 INJECTION INTRAMUSCULAR; INTRAVENOUS; SUBCUTANEOUS at 15:40

## 2022-01-21 RX ADMIN — Medication 25 MILLIGRAM(S): at 06:00

## 2022-01-21 RX ADMIN — HYDROMORPHONE HYDROCHLORIDE 0.5 MILLIGRAM(S): 2 INJECTION INTRAMUSCULAR; INTRAVENOUS; SUBCUTANEOUS at 00:36

## 2022-01-21 RX ADMIN — POLYETHYLENE GLYCOL 3350 17 GRAM(S): 17 POWDER, FOR SOLUTION ORAL at 21:15

## 2022-01-21 RX ADMIN — OXYCODONE HYDROCHLORIDE 10 MILLIGRAM(S): 5 TABLET ORAL at 06:01

## 2022-01-21 RX ADMIN — Medication 25 MILLIGRAM(S): at 21:14

## 2022-01-21 RX ADMIN — Medication 25 GRAM(S): at 15:10

## 2022-01-21 RX ADMIN — Medication 25 MILLIGRAM(S): at 14:30

## 2022-01-21 RX ADMIN — Medication 325 MILLIGRAM(S): at 18:23

## 2022-01-21 RX ADMIN — OXYCODONE HYDROCHLORIDE 10 MILLIGRAM(S): 5 TABLET ORAL at 20:21

## 2022-01-21 RX ADMIN — HYDROMORPHONE HYDROCHLORIDE 0.5 MILLIGRAM(S): 2 INJECTION INTRAMUSCULAR; INTRAVENOUS; SUBCUTANEOUS at 09:25

## 2022-01-21 RX ADMIN — Medication 1 APPLICATION(S): at 13:13

## 2022-01-21 RX ADMIN — Medication 975 MILLIGRAM(S): at 22:21

## 2022-01-21 RX ADMIN — HYDROMORPHONE HYDROCHLORIDE 0.5 MILLIGRAM(S): 2 INJECTION INTRAMUSCULAR; INTRAVENOUS; SUBCUTANEOUS at 22:27

## 2022-01-21 RX ADMIN — Medication 40 MILLIEQUIVALENT(S): at 14:30

## 2022-01-21 NOTE — PROGRESS NOTE ADULT - PROBLEM SELECTOR PLAN 2
POD#2 s/p ex-fix removal, abx spacer explant, ORIF of L fibula, flexor tendon tenotomies 1st-4th toes and complex closure by plastics with prevena placement and x1   follow ortho recs

## 2022-01-21 NOTE — PROGRESS NOTE ADULT - SUBJECTIVE AND OBJECTIVE BOX
Pt was seen this morning in her bed. She reports improvement in pain control with the addition of pregabalin.  Denies CP, SOB, N/V, numbness/tingling. surgical site is dressed,    Vital Signs Last 24 Hrs  T(C): 36.9 (01-21-22 @ 05:00), Max: 36.9 (01-21-22 @ 05:00)  T(F): 98.4 (01-21-22 @ 05:00), Max: 98.4 (01-21-22 @ 05:00)  HR: 81 (01-21-22 @ 05:00) (81 - 81)  BP: 103/68 (01-21-22 @ 05:00) (103/68 - 103/68)  BP(mean): 79 (01-21-22 @ 05:00) (79 - 79)  RR: 17 (01-21-22 @ 05:00) (17 - 17)  SpO2: 95% (01-21-22 @ 05:00) (95% - 95%)  I&O's Summary    20 Jan 2022 07:01  -  21 Jan 2022 07:00  --------------------------------------------------------  IN: 2760 mL / OUT: 2407.5 mL / NET: 352.5 mL      Physical Exam:  General: Pt Alert and oriented, NAD  DSG C/D/I, prevena holding suction  Pulses: toes wwp, cap refill <3 seconds  firing                          8.8    6.04  )-----------( 249      ( 21 Jan 2022 06:28 )             25.9     01-21    138  |  104  |  6<L>  ----------------------------<  90  3.3<L>   |  24  |  0.63    Ca    8.6      21 Jan 2022 05:54

## 2022-01-21 NOTE — DIETITIAN INITIAL EVALUATION ADULT. - ORAL NUTRITION SUPPLEMENTS
Pt declined ONS at this time since appetite intact. However, if po intake decreases (<50% of meals), then agreeable to add ONS

## 2022-01-21 NOTE — DIETITIAN INITIAL EVALUATION ADULT. - REASON
No overt signs of muscle wasting, but suspect muscle quality decline r/t multiple surgery hx and prior poor intake during hospital stay

## 2022-01-21 NOTE — DIETITIAN INITIAL EVALUATION ADULT. - PROBLEM SELECTOR PLAN 1
Admit to Orthopedic Service   For left ankle Removal of exfix, removal abx spacer, left ankle arthrotomy, open tx left tib/fib malunion, ORIF left syndesmosis, left deltoid repair/recon, left tenoachilles lengthening with Dr. Wallis   Medically cleared and optimized for surgery by Dr. Smith

## 2022-01-21 NOTE — PROGRESS NOTE ADULT - SUBJECTIVE AND OBJECTIVE BOX
NAPOLEON RODRIGUEZ  26y  Female    Patient is a 26y old  Female who presents with a chief complaint of Left ankle surgery (21 Jan 2022 14:39)      HPI:  Patient is 27 y/o female who presents with c/o left ankle pain following an accident last year. She is well known to [facility]. She was discharged from Hospital in Nov 2021. Since then she denies new medical problems. She has been using a walker and in ex fix.  She d/c IV abx in December and d/c PO abx last week.     Patient elects to undergo removal of ex fix; removal abx spacer, open tx left tib/fib malunion, ORIF left syndesmosis, left deltoid ligament repair, left achilles lengthening with Dr. Wallis/ Dr. Dickens  (13 Jan 2022 10:26)      PAST MEDICAL & SURGICAL HISTORY:  Left tibial neuropathy    PAD (peripheral artery disease)    Status post ORIF of fracture of ankle        Home Medications:  calcium carbonate 1250 mg (500 mg elemental calcium) oral tablet: 1 tab(s) orally once a day (18 Jan 2022 06:29)      26y    FAMILY HISTORY:      Marital Status:  (   )    (   ) Single    (   )    (  )   Lives with: (  ) alone  (  ) children   (  ) spouse   (  ) parents  (  ) other  Recent Travel: No recent travel  Occupation:    Substance Use (street drugs): ( x ) never used  (  ) other:  Tobacco Usage:  ( x  ) never smoked   (   ) former smoker   (   ) current smoker  (     ) pack year  Alcohol Usage: None       MEDICATIONS  (STANDING):  acetaminophen     Tablet .. 975 milliGRAM(s) Oral every 8 hours  AQUAPHOR (petrolatum Ointment) 1 Application(s) Topical daily  aspirin enteric coated 325 milliGRAM(s) Oral two times a day  caspofungin IVPB 50 milliGRAM(s) IV Intermittent every 24 hours  cefTRIAXone   IVPB 2000 milliGRAM(s) IV Intermittent every 24 hours  DAPTOmycin IVPB 500 milliGRAM(s) IV Intermittent every 24 hours  lactated ringers. 1000 milliLiter(s) (150 mL/Hr) IV Continuous <Continuous>  oxyCODONE  ER Tablet 10 milliGRAM(s) Oral every 12 hours  pantoprazole    Tablet 40 milliGRAM(s) Oral before breakfast  polyethylene glycol 3350 17 Gram(s) Oral at bedtime  potassium chloride    Tablet ER 40 milliEquivalent(s) Oral every 4 hours  pregabalin 25 milliGRAM(s) Oral three times a day  senna 2 Tablet(s) Oral at bedtime    MEDICATIONS  (PRN):  aluminum hydroxide/magnesium hydroxide/simethicone Suspension 30 milliLiter(s) Oral four times a day PRN Indigestion  bisacodyl Suppository 10 milliGRAM(s) Rectal once PRN Constipation  cyclobenzaprine 5 milliGRAM(s) Oral three times a day PRN Muscle Spasm  HYDROmorphone   Tablet 4 milliGRAM(s) Oral every 4 hours PRN Severe Pain (7 - 10)  HYDROmorphone   Tablet 2 milliGRAM(s) Oral every 4 hours PRN Moderate Pain (4 - 6)  HYDROmorphone  Injectable 0.5 milliGRAM(s) IV Push every 4 hours PRN Breakthrough Pain  magnesium hydroxide Suspension 30 milliLiter(s) Oral daily PRN Constipation  ondansetron Injectable 4 milliGRAM(s) IV Push every 6 hours PRN Nausea and/or Vomiting    REVIEW OF SYSTEMS:  CONSTITUTIONAL: No fever, weight loss, or fatigue  EYES: No eye pain, visual disturbances, or discharge  ENMT:  No difficulty hearing, tinnitus, vertigo; No sinus or throat pain  NECK: No pain or stiffness  BREASTS: No pain, masses, or nipple discharge  RESPIRATORY: No cough, wheezing, chills or hemoptysis; No shortness of breath  CARDIOVASCULAR: No chest pain, palpitations, dizziness, or leg swelling  GASTROINTESTINAL: No abdominal or epigastric pain. No nausea, vomiting, or hematemesis; No diarrhea or constipation. No melena or hematochezia.  GENITOURINARY: No dysuria, frequency, hematuria, or incontinence  NEUROLOGICAL: No headaches, memory loss, loss of strength, numbness, or tremors  SKIN: No itching, burning, rashes, or lesions   LYMPH NODES: No enlarged glands  ENDOCRINE: No heat or cold intolerance; No hair loss  MUSCULOSKELETAL:Rt lower ext pain  PSYCHIATRIC: No depression, anxiety, mood swings, or difficulty sleeping    Vital Signs Last 24 Hrs  T(C): 36.6 (21 Jan 2022 16:50), Max: 36.9 (20 Jan 2022 21:55)  T(F): 97.9 (21 Jan 2022 16:50), Max: 98.5 (20 Jan 2022 21:55)  HR: 92 (21 Jan 2022 16:50) (78 - 92)  BP: 97/64 (21 Jan 2022 16:50) (97/64 - 103/69)  BP(mean): 74 (21 Jan 2022 13:00) (74 - 79)  RR: 18 (21 Jan 2022 16:50) (16 - 18)  SpO2: 96% (21 Jan 2022 16:50) (95% - 97%)    PHYSICAL EXAM:  GENERAL: NAD, well-groomed, well-developed  HEAD:  Atraumatic, Normocephalic  EYES: EOMI, PERRLA, conjunctiva and sclera clear  ENMT: No tonsillar erythema, exudates, or enlargement; Moist mucous membranes, Good dentition, No lesions  NECK: Supple, No JVD, Normal thyroid  NERVOUS SYSTEM:  Alert & Oriented X3, Good concentration; Motor Strength 5/5 B/L upper and lower extremities; DTRs 2+ intact and symmetric  CHEST/LUNG: Clear to percussion bilaterally; No rales, rhonchi, wheezing, or rubs  HEART: Regular rate and rhythm; No murmurs, rubs, or gallops  ABDOMEN: Soft, Nontender, Nondistended; Bowel sounds present  EXTREMITIES:  Rt surgical site intact  LYMPH: No lymphadenopathy noted  SKIN: No rashes or lesions    Consultant(s) Notes Reviewed:  [x ] YES  [ ] NO  Care Discussed with Consultants/Other Providers [ x] YES  [ ] NO    LABS:                        8.8    6.04  )-----------( 249      ( 21 Jan 2022 06:28 )             25.9     01-21    138  |  104  |  6<L>  ----------------------------<  90  3.3<L>   |  24  |  0.63    Ca    8.6      21 Jan 2022 05:54  Mg     1.7     01-21          CAPILLARY BLOOD GLUCOSE          Culture - Fungal, Other (collected 18 Jan 2022 18:53)  Source: .Other FUNGAL#3-LEFT FIBULAR CULTURE/GRAM STAIN  Preliminary Report (19 Jan 2022 06:36):    Testing in progress    Culture - Fungal, Other (collected 18 Jan 2022 18:53)  Source: .Other FUNGAL #2-LEFT FIBULAR CULTURE/GRAM STAIN  Preliminary Report (19 Jan 2022 06:29):    Testing in progress    Culture - Fungal, Other (collected 18 Jan 2022 18:53)  Source: .Other #1-LEFT FIBULAR CULTURE/GRAM STAIN  Preliminary Report (19 Jan 2022 06:33):    Testing in progress        RADIOLOGY & ADDITIONAL TESTS:  Echo:        LVSF:        EF:        RVSF:        LA:        RA:        Mitral Valve:        Aortic Valve:       Tricuspid Valve:        Pulmonic Valve:        Pericardium:   Imaging Personally Reviewed:  [ ] YES  [ ] NO

## 2022-01-21 NOTE — PROGRESS NOTE ADULT - SUBJECTIVE AND OBJECTIVE BOX
[facility] [phone]    HPI: Patient seen and examined today. Patient reports improvement of left ankle pain today, stating she has no pain at this time. Patient reports significant improvement with PO Dilaudid, stating it lasts much longer than PO Oxycodone. Patient reports adequate pain control at this time. Patient denies any side effects from current pain regimen.    Pertinent PMH: Pain at: ___Back ___Neck___Knee ___Hip ___Shoulder ___ Opioid tolerance    Pain is _X__ sharp ____dull ___burning ___achy ___ Intensity: ____ mild ___X_mod ____severe     Location _X____surgical site _____cervical _____lumbar ____abd _____upper ext_X___lower ext    Worse with ___X_activity __X__movement _____physical therapy___ Rest    Improved with __X__medication _X___rest ____physical therapy    PMH:  Left tibial neuropathy  PAD (peripheral artery disease)  Status post ORIF of fracture of ankle    Medications:  potassium chloride    Tablet ER  pregabalin  HYDROmorphone   Tablet  HYDROmorphone   Tablet  AQUAPHOR (petrolatum Ointment)  cyclobenzaprine  oxyCODONE    IR  oxyCODONE    IR  oxyCODONE  ER Tablet  HYDROmorphone  Injectable  cefTRIAXone   IVPB  caspofungin IVPB  DAPTOmycin IVPB  HYDROmorphone  Injectable  acetaminophen     Tablet ..  acetaminophen   IVPB ..  HYDROmorphone  Injectable  aspirin enteric coated  polyethylene glycol 3350  senna  magnesium hydroxide Suspension  bisacodyl Suppository  pantoprazole    Tablet  ondansetron Injectable  aluminum hydroxide/magnesium hydroxide/simethicone Suspension  ceFAZolin   IVPB  HYDROmorphone  Injectable  oxyCODONE    IR  oxyCODONE    IR  acetaminophen     Tablet ..  lactated ringers.  chlorhexidine 2% Cloths  povidone iodine 5% Nasal Swab    ROS: Const:  _N__febrile   Eyes:___ENT:___CV: __N_chest pain  Resp: __N__sob  GI:_N__nausea _N__vomiting ___N_abd pain ___npo ___clears __Y_full diet _N_bm  :___ Musk: __Y_pain ___spasm  Skin:___ Neuro:  __N_sedation__N_confusion___N_ numbness ___weakness N_paresthesia  Psych:___anxiety  Endo:___ Heme:___Allergy:__NKDA_      01-21 @ 05:00373 mL/min/1.73M2  Hemoglobin: 8.8 g/dL (01-21 @ 06:28)  Hemoglobin: 8.5 g/dL (01-20 @ 06:45)  T(C): 36.9 (01-21-22 @ 05:00), Max: 36.9 (01-20-22 @ 21:55)  HR: 81 (01-21-22 @ 05:00) (60 - 105)  BP: 103/68 (01-21-22 @ 05:00) (96/65 - 110/71)  RR: 17 (01-21-22 @ 05:00) (16 - 17)  SpO2: 95% (01-21-22 @ 05:00) (95% - 97%)  Wt(kg): --     PHYSICAL EXAM:  Gen Appearance: __X_no acute distress X___appropriate       Neuro: ___SILT feet____ EOM Intact Psych: AAOX_3_, _X__mood/affect appropriate        Eyes: _X__conjunctiva WNL  _X____ Pupils equal and round        ENT: _X__ears and nose atraumatic_X__ Hearing grossly intact        Neck: _X__trachea midline, no visible masses ___thyroid without palpable mass    Resp: __X_Nml WOB____No tactile fremitus ___clear to auscultation    Cardio: _X__extremities free from edema ____pedal pulses palpable    GI/Abdomen: ___soft _____ Nontender____X__Nondistended_____HSM    Lymphatic: ___no palpable nodes in neck  ___no palpable nodes calves and feet    Skin/Wound: ___Incision, _X__Dressing c/d/i,   ____surrounding tissues soft,  ___drain/chest tube present____    Muscular: EHL ___/5  Gastrocnemius___/5    _X__absent clubbing/cyanosis         ASSESSMENT:  This is a 26y old Female with a history of PAD POD 3 s/p exfix removal, antibiotic spacer explant, ORIF of left fibula, flexor tendon tenotomies 1st - 4th toes and complex plastic closure with improvement of left ankle pain.    Recommended Treatment PLAN:  1. Continue Dilaudid 2-4 mg PO q4h PRN moderate-severe pain  2. Continue Dilaudid 0.5 mg IVP q4h PRN breakthrough pain  3. Continue Oxycodone ER 10 mg PO BID  4. Continue Tylenol 975 mg PO TID  5. Continue Flexeril 5 mg PO q8h PRN muscle spasm  6. Continue Lyrica 25 mg PO TID  Plan discussed with Dr. Rene      [facility] [phone]    HPI: Patient seen and examined today. Patient reports improvement of left ankle pain today, stating she has no pain at this time. Patient reports significant improvement with PO Dilaudid, stating it lasts much longer than PO Oxycodone. Patient reports adequate pain control at this time. Patient denies any side effects from current pain regimen.    Pertinent PMH: Pain at: ___Back ___Neck___Knee ___Hip ___Shoulder ___ Opioid tolerance    Pain is _X__ sharp ____dull ___burning ___achy ___ Intensity: ____ mild ___X_mod ____severe     Location _X____surgical site _____cervical _____lumbar ____abd _____upper ext_X___lower ext    Worse with ___X_activity __X__movement _____physical therapy___ Rest    Improved with __X__medication _X___rest ____physical therapy    PMH:  Left tibial neuropathy  PAD (peripheral artery disease)  Status post ORIF of fracture of ankle    Medications:  potassium chloride    Tablet ER  pregabalin  HYDROmorphone   Tablet  HYDROmorphone   Tablet  AQUAPHOR (petrolatum Ointment)  cyclobenzaprine  oxyCODONE    IR  oxyCODONE    IR  oxyCODONE  ER Tablet  HYDROmorphone  Injectable  cefTRIAXone   IVPB  caspofungin IVPB  DAPTOmycin IVPB  HYDROmorphone  Injectable  acetaminophen     Tablet ..  acetaminophen   IVPB ..  HYDROmorphone  Injectable  aspirin enteric coated  polyethylene glycol 3350  senna  magnesium hydroxide Suspension  bisacodyl Suppository  pantoprazole    Tablet  ondansetron Injectable  aluminum hydroxide/magnesium hydroxide/simethicone Suspension  ceFAZolin   IVPB  HYDROmorphone  Injectable  oxyCODONE    IR  oxyCODONE    IR  acetaminophen     Tablet ..  lactated ringers.  chlorhexidine 2% Cloths  povidone iodine 5% Nasal Swab    ROS: Const:  _N__febrile   Eyes:___ENT:___CV: __N_chest pain  Resp: __N__sob  GI:_N__nausea _N__vomiting ___N_abd pain ___npo ___clears __Y_full diet _N_bm  :___ Musk: __Y_pain ___spasm  Skin:___ Neuro:  __N_sedation__N_confusion___N_ numbness ___weakness N_paresthesia  Psych:___anxiety  Endo:___ Heme:___Allergy:__NKDA_      01-21 @ 05:52762 mL/min/1.73M2  Hemoglobin: 8.8 g/dL (01-21 @ 06:28)  Hemoglobin: 8.5 g/dL (01-20 @ 06:45)  T(C): 36.9 (01-21-22 @ 05:00), Max: 36.9 (01-20-22 @ 21:55)  HR: 81 (01-21-22 @ 05:00) (60 - 105)  BP: 103/68 (01-21-22 @ 05:00) (96/65 - 110/71)  RR: 17 (01-21-22 @ 05:00) (16 - 17)  SpO2: 95% (01-21-22 @ 05:00) (95% - 97%)  Wt(kg): --     PHYSICAL EXAM:  Gen Appearance: __X_no acute distress X___appropriate       Neuro: ___SILT feet____ EOM Intact Psych: AAOX_3_, _X__mood/affect appropriate        Eyes: _X__conjunctiva WNL  _X____ Pupils equal and round        ENT: _X__ears and nose atraumatic_X__ Hearing grossly intact        Neck: _X__trachea midline, no visible masses ___thyroid without palpable mass    Resp: __X_Nml WOB____No tactile fremitus ___clear to auscultation    Cardio: _X__extremities free from edema ____pedal pulses palpable    GI/Abdomen: ___soft _____ Nontender____X__Nondistended_____HSM    Lymphatic: ___no palpable nodes in neck  ___no palpable nodes calves and feet    Skin/Wound: ___Incision, _X__Dressing c/d/i,   ____surrounding tissues soft,  ___drain/chest tube present____    Muscular: EHL ___/5  Gastrocnemius___/5    _X__absent clubbing/cyanosis

## 2022-01-21 NOTE — PROGRESS NOTE ADULT - SUBJECTIVE AND OBJECTIVE BOX
Ortho Note    Pt comfortable without complaints, pain controlled compared to yesterday morning   Denies CP, SOB, N/V, new sensory/motor weakness, severe lower extremity pain     Vital Signs Last 24 Hrs  T(C): 36.9 (01-21-22 @ 05:00), Max: 36.9 (01-21-22 @ 05:00)  T(F): 98.4 (01-21-22 @ 05:00), Max: 98.4 (01-21-22 @ 05:00)  HR: 81 (01-21-22 @ 05:00) (81 - 81)  BP: 103/68 (01-21-22 @ 05:00) (103/68 - 103/68)  BP(mean): 79 (01-21-22 @ 05:00) (79 - 79)  RR: 17 (01-21-22 @ 05:00) (17 - 17)  SpO2: 95% (01-21-22 @ 05:00) (95% - 95%)  AVSS    General: Pt Alert and oriented, NAD  LLE wrapped in ACE from below knee to toe,  x 1 and Prevena x 1 holding suction; prevena has no output while  output is ~ 5-10 ml SS fluid  Pulses: unable to assess LLE pulse due to dsg  Sensation: no sensation in L 1st - 3rd toe, minimal sensation in 4th toe, + SILT in 5th toe (consistent w/ baseline)   Motor: able to wiggle 4th and 5th toe, unable to wiggle 1st-erd L toe     A/P: 26yFemale POD#3 s/p ex-fix removal, abx spacer explant, ORIF of L fibula, flexor tendon tenotomies 1st-4th toes and complex closure by plastics with prevena placement and x1 .  - Stable  - Pain Control, c/w pain mgmt recs    - c/w ID recs (dapto, ctx, caspo)   - f/u daily CK am labs   - DVT ppx: asa 325  - PT, WBS: NWB LLE   - nausea control/bowel regimen  - c/w home meds  - Drains:  x 1 and prevena x 1   - DVT ppx: SCDs  - Diet  - Dispo planning: pending clinical course       Ortho Pager [phone] Ortho Note    Pt comfortable without complaints, pain controlled compared to yesterday morning   Denies CP, SOB, N/V, new sensory/motor weakness, severe lower extremity pain     Vital Signs Last 24 Hrs  T(C): 36.9 (01-21-22 @ 05:00), Max: 36.9 (01-21-22 @ 05:00)  T(F): 98.4 (01-21-22 @ 05:00), Max: 98.4 (01-21-22 @ 05:00)  HR: 81 (01-21-22 @ 05:00) (81 - 81)  BP: 103/68 (01-21-22 @ 05:00) (103/68 - 103/68)  BP(mean): 79 (01-21-22 @ 05:00) (79 - 79)  RR: 17 (01-21-22 @ 05:00) (17 - 17)  SpO2: 95% (01-21-22 @ 05:00) (95% - 95%)  AVSS    General: Pt Alert and oriented, NAD  LLE wrapped in ACE from below knee to toe,  x 1 and Prevena x 1 holding suction; prevena has no output while  output is ~7.5 ml SS fluid  Pulses: unable to assess LLE pulse due to dsg  Sensation: no sensation in L 1st - 3rd toe, minimal sensation in 4th toe, + SILT in 5th toe (consistent w/ baseline)   Motor: able to wiggle 4th and 5th toe, unable to wiggle 1st-erd L toe   : voiding well, passed TOV 1/20/22     A/P: 26yFemale POD#3 s/p ex-fix removal, abx spacer explant, ORIF of L fibula, flexor tendon tenotomies 1st-4th toes and complex closure by plastics with prevena placement and x1 .  - Stable  - Pain Control, c/w pain mgmt recs    - c/w ID recs (dapto, ctx, caspo), f/u intraop cx   - wound mgmt by plastics  - passed TOV 1/20/22   - f/u daily CK am labs   - DVT ppx: asa 325  - PT, WBS: NWB LLE   - nausea control/bowel regimen  - c/w home meds  - Drains:  x 1 and prevena x 1 ;  put out 7.5/7.5 over 12/24, prevena 0/0 over 12/24  - DVT ppx: SCDs  - Diet  - Dispo planning: pending clinical course       Ortho Pager [phone] Ortho Note    Pt comfortable without complaints, pain controlled compared to yesterday morning   Denies CP, SOB, N/V, new sensory/motor weakness, severe lower extremity pain     Vital Signs Last 24 Hrs  T(C): 36.9 (01-21-22 @ 05:00), Max: 36.9 (01-21-22 @ 05:00)  T(F): 98.4 (01-21-22 @ 05:00), Max: 98.4 (01-21-22 @ 05:00)  HR: 81 (01-21-22 @ 05:00) (81 - 81)  BP: 103/68 (01-21-22 @ 05:00) (103/68 - 103/68)  BP(mean): 79 (01-21-22 @ 05:00) (79 - 79)  RR: 17 (01-21-22 @ 05:00) (17 - 17)  SpO2: 95% (01-21-22 @ 05:00) (95% - 95%)  AVSS    General: Pt Alert and oriented, NAD  LLE wrapped in ACE from below knee to toe,  x 1 and Prevena x 1 holding suction; prevena has no output while  output is ~7.5 ml SS fluid  Pulses: unable to assess LLE pulse due to dsg  Sensation: no sensation in L 1st - 3rd toe, minimal sensation in 4th toe, + SILT in 5th toe (consistent w/ baseline)   Motor: able to wiggle 4th and 5th toe, unable to wiggle 1st-erd L toe   : voiding well, passed TOV 1/20/22     A/P: 26yF POD #3 s/p left ankle ex-fix removal, antibiotic spacer explant, Open treatment left fibula malunion nonunion, open reduction internal fixation syndesmosis, Proximal tibia autograft harvest flexor tendon tenotomies 1st-4th toes with complex plastics closure   - Stable  - Pain Control, c/w pain mgmt recs - now controlled  - c/w ID recs (dapto, ctx, caspo), f/u intraop cx   - wound mgmt by plastics  - passed TOV 1/20/22   - f/u daily CK am labs - stable low without evidence of any new comp syndrome or muscle death  - DVT ppx: asa 325  - PT, WBS: NWB LLE   - nausea control/bowel regimen  - c/w home meds  - Drains:  x 1 and prevena x 1 ;  put out 7.5/7.5 over 12/24, prevena 0/0 over 12/24  - DVT ppx: SCDs  - Diet  - Dispo planning: drain removal on sunday and amaury change tomorrow (sunday). Discharge with ID recs for 6 weeks IV vs PO abx      Ortho Pager [phone]

## 2022-01-21 NOTE — PROGRESS NOTE ADULT - SUBJECTIVE AND OBJECTIVE BOX
Plastic Surgery Note    Pt endorsing improvement in pain control with addition of pregabalin.  Denies CP, SOB, N/V, numbness/tingling     Vital Signs Last 24 Hrs  T(C): 36.9 (01-21-22 @ 05:00), Max: 36.9 (01-21-22 @ 05:00)  T(F): 98.4 (01-21-22 @ 05:00), Max: 98.4 (01-21-22 @ 05:00)  HR: 81 (01-21-22 @ 05:00) (81 - 81)  BP: 103/68 (01-21-22 @ 05:00) (103/68 - 103/68)  BP(mean): 79 (01-21-22 @ 05:00) (79 - 79)  RR: 17 (01-21-22 @ 05:00) (17 - 17)  SpO2: 95% (01-21-22 @ 05:00) (95% - 95%)  I&O's Summary    20 Jan 2022 07:01  -  21 Jan 2022 07:00  --------------------------------------------------------  IN: 2760 mL / OUT: 2407.5 mL / NET: 352.5 mL      Physical Exam:  General: Pt Alert and oriented, NAD  DSG C/D/I, prevena holding suction  Pulses: toes wwp, cap refill <3 seconds  firing                          8.8    6.04  )-----------( 249      ( 21 Jan 2022 06:28 )             25.9     01-21    138  |  104  |  6<L>  ----------------------------<  90  3.3<L>   |  24  |  0.63    Ca    8.6      21 Jan 2022 05:54        A/P: 26yFemale POD#3 s/p ex-fix removal, abx spacer explant, ORIF of L fibula, flexor tendon tenotomies 2nd-4th toes, flap elevation and complex wound closure by Dr. Wiser with prevena placement and x1 .   - VSS  - ID following: Daptomycin 500 mg IV daily.  Check serum CK daily for now, Ceftriaxone 2 grams IV daily, Caspofungin 50 mg IV daily   - ensure prevena is on and holding suction, was off for an unknown amount of time last night.  - Pain MGMT following  - continue to monitor drain outputs  - DVT ppx: ASA, SCD  - PT, WBS: NWGERRY Clements, PGY-1

## 2022-01-21 NOTE — DIETITIAN INITIAL EVALUATION ADULT. - ORAL INTAKE PTA/DIET HISTORY
Pt well known to RD from prior extended hospital day a few months ago. Spoke with pt in room this morning. States appetite prior to admit was intact. Mom was cooking meals at home which helps improve pt's po intake. NKFA, No cultural, ethnic, Restorationist food preferences noted

## 2022-01-21 NOTE — PROGRESS NOTE ADULT - SUBJECTIVE AND OBJECTIVE BOX
Ortho Note    Surgery: POD #2 s/p ex-fix removal, abx spacer explant, ORIF of L fibula, flexor tendon tenotomies 1st-4th toes and complex closure by plastics with prevena placement and x1     Patient seen and examined at bedside this AM  Pain improved today with dilaudid   Denies CP, SOB, N/V, numbness/tingling   No BM since monday  voiding without difficulty s/p st removal     Vital Signs Last 24 Hrs  T(C): 36.6 (01-21-22 @ 09:05), Max: 36.6 (01-21-22 @ 09:05)  T(F): 97.9 (01-21-22 @ 09:05), Max: 97.9 (01-21-22 @ 09:05)  HR: 83 (01-21-22 @ 09:05) (83 - 83)  BP: 103/69 (01-21-22 @ 09:05) (103/69 - 103/69)  BP(mean): --  RR: 17 (01-21-22 @ 09:05) (17 - 17)  SpO2: 96% (01-21-22 @ 09:05) (96% - 96%)  AVSS    General: Pt Alert and oriented, NAD  Dressing C/D/I; splint intact;  x1, prevena holding suction   Sensation: diminished LLE  Motor: EHL/FHL/TA/GS not firing LLE         A/P: 26yFemale POD#2 s/p ex-fix removal, abx spacer explant, ORIF of L fibula, flexor tendon tenotomies 1st-4th toes and complex closure by plastics with prevena placement and x1     - Stable  - Pain Control  - DVT ppx:  - PT, WBS:     Ortho Pager [phone] Ortho Note    Surgery: POD #2 s/p ex-fix removal, abx spacer explant, ORIF of L fibula, flexor tendon tenotomies 1st-4th toes and complex closure by plastics with prevena placement and x1     Patient seen and examined at bedside this AM  Pain improved today with dilaudid   Denies CP, SOB, N/V, numbness/tingling   No BM since monday  voiding without difficulty s/p st removal     Vital Signs Last 24 Hrs  T(C): 36.6 (01-21-22 @ 09:05), Max: 36.6 (01-21-22 @ 09:05)  T(F): 97.9 (01-21-22 @ 09:05), Max: 97.9 (01-21-22 @ 09:05)  HR: 83 (01-21-22 @ 09:05) (83 - 83)  BP: 103/69 (01-21-22 @ 09:05) (103/69 - 103/69)  BP(mean): --  RR: 17 (01-21-22 @ 09:05) (17 - 17)  SpO2: 96% (01-21-22 @ 09:05) (96% - 96%)  AVSS    General: Pt Alert and oriented, NAD  Dressing C/D/I; splint intact;  x1, prevena holding suction   Sensation: diminished LLE  Motor: EHL/FHL/TA/GS not firing LLE         A/P: 26yFemale POD#2 s/p ex-fix removal, abx spacer explant, ORIF of L fibula, flexor tendon tenotomies 1st-4th toes and complex closure by plastics with prevena placement and x1     - Medically stable post op  - K 3.3; Mag 1.7 today- will replete, recheck AM labs   - Plastics management of prevena/drain- plan to change prevena on Sunday   - Pain Control: PO/IV PRN   - DVT ppx: ASA 325mg BID   - PT, WBS: NWB LLE   - continue capsofungin; daptomycin; ceftriaxone per ID  - dispo: pending progress with PT and wound management by plastics     Ortho Pager [phone]

## 2022-01-21 NOTE — DIETITIAN INITIAL EVALUATION ADULT. - OTHER INFO
26y Female s/p ex-fix removal, abx spacer explant, ORIF of L fibula, flexor tendon tenotomies 2nd-4th toes, flap elevation and complex wound closure by Dr. Dickens with prevena placement and x1  on 1/18. Above surgery was planned. On regular diet and tolerating without N/V or D/C. Pt reports good appetite and understands importance of nutrition for post-op recovery/ healing.   GI: abd-soft, + normal bowel sounds. Several bowel meds ordered  Skin: surgical incision noted, no pressure breakdown   Steve Score: 17    Per EMR, 6.5kg (8%) wt loss x 2 months which is severe per ASPEN standards.   11/24/21: 86.2kg   Admit wt (1/18/22): 79.7kg

## 2022-01-22 LAB
ANION GAP SERPL CALC-SCNC: 11 MMOL/L — SIGNIFICANT CHANGE UP (ref 5–17)
BUN SERPL-MCNC: 7 MG/DL — SIGNIFICANT CHANGE UP (ref 7–23)
CALCIUM SERPL-MCNC: 8.9 MG/DL — SIGNIFICANT CHANGE UP (ref 8.4–10.5)
CHLORIDE SERPL-SCNC: 102 MMOL/L — SIGNIFICANT CHANGE UP (ref 96–108)
CO2 SERPL-SCNC: 23 MMOL/L — SIGNIFICANT CHANGE UP (ref 22–31)
CREAT SERPL-MCNC: 0.66 MG/DL — SIGNIFICANT CHANGE UP (ref 0.5–1.3)
GLUCOSE SERPL-MCNC: 89 MG/DL — SIGNIFICANT CHANGE UP (ref 70–99)
HCT VFR BLD CALC: 26.5 % — LOW (ref 34.5–45)
HGB BLD-MCNC: 8.4 G/DL — LOW (ref 11.5–15.5)
MAGNESIUM SERPL-MCNC: 1.9 MG/DL — SIGNIFICANT CHANGE UP (ref 1.6–2.6)
MCHC RBC-ENTMCNC: 28.6 PG — SIGNIFICANT CHANGE UP (ref 27–34)
MCHC RBC-ENTMCNC: 31.7 GM/DL — LOW (ref 32–36)
MCV RBC AUTO: 90.1 FL — SIGNIFICANT CHANGE UP (ref 80–100)
NRBC # BLD: 0 /100 WBCS — SIGNIFICANT CHANGE UP (ref 0–0)
PLATELET # BLD AUTO: 250 K/UL — SIGNIFICANT CHANGE UP (ref 150–400)
POTASSIUM SERPL-MCNC: 4.2 MMOL/L — SIGNIFICANT CHANGE UP (ref 3.5–5.3)
POTASSIUM SERPL-SCNC: 4.2 MMOL/L — SIGNIFICANT CHANGE UP (ref 3.5–5.3)
RBC # BLD: 2.94 M/UL — LOW (ref 3.8–5.2)
RBC # FLD: 12.9 % — SIGNIFICANT CHANGE UP (ref 10.3–14.5)
SODIUM SERPL-SCNC: 136 MMOL/L — SIGNIFICANT CHANGE UP (ref 135–145)
WBC # BLD: 4.47 K/UL — SIGNIFICANT CHANGE UP (ref 3.8–10.5)
WBC # FLD AUTO: 4.47 K/UL — SIGNIFICANT CHANGE UP (ref 3.8–10.5)

## 2022-01-22 RX ADMIN — HYDROMORPHONE HYDROCHLORIDE 4 MILLIGRAM(S): 2 INJECTION INTRAMUSCULAR; INTRAVENOUS; SUBCUTANEOUS at 08:45

## 2022-01-22 RX ADMIN — HYDROMORPHONE HYDROCHLORIDE 0.5 MILLIGRAM(S): 2 INJECTION INTRAMUSCULAR; INTRAVENOUS; SUBCUTANEOUS at 11:55

## 2022-01-22 RX ADMIN — CEFTRIAXONE 100 MILLIGRAM(S): 500 INJECTION, POWDER, FOR SOLUTION INTRAMUSCULAR; INTRAVENOUS at 01:25

## 2022-01-22 RX ADMIN — Medication 25 MILLIGRAM(S): at 22:10

## 2022-01-22 RX ADMIN — OXYCODONE HYDROCHLORIDE 10 MILLIGRAM(S): 5 TABLET ORAL at 05:43

## 2022-01-22 RX ADMIN — Medication 975 MILLIGRAM(S): at 06:43

## 2022-01-22 RX ADMIN — OXYCODONE HYDROCHLORIDE 10 MILLIGRAM(S): 5 TABLET ORAL at 06:43

## 2022-01-22 RX ADMIN — Medication 975 MILLIGRAM(S): at 16:10

## 2022-01-22 RX ADMIN — Medication 975 MILLIGRAM(S): at 07:43

## 2022-01-22 RX ADMIN — Medication 25 MILLIGRAM(S): at 06:20

## 2022-01-22 RX ADMIN — HYDROMORPHONE HYDROCHLORIDE 0.5 MILLIGRAM(S): 2 INJECTION INTRAMUSCULAR; INTRAVENOUS; SUBCUTANEOUS at 04:31

## 2022-01-22 RX ADMIN — HYDROMORPHONE HYDROCHLORIDE 0.5 MILLIGRAM(S): 2 INJECTION INTRAMUSCULAR; INTRAVENOUS; SUBCUTANEOUS at 05:00

## 2022-01-22 RX ADMIN — HYDROMORPHONE HYDROCHLORIDE 2 MILLIGRAM(S): 2 INJECTION INTRAMUSCULAR; INTRAVENOUS; SUBCUTANEOUS at 03:24

## 2022-01-22 RX ADMIN — PANTOPRAZOLE SODIUM 40 MILLIGRAM(S): 20 TABLET, DELAYED RELEASE ORAL at 06:43

## 2022-01-22 RX ADMIN — HYDROMORPHONE HYDROCHLORIDE 4 MILLIGRAM(S): 2 INJECTION INTRAMUSCULAR; INTRAVENOUS; SUBCUTANEOUS at 22:09

## 2022-01-22 RX ADMIN — OXYCODONE HYDROCHLORIDE 10 MILLIGRAM(S): 5 TABLET ORAL at 18:49

## 2022-01-22 RX ADMIN — HYDROMORPHONE HYDROCHLORIDE 0.5 MILLIGRAM(S): 2 INJECTION INTRAMUSCULAR; INTRAVENOUS; SUBCUTANEOUS at 12:10

## 2022-01-22 RX ADMIN — Medication 25 MILLIGRAM(S): at 16:10

## 2022-01-22 RX ADMIN — CASPOFUNGIN ACETATE 260 MILLIGRAM(S): 7 INJECTION, POWDER, LYOPHILIZED, FOR SOLUTION INTRAVENOUS at 19:14

## 2022-01-22 RX ADMIN — Medication 975 MILLIGRAM(S): at 17:40

## 2022-01-22 RX ADMIN — HYDROMORPHONE HYDROCHLORIDE 4 MILLIGRAM(S): 2 INJECTION INTRAMUSCULAR; INTRAVENOUS; SUBCUTANEOUS at 09:45

## 2022-01-22 RX ADMIN — HYDROMORPHONE HYDROCHLORIDE 2 MILLIGRAM(S): 2 INJECTION INTRAMUSCULAR; INTRAVENOUS; SUBCUTANEOUS at 02:46

## 2022-01-22 RX ADMIN — OXYCODONE HYDROCHLORIDE 10 MILLIGRAM(S): 5 TABLET ORAL at 19:47

## 2022-01-22 RX ADMIN — HYDROMORPHONE HYDROCHLORIDE 4 MILLIGRAM(S): 2 INJECTION INTRAMUSCULAR; INTRAVENOUS; SUBCUTANEOUS at 23:00

## 2022-01-22 RX ADMIN — Medication 325 MILLIGRAM(S): at 05:43

## 2022-01-22 RX ADMIN — Medication 1 APPLICATION(S): at 12:03

## 2022-01-22 RX ADMIN — Medication 325 MILLIGRAM(S): at 18:49

## 2022-01-22 NOTE — PROGRESS NOTE ADULT - SUBJECTIVE AND OBJECTIVE BOX
NAPOLEON RODRIGUEZ  [id number]    Subjective:    Patient seen and examined, doing well. Still with pain, however pain medication improves it temporarily.        Objective:  T(C): 36.6 (01-22-22 @ 05:07), Max: 36.9 (01-22-22 @ 00:06)  HR: 92 (01-22-22 @ 05:07) (80 - 93)  BP: 94/66 (01-22-22 @ 05:07) (94/61 - 103/69)  RR: 16 (01-22-22 @ 05:07) (16 - 18)  SpO2: 95% (01-22-22 @ 05:07) (94% - 96%)  Wt(kg): --   01-21    138  |  104  |  6<L>  ----------------------------<  90  3.3<L>   |  24  |  0.63    Ca    8.6      21 Jan 2022 05:54  Mg     1.7     01-21                          8.8    6.04  )-----------( 249      ( 21 Jan 2022 06:28 )             25.9       01-21 @ 07:01 - 01-22 @ 07:00  --------------------------------------------------------  IN: 2300 mL / OUT: 930 mL / NET: 1370 mL    01-22 @ 07:01 - 01-22 @ 07:53  --------------------------------------------------------  IN: 150 mL / OUT: 0 mL / NET: 150 mL      PHYSICAL EXAM:    General: Pt Alert and oriented, NAD  DSG C/D/I, prevena holding suction  Pulses: toes wwp, cap refill <3 seconds              MEDICATIONS  (STANDING):  acetaminophen     Tablet .. 975 milliGRAM(s) Oral every 8 hours  AQUAPHOR (petrolatum Ointment) 1 Application(s) Topical daily  aspirin enteric coated 325 milliGRAM(s) Oral two times a day  caspofungin IVPB 50 milliGRAM(s) IV Intermittent every 24 hours  cefTRIAXone   IVPB 2000 milliGRAM(s) IV Intermittent every 24 hours  DAPTOmycin IVPB 500 milliGRAM(s) IV Intermittent every 24 hours  lactated ringers. 1000 milliLiter(s) (150 mL/Hr) IV Continuous <Continuous>  oxyCODONE  ER Tablet 10 milliGRAM(s) Oral every 12 hours  pantoprazole    Tablet 40 milliGRAM(s) Oral before breakfast  polyethylene glycol 3350 17 Gram(s) Oral at bedtime  pregabalin 25 milliGRAM(s) Oral three times a day  senna 2 Tablet(s) Oral at bedtime    MEDICATIONS  (PRN):  aluminum hydroxide/magnesium hydroxide/simethicone Suspension 30 milliLiter(s) Oral four times a day PRN Indigestion  bisacodyl Suppository 10 milliGRAM(s) Rectal once PRN Constipation  cyclobenzaprine 5 milliGRAM(s) Oral three times a day PRN Muscle Spasm  HYDROmorphone   Tablet 2 milliGRAM(s) Oral every 4 hours PRN Moderate Pain (4 - 6)  HYDROmorphone   Tablet 4 milliGRAM(s) Oral every 4 hours PRN Severe Pain (7 - 10)  HYDROmorphone  Injectable 0.5 milliGRAM(s) IV Push every 4 hours PRN Breakthrough Pain  magnesium hydroxide Suspension 30 milliLiter(s) Oral daily PRN Constipation  ondansetron Injectable 4 milliGRAM(s) IV Push every 6 hours PRN Nausea and/or Vomiting

## 2022-01-22 NOTE — PROGRESS NOTE ADULT - SUBJECTIVE AND OBJECTIVE BOX
Ortho Note    Pt comfortable without complaints, pain improved compared to yesterday morning   Denies CP, SOB, N/V, new sensory/motor weakness, severe lower extremity pain     Vital Signs Last 24 Hrs  T(C): 36.8 (22 Jan 2022 09:17), Max: 36.9 (22 Jan 2022 00:06)  T(F): 98.2 (22 Jan 2022 09:17), Max: 98.5 (22 Jan 2022 00:06)  HR: 94 (22 Jan 2022 09:17) (80 - 94)  BP: 102/61 (22 Jan 2022 09:17) (94/61 - 102/61)  BP(mean): 74 (21 Jan 2022 13:00) (74 - 74)  RR: 16 (22 Jan 2022 09:17) (16 - 18)  SpO2: 95% (22 Jan 2022 09:17) (94% - 96%)    General: Pt Alert and oriented, NAD  LLE wrapped in ACE from below knee to toe,  x 1 and Prevena x 1 holding suction  Pulses: unable to assess LLE pulse due to dsg  Sensation: no sensation in L 1st - 3rd toe, minimal sensation in 4th toe, + SILT in 5th toe (consistent w/ baseline)   Motor: able to wiggle 4th and 5th toe, unable to wiggle 1st-erd L toe   : voiding well, passed TOV 1/20/22     A/P: 26yFemale s/p ex-fix removal, abx spacer explant, ORIF of L fibula, flexor tendon tenotomies 1st-4th toes and complex closure by plastics with prevena placement and x1  1/18  - Stable  - Pain Control, c/w pain mgmt recs    - c/w ID recs (dapto, ctx, caspo), f/u intraop cx   - wound mgmt by plastics; dressing change 1/23  - passed TOV 1/20  - f/u daily CK am labs   - DVT ppx: asa 325  - PT, WBS: NWB LLE   - nausea control/bowel regimen  - c/w home meds  - Drains:  x 1 and prevena x 1 ;  15/30; prevena 0/0   - DVT ppx: SCDs  - Diet  - Dispo planning: pending clinical course       Ortho Pager [phone]

## 2022-01-23 LAB
ANION GAP SERPL CALC-SCNC: 9 MMOL/L — SIGNIFICANT CHANGE UP (ref 5–17)
BUN SERPL-MCNC: 6 MG/DL — LOW (ref 7–23)
CALCIUM SERPL-MCNC: 9 MG/DL — SIGNIFICANT CHANGE UP (ref 8.4–10.5)
CHLORIDE SERPL-SCNC: 105 MMOL/L — SIGNIFICANT CHANGE UP (ref 96–108)
CO2 SERPL-SCNC: 25 MMOL/L — SIGNIFICANT CHANGE UP (ref 22–31)
CREAT SERPL-MCNC: 0.65 MG/DL — SIGNIFICANT CHANGE UP (ref 0.5–1.3)
GLUCOSE SERPL-MCNC: 84 MG/DL — SIGNIFICANT CHANGE UP (ref 70–99)
HCT VFR BLD CALC: 26.7 % — LOW (ref 34.5–45)
HGB BLD-MCNC: 8.9 G/DL — LOW (ref 11.5–15.5)
MCHC RBC-ENTMCNC: 28.9 PG — SIGNIFICANT CHANGE UP (ref 27–34)
MCHC RBC-ENTMCNC: 33.3 GM/DL — SIGNIFICANT CHANGE UP (ref 32–36)
MCV RBC AUTO: 86.7 FL — SIGNIFICANT CHANGE UP (ref 80–100)
NRBC # BLD: 0 /100 WBCS — SIGNIFICANT CHANGE UP (ref 0–0)
PLATELET # BLD AUTO: 265 K/UL — SIGNIFICANT CHANGE UP (ref 150–400)
POTASSIUM SERPL-MCNC: 4 MMOL/L — SIGNIFICANT CHANGE UP (ref 3.5–5.3)
POTASSIUM SERPL-SCNC: 4 MMOL/L — SIGNIFICANT CHANGE UP (ref 3.5–5.3)
RBC # BLD: 3.08 M/UL — LOW (ref 3.8–5.2)
RBC # FLD: 13 % — SIGNIFICANT CHANGE UP (ref 10.3–14.5)
SODIUM SERPL-SCNC: 139 MMOL/L — SIGNIFICANT CHANGE UP (ref 135–145)
WBC # BLD: 4.4 K/UL — SIGNIFICANT CHANGE UP (ref 3.8–10.5)
WBC # FLD AUTO: 4.4 K/UL — SIGNIFICANT CHANGE UP (ref 3.8–10.5)

## 2022-01-23 PROCEDURE — 99232 SBSQ HOSP IP/OBS MODERATE 35: CPT

## 2022-01-23 RX ADMIN — OXYCODONE HYDROCHLORIDE 10 MILLIGRAM(S): 5 TABLET ORAL at 19:15

## 2022-01-23 RX ADMIN — DAPTOMYCIN 120 MILLIGRAM(S): 500 INJECTION, POWDER, LYOPHILIZED, FOR SOLUTION INTRAVENOUS at 23:30

## 2022-01-23 RX ADMIN — HYDROMORPHONE HYDROCHLORIDE 0.5 MILLIGRAM(S): 2 INJECTION INTRAMUSCULAR; INTRAVENOUS; SUBCUTANEOUS at 09:11

## 2022-01-23 RX ADMIN — Medication 325 MILLIGRAM(S): at 18:15

## 2022-01-23 RX ADMIN — Medication 975 MILLIGRAM(S): at 22:10

## 2022-01-23 RX ADMIN — HYDROMORPHONE HYDROCHLORIDE 0.5 MILLIGRAM(S): 2 INJECTION INTRAMUSCULAR; INTRAVENOUS; SUBCUTANEOUS at 09:26

## 2022-01-23 RX ADMIN — SENNA PLUS 2 TABLET(S): 8.6 TABLET ORAL at 23:11

## 2022-01-23 RX ADMIN — HYDROMORPHONE HYDROCHLORIDE 4 MILLIGRAM(S): 2 INJECTION INTRAMUSCULAR; INTRAVENOUS; SUBCUTANEOUS at 05:34

## 2022-01-23 RX ADMIN — SODIUM CHLORIDE 150 MILLILITER(S): 9 INJECTION, SOLUTION INTRAVENOUS at 05:52

## 2022-01-23 RX ADMIN — Medication 975 MILLIGRAM(S): at 08:36

## 2022-01-23 RX ADMIN — SODIUM CHLORIDE 150 MILLILITER(S): 9 INJECTION, SOLUTION INTRAVENOUS at 00:23

## 2022-01-23 RX ADMIN — HYDROMORPHONE HYDROCHLORIDE 0.5 MILLIGRAM(S): 2 INJECTION INTRAMUSCULAR; INTRAVENOUS; SUBCUTANEOUS at 01:24

## 2022-01-23 RX ADMIN — HYDROMORPHONE HYDROCHLORIDE 0.5 MILLIGRAM(S): 2 INJECTION INTRAMUSCULAR; INTRAVENOUS; SUBCUTANEOUS at 19:48

## 2022-01-23 RX ADMIN — HYDROMORPHONE HYDROCHLORIDE 0.5 MILLIGRAM(S): 2 INJECTION INTRAMUSCULAR; INTRAVENOUS; SUBCUTANEOUS at 13:57

## 2022-01-23 RX ADMIN — POLYETHYLENE GLYCOL 3350 17 GRAM(S): 17 POWDER, FOR SOLUTION ORAL at 23:13

## 2022-01-23 RX ADMIN — HYDROMORPHONE HYDROCHLORIDE 4 MILLIGRAM(S): 2 INJECTION INTRAMUSCULAR; INTRAVENOUS; SUBCUTANEOUS at 13:39

## 2022-01-23 RX ADMIN — HYDROMORPHONE HYDROCHLORIDE 0.5 MILLIGRAM(S): 2 INJECTION INTRAMUSCULAR; INTRAVENOUS; SUBCUTANEOUS at 01:55

## 2022-01-23 RX ADMIN — Medication 975 MILLIGRAM(S): at 23:22

## 2022-01-23 RX ADMIN — Medication 975 MILLIGRAM(S): at 18:15

## 2022-01-23 RX ADMIN — CASPOFUNGIN ACETATE 260 MILLIGRAM(S): 7 INJECTION, POWDER, LYOPHILIZED, FOR SOLUTION INTRAVENOUS at 19:48

## 2022-01-23 RX ADMIN — Medication 325 MILLIGRAM(S): at 05:52

## 2022-01-23 RX ADMIN — HYDROMORPHONE HYDROCHLORIDE 0.5 MILLIGRAM(S): 2 INJECTION INTRAMUSCULAR; INTRAVENOUS; SUBCUTANEOUS at 23:38

## 2022-01-23 RX ADMIN — Medication 25 MILLIGRAM(S): at 14:33

## 2022-01-23 RX ADMIN — HYDROMORPHONE HYDROCHLORIDE 0.5 MILLIGRAM(S): 2 INJECTION INTRAMUSCULAR; INTRAVENOUS; SUBCUTANEOUS at 14:12

## 2022-01-23 RX ADMIN — OXYCODONE HYDROCHLORIDE 10 MILLIGRAM(S): 5 TABLET ORAL at 06:55

## 2022-01-23 RX ADMIN — Medication 975 MILLIGRAM(S): at 19:15

## 2022-01-23 RX ADMIN — Medication 975 MILLIGRAM(S): at 01:15

## 2022-01-23 RX ADMIN — HYDROMORPHONE HYDROCHLORIDE 4 MILLIGRAM(S): 2 INJECTION INTRAMUSCULAR; INTRAVENOUS; SUBCUTANEOUS at 04:34

## 2022-01-23 RX ADMIN — DAPTOMYCIN 120 MILLIGRAM(S): 500 INJECTION, POWDER, LYOPHILIZED, FOR SOLUTION INTRAVENOUS at 00:21

## 2022-01-23 RX ADMIN — HYDROMORPHONE HYDROCHLORIDE 0.5 MILLIGRAM(S): 2 INJECTION INTRAMUSCULAR; INTRAVENOUS; SUBCUTANEOUS at 20:33

## 2022-01-23 RX ADMIN — Medication 25 MILLIGRAM(S): at 23:10

## 2022-01-23 RX ADMIN — Medication 1 APPLICATION(S): at 11:55

## 2022-01-23 RX ADMIN — HYDROMORPHONE HYDROCHLORIDE 4 MILLIGRAM(S): 2 INJECTION INTRAMUSCULAR; INTRAVENOUS; SUBCUTANEOUS at 12:39

## 2022-01-23 RX ADMIN — Medication 975 MILLIGRAM(S): at 07:37

## 2022-01-23 RX ADMIN — OXYCODONE HYDROCHLORIDE 10 MILLIGRAM(S): 5 TABLET ORAL at 18:15

## 2022-01-23 RX ADMIN — PANTOPRAZOLE SODIUM 40 MILLIGRAM(S): 20 TABLET, DELAYED RELEASE ORAL at 06:26

## 2022-01-23 RX ADMIN — OXYCODONE HYDROCHLORIDE 10 MILLIGRAM(S): 5 TABLET ORAL at 05:52

## 2022-01-23 RX ADMIN — CEFTRIAXONE 100 MILLIGRAM(S): 500 INJECTION, POWDER, FOR SOLUTION INTRAMUSCULAR; INTRAVENOUS at 01:24

## 2022-01-23 RX ADMIN — Medication 975 MILLIGRAM(S): at 00:21

## 2022-01-23 RX ADMIN — Medication 25 MILLIGRAM(S): at 05:53

## 2022-01-23 NOTE — PROGRESS NOTE ADULT - SUBJECTIVE AND OBJECTIVE BOX
Ortho Note    Pt comfortable without complaints, no issues.  Denies CP, SOB, N/V, new sensory/motor weakness, severe lower extremity pain     Vital Signs Last 24 Hrs  T(C): 36.8 (23 Jan 2022 05:00), Max: 37.2 (23 Jan 2022 01:02)  T(F): 98.2 (23 Jan 2022 05:00), Max: 98.9 (23 Jan 2022 01:02)  HR: 85 (23 Jan 2022 05:00) (82 - 96)  BP: 103/69 (23 Jan 2022 05:00) (99/63 - 113/70)  BP(mean): 80 (23 Jan 2022 05:00) (80 - 80)  RR: 17 (23 Jan 2022 05:00) (15 - 17)  SpO2: 94% (23 Jan 2022 05:00) (94% - 97%)    General: Pt Alert and oriented, NAD  LLE wrapped in ACE from below knee to toe,  x 1 and Prevena x 1 holding suction  Pulses: unable to assess LLE pulse due to dsg  Sensation: no sensation in L 1st - 3rd toe, minimal sensation in 4th toe, + SILT in 5th toe (consistent w/ baseline)   Motor: able to wiggle 4th and 5th toe, unable to wiggle 1st-erd L toe   : voiding well, passed TOV 1/20/22     A/P: 26yFemale s/p ex-fix removal, abx spacer explant, ORIF of L fibula, flexor tendon tenotomies 1st-4th toes and complex closure by plastics with prevena placement and x1  1/18  - Stable  - Pain Control, c/w pain mgmt recs    - c/w ID recs (dapto, ctx, caspo), f/u intraop cx   - wound mgmt by plastics; will coordinate dressing change this afternoon with Dr. Wallis and PRS team  - passed TOV 1/20  - f/u daily CK am labs   - DVT ppx: asa 325  - PT, WBS: NWB LLE   - nausea control/bowel regimen  - c/w home meds  - Drains:  x 1 and prevena x 1 ;  15/30; prevena 0/0   - DVT ppx: SCDs  - Diet  - Dispo planning: pending clinical course       Ortho Pager [phone] Ortho Note    Pt comfortable without complaints, no issues.  Denies CP, SOB, N/V, new sensory/motor weakness, severe lower extremity pain     Vital Signs Last 24 Hrs  T(C): 36.8 (23 Jan 2022 05:00), Max: 37.2 (23 Jan 2022 01:02)  T(F): 98.2 (23 Jan 2022 05:00), Max: 98.9 (23 Jan 2022 01:02)  HR: 85 (23 Jan 2022 05:00) (82 - 96)  BP: 103/69 (23 Jan 2022 05:00) (99/63 - 113/70)  BP(mean): 80 (23 Jan 2022 05:00) (80 - 80)  RR: 17 (23 Jan 2022 05:00) (15 - 17)  SpO2: 94% (23 Jan 2022 05:00) (94% - 97%)    General: Pt Alert and oriented, NAD  LLE wrapped in ACE from below knee to toe,  x 1 and Prevena x 1 holding suction  Pulses: unable to assess LLE pulse due to dsg  Sensation: no sensation in L 1st - 3rd toe, minimal sensation in 4th toe, + SILT in 5th toe (consistent w/ baseline)   Motor: able to wiggle 4th and 5th toe, unable to wiggle 1st-erd L toe   : voiding well, passed TOV 1/20/22     A/P:  26yF POD #3 s/p left ankle ex-fix removal, antibiotic spacer explant, Open treatment left fibula malunion nonunion, open reduction internal fixation syndesmosis, Proximal tibia autograft harvest flexor tendon tenotomies 1st-4th toes with complex plastics closure     - Pain Control, c/w pain mgmt recs    - c/w ID recs (dapto, ctx, caspo), f/u intraop cx   - wound mgmt by plastics; will coordinate dressing change this afternoon with Dr. Wallis and PRS team  - passed TOV 1/20  - DVT ppx: asa 325  - PT, WBS: NWB LLE   - nausea control/bowel regimen  - c/w home meds  - Drains:  x 1 and prevena x 1 ;  15/30; prevena 0/0   - DVT ppx: SCDs  - Diet  - Dispo planning: pending clinical course       Ortho Pager [phone]

## 2022-01-23 NOTE — PROGRESS NOTE ADULT - SUBJECTIVE AND OBJECTIVE BOX
INTERVAL HPI/OVERNIGHT EVENTS:  S/p dressing change by Ortho and Plastics this afternoon.  Feels well    CONSTITUTIONAL:  No fever, chills, night sweats  EYES:  No photophobia or visual changes  CARDIOVASCULAR:  No chest pain  RESPIRATORY:  No cough, wheezing, or SOB   GASTROINTESTINAL:  No nausea, vomiting, diarrhea, constipation, or abdominal pain  GENITOURINARY:  No frequency, urgency, dysuria or hematuria  NEUROLOGIC:  No headache, lightheadedness      ANTIBIOTICS/RELEVANT:    Daptomycin 500 mg IV q24 h (1/18-present)  Ceftriaxone 2 g IV q24h (1/18-present)  Caspofungin 50 mg IV q24h ((1/18-present)    Vital Signs Last 24 Hrs  T(C): 37.2 (23 Jan 2022 16:28), Max: 37.2 (23 Jan 2022 01:02)  T(F): 98.9 (23 Jan 2022 16:28), Max: 98.9 (23 Jan 2022 01:02)  HR: 98 (23 Jan 2022 16:28) (85 - 98)  BP: 118/79 (23 Jan 2022 16:28) (93/64 - 118/79)  BP(mean): 80 (23 Jan 2022 05:00) (80 - 80)  RR: 18 (23 Jan 2022 16:28) (17 - 18)  SpO2: 96% (23 Jan 2022 16:28) (94% - 97%)    PHYSICAL EXAM:  Constitutional:  Well-developed, well nourished  Eyes:  Sclerae anicteric, conjunctivae clear, PERRL  Ear/Nose/Throat:  No nasal exudate or sinus tenderness;  No buccal mucosal lesions, no pharyngeal erythema or exudate	  Neck:  Supple, no adenopathy  Respiratory:  Clear bilaterally  Cardiovascular:  RRR, S1S2, no murmur appreciated  Gastrointestinal:  Symmetric, normoactive BS, soft, NT, no masses, guarding or rebound.  No HSM  Extremities:  No edema.  LLE in splint with VAC      LABS:                        8.9    4.40  )-----------( 265      ( 23 Jan 2022 08:23 )             26.7         01-23    139  |  105  |  6<L>  ----------------------------<  84  4.0   |  25  |  0.65    Ca    9.0      23 Jan 2022 08:23  Mg     1.9     01-22            MICROBIOLOGY:    OR cultures 1/18 X 3 - NGTD    RADIOLOGY & ADDITIONAL STUDIES:

## 2022-01-23 NOTE — PROGRESS NOTE ADULT - SUBJECTIVE AND OBJECTIVE BOX
NAPOLEON RODRIGUEZ  [id number]    Subjective:    Patient seen and examined, doing well, increased pain since 4am. Afebrile, no other complaints.        Objective:  T(C): 36.8 (01-23-22 @ 05:00), Max: 37.2 (01-23-22 @ 01:02)  HR: 85 (01-23-22 @ 05:00) (82 - 96)  BP: 103/69 (01-23-22 @ 05:00) (99/63 - 113/70)  RR: 17 (01-23-22 @ 05:00) (15 - 17)  SpO2: 94% (01-23-22 @ 05:00) (94% - 97%)  Wt(kg): --   01-23    139  |  105  |  6<L>  ----------------------------<  84  4.0   |  25  |  0.65    Ca    9.0      23 Jan 2022 08:23  Mg     1.9     01-22                          8.9    4.40  )-----------( 265      ( 23 Jan 2022 08:23 )             26.7       01-22 @ 07:01  -  01-23 @ 07:00  --------------------------------------------------------  IN: 5200 mL / OUT: 2055 mL / NET: 3145 mL      PHYSICAL EXAM:    General: Pt Alert and oriented, NAD  DSG C/D/I, prevena holding suction, drain serosang.   Pulses: toes wwp, cap refill <3 seconds            MEDICATIONS  (STANDING):  acetaminophen     Tablet .. 975 milliGRAM(s) Oral every 8 hours  AQUAPHOR (petrolatum Ointment) 1 Application(s) Topical daily  aspirin enteric coated 325 milliGRAM(s) Oral two times a day  caspofungin IVPB 50 milliGRAM(s) IV Intermittent every 24 hours  cefTRIAXone   IVPB 2000 milliGRAM(s) IV Intermittent every 24 hours  DAPTOmycin IVPB 500 milliGRAM(s) IV Intermittent every 24 hours  lactated ringers. 1000 milliLiter(s) (150 mL/Hr) IV Continuous <Continuous>  oxyCODONE  ER Tablet 10 milliGRAM(s) Oral every 12 hours  pantoprazole    Tablet 40 milliGRAM(s) Oral before breakfast  polyethylene glycol 3350 17 Gram(s) Oral at bedtime  pregabalin 25 milliGRAM(s) Oral three times a day  senna 2 Tablet(s) Oral at bedtime    MEDICATIONS  (PRN):  aluminum hydroxide/magnesium hydroxide/simethicone Suspension 30 milliLiter(s) Oral four times a day PRN Indigestion  bisacodyl Suppository 10 milliGRAM(s) Rectal once PRN Constipation  cyclobenzaprine 5 milliGRAM(s) Oral three times a day PRN Muscle Spasm  HYDROmorphone   Tablet 2 milliGRAM(s) Oral every 4 hours PRN Moderate Pain (4 - 6)  HYDROmorphone   Tablet 4 milliGRAM(s) Oral every 4 hours PRN Severe Pain (7 - 10)  HYDROmorphone  Injectable 0.5 milliGRAM(s) IV Push every 4 hours PRN Breakthrough Pain  magnesium hydroxide Suspension 30 milliLiter(s) Oral daily PRN Constipation  ondansetron Injectable 4 milliGRAM(s) IV Push every 6 hours PRN Nausea and/or Vomiting      )

## 2022-01-23 NOTE — PROGRESS NOTE ADULT - SUBJECTIVE AND OBJECTIVE BOX
INTERVAL HPI/OVERNIGHT EVENTS: CASTRO O/N    SUBJECTIVE: Patient seen and examined at bedside.   Pt states pain is slowly improving. Usually at 8/10. Worked w PT today and tried crutches and is hoping to be able to return home to 2nd floor walkup using that or knee scooter. Has otherwise been relying mostly on a walker for mobility. No fever, chills. +BM. Voiding wo difficulty. Pleased w healing today during dressing change and pt showed photo from this AM's dressing change      OBJECTIVE:    VITAL SIGNS:  ICU Vital Signs Last 24 Hrs  T(C): 37.2 (23 Jan 2022 16:28), Max: 37.2 (23 Jan 2022 01:02)  T(F): 98.9 (23 Jan 2022 16:28), Max: 98.9 (23 Jan 2022 01:02)  HR: 98 (23 Jan 2022 16:28) (85 - 98)  BP: 118/79 (23 Jan 2022 16:28) (93/64 - 118/79)  BP(mean): 80 (23 Jan 2022 05:00) (80 - 80)  ABP: --  ABP(mean): --  RR: 18 (23 Jan 2022 16:28) (17 - 18)  SpO2: 96% (23 Jan 2022 16:28) (94% - 97%)        01-22 @ 07:01 - 01-23 @ 07:00  --------------------------------------------------------  IN: 5200 mL / OUT: 2055 mL / NET: 3145 mL    01-23 @ 07:01  -  01-24 @ 00:46  --------------------------------------------------------  IN: 4250 mL / OUT: 1650 mL / NET: 2600 mL      CAPILLARY BLOOD GLUCOSE          PHYSICAL EXAM:  GEN: female in NAD on RA  HEENT: NC/AT, MMM  CV: RRR, nml S1S2, no murmurs  PULM: nml effort, CTAB  ABD: Soft, non-distended, NABS, non-tender  NEURO  A/O x3,  L ankle in splint. No sensations in great toe-3rd toe. some sensation present in 4-5th toes.   PSYCH: Appropriate      MEDICATIONS:  MEDICATIONS  (STANDING):  acetaminophen     Tablet .. 975 milliGRAM(s) Oral every 8 hours  AQUAPHOR (petrolatum Ointment) 1 Application(s) Topical daily  aspirin enteric coated 325 milliGRAM(s) Oral two times a day  caspofungin IVPB 50 milliGRAM(s) IV Intermittent every 24 hours  cefTRIAXone   IVPB 2000 milliGRAM(s) IV Intermittent every 24 hours  DAPTOmycin IVPB 500 milliGRAM(s) IV Intermittent every 24 hours  lactated ringers. 1000 milliLiter(s) (150 mL/Hr) IV Continuous <Continuous>  oxyCODONE  ER Tablet 10 milliGRAM(s) Oral every 12 hours  pantoprazole    Tablet 40 milliGRAM(s) Oral before breakfast  polyethylene glycol 3350 17 Gram(s) Oral at bedtime  pregabalin 25 milliGRAM(s) Oral three times a day  senna 2 Tablet(s) Oral at bedtime    MEDICATIONS  (PRN):  aluminum hydroxide/magnesium hydroxide/simethicone Suspension 30 milliLiter(s) Oral four times a day PRN Indigestion  bisacodyl Suppository 10 milliGRAM(s) Rectal once PRN Constipation  cyclobenzaprine 5 milliGRAM(s) Oral three times a day PRN Muscle Spasm  HYDROmorphone   Tablet 2 milliGRAM(s) Oral every 4 hours PRN Moderate Pain (4 - 6)  HYDROmorphone   Tablet 4 milliGRAM(s) Oral every 4 hours PRN Severe Pain (7 - 10)  HYDROmorphone  Injectable 0.5 milliGRAM(s) IV Push every 4 hours PRN Breakthrough Pain  magnesium hydroxide Suspension 30 milliLiter(s) Oral daily PRN Constipation  ondansetron Injectable 4 milliGRAM(s) IV Push every 6 hours PRN Nausea and/or Vomiting      ALLERGIES:  Allergies    No Known Allergies    Intolerances        LABS:                        8.9    4.40  )-----------( 265      ( 23 Jan 2022 08:23 )             26.7     01-23    139  |  105  |  6<L>  ----------------------------<  84  4.0   |  25  |  0.65    Ca    9.0      23 Jan 2022 08:23  Mg     1.9     01-22            RADIOLOGY & ADDITIONAL TESTS: Reviewed.

## 2022-01-24 ENCOUNTER — TRANSCRIPTION ENCOUNTER (OUTPATIENT)
Age: 27
End: 2022-01-24

## 2022-01-24 LAB
ANION GAP SERPL CALC-SCNC: 13 MMOL/L — SIGNIFICANT CHANGE UP (ref 5–17)
BUN SERPL-MCNC: 6 MG/DL — LOW (ref 7–23)
CALCIUM SERPL-MCNC: 8.7 MG/DL — SIGNIFICANT CHANGE UP (ref 8.4–10.5)
CHLORIDE SERPL-SCNC: 104 MMOL/L — SIGNIFICANT CHANGE UP (ref 96–108)
CO2 SERPL-SCNC: 22 MMOL/L — SIGNIFICANT CHANGE UP (ref 22–31)
CREAT SERPL-MCNC: 0.65 MG/DL — SIGNIFICANT CHANGE UP (ref 0.5–1.3)
GLUCOSE SERPL-MCNC: 83 MG/DL — SIGNIFICANT CHANGE UP (ref 70–99)
HCT VFR BLD CALC: 25.3 % — LOW (ref 34.5–45)
HGB BLD-MCNC: 8.2 G/DL — LOW (ref 11.5–15.5)
MCHC RBC-ENTMCNC: 28 PG — SIGNIFICANT CHANGE UP (ref 27–34)
MCHC RBC-ENTMCNC: 32.4 GM/DL — SIGNIFICANT CHANGE UP (ref 32–36)
MCV RBC AUTO: 86.3 FL — SIGNIFICANT CHANGE UP (ref 80–100)
NRBC # BLD: 0 /100 WBCS — SIGNIFICANT CHANGE UP (ref 0–0)
PLATELET # BLD AUTO: 229 K/UL — SIGNIFICANT CHANGE UP (ref 150–400)
POTASSIUM SERPL-MCNC: 3.8 MMOL/L — SIGNIFICANT CHANGE UP (ref 3.5–5.3)
POTASSIUM SERPL-SCNC: 3.8 MMOL/L — SIGNIFICANT CHANGE UP (ref 3.5–5.3)
RBC # BLD: 2.93 M/UL — LOW (ref 3.8–5.2)
RBC # FLD: 12.7 % — SIGNIFICANT CHANGE UP (ref 10.3–14.5)
SODIUM SERPL-SCNC: 139 MMOL/L — SIGNIFICANT CHANGE UP (ref 135–145)
WBC # BLD: 4.14 K/UL — SIGNIFICANT CHANGE UP (ref 3.8–10.5)
WBC # FLD AUTO: 4.14 K/UL — SIGNIFICANT CHANGE UP (ref 3.8–10.5)

## 2022-01-24 PROCEDURE — 99233 SBSQ HOSP IP/OBS HIGH 50: CPT | Mod: GC

## 2022-01-24 PROCEDURE — 99232 SBSQ HOSP IP/OBS MODERATE 35: CPT | Mod: GC

## 2022-01-24 RX ORDER — MAGNESIUM HYDROXIDE 400 MG/1
30 TABLET, CHEWABLE ORAL
Qty: 0 | Refills: 0 | DISCHARGE
Start: 2022-01-24

## 2022-01-24 RX ORDER — OXYCODONE HYDROCHLORIDE 5 MG/1
1 TABLET ORAL
Qty: 14 | Refills: 0
Start: 2022-01-24 | End: 2022-01-30

## 2022-01-24 RX ORDER — SENNA PLUS 8.6 MG/1
2 TABLET ORAL
Qty: 0 | Refills: 0 | DISCHARGE
Start: 2022-01-24

## 2022-01-24 RX ORDER — ACETAMINOPHEN 500 MG
3 TABLET ORAL
Qty: 0 | Refills: 0 | DISCHARGE
Start: 2022-01-24

## 2022-01-24 RX ORDER — ASPIRIN/CALCIUM CARB/MAGNESIUM 324 MG
1 TABLET ORAL
Qty: 60 | Refills: 0
Start: 2022-01-24 | End: 2022-02-22

## 2022-01-24 RX ORDER — PETROLATUM,WHITE
1 JELLY (GRAM) TOPICAL
Qty: 0 | Refills: 0 | DISCHARGE
Start: 2022-01-24

## 2022-01-24 RX ORDER — CYCLOBENZAPRINE HYDROCHLORIDE 10 MG/1
1 TABLET, FILM COATED ORAL
Qty: 21 | Refills: 0
Start: 2022-01-24 | End: 2022-01-30

## 2022-01-24 RX ORDER — HYDROMORPHONE HYDROCHLORIDE 2 MG/ML
1 INJECTION INTRAMUSCULAR; INTRAVENOUS; SUBCUTANEOUS
Qty: 42 | Refills: 0
Start: 2022-01-24 | End: 2022-01-30

## 2022-01-24 RX ORDER — CEFPODOXIME PROXETIL 100 MG
200 TABLET ORAL EVERY 12 HOURS
Refills: 0 | Status: DISCONTINUED | OUTPATIENT
Start: 2022-01-24 | End: 2022-01-25

## 2022-01-24 RX ORDER — POLYETHYLENE GLYCOL 3350 17 G/17G
17 POWDER, FOR SOLUTION ORAL
Qty: 0 | Refills: 0 | DISCHARGE
Start: 2022-01-24

## 2022-01-24 RX ADMIN — Medication 100 MILLIGRAM(S): at 19:40

## 2022-01-24 RX ADMIN — Medication 975 MILLIGRAM(S): at 06:26

## 2022-01-24 RX ADMIN — OXYCODONE HYDROCHLORIDE 10 MILLIGRAM(S): 5 TABLET ORAL at 07:00

## 2022-01-24 RX ADMIN — HYDROMORPHONE HYDROCHLORIDE 4 MILLIGRAM(S): 2 INJECTION INTRAMUSCULAR; INTRAVENOUS; SUBCUTANEOUS at 22:40

## 2022-01-24 RX ADMIN — HYDROMORPHONE HYDROCHLORIDE 4 MILLIGRAM(S): 2 INJECTION INTRAMUSCULAR; INTRAVENOUS; SUBCUTANEOUS at 09:34

## 2022-01-24 RX ADMIN — HYDROMORPHONE HYDROCHLORIDE 4 MILLIGRAM(S): 2 INJECTION INTRAMUSCULAR; INTRAVENOUS; SUBCUTANEOUS at 23:10

## 2022-01-24 RX ADMIN — HYDROMORPHONE HYDROCHLORIDE 0.5 MILLIGRAM(S): 2 INJECTION INTRAMUSCULAR; INTRAVENOUS; SUBCUTANEOUS at 05:00

## 2022-01-24 RX ADMIN — HYDROMORPHONE HYDROCHLORIDE 4 MILLIGRAM(S): 2 INJECTION INTRAMUSCULAR; INTRAVENOUS; SUBCUTANEOUS at 09:04

## 2022-01-24 RX ADMIN — Medication 325 MILLIGRAM(S): at 06:25

## 2022-01-24 RX ADMIN — CEFTRIAXONE 100 MILLIGRAM(S): 500 INJECTION, POWDER, FOR SOLUTION INTRAMUSCULAR; INTRAVENOUS at 01:29

## 2022-01-24 RX ADMIN — OXYCODONE HYDROCHLORIDE 10 MILLIGRAM(S): 5 TABLET ORAL at 06:24

## 2022-01-24 RX ADMIN — POLYETHYLENE GLYCOL 3350 17 GRAM(S): 17 POWDER, FOR SOLUTION ORAL at 21:29

## 2022-01-24 RX ADMIN — HYDROMORPHONE HYDROCHLORIDE 4 MILLIGRAM(S): 2 INJECTION INTRAMUSCULAR; INTRAVENOUS; SUBCUTANEOUS at 01:20

## 2022-01-24 RX ADMIN — Medication 975 MILLIGRAM(S): at 14:47

## 2022-01-24 RX ADMIN — Medication 1 APPLICATION(S): at 12:51

## 2022-01-24 RX ADMIN — HYDROMORPHONE HYDROCHLORIDE 0.5 MILLIGRAM(S): 2 INJECTION INTRAMUSCULAR; INTRAVENOUS; SUBCUTANEOUS at 12:30

## 2022-01-24 RX ADMIN — Medication 975 MILLIGRAM(S): at 14:12

## 2022-01-24 RX ADMIN — Medication 325 MILLIGRAM(S): at 17:40

## 2022-01-24 RX ADMIN — SODIUM CHLORIDE 150 MILLILITER(S): 9 INJECTION, SOLUTION INTRAVENOUS at 03:49

## 2022-01-24 RX ADMIN — OXYCODONE HYDROCHLORIDE 10 MILLIGRAM(S): 5 TABLET ORAL at 18:00

## 2022-01-24 RX ADMIN — Medication 975 MILLIGRAM(S): at 22:00

## 2022-01-24 RX ADMIN — HYDROMORPHONE HYDROCHLORIDE 0.5 MILLIGRAM(S): 2 INJECTION INTRAMUSCULAR; INTRAVENOUS; SUBCUTANEOUS at 03:43

## 2022-01-24 RX ADMIN — HYDROMORPHONE HYDROCHLORIDE 4 MILLIGRAM(S): 2 INJECTION INTRAMUSCULAR; INTRAVENOUS; SUBCUTANEOUS at 01:29

## 2022-01-24 RX ADMIN — OXYCODONE HYDROCHLORIDE 10 MILLIGRAM(S): 5 TABLET ORAL at 17:39

## 2022-01-24 RX ADMIN — SENNA PLUS 2 TABLET(S): 8.6 TABLET ORAL at 21:28

## 2022-01-24 RX ADMIN — HYDROMORPHONE HYDROCHLORIDE 0.5 MILLIGRAM(S): 2 INJECTION INTRAMUSCULAR; INTRAVENOUS; SUBCUTANEOUS at 12:15

## 2022-01-24 RX ADMIN — Medication 975 MILLIGRAM(S): at 07:00

## 2022-01-24 RX ADMIN — Medication 200 MILLIGRAM(S): at 19:19

## 2022-01-24 RX ADMIN — Medication 25 MILLIGRAM(S): at 21:28

## 2022-01-24 RX ADMIN — HYDROMORPHONE HYDROCHLORIDE 4 MILLIGRAM(S): 2 INJECTION INTRAMUSCULAR; INTRAVENOUS; SUBCUTANEOUS at 16:32

## 2022-01-24 RX ADMIN — Medication 25 MILLIGRAM(S): at 14:13

## 2022-01-24 RX ADMIN — Medication 975 MILLIGRAM(S): at 21:27

## 2022-01-24 RX ADMIN — HYDROMORPHONE HYDROCHLORIDE 4 MILLIGRAM(S): 2 INJECTION INTRAMUSCULAR; INTRAVENOUS; SUBCUTANEOUS at 15:58

## 2022-01-24 RX ADMIN — HYDROMORPHONE HYDROCHLORIDE 0.5 MILLIGRAM(S): 2 INJECTION INTRAMUSCULAR; INTRAVENOUS; SUBCUTANEOUS at 19:33

## 2022-01-24 RX ADMIN — PANTOPRAZOLE SODIUM 40 MILLIGRAM(S): 20 TABLET, DELAYED RELEASE ORAL at 06:25

## 2022-01-24 RX ADMIN — HYDROMORPHONE HYDROCHLORIDE 0.5 MILLIGRAM(S): 2 INJECTION INTRAMUSCULAR; INTRAVENOUS; SUBCUTANEOUS at 00:00

## 2022-01-24 RX ADMIN — Medication 25 MILLIGRAM(S): at 06:25

## 2022-01-24 RX ADMIN — HYDROMORPHONE HYDROCHLORIDE 0.5 MILLIGRAM(S): 2 INJECTION INTRAMUSCULAR; INTRAVENOUS; SUBCUTANEOUS at 19:18

## 2022-01-24 NOTE — PROGRESS NOTE ADULT - SUBJECTIVE AND OBJECTIVE BOX
Plastic Surgery Note    Pt seen and examined on morning rounds. Pt comfortable without complaints, pain controlled but still requiring intermittent IV dilaudid.      Vital Signs Last 24 Hrs  T(C): 37 (01-24-22 @ 05:13), Max: 37 (01-24-22 @ 05:13)  T(F): 98.6 (01-24-22 @ 05:13), Max: 98.6 (01-24-22 @ 05:13)  HR: 91 (01-24-22 @ 05:13) (91 - 91)  BP: 101/65 (01-24-22 @ 05:13) (101/65 - 101/65)  BP(mean): --  RR: 17 (01-24-22 @ 05:13) (17 - 17)  SpO2: 94% (01-24-22 @ 05:13) (94% - 94%)  I&O's Summary    23 Jan 2022 07:01  -  24 Jan 2022 07:00  --------------------------------------------------------  IN: 4250 mL / OUT: 2275 mL / NET: 1975 mL          PHYSICAL EXAM:  General: Pt Alert and oriented, NAD  DSG C/D/I, prevena holding suction, drain serosang.   Pulses: toes wwp, cap refill <3 seconds                          8.9    4.40  )-----------( 265      ( 23 Jan 2022 08:23 )             26.7     01-23    139  |  105  |  6<L>  ----------------------------<  84  4.0   |  25  |  0.65    Ca    9.0      23 Jan 2022 08:23  Mg     1.9     01-22        A/P: 26yFemale POD#4 s/p ex-fix removal, abx spacer explant, ORIF of L fibula, flexor tendon tenotomies 2nd-4th toes, flap elevation and complex wound closure by Dr. Dickens with prevena placement and x1 .   - VSS  - ID following: Daptomycin 500 mg IV daily.  Ceftriaxone 2 grams IV daily, Caspofungin 50 mg IV daily   - prevena holding suction  - f/u ID final recs  - Pain MGMT following  - DVT ppx: ASA, SCD  - PT, WBS: DIANN DELACRUZE     Plastic Surgery     Sergio Clements, PGY-1

## 2022-01-24 NOTE — PROGRESS NOTE ADULT - SUBJECTIVE AND OBJECTIVE BOX
Ortho Note    Surgery: 26yFemale POD#6 s/p ex-fix removal, abx spacer explant, ORIF of L fibula, flexor tendon tenotomies 1st-4th toes and complex closure by plastics with prevena placement and x1   Patient seen and examined at bedside this AM   Pt comfortable without complaints, pain controlled  Denies CP, SOB, N/V, numbness/tingling     Vital Signs Last 24 Hrs  T(C): 36.9 (01-24-22 @ 09:47), Max: 36.9 (01-24-22 @ 09:47)  T(F): 98.4 (01-24-22 @ 09:47), Max: 98.4 (01-24-22 @ 09:47)  HR: 85 (01-24-22 @ 12:12) (85 - 88)  BP: 96/66 (01-24-22 @ 12:12) (94/60 - 96/66)  BP(mean): --  RR: 16 (01-24-22 @ 09:47) (16 - 16)  SpO2: 94% (01-24-22 @ 09:47) (94% - 94%)  AVSS    General: Pt Alert and oriented, NAD  Dressing C/D/I: Splint in place, prevena holding suction   extremity warm, well perfused   Sensation: diminished LLE   Motor: diminished LLE         A/P: 26yFemale POD#6 s/p ex-fix removal, abx spacer explant, ORIF of L fibula, flexor tendon tenotomies 1st-4th toes and complex closure by plastics with prevena placement and x1     - Stable, labs stable, VSS  - Pain Control: IV and PO PRN   - DVT ppx: ASA 325mg BID   - PT, WBS: NWB LLE   - likely start PO ABX; OR cultures no growth   - Dispo: home on PO abx pending PT clearance     Ortho Pager [phone]

## 2022-01-24 NOTE — DISCHARGE NOTE PROVIDER - NSDCCPTREATMENT_GEN_ALL_CORE_FT
PRINCIPAL PROCEDURE  Procedure: Tenotomy, flexor, toe, open, 1 tendon  Findings and Treatment:       SECONDARY PROCEDURE  Procedure: Tenotomy, flexor, toe, open, 1 tendon  Findings and Treatment:     Procedure: Repair of nonunion of fibula  Findings and Treatment:     Procedure: Removal, antibiotic spacer  Findings and Treatment:

## 2022-01-24 NOTE — DISCHARGE NOTE PROVIDER - NSDCACTIVITY_GEN_ALL_CORE
Do not drive or operate machinery/No heavy lifting/straining Do not drive or operate machinery/Do not make important decisions/No heavy lifting/straining Do not drive or operate machinery/Do not make important decisions/No heavy lifting/straining/Follow Instructions Provided by your Surgical Team

## 2022-01-24 NOTE — PROGRESS NOTE ADULT - SUBJECTIVE AND OBJECTIVE BOX
Ortho Note    Pt comfortable without complaints, pain controlled  Denies CP, SOB, N/V, numbness/tingling     Vital Signs Last 24 Hrs  T(C): 37 (01-24-22 @ 05:13), Max: 37 (01-24-22 @ 05:13)  T(F): 98.6 (01-24-22 @ 05:13), Max: 98.6 (01-24-22 @ 05:13)  HR: 91 (01-24-22 @ 05:13) (91 - 91)  BP: 101/65 (01-24-22 @ 05:13) (101/65 - 101/65)  BP(mean): --  RR: 17 (01-24-22 @ 05:13) (17 - 17)  SpO2: 94% (01-24-22 @ 05:13) (94% - 94%)  AVSS    General: Pt Alert and oriented, NAD  LLE wrapped in ACE from below knee to toe, Prevena x 1 holding suction  Pulses: unable to assess LLE pulse due to dsg  Sensation: no sensation in L 1st - 3rd toe, minimal sensation in 4th toe, + SILT in 5th toe (consistent w/ baseline)   Motor: unable to wiggle toes due to bulky akers splint placed on 1/23/22   : voiding well, passed TOV 1/20/22       A/P: 26yFemale s/p open treatment of fibular nonunion/malunion, ORIF syndesmosis, proximal tibial autograft by Dr. Wallis  - Stable  - Pain Control  - DVT ppx: asa 325   - PT, WBS: nwb rle   - ID: f/u ID for final PO recs   - nausea control/bowel regimen  - c/w home meds  - Drains: prevena x 1   - DVT ppx: SCDs  - Diet  - Dispo planning: [facility]         Ortho Pager [phone]

## 2022-01-24 NOTE — DISCHARGE NOTE PROVIDER - CARE PROVIDERS DIRECT ADDRESSES
,[email or web address]_Unknown

## 2022-01-24 NOTE — PROGRESS NOTE ADULT - SUBJECTIVE AND OBJECTIVE BOX
SUBJECTIVE / INTERVAL HPI: Patient seen and examined at bedside. Feels well, no complaints.     VITAL SIGNS:  Vital Signs Last 24 Hrs  T(C): 36.8 (24 Jan 2022 16:26), Max: 37 (24 Jan 2022 05:13)  T(F): 98.3 (24 Jan 2022 16:26), Max: 98.6 (24 Jan 2022 05:13)  HR: 95 (24 Jan 2022 16:26) (80 - 96)  BP: 102/70 (24 Jan 2022 16:26) (94/60 - 103/60)  BP(mean): --  RR: 18 (24 Jan 2022 16:26) (16 - 18)  SpO2: 97% (24 Jan 2022 16:26) (94% - 97%)    PHYSICAL EXAM:    General: NAD  HEENT: NC/AT; PERRL, anicteric sclera; MMM  Neck: supple  Cardiovascular: +S1/S2; RRR  Respiratory: CTA B/L; no W/R/R  Gastrointestinal: soft, NT/ND; +BSx4  Extremities: L ankle in splint. No sensations in great toe-3rd toe. some sensation present in 4-5th toes.  Vascular: 2+ radial, DP/PT pulses B/L  Neurological: AAOx3; no focal deficits    MEDICATIONS:  MEDICATIONS  (STANDING):  acetaminophen     Tablet .. 975 milliGRAM(s) Oral every 8 hours  AQUAPHOR (petrolatum Ointment) 1 Application(s) Topical daily  aspirin enteric coated 325 milliGRAM(s) Oral two times a day  cefpodoxime 200 milliGRAM(s) Oral every 12 hours  doxycycline hyclate Capsule 100 milliGRAM(s) Oral every 12 hours  lactated ringers. 1000 milliLiter(s) (150 mL/Hr) IV Continuous <Continuous>  oxyCODONE  ER Tablet 10 milliGRAM(s) Oral every 12 hours  pantoprazole    Tablet 40 milliGRAM(s) Oral before breakfast  polyethylene glycol 3350 17 Gram(s) Oral at bedtime  pregabalin 25 milliGRAM(s) Oral three times a day  senna 2 Tablet(s) Oral at bedtime    MEDICATIONS  (PRN):  aluminum hydroxide/magnesium hydroxide/simethicone Suspension 30 milliLiter(s) Oral four times a day PRN Indigestion  bisacodyl Suppository 10 milliGRAM(s) Rectal once PRN Constipation  cyclobenzaprine 5 milliGRAM(s) Oral three times a day PRN Muscle Spasm  HYDROmorphone   Tablet 2 milliGRAM(s) Oral every 4 hours PRN Moderate Pain (4 - 6)  HYDROmorphone   Tablet 4 milliGRAM(s) Oral every 4 hours PRN Severe Pain (7 - 10)  HYDROmorphone  Injectable 0.5 milliGRAM(s) IV Push every 4 hours PRN Breakthrough Pain  magnesium hydroxide Suspension 30 milliLiter(s) Oral daily PRN Constipation  ondansetron Injectable 4 milliGRAM(s) IV Push every 6 hours PRN Nausea and/or Vomiting      ALLERGIES:  Allergies    No Known Allergies    Intolerances        LABS:                        8.2    4.14  )-----------( 229      ( 24 Jan 2022 08:01 )             25.3     01-24    139  |  104  |  6<L>  ----------------------------<  83  3.8   |  22  |  0.65    Ca    8.7      24 Jan 2022 08:00          CAPILLARY BLOOD GLUCOSE          RADIOLOGY & ADDITIONAL TESTS: Reviewed.

## 2022-01-24 NOTE — DISCHARGE NOTE PROVIDER - CARE PROVIDER_API CALL
Chapin Wallis)  Orthopaedic Surgery  [address], [address] [place] [address]  Phone: [phone]  Fax: [phone]  Follow Up Time:    Chapin Wallis)  Orthopaedic Surgery  [address], [address] [place] [address]  Phone: [phone]  Fax: [phone]  Follow Up Time:     Kathleen Carmona)  Infectious Disease; Internal Medicine  [address] [place] [address]  Phone: [phone]  Fax: [phone]  Follow Up Time:    Chapin Wallis)  Orthopaedic Surgery  [address] [place] [address]  Phone: [phone]  Fax: [phone]  Scheduled Appointment: 01/28/2022    Kathleen Carmona)  Infectious Disease; Internal Medicine  [address] [place] [address]  Phone: [phone]  Fax: [phone]  Follow Up Time: 2 weeks    Jac Dickens)  Plastic Surgery  [address] [place] [address]  Phone: [phone]  Fax: [phone]  Follow Up Time: 1 week

## 2022-01-24 NOTE — DISCHARGE NOTE PROVIDER - PROVIDER TOKENS
PROVIDER:[TOKEN:[[id number]]] PROVIDER:[TOKEN:[[id number]]],PROVIDER:[TOKEN:[[id number]]] PROVIDER:[TOKEN:[[id number]],SCHEDULEDAPPT:[01/28/2022]],PROVIDER:[TOKEN:[[id number]],FOLLOWUP:[2 weeks]],PROVIDER:[TOKEN:[[id number]],FOLLOWUP:[1 week]]

## 2022-01-24 NOTE — DISCHARGE NOTE PROVIDER - NSDCMRMEDTOKEN_GEN_ALL_CORE_FT
acetaminophen 325 mg oral tablet: 3 tab(s) orally every 8 hours  calcium carbonate 1250 mg (500 mg elemental calcium) oral tablet: 1 tab(s) orally once a day  cholecalciferol oral tablet: 1000 unit(s) orally once a day  oxyCODONE 5 mg oral tablet: 1-2 tab(s) orally every 4-6 hours, As needed, Moderate to severe pain MDD:6   acetaminophen 325 mg oral tablet: 3 tab(s) orally every 8 hours  acetaminophen 325 mg oral tablet: 3 tab(s) orally every 8 hours, As Needed mild pain  aluminum hydroxide-magnesium hydroxide 200 mg-200 mg/5 mL oral suspension: 30 milliliter(s) orally 4 times a day, As needed, Indigestion  aspirin 325 mg oral delayed release tablet: 1 tab(s) orally 2 times a day for 30 days postop  calcium carbonate 1250 mg (500 mg elemental calcium) oral tablet: 1 tab(s) orally once a day  cholecalciferol oral tablet: 1000 unit(s) orally once a day  cyclobenzaprine 5 mg oral tablet: 1 tab(s) orally 3 times a day, As needed, Muscle Spasm MDD:3  HYDROmorphone 2 mg oral tablet: 1-2 tab(s) orally every 4 hours, As needed, Severe pain MDD:6  magnesium hydroxide 8% oral suspension: 30 milliliter(s) orally once a day, As needed, Constipation  oxyCODONE 10 mg oral tablet, extended release: 1 tab(s) orally every 12 hours MDD:2  oxyCODONE 5 mg oral tablet: 1-2 tab(s) orally every 4-6 hours, As needed, Moderate to severe pain MDD:6  petrolatum topical ointment: 1 application topically once a day  polyethylene glycol 3350 oral powder for reconstitution: 17 gram(s) orally once a day (at bedtime)  pregabalin 25 mg oral capsule: 1 cap(s) orally 3 times a day MDD:3  senna oral tablet: 2 tab(s) orally once a day (at bedtime)   acetaminophen 325 mg oral tablet: 3 tab(s) orally every 8 hours, As Needed mild pain  aluminum hydroxide-magnesium hydroxide 200 mg-200 mg/5 mL oral suspension: 30 milliliter(s) orally 4 times a day, As needed, Indigestion  aspirin 325 mg oral delayed release tablet: 1 tab(s) orally 2 times a day for 30 days postop  calcium carbonate 1250 mg (500 mg elemental calcium) oral tablet: 1 tab(s) orally once a day  cefpodoxime 200 mg oral tablet: 1 tab(s) orally every 12 hours MDD:2  cholecalciferol oral tablet: 1000 unit(s) orally once a day  doxycycline monohydrate 100 mg oral capsule: 1 cap(s) orally every 12 hours MDD:2  magnesium hydroxide 8% oral suspension: 30 milliliter(s) orally once a day, As needed, Constipation  petrolatum topical ointment: 1 application topically once a day  polyethylene glycol 3350 oral powder for reconstitution: 17 gram(s) orally once a day (at bedtime)  senna oral tablet: 2 tab(s) orally once a day (at bedtime)

## 2022-01-24 NOTE — DISCHARGE NOTE PROVIDER - HOSPITAL COURSE
Admitted  Surgery  Thais-op Antibiotics  Pain control  DVT prophylaxis  OOB/Physical Therapy Admitted  Surgery ex-fix removal, abx spacer explant, ORIF of L fibula, flexor tendon tenotomies 1st-4th toes and complex closure by plastics with prevena   Thais-op Antibiotics  Pain control  DVT prophylaxis  OOB/Physical Therapy  Plastic surgery consult  Pain management Consult  Infectious Disease consulted on standing daptomycin, ceftriaxone and caspofungin postop plan to transition to oral antibiotic regimen of doxycycline and cefpodoxime on discharge Admitted  Surgery ex-fix removal, abx spacer explant, ORIF of L fibula, flexor tendon tenotomies 2nd-4th toes, flap elevation and complex wound closure by Dr. Dickens with prevena placement  Thais-op Antibiotics  Pain control  DVT prophylaxis  OOB/Physical Therapy  Plastic surgery consult  Pain management Consult  Infectious Disease consulted on standing daptomycin, ceftriaxone and caspofungin transitioned to oral antibiotic regimen of doxycycline and cefpodoxime on 1/24

## 2022-01-24 NOTE — DISCHARGE NOTE PROVIDER - NSDCFUADDINST_GEN_ALL_CORE_FT
You are not to bear any weight on your left leg  You may take showers. Keep the battery pack dry. You may disconnect the dressing from the battery pack prior to showers and keep away from water. To do this, hold the on/off button down until it turns off, close the clamp on the tubing, then disconnect the tubing from the battery pack. Do the reverse to turn it back on.  No soaking in bathtubs.  The dressing has a battery that usually dies in 7 days. Once this occurs, you may remove the dressing and dispose of it, then leave incision open to air. Keep incision clean and dry.   No strenuous activity, heavy lifting, driving or returning to work until cleared by MD.  Try to have regular bowel movements, take stool softener or laxative if necessary.  May take Pepcid or Zantac for upset stomach.  May take Aleve or Naproxen instead of Meloxicam/Celebrex.  Swelling may travel all the way down leg to foot, this is normal and will subside in a few weeks.  Call to schedule an appt with Dr. Wallis for follow up, if you have staples or sutures they will be removed in office.  Contact your doctor if you experience: fever greater than 101.5, chills, chest pain, difficulty breathing, redness or excessive drainage around the incision, other concerns.  Follow up with your primary care provider.   You are to followup with your infectious disease doctor, Dr. Carmona in 2 weeks You are not to bear any weight on your left leg  You may take showers. Keep the battery pack dry. You may disconnect the dressing from the battery pack prior to showers and keep away from water. To do this, hold the on/off button down until it turns off, close the clamp on the tubing, then disconnect the tubing from the battery pack. Do the reverse to turn it back on.  No soaking in bathtubs.  The dressing has a battery that usually dies in 7 days. Once this occurs, you may remove the dressing and dispose of it, then leave incision open to air. Keep incision clean and dry.   No strenuous activity, heavy lifting, driving or returning to work until cleared by MD.  Try to have regular bowel movements, take stool softener or laxative if necessary.  May take Pepcid or Zantac for upset stomach.  May take Aleve or Naproxen instead of Meloxicam/Celebrex.  Swelling may travel all the way down leg to foot, this is normal and will subside in a few weeks.  Call to schedule an appt with Dr. Wallis for follow up, if you have staples or sutures they will be removed in office.  Contact your doctor if you experience: fever greater than 101.5, chills, chest pain, difficulty breathing, redness or excessive drainage around the incision, other concerns.  Follow up with your primary care provider.   You are to call to make a followup appointment with your infectious disease doctor, Dr. Carmona  You are to call to make a followup appointment with your plastic surgeon for a wound check  You are not to bear any weight on your left leg. Use an assistive device when ambulating.  Do not get dressing wet. Do not remove dressing.   No strenuous activity, heavy lifting, driving or returning to work until cleared by MD.  Try to have regular bowel movements, take stool softener or laxative if necessary.  May take Pepcid or Zantac for upset stomach.  May take Aleve or Naproxen as needed for pain  Swelling may travel all the way down leg to toes, this is normal and will subside in a few weeks.  Call to schedule an appt with Dr. Wallis for follow up, if you have staples or sutures they will be removed in office.  Contact your doctor if you experience: fever greater than 101.5, chills, chest pain, difficulty breathing, redness or excessive drainage around the incision, other concerns.  Follow up with your primary care provider.   You are to call to make a followup appointment with your infectious disease doctor, Dr. Carmona  You are to call to make a followup appointment with your plastic surgeon for a wound check

## 2022-01-24 NOTE — PROGRESS NOTE ADULT - SUBJECTIVE AND OBJECTIVE BOX
Pain Management Progress Note - [facility] [phone]    HPI: Patient seen and examined today. Patient reports continued improvement of pain to the left ankle. Patient reports PO Dilaudid lasts much longer than PO Oxycodone. Patient reports adequate pain control, but still reports needing occasional Dilaudid breakthrough. Patient denies side effects from current pain regimen.    Pertinent PMH: Pain at: ___Back ___Neck___Knee ___Hip ___Shoulder ___ Opioid tolerance    Pain is ___X sharp ____dull ___burning ___achy ___ Intensity: ____ mild __X__mod __X__severe     Location _X____surgical site _____cervical _____lumbar ____abd _____upper ext_X___lower ext    Worse with __X__activity __X__movement _____physical therapy___ Rest    Improved with _X___medication _X___rest ____physical therapy    PMH:  Left tibial neuropathy  PAD (peripheral artery disease)  Status post ORIF of fracture of ankle    Medications:  potassium chloride    Tablet ER  magnesium sulfate  IVPB  potassium chloride    Tablet ER  pregabalin  HYDROmorphone   Tablet  HYDROmorphone   Tablet  AQUAPHOR (petrolatum Ointment)  cyclobenzaprine  oxyCODONE    IR  oxyCODONE    IR  oxyCODONE  ER Tablet  HYDROmorphone  Injectable  cefTRIAXone   IVPB  caspofungin IVPB  DAPTOmycin IVPB  HYDROmorphone  Injectable  acetaminophen     Tablet ..  acetaminophen   IVPB ..  HYDROmorphone  Injectable  aspirin enteric coated  polyethylene glycol 3350  senna  magnesium hydroxide Suspension  bisacodyl Suppository  pantoprazole    Tablet  ondansetron Injectable  aluminum hydroxide/magnesium hydroxide/simethicone Suspension  ceFAZolin   IVPB  HYDROmorphone  Injectable  oxyCODONE    IR  oxyCODONE    IR  acetaminophen     Tablet ..  lactated ringers.  chlorhexidine 2% Cloths  povidone iodine 5% Nasal Swab    ROS: Const:  N___febrile   Eyes:___ENT:___CV: __N_chest pain  Resp: __N__sob  GI:__N_nausea _N__vomiting _N___abd pain ___npo ___clears _Y__full diet __bm  :___ Musk: __Y_pain ___spasm  Skin:___ Neuro:  N___sedation__N_confusion____N numbness _N__weakness _N__paresthesia  Psych:___anxiety  Endo:___ Heme:___Allergy:__NKDA_      01-24 @ 08:89210 mL/min/1.73M2  Hemoglobin: 8.2 g/dL (01-24 @ 08:01)  Hemoglobin: 8.9 g/dL (01-23 @ 08:23)  T(C): 36.9 (01-24-22 @ 09:47), Max: 37.2 (01-23-22 @ 16:28)  HR: 88 (01-24-22 @ 09:47) (88 - 98)  BP: 94/60 (01-24-22 @ 09:47) (94/60 - 118/79)  RR: 16 (01-24-22 @ 09:47) (16 - 18)  SpO2: 94% (01-24-22 @ 09:47) (94% - 96%)  Wt(kg): --     PHYSICAL EXAM:  Gen Appearance: __X_no acute distress __X_appropriate       Neuro: ___SILT feet____ EOM Intact Psych: AAOX_3_, __X_mood/affect appropriate        Eyes: __X_conjunctiva WNL  ___X__ Pupils equal and round        ENT: _X__ears and nose atraumatic__X_ Hearing grossly intact        Neck: _X__trachea midline, no visible masses ___thyroid without palpable mass    Resp: __X_Nml WOB____No tactile fremitus ___clear to auscultation    Cardio: __X_extremities free from edema ____pedal pulses palpable    GI/Abdomen: ___soft _____ Nontender____X__Nondistended_____HSM    Lymphatic: ___no palpable nodes in neck  ___no palpable nodes calves and feet    Skin/Wound: ___Incision, __X_Dressing c/d/i,   ____surrounding tissues soft,  _X__drain/chest tube present____    Muscular: EHL ___/5  Gastrocnemius___/5    _X__absent clubbing/cyanosis         ASSESSMENT:  This is a 26y old Female with a history of PAD POD 6 s/p exfix removal, antibiotic spacer explant, ORIF of left fibula, flexor tendon tenotomies 1st - 4th toes and complex plastic closure with improvement of left ankle pain.    Recommended Treatment PLAN:  1. Continue Dilaudid 2-4 mg PO q4h PRN moderate-severe pain  2. Continue Dilaudid 0.5 mg IVP q4h PRN breakthrough pain  3. Continue Oxycodone ER 10 mg PO BID  4. Continue Tylenol 975 mg PO TID  5. Continue Flexeril 5 mg PO q8h PRN muscle spasm  6. Continue Lyrica 25 mg PO TID  Plan discussed with Dr. Rene

## 2022-01-24 NOTE — DISCHARGE NOTE PROVIDER - NSDCCPCAREPLAN_GEN_ALL_CORE_FT
PRINCIPAL DISCHARGE DIAGNOSIS  Diagnosis: Closed left ankle fracture, with nonunion, subsequent encounter  Assessment and Plan of Treatment:

## 2022-01-24 NOTE — PROGRESS NOTE ADULT - SUBJECTIVE AND OBJECTIVE BOX
INTERVAL HPI/OVERNIGHT EVENTS: CASTRO O/N    no new events  no chest pain/sob  ros otherwise negative       OBJECTIVE:    VITAL SIGNS:  ICU Vital Signs Last 24 Hrs  T(C): 37.2 (23 Jan 2022 16:28), Max: 37.2 (23 Jan 2022 01:02)  T(F): 98.9 (23 Jan 2022 16:28), Max: 98.9 (23 Jan 2022 01:02)  HR: 98 (23 Jan 2022 16:28) (85 - 98)  BP: 118/79 (23 Jan 2022 16:28) (93/64 - 118/79)  BP(mean): 80 (23 Jan 2022 05:00) (80 - 80)  ABP: --  ABP(mean): --  RR: 18 (23 Jan 2022 16:28) (17 - 18)  SpO2: 96% (23 Jan 2022 16:28) (94% - 97%)        01-22 @ 07:01 - 01-23 @ 07:00  --------------------------------------------------------  IN: 5200 mL / OUT: 2055 mL / NET: 3145 mL    01-23 @ 07:01 - 01-24 @ 00:46  --------------------------------------------------------  IN: 4250 mL / OUT: 1650 mL / NET: 2600 mL      CAPILLARY BLOOD GLUCOSE          PHYSICAL EXAM:  GEN: female in NAD on RA  HEENT: NC/AT, MMM  CV: RRR, nml S1S2, no murmurs  PULM: nml effort, CTAB  ABD: Soft, non-distended, NABS, non-tender  NEURO  A/O x3,  L ankle in splint. No sensations in great toe-3rd toe. some sensation present in 4-5th toes.   PSYCH: Appropriate      MEDICATIONS:  MEDICATIONS  (STANDING):  acetaminophen     Tablet .. 975 milliGRAM(s) Oral every 8 hours  AQUAPHOR (petrolatum Ointment) 1 Application(s) Topical daily  aspirin enteric coated 325 milliGRAM(s) Oral two times a day  caspofungin IVPB 50 milliGRAM(s) IV Intermittent every 24 hours  cefTRIAXone   IVPB 2000 milliGRAM(s) IV Intermittent every 24 hours  DAPTOmycin IVPB 500 milliGRAM(s) IV Intermittent every 24 hours  lactated ringers. 1000 milliLiter(s) (150 mL/Hr) IV Continuous <Continuous>  oxyCODONE  ER Tablet 10 milliGRAM(s) Oral every 12 hours  pantoprazole    Tablet 40 milliGRAM(s) Oral before breakfast  polyethylene glycol 3350 17 Gram(s) Oral at bedtime  pregabalin 25 milliGRAM(s) Oral three times a day  senna 2 Tablet(s) Oral at bedtime    MEDICATIONS  (PRN):  aluminum hydroxide/magnesium hydroxide/simethicone Suspension 30 milliLiter(s) Oral four times a day PRN Indigestion  bisacodyl Suppository 10 milliGRAM(s) Rectal once PRN Constipation  cyclobenzaprine 5 milliGRAM(s) Oral three times a day PRN Muscle Spasm  HYDROmorphone   Tablet 2 milliGRAM(s) Oral every 4 hours PRN Moderate Pain (4 - 6)  HYDROmorphone   Tablet 4 milliGRAM(s) Oral every 4 hours PRN Severe Pain (7 - 10)  HYDROmorphone  Injectable 0.5 milliGRAM(s) IV Push every 4 hours PRN Breakthrough Pain  magnesium hydroxide Suspension 30 milliLiter(s) Oral daily PRN Constipation  ondansetron Injectable 4 milliGRAM(s) IV Push every 6 hours PRN Nausea and/or Vomiting      ALLERGIES:  Allergies    No Known Allergies    Intolerances        LABS:                        8.9    4.40  )-----------( 265      ( 23 Jan 2022 08:23 )             26.7     01-23    139  |  105  |  6<L>  ----------------------------<  84  4.0   |  25  |  0.65    Ca    9.0      23 Jan 2022 08:23  Mg     1.9     01-22            RADIOLOGY & ADDITIONAL TESTS: Reviewed.

## 2022-01-25 ENCOUNTER — TRANSCRIPTION ENCOUNTER (OUTPATIENT)
Age: 27
End: 2022-01-25

## 2022-01-25 VITALS
DIASTOLIC BLOOD PRESSURE: 74 MMHG | TEMPERATURE: 98 F | HEART RATE: 79 BPM | OXYGEN SATURATION: 97 % | RESPIRATION RATE: 18 BRPM | SYSTOLIC BLOOD PRESSURE: 113 MMHG

## 2022-01-25 LAB
ANION GAP SERPL CALC-SCNC: 11 MMOL/L — SIGNIFICANT CHANGE UP (ref 5–17)
BUN SERPL-MCNC: 8 MG/DL — SIGNIFICANT CHANGE UP (ref 7–23)
CALCIUM SERPL-MCNC: 9 MG/DL — SIGNIFICANT CHANGE UP (ref 8.4–10.5)
CHLORIDE SERPL-SCNC: 106 MMOL/L — SIGNIFICANT CHANGE UP (ref 96–108)
CO2 SERPL-SCNC: 24 MMOL/L — SIGNIFICANT CHANGE UP (ref 22–31)
CREAT SERPL-MCNC: 0.73 MG/DL — SIGNIFICANT CHANGE UP (ref 0.5–1.3)
GLUCOSE SERPL-MCNC: 94 MG/DL — SIGNIFICANT CHANGE UP (ref 70–99)
HCT VFR BLD CALC: 26.7 % — LOW (ref 34.5–45)
HGB BLD-MCNC: 8.5 G/DL — LOW (ref 11.5–15.5)
MCHC RBC-ENTMCNC: 27.9 PG — SIGNIFICANT CHANGE UP (ref 27–34)
MCHC RBC-ENTMCNC: 31.8 GM/DL — LOW (ref 32–36)
MCV RBC AUTO: 87.5 FL — SIGNIFICANT CHANGE UP (ref 80–100)
NRBC # BLD: 0 /100 WBCS — SIGNIFICANT CHANGE UP (ref 0–0)
PLATELET # BLD AUTO: 264 K/UL — SIGNIFICANT CHANGE UP (ref 150–400)
POTASSIUM SERPL-MCNC: 3.8 MMOL/L — SIGNIFICANT CHANGE UP (ref 3.5–5.3)
POTASSIUM SERPL-SCNC: 3.8 MMOL/L — SIGNIFICANT CHANGE UP (ref 3.5–5.3)
RBC # BLD: 3.05 M/UL — LOW (ref 3.8–5.2)
RBC # FLD: 12.8 % — SIGNIFICANT CHANGE UP (ref 10.3–14.5)
SODIUM SERPL-SCNC: 141 MMOL/L — SIGNIFICANT CHANGE UP (ref 135–145)
WBC # BLD: 4.16 K/UL — SIGNIFICANT CHANGE UP (ref 3.8–10.5)
WBC # FLD AUTO: 4.16 K/UL — SIGNIFICANT CHANGE UP (ref 3.8–10.5)

## 2022-01-25 PROCEDURE — 83735 ASSAY OF MAGNESIUM: CPT

## 2022-01-25 PROCEDURE — 85027 COMPLETE CBC AUTOMATED: CPT

## 2022-01-25 PROCEDURE — C1889: CPT

## 2022-01-25 PROCEDURE — C1769: CPT

## 2022-01-25 PROCEDURE — 80048 BASIC METABOLIC PNL TOTAL CA: CPT

## 2022-01-25 PROCEDURE — 87070 CULTURE OTHR SPECIMN AEROBIC: CPT

## 2022-01-25 PROCEDURE — 73630 X-RAY EXAM OF FOOT: CPT

## 2022-01-25 PROCEDURE — 87102 FUNGUS ISOLATION CULTURE: CPT

## 2022-01-25 PROCEDURE — 76000 FLUOROSCOPY <1 HR PHYS/QHP: CPT

## 2022-01-25 PROCEDURE — 99233 SBSQ HOSP IP/OBS HIGH 50: CPT

## 2022-01-25 PROCEDURE — 87075 CULTR BACTERIA EXCEPT BLOOD: CPT

## 2022-01-25 PROCEDURE — 97530 THERAPEUTIC ACTIVITIES: CPT

## 2022-01-25 PROCEDURE — 82550 ASSAY OF CK (CPK): CPT

## 2022-01-25 PROCEDURE — 73590 X-RAY EXAM OF LOWER LEG: CPT

## 2022-01-25 PROCEDURE — 97116 GAIT TRAINING THERAPY: CPT

## 2022-01-25 PROCEDURE — C1713: CPT

## 2022-01-25 PROCEDURE — 97161 PT EVAL LOW COMPLEX 20 MIN: CPT

## 2022-01-25 PROCEDURE — 36415 COLL VENOUS BLD VENIPUNCTURE: CPT

## 2022-01-25 PROCEDURE — 90686 IIV4 VACC NO PRSV 0.5 ML IM: CPT

## 2022-01-25 PROCEDURE — C1734: CPT

## 2022-01-25 PROCEDURE — 73610 X-RAY EXAM OF ANKLE: CPT

## 2022-01-25 RX ORDER — CEFPODOXIME PROXETIL 100 MG
1 TABLET ORAL
Qty: 84 | Refills: 0
Start: 2022-01-25 | End: 2022-03-07

## 2022-01-25 RX ORDER — OXYCODONE HYDROCHLORIDE 5 MG/1
1 TABLET ORAL
Qty: 14 | Refills: 0
Start: 2022-01-25 | End: 2022-01-31

## 2022-01-25 RX ORDER — MORPHINE SULFATE 50 MG/1
1 CAPSULE, EXTENDED RELEASE ORAL
Qty: 14 | Refills: 0
Start: 2022-01-25 | End: 2022-02-07

## 2022-01-25 RX ORDER — HYDROMORPHONE HYDROCHLORIDE 2 MG/ML
1 INJECTION INTRAMUSCULAR; INTRAVENOUS; SUBCUTANEOUS
Qty: 42 | Refills: 0
Start: 2022-01-25 | End: 2022-01-31

## 2022-01-25 RX ORDER — CYCLOBENZAPRINE HYDROCHLORIDE 10 MG/1
1 TABLET, FILM COATED ORAL
Qty: 21 | Refills: 0
Start: 2022-01-25 | End: 2022-01-31

## 2022-01-25 RX ORDER — ASPIRIN/CALCIUM CARB/MAGNESIUM 324 MG
1 TABLET ORAL
Qty: 60 | Refills: 0
Start: 2022-01-25 | End: 2022-02-23

## 2022-01-25 RX ORDER — BNT162B2 0.23 MG/2.25ML
0.3 INJECTION, SUSPENSION INTRAMUSCULAR ONCE
Refills: 0 | Status: COMPLETED | OUTPATIENT
Start: 2022-01-25 | End: 2022-01-25

## 2022-01-25 RX ORDER — INFLUENZA VIRUS VACCINE 15; 15; 15; 15 UG/.5ML; UG/.5ML; UG/.5ML; UG/.5ML
0.5 SUSPENSION INTRAMUSCULAR ONCE
Refills: 0 | Status: COMPLETED | OUTPATIENT
Start: 2022-01-25 | End: 2022-01-25

## 2022-01-25 RX ADMIN — Medication 25 MILLIGRAM(S): at 15:06

## 2022-01-25 RX ADMIN — HYDROMORPHONE HYDROCHLORIDE 4 MILLIGRAM(S): 2 INJECTION INTRAMUSCULAR; INTRAVENOUS; SUBCUTANEOUS at 12:38

## 2022-01-25 RX ADMIN — HYDROMORPHONE HYDROCHLORIDE 4 MILLIGRAM(S): 2 INJECTION INTRAMUSCULAR; INTRAVENOUS; SUBCUTANEOUS at 13:10

## 2022-01-25 RX ADMIN — Medication 975 MILLIGRAM(S): at 05:25

## 2022-01-25 RX ADMIN — PANTOPRAZOLE SODIUM 40 MILLIGRAM(S): 20 TABLET, DELAYED RELEASE ORAL at 07:12

## 2022-01-25 RX ADMIN — Medication 975 MILLIGRAM(S): at 15:06

## 2022-01-25 RX ADMIN — Medication 200 MILLIGRAM(S): at 07:12

## 2022-01-25 RX ADMIN — OXYCODONE HYDROCHLORIDE 10 MILLIGRAM(S): 5 TABLET ORAL at 06:00

## 2022-01-25 RX ADMIN — HYDROMORPHONE HYDROCHLORIDE 0.5 MILLIGRAM(S): 2 INJECTION INTRAMUSCULAR; INTRAVENOUS; SUBCUTANEOUS at 01:19

## 2022-01-25 RX ADMIN — Medication 325 MILLIGRAM(S): at 05:24

## 2022-01-25 RX ADMIN — Medication 25 MILLIGRAM(S): at 05:25

## 2022-01-25 RX ADMIN — Medication 1 APPLICATION(S): at 11:12

## 2022-01-25 RX ADMIN — Medication 100 MILLIGRAM(S): at 07:12

## 2022-01-25 RX ADMIN — HYDROMORPHONE HYDROCHLORIDE 0.5 MILLIGRAM(S): 2 INJECTION INTRAMUSCULAR; INTRAVENOUS; SUBCUTANEOUS at 14:18

## 2022-01-25 RX ADMIN — SODIUM CHLORIDE 150 MILLILITER(S): 9 INJECTION, SOLUTION INTRAVENOUS at 07:11

## 2022-01-25 RX ADMIN — HYDROMORPHONE HYDROCHLORIDE 4 MILLIGRAM(S): 2 INJECTION INTRAMUSCULAR; INTRAVENOUS; SUBCUTANEOUS at 07:48

## 2022-01-25 RX ADMIN — INFLUENZA VIRUS VACCINE 0.5 MILLILITER(S): 15; 15; 15; 15 SUSPENSION INTRAMUSCULAR at 14:01

## 2022-01-25 RX ADMIN — HYDROMORPHONE HYDROCHLORIDE 0.5 MILLIGRAM(S): 2 INJECTION INTRAMUSCULAR; INTRAVENOUS; SUBCUTANEOUS at 08:51

## 2022-01-25 RX ADMIN — Medication 975 MILLIGRAM(S): at 15:36

## 2022-01-25 RX ADMIN — HYDROMORPHONE HYDROCHLORIDE 0.5 MILLIGRAM(S): 2 INJECTION INTRAMUSCULAR; INTRAVENOUS; SUBCUTANEOUS at 09:30

## 2022-01-25 RX ADMIN — BNT162B2 0.3 MILLILITER(S): 0.23 INJECTION, SUSPENSION INTRAMUSCULAR at 11:40

## 2022-01-25 RX ADMIN — HYDROMORPHONE HYDROCHLORIDE 0.5 MILLIGRAM(S): 2 INJECTION INTRAMUSCULAR; INTRAVENOUS; SUBCUTANEOUS at 14:03

## 2022-01-25 RX ADMIN — HYDROMORPHONE HYDROCHLORIDE 0.5 MILLIGRAM(S): 2 INJECTION INTRAMUSCULAR; INTRAVENOUS; SUBCUTANEOUS at 00:55

## 2022-01-25 RX ADMIN — HYDROMORPHONE HYDROCHLORIDE 4 MILLIGRAM(S): 2 INJECTION INTRAMUSCULAR; INTRAVENOUS; SUBCUTANEOUS at 08:18

## 2022-01-25 RX ADMIN — OXYCODONE HYDROCHLORIDE 10 MILLIGRAM(S): 5 TABLET ORAL at 05:25

## 2022-01-25 RX ADMIN — Medication 975 MILLIGRAM(S): at 06:00

## 2022-01-25 NOTE — PROGRESS NOTE ADULT - SUBJECTIVE AND OBJECTIVE BOX
Ortho Note    Pt comfortable without complaints, pain controlled  Denies CP, SOB, N/V, numbness/tingling     Vital Signs Last 24 Hrs  T(C): 36.7 (01-25-22 @ 08:45), Max: 36.8 (01-25-22 @ 04:48)  T(F): 98 (01-25-22 @ 08:45), Max: 98.2 (01-25-22 @ 04:48)  HR: 89 (01-25-22 @ 08:45) (89 - 91)  BP: 117/78 (01-25-22 @ 08:45) (101/66 - 117/78)  BP(mean): --  RR: 16 (01-25-22 @ 08:45) (16 - 16)  SpO2: 94% (01-25-22 @ 08:45) (94% - 95%)  AVSS    General: Pt Alert and oriented, NAD  LLE wrapped in ACE from below knee to toe, Prevena x 1 holding suction  Pulses: unable to assess LLE pulse due to dsg  Sensation: no sensation in L 1st - 3rd toe, minimal sensation in 4th toe, + SILT in 5th toe (consistent w/ baseline)   Motor: unable to wiggle toes due to bulky akers splint placed on 1/23/22   : voiding well, passed TOV 1/20/22       A/P: 26yFemale s/p open treatment of fibular nonunion/malunion, ORIF syndesmosis, proximal tibial autograft on 1/18 by Dr. Wallis  - Stable  - Pain Control  - DVT ppx: asa 325   - PT, WBS: nwb rle   - ID: f/u ID for final PO recs- > d/c on 6 wks of po doxycycline and cefpodoxime  - nausea control/bowel regimen  - c/w home meds  - Drains: prevena x 1   - DVT ppx: SCDs  - Diet  - Dispo planning: HPT onec pt clears PT, f/u with Dr Wallis on Friday 1/28 once D/c         Ortho Pager [phone]

## 2022-01-25 NOTE — PROGRESS NOTE ADULT - ASSESSMENT
POD#3 s/p ex-fix removal, abx spacer explant, ORIF of L fibula, flexor tendon tenotomies 2nd-4th toes, flap elevation and complex wound closure by Dr. Dickens with prevena placement and x1 .   - VSS  - ID following: Daptomycin 500 mg IV daily.  Check serum CK daily for now, Ceftriaxone 2 grams IV daily, Caspofungin 50 mg IV daily   - ensure prevena is on and holding suction, was off for an unknown amount of time last night.  - Pain MGMT following  - continue to monitor drain outputs  - DVT ppx: ASA, SCD  - PT, WBS: NWB LLE     
26 year old female with left ankle fracture s/p removal of external fixator device and antibiotic spacer. She is s/p insertion of 2 plates, bone chips, bone graft and wound closure. Currently no signs of active infection, but patient requires further coverage due to protracted course of complex infections.  OR cultures continue to show no growth.    Recommend:  - D/c Daptomycin 500 mg IV qd  - D/c Ceftriaxone 2 grams IV qd  - Seeing as OR cultures continue to be negative, can switch to PO regimen of Doxycycline 100mg q12h and Cefpodoxime 200mg q12h until 3/1/2022  - Patient can f/u with Dr. Crocker outpatient, his office will call and set up appt.     ID Team 1 will sign off. Please reconsult if needed or if cultures grow anything. 
26 year old female with left ankle fracture s/p removal of external fixator device and antibiotic spacer. She is s/p insertion of 2 plates, bone chips, bone graft and wound closure. Currently no signs of active infection, but patient requires further coverage due to protracted course of complex infections.  OR cultures continue to show no growth.    Recommend:  - c/w Daptomycin 500 mg IV qd  - c/w Ceftriaxone 2 grams IV qd  - c/w Caspofungin 50 mg IV qd  - If OR cultures are negative on 1/24, will recommend po regimen to complete 6 week course from OR.  Recommendations discussed with primary team.  Will follow with you - team 1. 
26F w h/o L ankle [facility], multiple procedures here at [facility], now s/p Ex-fix removal, spacer removal, L fibula ORIF, flexor tendon tenotomies 1-4th toe w complex closure w plastics and Dr. Wallis 1/18    1-Post op state - pain controlled. PPx; ASA, on bowel regimen and incentive spirometer  2=Normocytic anemia - hgb stable. asymptomatic    -f/u ID recs - c/w daptomycin, CTX, caspofungin; f/u OR Cx  -f/u plastics recs    
26F w h/o L ankle fx s/p initial ORIF in [facility] w missed compartment syndrome, multiple procedures here at [facility], now s/p Ex-fix removal, spacer removal, L fibula ORIF, flexor tendon tenotomies 1-4th toe w complex closure w plastics and Dr. Wallis 1/18    #Post op state - pain controlled. PPx; ASA, on bowel regimen and incentive spirometer  #Normocytic anemia - hgb stable. asymptomatic    Recommend  optimize pain regimen  -f/u ID recs - c/w daptomycin, CTX, caspofungin; f/u OR Cx  -f/u plastics recs    DISPO: Home w HPT
A/P: 26yFemale POD#3 s/p ex-fix removal, abx spacer explant, ORIF of L fibula, flexor tendon tenotomies 2nd-4th toes, flap elevation and complex wound closure by Dr. Dickens with prevena placement and x1 .     - VSS  - ID following: Daptomycin 500 mg IV daily.  Check serum CK daily for now, Ceftriaxone 2 grams IV daily, Caspofungin 50 mg IV daily   - ensure prevena is on and holding suction and charging when in bed  - Pain MGMT following  - continue to monitor drain outputs  - DVT ppx: ASA, SCD  - PT, WBS: NWB LLE     Plastic Surgery 
26F w h/o L ankle fx s/p initial ORIF in [facility]placed by [facility] w missed compartment syndrome, multiple procedures here at [facility], now s/p Ex-fix removal, spacer removal, L fibula ORIF, flexor tendon tenotomies 1-4th toe w complex closure w plastics and Dr. Wallis 1/18    1-Post op state - pain controlled. PPx; ASA, on bowel regimen and incentive spirometer  2=Normocytic anemia - hgb stable. asymptomatic    -f/u ID recs - c/w doxy and cefpedoxime per ID recommendations  -f/u plastics recs    Dispo - home today per primary team    
26 year old female with left ankle fracture here for removal of external fixator device and antibiotic spacer. She is s/p insertion of 2 plates, bone chips, bone graft and wound closure. Currently no signs of active infection, but patient requires further coverage due to protracted course of complex infections.    Recommend:  - c/w Daptomycin 500 mg IV daily.  Check serum CK daily for now  - c/w Ceftriaxone 2 grams IV daily  - c/w Caspofungin 50 mg IV daily   - Follow up tissue cultures from OR.  Will need several weeks of antibiotics post-op.  Final recommendations based on these cultures     ID team 1 will follow   Please Follow Attendings Attestation below 
26 year old female with left ankle fracture here for removal of external fixator device and antibiotic spacer. She is s/p insertion of 2 plates, bone chips, bone graft and wound closure. Currently no signs of active infection, but patient requires further coverage due to protracted course of complex infections.    Recommend:  - c/w Daptomycin 500 mg IV daily.  Check serum CK daily for now  - c/w Ceftriaxone 2 grams IV daily  - c/w Caspofungin 50 mg IV daily   - Follow up tissue cultures from OR.  Will need several weeks of antibiotics post-op.  Final recommendations based on these cultures     ID team 1 will follow 
A/P: 26yFemale POD#3 s/p ex-fix removal, abx spacer explant, ORIF of L fibula, flexor tendon tenotomies 2nd-4th toes, flap elevation and complex wound closure by Dr. Dickens with prevena placement and x1 .     - VSS  - ID following: Daptomycin 500 mg IV daily.  Check serum CK daily for now, Ceftriaxone 2 grams IV daily, Caspofungin 50 mg IV daily   - ensure prevena is on and holding suction and charging when in bed, plan to remove today with ortho pending Dr. Dickens/Elizabeth  - Pain MGMT following  - continue to monitor drain outputs  - DVT ppx: ASA, SCD  - PT, WBS: NWB LLE     Plastic Surgery 
IMPRESSION:  26 year old female with left ankle fracture here for removal of external fixator device and antibiotic spacer.  She is s/p insertion of 2 plates, bone chips, bone graft and wound closure    Recommend:  1.  c/w Daptomycin 500 mg IV daily.  Check serum CK daily for now  2.  c/w Ceftriaxone 2 grams IV daily  3.  c/w Caspofungin 50 mg IV daily   4. Follow up tissue cultures from OR.  Will need several weeks of antibiotics post-op.  Final recommendations based on these cultures     ID team 1 will follow

## 2022-01-25 NOTE — DISCHARGE NOTE NURSING/CASE MANAGEMENT/SOCIAL WORK - NSDCPEFALRISK_GEN_ALL_CORE
For information on Fall & Injury Prevention, visit: [email or web address] OR  [email or web address] OR  https://www.cdc.gov/steadi/patient.html

## 2022-01-25 NOTE — PROGRESS NOTE ADULT - REASON FOR ADMISSION
Left ankle surgery

## 2022-01-25 NOTE — PROGRESS NOTE ADULT - SUBJECTIVE AND OBJECTIVE BOX
Orthopaedic Surgery Progress Note    Post-operative day #7 s/p ex-fix removal, abx spacer explant, ORIF of L fibula, flexor tendon tenotomies 2nd-4th toes, flap elevation and complex wound closure by Dr. Dickens with prevena placement    Subjective:     Patient seen and examined. Patient comfortable without complaints, pain controlled. Wants to go home today. Denies chest pain, shortness of breath, nausea/vomiting, numbness/tingling.    Objective:    Vital Signs Last 24 Hrs  T(C): 36.7 (01-25-22 @ 08:45), Max: 36.8 (01-25-22 @ 04:48)  T(F): 98 (01-25-22 @ 08:45), Max: 98.2 (01-25-22 @ 04:48)  HR: 89 (01-25-22 @ 08:45) (89 - 91)  BP: 117/78 (01-25-22 @ 08:45) (101/66 - 117/78)  RR: 16 (01-25-22 @ 08:45) (16 - 16)  SpO2: 94% (01-25-22 @ 08:45) (94% - 95%)  AVSS    PE:  General: Patient alert and oriented, NAD  Dressing: Clean/dry/intact splint LLE   Pulses: brisk cap refill all 5 toes LLE, 2+ DP RLE   Sensation: decreased sensation all 5 toes LLE, SILT RLE   Motor: EHL/FHL/TA/GS 5/5 RLE, can wiggle 2nd and 3rd toe LLE, no active EHL/FHL LLE                          8.5    4.16  )-----------( 264      ( 25 Jan 2022 07:57 )             26.7     01-25    141  |  106  |  8   ----------------------------<  94  3.8   |  24  |  0.73    Ca    9.0      25 Jan 2022 07:57          A/P: 26yFemale POD#7 s/p ex-fix removal, abx spacer explant, ORIF of L fibula, flexor tendon tenotomies 2nd-4th toes, flap elevation and complex wound closure by Dr. Dickens with prevena placement  1. Pain control as needed. Appreciate PM recs.   2. DVT prophylaxis: ASA  3. PT, weight-bearing status: NWB LLE   4. Appreciate plastics recs.   5. Continue doxy and cefpodoxime x 6 weeks. Appreciate ID recs.   6. Dispo: Home with home PT    Ortho Pager [phone]

## 2022-01-25 NOTE — PROGRESS NOTE ADULT - PROVIDER SPECIALTY LIST ADULT
Infectious Disease
Orthopedics
Pain Medicine
Plastic Surgery
Infectious Disease
Infectious Disease
Orthopedics
Pain Medicine
Plastic Surgery
Hospitalist
Hospitalist
Infectious Disease
Orthopedics
Pain Medicine
Pain Medicine
Plastic Surgery
Plastic Surgery
Hospitalist
Infectious Disease
Plastic Surgery
Hospitalist

## 2022-01-25 NOTE — PROGRESS NOTE ADULT - SUBJECTIVE AND OBJECTIVE BOX
Plastic Surgery Note    Pt seen and examined on morning rounds. Pt comfortable without complaints, pain controlled.      Vital Signs Last 24 Hrs  T(C): 36.8 (01-25-22 @ 04:48), Max: 36.8 (01-25-22 @ 04:48)  T(F): 98.2 (01-25-22 @ 04:48), Max: 98.2 (01-25-22 @ 04:48)  HR: 91 (01-25-22 @ 04:48) (91 - 91)  BP: 101/66 (01-25-22 @ 04:48) (101/66 - 101/66)  BP(mean): --  RR: 16 (01-25-22 @ 04:48) (16 - 16)  SpO2: 95% (01-25-22 @ 04:48) (95% - 95%)  I&O's Summary    24 Jan 2022 07:01  -  25 Jan 2022 07:00  --------------------------------------------------------  IN: 4470 mL / OUT: 3900 mL / NET: 570 mL          PHYSICAL EXAM:  General: Pt Alert and oriented, NAD  DSG C/D/I, prevena holding suction, drain serosang.   Pulses: toes wwp, cap refill <3 seconds                          8.2    4.14  )-----------( 229      ( 24 Jan 2022 08:01 )             25.3     01-24    139  |  104  |  6<L>  ----------------------------<  83  3.8   |  22  |  0.65    Ca    8.7      24 Jan 2022 08:00        A/P: 26yFemale POD#5 s/p ex-fix removal, abx spacer explant, ORIF of L fibula, flexor tendon tenotomies 2nd-4th toes, flap elevation and complex wound closure by Dr. Dickens with prevena placement and x1 .   - VSS  - ID transitioned pt to PO abx since OR cx continue NGTD and no signs of active infection  - prevena holding suction  - Pain MGMT following  - DVT ppx: ASA, SCD  - PT, WBS: NWB LLE   - possible discharge today    Sergio Clements, PGY-1  Ortho Pager [phone]

## 2022-01-25 NOTE — PROGRESS NOTE ADULT - SUBJECTIVE AND OBJECTIVE BOX
INTERVAL HPI/OVERNIGHT EVENTS: CASTRO O/N  patient examined at bedside today.  no new events  no chest pain/sob  ros otherwise negative       OBJECTIVE:    Vital Signs Last 24 Hrs  T(C): 36.4 (25 Jan 2022 13:55), Max: 36.8 (24 Jan 2022 16:26)  T(F): 97.5 (25 Jan 2022 13:55), Max: 98.3 (24 Jan 2022 16:26)  HR: 79 (25 Jan 2022 13:55) (79 - 95)  BP: 113/74 (25 Jan 2022 13:55) (100/68 - 117/78)  BP(mean): --  RR: 18 (25 Jan 2022 13:55) (16 - 18)  SpO2: 97% (25 Jan 2022 13:55) (93% - 97%)          PHYSICAL EXAM:  GEN: female in NAD, resting comfortably in bed  HEENT: NC/AT, MMM  CV: RRR, nml S1S2, no murmurs  PULM: nml effort, CTAB  ABD: Soft, non-distended, NABS, non-tender  NEURO  A/O x3,  L ankle in splint. No sensations in great toe-3rd toe. some sensation present in 4-5th toes   PSYCH: Appropriate      MEDICATIONS  (STANDING):  acetaminophen     Tablet .. 975 milliGRAM(s) Oral every 8 hours  AQUAPHOR (petrolatum Ointment) 1 Application(s) Topical daily  aspirin enteric coated 325 milliGRAM(s) Oral two times a day  cefpodoxime 200 milliGRAM(s) Oral every 12 hours  doxycycline hyclate Capsule 100 milliGRAM(s) Oral every 12 hours  lactated ringers. 1000 milliLiter(s) (150 mL/Hr) IV Continuous <Continuous>  oxyCODONE  ER Tablet 10 milliGRAM(s) Oral every 12 hours  pantoprazole    Tablet 40 milliGRAM(s) Oral before breakfast  polyethylene glycol 3350 17 Gram(s) Oral at bedtime  pregabalin 25 milliGRAM(s) Oral three times a day  senna 2 Tablet(s) Oral at bedtime    MEDICATIONS  (PRN):  aluminum hydroxide/magnesium hydroxide/simethicone Suspension 30 milliLiter(s) Oral four times a day PRN Indigestion  bisacodyl Suppository 10 milliGRAM(s) Rectal once PRN Constipation  cyclobenzaprine 5 milliGRAM(s) Oral three times a day PRN Muscle Spasm  HYDROmorphone   Tablet 2 milliGRAM(s) Oral every 4 hours PRN Moderate Pain (4 - 6)  HYDROmorphone   Tablet 4 milliGRAM(s) Oral every 4 hours PRN Severe Pain (7 - 10)  HYDROmorphone  Injectable 0.5 milliGRAM(s) IV Push every 4 hours PRN Breakthrough Pain  magnesium hydroxide Suspension 30 milliLiter(s) Oral daily PRN Constipation  ondansetron Injectable 4 milliGRAM(s) IV Push every 6 hours PRN Nausea and/or Vomiting      ALLERGIES:  Allergies    No Known Allergies    Intolerances        LABS:                                   8.5    4.16  )-----------( 264      ( 25 Jan 2022 07:57 )             26.7   01-25    141  |  106  |  8   ----------------------------<  94  3.8   |  24  |  0.73    Ca    9.0      25 Jan 2022 07:57              RADIOLOGY & ADDITIONAL TESTS: Reviewed.

## 2022-01-25 NOTE — PROGRESS NOTE ADULT - ATTENDING COMMENTS
26F POD #1 s/p left ankle ex-fix removal, antibiotic spacer explant, Open treatment left fibula malunion nonunion, open reduction internal fixation syndesmosis, Proximal tibia autograft harvest flexor tendon tenotomies 1st-4th toes with complex plastics closure     Operative images reviewed.  Patient having significant pain postoperatively.  Pain management team involved.  Overall the patient is doing well.  Her drain and VAC outputs are appropriate.  Her pain management is requiring further hospitalization.  As of today there is no growth on her cultures from the OR.  Splint is intact.
26F POD #2 s/p left ankle ex-fix removal, antibiotic spacer explant, Open treatment left fibula malunion nonunion, open reduction internal fixation syndesmosis, Proximal tibia autograft harvest flexor tendon tenotomies 1st-4th toes with complex plastics closure     Patient continues to have discomfort which is not uncommon from limb salvage reconstruction in the setting of significant immobilization. Her pain is much improved from Wednesday and Thursday. The patient has no evidence of new compartment syndrome and is having appropriate postoperative pain.  We will continue to adjust her pain medications to provide her with appropriate course.  Following up cultures and appreciate ID recommendations.  If the patient's cultures from the OR are negative, I would prefer the patient to continue on oral versus IV antibiotics for least 6 weeks postoperatively.    - K 3.2 today; replete with 40meq x2 today, recheck AM labs   - Pain Control- appreciate pain management recs   - DVT ppx: ASA 325mg BID   - PT, WBS: NWB LLE  - OR cultures NGTD  - d/c MARI st   - management of prevena and drain per plastics, appreciate recs   - continue dapto/capsofungin/ceftriaxone
ASSESSMENT:  This is a 26y old Female with a history of PAD POD 7 s/p exfix removal, antibiotic spacer explant, ORIF of left fibula, flexor tendon tenotomies 1st - 4th toes and complex plastic closure with improvement of left ankle pain.    Recommended Treatment PLAN:  1. Continue Dilaudid 2-4 mg PO q4h PRN moderate-severe pain  2. Continue Dilaudid 0.5 mg IVP q4h PRN breakthrough pain  3. Continue Oxycodone ER 10 mg PO BID  4. Continue Tylenol 975 mg PO TID  5. Continue Flexeril 5 mg PO q8h PRN muscle spasm  6. Continue Lyrica 25 mg PO TID  Pt seen and examined by me, NP acted as scribe
ASSESSMENT:  This is a 26y old Female with a history of PAD POD 3 s/p exfix removal, antibiotic spacer explant, ORIF of left fibula, flexor tendon tenotomies 1st - 4th toes and complex plastic closure with improvement of left ankle pain.    Recommended Treatment PLAN:  1. Continue Dilaudid 2-4 mg PO q4h PRN moderate-severe pain  2. Continue Dilaudid 0.5 mg IVP q4h PRN breakthrough pain  3. Continue Oxycodone ER 10 mg PO BID  4. Continue Tylenol 975 mg PO TID  5. Continue Flexeril 5 mg PO q8h PRN muscle spasm  6. Continue Lyrica 25 mg PO TID  Pt seen and examined by me, NP acted as scribe
As above.  OR cultures are NG at 5 d.  Can treat with doxy and cefpodoxime to complete 6 week course from OR on 1/18 (through 3/1).  Will arrange for f/u with Dr. Crocker.  Please recall if further ID input is desired - team 1,
She continues to do well postoperatively, is on empiric dapto, ceftriaxone, caspofungin.  OR cultures remain NGTD.  If they finalize negative, will give recommendations for course of po antibiotics given complex infection.  Will continue to follow with you - team 1.
26yF POD #3 s/p left ankle ex-fix removal, antibiotic spacer explant, Open treatment left fibula malunion nonunion, open reduction internal fixation syndesmosis, Proximal tibia autograft harvest flexor tendon tenotomies 1st-4th toes with complex plastics closure   Patient's  drain pulled today without incident.  Her Prevena VAC was changed.  Incisions evaluated by myself and Dr. Dunbar simultaneously with takedown of splint and Prevena.  Incisions look very good without evidence of recurrence of sinus tracts.  A new splint was applied to the left lower extremity including a standard well-padded bulky Florian splint.  The patient continues to have residual sequela from her prior compartment syndrome without evidence of new injury.  She underwent tenotomies of FHL, FDL to the second third and fourth toes.  Her pain is much better controlled.  Her cultures have resulted with no growth to date however the patient was moderately suppressed due to Bactrim prior to surgical interventions with only 5 days off of her antibiotics prior to surgical intervention.  The patient was being treated for a mild pin site infection.  Her CKs have been normal.  Overall the patient is in good spirits and is improving appropriately.  Plan for discharge Monday or Tuesday pending ID recs-prefer oral versus IV antibiotic treatment for 6 weeks postop  Continue current pain regimen with Dilaudid.  Discontinue IV pain medication  Plan to see the patient on Friday, January 28 versus Monday, January 31  Follow-up outpatient with Dr. Dunbar week of January 31  Nonweightbearing left lower extremity  PT out of bed, okay to ambulate with crutches versus rolling knee scooter  Plan to replace Monique in office at follow-up visit   drain removed without incident  Continue VAC outputs
Agree with above.  Patient is doing well post-op.  Continue empiric antibiotics for now.  If no growth on cultures and no evidence of infection would recommend 6 weeks post-op prophylactic antibiotics (likely Doxycycline + Cefpodoxime). ID team 1 will follow.  Dr. Carmona will take over the service on 1/20/22
OR cultures remain NGTD.  Would continue present anti-infectives for now.  If OR cultures finalize as negative, will give recommendations for a course of po antibiotics.  Will follow with you- team 1.
26yF POD #3 s/p left ankle ex-fix removal, antibiotic spacer explant, Open treatment left fibula malunion nonunion, open reduction internal fixation syndesmosis, Proximal tibia autograft harvest flexor tendon tenotomies 1st-4th toes with complex plastics closure   - Stable  - Pain Control, c/w pain mgmt recs - now controlled  - c/w ID recs (dapto, ctx, caspo), f/u intraop cx - NGTD  - wound mgmt by plastics  - passed TOV 1/20/22   - f/u daily CK am labs - stable low without evidence of any new comp syndrome or muscle death  - DVT ppx: asa 325  - PT, WBS: NWB LLE   - nausea control/bowel regimen  - c/w home meds  - Drains:  x 1 and prevena x 1 ;  put out 7.5/7.5 over 12/24, prevena 0/0 over 12/24  - DVT ppx: SCDs  - Diet as tolerated  - Dispo planning: drain removal on sunday and amaury change tomorrow (sunday). Discharge with ID recs for 6 weeks IV vs PO abx
ASSESSMENT:  This is a 26y old Female with a history of PAD POD 2 s/p exfix removal, antibiotic spacer explant, ORIF of left fibula, flexor tendon tenotomies 1st - 4th toes and complex plastic closure with reports of left ankle pain.    Recommended Treatment PLAN:  1. D/C Oxycodone 5-10 mg PO q3h PRN moderate-severe pain  2. Start Dilaudid 2-4 mg PO q4h PRN breakthrough pain  3. Continue Dilaudid 0.5 mg IVP q4h PRN breakthrough pain  4. Continue Oxycodone ER 10 mg PO BID  5. Continue Tylenol 975 mg PO TID  6. Continue Flexeril 5 mg PO q8h PRN muscle spasm  7. Start Lyrica 25 mg PO TID  Pt seen and examined by me, np acted as scribe

## 2022-01-25 NOTE — DISCHARGE NOTE NURSING/CASE MANAGEMENT/SOCIAL WORK - PATIENT PORTAL LINK FT
You can access the FollowMyHealth Patient Portal offered by [facility] by registering at the following website: [email or web address]. By joining [employer]’s FollowMyHealth portal, you will also be able to view your health information using other applications (apps) compatible with our system.

## 2022-01-25 NOTE — DISCHARGE NOTE NURSING/CASE MANAGEMENT/SOCIAL WORK - NSDCVIVACCINE_GEN_ALL_CORE_FT
COVID-19, mRNA, LNP-S, PF, 30 mcg/0.3 mL dose, glenda-sucrose ([employer]); 25-Jan-2022 11:40; Shruthi Robertson (RN); [employer]; [id number] (Exp. Date: 04-Apr-2022); IntraMuscular; Deltoid Right.; 0.3 milliLiter(s);

## 2022-01-25 NOTE — PROGRESS NOTE ADULT - SUBJECTIVE AND OBJECTIVE BOX
Pain Management Progress Note - [facility] [phone]    HPI: Patient seen and examined today. Patient reports endorsing increased pain this AM, requiring Dilaudid IVP. Patient reports overall adequate pain control with current pain regimen. Patient denies side effects from current pain regimen.    Pertinent PMH: Pain at: ___Back ___Neck___Knee ___Hip ___Shoulder ___ Opioid tolerance    Pain is _X__ sharp ____dull ___burning ___achy ___ Intensity: ____ mild __X__mod ___X_severe     Location __X___surgical site _____cervical _____lumbar ____abd _____upper ext__X__lower ext    Worse with __X__activity ___X_movement _____physical therapy___ Rest    Improved with __X__medication ___X_rest ____physical therapy    PMH:  Left tibial neuropathy  PAD (peripheral artery disease)  Status post ORIF of fracture of ankle    Medications:  coronavirus (EUA) Booster Vaccine (PFIZER)  cefpodoxime  doxycycline hyclate Capsule  potassium chloride    Tablet ER  magnesium sulfate  IVPB  potassium chloride    Tablet ER  pregabalin  HYDROmorphone   Tablet  HYDROmorphone   Tablet  AQUAPHOR (petrolatum Ointment)  cyclobenzaprine  oxyCODONE    IR  oxyCODONE    IR  oxyCODONE  ER Tablet  HYDROmorphone  Injectable  cefTRIAXone   IVPB  caspofungin IVPB  DAPTOmycin IVPB  HYDROmorphone  Injectable  acetaminophen     Tablet ..  acetaminophen   IVPB ..  HYDROmorphone  Injectable  aspirin enteric coated  polyethylene glycol 3350  senna  magnesium hydroxide Suspension  bisacodyl Suppository  pantoprazole    Tablet  ondansetron Injectable  aluminum hydroxide/magnesium hydroxide/simethicone Suspension  ceFAZolin   IVPB  HYDROmorphone  Injectable  oxyCODONE    IR  oxyCODONE    IR  acetaminophen     Tablet ..  lactated ringers.  chlorhexidine 2% Cloths  povidone iodine 5% Nasal Swab    ROS: Const:  _N__febrile   Eyes:___ENT:___CV: _N__chest pain  Resp: __N__sob  GI:_N__nausea _N__vomiting __N__abd pain ___npo ___clears __Y_full diet __bm  :___ Musk: _Y__pain ___spasm  Skin:___ Neuro:  _N__sedation_N__confusion__N_ numbness _N__weakness __N_paresthesia  Psych:__N_anxiety  Endo:___ Heme:___Allergy:__NKDA_      01-25 @ 07:45076 mL/min/1.73M2  Hemoglobin: 8.5 g/dL (01-25 @ 07:57)  Hemoglobin: 8.2 g/dL (01-24 @ 08:01)  T(C): 36.7 (01-25-22 @ 08:45), Max: 36.8 (01-24-22 @ 16:26)  HR: 89 (01-25-22 @ 08:45) (80 - 95)  BP: 117/78 (01-25-22 @ 08:45) (96/66 - 117/78)  RR: 16 (01-25-22 @ 08:45) (16 - 18)  SpO2: 94% (01-25-22 @ 08:45) (93% - 97%)  Wt(kg): --     PHYSICAL EXAM:  Gen Appearance: __X_no acute distress _X__appropriate       Neuro: ___SILT feet____ EOM Intact Psych: AAOX__3, _X__mood/affect appropriate        Eyes: __X_conjunctiva WNL  __X___ Pupils equal and round        ENT: __X_ears and nose atraumatic__X_ Hearing grossly intact        Neck: __X_trachea midline, no visible masses ___thyroid without palpable mass    Resp: __X_Nml WOB____No tactile fremitus ___clear to auscultation    Cardio: __X_extremities free from edema ____pedal pulses palpable    GI/Abdomen: ___soft _____ Nontender____X__Nondistended_____HSM    Lymphatic: ___no palpable nodes in neck  ___no palpable nodes calves and feet    Skin/Wound: ___Incision, __X_Dressing c/d/i,   ____surrounding tissues soft,  ___drain/chest tube present____    Muscular: EHL ___/5  Gastrocnemius___/5    X___absent clubbing/cyanosis         ASSESSMENT:  This is a 26y old Female with a history of PAD POD 7 s/p exfix removal, antibiotic spacer explant, ORIF of left fibula, flexor tendon tenotomies 1st - 4th toes and complex plastic closure with improvement of left ankle pain.    Recommended Treatment PLAN:  1. Continue Dilaudid 2-4 mg PO q4h PRN moderate-severe pain  2. Continue Dilaudid 0.5 mg IVP q4h PRN breakthrough pain  3. Continue Oxycodone ER 10 mg PO BID  4. Continue Tylenol 975 mg PO TID  5. Continue Flexeril 5 mg PO q8h PRN muscle spasm  6. Continue Lyrica 25 mg PO TID  Pt seen and examined by Dr. Rene at PM rounds     Pain Management Progress Note - [facility] [phone]    HPI: Patient seen and examined today. Patient reports endorsing increased pain this AM, requiring Dilaudid IVP. Patient reports overall adequate pain control with current pain regimen. Patient denies side effects from current pain regimen.    Pertinent PMH: Pain at: ___Back ___Neck___Knee ___Hip ___Shoulder ___ Opioid tolerance    Pain is _X__ sharp ____dull ___burning ___achy ___ Intensity: ____ mild __X__mod ___X_severe     Location __X___surgical site _____cervical _____lumbar ____abd _____upper ext__X__lower ext    Worse with __X__activity ___X_movement _____physical therapy___ Rest    Improved with __X__medication ___X_rest ____physical therapy    PMH:  Left tibial neuropathy  PAD (peripheral artery disease)  Status post ORIF of fracture of ankle    Medications:  coronavirus (EUA) Booster Vaccine (PFIZER)  cefpodoxime  doxycycline hyclate Capsule  potassium chloride    Tablet ER  magnesium sulfate  IVPB  potassium chloride    Tablet ER  pregabalin  HYDROmorphone   Tablet  HYDROmorphone   Tablet  AQUAPHOR (petrolatum Ointment)  cyclobenzaprine  oxyCODONE    IR  oxyCODONE    IR  oxyCODONE  ER Tablet  HYDROmorphone  Injectable  cefTRIAXone   IVPB  caspofungin IVPB  DAPTOmycin IVPB  HYDROmorphone  Injectable  acetaminophen     Tablet ..  acetaminophen   IVPB ..  HYDROmorphone  Injectable  aspirin enteric coated  polyethylene glycol 3350  senna  magnesium hydroxide Suspension  bisacodyl Suppository  pantoprazole    Tablet  ondansetron Injectable  aluminum hydroxide/magnesium hydroxide/simethicone Suspension  ceFAZolin   IVPB  HYDROmorphone  Injectable  oxyCODONE    IR  oxyCODONE    IR  acetaminophen     Tablet ..  lactated ringers.  chlorhexidine 2% Cloths  povidone iodine 5% Nasal Swab    ROS: Const:  _N__febrile   Eyes:___ENT:___CV: _N__chest pain  Resp: __N__sob  GI:_N__nausea _N__vomiting __N__abd pain ___npo ___clears __Y_full diet __bm  :___ Musk: _Y__pain ___spasm  Skin:___ Neuro:  _N__sedation_N__confusion__N_ numbness _N__weakness __N_paresthesia  Psych:__N_anxiety  Endo:___ Heme:___Allergy:__NKDA_      01-25 @ 07:97414 mL/min/1.73M2  Hemoglobin: 8.5 g/dL (01-25 @ 07:57)  Hemoglobin: 8.2 g/dL (01-24 @ 08:01)  T(C): 36.7 (01-25-22 @ 08:45), Max: 36.8 (01-24-22 @ 16:26)  HR: 89 (01-25-22 @ 08:45) (80 - 95)  BP: 117/78 (01-25-22 @ 08:45) (96/66 - 117/78)  RR: 16 (01-25-22 @ 08:45) (16 - 18)  SpO2: 94% (01-25-22 @ 08:45) (93% - 97%)  Wt(kg): --     PHYSICAL EXAM:  Gen Appearance: __X_no acute distress _X__appropriate       Neuro: ___SILT feet____ EOM Intact Psych: AAOX__3, _X__mood/affect appropriate        Eyes: __X_conjunctiva WNL  __X___ Pupils equal and round        ENT: __X_ears and nose atraumatic__X_ Hearing grossly intact        Neck: __X_trachea midline, no visible masses ___thyroid without palpable mass    Resp: __X_Nml WOB____No tactile fremitus ___clear to auscultation    Cardio: __X_extremities free from edema ____pedal pulses palpable    GI/Abdomen: ___soft _____ Nontender____X__Nondistended_____HSM    Lymphatic: ___no palpable nodes in neck  ___no palpable nodes calves and feet    Skin/Wound: ___Incision, __X_Dressing c/d/i,   ____surrounding tissues soft,  ___drain/chest tube present____    Muscular: EHL ___/5  Gastrocnemius___/5    X___absent clubbing/cyanosis

## 2022-01-26 ENCOUNTER — NON-APPOINTMENT (OUTPATIENT)
Age: 27
End: 2022-01-26

## 2022-01-28 ENCOUNTER — APPOINTMENT (OUTPATIENT)
Dept: ORTHOPEDIC SURGERY | Facility: CLINIC | Age: 27
End: 2022-01-28
Payer: COMMERCIAL

## 2022-01-28 PROCEDURE — 99024 POSTOP FOLLOW-UP VISIT: CPT

## 2022-01-28 PROCEDURE — 29405 APPL SHORT LEG CAST: CPT

## 2022-02-02 ENCOUNTER — APPOINTMENT (OUTPATIENT)
Dept: INFECTIOUS DISEASE | Facility: CLINIC | Age: 27
End: 2022-02-02
Payer: COMMERCIAL

## 2022-02-02 VITALS
DIASTOLIC BLOOD PRESSURE: 80 MMHG | HEART RATE: 96 BPM | BODY MASS INDEX: 25.31 KG/M2 | SYSTOLIC BLOOD PRESSURE: 114 MMHG | OXYGEN SATURATION: 100 % | TEMPERATURE: 97.7 F | WEIGHT: 167 LBS | HEIGHT: 68 IN

## 2022-02-02 PROCEDURE — 99213 OFFICE O/P EST LOW 20 MIN: CPT

## 2022-02-02 RX ORDER — OXYCODONE 5 MG/1
5 TABLET ORAL
Qty: 40 | Refills: 0 | Status: COMPLETED | COMMUNITY
Start: 2021-12-17 | End: 2022-02-02

## 2022-02-02 RX ORDER — SULFAMETHOXAZOLE AND TRIMETHOPRIM 800; 160 MG/1; MG/1
800-160 TABLET ORAL TWICE DAILY
Qty: 14 | Refills: 0 | Status: COMPLETED | COMMUNITY
Start: 2021-12-17 | End: 2022-02-02

## 2022-02-02 RX ORDER — GABAPENTIN 300 MG/1
300 CAPSULE ORAL
Qty: 35 | Refills: 0 | Status: COMPLETED | COMMUNITY
Start: 2021-12-17 | End: 2022-02-02

## 2022-02-02 NOTE — PHYSICAL EXAM
[General Appearance - Alert] : alert [Sclera] : the sclera and conjunctiva were normal [Outer Ear] : the ears and nose were normal in appearance [Neck Appearance] : the appearance of the neck was normal [Heart Rate And Rhythm] : heart rate was normal and rhythm regular [Edema] : there was no peripheral edema [Bowel Sounds] : normal bowel sounds [Abdomen Soft] : soft [No Palpable Adenopathy] : no palpable adenopathy [] : no rash [Motor Exam] : the motor exam was normal [Oriented To Time, Place, And Person] : oriented to person, place, and time [FreeTextEntry1] : left foot/calf with cast in place

## 2022-02-02 NOTE — HISTORY OF PRESENT ILLNESS
[FreeTextEntry1] : 25 yo F with complex L ankle fracture, recent prolonged admission (9/9-11/5) readmitted 11/8 with fever, leukocytosis. ID consult for assistance with source ID and management.\par She had L ankle fracture 3/21 initially treated with clinda/levofloxacin IV, then prolonged course po. She was admitted 9/9 with open wound draining pus. 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 She underwent RICK, I&D with bone debridement, ligament repair. She was initially started on vanc and cefepime, then changed to nafcillin and ceftriaxone when all OR cultures grew MSSA and Strep anginosus. (3 also grew Staph epi and 1 grew E. faecalis.) She underwent additional I&Ds with VAC placement on 9/12, 9/14, 9/17 & 9/20. Rare MSSA grew from 1/3 cultures on 9/20. On 9/21, she underwent L ankle reconstruction with L gracilis FF and L thigh STSG. PICC was placed on 9/21. Plan was for 6-week course of Abx from 9/20. Her inflammatory markers remained elevated. On 10/14, gallium showed prominent uptake in gallium compared to Tc99m within L ankle involving distal tibial/fibula and talus most c/c osteomyelitis. On 10/17, she underwent debridement of gracilis flap with sanguinous fluid anterolateral ankle seen distal to flap and application of multiplanar external fixator. OR cultures were polymicrobial but consistently grew E. faecalis, Staph epi, Candida albicans and Candida parapsilosis – these organisms were targed. Stenotrophomonas also grew from 1. On 10/21, nafcillin and ceftriaxone were d/tyson. She was started on dapto and fluconazole. She had RTOR on 10/25 for repeat flap debridement and STSG by Plastics. She did well until 10/29 when she started to have increasing LFTs. On 11/1, fluconazole was d/tyson and she was started on caspofungin. Her LFTs stabilized and she was d/tyson on 11/5. She returned on 11/8 with fever and leukocytosis. She was started on zosyn with resolution. Gallium was done and it showed some uptake in the left ankle which could be due to hardware placement. \par \par She was discharged with IV Zosyn, Daptomycin and Caspofungin and completed the course in December 2021. She was monitored off antibiotics without recurrence of infection.  She was admitted to [facility] in January 2022 for removal of fixator device, placement of bone graft, and closure. Cultures have been negative. She was discharged on PO doxycycline and Cefpdoxime for prophylaxis.  She is compliant with the medications.  She is having diarrhea that started 2 days ago.  About 6 BM per day.  Some mild abdominal pain. She denies any pus drainage or dehiscence from LLE surgical site.

## 2022-02-04 ENCOUNTER — NON-APPOINTMENT (OUTPATIENT)
Age: 27
End: 2022-02-04

## 2022-02-04 ENCOUNTER — APPOINTMENT (OUTPATIENT)
Dept: ORTHOPEDIC SURGERY | Facility: CLINIC | Age: 27
End: 2022-02-04
Payer: COMMERCIAL

## 2022-02-04 VITALS — BODY MASS INDEX: 25.31 KG/M2 | RESPIRATION RATE: 16 BRPM | WEIGHT: 167 LBS | HEIGHT: 68 IN

## 2022-02-04 DIAGNOSIS — T84.7XXA INFECTION AND INFLAMMATORY REACTION DUE TO OTHER INTERNAL ORTHOPEDIC PROSTHETIC DEVICES, IMPLANTS AND GRAFTS, INITIAL ENCOUNTER: ICD-10-CM

## 2022-02-04 DIAGNOSIS — S82.832K OTHER FRACTURE OF UPPER AND LOWER END OF LEFT FIBULA, SUBSEQUENT ENCOUNTER FOR CLOSED FRACTURE WITH NONUNION: ICD-10-CM

## 2022-02-04 LAB
ALBUMIN SERPL ELPH-MCNC: 4.7 G/DL
ALP BLD-CCNC: 77 U/L
ALT SERPL-CCNC: 47 U/L
ANION GAP SERPL CALC-SCNC: 15 MMOL/L
AST SERPL-CCNC: 28 U/L
BASOPHILS # BLD AUTO: 0.02 K/UL
BASOPHILS NFR BLD AUTO: 0.3 %
BILIRUB SERPL-MCNC: 0.8 MG/DL
BUN SERPL-MCNC: 10 MG/DL
CALCIUM SERPL-MCNC: 9.4 MG/DL
CHLORIDE SERPL-SCNC: 109 MMOL/L
CO2 SERPL-SCNC: 18 MMOL/L
CREAT SERPL-MCNC: 0.66 MG/DL
EOSINOPHIL # BLD AUTO: 0.14 K/UL
EOSINOPHIL NFR BLD AUTO: 1.9 %
GI PCR PANEL, STOOL: NORMAL
GLUCOSE SERPL-MCNC: 90 MG/DL
HCT VFR BLD CALC: 31.5 %
HGB BLD-MCNC: 10 G/DL
IMM GRANULOCYTES NFR BLD AUTO: 0.3 %
LYMPHOCYTES # BLD AUTO: 1.8 K/UL
LYMPHOCYTES NFR BLD AUTO: 23.9 %
MAN DIFF?: NORMAL
MCHC RBC-ENTMCNC: 27.1 PG
MCHC RBC-ENTMCNC: 31.7 GM/DL
MCV RBC AUTO: 85.4 FL
MONOCYTES # BLD AUTO: 0.46 K/UL
MONOCYTES NFR BLD AUTO: 6.1 %
NEUTROPHILS # BLD AUTO: 5.1 K/UL
NEUTROPHILS NFR BLD AUTO: 67.5 %
PLATELET # BLD AUTO: 503 K/UL
POTASSIUM SERPL-SCNC: 3.6 MMOL/L
PROT SERPL-MCNC: 7 G/DL
RBC # BLD: 3.69 M/UL
RBC # FLD: 12.5 %
SODIUM SERPL-SCNC: 142 MMOL/L
WBC # FLD AUTO: 7.54 K/UL

## 2022-02-04 PROCEDURE — 99024 POSTOP FOLLOW-UP VISIT: CPT

## 2022-02-04 PROCEDURE — 29705 RMVL/BIVLV FULL ARM/LEG CAST: CPT

## 2022-02-07 ENCOUNTER — APPOINTMENT (OUTPATIENT)
Dept: ORTHOPEDIC SURGERY | Facility: CLINIC | Age: 27
End: 2022-02-07
Payer: COMMERCIAL

## 2022-02-07 ENCOUNTER — NON-APPOINTMENT (OUTPATIENT)
Age: 27
End: 2022-02-07

## 2022-02-07 DIAGNOSIS — S91.002A UNSPECIFIED OPEN WOUND, LEFT ANKLE, INITIAL ENCOUNTER: ICD-10-CM

## 2022-02-07 LAB
C DIFF TOXIN B QL PCR REFLEX: NORMAL
GDH ANTIGEN: NOT DETECTED
TOXIN A AND B: NOT DETECTED

## 2022-02-07 PROCEDURE — 99024 POSTOP FOLLOW-UP VISIT: CPT

## 2022-02-08 LAB
CULTURE RESULTS: NO GROWTH — SIGNIFICANT CHANGE UP
SPECIMEN SOURCE: SIGNIFICANT CHANGE UP

## 2022-02-09 DIAGNOSIS — E87.6 HYPOKALEMIA: ICD-10-CM

## 2022-02-09 DIAGNOSIS — I73.9 PERIPHERAL VASCULAR DISEASE, UNSPECIFIED: ICD-10-CM
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
## 2022-02-09 DIAGNOSIS — M86.9 OSTEOMYELITIS, UNSPECIFIED: ICD-10-CM

## 2022-02-09 DIAGNOSIS — Z79.82 LONG TERM (CURRENT) USE OF ASPIRIN: ICD-10-CM

## 2022-02-09 DIAGNOSIS — Z79.899 OTHER LONG TERM (CURRENT) DRUG THERAPY: ICD-10-CM

## 2022-02-09 DIAGNOSIS — Q66.89 OTHER SPECIFIED CONGENITAL DEFORMITIES OF FEET: ICD-10-CM

## 2022-02-09 DIAGNOSIS — S82.202K UNSPECIFIED FRACTURE OF SHAFT OF LEFT TIBIA, SUBSEQUENT ENCOUNTER FOR CLOSED FRACTURE WITH NONUNION: ICD-10-CM
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
## 2022-02-09 DIAGNOSIS — G62.9 POLYNEUROPATHY, UNSPECIFIED: ICD-10-CM

## 2022-02-09 DIAGNOSIS — D62 ACUTE POSTHEMORRHAGIC ANEMIA: ICD-10-CM

## 2022-02-09 DIAGNOSIS — S82.402K UNSPECIFIED FRACTURE OF SHAFT OF LEFT FIBULA, SUBSEQUENT ENCOUNTER FOR CLOSED FRACTURE WITH NONUNION: ICD-10-CM

## 2022-02-09 DIAGNOSIS — M19.19 POST-TRAUMATIC OSTEOARTHRITIS, OTHER SPECIFIED SITE: ICD-10-CM

## 2022-02-09 DIAGNOSIS — M25.572 PAIN IN LEFT ANKLE AND JOINTS OF LEFT FOOT: ICD-10-CM

## 2022-02-11 ENCOUNTER — TRANSCRIPTION ENCOUNTER (OUTPATIENT)
Age: 27
End: 2022-02-11

## 2022-02-14 NOTE — PHYSICAL THERAPY INITIAL EVALUATION ADULT - TRANSFER SKILLS, REHAB EVAL
Call received from patient stating she called to schedule a mammogram and dexa and was told the orders will be expiring and need to be extended. Advised will extend dexa order but mammogram order is good until 2023.   
independent

## 2022-02-16 LAB
CULTURE RESULTS: SIGNIFICANT CHANGE UP
SPECIMEN SOURCE: SIGNIFICANT CHANGE UP

## 2022-02-23 ENCOUNTER — NON-APPOINTMENT (OUTPATIENT)
Age: 27
End: 2022-02-23

## 2022-03-01 ENCOUNTER — APPOINTMENT (OUTPATIENT)
Dept: INTERNAL MEDICINE | Facility: CLINIC | Age: 27
End: 2022-03-01
Payer: COMMERCIAL

## 2022-03-01 DIAGNOSIS — K59.00 CONSTIPATION, UNSPECIFIED: ICD-10-CM

## 2022-03-01 PROCEDURE — 99213 OFFICE O/P EST LOW 20 MIN: CPT | Mod: 95

## 2022-03-01 RX ORDER — ESCITALOPRAM OXALATE 5 MG/1
5 TABLET ORAL DAILY
Qty: 30 | Refills: 5 | Status: ACTIVE | COMMUNITY
Start: 2022-03-01 | End: 1900-01-01

## 2022-03-01 NOTE — HISTORY OF PRESENT ILLNESS
[Home] : at home, [unfilled] , at the time of the visit. [Medical Office: ([facility])___] : at the medical office located in  [Verbal consent obtained from patient] : the patient, [unfilled] [de-identified] : 27 yo f \par s/p ankle surgery with Dr Wallis.  Will get cast off  in 10 days.\par Reports stool changes.  occasional bleeding intermittent diarrhea and hard stools.  \par ALso sarted therapy with a psychologist- dealing with a lot.  issues with sleep.

## 2022-03-01 NOTE — PLAN
[FreeTextEntry1] : start lexparo- reviewed how tot maida it and potential aes\par \par trial of 1/2 kiwi daily and monitor bowel movements...\par - within one wekk call if no sig improvement\par \par f/up 1 month for check in

## 2022-03-10 NOTE — PROGRESS NOTE ADULT - SUBJECTIVE AND OBJECTIVE BOX
INFECTIOUS DISEASE PROGRESS NOTE:    SUBJECTIVE:   Patient seen and examined at bedside.    INTERVAL EVENTS:    ANTIBIOTICS/RELEVANT:  antimicrobials  caspofungin IVPB 50 milliGRAM(s) IV Intermittent every 24 hours  cefTRIAXone   IVPB 2000 milliGRAM(s) IV Intermittent every 24 hours  DAPTOmycin IVPB 500 milliGRAM(s) IV Intermittent every 24 hours    immunologic:      Allergies    No Known Allergies    Intolerances        OTHER:  acetaminophen     Tablet .. 975 milliGRAM(s) Oral every 8 hours  aluminum hydroxide/magnesium hydroxide/simethicone Suspension 30 milliLiter(s) Oral four times a day PRN  AQUAPHOR (petrolatum Ointment) 1 Application(s) Topical daily  aspirin enteric coated 325 milliGRAM(s) Oral two times a day  bisacodyl Suppository 10 milliGRAM(s) Rectal once PRN  cyclobenzaprine 5 milliGRAM(s) Oral three times a day PRN  HYDROmorphone   Tablet 4 milliGRAM(s) Oral every 4 hours PRN  HYDROmorphone   Tablet 2 milliGRAM(s) Oral every 4 hours PRN  HYDROmorphone  Injectable 0.5 milliGRAM(s) IV Push every 4 hours PRN  lactated ringers. 1000 milliLiter(s) IV Continuous <Continuous>  magnesium hydroxide Suspension 30 milliLiter(s) Oral daily PRN  ondansetron Injectable 4 milliGRAM(s) IV Push every 6 hours PRN  oxyCODONE  ER Tablet 10 milliGRAM(s) Oral every 12 hours  pantoprazole    Tablet 40 milliGRAM(s) Oral before breakfast  polyethylene glycol 3350 17 Gram(s) Oral at bedtime  pregabalin 25 milliGRAM(s) Oral three times a day  senna 2 Tablet(s) Oral at bedtime      Objective:  Vital Signs Last 24 Hrs  T(C): 36.6 (21 Jan 2022 09:05), Max: 36.9 (20 Jan 2022 21:55)  T(F): 97.9 (21 Jan 2022 09:05), Max: 98.5 (20 Jan 2022 21:55)  HR: 83 (21 Jan 2022 09:05) (60 - 105)  BP: 103/69 (21 Jan 2022 09:05) (96/65 - 110/71)  BP(mean): 79 (21 Jan 2022 05:00) (79 - 79)  RR: 17 (21 Jan 2022 09:05) (16 - 17)  SpO2: 96% (21 Jan 2022 09:05) (95% - 97%)    PHYSICAL EXAM:  Constitutional: resting comfortably in bed; NAD  HEENT: NC/AT, PER, anicteric sclera, no nasal discharge; MMM  Respiratory: CTA B/L; no W/R/R, no retractions  Cardiac: +S1/S2; RRR; no M/R/G  Gastrointestinal: soft, NT/ND; no rebound or guarding  Extremities: WWP, no clubbing or cyanosis; no peripheral edema; L leg wrapped with erythematous marks on thigh from prior graft  Vascular: 2+ radial, DP/PT pulses B/L  Dermatologic: skin warm, dry and intact; no rashes, wounds, or scars  Neurologic: AAOx3; CNII-XII grossly intact; no focal deficits  Psychiatric: affect and characteristics of appearance, verbalizations, behaviors are appropriate      LABS:                        8.8    6.04  )-----------( 249      ( 21 Jan 2022 06:28 )             25.9     01-21    138  |  104  |  6<L>  ----------------------------<  90  3.3<L>   |  24  |  0.63    Ca    8.6      21 Jan 2022 05:54  Mg     1.7     01-21            MICROBIOLOGY:         Name band;

## 2022-03-11 ENCOUNTER — APPOINTMENT (OUTPATIENT)
Dept: ORTHOPEDIC SURGERY | Facility: CLINIC | Age: 27
End: 2022-03-11
Payer: COMMERCIAL

## 2022-03-11 PROCEDURE — 73630 X-RAY EXAM OF FOOT: CPT | Mod: LT

## 2022-03-11 PROCEDURE — 73610 X-RAY EXAM OF ANKLE: CPT | Mod: LT

## 2022-03-23 ENCOUNTER — NON-APPOINTMENT (OUTPATIENT)
Age: 27
End: 2022-03-23

## 2022-04-01 ENCOUNTER — APPOINTMENT (OUTPATIENT)
Dept: INTERNAL MEDICINE | Facility: CLINIC | Age: 27
End: 2022-04-01
Payer: COMMERCIAL

## 2022-04-01 DIAGNOSIS — F32.A ANXIETY DISORDER, UNSPECIFIED: ICD-10-CM

## 2022-04-01 DIAGNOSIS — F41.9 ANXIETY DISORDER, UNSPECIFIED: ICD-10-CM

## 2022-04-01 PROCEDURE — 99213 OFFICE O/P EST LOW 20 MIN: CPT | Mod: 95

## 2022-04-01 NOTE — PLAN
[FreeTextEntry1] : Doing much better - no role for anxiety medication\par \par Constipation resolved\par \par f/up prn

## 2022-04-01 NOTE — HISTORY OF PRESENT ILLNESS
[Home] : at home, [unfilled] , at the time of the visit. [Medical Office: ([facility])___] : at the medical office located in  [Verbal consent obtained from patient] : the patient, [unfilled] [de-identified] : 27 yo f with anxiety\par \par \par Took lexparo for 2 weeks but then stopped\par she is feeling much better.\par sleeping well.  \par constipation also resolved. - more fiber has helped.  \par getting PT and it is going well

## 2022-04-05 NOTE — PROGRESS NOTE ADULT - SUBJECTIVE AND OBJECTIVE BOX
Recommendations:   - Refrain from sexual intercourse until all results received, treated if indicated, and symptoms resolve   - Use condoms every time to protect for STI's and pregnancy  - Make sure to use hypoallergenic and fragrance free soaps such as dove unscented or cetaphil      INTERVAL HPI/OVERNIGHT EVENTS: CASTRO O/N    SUBJECTIVE: Patient seen and examined at bedside.   Feels well overall. Ambulated to bathroom without issues. Now having regular BMs. Eating wo nausea, abd pain. Passing time before next surgery by catching up w friends, reading, and planning next steps after hospitalization.     OBJECTIVE:    VITAL SIGNS:  ICU Vital Signs Last 24 Hrs  T(C): 36.6 (16 Sep 2021 16:27), Max: 36.8 (15 Sep 2021 20:46)  T(F): 97.8 (16 Sep 2021 16:27), Max: 98.3 (15 Sep 2021 20:46)  HR: 79 (16 Sep 2021 16:27) (79 - 87)  BP: 109/75 (16 Sep 2021 16:27) (90/62 - 109/75)  BP(mean): --  ABP: --  ABP(mean): --  RR: 16 (16 Sep 2021 16:27) (16 - 19)  SpO2: 97% (16 Sep 2021 16:27) (95% - 97%)        09-15 @ 07:01  -  09-16 @ 07:00  --------------------------------------------------------  IN: 3180 mL / OUT: 1325 mL / NET: 1855 mL    09-16 @ 07:01  -  09-16 @ 18:17  --------------------------------------------------------  IN: 2300 mL / OUT: 900 mL / NET: 1400 mL      CAPILLARY BLOOD GLUCOSE          PHYSICAL EXAM:  GEN: female in NAD on RA  HEENT: NC/AT, MMM  NECK: Supple  CV: RRR, nml S1S2, no murmurs  PULM: nml effort, CTAB  ABD: Soft, non-distended, NABS, non-tender  NEURO  A/O x3, moving all extremities,   EXT: LLE in splint. Decreased sensation in 1st 3 toes on LEFT  PSYCH: Appropriate      MEDICATIONS:  MEDICATIONS  (STANDING):  calcium carbonate   1250 mG (OsCal) 1 Tablet(s) Oral daily  cefTRIAXone   IVPB 2000 milliGRAM(s) IV Intermittent every 24 hours  cholecalciferol 1000 Unit(s) Oral daily  heparin   Injectable 5000 Unit(s) SubCutaneous every 8 hours  HYDROmorphone  Injectable 0.5 milliGRAM(s) IV Push every 15 minutes  influenza   Vaccine 0.5 milliLiter(s) IntraMuscular once  lactated ringers. 1000 milliLiter(s) (130 mL/Hr) IV Continuous <Continuous>  multivitamin 1 Tablet(s) Oral daily  nafcillin  IVPB 2 Gram(s) IV Intermittent every 4 hours  polyethylene glycol 3350 17 Gram(s) Oral two times a day  senna 2 Tablet(s) Oral at bedtime    MEDICATIONS  (PRN):  acetaminophen   Tablet .. 650 milliGRAM(s) Oral every 6 hours PRN Temp greater or equal to 38C (100.4F), Mild Pain (1 - 3)  bisacodyl Suppository 10 milliGRAM(s) Rectal daily PRN Constipation  HYDROmorphone  Injectable 0.5 milliGRAM(s) IV Push every 4 hours PRN Breakthrough pain  metoclopramide Injectable 10 milliGRAM(s) IV Push every 6 hours PRN Nausea and/or Vomiting  oxyCODONE    IR 5 milliGRAM(s) Oral every 4 hours PRN Moderate Pain (4 - 6)  oxyCODONE    IR 10 milliGRAM(s) Oral every 4 hours PRN Severe Pain (7 - 10)      ALLERGIES:  Allergies    No Known Allergies    Intolerances        LABS:                        9.9    6.74  )-----------( 443      ( 16 Sep 2021 09:57 )             31.6     09-16    138  |  101  |  8   ----------------------------<  101<H>  3.7   |  27  |  0.79    Ca    9.3      16 Sep 2021 09:57            RADIOLOGY & ADDITIONAL TESTS: Reviewed.

## 2022-04-27 ENCOUNTER — NON-APPOINTMENT (OUTPATIENT)
Age: 27
End: 2022-04-27

## 2022-04-29 ENCOUNTER — APPOINTMENT (OUTPATIENT)
Dept: ORTHOPEDIC SURGERY | Facility: CLINIC | Age: 27
End: 2022-04-29
Payer: COMMERCIAL

## 2022-04-29 VITALS — HEIGHT: 68 IN | RESPIRATION RATE: 16 BRPM | BODY MASS INDEX: 25.31 KG/M2 | WEIGHT: 167 LBS

## 2022-04-29 PROCEDURE — 73610 X-RAY EXAM OF ANKLE: CPT | Mod: LT

## 2022-04-29 PROCEDURE — 73630 X-RAY EXAM OF FOOT: CPT | Mod: LT

## 2022-04-29 PROCEDURE — 99214 OFFICE O/P EST MOD 30 MIN: CPT

## 2022-04-29 RX ORDER — ONDANSETRON 4 MG/1
4 TABLET ORAL EVERY 8 HOURS
Qty: 15 | Refills: 3 | Status: DISCONTINUED | COMMUNITY
Start: 2021-12-17 | End: 2022-04-29

## 2022-06-24 ENCOUNTER — APPOINTMENT (OUTPATIENT)
Dept: ORTHOPEDIC SURGERY | Facility: CLINIC | Age: 27
End: 2022-06-24
Payer: COMMERCIAL

## 2022-06-24 VITALS — HEIGHT: 68 IN | BODY MASS INDEX: 25.31 KG/M2 | RESPIRATION RATE: 16 BRPM | WEIGHT: 167 LBS

## 2022-06-24 DIAGNOSIS — T84.84XA PAIN DUE TO INTERNAL ORTHOPEDIC PROSTHETIC DEVICES, IMPLANTS AND GRAFTS, INITIAL ENCOUNTER: ICD-10-CM

## 2022-06-24 PROCEDURE — 73630 X-RAY EXAM OF FOOT: CPT | Mod: LT

## 2022-06-24 PROCEDURE — 99214 OFFICE O/P EST MOD 30 MIN: CPT

## 2022-06-24 PROCEDURE — 73610 X-RAY EXAM OF ANKLE: CPT | Mod: LT

## 2022-07-11 ENCOUNTER — APPOINTMENT (OUTPATIENT)
Dept: ORTHOPEDIC SURGERY | Facility: CLINIC | Age: 27
End: 2022-07-11

## 2022-08-01 ENCOUNTER — RX RENEWAL (OUTPATIENT)
Age: 27
End: 2022-08-01

## 2022-08-07 NOTE — H&P ADULT - PROBLEM SELECTOR PROBLEM 1
Patient Name: Mary Celis  : 1976    MRN: [id number]                              Today's Date: 2022       Admit Date: 2022    Visit Dx:     ICD-10-CM ICD-9-CM   1. SAH (subarachnoid hemorrhage) (HCC)  I60.9 430   2. Essential hypertension  I10 401.9   3. Iron malabsorption  K90.9 579.8   4. Right upper quadrant abdominal pain  R10.11 789.01   5. Iron deficiency anemia due to chronic blood loss  D50.0 280.0   6. Acute nonintractable headache, unspecified headache type  R51.9 784.0   7. 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   • Depression with anxiety    • Gout    • History of blood transfusion    • History of transfusion     REPORTS HAS HAD SEVERAL TRANSFUSIONS, NO REACTIONS   • IBS (irritable bowel syndrome)    • Panic attack    • Tattoo     LEFT FOREARM   • Wears glasses      Past Surgical History:   Procedure Laterality Date   • CEREBRAL ANGIOGRAM N/A 2022    Procedure: Cerebral angiogram;  Surgeon: Spencer Hauser MD;  Location: [facility] CATH INVASIVE LOCATION;  Service: Interventional Radiology;  Laterality: N/A;   • CHOLECYSTECTOMY     • CHOLECYSTECTOMY WITH INTRAOPERATIVE CHOLANGIOGRAM N/A 2017    Procedure: CHOLECYSTECTOMY LAPAROSCOPIC INTRAOPERATIVE CHOLANGIOGRAM;  Surgeon: Albaro Lopez MD;  Location: [facility] OR;  Service:    • COLONOSCOPY N/A 2017    Procedure: COLONOSCOPY;  Surgeon: Albaro Lopez MD;  Location: [facility] ENDOSCOPY;  Service:    • D & C  HYSTEROSCOPY      VITAL SIGNS UNSTABLE WITH THIS PROCEDURE   • ENDOSCOPY N/A 7/28/2017    Procedure: ESOPHAGOGASTRODUODENOSCOPY WITH BIOPSIES;  Surgeon: Albaro Lopez MD;  Location: [facility] ENDOSCOPY;  Service:    • INTERVENTIONAL RADIOLOGY PROCEDURE Bilateral 7/31/2022    Procedure: CAROTID CEREBRAL ANGIOGRAM BILATERAL;  Surgeon: Duran Willis MD;  Location: [facility] INVASIVE LOCATION;  Service: Interventional Radiology;  Laterality: Bilateral;   • TUBAL ABDOMINAL LIGATION     • WISDOM TOOTH EXTRACTION        General Information     Row Name 08/07/22 0815          Physical Therapy Time and Intention    Document Type therapy note (daily note)  -     Mode of Treatment physical therapy  -     Row Name 08/07/22 0815          General Information    Patient Profile Reviewed yes  -     Barriers to Rehab medically complex  -     Row Name 08/07/22 0815          Cognition    Orientation Status (Cognition) oriented x 4  -     Row Name 08/07/22 0815          Safety Issues, Functional Mobility    Safety Issues Affecting Function (Mobility) safety precaution awareness;awareness of need for assistance  -     Impairments Affecting Function (Mobility) balance;endurance/activity tolerance;pain  -           User Key  (r) = Recorded By, (t) = Taken By, (c) = Cosigned By    Initials Name Provider Type     Shelli Chavis, PT Physical Therapist               Mobility     Row Name 08/07/22 0815          Bed Mobility    Bed Mobility supine-sit;scooting/bridging  -     Scooting/Bridging [place] (Bed Mobility) modified independence  -     Supine-Sit [place] (Bed Mobility) contact guard;verbal cues  -     Assistive Device (Bed Mobility) bed rails;head of bed elevated  -     Comment, (Bed Mobility) Pt required increased time to complete and cueing to initiate  -     Row Name 08/07/22 0815          Transfers    Comment, (Transfers) Pt completed STS from EOB and toilet transfer  -[facility]  Name 08/07/22 0815          Sit-Stand Transfer    Sit-Stand [place] (Transfers) standby assist  -     Row Name 08/07/22 0815          Gait/Stairs (Locomotion)    [place] Level (Gait) contact guard  -     Assistive Device (Gait) other (see comments)  IV pole  -LJ     Deviations/Abnormal Patterns (Gait) bilateral deviations;base of support, wide;stride length decreased;benja decreased  -     Bilateral Gait Deviations forward flexed posture  -     Comment, (Gait/Stairs) Pt ambulated with shuffled steps, mild sway during ambulation but no LOB  -           User Key  (r) = Recorded By, (t) = Taken By, (c) = Cosigned By    Initials Name Provider Type    Shelli Larkin PT Physical Therapist               Obj/Interventions     Row Name 08/07/22 0815          Range of Motion Comprehensive    General Range of Motion bilateral lower extremity ROM WFL  -     Row Name 08/07/22 0815          Strength Comprehensive (MMT)    Comment, General Manual Muscle Testing (MMT) Assessment BLE grossly 4/5  -     Row Name 08/07/22 0815          Balance    Balance Assessment sitting static balance;sitting dynamic balance;standing static balance;standing dynamic balance  -LJ     Static Sitting Balance modified independence  -LJ     Dynamic Sitting Balance modified independence  -LJ     Position, Sitting Balance unsupported;sitting edge of bed  -     Static Standing Balance standby assist  -LJ     Dynamic Standing Balance standby assist  -LJ     Position/Device Used, Standing Balance unsupported  -     Row Name 08/07/22 0815          Sensory Assessment (Somatosensory)    Sensory Assessment (Somatosensory) sensation intact  -           User Key  (r) = Recorded By, (t) = Taken By, (c) = Cosigned By    Initials Name Provider Type    Shelli Larkin PT Physical Therapist               Goals/Plan     Row Name 08/07/22 0815          Bed Mobility Goal 1 (PT)    Activity/Assistive Device (Bed Mobility Goal 1,  PT) bed mobility activities, all  -LJ     [place] Level/Cues Needed (Bed Mobility Goal 1, PT) independent  -LJ     Time Frame (Bed Mobility Goal 1, PT) 3 days  -LJ     Progress/Outcomes (Bed Mobility Goal 1, PT) goal revised this date;continuing progress toward goal  -LJ     Row Name 08/07/22 0815          Transfer Goal 1 (PT)    Activity/Assistive Device (Transfer Goal 1, PT) sit-to-stand/stand-to-sit;bed-to-chair/chair-to-bed  -LJ     [place] Level/Cues Needed (Transfer Goal 1, PT) modified independence  -LJ     Time Frame (Transfer Goal 1, PT) 5 days  -LJ     Progress/Outcome (Transfer Goal 1, PT) goal revised this date;continuing progress toward goal  -LJ     Row Name 08/07/22 0815          Gait Training Goal 1 (PT)    Activity/Assistive Device (Gait Training Goal 1, PT) gait (walking locomotion)  -LJ     [place] Level (Gait Training Goal 1, PT) modified independence  -LJ     Distance (Gait Training Goal 1, PT) >300  -LJ     Time Frame (Gait Training Goal 1, PT) 1 week  -LJ     Progress/Outcome (Gait Training Goal 1, PT) goal revised this date;continuing progress toward goal  -LJ     Row Name 08/07/22 0815          Therapy Assessment/Plan (PT)    Planned Therapy Interventions (PT) balance training;bed mobility training;gait training;home exercise program;patient/family education;transfer training;strengthening;stair training  -           User Key  (r) = Recorded By, (t) = Taken By, (c) = Cosigned By    Initials Name Provider Type    Shelli Larkin, PT Physical Therapist               Clinical Impression     Row Name 08/07/22 0815          Pain    Pain Intervention(s) Repositioned  -[facility] 08/07/22 0815          Pain Scale: FACES Pre/Post-Treatment    Pain: FACES Scale, Pretreatment 2-->hurts little bit  -     Posttreatment Pain Rating 2-->hurts little bit  -LJ     Row Name 08/07/22 0815          Plan of Care Review    Plan of Care Reviewed With patient  -     Progress no  change  -     Outcome Evaluation Pt demonstrates decreased activity tolerance, fair balance and decreased functional mobility. Pt c/o discomfort with mobility and ambulation. Pt required use of bathroom for BM, I for pericare. Pt will continue to benefit from skilled acute physical therapy services to increase functional mobility. Recommend home with assist upon DC.  -     Row Name 08/07/22 0815          Vital Signs    Pre Systolic BP Rehab --  VSS  -     Row Name 08/07/22 0815          Positioning and Restraints    Pre-Treatment Position in bed  -     Post Treatment Position bathroom  -     Bathroom notified nsg;call light within reach  -           User Key  (r) = Recorded By, (t) = Taken By, (c) = Cosigned By    Initials Name Provider Type    Shelli Larkin PT Physical Therapist               Outcome Measures     Row Name 08/07/22 0815          How much help from another person do you currently need...    Turning from your back to your side while in flat bed without using bedrails? 4  -LJ     Moving from lying on back to sitting on the side of a flat bed without bedrails? 4  -LJ     Moving to and from a bed to a chair (including a wheelchair)? 3  -LJ     Standing up from a chair using your arms (e.g., wheelchair, bedside chair)? 3  -LJ     Climbing 3-5 steps with a railing? 3  -LJ     To walk in hospital room? 3  -LJ     AM-PAC 6 Clicks Score (PT) 20  -     Highest level of mobility 6 --> Walked 10 steps or more  -     Row Name 08/07/22 0815          Functional Assessment    Outcome Measure Options AM-PAC 6 Clicks Basic Mobility (PT)  -           User Key  (r) = Recorded By, (t) = Taken By, (c) = Cosigned By    Initials Name Provider Type    Shelli Larkin, PT Physical Therapist                             Physical Therapy Education                 Title: PT OT SLP Therapies (In Progress)     Topic: Physical Therapy (Done)     Point: Mobility training (Done)     Learning Progress  Summary           Patient Acceptance, E, VU by  at 8/7/2022 0815      Show all documentation for this point (5)                 Point: Home exercise program (Done)     Learning Progress Summary           Patient Acceptance, E, VU,NR by KG at 7/29/2022 1137                   Point: Body mechanics (Done)     Learning Progress Summary           Patient Acceptance, E, VU by  at 8/7/2022 0815      Show all documentation for this point (5)                 Point: Precautions (Done)     Learning Progress Summary           Patient Acceptance, E, VU by  at 8/7/2022 0815      Show all documentation for this point (5)                             User Key     Initials Effective Dates Name Provider Type Discipline    KG 05/22/20 -  Stefani Ventura, PT Physical Therapist PT     07/14/21 -  Shelli Chavis, PT Physical Therapist PT              PT Recommendation and Plan  Planned Therapy Interventions (PT): balance training, bed mobility training, gait training, home exercise program, patient/family education, transfer training, strengthening, stair training  Plan of Care Reviewed With: patient  Progress: no change  Outcome Evaluation: Pt demonstrates decreased activity tolerance, fair balance and decreased functional mobility. Pt c/o discomfort with mobility and ambulation. Pt required use of bathroom for BM, I for pericare. Pt will continue to benefit from skilled acute physical therapy services to increase functional mobility. Recommend home with assist upon DC.     Time Calculation:    PT Charges     Row Name 08/07/22 0815             Time Calculation    Start Time 0815 -LJ      PT Received On 08/07/22 -LJ      PT Goal Re-Cert Due Date 08/17/22 -LJ              Timed Charges    03090 - Gait Training Minutes  10  -LJ      37062 - PT Therapeutic Activity Minutes 14  -LJ              Total Minutes    Timed Charges Total Minutes 24  -LJ       Total Minutes 24  -LJ            User Key  (r) = Recorded By, (t) =  Taken By, (c) = Cosigned By    Initials Name Provider Type    Shelli Larkin, PT Physical Therapist              Therapy Charges for Today     Code Description Service Date Service Provider Modifiers Qty    [id number] HC GAIT TRAINING EA 15 MIN 8/7/2022 Shelli Chavis, PT GP 1    [id number]  PT THERAPEUTIC ACT EA 15 MIN 8/7/2022 Shelli Chavis, PT GP 1          PT G-Codes  Outcome Measure Options: AM-PAC 6 Clicks Basic Mobility (PT)  AM-PAC 6 Clicks Score (PT): 20  AM-PAC 6 Clicks Score (OT): 20  Modified [place] Scale: 1 - No significant disability despite symptoms.  Able to carry out all usual duties and activities.    Shelli Chavis, PT  8/7/2022     Closed left ankle fracture, with nonunion, subsequent encounter

## 2022-10-19 ENCOUNTER — APPOINTMENT (OUTPATIENT)
Dept: ORTHOPEDIC SURGERY | Facility: CLINIC | Age: 27
End: 2022-10-19

## 2022-10-19 VITALS — WEIGHT: 167 LBS | RESPIRATION RATE: 16 BRPM | BODY MASS INDEX: 25.31 KG/M2 | HEIGHT: 68 IN

## 2022-10-19 DIAGNOSIS — G89.29 PAIN IN LEFT ANKLE AND JOINTS OF LEFT FOOT: ICD-10-CM

## 2022-10-19 DIAGNOSIS — M21.6X2 OTHER ACQUIRED DEFORMITIES OF LEFT FOOT: ICD-10-CM

## 2022-10-19 DIAGNOSIS — S82.392P: ICD-10-CM

## 2022-10-19 DIAGNOSIS — M25.572 PAIN IN LEFT ANKLE AND JOINTS OF LEFT FOOT: ICD-10-CM

## 2022-10-19 DIAGNOSIS — M20.5X2 OTHER DEFORMITIES OF TOE(S) (ACQUIRED), LEFT FOOT: ICD-10-CM

## 2022-10-19 DIAGNOSIS — M19.072 PRIMARY OSTEOARTHRITIS, LEFT ANKLE AND FOOT: ICD-10-CM

## 2022-10-19 DIAGNOSIS — M67.02 SHORT ACHILLES TENDON (ACQUIRED), LEFT ANKLE: ICD-10-CM

## 2022-10-19 DIAGNOSIS — G57.42: ICD-10-CM

## 2022-10-19 PROCEDURE — 73630 X-RAY EXAM OF FOOT: CPT | Mod: LT

## 2022-10-19 PROCEDURE — 99214 OFFICE O/P EST MOD 30 MIN: CPT

## 2022-10-19 PROCEDURE — 73610 X-RAY EXAM OF ANKLE: CPT | Mod: LT

## 2022-10-19 RX ORDER — PREGABALIN 25 MG/1
25 CAPSULE ORAL
Qty: 120 | Refills: 0 | Status: ACTIVE | COMMUNITY
Start: 2022-05-06 | End: 1900-01-01

## 2022-11-16 NOTE — PATIENT PROFILE ADULT - NSASFALLNEEDSASSIST_GEN_A_NUR
NO transitions of care outreach indicated. Patient was readmitted to [facility] with Dx. Pericardial Effusion, Pleural effusion on 11/8/22 from STR. Patient is currently IP per care everywhere. CTN will monitor chart for transitions of care outreach pending discharge disposition. yes

## 2023-02-23 NOTE — PROGRESS NOTE ADULT - ASSESSMENT
Patient returned phone call and is scheduled to see Dr. Ty on 3/9. Patient is only able to come in Tues. And Thurs due to receiving dialysis M/W/F.   A/P: 26yFemale s/p repeat ankle I&D by Dr. Wallis on 09/17 w L gracilis free flap and STSG on 09/20 now s/p I&D of flap by plastics team and placement of ringed external fixator by ortho 10/18  - Stable  - Pain/Nausea Control  - Home meds  - DVT ppx: per plastics team  - WBS: WBAT in ringed ex fix  - Flap care per plastics team  - Abx care per plastics team  - Plan for repeat flap debridement per plastics possibly next week +/- ortho involvement for ex fix adjustment as needed      Ortho Pager [phone]

## 2023-06-08 NOTE — PROGRESS NOTE ADULT - SUBJECTIVE AND OBJECTIVE BOX
Patient states no new changes in vision since last visit, still gets double vision; comes and goes.   Denies floaters/flashes/pain. Is not using any eye drops at this time. Both eyes      FRANDY LAZARO, June 8, 2023    Ortho Note    Pt resting comfortably this AM  Denies CP, SOB, N/V, numbness/tingling     Vital Signs Last 24 Hrs  T(C): 37 (31 Oct 2021 20:20), Max: 37 (31 Oct 2021 20:20)  T(F): 98.6 (31 Oct 2021 20:20), Max: 98.6 (31 Oct 2021 20:20)  HR: 93 (31 Oct 2021 20:20) (74 - 93)  BP: 117/81 (31 Oct 2021 20:20) (110/77 - 117/81)  BP(mean): --  RR: 18 (31 Oct 2021 20:20) (16 - 18)  SpO2: 98% (31 Oct 2021 20:20) (96% - 98%)    VSS  General: A&Ox3, NAD  LLE: Ringed external fixator in place w ace wrap; Pin site bolster dressings well appearing; Flap with vac in place holding suction without significant output  Vascular: Toes WWP; Cap refill < 2 sec  Sensation: Patient with abnormal/absent sensation over most of the dorsum & plantar aspects of the foot consistent with baseline  Motor: Minimal FHL/FDL, 0/5 EHL/EDL activity consistent with baseline    Labs:                         Ortho Note    Pt resting comfortably this AM  Denies CP, SOB, N/V, numbness/tingling     Vital Signs Last 24 Hrs  T(C): 37 (31 Oct 2021 20:20), Max: 37 (31 Oct 2021 20:20)  T(F): 98.6 (31 Oct 2021 20:20), Max: 98.6 (31 Oct 2021 20:20)  HR: 93 (31 Oct 2021 20:20) (74 - 93)  BP: 117/81 (31 Oct 2021 20:20) (110/77 - 117/81)  BP(mean): --  RR: 18 (31 Oct 2021 20:20) (16 - 18)  SpO2: 98% (31 Oct 2021 20:20) (96% - 98%)    VSS  General: A&Ox3, NAD  LLE: Ringed external fixator in place w ace wrap; Pin site bolster dressings well appearing; Flap with vac in place holding suction without significant output  Vascular: Toes WWP; Cap refill < 2 sec  Sensation: Patient with abnormal/absent sensation over most of the dorsum & plantar aspects of the foot consistent with baseline  Motor: Minimal FHL/FDL, 0/5 EHL/EDL activity consistent with baseline    Labs:                        10.2   6.50  )-----------( 461      ( 01 Nov 2021 06:59 )             31.4       11-01    136  |  100  |  10  ----------------------------<  88  3.9   |  24  |  0.73    Ca    9.7      01 Nov 2021 06:59    TPro  7.2  /  Alb  3.8  /  TBili  0.2  /  DBili  x   /  AST  69<H>  /  ALT  83<H>  /  AlkPhos  77  11-01

## 2023-07-20 NOTE — BRIEF OPERATIVE NOTE - SPECIMENS
Discharged discussed with patient, verbalized understanding. Pt discharged in stable condition with al belongings.   none

## 2023-08-14 NOTE — CONSULT NOTE ADULT - CONSULT REASON
[Time Spent: ___ minutes] : I have spent [unfilled] minutes of time on the encounter.
Left ankle pain
Antibiotic recommendations
co-mgmt

## 2023-09-21 NOTE — PATIENT PROFILE ADULT - IS PATIENT POST-MENOPAUSAL?
no Render In Strict Bullet Format?: No Initiate Treatment: clindamycin 1 % lotion \\nApply to face and back PRN as a spot treatment\\n\\nFinacea 15 % topical foam \\nApply to face and back QD Detail Level: Zone

## 2023-11-04 NOTE — PRE-OP CHECKLIST - NOTHING BY MOUTH SINCE
15-Sep-2021 23:59
08-Sep-2021 00:00
25-Oct-2021 00:00
20-Sep-2021 00:00
13-Sep-2021 23:59
12-Sep-2021 00:00
18-Oct-2021 23:59
0

## 2023-12-27 NOTE — PROGRESS NOTE ADULT - SUBJECTIVE AND OBJECTIVE BOX
CT imaging demonstrated signs of right-sided sinusitis which is likely causing your pain.  Please reach out to your ear nose and throat doctor to further discuss you can use over-the-counter decongestants for symptomatic relief as well as over-the-counter pain medications as well as your prescribed patient.  If you have severe fevers your worsening of symptoms please return to the ER.   Ortho Note    Surgery: s/p OR for RICK, I&D, wound vac left ankle on 9/9, repeat on 9/11, repeat 9/14  Repeat planned tomorrow 9/17  Patient seen and examined at bedside   Fatigued today, BM yesterday and today  c/o pain at IV site right hand     Pt comfortable without complaints, pain controlled  Denies CP, SOB, N/V, numbness/tingling     Vital Signs Last 24 Hrs  T(C): 36.7 (09-16-21 @ 14:30), Max: 36.7 (09-16-21 @ 14:30)  T(F): 98 (09-16-21 @ 14:30), Max: 98 (09-16-21 @ 14:30)  HR: 87 (09-16-21 @ 14:30) (87 - 87)  BP: 105/69 (09-16-21 @ 14:30) (105/69 - 105/69)  BP(mean): --  RR: 17 (09-16-21 @ 14:30) (17 - 17)  SpO2: 97% (09-16-21 @ 14:30) (97% - 97%)  AVSS    General: Pt Alert and oriented, NAD  Dressing C/D/I: splint LLE   Pulses: extremity warm, well perfused   Motor: EHL/FHL/TA/GS firing LLE         A/P: 26yFemale POD# s/p Left ankle RICK, I&D, wound vac left ankle on 9/9, repeat on 9/11, repeat 9/14    Planned for Repeat I&D, wound vac change left ankle tomorrow 9/17  Planned for wound closure with plastics on Monday 9/20  - Medically Stable  - c/w Pain Control  - DVT ppx: c/w SQH   - PT, WBS: NWB LLE  - NPO >2400, IVF, AM labs, Hcg AM, patient consented      Ortho Pager [phone]

## 2023-12-29 NOTE — DIETITIAN INITIAL EVALUATION ADULT. - REASON INDICATOR FOR ASSESSMENT
Recall colonoscopy pulled from IDX, 1st letter mailed  Affi hx of polyps, TA polyps, recall 3 years 1/21/2021      Anticipated LOS

## 2024-01-03 NOTE — ED PROVIDER NOTE - NS ED SCRIBE ACP NAME FT
Additional Notes: See Resident notes for details. As the Supervising Physician, I was available to provide review of procedures/encounters with feedback provided after care. The care was delivered, and I provided personal identifiable direction to the Resident who provided the care for this patient.
Render Risk Assessment In Note?: no
Detail Level: Simple
Sisi Robert (PA)

## 2024-01-30 NOTE — ASU PREOP CHECKLIST - SURGICAL CONSENT
Patient Quality Outreach    Patient is due for the following:   Physical Well Child Check      Topic Date Due    Flu Vaccine (1 of 2) Never done    COVID-19 Vaccine (3 - Pediatric 2023-24 season) 09/01/2023       Next Steps:   Schedule a Well Child Check    Type of outreach:    Sent letter.      Questions for provider review:    None           Arlette Willoughby [facility]  Chart routed to Care Team.     done

## 2024-02-02 NOTE — ED ADULT NURSE NOTE - MUSCULOSKELETAL ASSESSMENT
[facility] Hospitalist Progress Note      Subjective  Pt jaundiced, pain better, wife at bedside    Objective    Current Meds  Current Facility-Administered Medications   Medication Dose Route Frequency Provider Last Rate Last Admin    ciprofloxacin (CIPRO) 400 mg in dextrose 5% 200 mL IVPB  400 mg Intravenous On Call to Procedure Edmundo Harris MD        lactated ringers infusion   Intravenous Continuous Tonny Ndiaye  mL/hr at 02/02/24 1230 New Bag at 02/02/24 1230    [START ON 2/3/2024] indocyanine green (IC-GREEN) injection 2.5 mg  2.5 mg Intravenous Once Paulo Briceño MD        piperacillin-tazobactam (ZOSYN) 3.375 g in sodium chloride 0.9 % 100 mL IVPB  3.375 g Intravenous 3 times per day Tonny Ndiaye MD        [Held by provider] amLODIPine (NORVASC) tablet 5 mg  5 mg Oral Daily Earle Harris DO        [Held by provider] pantoprazole (PROTONIX) EC tablet 40 mg  40 mg Oral Daily Earle Harris DO        sodium chloride 0.9 % flush bag 25 mL  25 mL Intravenous PRN Earle Harris DO        sodium chloride 0.9 % injection 2 mL  2 mL Intracatheter 2 times per day Earle Harris DO   2 mL at 02/02/24 0813    [Held by provider] enoxaparin (LOVENOX) injection 40 mg  40 mg Subcutaneous Daily Earle Harris DO   40 mg at 02/01/24 1754    ondansetron (ZOFRAN ODT) disintegrating tablet 4 mg  4 mg Oral Q12H PRN Earle Harris DO   4 mg at 02/01/24 1835    Or    ondansetron (ZOFRAN) injection 4 mg  4 mg Intravenous Q12H PRN Earle Harris,         [Held by provider] lisinopril-hydroCHLOROthiazide 20-25 mg   Oral Daily Earle Harris,         pantoprazole (PROTONIX INJECT) injection 40 mg  40 mg Intravenous Nightly Earle Harris DO   40 mg at 02/01/24 2200    ketorolac (TORADOL) injection 15 mg  15 mg Intravenous Q6H PRN Earle Harris DO        traMADol (ULTRAM) tablet 50 mg  50 mg Oral Q6H PRN Earle Harris DO   50 mg at 02/01/24 2494        I/O's    Intake/Output Summary (Last 24 hours)  at 2/2/2024 1459  Last data filed at 2/2/2024 0600  Gross per 24 hour   Intake --   Output 700 ml   Net -700 ml       Last Recorded Vitals  Vitals with min/max:    Vital Last Value 24 Hour Range   Temperature 99 °F (37.2 °C) (02/02/24 0419) Temp  Min: 97.2 °F (36.2 °C)  Max: 100.9 °F (38.3 °C)   Pulse 86 (02/02/24 0419) Pulse  Min: 79  Max: 89   Respiratory 18 (02/01/24 2316) Resp  Min: 14  Max: 20   Non-Invasive  Blood Pressure (!) 142/65 (02/02/24 0419) BP  Min: 142/65  Max: 174/72   Pulse Oximetry 93 % (02/02/24 0419) SpO2  Min: 92 %  Max: 95 %   Arterial   Blood Pressure   No data recorded        Body mass index is 33.25 kg/m².    Physical Exam   GENERAL:  In no apparent distress  CHEST:  Chest with clear breath sounds bilaterally.   CARDIAC:  Regular rate and rhythm.  S1 and S2  VASCULAR:  No Edema.  Peripheral pulses normal and equal in all extremities  ABDOMEN:  epigastric tenderness  MUSCULOSKELETAL:  Good range of motion of all major joints. no calf tenderness    PSYCH:  no depression or anxiety     Labs   .  Recent Labs   Lab 02/02/24  0647 02/01/24  1332 02/01/24  0706   SODIUM 133* 132* 131*   CHLORIDE 97 95* 93*   CO2 28 28 27   BUN 13 9 10   CREATININE 0.89 0.70 0.72   CALCIUM 9.2 9.0 9.0   ALBUMIN 2.5* 2.8* 2.9*   BILIRUBIN 11.5* 10.2* 9.4*   ALKPT 163* 192* 197*   * 202* 231*   AST 51* 74* 90*   GLUCOSE 104* 111* 109*    .  Intake/Output Summary (Last 24 hours) at 2/2/2024 1451  Last data filed at 2/2/2024 0600  Gross per 24 hour   Intake --   Output 700 ml   Net -700 ml      WBC (K/mcL)   Date Value   02/02/2024 12.7 (H)     RBC (mil/mcL)   Date Value   02/02/2024 4.56     HCT (%)   Date Value   02/02/2024 39.2     HGB (g/dL)   Date Value   02/02/2024 13.1     PLT (K/mcL)   Date Value   02/02/2024 113 (L)       Imaging  MRI MRCP AND ABDOMEN W WO CONTRAST    Result Date: 2/2/2024  Examination: MRI MRCP AND ABDOMEN W WO CONTRAST Clinical Information: Biliary obstruction Comparison: CTA chest  abdomen pelvis 2/1/2024 Technique: Multiplanar multi-sequential MRI of the abdomen was performed before and after administration of intravenous contrast. The dose and type of IV contrast utilized for this examination are recorded in the imaging encounter of the patient's medical record. MRCP sequences were performed. 3D reconstructions of the MRCP images were performed with maximum intensity projection by a trained technologist on the acquisition scanner workstation. Findings: The liver is normal in size and morphology with a few mild cysts. The spleen and adrenals are unremarkable. Few tiny renal cysts but otherwise unremarkable kidneys. The pancreas is somewhat enlarged, hypoenhancing and surrounded by fat stranding as on recent prior CT. The gallbladder is either contracted or absent with a large cystic duct remnant. This duct remnant or contracted gallbladder is filled with low signal filling defects compatible with stones. There is borderline dilation of the common bile duct. This does not taper distally as expected and there is a 5 mm stone in the distal duct at the level of the ampulla.     Impression: 5 mm obstructing stone in the distal common bile duct near the ampulla. Acute interstitial edematous pancreatitis. Contracted gallbladder versus large cystic duct stone with multiple gallstones. Dictated by: JANI MORRIS M.D. Dictated on: 2/1/2024 10:20 PM IJACKSON M.D., have reviewed the images and report and concur with these findings interpreted by JANI MORRIS M.D.. Electronically Signed by: JACKSON WONG M.D. Signed on: 2/2/2024 8:17 AM Workstation ID: [id number]    CTA CHEST ABDOMEN PELVIS    Result Date: 2/1/2024  EXAM: CTA CHEST ABDOMEN PELVIS CLINICAL INDICATION: chest pain, abd pain, radiating to back COMPARISON: None TECHNIQUE: Helical CTA was performed of the chest, abdomen and pelvis with IV contrast, with axial, coronal, and sagittal reconstructions performed and provided for review.  Noncontrast imaging of the chest and abdomen was also included. Automated exposure control was utilized. 3D reconstructions (using MIP, volume rendering, and/or other techniques) were generated on acquisition workstation. FINDINGS: Chest: HEART: The heart is normal in size. There is no pericardial effusion. LYMPH NODES/MEDIASTINUM: There is no mediastinal, hilar, or axillary lymphadenopathy by CT size criteria. The esophagus is unremarkable. The visualized thyroid gland is grossly unremarkable. The trachea and central airways are adequately patent. LUNGS: There is no consolidation, pleural effusion, or pneumothorax. Mild bandlike atelectasis and scarring in both lungs. No suspicious pulmonary nodules are identified. Abdomen/pelvis: LIVER: Normal. BILIARY TREE AND GALLBLADDER: Contracted gallbladder, dilated CBD up to 1.4 cm. PANCREAS: Acute interstitial edematous pancreatitis involving the entire pancreas with no evidence of necrosis or peripancreatic fluid collection. SPLEEN: Normal. ADRENAL GLANDS: Normal. KIDNEYS AND URETERS: Normal. BLADDER: Normal. REPRODUCTIVE ORGANS: Unremarkable prostate. Suspect retracted right testis in the right inguinal canal. GI TRACT: No bowel obstruction or acute inflammation. Appendix is not confidently seen. Mild colonic diverticulosis. PERITONEUM AND RETROPERITONEUM: No free air or pathologic free fluid. LYMPH NODES: No lymphadenopathy. Chest/abdomen/pelvis: VESSELS: No evidence of aortic dissection, rupture, acute intramural hematoma, occlusion, or other acute aortic process. There is overall moderate diffuse atherosclerotic disease. The thoracic aorta is normal in course and caliber. Patent aortic arch branch vessels. Mild coronary calcifications. The abdominal aorta is normal in course and caliber.  Patent celiac artery and its major branches. Patent SMA and its major branches.  Patent DAKOTA. Patent bilateral renal arteries.  Patent bilateral common and external iliac arteries and  visualized femoral arterial branches. Limited evaluation of the pulmonary arteries is without gross abnormality. A portosystemic varicosity connects the splenic vein to the left internal iliac vein, with the main portal vein appearing normal in caliber. BODY WALL: Small fat-containing left inguinal hernia. BONES: Mild spinal degeneration.     1.   No aortic dissection or other acute arterial pathology. Moderate atherosclerosis. 2.   Acute interstitial edematous pancreatitis. 3.   Dilated common bile duct to 1.4 cm. Recommend MRCP or ERCP to assess for distal obstruction, particularly given patient's abnormal LFTs. 4.   A portosystemic shunt is present indicating portal hypertension, though the liver and portal vein appear normal as imaged. Electronically Signed by: JESSENIA ORDONEZ M.D. Signed on: 2/1/2024 9:32 AM Workstation ID: [id number]    XR CHEST AP OR PA    Result Date: 2/1/2024  EXAM: XR CHEST AP OR PA CLINICAL INDICATION: Chest pain COMPARISON: None.     FINDINGS AND IMPRESSION: Cardiomediastinal silhouette is normal in size and contour. Linear opacity in the right lower lobe probably represents atelectasis. No focal consolidation, pleural effusion, or detectable pneumothorax. Electronically Signed by: HARVEY RODRIGUES M.D. Signed on: 2/1/2024 8:17 AM Workstation ID: [id number]         Assessment/Plan  71-year-old male with a past medical history significant for hypertension and GERD who presents with obstructive jaundice and acute pancreatitis  -Patient with abnormal LFTs total bili of 10.2>11.5, AST of 74>51, ALT of 202,>137 alk phos of 192>163, albumin of 2.8  -Lipase 1947  -CT of the chest abdomen pelvis consistent with moderate atherosclerosis and acute interstitial edematous pancreatitis with a dilated CBD of 1.4 cm  -N.p.o. IV fluids, pain control, IV Protonix  MRCP w/  CBD stone, for ERCP today  - pt was seen and examined by gen surg and plan OR for fabrice on 2/3/24, added  zosyn  #Hypokalemia/hypomag  -Continue to replete and monitor  #5 to 7 pound weight loss  -Will continue to monitor  #Hypertension  -We will cover on IV hydralazine during hospitalization and resume once patient able to tolerate orals on Norvasc  ERCP Today and OR in am         DVT Prophylaxis  SQL held for ERCP,     Code Status  Code Status Information       Code Status    Full Resuscitation             PCP  Johnathon Cerda MD Azmina Bhaiji, MD  2/2/2024 2:59 PM           - - -

## 2024-02-14 NOTE — PHYSICAL THERAPY INITIAL EVALUATION ADULT - LEVEL OF INDEPENDENCE, REHAB EVAL
[facility] Medicine  Progress Note    Patient Name: Israel De Jesus  MRN: [id number]  Patient Class: IP- Inpatient   Admission Date: 2/13/2024  Length of Stay: 1 days  Attending Physician: Yusra Gonzalez MD  Primary Care Provider: Nuha Lynn MD      Subjective:     Principal Problem:Humerus fracture      HPI:    Israel De Jesus is a 62 y.o. male who has a past medical history of Eye injury, Generalized anxiety disorder, Hypertension, and Sciatica, presented to the ED with CC of R arm pain after a Fall, and Suicidal Ideations.     Patient states that his cat was jumping all over him and caused a mechanical fall onto carpeted ground, resulting in his right arm pain. This happened yesterday about 330 p.m. and has waited about 24 hours before coming into hospital. Patient was PEC'd in ED for S.I. Patient states he was not depressed a week or month ago. That his depression and suicidal thoughts began when he broke his arm and thought about not being able to work and potentially losing his job (he works as  at [employer]). He does not have a plan of suicide and has not imagined it. EtOH is still 312 now, unclear if he was trying to dull the pain, or if alcohol is a chronic problem for him. He does have a a low albumin of 2.6, mildly elevated AST at 45 (but also with muscle injury), and macrocytic anemia with  - which are all consistent with chronic alcoholism (daughter later confirmed he is recovering alcoholic and has been drinking for past 4 months). Xray of humerus showed an acute displaced and foreshortened fracture of the proximal humerus metadiaphysis. Arm wrapped and slinged in ED. Case discussed with Orthopedic surgery, patient is NPO for likely procedure tomorrow morning.        Overview/Hospital Course:  No notes on file    Interval History: NAEON. Continues to have uncontrolled pain of R arm. Otherwise, no acute concerns.     Review of Systems   Respiratory:   Negative for shortness of breath.    Cardiovascular:  Negative for chest pain.   Gastrointestinal:  Negative for abdominal pain.   Genitourinary:  Negative for dysuria.   Musculoskeletal:  Positive for arthralgias (R arm).   Neurological:  Negative for headaches.     Objective:     Vital Signs (Most Recent):  Temp: 98.6 °F (37 °C) (02/14/24 1626)  Pulse: 110 (02/14/24 1626)  Resp: 18 (02/14/24 1626)  BP: (!) 167/90 (02/14/24 1626)  SpO2: 97 % (02/14/24 1626) Vital Signs (24h Range):  Temp:  [97.9 °F (36.6 °C)-99.1 °F (37.3 °C)] 98.6 °F (37 °C)  Pulse:  [] 110  Resp:  [13-20] 18  SpO2:  [95 %-100 %] 97 %  BP: (133-195)/() 167/90     Weight: 72.6 kg (160 lb)  Body mass index is 25.82 kg/m².    Intake/Output Summary (Last 24 hours) at 2/14/2024 1646  Last data filed at 2/14/2024 1512  Gross per 24 hour   Intake 440 ml   Output 400 ml   Net 40 ml         Physical Exam  Vitals and nursing note reviewed.   Constitutional:       General: He is not in acute distress.     Appearance: He is well-developed. He is obese.   HENT:      Head: Normocephalic and atraumatic.   Eyes:      Conjunctiva/sclera: Conjunctivae normal.   Neck:      Vascular: No JVD.   Cardiovascular:      Rate and Rhythm: Normal rate and regular rhythm.      Heart sounds: Normal heart sounds.   Pulmonary:      Effort: Pulmonary effort is normal.      Breath sounds: Normal breath sounds.   Abdominal:      General: Bowel sounds are normal. There is no distension.      Palpations: Abdomen is soft.      Tenderness: There is no abdominal tenderness.   Musculoskeletal:         General: Signs of injury (R arm in sling) present.      Cervical back: Neck supple.      Right lower leg: No edema.      Left lower leg: No edema.   Neurological:      Mental Status: He is alert.   Psychiatric:         Behavior: Behavior normal.             Significant Labs: All pertinent labs within the past 24 hours have been reviewed.  CBC:   Recent Labs   Lab 02/13/24  1740  02/14/24  0512   WBC 8.20 9.30   HGB 11.6* 10.5*   HCT 34.1* 31.5*    229     CMP:   Recent Labs   Lab 02/13/24  1740 02/14/24  0512    139   K 3.2* 3.8    111*   CO2 24 23   GLU 92 110   BUN 7* 10   CREATININE 0.9 0.8   CALCIUM 8.7 8.1*   PROT 6.2 5.7*   ALBUMIN 2.6* 2.4*   BILITOT 0.3 0.4   ALKPHOS 126 133   AST 45* 37   ALT 19 15   ANIONGAP 8 5*       Significant Imaging: I have reviewed all pertinent imaging results/findings within the past 24 hours.    Assessment/Plan:      * Humerus fracture  S/p fall  Suspect cat jumping on him is a deflection of him being drunk and falling  Xray of humerus showed an acute displaced and foreshortened fracture of the proximal humerus metadiaphysis.  Ortho consulted. Recs reviewed:  noted to have radial nerve palsy prior to splinting. I agree that surgery for open reduction internal fixation is likely the best choice for him. Is not emergent and can be scheduled. He reports he has no medical coverage in his currently under pec. I think it is best to try to handle this on this admission. We will schedule him for surgery for Friday for ORIF of right humerus. 2-3 months before he is able to use his arm for heavy lifting.      Alcohol abuse  Patient says he does not drink everyday; used to  Now has a beverage intermittently; last drink Tuesday evening.  Has about 2 or 3 mixed drinks; does not get drunk    Scheduled Librium; will taper   CIWA protocol       Macrocytic anemia  Stable. Trend.     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   He does not have a plan of suicide and has not imagined it  His S.I. seems to be stimulated directly from his injury, therefore, felt starting SSRI less appropriate and giving  him time to process the event and make plans with his job would be best, initially.  PEC'd in ED  Tele-Psych consulted. Recs reviewed:  Continue PEC for IP stabilization.    Will transfer to IP psych once medically cleared       Generalized anxiety disorder  Was on Cymbalta, not taken in months  Monitor       Tobacco use disorder  Nicotine patch   4m counseling routinely: The following was discussed concerning tobacco use:  Relevance of Quitting  Risk to Health  Long Term Risk  Risk for Others  Rewards of Quitting  Motivation Intervention to Quit    Hypertension  Uncontrolled.   Home meds resumed. Control pain.  Trend      VTE Risk Mitigation (From admission, onward)           Ordered     enoxaparin injection 40 mg  Daily         02/13/24 2218     IP VTE LOW RISK PATIENT  Once         02/13/24 2218                    Discharge Planning   BRANDON:      Code Status: Prior   Is the patient medically ready for discharge?:     Reason for patient still in hospital (select all that apply): Patient trending condition, Treatment, and Consult recommendations             Yusra Gonzalez MD  Department of Hospital Medicine   [facility] Surg     supervision

## 2024-04-07 NOTE — PROVIDER CONTACT NOTE (OTHER) - RECOMMENDATIONS
-chronic  -controlled  -continue home metoprolol  -dose/medication adjustment as appropriate   -monitor   
To have the team come to bedside to evaluate the PICC line. Recommend a Heparin flush.
MD Cote will come to assess patient.

## 2024-05-06 NOTE — PATIENT PROFILE ADULT - HAS THE PATIENT USED TOBACCO IN THE PAST 30 DAYS?
Follows with weight management   Current medications: Naltrexone 50mg   Continue current medication regimen    Yes

## 2024-05-13 NOTE — PROGRESS NOTE ADULT - ASSESSMENT
26F [place] p/w infection and non-union now s/p RICK, arthrotomy, neuroma excision, L ankle ligament repair, and I/D w bone debridement w Dr. Wallis 9/10, Repeat I&D and vac change 9/12  -- surgical culture growing MSSA and strep anginosis - currently on IV CTX and Nafcillin    #Post-op state - pain is controlled. PPx: SQH. 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 Currently on IV CTX 2g q24h and Nafcillin 1g q4h. BCx 9/7 NGTD. Surgical cultures from OR 9/9 w MSSA and strep anginosus. ID is following.   #Blood loss anemia - stable at 10.6. Likely operative loss w element of dilution from IVF. Pt is asymptomatic. Continue to follow  #Leukocytosis - 12 from 7. Likely reactive. Pt afebrile and non-toxica ppearing.   #Tobacco use - intermittently - pt counseled to stop smoking  VTE ppx: SQH    Plan  -F/U ID recs  -plan to return to OR for debridement, vac change TUE 9/14; Debridement and grafting Bryan 9/19  -Plastic surgery have been consulted; f/u recs. Noted to have neuroma which was excised in OR    DISPO: TBD    DISPO: TBD  Pt will need to establish PMD on discharge  Pt lives in 2nd floor walk-up apartment.   Orientation to room/Bed in low position, brakes on/Call light is within reach, educate patient/family on its functionality/Patient and family education available to parents and patient/Educate patient/parents of falls protocol precautions/Check patient minimum every 1 hour
